# Patient Record
Sex: FEMALE | Race: WHITE | Employment: OTHER | URBAN - METROPOLITAN AREA
[De-identification: names, ages, dates, MRNs, and addresses within clinical notes are randomized per-mention and may not be internally consistent; named-entity substitution may affect disease eponyms.]

---

## 2017-01-01 ENCOUNTER — TELEPHONE (OUTPATIENT)
Dept: NURSING | Facility: CLINIC | Age: 67
End: 2017-01-01

## 2017-01-01 ENCOUNTER — TELEPHONE (OUTPATIENT)
Dept: FAMILY MEDICINE | Facility: CLINIC | Age: 67
End: 2017-01-01

## 2017-01-01 ENCOUNTER — APPOINTMENT (OUTPATIENT)
Dept: GENERAL RADIOLOGY | Facility: CLINIC | Age: 67
DRG: 207 | End: 2017-01-01
Payer: MEDICARE

## 2017-01-01 ENCOUNTER — CARE COORDINATION (OUTPATIENT)
Dept: CARDIOLOGY | Facility: CLINIC | Age: 67
End: 2017-01-01

## 2017-01-01 ENCOUNTER — APPOINTMENT (OUTPATIENT)
Dept: GENERAL RADIOLOGY | Facility: CLINIC | Age: 67
DRG: 871 | End: 2017-01-01
Attending: HOSPITALIST
Payer: MEDICARE

## 2017-01-01 ENCOUNTER — APPOINTMENT (OUTPATIENT)
Dept: GENERAL RADIOLOGY | Facility: CLINIC | Age: 67
DRG: 207 | End: 2017-01-01
Attending: INTERNAL MEDICINE
Payer: MEDICARE

## 2017-01-01 ENCOUNTER — APPOINTMENT (OUTPATIENT)
Dept: ULTRASOUND IMAGING | Facility: CLINIC | Age: 67
DRG: 871 | End: 2017-01-01
Payer: MEDICARE

## 2017-01-01 ENCOUNTER — CARE COORDINATION (OUTPATIENT)
Dept: GERIATRIC MEDICINE | Facility: CLINIC | Age: 67
End: 2017-01-01

## 2017-01-01 ENCOUNTER — APPOINTMENT (OUTPATIENT)
Dept: MEDSURG UNIT | Facility: CLINIC | Age: 67
End: 2017-01-01
Attending: RADIOLOGY
Payer: MEDICARE

## 2017-01-01 ENCOUNTER — CARE COORDINATION (OUTPATIENT)
Dept: CARE COORDINATION | Facility: CLINIC | Age: 67
End: 2017-01-01

## 2017-01-01 ENCOUNTER — APPOINTMENT (OUTPATIENT)
Dept: GENERAL RADIOLOGY | Facility: CLINIC | Age: 67
DRG: 871 | End: 2017-01-01
Payer: MEDICARE

## 2017-01-01 ENCOUNTER — APPOINTMENT (OUTPATIENT)
Dept: CT IMAGING | Facility: CLINIC | Age: 67
DRG: 207 | End: 2017-01-01
Attending: INTERNAL MEDICINE
Payer: MEDICARE

## 2017-01-01 ENCOUNTER — APPOINTMENT (OUTPATIENT)
Dept: ULTRASOUND IMAGING | Facility: CLINIC | Age: 67
DRG: 917 | End: 2017-01-01
Attending: EMERGENCY MEDICINE
Payer: MEDICARE

## 2017-01-01 ENCOUNTER — HOSPITAL ENCOUNTER (INPATIENT)
Facility: CLINIC | Age: 67
LOS: 3 days | Discharge: NURSING, REHAB OR LONG-TERM CARE | DRG: 208 | End: 2017-01-10
Attending: EMERGENCY MEDICINE | Admitting: ORTHOPAEDIC SURGERY
Payer: MEDICARE

## 2017-01-01 ENCOUNTER — HOSPITAL ENCOUNTER (INPATIENT)
Facility: CLINIC | Age: 67
LOS: 5 days | DRG: 207 | End: 2017-04-23
Attending: EMERGENCY MEDICINE | Admitting: INTERNAL MEDICINE
Payer: MEDICARE

## 2017-01-01 ENCOUNTER — APPOINTMENT (OUTPATIENT)
Dept: CT IMAGING | Facility: CLINIC | Age: 67
DRG: 871 | End: 2017-01-01
Attending: EMERGENCY MEDICINE
Payer: MEDICARE

## 2017-01-01 ENCOUNTER — OFFICE VISIT (OUTPATIENT)
Dept: FAMILY MEDICINE | Facility: CLINIC | Age: 67
End: 2017-01-01
Payer: MEDICARE

## 2017-01-01 ENCOUNTER — APPOINTMENT (OUTPATIENT)
Dept: GENERAL RADIOLOGY | Facility: CLINIC | Age: 67
DRG: 871 | End: 2017-01-01
Attending: PEDIATRICS
Payer: MEDICARE

## 2017-01-01 ENCOUNTER — DOCUMENTATION ONLY (OUTPATIENT)
Dept: OTHER | Facility: CLINIC | Age: 67
End: 2017-01-01

## 2017-01-01 ENCOUNTER — HOSPITAL ENCOUNTER (INPATIENT)
Facility: CLINIC | Age: 67
LOS: 6 days | Discharge: HOME HEALTH | DRG: 207 | End: 2017-02-03
Attending: EMERGENCY MEDICINE | Admitting: ORTHOPAEDIC SURGERY
Payer: MEDICARE

## 2017-01-01 ENCOUNTER — APPOINTMENT (OUTPATIENT)
Dept: GENERAL RADIOLOGY | Facility: CLINIC | Age: 67
DRG: 207 | End: 2017-01-01
Attending: EMERGENCY MEDICINE
Payer: MEDICARE

## 2017-01-01 ENCOUNTER — MEDICAL CORRESPONDENCE (OUTPATIENT)
Dept: HEALTH INFORMATION MANAGEMENT | Facility: CLINIC | Age: 67
End: 2017-01-01

## 2017-01-01 ENCOUNTER — TELEPHONE (OUTPATIENT)
Dept: OTHER | Facility: CLINIC | Age: 67
End: 2017-01-01

## 2017-01-01 ENCOUNTER — TELEPHONE (OUTPATIENT)
Dept: INTERNAL MEDICINE | Facility: CLINIC | Age: 67
End: 2017-01-01

## 2017-01-01 ENCOUNTER — APPOINTMENT (OUTPATIENT)
Dept: GENERAL RADIOLOGY | Facility: CLINIC | Age: 67
DRG: 917 | End: 2017-01-01
Attending: EMERGENCY MEDICINE
Payer: MEDICARE

## 2017-01-01 ENCOUNTER — HOSPITAL ENCOUNTER (INPATIENT)
Facility: CLINIC | Age: 67
LOS: 15 days | Discharge: HOME HEALTH | DRG: 871 | End: 2017-04-05
Attending: EMERGENCY MEDICINE | Admitting: SURGERY
Payer: MEDICARE

## 2017-01-01 ENCOUNTER — APPOINTMENT (OUTPATIENT)
Dept: CT IMAGING | Facility: CLINIC | Age: 67
DRG: 871 | End: 2017-01-01
Payer: MEDICARE

## 2017-01-01 ENCOUNTER — APPOINTMENT (OUTPATIENT)
Dept: PHYSICAL THERAPY | Facility: CLINIC | Age: 67
DRG: 917 | End: 2017-01-01
Attending: NURSE PRACTITIONER
Payer: MEDICARE

## 2017-01-01 ENCOUNTER — APPOINTMENT (OUTPATIENT)
Dept: MRI IMAGING | Facility: CLINIC | Age: 67
DRG: 207 | End: 2017-01-01
Attending: INTERNAL MEDICINE
Payer: MEDICARE

## 2017-01-01 ENCOUNTER — HOSPITAL ENCOUNTER (INPATIENT)
Facility: CLINIC | Age: 67
LOS: 4 days | Discharge: HOME HEALTH | DRG: 207 | End: 2017-01-24
Attending: EMERGENCY MEDICINE | Admitting: INTERNAL MEDICINE
Payer: MEDICARE

## 2017-01-01 ENCOUNTER — APPOINTMENT (OUTPATIENT)
Dept: GENERAL RADIOLOGY | Facility: CLINIC | Age: 67
DRG: 917 | End: 2017-01-01
Attending: INTERNAL MEDICINE
Payer: MEDICARE

## 2017-01-01 ENCOUNTER — APPOINTMENT (OUTPATIENT)
Dept: CT IMAGING | Facility: CLINIC | Age: 67
DRG: 917 | End: 2017-01-01
Attending: EMERGENCY MEDICINE
Payer: MEDICARE

## 2017-01-01 ENCOUNTER — OFFICE VISIT (OUTPATIENT)
Dept: BEHAVIORAL HEALTH | Facility: CLINIC | Age: 67
End: 2017-01-01
Payer: MEDICARE

## 2017-01-01 ENCOUNTER — APPOINTMENT (OUTPATIENT)
Dept: GENERAL RADIOLOGY | Facility: CLINIC | Age: 67
End: 2017-01-01
Attending: INTERNAL MEDICINE
Payer: MEDICARE

## 2017-01-01 ENCOUNTER — APPOINTMENT (OUTPATIENT)
Dept: LAB | Facility: CLINIC | Age: 67
End: 2017-01-01
Attending: STUDENT IN AN ORGANIZED HEALTH CARE EDUCATION/TRAINING PROGRAM
Payer: MEDICARE

## 2017-01-01 ENCOUNTER — DOCUMENTATION ONLY (OUTPATIENT)
Dept: CARE COORDINATION | Facility: CLINIC | Age: 67
End: 2017-01-01

## 2017-01-01 ENCOUNTER — APPOINTMENT (OUTPATIENT)
Dept: ULTRASOUND IMAGING | Facility: CLINIC | Age: 67
DRG: 207 | End: 2017-01-01
Attending: PHYSICIAN ASSISTANT
Payer: MEDICARE

## 2017-01-01 ENCOUNTER — ALLIED HEALTH/NURSE VISIT (OUTPATIENT)
Dept: FAMILY MEDICINE | Facility: CLINIC | Age: 67
End: 2017-01-01
Payer: MEDICARE

## 2017-01-01 ENCOUNTER — APPOINTMENT (OUTPATIENT)
Dept: ULTRASOUND IMAGING | Facility: CLINIC | Age: 67
DRG: 208 | End: 2017-01-01
Attending: INTERNAL MEDICINE
Payer: MEDICARE

## 2017-01-01 ENCOUNTER — HOSPITAL ENCOUNTER (INPATIENT)
Facility: CLINIC | Age: 67
LOS: 4 days | Discharge: HOME HEALTH | DRG: 917 | End: 2017-01-06
Attending: EMERGENCY MEDICINE | Admitting: HOSPITALIST
Payer: MEDICARE

## 2017-01-01 ENCOUNTER — APPOINTMENT (OUTPATIENT)
Dept: GENERAL RADIOLOGY | Facility: CLINIC | Age: 67
DRG: 871 | End: 2017-01-01
Attending: INTERNAL MEDICINE
Payer: MEDICARE

## 2017-01-01 ENCOUNTER — ANESTHESIA EVENT (OUTPATIENT)
Dept: SURGERY | Facility: CLINIC | Age: 67
End: 2017-01-01
Payer: MEDICARE

## 2017-01-01 ENCOUNTER — APPOINTMENT (OUTPATIENT)
Dept: GENERAL RADIOLOGY | Facility: CLINIC | Age: 67
DRG: 208 | End: 2017-01-01
Attending: EMERGENCY MEDICINE
Payer: MEDICARE

## 2017-01-01 ENCOUNTER — APPOINTMENT (OUTPATIENT)
Dept: GENERAL RADIOLOGY | Facility: CLINIC | Age: 67
DRG: 871 | End: 2017-01-01
Attending: EMERGENCY MEDICINE
Payer: MEDICARE

## 2017-01-01 ENCOUNTER — APPOINTMENT (OUTPATIENT)
Dept: INTERVENTIONAL RADIOLOGY/VASCULAR | Facility: CLINIC | Age: 67
End: 2017-01-01
Attending: NURSE PRACTITIONER
Payer: MEDICARE

## 2017-01-01 ENCOUNTER — OFFICE VISIT (OUTPATIENT)
Dept: PHARMACY | Facility: CLINIC | Age: 67
End: 2017-01-01
Payer: COMMERCIAL

## 2017-01-01 ENCOUNTER — APPOINTMENT (OUTPATIENT)
Dept: GENERAL RADIOLOGY | Facility: CLINIC | Age: 67
DRG: 208 | End: 2017-01-01
Attending: INTERNAL MEDICINE
Payer: MEDICARE

## 2017-01-01 ENCOUNTER — HOSPITAL ENCOUNTER (OUTPATIENT)
Facility: CLINIC | Age: 67
Setting detail: OBSERVATION
Discharge: HOME HEALTH | End: 2017-02-17
Attending: RADIOLOGY | Admitting: INTERNAL MEDICINE
Payer: MEDICARE

## 2017-01-01 ENCOUNTER — ANESTHESIA (OUTPATIENT)
Dept: SURGERY | Facility: CLINIC | Age: 67
End: 2017-01-01
Payer: MEDICARE

## 2017-01-01 VITALS
OXYGEN SATURATION: 97 % | RESPIRATION RATE: 16 BRPM | SYSTOLIC BLOOD PRESSURE: 137 MMHG | HEART RATE: 116 BPM | HEIGHT: 64 IN | DIASTOLIC BLOOD PRESSURE: 65 MMHG | BODY MASS INDEX: 22.77 KG/M2 | TEMPERATURE: 97.7 F | WEIGHT: 133.4 LBS

## 2017-01-01 VITALS
HEIGHT: 64 IN | WEIGHT: 122 LBS | TEMPERATURE: 96.8 F | RESPIRATION RATE: 20 BRPM | HEART RATE: 134 BPM | OXYGEN SATURATION: 98 % | BODY MASS INDEX: 20.83 KG/M2 | DIASTOLIC BLOOD PRESSURE: 84 MMHG | SYSTOLIC BLOOD PRESSURE: 147 MMHG

## 2017-01-01 VITALS — SYSTOLIC BLOOD PRESSURE: 161 MMHG | HEART RATE: 88 BPM | DIASTOLIC BLOOD PRESSURE: 83 MMHG | OXYGEN SATURATION: 97 %

## 2017-01-01 VITALS — RESPIRATION RATE: 14 BRPM | HEART RATE: 96 BPM | OXYGEN SATURATION: 97 %

## 2017-01-01 VITALS
HEART RATE: 90 BPM | BODY MASS INDEX: 24.87 KG/M2 | TEMPERATURE: 98.3 F | RESPIRATION RATE: 14 BRPM | HEIGHT: 60 IN | SYSTOLIC BLOOD PRESSURE: 94 MMHG | WEIGHT: 126.7 LBS | DIASTOLIC BLOOD PRESSURE: 58 MMHG | OXYGEN SATURATION: 96 %

## 2017-01-01 VITALS
HEART RATE: 86 BPM | DIASTOLIC BLOOD PRESSURE: 76 MMHG | OXYGEN SATURATION: 98 % | RESPIRATION RATE: 16 BRPM | SYSTOLIC BLOOD PRESSURE: 116 MMHG | TEMPERATURE: 98.6 F

## 2017-01-01 VITALS
OXYGEN SATURATION: 98 % | WEIGHT: 130.07 LBS | HEIGHT: 64 IN | SYSTOLIC BLOOD PRESSURE: 106 MMHG | RESPIRATION RATE: 12 BRPM | TEMPERATURE: 98.6 F | BODY MASS INDEX: 22.21 KG/M2 | DIASTOLIC BLOOD PRESSURE: 60 MMHG

## 2017-01-01 VITALS
DIASTOLIC BLOOD PRESSURE: 64 MMHG | RESPIRATION RATE: 14 BRPM | OXYGEN SATURATION: 99 % | SYSTOLIC BLOOD PRESSURE: 104 MMHG | HEART RATE: 92 BPM

## 2017-01-01 VITALS
DIASTOLIC BLOOD PRESSURE: 76 MMHG | OXYGEN SATURATION: 96 % | RESPIRATION RATE: 16 BRPM | HEART RATE: 88 BPM | SYSTOLIC BLOOD PRESSURE: 132 MMHG

## 2017-01-01 VITALS
BODY MASS INDEX: 21.08 KG/M2 | WEIGHT: 123.46 LBS | OXYGEN SATURATION: 100 % | TEMPERATURE: 98.7 F | HEIGHT: 64 IN | RESPIRATION RATE: 16 BRPM | DIASTOLIC BLOOD PRESSURE: 75 MMHG | SYSTOLIC BLOOD PRESSURE: 139 MMHG

## 2017-01-01 VITALS
RESPIRATION RATE: 14 BRPM | DIASTOLIC BLOOD PRESSURE: 68 MMHG | OXYGEN SATURATION: 100 % | SYSTOLIC BLOOD PRESSURE: 132 MMHG | HEART RATE: 98 BPM

## 2017-01-01 VITALS
HEART RATE: 81 BPM | SYSTOLIC BLOOD PRESSURE: 98 MMHG | DIASTOLIC BLOOD PRESSURE: 58 MMHG | RESPIRATION RATE: 16 BRPM | OXYGEN SATURATION: 98 %

## 2017-01-01 VITALS
HEIGHT: 64 IN | HEART RATE: 122 BPM | DIASTOLIC BLOOD PRESSURE: 61 MMHG | OXYGEN SATURATION: 96 % | SYSTOLIC BLOOD PRESSURE: 103 MMHG | WEIGHT: 133.38 LBS | RESPIRATION RATE: 20 BRPM | TEMPERATURE: 97.7 F | BODY MASS INDEX: 22.77 KG/M2

## 2017-01-01 VITALS
OXYGEN SATURATION: 13 % | SYSTOLIC BLOOD PRESSURE: 127 MMHG | HEART RATE: 87 BPM | HEIGHT: 60 IN | DIASTOLIC BLOOD PRESSURE: 90 MMHG | WEIGHT: 120.59 LBS | BODY MASS INDEX: 23.68 KG/M2 | TEMPERATURE: 98.5 F

## 2017-01-01 DIAGNOSIS — K52.9 FREQUENT STOOLS: ICD-10-CM

## 2017-01-01 DIAGNOSIS — K59.00 CONSTIPATION, UNSPECIFIED CONSTIPATION TYPE: ICD-10-CM

## 2017-01-01 DIAGNOSIS — Z87.891 PERSONAL HISTORY OF TOBACCO USE, PRESENTING HAZARDS TO HEALTH: ICD-10-CM

## 2017-01-01 DIAGNOSIS — E56.9 VITAMIN DEFICIENCY: ICD-10-CM

## 2017-01-01 DIAGNOSIS — F41.1 GENERALIZED ANXIETY DISORDER: ICD-10-CM

## 2017-01-01 DIAGNOSIS — J96.22 ACUTE AND CHRONIC RESPIRATORY FAILURE WITH HYPERCAPNIA (H): ICD-10-CM

## 2017-01-01 DIAGNOSIS — Z99.11 ENCOUNTER FOR WEANING FROM RESPIRATOR (H): ICD-10-CM

## 2017-01-01 DIAGNOSIS — T40.2X5A CONSTIPATION DUE TO OPIOID THERAPY: ICD-10-CM

## 2017-01-01 DIAGNOSIS — J96.21 ACUTE ON CHRONIC RESPIRATORY FAILURE WITH HYPOXEMIA (H): ICD-10-CM

## 2017-01-01 DIAGNOSIS — F41.9 ANXIETY: ICD-10-CM

## 2017-01-01 DIAGNOSIS — G62.0 DIETARY SUPPLEMENT-INDUCED NEUROPATHY (H): ICD-10-CM

## 2017-01-01 DIAGNOSIS — K21.9 GASTROESOPHAGEAL REFLUX DISEASE, ESOPHAGITIS PRESENCE NOT SPECIFIED: ICD-10-CM

## 2017-01-01 DIAGNOSIS — R09.89 AIR HUNGER: ICD-10-CM

## 2017-01-01 DIAGNOSIS — K22.0 ACHALASIA: ICD-10-CM

## 2017-01-01 DIAGNOSIS — L03.90 CELLULITIS, UNSPECIFIED CELLULITIS SITE: ICD-10-CM

## 2017-01-01 DIAGNOSIS — J96.21 ACUTE ON CHRONIC RESPIRATORY FAILURE WITH HYPOXIA (H): ICD-10-CM

## 2017-01-01 DIAGNOSIS — R06.02 SHORTNESS OF BREATH: ICD-10-CM

## 2017-01-01 DIAGNOSIS — F18.10: ICD-10-CM

## 2017-01-01 DIAGNOSIS — J96.21 ACUTE ON CHRONIC RESPIRATORY FAILURE WITH HYPOXIA (H): Primary | ICD-10-CM

## 2017-01-01 DIAGNOSIS — M54.9 BACK PAIN, UNSPECIFIED BACK LOCATION, UNSPECIFIED BACK PAIN LATERALITY, UNSPECIFIED CHRONICITY: ICD-10-CM

## 2017-01-01 DIAGNOSIS — R19.7 DIARRHEA, UNSPECIFIED TYPE: ICD-10-CM

## 2017-01-01 DIAGNOSIS — T85.598A FEEDING TUBE DYSFUNCTION, INITIAL ENCOUNTER: Primary | ICD-10-CM

## 2017-01-01 DIAGNOSIS — N39.0 URINARY TRACT INFECTION WITHOUT HEMATURIA, SITE UNSPECIFIED: ICD-10-CM

## 2017-01-01 DIAGNOSIS — Z93.0 TRACHEOSTOMY STATUS (H): ICD-10-CM

## 2017-01-01 DIAGNOSIS — I21.4 NSTEMI (NON-ST ELEVATED MYOCARDIAL INFARCTION) (H): ICD-10-CM

## 2017-01-01 DIAGNOSIS — R06.03 RESPIRATORY DIFFICULTY: ICD-10-CM

## 2017-01-01 DIAGNOSIS — J96.22 ACUTE AND CHRONIC RESPIRATORY FAILURE WITH HYPERCAPNIA (H): Primary | ICD-10-CM

## 2017-01-01 DIAGNOSIS — J44.1 COPD EXACERBATION (H): ICD-10-CM

## 2017-01-01 DIAGNOSIS — J44.9 CHRONIC OBSTRUCTIVE PULMONARY DISEASE, UNSPECIFIED COPD TYPE (H): ICD-10-CM

## 2017-01-01 DIAGNOSIS — B96.4 URINARY TRACT INFECTION DUE TO PROTEUS: Primary | ICD-10-CM

## 2017-01-01 DIAGNOSIS — R06.89 HYPERCARBIA: ICD-10-CM

## 2017-01-01 DIAGNOSIS — K22.4 ESOPHAGEAL DYSMOTILITY: Primary | ICD-10-CM

## 2017-01-01 DIAGNOSIS — F41.1 GENERALIZED ANXIETY DISORDER: Primary | ICD-10-CM

## 2017-01-01 DIAGNOSIS — K59.03 CONSTIPATION DUE TO OPIOID THERAPY: ICD-10-CM

## 2017-01-01 DIAGNOSIS — J06.9 UPPER RESPIRATORY TRACT INFECTION, UNSPECIFIED TYPE: Primary | ICD-10-CM

## 2017-01-01 DIAGNOSIS — J44.89 OBSTRUCTIVE CHRONIC BRONCHITIS WITHOUT EXACERBATION (H): ICD-10-CM

## 2017-01-01 DIAGNOSIS — R33.9 URINARY RETENTION: Primary | ICD-10-CM

## 2017-01-01 DIAGNOSIS — N39.0 URINARY TRACT INFECTION DUE TO PROTEUS: Primary | ICD-10-CM

## 2017-01-01 DIAGNOSIS — J43.2 CENTRILOBULAR EMPHYSEMA (H): ICD-10-CM

## 2017-01-01 DIAGNOSIS — J96.00 ACUTE RESPIRATORY FAILURE, UNSPECIFIED WHETHER WITH HYPOXIA OR HYPERCAPNIA (H): ICD-10-CM

## 2017-01-01 DIAGNOSIS — T50.905A DIETARY SUPPLEMENT-INDUCED NEUROPATHY (H): ICD-10-CM

## 2017-01-01 DIAGNOSIS — L89.150 DECUBITUS ULCER OF SACRAL REGION, UNSTAGEABLE (H): Primary | ICD-10-CM

## 2017-01-01 DIAGNOSIS — F41.9 ANXIETY: Primary | ICD-10-CM

## 2017-01-01 DIAGNOSIS — G47.01 INSOMNIA DUE TO MEDICAL CONDITION: ICD-10-CM

## 2017-01-01 DIAGNOSIS — J18.9 HCAP (HEALTHCARE-ASSOCIATED PNEUMONIA): ICD-10-CM

## 2017-01-01 DIAGNOSIS — J44.9 END STAGE COPD (H): Primary | ICD-10-CM

## 2017-01-01 DIAGNOSIS — J43.2 CENTRILOBULAR EMPHYSEMA (H): Primary | ICD-10-CM

## 2017-01-01 DIAGNOSIS — J44.1 CHRONIC OBSTRUCTIVE PULMONARY DISEASE WITH ACUTE EXACERBATION (H): ICD-10-CM

## 2017-01-01 DIAGNOSIS — Z71.89 ADVANCED DIRECTIVES, COUNSELING/DISCUSSION: ICD-10-CM

## 2017-01-01 DIAGNOSIS — R41.82 ALTERED MENTAL STATUS, UNSPECIFIED ALTERED MENTAL STATUS TYPE: ICD-10-CM

## 2017-01-01 DIAGNOSIS — I21.4 NSTEMI (NON-ST ELEVATED MYOCARDIAL INFARCTION) (H): Primary | ICD-10-CM

## 2017-01-01 DIAGNOSIS — Z76.89 HEALTH CARE HOME: ICD-10-CM

## 2017-01-01 DIAGNOSIS — R06.02 SHORTNESS OF BREATH: Primary | ICD-10-CM

## 2017-01-01 DIAGNOSIS — R65.10 SIRS (SYSTEMIC INFLAMMATORY RESPONSE SYNDROME) (H): ICD-10-CM

## 2017-01-01 DIAGNOSIS — R07.89 ATYPICAL CHEST PAIN: Primary | ICD-10-CM

## 2017-01-01 DIAGNOSIS — N39.0 URINARY TRACT INFECTION WITHOUT HEMATURIA, SITE UNSPECIFIED: Primary | ICD-10-CM

## 2017-01-01 DIAGNOSIS — J42 CHRONIC BRONCHITIS, UNSPECIFIED CHRONIC BRONCHITIS TYPE (H): ICD-10-CM

## 2017-01-01 DIAGNOSIS — K59.03 CONSTIPATION DUE TO OPIOID THERAPY: Primary | ICD-10-CM

## 2017-01-01 DIAGNOSIS — E87.1 HYPONATREMIA: ICD-10-CM

## 2017-01-01 DIAGNOSIS — J44.9 END STAGE COPD (H): ICD-10-CM

## 2017-01-01 DIAGNOSIS — Z87.440 PERSONAL HISTORY OF URINARY TRACT INFECTION: ICD-10-CM

## 2017-01-01 DIAGNOSIS — H04.123 DRY EYES, BILATERAL: ICD-10-CM

## 2017-01-01 DIAGNOSIS — J44.1 COPD EXACERBATION (H): Primary | ICD-10-CM

## 2017-01-01 DIAGNOSIS — N39.0 CHRONIC LOWER URINARY TRACT INFECTION: Primary | ICD-10-CM

## 2017-01-01 DIAGNOSIS — H61.20 IMPACTED CERUMEN, UNSPECIFIED LATERALITY: ICD-10-CM

## 2017-01-01 DIAGNOSIS — J18.9 PNEUMONIA OF LEFT LOWER LOBE DUE TO INFECTIOUS ORGANISM: ICD-10-CM

## 2017-01-01 DIAGNOSIS — J96.20 ACUTE ON CHRONIC RESPIRATORY FAILURE, UNSPECIFIED WHETHER WITH HYPOXIA OR HYPERCAPNIA (H): Primary | ICD-10-CM

## 2017-01-01 DIAGNOSIS — R09.02 HYPOXIA: ICD-10-CM

## 2017-01-01 DIAGNOSIS — T40.2X5A CONSTIPATION DUE TO OPIOID THERAPY: Primary | ICD-10-CM

## 2017-01-01 DIAGNOSIS — Z71.89 ACP (ADVANCE CARE PLANNING): Chronic | ICD-10-CM

## 2017-01-01 DIAGNOSIS — N39.0 URINARY TRACT INFECTION, SITE NOT SPECIFIED: ICD-10-CM

## 2017-01-01 LAB
ABO + RH BLD: NORMAL
ALBUMIN SERPL-MCNC: 2 G/DL (ref 3.4–5)
ALBUMIN SERPL-MCNC: 2.1 G/DL (ref 3.4–5)
ALBUMIN SERPL-MCNC: 2.4 G/DL (ref 3.4–5)
ALBUMIN SERPL-MCNC: 2.5 G/DL (ref 3.4–5)
ALBUMIN SERPL-MCNC: 2.5 G/DL (ref 3.4–5)
ALBUMIN SERPL-MCNC: 2.6 G/DL (ref 3.4–5)
ALBUMIN SERPL-MCNC: 2.7 G/DL (ref 3.4–5)
ALBUMIN SERPL-MCNC: 2.7 G/DL (ref 3.4–5)
ALBUMIN SERPL-MCNC: 3.1 G/DL (ref 3.4–5)
ALBUMIN UR-MCNC: 10 MG/DL
ALBUMIN UR-MCNC: 10 MG/DL
ALBUMIN UR-MCNC: 100 MG/DL
ALBUMIN UR-MCNC: 30 MG/DL
ALBUMIN UR-MCNC: 30 MG/DL
ALBUMIN UR-MCNC: NEGATIVE MG/DL
ALBUMIN UR-MCNC: NEGATIVE MG/DL
ALP SERPL-CCNC: 104 U/L (ref 40–150)
ALP SERPL-CCNC: 113 U/L (ref 40–150)
ALP SERPL-CCNC: 164 U/L (ref 40–150)
ALP SERPL-CCNC: 58 U/L (ref 40–150)
ALP SERPL-CCNC: 65 U/L (ref 40–150)
ALP SERPL-CCNC: 72 U/L (ref 40–150)
ALP SERPL-CCNC: 73 U/L (ref 40–150)
ALP SERPL-CCNC: 79 U/L (ref 40–150)
ALP SERPL-CCNC: 82 U/L (ref 40–150)
ALP SERPL-CCNC: 84 U/L (ref 40–150)
ALP SERPL-CCNC: 91 U/L (ref 40–150)
ALT SERPL W P-5'-P-CCNC: 15 U/L (ref 0–50)
ALT SERPL W P-5'-P-CCNC: 16 U/L (ref 0–50)
ALT SERPL W P-5'-P-CCNC: 21 U/L (ref 0–50)
ALT SERPL W P-5'-P-CCNC: 22 U/L (ref 0–50)
ALT SERPL W P-5'-P-CCNC: 24 U/L (ref 0–50)
ALT SERPL W P-5'-P-CCNC: 26 U/L (ref 0–50)
ALT SERPL W P-5'-P-CCNC: 33 U/L (ref 0–50)
ALT SERPL W P-5'-P-CCNC: 39 U/L (ref 0–50)
ANION GAP SERPL CALCULATED.3IONS-SCNC: 11 MMOL/L (ref 3–14)
ANION GAP SERPL CALCULATED.3IONS-SCNC: 2 MMOL/L (ref 3–14)
ANION GAP SERPL CALCULATED.3IONS-SCNC: 3 MMOL/L (ref 3–14)
ANION GAP SERPL CALCULATED.3IONS-SCNC: 3 MMOL/L (ref 3–14)
ANION GAP SERPL CALCULATED.3IONS-SCNC: 4 MMOL/L (ref 3–14)
ANION GAP SERPL CALCULATED.3IONS-SCNC: 5 MMOL/L (ref 3–14)
ANION GAP SERPL CALCULATED.3IONS-SCNC: 6 MMOL/L (ref 3–14)
ANION GAP SERPL CALCULATED.3IONS-SCNC: 7 MMOL/L (ref 3–14)
ANION GAP SERPL CALCULATED.3IONS-SCNC: 8 MMOL/L (ref 3–14)
ANION GAP SERPL CALCULATED.3IONS-SCNC: 9 MMOL/L (ref 3–14)
ANION GAP SERPL CALCULATED.3IONS-SCNC: ABNORMAL MMOL/L (ref 3–14)
APPEARANCE UR: ABNORMAL
APPEARANCE UR: ABNORMAL
APPEARANCE UR: CLEAR
AST SERPL W P-5'-P-CCNC: 10 U/L (ref 0–45)
AST SERPL W P-5'-P-CCNC: 11 U/L (ref 0–45)
AST SERPL W P-5'-P-CCNC: 12 U/L (ref 0–45)
AST SERPL W P-5'-P-CCNC: 14 U/L (ref 0–45)
AST SERPL W P-5'-P-CCNC: 16 U/L (ref 0–45)
AST SERPL W P-5'-P-CCNC: 17 U/L (ref 0–45)
AST SERPL W P-5'-P-CCNC: 19 U/L (ref 0–45)
AST SERPL W P-5'-P-CCNC: 19 U/L (ref 0–45)
AST SERPL W P-5'-P-CCNC: 22 U/L (ref 0–45)
AST SERPL W P-5'-P-CCNC: 22 U/L (ref 0–45)
AST SERPL W P-5'-P-CCNC: 23 U/L (ref 0–45)
AST SERPL W P-5'-P-CCNC: 24 U/L (ref 0–45)
AST SERPL W P-5'-P-CCNC: 9 U/L (ref 0–45)
BACTERIA #/AREA URNS HPF: ABNORMAL /HPF
BACTERIA SPEC CULT: ABNORMAL
BACTERIA SPEC CULT: NO GROWTH
BACTERIA SPEC CULT: NORMAL
BASE EXCESS BLDA CALC-SCNC: 17.3 MMOL/L
BASE EXCESS BLDA CALC-SCNC: 17.4 MMOL/L
BASE EXCESS BLDA CALC-SCNC: 18.4 MMOL/L
BASE EXCESS BLDA CALC-SCNC: 19.3 MMOL/L
BASE EXCESS BLDA CALC-SCNC: 8.9 MMOL/L
BASE EXCESS BLDV CALC-SCNC: 10 MMOL/L
BASE EXCESS BLDV CALC-SCNC: 12.1 MMOL/L
BASE EXCESS BLDV CALC-SCNC: 12.5 MMOL/L
BASE EXCESS BLDV CALC-SCNC: 13.8 MMOL/L
BASE EXCESS BLDV CALC-SCNC: 14.2 MMOL/L
BASE EXCESS BLDV CALC-SCNC: 17.3 MMOL/L
BASE EXCESS BLDV CALC-SCNC: 18.9 MMOL/L
BASE EXCESS BLDV CALC-SCNC: 25.2 MMOL/L
BASE EXCESS BLDV CALC-SCNC: 25.3 MMOL/L
BASE EXCESS BLDV CALC-SCNC: 26.8 MMOL/L
BASE EXCESS BLDV CALC-SCNC: 3.9 MMOL/L
BASE EXCESS BLDV CALC-SCNC: 5.3 MMOL/L
BASE EXCESS BLDV CALC-SCNC: 7.2 MMOL/L
BASE EXCESS BLDV CALC-SCNC: 7.9 MMOL/L
BASE EXCESS BLDV CALC-SCNC: 8.4 MMOL/L
BASE EXCESS BLDV CALC-SCNC: 8.6 MMOL/L
BASOPHILS # BLD AUTO: 0 10E9/L (ref 0–0.2)
BASOPHILS # BLD AUTO: 0.1 10E9/L (ref 0–0.2)
BASOPHILS NFR BLD AUTO: 0.1 %
BASOPHILS NFR BLD AUTO: 0.2 %
BASOPHILS NFR BLD AUTO: 0.3 %
BASOPHILS NFR BLD AUTO: 0.4 %
BILIRUB DIRECT SERPL-MCNC: <0.1 MG/DL (ref 0–0.2)
BILIRUB SERPL-MCNC: 0.2 MG/DL (ref 0.2–1.3)
BILIRUB SERPL-MCNC: 0.3 MG/DL (ref 0.2–1.3)
BILIRUB SERPL-MCNC: 0.3 MG/DL (ref 0.2–1.3)
BILIRUB SERPL-MCNC: 0.4 MG/DL (ref 0.2–1.3)
BILIRUB SERPL-MCNC: 0.7 MG/DL (ref 0.2–1.3)
BILIRUB UR QL STRIP: NEGATIVE
BLD GP AB SCN SERPL QL: NORMAL
BLD GP AB SCN SERPL QL: NORMAL
BLD PROD TYP BPU: NORMAL
BLD UNIT ID BPU: 0
BLD UNIT ID BPU: 0
BLOOD BANK CMNT PATIENT-IMP: NORMAL
BLOOD BANK CMNT PATIENT-IMP: NORMAL
BLOOD PRODUCT CODE: NORMAL
BLOOD PRODUCT CODE: NORMAL
BPU ID: NORMAL
BPU ID: NORMAL
BUN SERPL-MCNC: 10 MG/DL (ref 7–30)
BUN SERPL-MCNC: 10 MG/DL (ref 7–30)
BUN SERPL-MCNC: 11 MG/DL (ref 7–30)
BUN SERPL-MCNC: 11 MG/DL (ref 7–30)
BUN SERPL-MCNC: 14 MG/DL (ref 7–30)
BUN SERPL-MCNC: 15 MG/DL (ref 7–30)
BUN SERPL-MCNC: 16 MG/DL (ref 7–30)
BUN SERPL-MCNC: 16 MG/DL (ref 7–30)
BUN SERPL-MCNC: 17 MG/DL (ref 7–30)
BUN SERPL-MCNC: 17 MG/DL (ref 7–30)
BUN SERPL-MCNC: 18 MG/DL (ref 7–30)
BUN SERPL-MCNC: 18 MG/DL (ref 7–30)
BUN SERPL-MCNC: 19 MG/DL (ref 7–30)
BUN SERPL-MCNC: 21 MG/DL (ref 7–30)
BUN SERPL-MCNC: 23 MG/DL (ref 7–30)
BUN SERPL-MCNC: 24 MG/DL (ref 7–30)
BUN SERPL-MCNC: 25 MG/DL (ref 7–30)
BUN SERPL-MCNC: 26 MG/DL (ref 7–30)
BUN SERPL-MCNC: 27 MG/DL (ref 7–30)
BUN SERPL-MCNC: 28 MG/DL (ref 7–30)
BUN SERPL-MCNC: 29 MG/DL (ref 7–30)
BUN SERPL-MCNC: 29 MG/DL (ref 7–30)
BUN SERPL-MCNC: 30 MG/DL (ref 7–30)
BUN SERPL-MCNC: 30 MG/DL (ref 7–30)
BUN SERPL-MCNC: 32 MG/DL (ref 7–30)
BUN SERPL-MCNC: 38 MG/DL (ref 7–30)
BUN SERPL-MCNC: 41 MG/DL (ref 7–30)
BUN SERPL-MCNC: 41 MG/DL (ref 7–30)
BUN SERPL-MCNC: 46 MG/DL (ref 7–30)
BUN SERPL-MCNC: 46 MG/DL (ref 7–30)
BUN SERPL-MCNC: 47 MG/DL (ref 7–30)
BUN SERPL-MCNC: 50 MG/DL (ref 7–30)
BUN SERPL-MCNC: 50 MG/DL (ref 7–30)
BUN SERPL-MCNC: 57 MG/DL (ref 7–30)
BUN SERPL-MCNC: 66 MG/DL (ref 7–30)
BUN SERPL-MCNC: 8 MG/DL (ref 7–30)
BUN SERPL-MCNC: 84 MG/DL (ref 7–30)
BUN SERPL-MCNC: 9 MG/DL (ref 7–30)
BUN SERPL-MCNC: 9 MG/DL (ref 7–30)
C DIFF TOX B STL QL: NORMAL
CA-I BLD-SCNC: 4.3 MG/DL (ref 4.4–5.2)
CA-I BLD-SCNC: 4.5 MG/DL (ref 4.4–5.2)
CALCIUM SERPL-MCNC: 7.6 MG/DL (ref 8.5–10.1)
CALCIUM SERPL-MCNC: 7.7 MG/DL (ref 8.5–10.1)
CALCIUM SERPL-MCNC: 7.9 MG/DL (ref 8.5–10.1)
CALCIUM SERPL-MCNC: 8.2 MG/DL (ref 8.5–10.1)
CALCIUM SERPL-MCNC: 8.2 MG/DL (ref 8.5–10.1)
CALCIUM SERPL-MCNC: 8.3 MG/DL (ref 8.5–10.1)
CALCIUM SERPL-MCNC: 8.4 MG/DL (ref 8.5–10.1)
CALCIUM SERPL-MCNC: 8.4 MG/DL (ref 8.5–10.1)
CALCIUM SERPL-MCNC: 8.5 MG/DL (ref 8.5–10.1)
CALCIUM SERPL-MCNC: 8.6 MG/DL (ref 8.5–10.1)
CALCIUM SERPL-MCNC: 8.7 MG/DL (ref 8.5–10.1)
CALCIUM SERPL-MCNC: 8.7 MG/DL (ref 8.5–10.1)
CALCIUM SERPL-MCNC: 8.8 MG/DL (ref 8.5–10.1)
CALCIUM SERPL-MCNC: 8.8 MG/DL (ref 8.5–10.1)
CALCIUM SERPL-MCNC: 8.9 MG/DL (ref 8.5–10.1)
CALCIUM SERPL-MCNC: 8.9 MG/DL (ref 8.5–10.1)
CALCIUM SERPL-MCNC: 9 MG/DL (ref 8.5–10.1)
CALCIUM SERPL-MCNC: 9.1 MG/DL (ref 8.5–10.1)
CALCIUM SERPL-MCNC: 9.2 MG/DL (ref 8.5–10.1)
CALCIUM SERPL-MCNC: 9.4 MG/DL (ref 8.5–10.1)
CALCIUM SERPL-MCNC: 9.5 MG/DL (ref 8.5–10.1)
CALCIUM SERPL-MCNC: 9.6 MG/DL (ref 8.5–10.1)
CALCIUM SERPL-MCNC: 9.9 MG/DL (ref 8.5–10.1)
CAOX CRY #/AREA URNS HPF: ABNORMAL /HPF
CHLORIDE SERPL-SCNC: 100 MMOL/L (ref 94–109)
CHLORIDE SERPL-SCNC: 100 MMOL/L (ref 94–109)
CHLORIDE SERPL-SCNC: 102 MMOL/L (ref 94–109)
CHLORIDE SERPL-SCNC: 103 MMOL/L (ref 94–109)
CHLORIDE SERPL-SCNC: 64 MMOL/L (ref 94–109)
CHLORIDE SERPL-SCNC: 64 MMOL/L (ref 94–109)
CHLORIDE SERPL-SCNC: 71 MMOL/L (ref 94–109)
CHLORIDE SERPL-SCNC: 72 MMOL/L (ref 94–109)
CHLORIDE SERPL-SCNC: 84 MMOL/L (ref 94–109)
CHLORIDE SERPL-SCNC: 86 MMOL/L (ref 94–109)
CHLORIDE SERPL-SCNC: 89 MMOL/L (ref 94–109)
CHLORIDE SERPL-SCNC: 90 MMOL/L (ref 94–109)
CHLORIDE SERPL-SCNC: 91 MMOL/L (ref 94–109)
CHLORIDE SERPL-SCNC: 92 MMOL/L (ref 94–109)
CHLORIDE SERPL-SCNC: 94 MMOL/L (ref 94–109)
CHLORIDE SERPL-SCNC: 95 MMOL/L (ref 94–109)
CHLORIDE SERPL-SCNC: 96 MMOL/L (ref 94–109)
CHLORIDE SERPL-SCNC: 97 MMOL/L (ref 94–109)
CHLORIDE SERPL-SCNC: 98 MMOL/L (ref 94–109)
CHLORIDE SERPL-SCNC: 99 MMOL/L (ref 94–109)
CO2 BLDCOV-SCNC: 34 MMOL/L (ref 21–28)
CO2 BLDCOV-SCNC: 42 MMOL/L (ref 21–28)
CO2 BLDCOV-SCNC: 44 MMOL/L (ref 21–28)
CO2 BLDCOV-SCNC: 48 MMOL/L (ref 21–28)
CO2 BLDCOV-SCNC: 49 MMOL/L (ref 21–28)
CO2 BLDCOV-SCNC: 52 MMOL/L (ref 21–28)
CO2 BLDCOV-SCNC: 55 MMOL/L (ref 21–28)
CO2 SERPL-SCNC: 28 MMOL/L (ref 20–32)
CO2 SERPL-SCNC: 31 MMOL/L (ref 20–32)
CO2 SERPL-SCNC: 31 MMOL/L (ref 20–32)
CO2 SERPL-SCNC: 32 MMOL/L (ref 20–32)
CO2 SERPL-SCNC: 34 MMOL/L (ref 20–32)
CO2 SERPL-SCNC: 35 MMOL/L (ref 20–32)
CO2 SERPL-SCNC: 36 MMOL/L (ref 20–32)
CO2 SERPL-SCNC: 37 MMOL/L (ref 20–32)
CO2 SERPL-SCNC: 38 MMOL/L (ref 20–32)
CO2 SERPL-SCNC: 39 MMOL/L (ref 20–32)
CO2 SERPL-SCNC: 40 MMOL/L (ref 20–32)
CO2 SERPL-SCNC: 41 MMOL/L (ref 20–32)
CO2 SERPL-SCNC: 41 MMOL/L (ref 20–32)
CO2 SERPL-SCNC: 42 MMOL/L (ref 20–32)
CO2 SERPL-SCNC: 42 MMOL/L (ref 20–32)
CO2 SERPL-SCNC: 45 MMOL/L (ref 20–32)
CO2 SERPL-SCNC: ABNORMAL MMOL/L (ref 20–32)
COLOR UR AUTO: ABNORMAL
COLOR UR AUTO: YELLOW
CORTIS SERPL-MCNC: 19.8 UG/DL (ref 4–22)
CREAT SERPL-MCNC: 0.33 MG/DL (ref 0.52–1.04)
CREAT SERPL-MCNC: 0.35 MG/DL (ref 0.52–1.04)
CREAT SERPL-MCNC: 0.36 MG/DL (ref 0.52–1.04)
CREAT SERPL-MCNC: 0.37 MG/DL (ref 0.52–1.04)
CREAT SERPL-MCNC: 0.39 MG/DL (ref 0.52–1.04)
CREAT SERPL-MCNC: 0.39 MG/DL (ref 0.52–1.04)
CREAT SERPL-MCNC: 0.4 MG/DL (ref 0.52–1.04)
CREAT SERPL-MCNC: 0.41 MG/DL (ref 0.52–1.04)
CREAT SERPL-MCNC: 0.42 MG/DL (ref 0.52–1.04)
CREAT SERPL-MCNC: 0.46 MG/DL (ref 0.52–1.04)
CREAT SERPL-MCNC: 0.47 MG/DL (ref 0.52–1.04)
CREAT SERPL-MCNC: 0.47 MG/DL (ref 0.52–1.04)
CREAT SERPL-MCNC: 0.49 MG/DL (ref 0.52–1.04)
CREAT SERPL-MCNC: 0.5 MG/DL (ref 0.52–1.04)
CREAT SERPL-MCNC: 0.5 MG/DL (ref 0.52–1.04)
CREAT SERPL-MCNC: 0.52 MG/DL (ref 0.52–1.04)
CREAT SERPL-MCNC: 0.53 MG/DL (ref 0.52–1.04)
CREAT SERPL-MCNC: 0.53 MG/DL (ref 0.52–1.04)
CREAT SERPL-MCNC: 0.54 MG/DL (ref 0.52–1.04)
CREAT SERPL-MCNC: 0.54 MG/DL (ref 0.52–1.04)
CREAT SERPL-MCNC: 0.55 MG/DL (ref 0.52–1.04)
CREAT SERPL-MCNC: 0.56 MG/DL (ref 0.52–1.04)
CREAT SERPL-MCNC: 0.58 MG/DL (ref 0.52–1.04)
CREAT SERPL-MCNC: 0.6 MG/DL (ref 0.52–1.04)
CREAT SERPL-MCNC: 0.61 MG/DL (ref 0.52–1.04)
CREAT SERPL-MCNC: 0.61 MG/DL (ref 0.52–1.04)
CREAT SERPL-MCNC: 0.64 MG/DL (ref 0.52–1.04)
CREAT SERPL-MCNC: 0.65 MG/DL (ref 0.52–1.04)
CREAT SERPL-MCNC: 0.65 MG/DL (ref 0.52–1.04)
CREAT SERPL-MCNC: 0.76 MG/DL (ref 0.52–1.04)
CREAT SERPL-MCNC: 0.8 MG/DL (ref 0.52–1.04)
CREAT SERPL-MCNC: 0.82 MG/DL (ref 0.52–1.04)
CREAT SERPL-MCNC: 0.83 MG/DL (ref 0.52–1.04)
CREAT SERPL-MCNC: 0.84 MG/DL (ref 0.52–1.04)
CREAT SERPL-MCNC: 0.97 MG/DL (ref 0.52–1.04)
CRP SERPL-MCNC: 10 MG/L (ref 0–8)
CRP SERPL-MCNC: 140 MG/L (ref 0–8)
CRP SERPL-MCNC: 18 MG/L (ref 0–8)
CRP SERPL-MCNC: 21.1 MG/L (ref 0–8)
CRP SERPL-MCNC: 22 MG/L (ref 0–8)
CRP SERPL-MCNC: 4 MG/L (ref 0–8)
CRP SERPL-MCNC: 42 MG/L (ref 0–8)
CRP SERPL-MCNC: 45 MG/L (ref 0–8)
CRP SERPL-MCNC: 83 MG/L (ref 0–8)
DIFFERENTIAL METHOD BLD: ABNORMAL
DIFFERENTIAL METHOD BLD: NORMAL
EOSINOPHIL # BLD AUTO: 0 10E9/L (ref 0–0.7)
EOSINOPHIL # BLD AUTO: 0.1 10E9/L (ref 0–0.7)
EOSINOPHIL # BLD AUTO: 0.2 10E9/L (ref 0–0.7)
EOSINOPHIL # BLD AUTO: 0.5 10E9/L (ref 0–0.7)
EOSINOPHIL # BLD AUTO: 0.7 10E9/L (ref 0–0.7)
EOSINOPHIL # BLD AUTO: 0.8 10E9/L (ref 0–0.7)
EOSINOPHIL # BLD AUTO: 0.9 10E9/L (ref 0–0.7)
EOSINOPHIL # BLD AUTO: 1 10E9/L (ref 0–0.7)
EOSINOPHIL NFR BLD AUTO: 0 %
EOSINOPHIL NFR BLD AUTO: 0 %
EOSINOPHIL NFR BLD AUTO: 0.1 %
EOSINOPHIL NFR BLD AUTO: 0.1 %
EOSINOPHIL NFR BLD AUTO: 0.3 %
EOSINOPHIL NFR BLD AUTO: 1 %
EOSINOPHIL NFR BLD AUTO: 1.3 %
EOSINOPHIL NFR BLD AUTO: 1.8 %
EOSINOPHIL NFR BLD AUTO: 1.9 %
EOSINOPHIL NFR BLD AUTO: 3.8 %
EOSINOPHIL NFR BLD AUTO: 4 %
EOSINOPHIL NFR BLD AUTO: 4.6 %
EOSINOPHIL NFR BLD AUTO: 6.7 %
ERYTHROCYTE [DISTWIDTH] IN BLOOD BY AUTOMATED COUNT: 15 % (ref 10–15)
ERYTHROCYTE [DISTWIDTH] IN BLOOD BY AUTOMATED COUNT: 15.1 % (ref 10–15)
ERYTHROCYTE [DISTWIDTH] IN BLOOD BY AUTOMATED COUNT: 15.2 % (ref 10–15)
ERYTHROCYTE [DISTWIDTH] IN BLOOD BY AUTOMATED COUNT: 15.3 % (ref 10–15)
ERYTHROCYTE [DISTWIDTH] IN BLOOD BY AUTOMATED COUNT: 15.4 % (ref 10–15)
ERYTHROCYTE [DISTWIDTH] IN BLOOD BY AUTOMATED COUNT: 15.5 % (ref 10–15)
ERYTHROCYTE [DISTWIDTH] IN BLOOD BY AUTOMATED COUNT: 15.5 % (ref 10–15)
ERYTHROCYTE [DISTWIDTH] IN BLOOD BY AUTOMATED COUNT: 15.6 % (ref 10–15)
ERYTHROCYTE [DISTWIDTH] IN BLOOD BY AUTOMATED COUNT: 15.6 % (ref 10–15)
ERYTHROCYTE [DISTWIDTH] IN BLOOD BY AUTOMATED COUNT: 15.7 % (ref 10–15)
ERYTHROCYTE [DISTWIDTH] IN BLOOD BY AUTOMATED COUNT: 15.7 % (ref 10–15)
ERYTHROCYTE [DISTWIDTH] IN BLOOD BY AUTOMATED COUNT: 15.8 % (ref 10–15)
ERYTHROCYTE [DISTWIDTH] IN BLOOD BY AUTOMATED COUNT: 16 % (ref 10–15)
ERYTHROCYTE [DISTWIDTH] IN BLOOD BY AUTOMATED COUNT: 16.2 % (ref 10–15)
ERYTHROCYTE [DISTWIDTH] IN BLOOD BY AUTOMATED COUNT: 16.3 % (ref 10–15)
ERYTHROCYTE [DISTWIDTH] IN BLOOD BY AUTOMATED COUNT: 16.5 % (ref 10–15)
ERYTHROCYTE [DISTWIDTH] IN BLOOD BY AUTOMATED COUNT: 17.7 % (ref 10–15)
ERYTHROCYTE [DISTWIDTH] IN BLOOD BY AUTOMATED COUNT: 17.7 % (ref 10–15)
ERYTHROCYTE [DISTWIDTH] IN BLOOD BY AUTOMATED COUNT: 17.8 % (ref 10–15)
ERYTHROCYTE [DISTWIDTH] IN BLOOD BY AUTOMATED COUNT: 18 % (ref 10–15)
ERYTHROCYTE [DISTWIDTH] IN BLOOD BY AUTOMATED COUNT: 18.2 % (ref 10–15)
ERYTHROCYTE [DISTWIDTH] IN BLOOD BY AUTOMATED COUNT: 18.2 % (ref 10–15)
ERYTHROCYTE [DISTWIDTH] IN BLOOD BY AUTOMATED COUNT: 18.3 % (ref 10–15)
ERYTHROCYTE [DISTWIDTH] IN BLOOD BY AUTOMATED COUNT: 18.5 % (ref 10–15)
ERYTHROCYTE [DISTWIDTH] IN BLOOD BY AUTOMATED COUNT: 18.5 % (ref 10–15)
ERYTHROCYTE [DISTWIDTH] IN BLOOD BY AUTOMATED COUNT: 18.6 % (ref 10–15)
ERYTHROCYTE [DISTWIDTH] IN BLOOD BY AUTOMATED COUNT: 19.2 % (ref 10–15)
ERYTHROCYTE [DISTWIDTH] IN BLOOD BY AUTOMATED COUNT: 19.2 % (ref 10–15)
ERYTHROCYTE [DISTWIDTH] IN BLOOD BY AUTOMATED COUNT: 19.4 % (ref 10–15)
ERYTHROCYTE [DISTWIDTH] IN BLOOD BY AUTOMATED COUNT: 19.5 % (ref 10–15)
ERYTHROCYTE [DISTWIDTH] IN BLOOD BY AUTOMATED COUNT: 19.7 % (ref 10–15)
ERYTHROCYTE [DISTWIDTH] IN BLOOD BY AUTOMATED COUNT: NORMAL % (ref 10–15)
ERYTHROCYTE [SEDIMENTATION RATE] IN BLOOD BY WESTERGREN METHOD: 14 MM/H (ref 0–30)
ERYTHROCYTE [SEDIMENTATION RATE] IN BLOOD BY WESTERGREN METHOD: 30 MM/H (ref 0–30)
FERRITIN SERPL-MCNC: 414 NG/ML (ref 8–252)
FLUAV H1 2009 PAND RNA SPEC QL NAA+PROBE: NEGATIVE
FLUAV H1 RNA SPEC QL NAA+PROBE: NEGATIVE
FLUAV H3 RNA SPEC QL NAA+PROBE: NEGATIVE
FLUAV RNA SPEC QL NAA+PROBE: NEGATIVE
FLUAV+FLUBV AG SPEC QL: NEGATIVE
FLUAV+FLUBV AG SPEC QL: NEGATIVE
FLUAV+FLUBV AG SPEC QL: NORMAL
FLUAV+FLUBV AG SPEC QL: NORMAL
FLUAV+FLUBV RNA SPEC QL NAA+PROBE: NORMAL
FLUAV+FLUBV RNA SPEC QL NAA+PROBE: NORMAL
FLUBV RNA SPEC QL NAA+PROBE: NEGATIVE
GFR SERPL CREATININE-BSD FRML MDRD: 57 ML/MIN/1.7M2
GFR SERPL CREATININE-BSD FRML MDRD: 67 ML/MIN/1.7M2
GFR SERPL CREATININE-BSD FRML MDRD: 69 ML/MIN/1.7M2
GFR SERPL CREATININE-BSD FRML MDRD: 69 ML/MIN/1.7M2
GFR SERPL CREATININE-BSD FRML MDRD: 72 ML/MIN/1.7M2
GFR SERPL CREATININE-BSD FRML MDRD: 75 ML/MIN/1.7M2
GFR SERPL CREATININE-BSD FRML MDRD: ABNORMAL ML/MIN/1.7M2
GFR SERPL CREATININE-BSD FRML MDRD: NORMAL ML/MIN/1.7M2
GFR SERPL CREATININE-BSD FRML MDRD: NORMAL ML/MIN/1.7M2
GLUCOSE BLD-MCNC: 134 MG/DL (ref 70–99)
GLUCOSE BLD-MCNC: 146 MG/DL (ref 70–99)
GLUCOSE BLDC GLUCOMTR-MCNC: 100 MG/DL (ref 70–99)
GLUCOSE BLDC GLUCOMTR-MCNC: 100 MG/DL (ref 70–99)
GLUCOSE BLDC GLUCOMTR-MCNC: 101 MG/DL (ref 70–99)
GLUCOSE BLDC GLUCOMTR-MCNC: 102 MG/DL (ref 70–99)
GLUCOSE BLDC GLUCOMTR-MCNC: 103 MG/DL (ref 70–99)
GLUCOSE BLDC GLUCOMTR-MCNC: 104 MG/DL (ref 70–99)
GLUCOSE BLDC GLUCOMTR-MCNC: 105 MG/DL (ref 70–99)
GLUCOSE BLDC GLUCOMTR-MCNC: 106 MG/DL (ref 70–99)
GLUCOSE BLDC GLUCOMTR-MCNC: 106 MG/DL (ref 70–99)
GLUCOSE BLDC GLUCOMTR-MCNC: 107 MG/DL (ref 70–99)
GLUCOSE BLDC GLUCOMTR-MCNC: 108 MG/DL (ref 70–99)
GLUCOSE BLDC GLUCOMTR-MCNC: 110 MG/DL (ref 70–99)
GLUCOSE BLDC GLUCOMTR-MCNC: 111 MG/DL (ref 70–99)
GLUCOSE BLDC GLUCOMTR-MCNC: 112 MG/DL (ref 70–99)
GLUCOSE BLDC GLUCOMTR-MCNC: 113 MG/DL (ref 70–99)
GLUCOSE BLDC GLUCOMTR-MCNC: 113 MG/DL (ref 70–99)
GLUCOSE BLDC GLUCOMTR-MCNC: 114 MG/DL (ref 70–99)
GLUCOSE BLDC GLUCOMTR-MCNC: 115 MG/DL (ref 70–99)
GLUCOSE BLDC GLUCOMTR-MCNC: 116 MG/DL (ref 70–99)
GLUCOSE BLDC GLUCOMTR-MCNC: 117 MG/DL (ref 70–99)
GLUCOSE BLDC GLUCOMTR-MCNC: 118 MG/DL (ref 70–99)
GLUCOSE BLDC GLUCOMTR-MCNC: 119 MG/DL (ref 70–99)
GLUCOSE BLDC GLUCOMTR-MCNC: 120 MG/DL (ref 70–99)
GLUCOSE BLDC GLUCOMTR-MCNC: 121 MG/DL (ref 70–99)
GLUCOSE BLDC GLUCOMTR-MCNC: 121 MG/DL (ref 70–99)
GLUCOSE BLDC GLUCOMTR-MCNC: 122 MG/DL (ref 70–99)
GLUCOSE BLDC GLUCOMTR-MCNC: 125 MG/DL (ref 70–99)
GLUCOSE BLDC GLUCOMTR-MCNC: 127 MG/DL (ref 70–99)
GLUCOSE BLDC GLUCOMTR-MCNC: 128 MG/DL (ref 70–99)
GLUCOSE BLDC GLUCOMTR-MCNC: 129 MG/DL (ref 70–99)
GLUCOSE BLDC GLUCOMTR-MCNC: 130 MG/DL (ref 70–99)
GLUCOSE BLDC GLUCOMTR-MCNC: 130 MG/DL (ref 70–99)
GLUCOSE BLDC GLUCOMTR-MCNC: 131 MG/DL (ref 70–99)
GLUCOSE BLDC GLUCOMTR-MCNC: 131 MG/DL (ref 70–99)
GLUCOSE BLDC GLUCOMTR-MCNC: 132 MG/DL (ref 70–99)
GLUCOSE BLDC GLUCOMTR-MCNC: 133 MG/DL (ref 70–99)
GLUCOSE BLDC GLUCOMTR-MCNC: 134 MG/DL (ref 70–99)
GLUCOSE BLDC GLUCOMTR-MCNC: 136 MG/DL (ref 70–99)
GLUCOSE BLDC GLUCOMTR-MCNC: 138 MG/DL (ref 70–99)
GLUCOSE BLDC GLUCOMTR-MCNC: 139 MG/DL (ref 70–99)
GLUCOSE BLDC GLUCOMTR-MCNC: 140 MG/DL (ref 70–99)
GLUCOSE BLDC GLUCOMTR-MCNC: 141 MG/DL (ref 70–99)
GLUCOSE BLDC GLUCOMTR-MCNC: 143 MG/DL (ref 70–99)
GLUCOSE BLDC GLUCOMTR-MCNC: 144 MG/DL (ref 70–99)
GLUCOSE BLDC GLUCOMTR-MCNC: 145 MG/DL (ref 70–99)
GLUCOSE BLDC GLUCOMTR-MCNC: 148 MG/DL (ref 70–99)
GLUCOSE BLDC GLUCOMTR-MCNC: 150 MG/DL (ref 70–99)
GLUCOSE BLDC GLUCOMTR-MCNC: 151 MG/DL (ref 70–99)
GLUCOSE BLDC GLUCOMTR-MCNC: 153 MG/DL (ref 70–99)
GLUCOSE BLDC GLUCOMTR-MCNC: 160 MG/DL (ref 70–99)
GLUCOSE BLDC GLUCOMTR-MCNC: 161 MG/DL (ref 70–99)
GLUCOSE BLDC GLUCOMTR-MCNC: 172 MG/DL (ref 70–99)
GLUCOSE BLDC GLUCOMTR-MCNC: 173 MG/DL (ref 70–99)
GLUCOSE BLDC GLUCOMTR-MCNC: 177 MG/DL (ref 70–99)
GLUCOSE BLDC GLUCOMTR-MCNC: 214 MG/DL (ref 70–99)
GLUCOSE BLDC GLUCOMTR-MCNC: 76 MG/DL (ref 70–99)
GLUCOSE BLDC GLUCOMTR-MCNC: 78 MG/DL (ref 70–99)
GLUCOSE BLDC GLUCOMTR-MCNC: 79 MG/DL (ref 70–99)
GLUCOSE BLDC GLUCOMTR-MCNC: 79 MG/DL (ref 70–99)
GLUCOSE BLDC GLUCOMTR-MCNC: 80 MG/DL (ref 70–99)
GLUCOSE BLDC GLUCOMTR-MCNC: 83 MG/DL (ref 70–99)
GLUCOSE BLDC GLUCOMTR-MCNC: 86 MG/DL (ref 70–99)
GLUCOSE BLDC GLUCOMTR-MCNC: 87 MG/DL (ref 70–99)
GLUCOSE BLDC GLUCOMTR-MCNC: 89 MG/DL (ref 70–99)
GLUCOSE BLDC GLUCOMTR-MCNC: 91 MG/DL (ref 70–99)
GLUCOSE BLDC GLUCOMTR-MCNC: 91 MG/DL (ref 70–99)
GLUCOSE BLDC GLUCOMTR-MCNC: 92 MG/DL (ref 70–99)
GLUCOSE BLDC GLUCOMTR-MCNC: 93 MG/DL (ref 70–99)
GLUCOSE BLDC GLUCOMTR-MCNC: 93 MG/DL (ref 70–99)
GLUCOSE BLDC GLUCOMTR-MCNC: 94 MG/DL (ref 70–99)
GLUCOSE BLDC GLUCOMTR-MCNC: 95 MG/DL (ref 70–99)
GLUCOSE BLDC GLUCOMTR-MCNC: 97 MG/DL (ref 70–99)
GLUCOSE BLDC GLUCOMTR-MCNC: 98 MG/DL (ref 70–99)
GLUCOSE BLDC GLUCOMTR-MCNC: 99 MG/DL (ref 70–99)
GLUCOSE SERPL-MCNC: 100 MG/DL (ref 70–99)
GLUCOSE SERPL-MCNC: 101 MG/DL (ref 70–99)
GLUCOSE SERPL-MCNC: 102 MG/DL (ref 70–99)
GLUCOSE SERPL-MCNC: 102 MG/DL (ref 70–99)
GLUCOSE SERPL-MCNC: 103 MG/DL (ref 70–99)
GLUCOSE SERPL-MCNC: 104 MG/DL (ref 70–99)
GLUCOSE SERPL-MCNC: 106 MG/DL (ref 70–99)
GLUCOSE SERPL-MCNC: 106 MG/DL (ref 70–99)
GLUCOSE SERPL-MCNC: 109 MG/DL (ref 70–99)
GLUCOSE SERPL-MCNC: 113 MG/DL (ref 70–99)
GLUCOSE SERPL-MCNC: 114 MG/DL (ref 70–99)
GLUCOSE SERPL-MCNC: 115 MG/DL (ref 70–99)
GLUCOSE SERPL-MCNC: 119 MG/DL (ref 70–99)
GLUCOSE SERPL-MCNC: 121 MG/DL (ref 70–99)
GLUCOSE SERPL-MCNC: 125 MG/DL (ref 70–99)
GLUCOSE SERPL-MCNC: 126 MG/DL (ref 70–99)
GLUCOSE SERPL-MCNC: 127 MG/DL (ref 70–99)
GLUCOSE SERPL-MCNC: 131 MG/DL (ref 70–99)
GLUCOSE SERPL-MCNC: 131 MG/DL (ref 70–99)
GLUCOSE SERPL-MCNC: 132 MG/DL (ref 70–99)
GLUCOSE SERPL-MCNC: 134 MG/DL (ref 70–99)
GLUCOSE SERPL-MCNC: 135 MG/DL (ref 70–99)
GLUCOSE SERPL-MCNC: 138 MG/DL (ref 70–99)
GLUCOSE SERPL-MCNC: 138 MG/DL (ref 70–99)
GLUCOSE SERPL-MCNC: 140 MG/DL (ref 70–99)
GLUCOSE SERPL-MCNC: 146 MG/DL (ref 70–99)
GLUCOSE SERPL-MCNC: 153 MG/DL (ref 70–99)
GLUCOSE SERPL-MCNC: 159 MG/DL (ref 70–99)
GLUCOSE SERPL-MCNC: 160 MG/DL (ref 70–99)
GLUCOSE SERPL-MCNC: 163 MG/DL (ref 70–99)
GLUCOSE SERPL-MCNC: 192 MG/DL (ref 70–99)
GLUCOSE SERPL-MCNC: 66 MG/DL (ref 70–99)
GLUCOSE SERPL-MCNC: 81 MG/DL (ref 70–99)
GLUCOSE SERPL-MCNC: 82 MG/DL (ref 70–99)
GLUCOSE SERPL-MCNC: 87 MG/DL (ref 70–99)
GLUCOSE SERPL-MCNC: 89 MG/DL (ref 70–99)
GLUCOSE SERPL-MCNC: 91 MG/DL (ref 70–99)
GLUCOSE SERPL-MCNC: 93 MG/DL (ref 70–99)
GLUCOSE SERPL-MCNC: 96 MG/DL (ref 70–99)
GLUCOSE SERPL-MCNC: 97 MG/DL (ref 70–99)
GLUCOSE SERPL-MCNC: 99 MG/DL (ref 70–99)
GLUCOSE SERPL-MCNC: 99 MG/DL (ref 70–99)
GLUCOSE UR STRIP-MCNC: NEGATIVE MG/DL
GRAM STN SPEC: ABNORMAL
GRAM STN SPEC: ABNORMAL
GRAM STN SPEC: NORMAL
HADV DNA SPEC QL NAA+PROBE: NEGATIVE
HADV DNA SPEC QL NAA+PROBE: NEGATIVE
HCO3 BLD-SCNC: 34 MMOL/L (ref 21–28)
HCO3 BLD-SCNC: 43 MMOL/L (ref 21–28)
HCO3 BLD-SCNC: 43 MMOL/L (ref 21–28)
HCO3 BLD-SCNC: 44 MMOL/L (ref 21–28)
HCO3 BLD-SCNC: 45 MMOL/L (ref 21–28)
HCO3 BLDV-SCNC: 30 MMOL/L (ref 21–28)
HCO3 BLDV-SCNC: 32 MMOL/L (ref 21–28)
HCO3 BLDV-SCNC: 34 MMOL/L (ref 21–28)
HCO3 BLDV-SCNC: 34 MMOL/L (ref 21–28)
HCO3 BLDV-SCNC: 35 MMOL/L (ref 21–28)
HCO3 BLDV-SCNC: 35 MMOL/L (ref 21–28)
HCO3 BLDV-SCNC: 37 MMOL/L (ref 21–28)
HCO3 BLDV-SCNC: 39 MMOL/L (ref 21–28)
HCO3 BLDV-SCNC: 40 MMOL/L (ref 21–28)
HCO3 BLDV-SCNC: 40 MMOL/L (ref 21–28)
HCO3 BLDV-SCNC: 41 MMOL/L (ref 21–28)
HCO3 BLDV-SCNC: 43 MMOL/L (ref 21–28)
HCO3 BLDV-SCNC: 44 MMOL/L (ref 21–28)
HCO3 BLDV-SCNC: 51 MMOL/L (ref 21–28)
HCO3 BLDV-SCNC: 51 MMOL/L (ref 21–28)
HCO3 BLDV-SCNC: 52 MMOL/L (ref 21–28)
HCO3 BLDV-SCNC: 58 MMOL/L (ref 21–28)
HCT VFR BLD AUTO: 22.6 % (ref 35–47)
HCT VFR BLD AUTO: 23 % (ref 35–47)
HCT VFR BLD AUTO: 23 % (ref 35–47)
HCT VFR BLD AUTO: 23.8 % (ref 35–47)
HCT VFR BLD AUTO: 24.1 % (ref 35–47)
HCT VFR BLD AUTO: 24.3 % (ref 35–47)
HCT VFR BLD AUTO: 24.9 % (ref 35–47)
HCT VFR BLD AUTO: 26.2 % (ref 35–47)
HCT VFR BLD AUTO: 26.5 % (ref 35–47)
HCT VFR BLD AUTO: 28.1 % (ref 35–47)
HCT VFR BLD AUTO: 28.5 % (ref 35–47)
HCT VFR BLD AUTO: 28.6 % (ref 35–47)
HCT VFR BLD AUTO: 28.7 % (ref 35–47)
HCT VFR BLD AUTO: 28.8 % (ref 35–47)
HCT VFR BLD AUTO: 28.8 % (ref 35–47)
HCT VFR BLD AUTO: 29.2 % (ref 35–47)
HCT VFR BLD AUTO: 29.3 % (ref 35–47)
HCT VFR BLD AUTO: 29.4 % (ref 35–47)
HCT VFR BLD AUTO: 29.5 % (ref 35–47)
HCT VFR BLD AUTO: 29.6 % (ref 35–47)
HCT VFR BLD AUTO: 29.8 % (ref 35–47)
HCT VFR BLD AUTO: 29.8 % (ref 35–47)
HCT VFR BLD AUTO: 29.9 % (ref 35–47)
HCT VFR BLD AUTO: 30.1 % (ref 35–47)
HCT VFR BLD AUTO: 30.6 % (ref 35–47)
HCT VFR BLD AUTO: 30.9 % (ref 35–47)
HCT VFR BLD AUTO: 31 % (ref 35–47)
HCT VFR BLD AUTO: 31.3 % (ref 35–47)
HCT VFR BLD AUTO: 31.3 % (ref 35–47)
HCT VFR BLD AUTO: 32.1 % (ref 35–47)
HCT VFR BLD AUTO: 32.4 % (ref 35–47)
HCT VFR BLD AUTO: 32.6 % (ref 35–47)
HCT VFR BLD AUTO: 32.6 % (ref 35–47)
HCT VFR BLD AUTO: 32.7 % (ref 35–47)
HCT VFR BLD AUTO: 32.8 % (ref 35–47)
HCT VFR BLD AUTO: 33.7 % (ref 35–47)
HCT VFR BLD AUTO: 34.4 % (ref 35–47)
HCT VFR BLD AUTO: 35.7 % (ref 35–47)
HCT VFR BLD AUTO: 36 % (ref 35–47)
HCT VFR BLD AUTO: 36.5 % (ref 35–47)
HCT VFR BLD AUTO: 36.9 % (ref 35–47)
HCT VFR BLD AUTO: NORMAL % (ref 35–47)
HCT VFR BLD CALC: 30 %PCV (ref 35–47)
HCT VFR BLD CALC: 33 %PCV (ref 35–47)
HGB BLD CALC-MCNC: 10.2 G/DL (ref 11.7–15.7)
HGB BLD CALC-MCNC: 11.2 G/DL (ref 11.7–15.7)
HGB BLD-MCNC: 10 G/DL (ref 11.7–15.7)
HGB BLD-MCNC: 10.1 G/DL (ref 11.7–15.7)
HGB BLD-MCNC: 10.4 G/DL (ref 11.7–15.7)
HGB BLD-MCNC: 10.5 G/DL (ref 11.7–15.7)
HGB BLD-MCNC: 10.8 G/DL (ref 11.7–15.7)
HGB BLD-MCNC: 10.9 G/DL (ref 11.7–15.7)
HGB BLD-MCNC: 11 G/DL (ref 11.7–15.7)
HGB BLD-MCNC: 11.4 G/DL (ref 11.7–15.7)
HGB BLD-MCNC: 11.6 G/DL (ref 11.7–15.7)
HGB BLD-MCNC: 6.9 G/DL (ref 11.7–15.7)
HGB BLD-MCNC: 7.4 G/DL (ref 11.7–15.7)
HGB BLD-MCNC: 7.4 G/DL (ref 11.7–15.7)
HGB BLD-MCNC: 7.5 G/DL (ref 11.7–15.7)
HGB BLD-MCNC: 7.6 G/DL (ref 11.7–15.7)
HGB BLD-MCNC: 7.8 G/DL (ref 11.7–15.7)
HGB BLD-MCNC: 8 G/DL (ref 11.7–15.7)
HGB BLD-MCNC: 8.2 G/DL (ref 11.7–15.7)
HGB BLD-MCNC: 8.5 G/DL (ref 11.7–15.7)
HGB BLD-MCNC: 8.6 G/DL (ref 11.7–15.7)
HGB BLD-MCNC: 8.7 G/DL (ref 11.7–15.7)
HGB BLD-MCNC: 8.7 G/DL (ref 11.7–15.7)
HGB BLD-MCNC: 8.8 G/DL (ref 11.7–15.7)
HGB BLD-MCNC: 8.8 G/DL (ref 11.7–15.7)
HGB BLD-MCNC: 8.9 G/DL (ref 11.7–15.7)
HGB BLD-MCNC: 9 G/DL (ref 11.7–15.7)
HGB BLD-MCNC: 9.1 G/DL (ref 11.7–15.7)
HGB BLD-MCNC: 9.1 G/DL (ref 11.7–15.7)
HGB BLD-MCNC: 9.2 G/DL (ref 11.7–15.7)
HGB BLD-MCNC: 9.2 G/DL (ref 11.7–15.7)
HGB BLD-MCNC: 9.4 G/DL (ref 11.7–15.7)
HGB BLD-MCNC: 9.4 G/DL (ref 11.7–15.7)
HGB BLD-MCNC: 9.5 G/DL (ref 11.7–15.7)
HGB BLD-MCNC: 9.6 G/DL (ref 11.7–15.7)
HGB BLD-MCNC: 9.7 G/DL (ref 11.7–15.7)
HGB BLD-MCNC: 9.7 G/DL (ref 11.7–15.7)
HGB BLD-MCNC: 9.9 G/DL (ref 11.7–15.7)
HGB BLD-MCNC: NORMAL G/DL (ref 11.7–15.7)
HGB UR QL STRIP: ABNORMAL
HGB UR QL STRIP: NEGATIVE
HMPV RNA SPEC QL NAA+PROBE: NEGATIVE
HPIV1 RNA SPEC QL NAA+PROBE: NEGATIVE
HPIV2 RNA SPEC QL NAA+PROBE: NEGATIVE
HPIV3 RNA SPEC QL NAA+PROBE: NEGATIVE
IMM GRANULOCYTES # BLD: 0 10E9/L (ref 0–0.4)
IMM GRANULOCYTES # BLD: 0.1 10E9/L (ref 0–0.4)
IMM GRANULOCYTES NFR BLD: 0.2 %
IMM GRANULOCYTES NFR BLD: 0.3 %
IMM GRANULOCYTES NFR BLD: 0.4 %
IMM GRANULOCYTES NFR BLD: 0.5 %
IMM GRANULOCYTES NFR BLD: 0.6 %
IMM GRANULOCYTES NFR BLD: 1.1 %
INR PPP: 0.91 (ref 0.86–1.14)
INR PPP: 0.99 (ref 0.86–1.14)
INR PPP: 1.06 (ref 0.86–1.14)
INR PPP: 1.15 (ref 0.86–1.14)
INTERPRETATION ECG - MUSE: NORMAL
IRON SATN MFR SERPL: 11 % (ref 15–46)
IRON SERPL-MCNC: 23 UG/DL (ref 35–180)
KETONES UR STRIP-MCNC: NEGATIVE MG/DL
LACTATE BLD-SCNC: 0.9 MMOL/L (ref 0.7–2.1)
LACTATE BLD-SCNC: 1 MMOL/L (ref 0.7–2.1)
LACTATE BLD-SCNC: 1.2 MMOL/L (ref 0.7–2.1)
LACTATE BLD-SCNC: 1.3 MMOL/L (ref 0.7–2.1)
LACTATE BLD-SCNC: 1.4 MMOL/L (ref 0.7–2.1)
LACTATE BLD-SCNC: 1.5 MMOL/L (ref 0.7–2.1)
LACTATE BLD-SCNC: 1.6 MMOL/L (ref 0.7–2.1)
LACTATE BLD-SCNC: 1.7 MMOL/L (ref 0.7–2.1)
LACTATE BLD-SCNC: 1.8 MMOL/L (ref 0.7–2.1)
LACTATE BLD-SCNC: 1.8 MMOL/L (ref 0.7–2.1)
LACTATE BLD-SCNC: 1.9 MMOL/L (ref 0.7–2.1)
LACTATE BLD-SCNC: 1.9 MMOL/L (ref 0.7–2.1)
LACTATE BLD-SCNC: 2.5 MMOL/L (ref 0.7–2.1)
LACTATE BLD-SCNC: 2.6 MMOL/L (ref 0.7–2.1)
LACTATE BLD-SCNC: 2.8 MMOL/L (ref 0.7–2.1)
LEUKOCYTE ESTERASE UR QL STRIP: ABNORMAL
LEUKOCYTE ESTERASE UR QL STRIP: NEGATIVE
LIPASE SERPL-CCNC: 102 U/L (ref 73–393)
LIPASE SERPL-CCNC: 56 U/L (ref 73–393)
LIPASE SERPL-CCNC: 61 U/L (ref 73–393)
LYMPHOCYTES # BLD AUTO: 0.3 10E9/L (ref 0.8–5.3)
LYMPHOCYTES # BLD AUTO: 0.4 10E9/L (ref 0.8–5.3)
LYMPHOCYTES # BLD AUTO: 0.6 10E9/L (ref 0.8–5.3)
LYMPHOCYTES # BLD AUTO: 0.6 10E9/L (ref 0.8–5.3)
LYMPHOCYTES # BLD AUTO: 0.7 10E9/L (ref 0.8–5.3)
LYMPHOCYTES # BLD AUTO: 0.9 10E9/L (ref 0.8–5.3)
LYMPHOCYTES # BLD AUTO: 1.2 10E9/L (ref 0.8–5.3)
LYMPHOCYTES # BLD AUTO: 1.2 10E9/L (ref 0.8–5.3)
LYMPHOCYTES # BLD AUTO: 1.3 10E9/L (ref 0.8–5.3)
LYMPHOCYTES # BLD AUTO: 1.4 10E9/L (ref 0.8–5.3)
LYMPHOCYTES # BLD AUTO: 1.7 10E9/L (ref 0.8–5.3)
LYMPHOCYTES # BLD AUTO: 2.4 10E9/L (ref 0.8–5.3)
LYMPHOCYTES # BLD AUTO: 3 10E9/L (ref 0.8–5.3)
LYMPHOCYTES NFR BLD AUTO: 11.7 %
LYMPHOCYTES NFR BLD AUTO: 12.5 %
LYMPHOCYTES NFR BLD AUTO: 13.3 %
LYMPHOCYTES NFR BLD AUTO: 16.5 %
LYMPHOCYTES NFR BLD AUTO: 2.2 %
LYMPHOCYTES NFR BLD AUTO: 2.3 %
LYMPHOCYTES NFR BLD AUTO: 2.4 %
LYMPHOCYTES NFR BLD AUTO: 3.1 %
LYMPHOCYTES NFR BLD AUTO: 3.9 %
LYMPHOCYTES NFR BLD AUTO: 5.3 %
LYMPHOCYTES NFR BLD AUTO: 6.5 %
LYMPHOCYTES NFR BLD AUTO: 6.7 %
LYMPHOCYTES NFR BLD AUTO: 9.7 %
Lab: ABNORMAL
Lab: NORMAL
MAGNESIUM SERPL-MCNC: 1.8 MG/DL (ref 1.6–2.3)
MAGNESIUM SERPL-MCNC: 1.8 MG/DL (ref 1.6–2.3)
MAGNESIUM SERPL-MCNC: 1.9 MG/DL (ref 1.6–2.3)
MAGNESIUM SERPL-MCNC: 2 MG/DL (ref 1.6–2.3)
MAGNESIUM SERPL-MCNC: 2.1 MG/DL (ref 1.6–2.3)
MAGNESIUM SERPL-MCNC: 2.2 MG/DL (ref 1.6–2.3)
MAGNESIUM SERPL-MCNC: 2.3 MG/DL (ref 1.6–2.3)
MAGNESIUM SERPL-MCNC: 2.4 MG/DL (ref 1.6–2.3)
MAGNESIUM SERPL-MCNC: 2.5 MG/DL (ref 1.6–2.3)
MAGNESIUM SERPL-MCNC: 2.9 MG/DL (ref 1.6–2.3)
MCH RBC QN AUTO: 28.4 PG (ref 26.5–33)
MCH RBC QN AUTO: 28.9 PG (ref 26.5–33)
MCH RBC QN AUTO: 29 PG (ref 26.5–33)
MCH RBC QN AUTO: 29.1 PG (ref 26.5–33)
MCH RBC QN AUTO: 29.2 PG (ref 26.5–33)
MCH RBC QN AUTO: 29.3 PG (ref 26.5–33)
MCH RBC QN AUTO: 29.4 PG (ref 26.5–33)
MCH RBC QN AUTO: 29.6 PG (ref 26.5–33)
MCH RBC QN AUTO: 29.7 PG (ref 26.5–33)
MCH RBC QN AUTO: 29.7 PG (ref 26.5–33)
MCH RBC QN AUTO: 29.8 PG (ref 26.5–33)
MCH RBC QN AUTO: 29.9 PG (ref 26.5–33)
MCH RBC QN AUTO: 30 PG (ref 26.5–33)
MCH RBC QN AUTO: 30 PG (ref 26.5–33)
MCH RBC QN AUTO: 30.1 PG (ref 26.5–33)
MCH RBC QN AUTO: 30.1 PG (ref 26.5–33)
MCH RBC QN AUTO: 30.2 PG (ref 26.5–33)
MCH RBC QN AUTO: 30.2 PG (ref 26.5–33)
MCH RBC QN AUTO: 30.3 PG (ref 26.5–33)
MCH RBC QN AUTO: 30.3 PG (ref 26.5–33)
MCH RBC QN AUTO: 30.4 PG (ref 26.5–33)
MCH RBC QN AUTO: 30.4 PG (ref 26.5–33)
MCH RBC QN AUTO: 30.5 PG (ref 26.5–33)
MCH RBC QN AUTO: 30.6 PG (ref 26.5–33)
MCH RBC QN AUTO: 30.7 PG (ref 26.5–33)
MCH RBC QN AUTO: 31 PG (ref 26.5–33)
MCH RBC QN AUTO: NORMAL PG (ref 26.5–33)
MCHC RBC AUTO-ENTMCNC: 29.6 G/DL (ref 31.5–36.5)
MCHC RBC AUTO-ENTMCNC: 29.9 G/DL (ref 31.5–36.5)
MCHC RBC AUTO-ENTMCNC: 30 G/DL (ref 31.5–36.5)
MCHC RBC AUTO-ENTMCNC: 30 G/DL (ref 31.5–36.5)
MCHC RBC AUTO-ENTMCNC: 30.1 G/DL (ref 31.5–36.5)
MCHC RBC AUTO-ENTMCNC: 30.2 G/DL (ref 31.5–36.5)
MCHC RBC AUTO-ENTMCNC: 30.3 G/DL (ref 31.5–36.5)
MCHC RBC AUTO-ENTMCNC: 30.5 G/DL (ref 31.5–36.5)
MCHC RBC AUTO-ENTMCNC: 30.6 G/DL (ref 31.5–36.5)
MCHC RBC AUTO-ENTMCNC: 30.7 G/DL (ref 31.5–36.5)
MCHC RBC AUTO-ENTMCNC: 30.8 G/DL (ref 31.5–36.5)
MCHC RBC AUTO-ENTMCNC: 30.9 G/DL (ref 31.5–36.5)
MCHC RBC AUTO-ENTMCNC: 31 G/DL (ref 31.5–36.5)
MCHC RBC AUTO-ENTMCNC: 31.2 G/DL (ref 31.5–36.5)
MCHC RBC AUTO-ENTMCNC: 31.2 G/DL (ref 31.5–36.5)
MCHC RBC AUTO-ENTMCNC: 31.3 G/DL (ref 31.5–36.5)
MCHC RBC AUTO-ENTMCNC: 31.4 G/DL (ref 31.5–36.5)
MCHC RBC AUTO-ENTMCNC: 31.4 G/DL (ref 31.5–36.5)
MCHC RBC AUTO-ENTMCNC: 31.5 G/DL (ref 31.5–36.5)
MCHC RBC AUTO-ENTMCNC: 31.7 G/DL (ref 31.5–36.5)
MCHC RBC AUTO-ENTMCNC: 31.7 G/DL (ref 31.5–36.5)
MCHC RBC AUTO-ENTMCNC: 31.9 G/DL (ref 31.5–36.5)
MCHC RBC AUTO-ENTMCNC: 32.1 G/DL (ref 31.5–36.5)
MCHC RBC AUTO-ENTMCNC: 32.1 G/DL (ref 31.5–36.5)
MCHC RBC AUTO-ENTMCNC: 32.2 G/DL (ref 31.5–36.5)
MCHC RBC AUTO-ENTMCNC: 33.1 G/DL (ref 31.5–36.5)
MCHC RBC AUTO-ENTMCNC: NORMAL G/DL (ref 31.5–36.5)
MCV RBC AUTO: 100 FL (ref 78–100)
MCV RBC AUTO: 91 FL (ref 78–100)
MCV RBC AUTO: 92 FL (ref 78–100)
MCV RBC AUTO: 93 FL (ref 78–100)
MCV RBC AUTO: 94 FL (ref 78–100)
MCV RBC AUTO: 95 FL (ref 78–100)
MCV RBC AUTO: 96 FL (ref 78–100)
MCV RBC AUTO: 97 FL (ref 78–100)
MCV RBC AUTO: 98 FL (ref 78–100)
MCV RBC AUTO: 99 FL (ref 78–100)
MCV RBC AUTO: NORMAL FL (ref 78–100)
MICRO REPORT STATUS: ABNORMAL
MICRO REPORT STATUS: NORMAL
MICROBIOLOGIST REVIEW: NORMAL
MICROORGANISM SPEC CULT: ABNORMAL
MONOCYTES # BLD AUTO: 0.3 10E9/L (ref 0–1.3)
MONOCYTES # BLD AUTO: 0.3 10E9/L (ref 0–1.3)
MONOCYTES # BLD AUTO: 0.4 10E9/L (ref 0–1.3)
MONOCYTES # BLD AUTO: 0.6 10E9/L (ref 0–1.3)
MONOCYTES # BLD AUTO: 1 10E9/L (ref 0–1.3)
MONOCYTES # BLD AUTO: 1.1 10E9/L (ref 0–1.3)
MONOCYTES # BLD AUTO: 1.1 10E9/L (ref 0–1.3)
MONOCYTES # BLD AUTO: 1.3 10E9/L (ref 0–1.3)
MONOCYTES # BLD AUTO: 1.3 10E9/L (ref 0–1.3)
MONOCYTES # BLD AUTO: 1.5 10E9/L (ref 0–1.3)
MONOCYTES # BLD AUTO: 1.6 10E9/L (ref 0–1.3)
MONOCYTES # BLD AUTO: 1.7 10E9/L (ref 0–1.3)
MONOCYTES # BLD AUTO: 1.9 10E9/L (ref 0–1.3)
MONOCYTES NFR BLD AUTO: 1.3 %
MONOCYTES NFR BLD AUTO: 10.3 %
MONOCYTES NFR BLD AUTO: 10.7 %
MONOCYTES NFR BLD AUTO: 2.2 %
MONOCYTES NFR BLD AUTO: 3 %
MONOCYTES NFR BLD AUTO: 4.3 %
MONOCYTES NFR BLD AUTO: 5.4 %
MONOCYTES NFR BLD AUTO: 6.6 %
MONOCYTES NFR BLD AUTO: 7.3 %
MONOCYTES NFR BLD AUTO: 7.9 %
MONOCYTES NFR BLD AUTO: 8.1 %
MONOCYTES NFR BLD AUTO: 9.3 %
MONOCYTES NFR BLD AUTO: 9.6 %
MRSA DNA SPEC QL NAA+PROBE: ABNORMAL
MRSA DNA SPEC QL NAA+PROBE: NORMAL
MRSA DNA SPEC QL NAA+PROBE: NORMAL
MUCOUS THREADS #/AREA URNS LPF: PRESENT /LPF
NEUTROPHILS # BLD AUTO: 10.5 10E9/L (ref 1.6–8.3)
NEUTROPHILS # BLD AUTO: 10.7 10E9/L (ref 1.6–8.3)
NEUTROPHILS # BLD AUTO: 10.9 10E9/L (ref 1.6–8.3)
NEUTROPHILS # BLD AUTO: 12.4 10E9/L (ref 1.6–8.3)
NEUTROPHILS # BLD AUTO: 14.5 10E9/L (ref 1.6–8.3)
NEUTROPHILS # BLD AUTO: 15.8 10E9/L (ref 1.6–8.3)
NEUTROPHILS # BLD AUTO: 16.1 10E9/L (ref 1.6–8.3)
NEUTROPHILS # BLD AUTO: 16.2 10E9/L (ref 1.6–8.3)
NEUTROPHILS # BLD AUTO: 17.7 10E9/L (ref 1.6–8.3)
NEUTROPHILS # BLD AUTO: 22.4 10E9/L (ref 1.6–8.3)
NEUTROPHILS # BLD AUTO: 23 10E9/L (ref 1.6–8.3)
NEUTROPHILS # BLD AUTO: 6.9 10E9/L (ref 1.6–8.3)
NEUTROPHILS # BLD AUTO: 9.8 10E9/L (ref 1.6–8.3)
NEUTROPHILS NFR BLD AUTO: 68.3 %
NEUTROPHILS NFR BLD AUTO: 73.7 %
NEUTROPHILS NFR BLD AUTO: 73.7 %
NEUTROPHILS NFR BLD AUTO: 74.6 %
NEUTROPHILS NFR BLD AUTO: 82.5 %
NEUTROPHILS NFR BLD AUTO: 82.6 %
NEUTROPHILS NFR BLD AUTO: 84.8 %
NEUTROPHILS NFR BLD AUTO: 84.9 %
NEUTROPHILS NFR BLD AUTO: 86.2 %
NEUTROPHILS NFR BLD AUTO: 88 %
NEUTROPHILS NFR BLD AUTO: 94 %
NEUTROPHILS NFR BLD AUTO: 94.9 %
NEUTROPHILS NFR BLD AUTO: 95.3 %
NITRATE UR QL: NEGATIVE
NRBC # BLD AUTO: 0 10*3/UL
NRBC BLD AUTO-RTO: 0 /100
NT-PROBNP SERPL-MCNC: 113 PG/ML (ref 0–900)
NT-PROBNP SERPL-MCNC: 248 PG/ML (ref 0–900)
NUM BPU REQUESTED: 1
NUM BPU REQUESTED: 1
O2/TOTAL GAS SETTING VFR VENT: 21 %
O2/TOTAL GAS SETTING VFR VENT: 3 %
O2/TOTAL GAS SETTING VFR VENT: 35 %
O2/TOTAL GAS SETTING VFR VENT: 35 %
O2/TOTAL GAS SETTING VFR VENT: 40 %
O2/TOTAL GAS SETTING VFR VENT: 60 %
O2/TOTAL GAS SETTING VFR VENT: 95 %
O2/TOTAL GAS SETTING VFR VENT: ABNORMAL %
OSMOLALITY SERPL: 318 MMOL/KG (ref 280–301)
PCO2 BLD: 50 MM HG (ref 35–45)
PCO2 BLD: 52 MM HG (ref 35–45)
PCO2 BLD: 62 MM HG (ref 35–45)
PCO2 BLD: 66 MM HG (ref 35–45)
PCO2 BLD: 70 MM HG (ref 35–45)
PCO2 BLDV: 55 MM HG (ref 40–50)
PCO2 BLDV: 56 MM HG (ref 40–50)
PCO2 BLDV: 57 MM HG (ref 40–50)
PCO2 BLDV: 58 MM HG (ref 40–50)
PCO2 BLDV: 59 MM HG (ref 40–50)
PCO2 BLDV: 60 MM HG (ref 40–50)
PCO2 BLDV: 60 MM HG (ref 40–50)
PCO2 BLDV: 62 MM HG (ref 40–50)
PCO2 BLDV: 64 MM HG (ref 40–50)
PCO2 BLDV: 64 MM HG (ref 40–50)
PCO2 BLDV: 65 MM HG (ref 40–50)
PCO2 BLDV: 66 MM HG (ref 40–50)
PCO2 BLDV: 67 MM HG (ref 40–50)
PCO2 BLDV: 68 MM HG (ref 40–50)
PCO2 BLDV: 68 MM HG (ref 40–50)
PCO2 BLDV: 69 MM HG (ref 40–50)
PCO2 BLDV: 73 MM HG (ref 40–50)
PCO2 BLDV: 74 MM HG (ref 40–50)
PCO2 BLDV: 75 MM HG (ref 40–50)
PCO2 BLDV: 75 MM HG (ref 40–50)
PCO2 BLDV: 78 MM HG (ref 40–50)
PCO2 BLDV: 93 MM HG (ref 40–50)
PH BLD: 7.4 PH (ref 7.35–7.45)
PH BLD: 7.43 PH (ref 7.35–7.45)
PH BLD: 7.44 PH (ref 7.35–7.45)
PH BLD: 7.45 PH (ref 7.35–7.45)
PH BLD: 7.54 PH (ref 7.35–7.45)
PH BLDV: 7.31 PH (ref 7.32–7.43)
PH BLDV: 7.31 PH (ref 7.32–7.43)
PH BLDV: 7.32 PH (ref 7.32–7.43)
PH BLDV: 7.33 PH (ref 7.32–7.43)
PH BLDV: 7.35 PH (ref 7.32–7.43)
PH BLDV: 7.36 PH (ref 7.32–7.43)
PH BLDV: 7.36 PH (ref 7.32–7.43)
PH BLDV: 7.37 PH (ref 7.32–7.43)
PH BLDV: 7.38 PH (ref 7.32–7.43)
PH BLDV: 7.39 PH (ref 7.32–7.43)
PH BLDV: 7.4 PH (ref 7.32–7.43)
PH BLDV: 7.4 PH (ref 7.32–7.43)
PH BLDV: 7.41 PH (ref 7.32–7.43)
PH BLDV: 7.45 PH (ref 7.32–7.43)
PH BLDV: 7.47 PH (ref 7.32–7.43)
PH BLDV: 7.48 PH (ref 7.32–7.43)
PH BLDV: 7.49 PH (ref 7.32–7.43)
PH BLDV: 7.5 PH (ref 7.32–7.43)
PH BLDV: 7.5 PH (ref 7.32–7.43)
PH BLDV: 7.57 PH (ref 7.32–7.43)
PH UR STRIP: 6.5 PH (ref 5–7)
PH UR STRIP: 6.5 PH (ref 5–7)
PH UR STRIP: 7.5 PH (ref 5–7)
PH UR STRIP: 8 PH (ref 5–7)
PH UR STRIP: 8 PH (ref 5–7)
PH UR STRIP: 8.5 PH (ref 5–7)
PH UR STRIP: 8.5 PH (ref 5–7)
PHOSPHATE SERPL-MCNC: 1.9 MG/DL (ref 2.5–4.5)
PHOSPHATE SERPL-MCNC: 2 MG/DL (ref 2.5–4.5)
PHOSPHATE SERPL-MCNC: 2.1 MG/DL (ref 2.5–4.5)
PHOSPHATE SERPL-MCNC: 2.1 MG/DL (ref 2.5–4.5)
PHOSPHATE SERPL-MCNC: 2.2 MG/DL (ref 2.5–4.5)
PHOSPHATE SERPL-MCNC: 2.3 MG/DL (ref 2.5–4.5)
PHOSPHATE SERPL-MCNC: 2.3 MG/DL (ref 2.5–4.5)
PHOSPHATE SERPL-MCNC: 2.4 MG/DL (ref 2.5–4.5)
PHOSPHATE SERPL-MCNC: 2.6 MG/DL (ref 2.5–4.5)
PHOSPHATE SERPL-MCNC: 2.8 MG/DL (ref 2.5–4.5)
PHOSPHATE SERPL-MCNC: 2.9 MG/DL (ref 2.5–4.5)
PHOSPHATE SERPL-MCNC: 2.9 MG/DL (ref 2.5–4.5)
PHOSPHATE SERPL-MCNC: 3.1 MG/DL (ref 2.5–4.5)
PLATELET # BLD AUTO: 164 10E9/L (ref 150–450)
PLATELET # BLD AUTO: 187 10E9/L (ref 150–450)
PLATELET # BLD AUTO: 205 10E9/L (ref 150–450)
PLATELET # BLD AUTO: 218 10E9/L (ref 150–450)
PLATELET # BLD AUTO: 230 10E9/L (ref 150–450)
PLATELET # BLD AUTO: 249 10E9/L (ref 150–450)
PLATELET # BLD AUTO: 251 10E9/L (ref 150–450)
PLATELET # BLD AUTO: 259 10E9/L (ref 150–450)
PLATELET # BLD AUTO: 263 10E9/L (ref 150–450)
PLATELET # BLD AUTO: 265 10E9/L (ref 150–450)
PLATELET # BLD AUTO: 268 10E9/L (ref 150–450)
PLATELET # BLD AUTO: 270 10E9/L (ref 150–450)
PLATELET # BLD AUTO: 273 10E9/L (ref 150–450)
PLATELET # BLD AUTO: 275 10E9/L (ref 150–450)
PLATELET # BLD AUTO: 275 10E9/L (ref 150–450)
PLATELET # BLD AUTO: 278 10E9/L (ref 150–450)
PLATELET # BLD AUTO: 278 10E9/L (ref 150–450)
PLATELET # BLD AUTO: 279 10E9/L (ref 150–450)
PLATELET # BLD AUTO: 279 10E9/L (ref 150–450)
PLATELET # BLD AUTO: 285 10E9/L (ref 150–450)
PLATELET # BLD AUTO: 288 10E9/L (ref 150–450)
PLATELET # BLD AUTO: 290 10E9/L (ref 150–450)
PLATELET # BLD AUTO: 295 10E9/L (ref 150–450)
PLATELET # BLD AUTO: 299 10E9/L (ref 150–450)
PLATELET # BLD AUTO: 300 10E9/L (ref 150–450)
PLATELET # BLD AUTO: 300 10E9/L (ref 150–450)
PLATELET # BLD AUTO: 306 10E9/L (ref 150–450)
PLATELET # BLD AUTO: 308 10E9/L (ref 150–450)
PLATELET # BLD AUTO: 309 10E9/L (ref 150–450)
PLATELET # BLD AUTO: 309 10E9/L (ref 150–450)
PLATELET # BLD AUTO: 317 10E9/L (ref 150–450)
PLATELET # BLD AUTO: 319 10E9/L (ref 150–450)
PLATELET # BLD AUTO: 324 10E9/L (ref 150–450)
PLATELET # BLD AUTO: 326 10E9/L (ref 150–450)
PLATELET # BLD AUTO: 333 10E9/L (ref 150–450)
PLATELET # BLD AUTO: 334 10E9/L (ref 150–450)
PLATELET # BLD AUTO: 335 10E9/L (ref 150–450)
PLATELET # BLD AUTO: 341 10E9/L (ref 150–450)
PLATELET # BLD AUTO: 341 10E9/L (ref 150–450)
PLATELET # BLD AUTO: 342 10E9/L (ref 150–450)
PLATELET # BLD AUTO: 343 10E9/L (ref 150–450)
PLATELET # BLD AUTO: 344 10E9/L (ref 150–450)
PLATELET # BLD AUTO: 346 10E9/L (ref 150–450)
PLATELET # BLD AUTO: 378 10E9/L (ref 150–450)
PLATELET # BLD AUTO: NORMAL 10E9/L (ref 150–450)
PLATELET # BLD EST: ABNORMAL 10*3/UL
PO2 BLD: 101 MM HG (ref 80–105)
PO2 BLD: 124 MM HG (ref 80–105)
PO2 BLD: 41 MM HG (ref 80–105)
PO2 BLD: 87 MM HG (ref 80–105)
PO2 BLD: 88 MM HG (ref 80–105)
PO2 BLDV: 29 MM HG (ref 25–47)
PO2 BLDV: 33 MM HG (ref 25–47)
PO2 BLDV: 34 MM HG (ref 25–47)
PO2 BLDV: 34 MM HG (ref 25–47)
PO2 BLDV: 35 MM HG (ref 25–47)
PO2 BLDV: 36 MM HG (ref 25–47)
PO2 BLDV: 37 MM HG (ref 25–47)
PO2 BLDV: 39 MM HG (ref 25–47)
PO2 BLDV: 39 MM HG (ref 25–47)
PO2 BLDV: 42 MM HG (ref 25–47)
PO2 BLDV: 44 MM HG (ref 25–47)
PO2 BLDV: 44 MM HG (ref 25–47)
PO2 BLDV: 49 MM HG (ref 25–47)
PO2 BLDV: 51 MM HG (ref 25–47)
PO2 BLDV: 51 MM HG (ref 25–47)
PO2 BLDV: 57 MM HG (ref 25–47)
PO2 BLDV: 57 MM HG (ref 25–47)
PO2 BLDV: 60 MM HG (ref 25–47)
PO2 BLDV: 65 MM HG (ref 25–47)
PO2 BLDV: 67 MM HG (ref 25–47)
PO2 BLDV: 74 MM HG (ref 25–47)
PO2 BLDV: 91 MM HG (ref 25–47)
POTASSIUM BLD-SCNC: 4.2 MMOL/L (ref 3.4–5.3)
POTASSIUM BLD-SCNC: 4.7 MMOL/L (ref 3.4–5.3)
POTASSIUM SERPL-SCNC: 2.7 MMOL/L (ref 3.4–5.3)
POTASSIUM SERPL-SCNC: 2.8 MMOL/L (ref 3.4–5.3)
POTASSIUM SERPL-SCNC: 3.1 MMOL/L (ref 3.4–5.3)
POTASSIUM SERPL-SCNC: 3.2 MMOL/L (ref 3.4–5.3)
POTASSIUM SERPL-SCNC: 3.3 MMOL/L (ref 3.4–5.3)
POTASSIUM SERPL-SCNC: 3.4 MMOL/L (ref 3.4–5.3)
POTASSIUM SERPL-SCNC: 3.5 MMOL/L (ref 3.4–5.3)
POTASSIUM SERPL-SCNC: 3.5 MMOL/L (ref 3.4–5.3)
POTASSIUM SERPL-SCNC: 3.6 MMOL/L (ref 3.4–5.3)
POTASSIUM SERPL-SCNC: 3.7 MMOL/L (ref 3.4–5.3)
POTASSIUM SERPL-SCNC: 3.8 MMOL/L (ref 3.4–5.3)
POTASSIUM SERPL-SCNC: 3.9 MMOL/L (ref 3.4–5.3)
POTASSIUM SERPL-SCNC: 4 MMOL/L (ref 3.4–5.3)
POTASSIUM SERPL-SCNC: 4.1 MMOL/L (ref 3.4–5.3)
POTASSIUM SERPL-SCNC: 4.2 MMOL/L (ref 3.4–5.3)
POTASSIUM SERPL-SCNC: 4.3 MMOL/L (ref 3.4–5.3)
POTASSIUM SERPL-SCNC: 4.4 MMOL/L (ref 3.4–5.3)
POTASSIUM SERPL-SCNC: 4.5 MMOL/L (ref 3.4–5.3)
POTASSIUM SERPL-SCNC: 4.6 MMOL/L (ref 3.4–5.3)
POTASSIUM SERPL-SCNC: 5 MMOL/L (ref 3.4–5.3)
POTASSIUM SERPL-SCNC: 5 MMOL/L (ref 3.4–5.3)
POTASSIUM SERPL-SCNC: 5.5 MMOL/L (ref 3.4–5.3)
POTASSIUM SERPL-SCNC: 5.9 MMOL/L (ref 3.4–5.3)
POTASSIUM SERPL-SCNC: 6.7 MMOL/L (ref 3.4–5.3)
PROCALCITONIN SERPL-MCNC: 0.07 NG/ML
PROCALCITONIN SERPL-MCNC: 0.11 NG/ML
PROCALCITONIN SERPL-MCNC: 0.13 NG/ML
PROCALCITONIN SERPL-MCNC: 0.19 NG/ML
PROCALCITONIN SERPL-MCNC: 0.2 NG/ML
PROCALCITONIN SERPL-MCNC: 0.33 NG/ML
PROCALCITONIN SERPL-MCNC: 12 NG/ML
PROCALCITONIN SERPL-MCNC: NORMAL NG/ML
PROT SERPL-MCNC: 5.1 G/DL (ref 6.8–8.8)
PROT SERPL-MCNC: 5.2 G/DL (ref 6.8–8.8)
PROT SERPL-MCNC: 5.6 G/DL (ref 6.8–8.8)
PROT SERPL-MCNC: 5.6 G/DL (ref 6.8–8.8)
PROT SERPL-MCNC: 5.8 G/DL (ref 6.8–8.8)
PROT SERPL-MCNC: 5.8 G/DL (ref 6.8–8.8)
PROT SERPL-MCNC: 5.9 G/DL (ref 6.8–8.8)
PROT SERPL-MCNC: 6.3 G/DL (ref 6.8–8.8)
PROT SERPL-MCNC: 6.5 G/DL (ref 6.8–8.8)
PROT SERPL-MCNC: 6.8 G/DL (ref 6.8–8.8)
PROT SERPL-MCNC: 7.2 G/DL (ref 6.8–8.8)
RADIOLOGIST FLAGS: ABNORMAL
RADIOLOGIST FLAGS: ABNORMAL
RBC # BLD AUTO: 2.38 10E12/L (ref 3.8–5.2)
RBC # BLD AUTO: 2.43 10E12/L (ref 3.8–5.2)
RBC # BLD AUTO: 2.48 10E12/L (ref 3.8–5.2)
RBC # BLD AUTO: 2.54 10E12/L (ref 3.8–5.2)
RBC # BLD AUTO: 2.57 10E12/L (ref 3.8–5.2)
RBC # BLD AUTO: 2.57 10E12/L (ref 3.8–5.2)
RBC # BLD AUTO: 2.68 10E12/L (ref 3.8–5.2)
RBC # BLD AUTO: 2.7 10E12/L (ref 3.8–5.2)
RBC # BLD AUTO: 2.75 10E12/L (ref 3.8–5.2)
RBC # BLD AUTO: 2.92 10E12/L (ref 3.8–5.2)
RBC # BLD AUTO: 2.93 10E12/L (ref 3.8–5.2)
RBC # BLD AUTO: 2.93 10E12/L (ref 3.8–5.2)
RBC # BLD AUTO: 2.96 10E12/L (ref 3.8–5.2)
RBC # BLD AUTO: 2.98 10E12/L (ref 3.8–5.2)
RBC # BLD AUTO: 2.98 10E12/L (ref 3.8–5.2)
RBC # BLD AUTO: 2.99 10E12/L (ref 3.8–5.2)
RBC # BLD AUTO: 3 10E12/L (ref 3.8–5.2)
RBC # BLD AUTO: 3.03 10E12/L (ref 3.8–5.2)
RBC # BLD AUTO: 3.04 10E12/L (ref 3.8–5.2)
RBC # BLD AUTO: 3.05 10E12/L (ref 3.8–5.2)
RBC # BLD AUTO: 3.07 10E12/L (ref 3.8–5.2)
RBC # BLD AUTO: 3.09 10E12/L (ref 3.8–5.2)
RBC # BLD AUTO: 3.11 10E12/L (ref 3.8–5.2)
RBC # BLD AUTO: 3.13 10E12/L (ref 3.8–5.2)
RBC # BLD AUTO: 3.13 10E12/L (ref 3.8–5.2)
RBC # BLD AUTO: 3.17 10E12/L (ref 3.8–5.2)
RBC # BLD AUTO: 3.19 10E12/L (ref 3.8–5.2)
RBC # BLD AUTO: 3.23 10E12/L (ref 3.8–5.2)
RBC # BLD AUTO: 3.26 10E12/L (ref 3.8–5.2)
RBC # BLD AUTO: 3.28 10E12/L (ref 3.8–5.2)
RBC # BLD AUTO: 3.33 10E12/L (ref 3.8–5.2)
RBC # BLD AUTO: 3.34 10E12/L (ref 3.8–5.2)
RBC # BLD AUTO: 3.46 10E12/L (ref 3.8–5.2)
RBC # BLD AUTO: 3.48 10E12/L (ref 3.8–5.2)
RBC # BLD AUTO: 3.48 10E12/L (ref 3.8–5.2)
RBC # BLD AUTO: 3.52 10E12/L (ref 3.8–5.2)
RBC # BLD AUTO: 3.56 10E12/L (ref 3.8–5.2)
RBC # BLD AUTO: 3.64 10E12/L (ref 3.8–5.2)
RBC # BLD AUTO: 3.72 10E12/L (ref 3.8–5.2)
RBC # BLD AUTO: 3.73 10E12/L (ref 3.8–5.2)
RBC # BLD AUTO: 3.83 10E12/L (ref 3.8–5.2)
RBC # BLD AUTO: NORMAL 10E12/L (ref 3.8–5.2)
RBC #/AREA URNS AUTO: 17 /HPF (ref 0–2)
RBC #/AREA URNS AUTO: 20 /HPF (ref 0–2)
RBC #/AREA URNS AUTO: 35 /HPF (ref 0–2)
RBC #/AREA URNS AUTO: 35 /HPF (ref 0–2)
RBC #/AREA URNS AUTO: 4 /HPF (ref 0–2)
RBC #/AREA URNS AUTO: 42 /HPF (ref 0–2)
RBC #/AREA URNS AUTO: 44 /HPF (ref 0–2)
RETICS # AUTO: 38.6 10E9/L (ref 25–95)
RETICS # AUTO: 58.3 10E9/L (ref 25–95)
RETICS/RBC NFR AUTO: 1.6 % (ref 0.5–2)
RETICS/RBC NFR AUTO: 2.4 % (ref 0.5–2)
RHINOVIRUS RNA SPEC QL NAA+PROBE: NEGATIVE
RSV RNA SPEC NAA+PROBE: NORMAL
RSV RNA SPEC QL NAA+PROBE: NEGATIVE
RSV RNA SPEC QL NAA+PROBE: NEGATIVE
SAO2 % BLDV FROM PO2: 51 %
SAO2 % BLDV FROM PO2: 55 %
SAO2 % BLDV FROM PO2: 64 %
SAO2 % BLDV FROM PO2: 88 %
SAO2 % BLDV FROM PO2: 90 %
SAO2 % BLDV FROM PO2: 90 %
SAO2 % BLDV FROM PO2: 93 %
SODIUM BLD-SCNC: 130 MMOL/L (ref 133–144)
SODIUM BLD-SCNC: 134 MMOL/L (ref 133–144)
SODIUM SERPL-SCNC: 120 MMOL/L (ref 133–144)
SODIUM SERPL-SCNC: 120 MMOL/L (ref 133–144)
SODIUM SERPL-SCNC: 125 MMOL/L (ref 133–144)
SODIUM SERPL-SCNC: 128 MMOL/L (ref 133–144)
SODIUM SERPL-SCNC: 129 MMOL/L (ref 133–144)
SODIUM SERPL-SCNC: 130 MMOL/L (ref 133–144)
SODIUM SERPL-SCNC: 131 MMOL/L (ref 133–144)
SODIUM SERPL-SCNC: 132 MMOL/L (ref 133–144)
SODIUM SERPL-SCNC: 133 MMOL/L (ref 133–144)
SODIUM SERPL-SCNC: 133 MMOL/L (ref 133–144)
SODIUM SERPL-SCNC: 135 MMOL/L (ref 133–144)
SODIUM SERPL-SCNC: 136 MMOL/L (ref 133–144)
SODIUM SERPL-SCNC: 137 MMOL/L (ref 133–144)
SODIUM SERPL-SCNC: 138 MMOL/L (ref 133–144)
SODIUM SERPL-SCNC: 139 MMOL/L (ref 133–144)
SODIUM SERPL-SCNC: 140 MMOL/L (ref 133–144)
SODIUM SERPL-SCNC: 141 MMOL/L (ref 133–144)
SODIUM SERPL-SCNC: 142 MMOL/L (ref 133–144)
SP GR UR STRIP: 1.01 (ref 1–1.03)
SP GR UR STRIP: 1.02 (ref 1–1.03)
SPECIMEN EXP DATE BLD: NORMAL
SPECIMEN EXP DATE BLD: NORMAL
SPECIMEN SOURCE: ABNORMAL
SPECIMEN SOURCE: NORMAL
SQUAMOUS #/AREA URNS AUTO: <1 /HPF (ref 0–1)
SQUAMOUS #/AREA URNS AUTO: <1 /HPF (ref 0–1)
TIBC SERPL-MCNC: 205 UG/DL (ref 240–430)
TRANS CELLS #/AREA URNS HPF: 1 /HPF (ref 0–1)
TRANS CELLS #/AREA URNS HPF: <1 /HPF (ref 0–1)
TRANSFERRIN SERPL-MCNC: 176 MG/DL (ref 210–360)
TRANSFUSION STATUS PATIENT QL: NORMAL
TROPONIN I SERPL-MCNC: 0.02 UG/L (ref 0–0.04)
TROPONIN I SERPL-MCNC: NORMAL UG/L (ref 0–0.04)
UPPER GI ENDOSCOPY: NORMAL
URN SPEC COLLECT METH UR: ABNORMAL
UROBILINOGEN UR STRIP-MCNC: NORMAL MG/DL (ref 0–2)
VANCOMYCIN SERPL-MCNC: 21.8 MG/L
WBC # BLD AUTO: 10 10E9/L (ref 4–11)
WBC # BLD AUTO: 10 10E9/L (ref 4–11)
WBC # BLD AUTO: 10.1 10E9/L (ref 4–11)
WBC # BLD AUTO: 10.4 10E9/L (ref 4–11)
WBC # BLD AUTO: 10.6 10E9/L (ref 4–11)
WBC # BLD AUTO: 10.6 10E9/L (ref 4–11)
WBC # BLD AUTO: 10.7 10E9/L (ref 4–11)
WBC # BLD AUTO: 10.7 10E9/L (ref 4–11)
WBC # BLD AUTO: 11 10E9/L (ref 4–11)
WBC # BLD AUTO: 11 10E9/L (ref 4–11)
WBC # BLD AUTO: 11.1 10E9/L (ref 4–11)
WBC # BLD AUTO: 11.1 10E9/L (ref 4–11)
WBC # BLD AUTO: 11.6 10E9/L (ref 4–11)
WBC # BLD AUTO: 11.6 10E9/L (ref 4–11)
WBC # BLD AUTO: 11.8 10E9/L (ref 4–11)
WBC # BLD AUTO: 11.9 10E9/L (ref 4–11)
WBC # BLD AUTO: 12.9 10E9/L (ref 4–11)
WBC # BLD AUTO: 13.2 10E9/L (ref 4–11)
WBC # BLD AUTO: 13.4 10E9/L (ref 4–11)
WBC # BLD AUTO: 13.5 10E9/L (ref 4–11)
WBC # BLD AUTO: 14.3 10E9/L (ref 4–11)
WBC # BLD AUTO: 14.4 10E9/L (ref 4–11)
WBC # BLD AUTO: 14.5 10E9/L (ref 4–11)
WBC # BLD AUTO: 15.9 10E9/L (ref 4–11)
WBC # BLD AUTO: 16.6 10E9/L (ref 4–11)
WBC # BLD AUTO: 17.1 10E9/L (ref 4–11)
WBC # BLD AUTO: 18.1 10E9/L (ref 4–11)
WBC # BLD AUTO: 18.6 10E9/L (ref 4–11)
WBC # BLD AUTO: 18.7 10E9/L (ref 4–11)
WBC # BLD AUTO: 18.7 10E9/L (ref 4–11)
WBC # BLD AUTO: 18.9 10E9/L (ref 4–11)
WBC # BLD AUTO: 19.1 10E9/L (ref 4–11)
WBC # BLD AUTO: 19.5 10E9/L (ref 4–11)
WBC # BLD AUTO: 23.5 10E9/L (ref 4–11)
WBC # BLD AUTO: 26.1 10E9/L (ref 4–11)
WBC # BLD AUTO: 8.3 10E9/L (ref 4–11)
WBC # BLD AUTO: 8.6 10E9/L (ref 4–11)
WBC # BLD AUTO: 8.7 10E9/L (ref 4–11)
WBC # BLD AUTO: 9 10E9/L (ref 4–11)
WBC # BLD AUTO: 9.3 10E9/L (ref 4–11)
WBC # BLD AUTO: 9.3 10E9/L (ref 4–11)
WBC # BLD AUTO: NORMAL 10E9/L (ref 4–11)
WBC #/AREA URNS AUTO: 1 /HPF (ref 0–2)
WBC #/AREA URNS AUTO: 3 /HPF (ref 0–2)
WBC #/AREA URNS AUTO: 3 /HPF (ref 0–2)
WBC #/AREA URNS AUTO: 4 /HPF (ref 0–2)
WBC #/AREA URNS AUTO: 5 /HPF (ref 0–2)
WBC #/AREA URNS AUTO: 51 /HPF (ref 0–2)
WBC #/AREA URNS AUTO: 6 /HPF (ref 0–2)

## 2017-01-01 PROCEDURE — 94640 AIRWAY INHALATION TREATMENT: CPT

## 2017-01-01 PROCEDURE — 99233 SBSQ HOSP IP/OBS HIGH 50: CPT | Mod: GC | Performed by: STUDENT IN AN ORGANIZED HEALTH CARE EDUCATION/TRAINING PROGRAM

## 2017-01-01 PROCEDURE — 25000132 ZZH RX MED GY IP 250 OP 250 PS 637: Mod: GY | Performed by: INTERNAL MEDICINE

## 2017-01-01 PROCEDURE — 25000132 ZZH RX MED GY IP 250 OP 250 PS 637: Mod: GY | Performed by: SURGERY

## 2017-01-01 PROCEDURE — 87077 CULTURE AEROBIC IDENTIFY: CPT | Performed by: INTERNAL MEDICINE

## 2017-01-01 PROCEDURE — 25000132 ZZH RX MED GY IP 250 OP 250 PS 637: Mod: GY | Performed by: PHYSICIAN ASSISTANT

## 2017-01-01 PROCEDURE — 70450 CT HEAD/BRAIN W/O DYE: CPT

## 2017-01-01 PROCEDURE — 85049 AUTOMATED PLATELET COUNT: CPT | Performed by: INTERNAL MEDICINE

## 2017-01-01 PROCEDURE — 94003 VENT MGMT INPAT SUBQ DAY: CPT

## 2017-01-01 PROCEDURE — 94640 AIRWAY INHALATION TREATMENT: CPT | Mod: 76

## 2017-01-01 PROCEDURE — 25000125 ZZHC RX 250: Performed by: INTERNAL MEDICINE

## 2017-01-01 PROCEDURE — 40000802 ZZH SITE CHECK

## 2017-01-01 PROCEDURE — 90834 PSYTX W PT 45 MINUTES: CPT | Performed by: MARRIAGE & FAMILY THERAPIST

## 2017-01-01 PROCEDURE — 25000125 ZZHC RX 250: Performed by: EMERGENCY MEDICINE

## 2017-01-01 PROCEDURE — 25000125 ZZHC RX 250: Performed by: NURSE PRACTITIONER

## 2017-01-01 PROCEDURE — 74000 XR ABDOMEN PORT F1 VW: CPT

## 2017-01-01 PROCEDURE — 25000132 ZZH RX MED GY IP 250 OP 250 PS 637: Mod: GY | Performed by: NURSE PRACTITIONER

## 2017-01-01 PROCEDURE — 83605 ASSAY OF LACTIC ACID: CPT | Performed by: STUDENT IN AN ORGANIZED HEALTH CARE EDUCATION/TRAINING PROGRAM

## 2017-01-01 PROCEDURE — 40000275 ZZH STATISTIC RCP TIME EA 10 MIN

## 2017-01-01 PROCEDURE — 99285 EMERGENCY DEPT VISIT HI MDM: CPT | Mod: 25 | Performed by: EMERGENCY MEDICINE

## 2017-01-01 PROCEDURE — 99217 ZZC OBSERVATION CARE DISCHARGE: CPT | Performed by: INTERNAL MEDICINE

## 2017-01-01 PROCEDURE — 99291 CRITICAL CARE FIRST HOUR: CPT | Mod: 25 | Performed by: EMERGENCY MEDICINE

## 2017-01-01 PROCEDURE — 85027 COMPLETE CBC AUTOMATED: CPT | Performed by: STUDENT IN AN ORGANIZED HEALTH CARE EDUCATION/TRAINING PROGRAM

## 2017-01-01 PROCEDURE — 25000128 H RX IP 250 OP 636: Performed by: INTERNAL MEDICINE

## 2017-01-01 PROCEDURE — A9270 NON-COVERED ITEM OR SERVICE: HCPCS | Mod: GY | Performed by: SURGERY

## 2017-01-01 PROCEDURE — 36592 COLLECT BLOOD FROM PICC: CPT | Performed by: STUDENT IN AN ORGANIZED HEALTH CARE EDUCATION/TRAINING PROGRAM

## 2017-01-01 PROCEDURE — 85025 COMPLETE CBC W/AUTO DIFF WBC: CPT | Performed by: INTERNAL MEDICINE

## 2017-01-01 PROCEDURE — 12000008 ZZH R&B INTERMEDIATE UMMC

## 2017-01-01 PROCEDURE — 40000501 ZZHCL STATISTIC HEMATOCRIT ED POCT

## 2017-01-01 PROCEDURE — 36592 COLLECT BLOOD FROM PICC: CPT | Performed by: INTERNAL MEDICINE

## 2017-01-01 PROCEDURE — A9270 NON-COVERED ITEM OR SERVICE: HCPCS | Mod: GY | Performed by: INTERNAL MEDICINE

## 2017-01-01 PROCEDURE — 99223 1ST HOSP IP/OBS HIGH 75: CPT | Performed by: INTERNAL MEDICINE

## 2017-01-01 PROCEDURE — 83605 ASSAY OF LACTIC ACID: CPT

## 2017-01-01 PROCEDURE — 25000132 ZZH RX MED GY IP 250 OP 250 PS 637: Mod: GY | Performed by: STUDENT IN AN ORGANIZED HEALTH CARE EDUCATION/TRAINING PROGRAM

## 2017-01-01 PROCEDURE — 25000128 H RX IP 250 OP 636: Performed by: STUDENT IN AN ORGANIZED HEALTH CARE EDUCATION/TRAINING PROGRAM

## 2017-01-01 PROCEDURE — 99350 HOME/RES VST EST HIGH MDM 60: CPT

## 2017-01-01 PROCEDURE — 87633 RESP VIRUS 12-25 TARGETS: CPT | Performed by: EMERGENCY MEDICINE

## 2017-01-01 PROCEDURE — 36415 COLL VENOUS BLD VENIPUNCTURE: CPT | Performed by: INTERNAL MEDICINE

## 2017-01-01 PROCEDURE — 40000281 ZZH STATISTIC TRANSPORT TIME EA 15 MIN

## 2017-01-01 PROCEDURE — 87800 DETECT AGNT MULT DNA DIREC: CPT | Performed by: EMERGENCY MEDICINE

## 2017-01-01 PROCEDURE — 12000006 ZZH R&B IMCU INTERMEDIATE UMMC

## 2017-01-01 PROCEDURE — 71010 XR CHEST PORT 1 VW: CPT

## 2017-01-01 PROCEDURE — A9270 NON-COVERED ITEM OR SERVICE: HCPCS | Mod: GY | Performed by: STUDENT IN AN ORGANIZED HEALTH CARE EDUCATION/TRAINING PROGRAM

## 2017-01-01 PROCEDURE — 25000125 ZZHC RX 250: Performed by: STUDENT IN AN ORGANIZED HEALTH CARE EDUCATION/TRAINING PROGRAM

## 2017-01-01 PROCEDURE — 99232 SBSQ HOSP IP/OBS MODERATE 35: CPT | Mod: GC | Performed by: INTERNAL MEDICINE

## 2017-01-01 PROCEDURE — 83735 ASSAY OF MAGNESIUM: CPT | Performed by: INTERNAL MEDICINE

## 2017-01-01 PROCEDURE — 36600 WITHDRAWAL OF ARTERIAL BLOOD: CPT

## 2017-01-01 PROCEDURE — 80076 HEPATIC FUNCTION PANEL: CPT | Performed by: EMERGENCY MEDICINE

## 2017-01-01 PROCEDURE — A9270 NON-COVERED ITEM OR SERVICE: HCPCS | Mod: GY | Performed by: PHYSICIAN ASSISTANT

## 2017-01-01 PROCEDURE — 80048 BASIC METABOLIC PNL TOTAL CA: CPT | Performed by: INTERNAL MEDICINE

## 2017-01-01 PROCEDURE — 87070 CULTURE OTHR SPECIMN AEROBIC: CPT | Mod: XS | Performed by: INTERNAL MEDICINE

## 2017-01-01 PROCEDURE — 99233 SBSQ HOSP IP/OBS HIGH 50: CPT | Performed by: CLINICAL NURSE SPECIALIST

## 2017-01-01 PROCEDURE — 84100 ASSAY OF PHOSPHORUS: CPT | Performed by: INTERNAL MEDICINE

## 2017-01-01 PROCEDURE — 84145 PROCALCITONIN (PCT): CPT | Performed by: INTERNAL MEDICINE

## 2017-01-01 PROCEDURE — 99207 ZZC CDG-CODE CATEGORY CHANGED: CPT | Performed by: PHYSICIAN ASSISTANT

## 2017-01-01 PROCEDURE — 87088 URINE BACTERIA CULTURE: CPT | Performed by: HOSPITALIST

## 2017-01-01 PROCEDURE — 27210429 ZZH NUTRITION PRODUCT INTERMEDIATE LITER

## 2017-01-01 PROCEDURE — 96365 THER/PROPH/DIAG IV INF INIT: CPT | Performed by: EMERGENCY MEDICINE

## 2017-01-01 PROCEDURE — 96361 HYDRATE IV INFUSION ADD-ON: CPT | Performed by: EMERGENCY MEDICINE

## 2017-01-01 PROCEDURE — 80048 BASIC METABOLIC PNL TOTAL CA: CPT | Performed by: NURSE PRACTITIONER

## 2017-01-01 PROCEDURE — 80048 BASIC METABOLIC PNL TOTAL CA: CPT | Performed by: STUDENT IN AN ORGANIZED HEALTH CARE EDUCATION/TRAINING PROGRAM

## 2017-01-01 PROCEDURE — 25000128 H RX IP 250 OP 636: Performed by: EMERGENCY MEDICINE

## 2017-01-01 PROCEDURE — 27210437 ZZH NUTRITION PRODUCT SEMIELEM INTERMED LITER

## 2017-01-01 PROCEDURE — 99232 SBSQ HOSP IP/OBS MODERATE 35: CPT | Performed by: CLINICAL NURSE SPECIALIST

## 2017-01-01 PROCEDURE — 94002 VENT MGMT INPAT INIT DAY: CPT

## 2017-01-01 PROCEDURE — 84484 ASSAY OF TROPONIN QUANT: CPT | Performed by: INTERNAL MEDICINE

## 2017-01-01 PROCEDURE — 25000132 ZZH RX MED GY IP 250 OP 250 PS 637: Mod: GY | Performed by: HOSPITALIST

## 2017-01-01 PROCEDURE — 40000940 XR CHEST PORT 1 VW

## 2017-01-01 PROCEDURE — A9270 NON-COVERED ITEM OR SERVICE: HCPCS | Mod: GY | Performed by: HOSPITALIST

## 2017-01-01 PROCEDURE — 82803 BLOOD GASES ANY COMBINATION: CPT | Performed by: INTERNAL MEDICINE

## 2017-01-01 PROCEDURE — 93970 EXTREMITY STUDY: CPT

## 2017-01-01 PROCEDURE — 87077 CULTURE AEROBIC IDENTIFY: CPT | Performed by: SURGERY

## 2017-01-01 PROCEDURE — 99211 OFF/OP EST MAY X REQ PHY/QHP: CPT

## 2017-01-01 PROCEDURE — 84100 ASSAY OF PHOSPHORUS: CPT | Performed by: STUDENT IN AN ORGANIZED HEALTH CARE EDUCATION/TRAINING PROGRAM

## 2017-01-01 PROCEDURE — 84100 ASSAY OF PHOSPHORUS: CPT | Performed by: PEDIATRICS

## 2017-01-01 PROCEDURE — 99223 1ST HOSP IP/OBS HIGH 75: CPT | Performed by: CLINICAL NURSE SPECIALIST

## 2017-01-01 PROCEDURE — 84145 PROCALCITONIN (PCT): CPT | Performed by: EMERGENCY MEDICINE

## 2017-01-01 PROCEDURE — 5A1945Z RESPIRATORY VENTILATION, 24-96 CONSECUTIVE HOURS: ICD-10-PCS | Performed by: HOSPITALIST

## 2017-01-01 PROCEDURE — A9270 NON-COVERED ITEM OR SERVICE: HCPCS | Mod: GY | Performed by: NURSE PRACTITIONER

## 2017-01-01 PROCEDURE — 93010 ELECTROCARDIOGRAM REPORT: CPT | Performed by: INTERNAL MEDICINE

## 2017-01-01 PROCEDURE — 00000146 ZZHCL STATISTIC GLUCOSE BY METER IP

## 2017-01-01 PROCEDURE — 84450 TRANSFERASE (AST) (SGOT): CPT | Performed by: INTERNAL MEDICINE

## 2017-01-01 PROCEDURE — 87186 SC STD MICRODIL/AGAR DIL: CPT | Performed by: EMERGENCY MEDICINE

## 2017-01-01 PROCEDURE — 80048 BASIC METABOLIC PNL TOTAL CA: CPT | Performed by: EMERGENCY MEDICINE

## 2017-01-01 PROCEDURE — 25000125 ZZHC RX 250: Performed by: PHYSICIAN ASSISTANT

## 2017-01-01 PROCEDURE — 85027 COMPLETE CBC AUTOMATED: CPT | Performed by: PEDIATRICS

## 2017-01-01 PROCEDURE — 37000009 ZZH ANESTHESIA TECHNICAL FEE, EACH ADDTL 15 MIN: Performed by: INTERNAL MEDICINE

## 2017-01-01 PROCEDURE — 99207 ZZC CDG-CORRECTLY CODED, REVIEWED AND AGREE: CPT | Performed by: CLINICAL NURSE SPECIALIST

## 2017-01-01 PROCEDURE — 85027 COMPLETE CBC AUTOMATED: CPT | Performed by: NURSE PRACTITIONER

## 2017-01-01 PROCEDURE — 27210903 ZZH KIT CR5

## 2017-01-01 PROCEDURE — 99233 SBSQ HOSP IP/OBS HIGH 50: CPT | Performed by: INTERNAL MEDICINE

## 2017-01-01 PROCEDURE — 70553 MRI BRAIN STEM W/O & W/DYE: CPT

## 2017-01-01 PROCEDURE — 80053 COMPREHEN METABOLIC PANEL: CPT | Performed by: EMERGENCY MEDICINE

## 2017-01-01 PROCEDURE — 87640 STAPH A DNA AMP PROBE: CPT | Mod: XU | Performed by: INTERNAL MEDICINE

## 2017-01-01 PROCEDURE — 40000809 ZZH STATISTIC NO DOCUMENTATION TO SUPPORT CHARGE

## 2017-01-01 PROCEDURE — 82803 BLOOD GASES ANY COMBINATION: CPT | Performed by: PHYSICIAN ASSISTANT

## 2017-01-01 PROCEDURE — 85027 COMPLETE CBC AUTOMATED: CPT | Performed by: INTERNAL MEDICINE

## 2017-01-01 PROCEDURE — 82330 ASSAY OF CALCIUM: CPT

## 2017-01-01 PROCEDURE — 84295 ASSAY OF SERUM SODIUM: CPT | Mod: 91 | Performed by: NURSE PRACTITIONER

## 2017-01-01 PROCEDURE — 36592 COLLECT BLOOD FROM PICC: CPT | Performed by: PHYSICIAN ASSISTANT

## 2017-01-01 PROCEDURE — 25000128 H RX IP 250 OP 636

## 2017-01-01 PROCEDURE — 25000128 H RX IP 250 OP 636: Performed by: PHYSICIAN ASSISTANT

## 2017-01-01 PROCEDURE — 96365 THER/PROPH/DIAG IV INF INIT: CPT

## 2017-01-01 PROCEDURE — 36415 COLL VENOUS BLD VENIPUNCTURE: CPT | Performed by: HOSPITALIST

## 2017-01-01 PROCEDURE — 93005 ELECTROCARDIOGRAM TRACING: CPT | Performed by: EMERGENCY MEDICINE

## 2017-01-01 PROCEDURE — 99207 ZZC APP CREDIT; MD BILLING SHARED VISIT: CPT | Mod: Z6 | Performed by: EMERGENCY MEDICINE

## 2017-01-01 PROCEDURE — 99207 ZZC CDG-CODE CATEGORY CHANGED: CPT | Performed by: INTERNAL MEDICINE

## 2017-01-01 PROCEDURE — 25800025 ZZH RX 258: Performed by: STUDENT IN AN ORGANIZED HEALTH CARE EDUCATION/TRAINING PROGRAM

## 2017-01-01 PROCEDURE — 40000556 ZZH STATISTIC PERIPHERAL IV START W US GUIDANCE

## 2017-01-01 PROCEDURE — 83735 ASSAY OF MAGNESIUM: CPT | Performed by: PEDIATRICS

## 2017-01-01 PROCEDURE — 87040 BLOOD CULTURE FOR BACTERIA: CPT | Performed by: EMERGENCY MEDICINE

## 2017-01-01 PROCEDURE — 25000125 ZZHC RX 250: Performed by: ORTHOPAEDIC SURGERY

## 2017-01-01 PROCEDURE — 25000308 HC RX OP HPI UCR WEL MED 250 IP 250: Performed by: INTERNAL MEDICINE

## 2017-01-01 PROCEDURE — 86850 RBC ANTIBODY SCREEN: CPT | Performed by: STUDENT IN AN ORGANIZED HEALTH CARE EDUCATION/TRAINING PROGRAM

## 2017-01-01 PROCEDURE — 82565 ASSAY OF CREATININE: CPT | Mod: 91 | Performed by: INTERNAL MEDICINE

## 2017-01-01 PROCEDURE — 87086 URINE CULTURE/COLONY COUNT: CPT | Performed by: EMERGENCY MEDICINE

## 2017-01-01 PROCEDURE — 25000131 ZZH RX MED GY IP 250 OP 636 PS 637: Mod: GY | Performed by: INTERNAL MEDICINE

## 2017-01-01 PROCEDURE — 84295 ASSAY OF SERUM SODIUM: CPT | Performed by: INTERNAL MEDICINE

## 2017-01-01 PROCEDURE — 99349 HOME/RES VST EST MOD MDM 40: CPT

## 2017-01-01 PROCEDURE — 84145 PROCALCITONIN (PCT): CPT | Performed by: STUDENT IN AN ORGANIZED HEALTH CARE EDUCATION/TRAINING PROGRAM

## 2017-01-01 PROCEDURE — 99226 ZZC SUBSEQUENT OBSERVATION CARE,LEVEL III: CPT | Performed by: INTERNAL MEDICINE

## 2017-01-01 PROCEDURE — 27210995 ZZH RX 272

## 2017-01-01 PROCEDURE — 25000125 ZZHC RX 250: Performed by: PEDIATRICS

## 2017-01-01 PROCEDURE — 27210905 ZZH KIT CR7

## 2017-01-01 PROCEDURE — 84132 ASSAY OF SERUM POTASSIUM: CPT | Performed by: INTERNAL MEDICINE

## 2017-01-01 PROCEDURE — 40000115 ZZH STATISTIC NURSE TLC VISIT: Performed by: CLINICAL NURSE SPECIALIST

## 2017-01-01 PROCEDURE — 87070 CULTURE OTHR SPECIMN AEROBIC: CPT | Performed by: HOSPITALIST

## 2017-01-01 PROCEDURE — 82803 BLOOD GASES ANY COMBINATION: CPT | Performed by: STUDENT IN AN ORGANIZED HEALTH CARE EDUCATION/TRAINING PROGRAM

## 2017-01-01 PROCEDURE — 86900 BLOOD TYPING SEROLOGIC ABO: CPT | Performed by: STUDENT IN AN ORGANIZED HEALTH CARE EDUCATION/TRAINING PROGRAM

## 2017-01-01 PROCEDURE — 83540 ASSAY OF IRON: CPT | Performed by: INTERNAL MEDICINE

## 2017-01-01 PROCEDURE — 97110 THERAPEUTIC EXERCISES: CPT | Mod: GP | Performed by: PHYSICAL THERAPIST

## 2017-01-01 PROCEDURE — 25000565 ZZH ISOFLURANE, EA 15 MIN: Performed by: INTERNAL MEDICINE

## 2017-01-01 PROCEDURE — 80053 COMPREHEN METABOLIC PANEL: CPT | Performed by: PEDIATRICS

## 2017-01-01 PROCEDURE — 85045 AUTOMATED RETICULOCYTE COUNT: CPT | Performed by: INTERNAL MEDICINE

## 2017-01-01 PROCEDURE — 25500064 ZZH RX 255 OP 636: Performed by: ORTHOPAEDIC SURGERY

## 2017-01-01 PROCEDURE — 82565 ASSAY OF CREATININE: CPT | Performed by: INTERNAL MEDICINE

## 2017-01-01 PROCEDURE — 25000128 H RX IP 250 OP 636: Performed by: ORTHOPAEDIC SURGERY

## 2017-01-01 PROCEDURE — 25000128 H RX IP 250 OP 636: Performed by: NURSE PRACTITIONER

## 2017-01-01 PROCEDURE — 25000132 ZZH RX MED GY IP 250 OP 250 PS 637: Mod: GY | Performed by: MARRIAGE & FAMILY THERAPIST

## 2017-01-01 PROCEDURE — 27210195 ZZH KIT POWER PICC DOUBLE LUMEN

## 2017-01-01 PROCEDURE — 85652 RBC SED RATE AUTOMATED: CPT | Performed by: INTERNAL MEDICINE

## 2017-01-01 PROCEDURE — 86140 C-REACTIVE PROTEIN: CPT | Performed by: PHYSICIAN ASSISTANT

## 2017-01-01 PROCEDURE — 85025 COMPLETE CBC W/AUTO DIFF WBC: CPT | Performed by: STUDENT IN AN ORGANIZED HEALTH CARE EDUCATION/TRAINING PROGRAM

## 2017-01-01 PROCEDURE — 40000497 ZZHCL STATISTIC SODIUM ED POCT

## 2017-01-01 PROCEDURE — 96367 TX/PROPH/DG ADDL SEQ IV INF: CPT

## 2017-01-01 PROCEDURE — 99285 EMERGENCY DEPT VISIT HI MDM: CPT | Mod: 25

## 2017-01-01 PROCEDURE — 25000128 H RX IP 250 OP 636: Performed by: HOSPITALIST

## 2017-01-01 PROCEDURE — 80053 COMPREHEN METABOLIC PANEL: CPT | Performed by: STUDENT IN AN ORGANIZED HEALTH CARE EDUCATION/TRAINING PROGRAM

## 2017-01-01 PROCEDURE — 94640 AIRWAY INHALATION TREATMENT: CPT | Performed by: OPTOMETRIST

## 2017-01-01 PROCEDURE — 84295 ASSAY OF SERUM SODIUM: CPT | Mod: 91 | Performed by: INTERNAL MEDICINE

## 2017-01-01 PROCEDURE — 82803 BLOOD GASES ANY COMBINATION: CPT | Performed by: PEDIATRICS

## 2017-01-01 PROCEDURE — 97161 PT EVAL LOW COMPLEX 20 MIN: CPT | Mod: GP | Performed by: PHYSICAL THERAPIST

## 2017-01-01 PROCEDURE — 87804 INFLUENZA ASSAY W/OPTIC: CPT | Mod: 91 | Performed by: EMERGENCY MEDICINE

## 2017-01-01 PROCEDURE — 99221 1ST HOSP IP/OBS SF/LOW 40: CPT | Performed by: CLINICAL NURSE SPECIALIST

## 2017-01-01 PROCEDURE — 80048 BASIC METABOLIC PNL TOTAL CA: CPT | Performed by: PEDIATRICS

## 2017-01-01 PROCEDURE — 85610 PROTHROMBIN TIME: CPT | Performed by: PEDIATRICS

## 2017-01-01 PROCEDURE — 36415 COLL VENOUS BLD VENIPUNCTURE: CPT | Performed by: STUDENT IN AN ORGANIZED HEALTH CARE EDUCATION/TRAINING PROGRAM

## 2017-01-01 PROCEDURE — 99233 SBSQ HOSP IP/OBS HIGH 50: CPT | Mod: GC | Performed by: INTERNAL MEDICINE

## 2017-01-01 PROCEDURE — 71260 CT THORAX DX C+: CPT

## 2017-01-01 PROCEDURE — A9270 NON-COVERED ITEM OR SERVICE: HCPCS | Mod: GY | Performed by: EMERGENCY MEDICINE

## 2017-01-01 PROCEDURE — 40000164 ZZH STATISTIC PHYSICIAN TLC VISIT: Performed by: INTERNAL MEDICINE

## 2017-01-01 PROCEDURE — P9016 RBC LEUKOCYTES REDUCED: HCPCS | Performed by: STUDENT IN AN ORGANIZED HEALTH CARE EDUCATION/TRAINING PROGRAM

## 2017-01-01 PROCEDURE — 86140 C-REACTIVE PROTEIN: CPT | Performed by: INTERNAL MEDICINE

## 2017-01-01 PROCEDURE — 40000498 ZZHCL STATISTIC POTASSIUM ED POCT

## 2017-01-01 PROCEDURE — 96360 HYDRATION IV INFUSION INIT: CPT | Performed by: EMERGENCY MEDICINE

## 2017-01-01 PROCEDURE — 83735 ASSAY OF MAGNESIUM: CPT | Performed by: STUDENT IN AN ORGANIZED HEALTH CARE EDUCATION/TRAINING PROGRAM

## 2017-01-01 PROCEDURE — 84132 ASSAY OF SERUM POTASSIUM: CPT | Mod: 91

## 2017-01-01 PROCEDURE — 5A1955Z RESPIRATORY VENTILATION, GREATER THAN 96 CONSECUTIVE HOURS: ICD-10-PCS | Performed by: INTERNAL MEDICINE

## 2017-01-01 PROCEDURE — 83550 IRON BINDING TEST: CPT | Performed by: INTERNAL MEDICINE

## 2017-01-01 PROCEDURE — 87493 C DIFF AMPLIFIED PROBE: CPT | Performed by: INTERNAL MEDICINE

## 2017-01-01 PROCEDURE — 86140 C-REACTIVE PROTEIN: CPT | Performed by: PEDIATRICS

## 2017-01-01 PROCEDURE — 83605 ASSAY OF LACTIC ACID: CPT | Performed by: INTERNAL MEDICINE

## 2017-01-01 PROCEDURE — 40000558 ZZH STATISTIC CVC DRESSING CHANGE

## 2017-01-01 PROCEDURE — 99212 OFFICE O/P EST SF 10 MIN: CPT

## 2017-01-01 PROCEDURE — 82803 BLOOD GASES ANY COMBINATION: CPT

## 2017-01-01 PROCEDURE — 85025 COMPLETE CBC W/AUTO DIFF WBC: CPT | Performed by: EMERGENCY MEDICINE

## 2017-01-01 PROCEDURE — 25000125 ZZHC RX 250

## 2017-01-01 PROCEDURE — 20000004 ZZH R&B ICU UMMC

## 2017-01-01 PROCEDURE — 93010 ELECTROCARDIOGRAM REPORT: CPT | Mod: Z6 | Performed by: EMERGENCY MEDICINE

## 2017-01-01 PROCEDURE — 87186 SC STD MICRODIL/AGAR DIL: CPT | Performed by: HOSPITALIST

## 2017-01-01 PROCEDURE — 25000128 H RX IP 250 OP 636: Performed by: RADIOLOGY

## 2017-01-01 PROCEDURE — 83690 ASSAY OF LIPASE: CPT | Performed by: EMERGENCY MEDICINE

## 2017-01-01 PROCEDURE — 99231 SBSQ HOSP IP/OBS SF/LOW 25: CPT | Performed by: INTERNAL MEDICINE

## 2017-01-01 PROCEDURE — 87493 C DIFF AMPLIFIED PROBE: CPT | Performed by: HOSPITALIST

## 2017-01-01 PROCEDURE — 36569 INSJ PICC 5 YR+ W/O IMAGING: CPT

## 2017-01-01 PROCEDURE — 96366 THER/PROPH/DIAG IV INF ADDON: CPT | Performed by: EMERGENCY MEDICINE

## 2017-01-01 PROCEDURE — 87641 MR-STAPH DNA AMP PROBE: CPT | Performed by: INTERNAL MEDICINE

## 2017-01-01 PROCEDURE — C1769 GUIDE WIRE: HCPCS | Performed by: INTERNAL MEDICINE

## 2017-01-01 PROCEDURE — 25500064 ZZH RX 255 OP 636: Performed by: INTERNAL MEDICINE

## 2017-01-01 PROCEDURE — 36415 COLL VENOUS BLD VENIPUNCTURE: CPT | Performed by: MARRIAGE & FAMILY THERAPIST

## 2017-01-01 PROCEDURE — 85027 COMPLETE CBC AUTOMATED: CPT | Performed by: MARRIAGE & FAMILY THERAPIST

## 2017-01-01 PROCEDURE — 87186 SC STD MICRODIL/AGAR DIL: CPT | Performed by: SURGERY

## 2017-01-01 PROCEDURE — 36415 COLL VENOUS BLD VENIPUNCTURE: CPT | Performed by: EMERGENCY MEDICINE

## 2017-01-01 PROCEDURE — 87493 C DIFF AMPLIFIED PROBE: CPT | Performed by: NURSE PRACTITIONER

## 2017-01-01 PROCEDURE — 86900 BLOOD TYPING SEROLOGIC ABO: CPT | Performed by: SURGERY

## 2017-01-01 PROCEDURE — 87070 CULTURE OTHR SPECIMN AEROBIC: CPT | Performed by: SURGERY

## 2017-01-01 PROCEDURE — 85610 PROTHROMBIN TIME: CPT | Performed by: INTERNAL MEDICINE

## 2017-01-01 PROCEDURE — 74177 CT ABD & PELVIS W/CONTRAST: CPT

## 2017-01-01 PROCEDURE — 94640 AIRWAY INHALATION TREATMENT: CPT | Mod: 76 | Performed by: EMERGENCY MEDICINE

## 2017-01-01 PROCEDURE — 82533 TOTAL CORTISOL: CPT | Performed by: PEDIATRICS

## 2017-01-01 PROCEDURE — 86140 C-REACTIVE PROTEIN: CPT | Performed by: EMERGENCY MEDICINE

## 2017-01-01 PROCEDURE — 94640 AIRWAY INHALATION TREATMENT: CPT | Performed by: EMERGENCY MEDICINE

## 2017-01-01 PROCEDURE — 99223 1ST HOSP IP/OBS HIGH 75: CPT | Mod: AI | Performed by: ORTHOPAEDIC SURGERY

## 2017-01-01 PROCEDURE — 80053 COMPREHEN METABOLIC PANEL: CPT | Performed by: INTERNAL MEDICINE

## 2017-01-01 PROCEDURE — 74000 XR ABDOMEN PORT F1 VW: CPT | Mod: 77

## 2017-01-01 PROCEDURE — 36415 COLL VENOUS BLD VENIPUNCTURE: CPT | Performed by: PEDIATRICS

## 2017-01-01 PROCEDURE — 40000922 ZZH STATISTIC RCP TIME PACU VENT EA 10 MIN

## 2017-01-01 PROCEDURE — 99239 HOSP IP/OBS DSCHRG MGMT >30: CPT | Mod: GC | Performed by: INTERNAL MEDICINE

## 2017-01-01 PROCEDURE — 85049 AUTOMATED PLATELET COUNT: CPT | Mod: 91 | Performed by: EMERGENCY MEDICINE

## 2017-01-01 PROCEDURE — 71010 XR CHEST 1 VW: CPT | Mod: 77

## 2017-01-01 PROCEDURE — 40000275 ZZH STATISTIC RCP TIME EA 10 MIN: Performed by: OPTOMETRIST

## 2017-01-01 PROCEDURE — 87077 CULTURE AEROBIC IDENTIFY: CPT | Performed by: EMERGENCY MEDICINE

## 2017-01-01 PROCEDURE — 99207 ZZC CDG-CODE CATEGORY CHANGED: CPT | Performed by: CLINICAL NURSE SPECIALIST

## 2017-01-01 PROCEDURE — 87088 URINE BACTERIA CULTURE: CPT | Performed by: EMERGENCY MEDICINE

## 2017-01-01 PROCEDURE — 93971 EXTREMITY STUDY: CPT | Mod: LT

## 2017-01-01 PROCEDURE — 99285 EMERGENCY DEPT VISIT HI MDM: CPT | Performed by: EMERGENCY MEDICINE

## 2017-01-01 PROCEDURE — 99291 CRITICAL CARE FIRST HOUR: CPT | Mod: GC | Performed by: INTERNAL MEDICINE

## 2017-01-01 PROCEDURE — 25000132 ZZH RX MED GY IP 250 OP 250 PS 637: Mod: GY | Performed by: EMERGENCY MEDICINE

## 2017-01-01 PROCEDURE — 84132 ASSAY OF SERUM POTASSIUM: CPT | Mod: 91 | Performed by: INTERNAL MEDICINE

## 2017-01-01 PROCEDURE — 80048 BASIC METABOLIC PNL TOTAL CA: CPT | Performed by: MARRIAGE & FAMILY THERAPIST

## 2017-01-01 PROCEDURE — 90785 PSYTX COMPLEX INTERACTIVE: CPT | Performed by: MARRIAGE & FAMILY THERAPIST

## 2017-01-01 PROCEDURE — 25000125 ZZHC RX 250: Performed by: PHARMACIST

## 2017-01-01 PROCEDURE — 76882 US LMTD JT/FCL EVL NVASC XTR: CPT | Mod: LT

## 2017-01-01 PROCEDURE — 84132 ASSAY OF SERUM POTASSIUM: CPT | Mod: 91 | Performed by: ORTHOPAEDIC SURGERY

## 2017-01-01 PROCEDURE — 87804 INFLUENZA ASSAY W/OPTIC: CPT | Performed by: EMERGENCY MEDICINE

## 2017-01-01 PROCEDURE — 40000274 ZZH STATISTIC RCP CONSULT EA 30 MIN

## 2017-01-01 PROCEDURE — 84484 ASSAY OF TROPONIN QUANT: CPT | Performed by: EMERGENCY MEDICINE

## 2017-01-01 PROCEDURE — 99607 MTMS BY PHARM ADDL 15 MIN: CPT | Mod: GY | Performed by: PHARMACIST

## 2017-01-01 PROCEDURE — 40000141 ZZH STATISTIC PERIPHERAL IV START W/O US GUIDANCE

## 2017-01-01 PROCEDURE — 99239 HOSP IP/OBS DSCHRG MGMT >30: CPT | Performed by: INTERNAL MEDICINE

## 2017-01-01 PROCEDURE — 87641 MR-STAPH DNA AMP PROBE: CPT | Performed by: SURGERY

## 2017-01-01 PROCEDURE — 85610 PROTHROMBIN TIME: CPT | Performed by: EMERGENCY MEDICINE

## 2017-01-01 PROCEDURE — 36592 COLLECT BLOOD FROM PICC: CPT | Performed by: ORTHOPAEDIC SURGERY

## 2017-01-01 PROCEDURE — 84520 ASSAY OF UREA NITROGEN: CPT | Performed by: INTERNAL MEDICINE

## 2017-01-01 PROCEDURE — 84300 ASSAY OF URINE SODIUM: CPT | Performed by: EMERGENCY MEDICINE

## 2017-01-01 PROCEDURE — 36415 COLL VENOUS BLD VENIPUNCTURE: CPT | Performed by: NURSE PRACTITIONER

## 2017-01-01 PROCEDURE — 81001 URINALYSIS AUTO W/SCOPE: CPT | Performed by: EMERGENCY MEDICINE

## 2017-01-01 PROCEDURE — G8978 MOBILITY CURRENT STATUS: HCPCS | Mod: GP,CL | Performed by: PHYSICAL THERAPIST

## 2017-01-01 PROCEDURE — 25800025 ZZH RX 258: Performed by: HOSPITALIST

## 2017-01-01 PROCEDURE — 84145 PROCALCITONIN (PCT): CPT | Performed by: PHYSICIAN ASSISTANT

## 2017-01-01 PROCEDURE — 82803 BLOOD GASES ANY COMBINATION: CPT | Performed by: EMERGENCY MEDICINE

## 2017-01-01 PROCEDURE — 25000125 ZZHC RX 250: Performed by: HOSPITALIST

## 2017-01-01 PROCEDURE — 5A1945Z RESPIRATORY VENTILATION, 24-96 CONSECUTIVE HOURS: ICD-10-PCS | Performed by: ORTHOPAEDIC SURGERY

## 2017-01-01 PROCEDURE — 83605 ASSAY OF LACTIC ACID: CPT | Performed by: PEDIATRICS

## 2017-01-01 PROCEDURE — 25000125 ZZHC RX 250: Performed by: NURSE ANESTHETIST, CERTIFIED REGISTERED

## 2017-01-01 PROCEDURE — 99356 ZZC PROLONGED SERV,INPATIENT,1ST HR: CPT | Performed by: CLINICAL NURSE SPECIALIST

## 2017-01-01 PROCEDURE — P9016 RBC LEUKOCYTES REDUCED: HCPCS | Performed by: SURGERY

## 2017-01-01 PROCEDURE — 87631 RESP VIRUS 3-5 TARGETS: CPT | Performed by: EMERGENCY MEDICINE

## 2017-01-01 PROCEDURE — 99207 ZZC CDG-CORRECTLY CODED, REVIEWED AND AGREE: CPT | Performed by: INTERNAL MEDICINE

## 2017-01-01 PROCEDURE — 96375 TX/PRO/DX INJ NEW DRUG ADDON: CPT

## 2017-01-01 PROCEDURE — C1887 CATHETER, GUIDING: HCPCS

## 2017-01-01 PROCEDURE — 87186 SC STD MICRODIL/AGAR DIL: CPT | Performed by: INTERNAL MEDICINE

## 2017-01-01 PROCEDURE — 87205 SMEAR GRAM STAIN: CPT | Performed by: HOSPITALIST

## 2017-01-01 PROCEDURE — 93971 EXTREMITY STUDY: CPT | Mod: RT

## 2017-01-01 PROCEDURE — 40000502 ZZHCL STATISTIC GLUCOSE ED POCT

## 2017-01-01 PROCEDURE — G8979 MOBILITY GOAL STATUS: HCPCS | Mod: GP,CL | Performed by: PHYSICAL THERAPIST

## 2017-01-01 PROCEDURE — 25800025 ZZH RX 258: Performed by: INTERNAL MEDICINE

## 2017-01-01 PROCEDURE — A9270 NON-COVERED ITEM OR SERVICE: HCPCS | Performed by: EMERGENCY MEDICINE

## 2017-01-01 PROCEDURE — 86923 COMPATIBILITY TEST ELECTRIC: CPT | Performed by: SURGERY

## 2017-01-01 PROCEDURE — 99220 ZZC INITIAL OBSERVATION CARE,LEVL III: CPT | Performed by: PHYSICIAN ASSISTANT

## 2017-01-01 PROCEDURE — 83605 ASSAY OF LACTIC ACID: CPT | Performed by: EMERGENCY MEDICINE

## 2017-01-01 PROCEDURE — C1769 GUIDE WIRE: HCPCS

## 2017-01-01 PROCEDURE — 82570 ASSAY OF URINE CREATININE: CPT | Performed by: EMERGENCY MEDICINE

## 2017-01-01 PROCEDURE — 27210185 ZZH KIT OPEN ENDED DOUBLE LUMEN

## 2017-01-01 PROCEDURE — 99223 1ST HOSP IP/OBS HIGH 75: CPT | Mod: AI | Performed by: HOSPITALIST

## 2017-01-01 PROCEDURE — A9270 NON-COVERED ITEM OR SERVICE: HCPCS | Performed by: PHYSICIAN ASSISTANT

## 2017-01-01 PROCEDURE — 99350 HOME/RES VST EST HIGH MDM 60: CPT | Performed by: NURSE PRACTITIONER

## 2017-01-01 PROCEDURE — 25800025 ZZH RX 258: Performed by: EMERGENCY MEDICINE

## 2017-01-01 PROCEDURE — 27210794 ZZH OR GENERAL SUPPLY STERILE: Performed by: INTERNAL MEDICINE

## 2017-01-01 PROCEDURE — 36416 COLLJ CAPILLARY BLOOD SPEC: CPT | Performed by: INTERNAL MEDICINE

## 2017-01-01 PROCEDURE — 86850 RBC ANTIBODY SCREEN: CPT | Performed by: SURGERY

## 2017-01-01 PROCEDURE — 36592 COLLECT BLOOD FROM PICC: CPT | Performed by: PEDIATRICS

## 2017-01-01 PROCEDURE — 99232 SBSQ HOSP IP/OBS MODERATE 35: CPT | Performed by: INTERNAL MEDICINE

## 2017-01-01 PROCEDURE — 83605 ASSAY OF LACTIC ACID: CPT | Performed by: ORTHOPAEDIC SURGERY

## 2017-01-01 PROCEDURE — 99223 1ST HOSP IP/OBS HIGH 75: CPT | Mod: AI | Performed by: INTERNAL MEDICINE

## 2017-01-01 PROCEDURE — 27210916 ZZ H TUBE GASTRO CR5

## 2017-01-01 PROCEDURE — 81001 URINALYSIS AUTO W/SCOPE: CPT | Performed by: STUDENT IN AN ORGANIZED HEALTH CARE EDUCATION/TRAINING PROGRAM

## 2017-01-01 PROCEDURE — 86901 BLOOD TYPING SEROLOGIC RH(D): CPT | Performed by: SURGERY

## 2017-01-01 PROCEDURE — 85018 HEMOGLOBIN: CPT | Performed by: INTERNAL MEDICINE

## 2017-01-01 PROCEDURE — 40000193 ZZH STATISTIC PT WARD VISIT: Performed by: PHYSICAL THERAPIST

## 2017-01-01 PROCEDURE — 83605 ASSAY OF LACTIC ACID: CPT | Mod: 91 | Performed by: PHYSICIAN ASSISTANT

## 2017-01-01 PROCEDURE — 83735 ASSAY OF MAGNESIUM: CPT | Performed by: EMERGENCY MEDICINE

## 2017-01-01 PROCEDURE — 80048 BASIC METABOLIC PNL TOTAL CA: CPT | Performed by: PHYSICIAN ASSISTANT

## 2017-01-01 PROCEDURE — 99605 MTMS BY PHARM NP 15 MIN: CPT | Mod: GY | Performed by: PHARMACIST

## 2017-01-01 PROCEDURE — 37000008 ZZH ANESTHESIA TECHNICAL FEE, 1ST 30 MIN: Performed by: INTERNAL MEDICINE

## 2017-01-01 PROCEDURE — 25500064 ZZH RX 255 OP 636: Performed by: STUDENT IN AN ORGANIZED HEALTH CARE EDUCATION/TRAINING PROGRAM

## 2017-01-01 PROCEDURE — 93005 ELECTROCARDIOGRAM TRACING: CPT

## 2017-01-01 PROCEDURE — 87640 STAPH A DNA AMP PROBE: CPT | Mod: XU | Performed by: SURGERY

## 2017-01-01 PROCEDURE — 83880 ASSAY OF NATRIURETIC PEPTIDE: CPT | Performed by: EMERGENCY MEDICINE

## 2017-01-01 PROCEDURE — 25000128 H RX IP 250 OP 636: Performed by: PEDIATRICS

## 2017-01-01 PROCEDURE — 96376 TX/PRO/DX INJ SAME DRUG ADON: CPT

## 2017-01-01 PROCEDURE — 85027 COMPLETE CBC AUTOMATED: CPT | Performed by: PHYSICIAN ASSISTANT

## 2017-01-01 PROCEDURE — 80202 ASSAY OF VANCOMYCIN: CPT | Performed by: INTERNAL MEDICINE

## 2017-01-01 PROCEDURE — A9585 GADOBUTROL INJECTION: HCPCS | Performed by: ORTHOPAEDIC SURGERY

## 2017-01-01 PROCEDURE — 96365 THER/PROPH/DIAG IV INF INIT: CPT | Mod: 59 | Performed by: EMERGENCY MEDICINE

## 2017-01-01 PROCEDURE — A9270 NON-COVERED ITEM OR SERVICE: HCPCS | Mod: GY | Performed by: MARRIAGE & FAMILY THERAPIST

## 2017-01-01 PROCEDURE — 84132 ASSAY OF SERUM POTASSIUM: CPT | Mod: 91 | Performed by: HOSPITALIST

## 2017-01-01 PROCEDURE — 85018 HEMOGLOBIN: CPT | Performed by: STUDENT IN AN ORGANIZED HEALTH CARE EDUCATION/TRAINING PROGRAM

## 2017-01-01 PROCEDURE — 87205 SMEAR GRAM STAIN: CPT | Performed by: INTERNAL MEDICINE

## 2017-01-01 PROCEDURE — 25000132 ZZH RX MED GY IP 250 OP 250 PS 637: Performed by: EMERGENCY MEDICINE

## 2017-01-01 PROCEDURE — 40000893 ZZH STATISTIC PT IP EVAL DEFER

## 2017-01-01 PROCEDURE — 40000277 XR SURGERY CARM FLUORO LESS THAN 5 MIN W STILLS: Mod: TC

## 2017-01-01 PROCEDURE — P9041 ALBUMIN (HUMAN),5%, 50ML: HCPCS | Performed by: INTERNAL MEDICINE

## 2017-01-01 PROCEDURE — 84484 ASSAY OF TROPONIN QUANT: CPT | Mod: 91 | Performed by: INTERNAL MEDICINE

## 2017-01-01 PROCEDURE — 36000051 ZZH SURGERY LEVEL 2 1ST 30 MIN - UMMC: Performed by: INTERNAL MEDICINE

## 2017-01-01 PROCEDURE — 84145 PROCALCITONIN (PCT): CPT | Performed by: NURSE PRACTITIONER

## 2017-01-01 PROCEDURE — 96366 THER/PROPH/DIAG IV INF ADDON: CPT

## 2017-01-01 PROCEDURE — 87040 BLOOD CULTURE FOR BACTERIA: CPT | Mod: 91 | Performed by: EMERGENCY MEDICINE

## 2017-01-01 PROCEDURE — 84100 ASSAY OF PHOSPHORUS: CPT | Performed by: EMERGENCY MEDICINE

## 2017-01-01 PROCEDURE — 99207 ZZC NO CHARGE NURSE ONLY: CPT

## 2017-01-01 PROCEDURE — 25800025 ZZH RX 258

## 2017-01-01 PROCEDURE — 81001 URINALYSIS AUTO W/SCOPE: CPT | Performed by: INTERNAL MEDICINE

## 2017-01-01 PROCEDURE — 86923 COMPATIBILITY TEST ELECTRIC: CPT | Performed by: STUDENT IN AN ORGANIZED HEALTH CARE EDUCATION/TRAINING PROGRAM

## 2017-01-01 PROCEDURE — 93005 ELECTROCARDIOGRAM TRACING: CPT | Mod: XU

## 2017-01-01 PROCEDURE — 85045 AUTOMATED RETICULOCYTE COUNT: CPT | Performed by: STUDENT IN AN ORGANIZED HEALTH CARE EDUCATION/TRAINING PROGRAM

## 2017-01-01 PROCEDURE — 87040 BLOOD CULTURE FOR BACTERIA: CPT | Performed by: INTERNAL MEDICINE

## 2017-01-01 PROCEDURE — 49450 REPLACE G/C TUBE PERC: CPT

## 2017-01-01 PROCEDURE — 99285 EMERGENCY DEPT VISIT HI MDM: CPT | Mod: Z6 | Performed by: EMERGENCY MEDICINE

## 2017-01-01 PROCEDURE — 82728 ASSAY OF FERRITIN: CPT | Performed by: INTERNAL MEDICINE

## 2017-01-01 PROCEDURE — C1751 CATH, INF, PER/CENT/MIDLINE: HCPCS

## 2017-01-01 PROCEDURE — 87077 CULTURE AEROBIC IDENTIFY: CPT | Performed by: HOSPITALIST

## 2017-01-01 PROCEDURE — 25000125 ZZHC RX 250: Performed by: SURGERY

## 2017-01-01 PROCEDURE — 84145 PROCALCITONIN (PCT): CPT | Performed by: HOSPITALIST

## 2017-01-01 PROCEDURE — 82803 BLOOD GASES ANY COMBINATION: CPT | Mod: 91

## 2017-01-01 PROCEDURE — 25500064 ZZH RX 255 OP 636: Performed by: SURGERY

## 2017-01-01 PROCEDURE — 86140 C-REACTIVE PROTEIN: CPT | Performed by: STUDENT IN AN ORGANIZED HEALTH CARE EDUCATION/TRAINING PROGRAM

## 2017-01-01 PROCEDURE — 70549 MR ANGIOGRAPH NECK W/O&W/DYE: CPT

## 2017-01-01 PROCEDURE — 85049 AUTOMATED PLATELET COUNT: CPT | Mod: 91 | Performed by: INTERNAL MEDICINE

## 2017-01-01 PROCEDURE — 93010 ELECTROCARDIOGRAM REPORT: CPT | Mod: 76 | Performed by: INTERNAL MEDICINE

## 2017-01-01 PROCEDURE — 76770 US EXAM ABDO BACK WALL COMP: CPT

## 2017-01-01 PROCEDURE — 25000128 H RX IP 250 OP 636: Performed by: NURSE ANESTHETIST, CERTIFIED REGISTERED

## 2017-01-01 PROCEDURE — 87070 CULTURE OTHR SPECIMN AEROBIC: CPT | Performed by: INTERNAL MEDICINE

## 2017-01-01 PROCEDURE — 12000005 ZZH R&B IMCU CRITICAL UMMC

## 2017-01-01 PROCEDURE — 82803 BLOOD GASES ANY COMBINATION: CPT | Mod: 91 | Performed by: PHYSICIAN ASSISTANT

## 2017-01-01 PROCEDURE — 36592 COLLECT BLOOD FROM PICC: CPT

## 2017-01-01 PROCEDURE — 82803 BLOOD GASES ANY COMBINATION: CPT | Performed by: NURSE PRACTITIONER

## 2017-01-01 PROCEDURE — 40000276 ZZH STATISTIC RCP TIME ED VENT EA 10 MIN

## 2017-01-01 PROCEDURE — 86901 BLOOD TYPING SEROLOGIC RH(D): CPT | Performed by: STUDENT IN AN ORGANIZED HEALTH CARE EDUCATION/TRAINING PROGRAM

## 2017-01-01 PROCEDURE — 83735 ASSAY OF MAGNESIUM: CPT | Performed by: NURSE PRACTITIONER

## 2017-01-01 PROCEDURE — 96361 HYDRATE IV INFUSION ADD-ON: CPT | Mod: 59 | Performed by: EMERGENCY MEDICINE

## 2017-01-01 PROCEDURE — 36430 TRANSFUSION BLD/BLD COMPNT: CPT | Mod: XU

## 2017-01-01 PROCEDURE — 36000053 ZZH SURGERY LEVEL 2 EA 15 ADDTL MIN - UMMC: Performed by: INTERNAL MEDICINE

## 2017-01-01 PROCEDURE — 83930 ASSAY OF BLOOD OSMOLALITY: CPT | Performed by: EMERGENCY MEDICINE

## 2017-01-01 PROCEDURE — 87205 SMEAR GRAM STAIN: CPT | Performed by: SURGERY

## 2017-01-01 PROCEDURE — 84466 ASSAY OF TRANSFERRIN: CPT | Performed by: INTERNAL MEDICINE

## 2017-01-01 PROCEDURE — 25500064 ZZH RX 255 OP 636: Performed by: RADIOLOGY

## 2017-01-01 PROCEDURE — 87086 URINE CULTURE/COLONY COUNT: CPT | Performed by: HOSPITALIST

## 2017-01-01 PROCEDURE — 84100 ASSAY OF PHOSPHORUS: CPT | Performed by: MARRIAGE & FAMILY THERAPIST

## 2017-01-01 PROCEDURE — 83735 ASSAY OF MAGNESIUM: CPT | Performed by: MARRIAGE & FAMILY THERAPIST

## 2017-01-01 RX ORDER — POTASSIUM CHLORIDE 7.45 MG/ML
10 INJECTION INTRAVENOUS
Status: DISCONTINUED | OUTPATIENT
Start: 2017-01-01 | End: 2017-01-01 | Stop reason: HOSPADM

## 2017-01-01 RX ORDER — LACTOBACILLUS RHAMNOSUS GG 10B CELL
1 CAPSULE ORAL DAILY
Status: DISCONTINUED | OUTPATIENT
Start: 2017-01-01 | End: 2017-01-01 | Stop reason: HOSPADM

## 2017-01-01 RX ORDER — CALCIUM CARBONATE 500 MG/1
500 TABLET, CHEWABLE ORAL
Status: DISCONTINUED | OUTPATIENT
Start: 2017-01-01 | End: 2017-01-01 | Stop reason: HOSPADM

## 2017-01-01 RX ORDER — PREDNISONE 20 MG/1
20 TABLET ORAL DAILY
Qty: 90 TABLET | Refills: 3 | Status: ON HOLD | OUTPATIENT
Start: 2017-01-01 | End: 2017-01-01

## 2017-01-01 RX ORDER — FEXOFENADINE HCL 180 MG/1
180 TABLET ORAL DAILY
Status: DISCONTINUED | OUTPATIENT
Start: 2017-01-01 | End: 2017-01-01 | Stop reason: HOSPADM

## 2017-01-01 RX ORDER — LORAZEPAM 2 MG/ML
2-4 INJECTION INTRAMUSCULAR EVERY 30 MIN PRN
Status: DISCONTINUED | OUTPATIENT
Start: 2017-01-01 | End: 2017-01-01 | Stop reason: HOSPADM

## 2017-01-01 RX ORDER — LOPERAMIDE HYDROCHLORIDE 2 MG/1
TABLET ORAL
Qty: 30 TABLET | Refills: 0 | Status: SHIPPED | OUTPATIENT
Start: 2017-01-01

## 2017-01-01 RX ORDER — BUDESONIDE 0.5 MG/2ML
0.5 INHALANT ORAL 2 TIMES DAILY
Status: DISCONTINUED | OUTPATIENT
Start: 2017-01-01 | End: 2017-01-01 | Stop reason: HOSPADM

## 2017-01-01 RX ORDER — POTASSIUM CHLORIDE 1.5 G/1.58G
40 POWDER, FOR SOLUTION ORAL ONCE
Status: COMPLETED | OUTPATIENT
Start: 2017-01-01 | End: 2017-01-01

## 2017-01-01 RX ORDER — HEPARIN SODIUM,PORCINE 10 UNIT/ML
5-10 VIAL (ML) INTRAVENOUS
Status: DISCONTINUED | OUTPATIENT
Start: 2017-01-01 | End: 2017-01-01 | Stop reason: HOSPADM

## 2017-01-01 RX ORDER — POLYETHYLENE GLYCOL 3350 17 G/17G
17 POWDER, FOR SOLUTION ORAL 2 TIMES DAILY
Qty: 100 PACKET | Refills: 0 | Status: CANCELLED | OUTPATIENT
Start: 2017-01-01

## 2017-01-01 RX ORDER — FUROSEMIDE 10 MG/ML
40 INJECTION INTRAMUSCULAR; INTRAVENOUS ONCE
Status: COMPLETED | OUTPATIENT
Start: 2017-01-01 | End: 2017-01-01

## 2017-01-01 RX ORDER — PREDNISONE 20 MG/1
TABLET ORAL
Qty: 12 TABLET | Refills: 0 | Status: SHIPPED | OUTPATIENT
Start: 2017-01-01 | End: 2017-01-01

## 2017-01-01 RX ORDER — PREDNISONE 5 MG/ML
40 SOLUTION ORAL DAILY
Qty: 120 ML | Refills: 0 | Status: ON HOLD | OUTPATIENT
Start: 2017-01-01 | End: 2017-01-01

## 2017-01-01 RX ORDER — SODIUM CHLORIDE 9 MG/ML
INJECTION, SOLUTION INTRAVENOUS CONTINUOUS PRN
Status: DISCONTINUED | OUTPATIENT
Start: 2017-01-01 | End: 2017-01-01

## 2017-01-01 RX ORDER — LORAZEPAM 0.5 MG/1
.5-1 TABLET ORAL
Status: DISCONTINUED | OUTPATIENT
Start: 2017-01-01 | End: 2017-01-01

## 2017-01-01 RX ORDER — ONDANSETRON 2 MG/ML
INJECTION INTRAMUSCULAR; INTRAVENOUS PRN
Status: DISCONTINUED | OUTPATIENT
Start: 2017-01-01 | End: 2017-01-01

## 2017-01-01 RX ORDER — BISACODYL 10 MG
10 SUPPOSITORY, RECTAL RECTAL DAILY PRN
Status: DISCONTINUED | OUTPATIENT
Start: 2017-01-01 | End: 2017-01-01

## 2017-01-01 RX ORDER — LEVALBUTEROL INHALATION SOLUTION 1.25 MG/3ML
1 SOLUTION RESPIRATORY (INHALATION) EVERY 4 HOURS
Status: DISCONTINUED | OUTPATIENT
Start: 2017-01-01 | End: 2017-01-01

## 2017-01-01 RX ORDER — PREDNISONE 5 MG/ML
20 SOLUTION ORAL DAILY
Status: DISCONTINUED | OUTPATIENT
Start: 2017-01-01 | End: 2017-01-01

## 2017-01-01 RX ORDER — ONDANSETRON 4 MG/1
4 TABLET, FILM COATED ORAL EVERY 4 HOURS PRN
Status: DISCONTINUED | OUTPATIENT
Start: 2017-01-01 | End: 2017-01-01 | Stop reason: HOSPADM

## 2017-01-01 RX ORDER — GUAR GUM
1 PACKET (EA) ORAL 3 TIMES DAILY
Status: DISCONTINUED | OUTPATIENT
Start: 2017-01-01 | End: 2017-01-01 | Stop reason: HOSPADM

## 2017-01-01 RX ORDER — AMOXICILLIN 250 MG
2 CAPSULE ORAL 2 TIMES DAILY
Qty: 100 TABLET | Refills: 0 | COMMUNITY
Start: 2017-01-01 | End: 2017-01-01

## 2017-01-01 RX ORDER — SODIUM CHLORIDE, SODIUM LACTATE, POTASSIUM CHLORIDE, CALCIUM CHLORIDE 600; 310; 30; 20 MG/100ML; MG/100ML; MG/100ML; MG/100ML
INJECTION, SOLUTION INTRAVENOUS CONTINUOUS
Status: DISCONTINUED | OUTPATIENT
Start: 2017-01-01 | End: 2017-01-01

## 2017-01-01 RX ORDER — GABAPENTIN 250 MG/5ML
300 SOLUTION ORAL 2 TIMES DAILY
Status: DISCONTINUED | OUTPATIENT
Start: 2017-01-01 | End: 2017-01-01

## 2017-01-01 RX ORDER — LIDOCAINE 40 MG/G
CREAM TOPICAL
Status: DISCONTINUED | OUTPATIENT
Start: 2017-01-01 | End: 2017-01-01 | Stop reason: HOSPADM

## 2017-01-01 RX ORDER — OXYCODONE HYDROCHLORIDE 5 MG/1
5 TABLET ORAL EVERY 4 HOURS PRN
Status: DISCONTINUED | OUTPATIENT
Start: 2017-01-01 | End: 2017-01-01 | Stop reason: HOSPADM

## 2017-01-01 RX ORDER — MEROPENEM 500 MG/1
500 INJECTION, POWDER, FOR SOLUTION INTRAVENOUS EVERY 6 HOURS
Status: DISCONTINUED | OUTPATIENT
Start: 2017-01-01 | End: 2017-01-01 | Stop reason: HOSPADM

## 2017-01-01 RX ORDER — POTASSIUM CHLORIDE 20MEQ/15ML
60 LIQUID (ML) ORAL DAILY
Status: DISCONTINUED | OUTPATIENT
Start: 2017-01-01 | End: 2017-01-01

## 2017-01-01 RX ORDER — POTASSIUM CHLORIDE 750 MG/1
20-40 TABLET, EXTENDED RELEASE ORAL
Status: DISCONTINUED | OUTPATIENT
Start: 2017-01-01 | End: 2017-01-01 | Stop reason: HOSPADM

## 2017-01-01 RX ORDER — FAMOTIDINE 20 MG/1
20 TABLET, FILM COATED ORAL 2 TIMES DAILY
Qty: 60 TABLET | Refills: 1 | Status: ON HOLD | OUTPATIENT
Start: 2017-01-01 | End: 2017-01-01

## 2017-01-01 RX ORDER — QUETIAPINE FUMARATE 50 MG/1
50 TABLET, FILM COATED ORAL 2 TIMES DAILY
Qty: 120 TABLET | DISCHARGE
Start: 2017-01-01 | End: 2017-01-01

## 2017-01-01 RX ORDER — NALOXONE HYDROCHLORIDE 0.4 MG/ML
.1-.4 INJECTION, SOLUTION INTRAMUSCULAR; INTRAVENOUS; SUBCUTANEOUS
Status: DISCONTINUED | OUTPATIENT
Start: 2017-01-01 | End: 2017-01-01 | Stop reason: HOSPADM

## 2017-01-01 RX ORDER — NUT.TX.COMP. IMMUNE SYSTM,REG 0.09 G-1.5
50 LIQUID (ML) ORAL CONTINUOUS
Qty: 30 BOTTLE | Refills: 11 | Status: SHIPPED | OUTPATIENT
Start: 2017-01-01

## 2017-01-01 RX ORDER — LEVOFLOXACIN 750 MG/1
750 TABLET, FILM COATED ORAL DAILY
Qty: 7 TABLET | Refills: 0 | Status: SHIPPED | OUTPATIENT
Start: 2017-01-01 | End: 2017-01-01

## 2017-01-01 RX ORDER — SODIUM CHLORIDE 9 MG/ML
INJECTION, SOLUTION INTRAVENOUS CONTINUOUS
Status: DISCONTINUED | OUTPATIENT
Start: 2017-01-01 | End: 2017-01-01

## 2017-01-01 RX ORDER — HYDRALAZINE HYDROCHLORIDE 20 MG/ML
10 INJECTION INTRAMUSCULAR; INTRAVENOUS EVERY 6 HOURS PRN
Status: DISCONTINUED | OUTPATIENT
Start: 2017-01-01 | End: 2017-01-01 | Stop reason: HOSPADM

## 2017-01-01 RX ORDER — MORPHINE SULFATE 10 MG/1
10 CAPSULE, EXTENDED RELEASE ORAL EVERY 8 HOURS
Status: DISCONTINUED | OUTPATIENT
Start: 2017-01-01 | End: 2017-01-01

## 2017-01-01 RX ORDER — SODIUM CHLORIDE 9 MG/ML
INJECTION, SOLUTION INTRAVENOUS
Status: DISCONTINUED
Start: 2017-01-01 | End: 2017-01-01 | Stop reason: HOSPADM

## 2017-01-01 RX ORDER — PIPERACILLIN SODIUM, TAZOBACTAM SODIUM 4; .5 G/20ML; G/20ML
4.5 INJECTION, POWDER, LYOPHILIZED, FOR SOLUTION INTRAVENOUS EVERY 6 HOURS
Status: CANCELLED | OUTPATIENT
Start: 2017-01-01

## 2017-01-01 RX ORDER — SODIUM CHLORIDE 9 MG/ML
INJECTION, SOLUTION INTRAVENOUS
Status: COMPLETED
Start: 2017-01-01 | End: 2017-01-01

## 2017-01-01 RX ORDER — LORAZEPAM 2 MG/ML
.25-.5 INJECTION INTRAMUSCULAR
Status: DISCONTINUED | OUTPATIENT
Start: 2017-01-01 | End: 2017-01-01 | Stop reason: HOSPADM

## 2017-01-01 RX ORDER — HEPARIN SODIUM 5000 [USP'U]/.5ML
5000 INJECTION, SOLUTION INTRAVENOUS; SUBCUTANEOUS EVERY 12 HOURS
Status: DISCONTINUED | OUTPATIENT
Start: 2017-01-01 | End: 2017-01-01

## 2017-01-01 RX ORDER — HYDROMORPHONE HYDROCHLORIDE 1 MG/ML
1 SOLUTION ORAL
Status: DISCONTINUED | OUTPATIENT
Start: 2017-01-01 | End: 2017-01-01 | Stop reason: HOSPADM

## 2017-01-01 RX ORDER — POTASSIUM CHLORIDE 29.8 MG/ML
20 INJECTION INTRAVENOUS
Status: DISCONTINUED | OUTPATIENT
Start: 2017-01-01 | End: 2017-01-01 | Stop reason: HOSPADM

## 2017-01-01 RX ORDER — BISACODYL 10 MG
10 SUPPOSITORY, RECTAL RECTAL DAILY PRN
Qty: 25 SUPPOSITORY | Refills: 1 | COMMUNITY
Start: 2017-01-01

## 2017-01-01 RX ORDER — FUROSEMIDE 10 MG/ML
20 INJECTION INTRAMUSCULAR; INTRAVENOUS ONCE
Status: DISCONTINUED | OUTPATIENT
Start: 2017-01-01 | End: 2017-01-01

## 2017-01-01 RX ORDER — PREDNISONE 20 MG/1
20 TABLET ORAL ONCE
Status: COMPLETED | OUTPATIENT
Start: 2017-01-01 | End: 2017-01-01

## 2017-01-01 RX ORDER — HEPARIN SODIUM 5000 [USP'U]/.5ML
5000 INJECTION, SOLUTION INTRAVENOUS; SUBCUTANEOUS EVERY 8 HOURS
Status: DISCONTINUED | OUTPATIENT
Start: 2017-01-01 | End: 2017-01-01 | Stop reason: HOSPADM

## 2017-01-01 RX ORDER — LIDOCAINE 50 MG/G
1-2 PATCH TOPICAL
Status: DISCONTINUED | OUTPATIENT
Start: 2017-01-01 | End: 2017-01-01 | Stop reason: HOSPADM

## 2017-01-01 RX ORDER — CLONAZEPAM 0.5 MG/1
2 TABLET ORAL 4 TIMES DAILY
Status: DISCONTINUED | OUTPATIENT
Start: 2017-01-01 | End: 2017-01-01

## 2017-01-01 RX ORDER — LACTOBACILLUS RHAMNOSUS GG 10B CELL
1 CAPSULE ORAL 2 TIMES DAILY
Qty: 60 CAPSULE | Refills: 0 | Status: SHIPPED
Start: 2017-01-01 | End: 2017-01-01

## 2017-01-01 RX ORDER — SIMETHICONE 40MG/0.6ML
125 SUSPENSION, DROPS(FINAL DOSAGE FORM)(ML) ORAL 4 TIMES DAILY
Status: DISCONTINUED | OUTPATIENT
Start: 2017-01-01 | End: 2017-01-01 | Stop reason: HOSPADM

## 2017-01-01 RX ORDER — SULFAMETHOXAZOLE AND TRIMETHOPRIM 200; 40 MG/5ML; MG/5ML
20 SUSPENSION ORAL DAILY
Status: DISCONTINUED | OUTPATIENT
Start: 2017-01-01 | End: 2017-01-01 | Stop reason: HOSPADM

## 2017-01-01 RX ORDER — LORAZEPAM 2 MG/ML
1-2 INJECTION INTRAMUSCULAR
Status: DISCONTINUED | OUTPATIENT
Start: 2017-01-01 | End: 2017-01-01

## 2017-01-01 RX ORDER — MEROPENEM 1 G/1
1 INJECTION, POWDER, FOR SOLUTION INTRAVENOUS EVERY 8 HOURS
Qty: 15000 MG | Refills: 0 | Status: SHIPPED | OUTPATIENT
Start: 2017-01-01 | End: 2017-01-01

## 2017-01-01 RX ORDER — AMPICILLIN AND SULBACTAM 2; 1 G/1; G/1
3 INJECTION, POWDER, FOR SOLUTION INTRAMUSCULAR; INTRAVENOUS EVERY 6 HOURS
Status: DISCONTINUED | OUTPATIENT
Start: 2017-01-01 | End: 2017-01-01

## 2017-01-01 RX ORDER — QUETIAPINE FUMARATE 50 MG/1
50 TABLET, FILM COATED ORAL 3 TIMES DAILY
Qty: 30 TABLET | Refills: 1 | COMMUNITY
Start: 2017-01-01 | End: 2017-01-01

## 2017-01-01 RX ORDER — PREDNISONE 5 MG/ML
20 SOLUTION ORAL DAILY
Status: DISCONTINUED | OUTPATIENT
Start: 2017-01-01 | End: 2017-01-01 | Stop reason: HOSPADM

## 2017-01-01 RX ORDER — FAMOTIDINE 40 MG/5ML
20 POWDER, FOR SUSPENSION ORAL 2 TIMES DAILY PRN
Status: DISCONTINUED | OUTPATIENT
Start: 2017-01-01 | End: 2017-01-01

## 2017-01-01 RX ORDER — ONDANSETRON 4 MG/1
4 TABLET, ORALLY DISINTEGRATING ORAL EVERY 6 HOURS PRN
Status: DISCONTINUED | OUTPATIENT
Start: 2017-01-01 | End: 2017-01-01 | Stop reason: HOSPADM

## 2017-01-01 RX ORDER — GABAPENTIN 250 MG/5ML
300 SOLUTION ORAL 3 TIMES DAILY
Qty: 450 ML | Refills: 0 | COMMUNITY
Start: 2017-01-01 | End: 2017-01-01

## 2017-01-01 RX ORDER — SERTRALINE HYDROCHLORIDE 20 MG/ML
150 SOLUTION ORAL DAILY
Status: DISCONTINUED | OUTPATIENT
Start: 2017-01-01 | End: 2017-01-01

## 2017-01-01 RX ORDER — MORPHINE SULFATE 10 MG/1
5 CAPSULE, EXTENDED RELEASE ORAL AT BEDTIME
Status: DISCONTINUED | OUTPATIENT
Start: 2017-01-01 | End: 2017-01-01

## 2017-01-01 RX ORDER — FAMOTIDINE 40 MG/5ML
20 POWDER, FOR SUSPENSION ORAL 2 TIMES DAILY PRN
Status: DISCONTINUED | OUTPATIENT
Start: 2017-01-01 | End: 2017-01-01 | Stop reason: HOSPADM

## 2017-01-01 RX ORDER — FLUTICASONE PROPIONATE 50 MCG
2 SPRAY, SUSPENSION (ML) NASAL DAILY
Status: DISCONTINUED | OUTPATIENT
Start: 2017-01-01 | End: 2017-01-01 | Stop reason: HOSPADM

## 2017-01-01 RX ORDER — MORPHINE SULFATE 2 MG/ML
2 INJECTION, SOLUTION INTRAMUSCULAR; INTRAVENOUS ONCE
Status: DISCONTINUED | OUTPATIENT
Start: 2017-01-01 | End: 2017-01-01 | Stop reason: HOSPADM

## 2017-01-01 RX ORDER — ONDANSETRON 2 MG/ML
4 INJECTION INTRAMUSCULAR; INTRAVENOUS EVERY 6 HOURS PRN
Status: DISCONTINUED | OUTPATIENT
Start: 2017-01-01 | End: 2017-01-01 | Stop reason: HOSPADM

## 2017-01-01 RX ORDER — MULTIVIT WITH IRON,MINERALS
1 TABLET,CHEWABLE ORAL DAILY
Qty: 30 TABLET | Refills: 3 | Status: SHIPPED | OUTPATIENT
Start: 2017-01-01 | End: 2017-01-01

## 2017-01-01 RX ORDER — HYDROMORPHONE HYDROCHLORIDE 1 MG/ML
1.5 SOLUTION ORAL
Status: DISCONTINUED | OUTPATIENT
Start: 2017-01-01 | End: 2017-01-01 | Stop reason: HOSPADM

## 2017-01-01 RX ORDER — LEVALBUTEROL INHALATION SOLUTION 1.25 MG/3ML
1 SOLUTION RESPIRATORY (INHALATION)
Status: DISCONTINUED | OUTPATIENT
Start: 2017-01-01 | End: 2017-01-01 | Stop reason: HOSPADM

## 2017-01-01 RX ORDER — QUETIAPINE FUMARATE 25 MG/1
50 TABLET, FILM COATED ORAL 2 TIMES DAILY
Status: DISCONTINUED | OUTPATIENT
Start: 2017-01-01 | End: 2017-01-01

## 2017-01-01 RX ORDER — AMOXICILLIN 250 MG
1 CAPSULE ORAL AT BEDTIME
Status: DISCONTINUED | OUTPATIENT
Start: 2017-01-01 | End: 2017-01-01

## 2017-01-01 RX ORDER — GUAR GUM
1 PACKET (EA) ORAL 3 TIMES DAILY
Qty: 42 PACKET | Refills: 0 | Status: SHIPPED | OUTPATIENT
Start: 2017-01-01 | End: 2017-01-01

## 2017-01-01 RX ORDER — LIDOCAINE 50 MG/G
1-2 PATCH TOPICAL EVERY 24 HOURS
Status: DISCONTINUED | OUTPATIENT
Start: 2017-01-01 | End: 2017-01-01

## 2017-01-01 RX ORDER — POTASSIUM CHLORIDE 1.5 G/1.58G
20-40 POWDER, FOR SOLUTION ORAL
Status: DISCONTINUED | OUTPATIENT
Start: 2017-01-01 | End: 2017-01-01 | Stop reason: HOSPADM

## 2017-01-01 RX ORDER — PROCHLORPERAZINE MALEATE 5 MG
5 TABLET ORAL EVERY 6 HOURS PRN
Status: DISCONTINUED | OUTPATIENT
Start: 2017-01-01 | End: 2017-01-01 | Stop reason: HOSPADM

## 2017-01-01 RX ORDER — HEPARIN SODIUM,PORCINE 10 UNIT/ML
2-5 VIAL (ML) INTRAVENOUS
Status: DISCONTINUED | OUTPATIENT
Start: 2017-01-01 | End: 2017-01-01

## 2017-01-01 RX ORDER — HEPARIN SODIUM,PORCINE 10 UNIT/ML
5-10 VIAL (ML) INTRAVENOUS
Status: DISCONTINUED | OUTPATIENT
Start: 2017-01-01 | End: 2017-01-01

## 2017-01-01 RX ORDER — PREDNISONE 20 MG/1
40 TABLET ORAL DAILY
Status: DISCONTINUED | OUTPATIENT
Start: 2017-01-01 | End: 2017-01-01 | Stop reason: HOSPADM

## 2017-01-01 RX ORDER — AMOXICILLIN 500 MG/1
1000 TABLET, FILM COATED ORAL 3 TIMES DAILY
Qty: 30 TABLET | Refills: 0 | Status: SHIPPED | OUTPATIENT
Start: 2017-01-01 | End: 2017-01-01

## 2017-01-01 RX ORDER — POLYETHYLENE GLYCOL 3350 17 G/17G
17 POWDER, FOR SOLUTION ORAL 2 TIMES DAILY
Qty: 100 PACKET | Refills: 0 | Status: SHIPPED | OUTPATIENT
Start: 2017-01-01

## 2017-01-01 RX ORDER — DEXTROSE MONOHYDRATE 25 G/50ML
50 INJECTION, SOLUTION INTRAVENOUS ONCE
Status: COMPLETED | OUTPATIENT
Start: 2017-01-01 | End: 2017-01-01

## 2017-01-01 RX ORDER — LORAZEPAM 2 MG/ML
1 INJECTION INTRAMUSCULAR ONCE
Status: COMPLETED | OUTPATIENT
Start: 2017-01-01 | End: 2017-01-01

## 2017-01-01 RX ORDER — GABAPENTIN 250 MG/5ML
150 SOLUTION ORAL ONCE
Status: COMPLETED | OUTPATIENT
Start: 2017-01-01 | End: 2017-01-01

## 2017-01-01 RX ORDER — LORAZEPAM 2 MG/ML
0.5 CONCENTRATE ORAL
Status: DISCONTINUED | OUTPATIENT
Start: 2017-01-01 | End: 2017-01-01 | Stop reason: HOSPADM

## 2017-01-01 RX ORDER — FEXOFENADINE HCL 180 MG/1
180 TABLET ORAL DAILY
Qty: 30 TABLET | Refills: 0 | Status: SHIPPED | OUTPATIENT
Start: 2017-01-01

## 2017-01-01 RX ORDER — ONDANSETRON 4 MG/1
4 TABLET, FILM COATED ORAL EVERY 4 HOURS PRN
Status: DISCONTINUED | OUTPATIENT
Start: 2017-01-01 | End: 2017-01-01

## 2017-01-01 RX ORDER — HYDROMORPHONE HYDROCHLORIDE 1 MG/ML
1 SOLUTION ORAL
Status: DISCONTINUED | OUTPATIENT
Start: 2017-01-01 | End: 2017-01-01

## 2017-01-01 RX ORDER — QUETIAPINE FUMARATE 50 MG/1
50 TABLET, FILM COATED ORAL 3 TIMES DAILY
Status: DISCONTINUED | OUTPATIENT
Start: 2017-01-01 | End: 2017-01-01

## 2017-01-01 RX ORDER — GUAIFENESIN 200 MG/1
200 TABLET ORAL 4 TIMES DAILY
Status: DISCONTINUED | OUTPATIENT
Start: 2017-01-01 | End: 2017-01-01 | Stop reason: CLARIF

## 2017-01-01 RX ORDER — AMOXICILLIN 250 MG
2 CAPSULE ORAL 2 TIMES DAILY
Status: DISCONTINUED | OUTPATIENT
Start: 2017-01-01 | End: 2017-01-01 | Stop reason: HOSPADM

## 2017-01-01 RX ORDER — SIMETHICONE 125 MG
125 TABLET,CHEWABLE ORAL 4 TIMES DAILY
Status: DISCONTINUED | OUTPATIENT
Start: 2017-01-01 | End: 2017-01-01

## 2017-01-01 RX ORDER — GUAIFENESIN 200 MG/1
200 TABLET ORAL 4 TIMES DAILY
Status: DISCONTINUED | OUTPATIENT
Start: 2017-01-01 | End: 2017-01-01

## 2017-01-01 RX ORDER — ONDANSETRON 4 MG/1
4 TABLET, FILM COATED ORAL EVERY 4 HOURS PRN
Qty: 18 TABLET | COMMUNITY
Start: 2017-01-01

## 2017-01-01 RX ORDER — PIPERACILLIN SODIUM, TAZOBACTAM SODIUM 3; .375 G/15ML; G/15ML
3.38 INJECTION, POWDER, LYOPHILIZED, FOR SOLUTION INTRAVENOUS EVERY 6 HOURS
Status: DISCONTINUED | OUTPATIENT
Start: 2017-01-01 | End: 2017-01-01 | Stop reason: HOSPADM

## 2017-01-01 RX ORDER — DEXTROSE MONOHYDRATE 25 G/50ML
25 INJECTION, SOLUTION INTRAVENOUS ONCE
Status: DISCONTINUED | OUTPATIENT
Start: 2017-01-01 | End: 2017-01-01

## 2017-01-01 RX ORDER — LORAZEPAM 2 MG/ML
0.5 CONCENTRATE ORAL
Qty: 15 ML | Refills: 0 | Status: SHIPPED | DISCHARGE
Start: 2017-01-01 | End: 2017-01-01

## 2017-01-01 RX ORDER — QUETIAPINE FUMARATE 25 MG/1
25 TABLET, FILM COATED ORAL
Status: DISCONTINUED | OUTPATIENT
Start: 2017-01-01 | End: 2017-01-01

## 2017-01-01 RX ORDER — MORPHINE SULFATE 2 MG/ML
2-4 INJECTION, SOLUTION INTRAMUSCULAR; INTRAVENOUS
Status: DISCONTINUED | OUTPATIENT
Start: 2017-01-01 | End: 2017-01-01 | Stop reason: HOSPADM

## 2017-01-01 RX ORDER — PREDNISONE 20 MG/1
40 TABLET ORAL DAILY
Qty: 6 TABLET | Refills: 0 | Status: SHIPPED | OUTPATIENT
Start: 2017-01-01 | End: 2017-01-01

## 2017-01-01 RX ORDER — HYDROMORPHONE HYDROCHLORIDE 1 MG/ML
1 SOLUTION ORAL ONCE
Status: COMPLETED | OUTPATIENT
Start: 2017-01-01 | End: 2017-01-01

## 2017-01-01 RX ORDER — FEXOFENADINE HCL 180 MG/1
180 TABLET ORAL DAILY
Status: DISCONTINUED | OUTPATIENT
Start: 2017-01-01 | End: 2017-01-01

## 2017-01-01 RX ORDER — CLONAZEPAM 0.5 MG/1
1.5 TABLET, ORALLY DISINTEGRATING ORAL 4 TIMES DAILY
Status: DISCONTINUED | OUTPATIENT
Start: 2017-01-01 | End: 2017-01-01 | Stop reason: HOSPADM

## 2017-01-01 RX ORDER — QUETIAPINE FUMARATE 50 MG/1
50 TABLET, FILM COATED ORAL 2 TIMES DAILY
Status: DISCONTINUED | OUTPATIENT
Start: 2017-01-01 | End: 2017-01-01 | Stop reason: HOSPADM

## 2017-01-01 RX ORDER — LORAZEPAM 2 MG/ML
0.5 CONCENTRATE ORAL
Qty: 15 ML | Refills: 0 | Status: ON HOLD | OUTPATIENT
Start: 2017-01-01 | End: 2017-01-01

## 2017-01-01 RX ORDER — IOPAMIDOL 755 MG/ML
76 INJECTION, SOLUTION INTRAVASCULAR ONCE
Status: COMPLETED | OUTPATIENT
Start: 2017-01-01 | End: 2017-01-01

## 2017-01-01 RX ORDER — FLUMAZENIL 0.1 MG/ML
0.2 INJECTION, SOLUTION INTRAVENOUS
Status: DISCONTINUED | OUTPATIENT
Start: 2017-01-01 | End: 2017-01-01 | Stop reason: HOSPADM

## 2017-01-01 RX ORDER — LORAZEPAM 2 MG/ML
.25-.5 INJECTION INTRAMUSCULAR EVERY 6 HOURS PRN
Status: DISCONTINUED | OUTPATIENT
Start: 2017-01-01 | End: 2017-01-01

## 2017-01-01 RX ORDER — LEVALBUTEROL INHALATION SOLUTION 1.25 MG/3ML
1 SOLUTION RESPIRATORY (INHALATION) 4 TIMES DAILY
Status: DISCONTINUED | OUTPATIENT
Start: 2017-01-01 | End: 2017-01-01

## 2017-01-01 RX ORDER — GABAPENTIN 250 MG/5ML
300 SOLUTION ORAL 3 TIMES DAILY
Status: DISCONTINUED | OUTPATIENT
Start: 2017-01-01 | End: 2017-01-01 | Stop reason: HOSPADM

## 2017-01-01 RX ORDER — NUT.TX.COMP. IMMUNE SYSTM,REG 0.09 G-1.5
50 LIQUID (ML) ORAL CONTINUOUS
Status: DISCONTINUED | OUTPATIENT
Start: 2017-01-01 | End: 2017-01-01

## 2017-01-01 RX ORDER — PYRIDOXINE HCL (VITAMIN B6) 100 MG
500 TABLET ORAL DAILY
Status: DISCONTINUED | OUTPATIENT
Start: 2017-01-01 | End: 2017-01-01 | Stop reason: HOSPADM

## 2017-01-01 RX ORDER — DEXTROSE MONOHYDRATE 25 G/50ML
25-50 INJECTION, SOLUTION INTRAVENOUS
Status: DISCONTINUED | OUTPATIENT
Start: 2017-01-01 | End: 2017-01-01 | Stop reason: HOSPADM

## 2017-01-01 RX ORDER — NALOXONE HYDROCHLORIDE 0.4 MG/ML
.1-.4 INJECTION, SOLUTION INTRAMUSCULAR; INTRAVENOUS; SUBCUTANEOUS
Status: DISCONTINUED | OUTPATIENT
Start: 2017-01-01 | End: 2017-01-01

## 2017-01-01 RX ORDER — QUETIAPINE FUMARATE 50 MG/1
50 TABLET, FILM COATED ORAL AT BEDTIME
Status: DISCONTINUED | OUTPATIENT
Start: 2017-01-01 | End: 2017-01-01

## 2017-01-01 RX ORDER — MORPHINE SULFATE 10 MG/1
CAPSULE, EXTENDED RELEASE ORAL
Qty: 90 CAPSULE | Refills: 0 | Status: SHIPPED | OUTPATIENT
Start: 2017-01-01 | End: 2017-01-01

## 2017-01-01 RX ORDER — LIDOCAINE 40 MG/G
CREAM TOPICAL
Status: DISCONTINUED | OUTPATIENT
Start: 2017-01-01 | End: 2017-01-01

## 2017-01-01 RX ORDER — SODIUM CHLORIDE 9 MG/ML
INJECTION, SOLUTION INTRAVENOUS CONTINUOUS
Status: DISPENSED | OUTPATIENT
Start: 2017-01-01 | End: 2017-01-01

## 2017-01-01 RX ORDER — DEXTROSE MONOHYDRATE, SODIUM CHLORIDE, AND POTASSIUM CHLORIDE 50; 1.49; 9 G/1000ML; G/1000ML; G/1000ML
INJECTION, SOLUTION INTRAVENOUS CONTINUOUS
Status: DISCONTINUED | OUTPATIENT
Start: 2017-01-01 | End: 2017-01-01

## 2017-01-01 RX ORDER — LEVALBUTEROL INHALATION SOLUTION 1.25 MG/3ML
1 SOLUTION RESPIRATORY (INHALATION) EVERY 4 HOURS
Status: DISCONTINUED | OUTPATIENT
Start: 2017-01-01 | End: 2017-01-01 | Stop reason: HOSPADM

## 2017-01-01 RX ORDER — CLONAZEPAM 0.12 MG/1
1.5 TABLET, ORALLY DISINTEGRATING ORAL 4 TIMES DAILY
Status: DISCONTINUED | OUTPATIENT
Start: 2017-01-01 | End: 2017-01-01

## 2017-01-01 RX ORDER — QUETIAPINE FUMARATE 25 MG/1
25 TABLET, FILM COATED ORAL
Status: DISCONTINUED | OUTPATIENT
Start: 2017-01-01 | End: 2017-01-01 | Stop reason: HOSPADM

## 2017-01-01 RX ORDER — CLARITHROMYCIN 500 MG
500 TABLET ORAL 2 TIMES DAILY
Status: DISCONTINUED | OUTPATIENT
Start: 2017-01-01 | End: 2017-01-01 | Stop reason: HOSPADM

## 2017-01-01 RX ORDER — BUDESONIDE 0.5 MG/2ML
0.5 INHALANT ORAL 2 TIMES DAILY
Status: DISCONTINUED | OUTPATIENT
Start: 2017-01-01 | End: 2017-01-01

## 2017-01-01 RX ORDER — PYRIDOXINE HCL (VITAMIN B6) 100 MG
500 TABLET ORAL DAILY
Status: DISCONTINUED | OUTPATIENT
Start: 2017-01-01 | End: 2017-01-01 | Stop reason: CLARIF

## 2017-01-01 RX ORDER — QUETIAPINE FUMARATE 25 MG/1
25 TABLET, FILM COATED ORAL 2 TIMES DAILY
Status: DISCONTINUED | OUTPATIENT
Start: 2017-01-01 | End: 2017-01-01 | Stop reason: HOSPADM

## 2017-01-01 RX ORDER — SCOLOPAMINE TRANSDERMAL SYSTEM 1 MG/1
1 PATCH, EXTENDED RELEASE TRANSDERMAL
Status: DISCONTINUED | OUTPATIENT
Start: 2017-01-01 | End: 2017-01-01 | Stop reason: HOSPADM

## 2017-01-01 RX ORDER — QUETIAPINE FUMARATE 25 MG/1
25 TABLET, FILM COATED ORAL 3 TIMES DAILY PRN
Status: DISCONTINUED | OUTPATIENT
Start: 2017-01-01 | End: 2017-01-01

## 2017-01-01 RX ORDER — ONDANSETRON 2 MG/ML
0.1 INJECTION INTRAMUSCULAR; INTRAVENOUS EVERY 4 HOURS PRN
Status: DISCONTINUED | OUTPATIENT
Start: 2017-01-01 | End: 2017-01-01

## 2017-01-01 RX ORDER — LABETALOL HYDROCHLORIDE 5 MG/ML
20 INJECTION, SOLUTION INTRAVENOUS EVERY 6 HOURS PRN
Status: DISCONTINUED | OUTPATIENT
Start: 2017-01-01 | End: 2017-01-01

## 2017-01-01 RX ORDER — DEXTROSE MONOHYDRATE 100 MG/ML
INJECTION, SOLUTION INTRAVENOUS CONTINUOUS
Status: DISPENSED | OUTPATIENT
Start: 2017-01-01 | End: 2017-01-01

## 2017-01-01 RX ORDER — SODIUM CHLORIDE 9 MG/ML
INJECTION, SOLUTION INTRAVENOUS CONTINUOUS
Status: DISCONTINUED | OUTPATIENT
Start: 2017-01-01 | End: 2017-01-01 | Stop reason: HOSPADM

## 2017-01-01 RX ORDER — CLONAZEPAM 0.5 MG/1
1.5 TABLET ORAL 4 TIMES DAILY
Status: DISCONTINUED | OUTPATIENT
Start: 2017-01-01 | End: 2017-01-01

## 2017-01-01 RX ORDER — SERTRALINE HYDROCHLORIDE 20 MG/ML
100 SOLUTION ORAL DAILY
Status: DISCONTINUED | OUTPATIENT
Start: 2017-01-01 | End: 2017-01-01 | Stop reason: HOSPADM

## 2017-01-01 RX ORDER — GABAPENTIN 250 MG/5ML
300 SOLUTION ORAL 2 TIMES DAILY
Status: DISCONTINUED | OUTPATIENT
Start: 2017-01-01 | End: 2017-01-01 | Stop reason: HOSPADM

## 2017-01-01 RX ORDER — FAMOTIDINE 40 MG/5ML
20 POWDER, FOR SUSPENSION ORAL 2 TIMES DAILY PRN
Qty: 75 ML | Refills: 3 | Status: ON HOLD | OUTPATIENT
Start: 2017-01-01 | End: 2017-01-01

## 2017-01-01 RX ORDER — ATROPINE SULFATE 10 MG/ML
1-2 SOLUTION/ DROPS OPHTHALMIC
Status: DISCONTINUED | OUTPATIENT
Start: 2017-01-01 | End: 2017-01-01 | Stop reason: HOSPADM

## 2017-01-01 RX ORDER — LOPERAMIDE HYDROCHLORIDE 1 MG/5ML
2 SOLUTION ORAL 4 TIMES DAILY PRN
Status: DISCONTINUED | OUTPATIENT
Start: 2017-01-01 | End: 2017-01-01 | Stop reason: HOSPADM

## 2017-01-01 RX ORDER — LORAZEPAM 2 MG/ML
1-2 INJECTION INTRAMUSCULAR EVERY 30 MIN PRN
Status: DISCONTINUED | OUTPATIENT
Start: 2017-01-01 | End: 2017-01-01

## 2017-01-01 RX ORDER — SERTRALINE HYDROCHLORIDE 20 MG/ML
150 SOLUTION ORAL DAILY
Status: DISCONTINUED | OUTPATIENT
Start: 2017-01-01 | End: 2017-01-01 | Stop reason: HOSPADM

## 2017-01-01 RX ORDER — QUETIAPINE FUMARATE 25 MG/1
25 TABLET, FILM COATED ORAL 3 TIMES DAILY PRN
Qty: 90 TABLET | Refills: 0 | Status: SHIPPED | OUTPATIENT
Start: 2017-01-01 | End: 2017-01-01

## 2017-01-01 RX ORDER — ATORVASTATIN CALCIUM 20 MG/1
20 TABLET, FILM COATED ORAL DAILY
Status: DISCONTINUED | OUTPATIENT
Start: 2017-01-01 | End: 2017-01-01 | Stop reason: HOSPADM

## 2017-01-01 RX ORDER — BUDESONIDE 0.5 MG/2ML
INHALANT ORAL
Qty: 60 ML | Refills: 0 | Status: CANCELLED | OUTPATIENT
Start: 2017-01-01

## 2017-01-01 RX ORDER — CLONAZEPAM 0.5 MG/1
1 TABLET ORAL ONCE
Status: COMPLETED | OUTPATIENT
Start: 2017-01-01 | End: 2017-01-01

## 2017-01-01 RX ORDER — MORPHINE SULFATE 10 MG/5ML
10 SOLUTION ORAL EVERY 8 HOURS
Status: DISCONTINUED | OUTPATIENT
Start: 2017-01-01 | End: 2017-01-01

## 2017-01-01 RX ORDER — LEVALBUTEROL INHALATION SOLUTION 1.25 MG/3ML
1.25 SOLUTION RESPIRATORY (INHALATION) EVERY 6 HOURS PRN
Status: DISCONTINUED | OUTPATIENT
Start: 2017-01-01 | End: 2017-01-01

## 2017-01-01 RX ORDER — SODIUM CHLORIDE, SODIUM LACTATE, POTASSIUM CHLORIDE, CALCIUM CHLORIDE 600; 310; 30; 20 MG/100ML; MG/100ML; MG/100ML; MG/100ML
INJECTION, SOLUTION INTRAVENOUS
Status: COMPLETED
Start: 2017-01-01 | End: 2017-01-01

## 2017-01-01 RX ORDER — LEVALBUTEROL INHALATION SOLUTION 1.25 MG/3ML
1 SOLUTION RESPIRATORY (INHALATION) 4 TIMES DAILY
Qty: 540 ML | Refills: 0 | Status: SHIPPED | OUTPATIENT
Start: 2017-01-01

## 2017-01-01 RX ORDER — QUETIAPINE FUMARATE 50 MG/1
50 TABLET, FILM COATED ORAL 2 TIMES DAILY
Status: DISCONTINUED | OUTPATIENT
Start: 2017-01-01 | End: 2017-01-01

## 2017-01-01 RX ORDER — IOPAMIDOL 755 MG/ML
52 INJECTION, SOLUTION INTRAVASCULAR ONCE
Status: COMPLETED | OUTPATIENT
Start: 2017-01-01 | End: 2017-01-01

## 2017-01-01 RX ORDER — LEVALBUTEROL INHALATION SOLUTION 1.25 MG/3ML
1.25 SOLUTION RESPIRATORY (INHALATION) ONCE
Status: DISCONTINUED | OUTPATIENT
Start: 2017-01-01 | End: 2017-01-01

## 2017-01-01 RX ORDER — NYSTATIN 100000/ML
500000 SUSPENSION, ORAL (FINAL DOSE FORM) ORAL 4 TIMES DAILY
Status: DISCONTINUED | OUTPATIENT
Start: 2017-01-01 | End: 2017-01-01 | Stop reason: HOSPADM

## 2017-01-01 RX ORDER — QUETIAPINE FUMARATE 25 MG/1
TABLET, FILM COATED ORAL
Qty: 90 TABLET | Refills: 3 | Status: SHIPPED | OUTPATIENT
Start: 2017-01-01

## 2017-01-01 RX ORDER — PREDNISONE 20 MG/1
20 TABLET ORAL DAILY
Qty: 90 TABLET | Refills: 3 | Status: SHIPPED | OUTPATIENT
Start: 2017-01-01 | End: 2017-01-01 | Stop reason: ALTCHOICE

## 2017-01-01 RX ORDER — CLARITHROMYCIN 500 MG
500 TABLET ORAL 2 TIMES DAILY
Qty: 20 TABLET | Refills: 0 | Status: SHIPPED | OUTPATIENT
Start: 2017-01-01 | End: 2017-01-01

## 2017-01-01 RX ORDER — HEPARIN SODIUM,PORCINE 10 UNIT/ML
5-10 VIAL (ML) INTRAVENOUS EVERY 24 HOURS
Status: DISCONTINUED | OUTPATIENT
Start: 2017-01-01 | End: 2017-01-01 | Stop reason: HOSPADM

## 2017-01-01 RX ORDER — LEVALBUTEROL INHALATION SOLUTION 1.25 MG/3ML
1 SOLUTION RESPIRATORY (INHALATION) 4 TIMES DAILY
Status: DISCONTINUED | OUTPATIENT
Start: 2017-01-01 | End: 2017-01-01 | Stop reason: HOSPADM

## 2017-01-01 RX ORDER — MORPHINE SULFATE 100 MG/5ML
15 SOLUTION ORAL
Qty: 30 ML | Refills: 0
Start: 2017-01-01 | End: 2017-01-01

## 2017-01-01 RX ORDER — PREDNISONE 20 MG/1
20 TABLET ORAL DAILY
Qty: 90 TABLET | Refills: 3 | Status: SHIPPED | OUTPATIENT
Start: 2017-01-01 | End: 2017-01-01

## 2017-01-01 RX ORDER — MORPHINE SULFATE 0.5 MG/ML
2 INJECTION, SOLUTION EPIDURAL; INTRATHECAL; INTRAVENOUS ONCE
Status: DISCONTINUED | OUTPATIENT
Start: 2017-01-01 | End: 2017-01-01

## 2017-01-01 RX ORDER — HEPARIN SODIUM,PORCINE 10 UNIT/ML
2-5 VIAL (ML) INTRAVENOUS
Status: COMPLETED | OUTPATIENT
Start: 2017-01-01 | End: 2017-01-01

## 2017-01-01 RX ORDER — BISACODYL 10 MG
10 SUPPOSITORY, RECTAL RECTAL DAILY PRN
Status: DISCONTINUED | OUTPATIENT
Start: 2017-01-01 | End: 2017-01-01 | Stop reason: HOSPADM

## 2017-01-01 RX ORDER — LORAZEPAM 2 MG/ML
0.5 CONCENTRATE ORAL EVERY 4 HOURS PRN
Status: DISCONTINUED | OUTPATIENT
Start: 2017-01-01 | End: 2017-01-01

## 2017-01-01 RX ORDER — MEROPENEM 500 MG/1
500 INJECTION, POWDER, FOR SOLUTION INTRAVENOUS EVERY 8 HOURS
Qty: 300 ML | Refills: 0 | Status: ON HOLD
Start: 2017-01-01 | End: 2017-01-01

## 2017-01-01 RX ORDER — HYDROMORPHONE HYDROCHLORIDE 1 MG/ML
1 SOLUTION ORAL
Refills: 0 | Status: ON HOLD | COMMUNITY
Start: 2017-01-01 | End: 2017-01-01

## 2017-01-01 RX ORDER — HYDROMORPHONE HYDROCHLORIDE 1 MG/ML
.3-.5 INJECTION, SOLUTION INTRAMUSCULAR; INTRAVENOUS; SUBCUTANEOUS ONCE
Status: COMPLETED | OUTPATIENT
Start: 2017-01-01 | End: 2017-01-01

## 2017-01-01 RX ORDER — PYRIDOXINE HCL (VITAMIN B6) 100 MG
500 TABLET ORAL DAILY
Status: DISCONTINUED | OUTPATIENT
Start: 2017-01-01 | End: 2017-01-01

## 2017-01-01 RX ORDER — MORPHINE SULFATE 2 MG/ML
1-2 INJECTION, SOLUTION INTRAMUSCULAR; INTRAVENOUS
Status: DISCONTINUED | OUTPATIENT
Start: 2017-01-01 | End: 2017-01-01

## 2017-01-01 RX ORDER — ALBUTEROL SULFATE 0.83 MG/ML
1 SOLUTION RESPIRATORY (INHALATION) EVERY 6 HOURS PRN
Status: DISCONTINUED | OUTPATIENT
Start: 2017-01-01 | End: 2017-01-01 | Stop reason: HOSPADM

## 2017-01-01 RX ORDER — GABAPENTIN 250 MG/5ML
300 SOLUTION ORAL EVERY 8 HOURS SCHEDULED
Status: DISCONTINUED | OUTPATIENT
Start: 2017-01-01 | End: 2017-01-01 | Stop reason: HOSPADM

## 2017-01-01 RX ORDER — QUETIAPINE FUMARATE 25 MG/1
75 TABLET, FILM COATED ORAL AT BEDTIME
Qty: 60 TABLET | Refills: 1 | COMMUNITY
Start: 2017-01-01 | End: 2017-01-01

## 2017-01-01 RX ORDER — QUETIAPINE FUMARATE 25 MG/1
25 TABLET, FILM COATED ORAL 3 TIMES DAILY PRN
Status: DISCONTINUED | OUTPATIENT
Start: 2017-01-01 | End: 2017-01-01 | Stop reason: HOSPADM

## 2017-01-01 RX ORDER — LORAZEPAM 2 MG/ML
0.5 CONCENTRATE ORAL
Status: DISCONTINUED | OUTPATIENT
Start: 2017-01-01 | End: 2017-01-01

## 2017-01-01 RX ORDER — HALOPERIDOL 5 MG/ML
.5-1 INJECTION INTRAMUSCULAR
Status: DISCONTINUED | OUTPATIENT
Start: 2017-01-01 | End: 2017-01-01 | Stop reason: HOSPADM

## 2017-01-01 RX ORDER — LACTOBACILLUS RHAMNOSUS GG 10B CELL
1 CAPSULE ORAL DAILY
Qty: 60 CAPSULE | Refills: 0 | Status: SHIPPED
Start: 2017-01-01

## 2017-01-01 RX ORDER — LIDOCAINE 50 MG/G
1-2 PATCH TOPICAL EVERY 24 HOURS
Status: DISCONTINUED | OUTPATIENT
Start: 2017-01-01 | End: 2017-01-01 | Stop reason: HOSPADM

## 2017-01-01 RX ORDER — MAGNESIUM SULFATE HEPTAHYDRATE 40 MG/ML
4 INJECTION, SOLUTION INTRAVENOUS EVERY 4 HOURS PRN
Status: DISCONTINUED | OUTPATIENT
Start: 2017-01-01 | End: 2017-01-01 | Stop reason: HOSPADM

## 2017-01-01 RX ORDER — GADOBUTROL 604.72 MG/ML
7.5 INJECTION INTRAVENOUS ONCE
Status: COMPLETED | OUTPATIENT
Start: 2017-01-01 | End: 2017-01-01

## 2017-01-01 RX ORDER — IOPAMIDOL 755 MG/ML
85 INJECTION, SOLUTION INTRAVASCULAR ONCE
Status: COMPLETED | OUTPATIENT
Start: 2017-01-01 | End: 2017-01-01

## 2017-01-01 RX ORDER — PREDNISONE 5 MG/ML
40 SOLUTION ORAL DAILY
Status: DISCONTINUED | OUTPATIENT
Start: 2017-01-01 | End: 2017-01-01 | Stop reason: HOSPADM

## 2017-01-01 RX ORDER — POLYETHYLENE GLYCOL 3350 17 G/17G
17 POWDER, FOR SOLUTION ORAL 2 TIMES DAILY
Status: DISCONTINUED | OUTPATIENT
Start: 2017-01-01 | End: 2017-01-01 | Stop reason: HOSPADM

## 2017-01-01 RX ORDER — LEVALBUTEROL INHALATION SOLUTION 1.25 MG/3ML
1.25 SOLUTION RESPIRATORY (INHALATION) ONCE
Status: COMPLETED | OUTPATIENT
Start: 2017-01-01 | End: 2017-01-01

## 2017-01-01 RX ORDER — AMOXICILLIN 250 MG
2 CAPSULE ORAL 2 TIMES DAILY
Status: DISCONTINUED | OUTPATIENT
Start: 2017-01-01 | End: 2017-01-01

## 2017-01-01 RX ORDER — MEROPENEM 500 MG/1
500 INJECTION, POWDER, FOR SOLUTION INTRAVENOUS ONCE
Status: COMPLETED | OUTPATIENT
Start: 2017-01-01 | End: 2017-01-01

## 2017-01-01 RX ORDER — HEPARIN SODIUM,PORCINE 10 UNIT/ML
2-5 VIAL (ML) INTRAVENOUS
Status: DISCONTINUED | OUTPATIENT
Start: 2017-01-01 | End: 2017-01-01 | Stop reason: HOSPADM

## 2017-01-01 RX ORDER — NICOTINE POLACRILEX 4 MG
15-30 LOZENGE BUCCAL
Status: DISCONTINUED | OUTPATIENT
Start: 2017-01-01 | End: 2017-01-01 | Stop reason: HOSPADM

## 2017-01-01 RX ORDER — SODIUM CHLORIDE, SODIUM LACTATE, POTASSIUM CHLORIDE, CALCIUM CHLORIDE 600; 310; 30; 20 MG/100ML; MG/100ML; MG/100ML; MG/100ML
INJECTION, SOLUTION INTRAVENOUS CONTINUOUS
Status: DISCONTINUED | OUTPATIENT
Start: 2017-01-01 | End: 2017-01-01 | Stop reason: HOSPADM

## 2017-01-01 RX ORDER — FUROSEMIDE 10 MG/ML
20 INJECTION INTRAMUSCULAR; INTRAVENOUS ONCE
Status: COMPLETED | OUTPATIENT
Start: 2017-01-01 | End: 2017-01-01

## 2017-01-01 RX ORDER — MORPHINE SULFATE 10 MG/1
CAPSULE, EXTENDED RELEASE ORAL
Qty: 60 CAPSULE | Refills: 0 | Status: SHIPPED | OUTPATIENT
Start: 2017-01-01 | End: 2017-01-01

## 2017-01-01 RX ORDER — HYDROMORPHONE HYDROCHLORIDE 1 MG/ML
.3-.5 INJECTION, SOLUTION INTRAMUSCULAR; INTRAVENOUS; SUBCUTANEOUS
Status: DISCONTINUED | OUTPATIENT
Start: 2017-01-01 | End: 2017-01-01 | Stop reason: HOSPADM

## 2017-01-01 RX ORDER — CLONAZEPAM 0.5 MG/1
1 TABLET ORAL 3 TIMES DAILY
Status: DISCONTINUED | OUTPATIENT
Start: 2017-01-01 | End: 2017-01-01

## 2017-01-01 RX ORDER — LEVOFLOXACIN 750 MG/1
750 TABLET, FILM COATED ORAL DAILY
Status: DISCONTINUED | OUTPATIENT
Start: 2017-01-01 | End: 2017-01-01 | Stop reason: HOSPADM

## 2017-01-01 RX ORDER — SODIUM CHLORIDE FOR INHALATION 3 %
3 VIAL, NEBULIZER (ML) INHALATION
Status: DISCONTINUED | OUTPATIENT
Start: 2017-01-01 | End: 2017-01-01 | Stop reason: HOSPADM

## 2017-01-01 RX ORDER — GUAIFENESIN 200 MG/1
200 TABLET ORAL 4 TIMES DAILY
Qty: 120 TABLET | Refills: 3 | Status: SHIPPED | OUTPATIENT
Start: 2017-01-01

## 2017-01-01 RX ORDER — ATROPINE SULFATE 10 MG/ML
1-2 SOLUTION/ DROPS OPHTHALMIC EVERY 30 MIN PRN
Status: DISCONTINUED | OUTPATIENT
Start: 2017-01-01 | End: 2017-01-01

## 2017-01-01 RX ORDER — PROCHLORPERAZINE 25 MG
12.5 SUPPOSITORY, RECTAL RECTAL EVERY 12 HOURS PRN
Status: DISCONTINUED | OUTPATIENT
Start: 2017-01-01 | End: 2017-01-01 | Stop reason: HOSPADM

## 2017-01-01 RX ORDER — HYDROMORPHONE HYDROCHLORIDE 1 MG/ML
1 SOLUTION ORAL
Qty: 50 ML | Refills: 0 | Status: SHIPPED | OUTPATIENT
Start: 2017-01-01 | End: 2017-01-01 | Stop reason: ALTCHOICE

## 2017-01-01 RX ORDER — LOPERAMIDE HYDROCHLORIDE 2 MG/1
2 TABLET ORAL 4 TIMES DAILY PRN
Status: DISCONTINUED | OUTPATIENT
Start: 2017-01-01 | End: 2017-01-01

## 2017-01-01 RX ORDER — DEXTROSE MONOHYDRATE 25 G/50ML
25 INJECTION, SOLUTION INTRAVENOUS ONCE
Status: COMPLETED | OUTPATIENT
Start: 2017-01-01 | End: 2017-01-01

## 2017-01-01 RX ORDER — MENTHOL AND ZINC OXIDE .44; 20.625 G/100G; G/100G
OINTMENT TOPICAL 2 TIMES DAILY PRN
Qty: 113 G | Refills: 3 | Status: ON HOLD | OUTPATIENT
Start: 2017-01-01 | End: 2017-01-01

## 2017-01-01 RX ORDER — PREDNISONE 5 MG/ML
20 SOLUTION ORAL DAILY
Qty: 500 ML | Refills: 0 | Status: ON HOLD | DISCHARGE
Start: 2017-01-01 | End: 2017-01-01

## 2017-01-01 RX ORDER — MORPHINE SULFATE 10 MG/5ML
5 SOLUTION ORAL ONCE
Status: COMPLETED | OUTPATIENT
Start: 2017-01-01 | End: 2017-01-01

## 2017-01-01 RX ORDER — SODIUM POLYSTYRENE SULFONATE 15 G/60ML
30 SUSPENSION ORAL; RECTAL ONCE
Status: COMPLETED | OUTPATIENT
Start: 2017-01-01 | End: 2017-01-01

## 2017-01-01 RX ORDER — QUETIAPINE FUMARATE 25 MG/1
25 TABLET, FILM COATED ORAL ONCE
Status: COMPLETED | OUTPATIENT
Start: 2017-01-01 | End: 2017-01-01

## 2017-01-01 RX ORDER — ALBUMIN, HUMAN INJ 5% 5 %
50 SOLUTION INTRAVENOUS ONCE
Status: COMPLETED | OUTPATIENT
Start: 2017-01-01 | End: 2017-01-01

## 2017-01-01 RX ORDER — PREDNISONE 5 MG/ML
20 SOLUTION ORAL DAILY
Qty: 500 ML | Refills: 0 | COMMUNITY
Start: 2017-01-01 | End: 2017-01-01

## 2017-01-01 RX ORDER — LIDOCAINE 50 MG/G
1-2 PATCH TOPICAL EVERY 24 HOURS
Qty: 30 PATCH | Refills: 0 | Status: ON HOLD | OUTPATIENT
Start: 2017-01-01 | End: 2017-01-01

## 2017-01-01 RX ORDER — SIMETHICONE 40MG/0.6ML
20 SUSPENSION, DROPS(FINAL DOSAGE FORM)(ML) ORAL ONCE
Status: COMPLETED | OUTPATIENT
Start: 2017-01-01 | End: 2017-01-01

## 2017-01-01 RX ORDER — FAMOTIDINE 40 MG/5ML
20 POWDER, FOR SUSPENSION ORAL 2 TIMES DAILY
Status: DISCONTINUED | OUTPATIENT
Start: 2017-01-01 | End: 2017-01-01

## 2017-01-01 RX ORDER — CALCIUM CARBONATE 500 MG/1
500 TABLET, CHEWABLE ORAL
Status: DISCONTINUED | OUTPATIENT
Start: 2017-01-01 | End: 2017-01-01

## 2017-01-01 RX ORDER — GUAR GUM
1 PACKET (EA) ORAL 3 TIMES DAILY
Status: DISCONTINUED | OUTPATIENT
Start: 2017-01-01 | End: 2017-01-01

## 2017-01-01 RX ORDER — VANCOMYCIN HYDROCHLORIDE 1 G/200ML
1000 INJECTION, SOLUTION INTRAVENOUS EVERY 12 HOURS
Status: DISCONTINUED | OUTPATIENT
Start: 2017-01-01 | End: 2017-01-01

## 2017-01-01 RX ORDER — PYRIDOXINE HCL (VITAMIN B6) 100 MG
500 TABLET ORAL DAILY
Qty: 90 CAPSULE | COMMUNITY
Start: 2017-01-01 | End: 2017-01-01 | Stop reason: DRUGHIGH

## 2017-01-01 RX ORDER — ASPIRIN 81 MG/1
81 TABLET, CHEWABLE ORAL DAILY
Status: DISCONTINUED | OUTPATIENT
Start: 2017-01-01 | End: 2017-01-01 | Stop reason: HOSPADM

## 2017-01-01 RX ORDER — FUROSEMIDE 10 MG/ML
20 SOLUTION ORAL ONCE
Status: COMPLETED | OUTPATIENT
Start: 2017-01-01 | End: 2017-01-01

## 2017-01-01 RX ORDER — CLONAZEPAM 2 MG/1
2 TABLET ORAL 4 TIMES DAILY
Qty: 120 TABLET | Refills: 1 | Status: SHIPPED | OUTPATIENT
Start: 2017-01-01

## 2017-01-01 RX ORDER — MENTHOL AND ZINC OXIDE .44; 20.625 G/100G; G/100G
OINTMENT TOPICAL 2 TIMES DAILY PRN
Status: DISCONTINUED | OUTPATIENT
Start: 2017-01-01 | End: 2017-01-01

## 2017-01-01 RX ORDER — POLYETHYLENE GLYCOL 3350 17 G/17G
17 POWDER, FOR SOLUTION ORAL DAILY
Status: DISCONTINUED | OUTPATIENT
Start: 2017-01-01 | End: 2017-01-01

## 2017-01-01 RX ORDER — BISACODYL 10 MG
10 SUPPOSITORY, RECTAL RECTAL DAILY PRN
Qty: 28 SUPPOSITORY | Refills: 3 | Status: SHIPPED | OUTPATIENT
Start: 2017-01-01 | End: 2017-01-01

## 2017-01-01 RX ORDER — QUETIAPINE FUMARATE 50 MG/1
50 TABLET, FILM COATED ORAL 3 TIMES DAILY
Status: DISCONTINUED | OUTPATIENT
Start: 2017-01-01 | End: 2017-01-01 | Stop reason: HOSPADM

## 2017-01-01 RX ORDER — MEROPENEM 500 MG/1
500 INJECTION, POWDER, FOR SOLUTION INTRAVENOUS EVERY 6 HOURS
Qty: 200 ML | Refills: 0 | Status: ON HOLD | DISCHARGE
Start: 2017-01-01 | End: 2017-01-01

## 2017-01-01 RX ORDER — IOPAMIDOL 612 MG/ML
INJECTION, SOLUTION INTRAVASCULAR PRN
Status: DISCONTINUED | OUTPATIENT
Start: 2017-01-01 | End: 2017-01-01 | Stop reason: HOSPADM

## 2017-01-01 RX ORDER — OXYCODONE HYDROCHLORIDE 5 MG/1
5 TABLET ORAL EVERY 4 HOURS
Status: DISCONTINUED | OUTPATIENT
Start: 2017-01-01 | End: 2017-01-01

## 2017-01-01 RX ORDER — POLYETHYLENE GLYCOL 3350 17 G/17G
17 POWDER, FOR SOLUTION ORAL 2 TIMES DAILY PRN
Qty: 100 PACKET | Refills: 0 | COMMUNITY
Start: 2017-01-01 | End: 2017-01-01

## 2017-01-01 RX ORDER — HYDROMORPHONE HYDROCHLORIDE 1 MG/ML
0.5 INJECTION, SOLUTION INTRAMUSCULAR; INTRAVENOUS; SUBCUTANEOUS ONCE
Status: COMPLETED | OUTPATIENT
Start: 2017-01-01 | End: 2017-01-01

## 2017-01-01 RX ORDER — LACTOBACILLUS RHAMNOSUS GG 10B CELL
1 CAPSULE ORAL DAILY
Status: DISCONTINUED | OUTPATIENT
Start: 2017-01-01 | End: 2017-01-01

## 2017-01-01 RX ORDER — MORPHINE SULFATE 10 MG/1
10 CAPSULE, EXTENDED RELEASE ORAL EVERY 12 HOURS
Status: DISCONTINUED | OUTPATIENT
Start: 2017-01-01 | End: 2017-01-01

## 2017-01-01 RX ORDER — QUETIAPINE FUMARATE 50 MG/1
50 TABLET, FILM COATED ORAL 2 TIMES DAILY
Qty: 180 TABLET | Refills: 3 | Status: SHIPPED | OUTPATIENT
Start: 2017-01-01

## 2017-01-01 RX ORDER — MORPHINE SULFATE 4 MG/ML
5-10 INJECTION, SOLUTION INTRAMUSCULAR; INTRAVENOUS EVERY 10 MIN PRN
Status: DISCONTINUED | OUTPATIENT
Start: 2017-01-01 | End: 2017-01-01 | Stop reason: HOSPADM

## 2017-01-01 RX ORDER — GABAPENTIN 250 MG/5ML
300 SOLUTION ORAL ONCE
Status: COMPLETED | OUTPATIENT
Start: 2017-01-01 | End: 2017-01-01

## 2017-01-01 RX ORDER — HYDRALAZINE HYDROCHLORIDE 20 MG/ML
10 INJECTION INTRAMUSCULAR; INTRAVENOUS EVERY 6 HOURS PRN
Status: DISCONTINUED | OUTPATIENT
Start: 2017-01-01 | End: 2017-01-01

## 2017-01-01 RX ORDER — FEXOFENADINE HCL 60 MG/1
60 TABLET, FILM COATED ORAL 2 TIMES DAILY
Status: DISCONTINUED | OUTPATIENT
Start: 2017-01-01 | End: 2017-01-01

## 2017-01-01 RX ORDER — SULFAMETHOXAZOLE AND TRIMETHOPRIM 200; 40 MG/5ML; MG/5ML
20 SUSPENSION ORAL DAILY
Qty: 560 ML | Refills: 3 | Status: SHIPPED | OUTPATIENT
Start: 2017-01-01 | End: 2017-01-01

## 2017-01-01 RX ORDER — MENTHOL AND ZINC OXIDE .44; 20.625 G/100G; G/100G
OINTMENT TOPICAL 2 TIMES DAILY PRN
Status: DISCONTINUED | OUTPATIENT
Start: 2017-01-01 | End: 2017-01-01 | Stop reason: HOSPADM

## 2017-01-01 RX ORDER — HYDROMORPHONE HYDROCHLORIDE 1 MG/ML
1 SOLUTION ORAL
Qty: 180 ML | Refills: 0 | Status: SHIPPED | OUTPATIENT
Start: 2017-01-01 | End: 2017-01-01

## 2017-01-01 RX ORDER — HYDROMORPHONE HCL/0.9% NACL/PF 0.2MG/0.2
0.2 SYRINGE (ML) INTRAVENOUS
Status: DISCONTINUED | OUTPATIENT
Start: 2017-01-01 | End: 2017-01-01

## 2017-01-01 RX ORDER — LORAZEPAM 2 MG/ML
0.5 CONCENTRATE ORAL
Qty: 30 ML | Refills: 1 | Status: SHIPPED | OUTPATIENT
Start: 2017-01-01

## 2017-01-01 RX ORDER — POLYETHYLENE GLYCOL 3350 17 G/17G
17 POWDER, FOR SOLUTION ORAL 2 TIMES DAILY
Qty: 100 PACKET | Refills: 0 | COMMUNITY
Start: 2017-01-01 | End: 2017-01-01

## 2017-01-01 RX ORDER — ALBUTEROL SULFATE 0.83 MG/ML
1 SOLUTION RESPIRATORY (INHALATION) EVERY 6 HOURS PRN
Status: ON HOLD | COMMUNITY
End: 2017-01-01

## 2017-01-01 RX ORDER — PIPERACILLIN SODIUM, TAZOBACTAM SODIUM 3; .375 G/15ML; G/15ML
3.38 INJECTION, POWDER, LYOPHILIZED, FOR SOLUTION INTRAVENOUS EVERY 6 HOURS
Qty: 540 ML | Refills: 0 | DISCHARGE
Start: 2017-01-01 | End: 2017-01-01

## 2017-01-01 RX ORDER — HYDROMORPHONE HYDROCHLORIDE 1 MG/ML
1 SOLUTION ORAL
Qty: 180 ML | Refills: 0 | Status: SHIPPED | OUTPATIENT
Start: 2017-01-01

## 2017-01-01 RX ORDER — PREDNISONE 20 MG/1
40 TABLET ORAL DAILY
Qty: 8 TABLET | Refills: 0 | Status: SHIPPED | OUTPATIENT
Start: 2017-01-01 | End: 2017-01-01

## 2017-01-01 RX ORDER — METHYLPREDNISOLONE SODIUM SUCCINATE 125 MG/2ML
60 INJECTION, POWDER, LYOPHILIZED, FOR SOLUTION INTRAMUSCULAR; INTRAVENOUS EVERY 8 HOURS
Status: DISCONTINUED | OUTPATIENT
Start: 2017-01-01 | End: 2017-01-01

## 2017-01-01 RX ORDER — METHYLPREDNISOLONE SODIUM SUCCINATE 125 MG/2ML
125 INJECTION, POWDER, LYOPHILIZED, FOR SOLUTION INTRAMUSCULAR; INTRAVENOUS EVERY 8 HOURS
Status: DISCONTINUED | OUTPATIENT
Start: 2017-01-01 | End: 2017-01-01

## 2017-01-01 RX ORDER — CALCIUM CARBONATE 500 MG/1
1 TABLET, CHEWABLE ORAL
Qty: 150 TABLET | COMMUNITY
Start: 2017-01-01

## 2017-01-01 RX ORDER — NALOXONE HYDROCHLORIDE 0.4 MG/ML
1 INJECTION, SOLUTION INTRAMUSCULAR; INTRAVENOUS; SUBCUTANEOUS ONCE
Status: DISCONTINUED | OUTPATIENT
Start: 2017-01-01 | End: 2017-01-01

## 2017-01-01 RX ORDER — PREDNISONE 20 MG/1
20 TABLET ORAL DAILY
Status: DISCONTINUED | OUTPATIENT
Start: 2017-01-01 | End: 2017-01-01

## 2017-01-01 RX ORDER — LACTOBACILLUS RHAMNOSUS GG 10B CELL
1 CAPSULE ORAL 2 TIMES DAILY
Status: DISCONTINUED | OUTPATIENT
Start: 2017-01-01 | End: 2017-01-01 | Stop reason: HOSPADM

## 2017-01-01 RX ORDER — FENTANYL CITRATE 50 UG/ML
INJECTION, SOLUTION INTRAMUSCULAR; INTRAVENOUS PRN
Status: DISCONTINUED | OUTPATIENT
Start: 2017-01-01 | End: 2017-01-01

## 2017-01-01 RX ORDER — SERTRALINE HYDROCHLORIDE 20 MG/ML
150 SOLUTION ORAL DAILY
Qty: 105 ML | Refills: 0 | Status: SHIPPED | OUTPATIENT
Start: 2017-01-01

## 2017-01-01 RX ORDER — METHYLPREDNISOLONE SODIUM SUCCINATE 40 MG/ML
16 INJECTION, POWDER, LYOPHILIZED, FOR SOLUTION INTRAMUSCULAR; INTRAVENOUS EVERY 24 HOURS
Status: DISCONTINUED | OUTPATIENT
Start: 2017-01-01 | End: 2017-01-01

## 2017-01-01 RX ORDER — FEXOFENADINE HCL 60 MG/1
180 TABLET, FILM COATED ORAL DAILY
Qty: 30 TABLET | Refills: 0 | Status: SHIPPED | OUTPATIENT
Start: 2017-01-01 | End: 2017-01-01

## 2017-01-01 RX ORDER — QUETIAPINE FUMARATE 50 MG/1
50 TABLET, FILM COATED ORAL 3 TIMES DAILY
Qty: 30 TABLET | Refills: 1 | Status: ON HOLD | COMMUNITY
Start: 2017-01-01 | End: 2017-01-01

## 2017-01-01 RX ORDER — HYDROMORPHONE HYDROCHLORIDE 1 MG/ML
1 SOLUTION ORAL
Qty: 30 ML | Refills: 0 | Status: SHIPPED | DISCHARGE
Start: 2017-01-01 | End: 2017-01-01

## 2017-01-01 RX ORDER — NICOTINE POLACRILEX 4 MG
15-30 LOZENGE BUCCAL
Status: DISCONTINUED | OUTPATIENT
Start: 2017-01-01 | End: 2017-01-01

## 2017-01-01 RX ORDER — DEXTROSE MONOHYDRATE 100 MG/ML
INJECTION, SOLUTION INTRAVENOUS CONTINUOUS
Status: DISCONTINUED | OUTPATIENT
Start: 2017-01-01 | End: 2017-01-01

## 2017-01-01 RX ORDER — MORPHINE SULFATE 10 MG/5ML
10 SOLUTION ORAL 2 TIMES DAILY
Status: DISCONTINUED | OUTPATIENT
Start: 2017-01-01 | End: 2017-01-01

## 2017-01-01 RX ORDER — FEXOFENADINE HCL 60 MG/1
60 TABLET, FILM COATED ORAL ONCE
Status: COMPLETED | OUTPATIENT
Start: 2017-01-01 | End: 2017-01-01

## 2017-01-01 RX ORDER — LABETALOL HYDROCHLORIDE 5 MG/ML
INJECTION, SOLUTION INTRAVENOUS
Status: COMPLETED
Start: 2017-01-01 | End: 2017-01-01

## 2017-01-01 RX ORDER — LIDOCAINE 50 MG/G
2-3 PATCH TOPICAL
Status: DISCONTINUED | OUTPATIENT
Start: 2017-01-01 | End: 2017-01-01 | Stop reason: HOSPADM

## 2017-01-01 RX ORDER — SODIUM CHLORIDE 9 MG/ML
INJECTION, SOLUTION INTRAVENOUS CONTINUOUS
Status: ACTIVE | OUTPATIENT
Start: 2017-01-01 | End: 2017-01-01

## 2017-01-01 RX ORDER — BUDESONIDE 0.5 MG/2ML
0.5 INHALANT ORAL 2 TIMES DAILY
Qty: 60 AMPULE | Refills: 0 | Status: SHIPPED | OUTPATIENT
Start: 2017-01-01

## 2017-01-01 RX ORDER — SULFAMETHOXAZOLE AND TRIMETHOPRIM 200; 40 MG/5ML; MG/5ML
160 SUSPENSION ORAL DAILY
COMMUNITY

## 2017-01-01 RX ORDER — MORPHINE SULFATE 10 MG/1
10 CAPSULE, EXTENDED RELEASE ORAL DAILY
Status: DISCONTINUED | OUTPATIENT
Start: 2017-01-01 | End: 2017-01-01

## 2017-01-01 RX ORDER — LABETALOL HYDROCHLORIDE 5 MG/ML
20 INJECTION, SOLUTION INTRAVENOUS ONCE
Status: COMPLETED | OUTPATIENT
Start: 2017-01-01 | End: 2017-01-01

## 2017-01-01 RX ORDER — MORPHINE SULFATE 10 MG/1
CAPSULE, EXTENDED RELEASE ORAL
Qty: 60 CAPSULE | Refills: 0 | Status: SHIPPED | OUTPATIENT
Start: 2017-01-01

## 2017-01-01 RX ORDER — AMOXICILLIN 250 MG
2 CAPSULE ORAL 2 TIMES DAILY PRN
Qty: 100 TABLET | Refills: 0 | Status: SHIPPED | OUTPATIENT
Start: 2017-01-01 | End: 2017-01-01

## 2017-01-01 RX ORDER — PIPERACILLIN SODIUM, TAZOBACTAM SODIUM 3; .375 G/15ML; G/15ML
3.38 INJECTION, POWDER, LYOPHILIZED, FOR SOLUTION INTRAVENOUS ONCE
Status: DISCONTINUED | OUTPATIENT
Start: 2017-01-01 | End: 2017-01-01

## 2017-01-01 RX ORDER — AMOXICILLIN 400 MG/5ML
1000 POWDER, FOR SUSPENSION ORAL 3 TIMES DAILY
Status: DISCONTINUED | OUTPATIENT
Start: 2017-01-01 | End: 2017-01-01 | Stop reason: HOSPADM

## 2017-01-01 RX ORDER — HYDROMORPHONE HYDROCHLORIDE 1 MG/ML
0.5 SOLUTION ORAL ONCE
Status: COMPLETED | OUTPATIENT
Start: 2017-01-01 | End: 2017-01-01

## 2017-01-01 RX ORDER — OLANZAPINE 5 MG/1
5 TABLET, ORALLY DISINTEGRATING ORAL AT BEDTIME
Status: DISCONTINUED | OUTPATIENT
Start: 2017-01-01 | End: 2017-01-01 | Stop reason: HOSPADM

## 2017-01-01 RX ORDER — ACETYLCYSTEINE 200 MG/ML
2 SOLUTION ORAL; RESPIRATORY (INHALATION) EVERY 4 HOURS
Status: DISCONTINUED | OUTPATIENT
Start: 2017-01-01 | End: 2017-01-01

## 2017-01-01 RX ORDER — MORPHINE SULFATE 10 MG/5ML
5 SOLUTION ORAL 2 TIMES DAILY
Status: DISCONTINUED | OUTPATIENT
Start: 2017-01-01 | End: 2017-01-01

## 2017-01-01 RX ORDER — GABAPENTIN 250 MG/5ML
150 SOLUTION ORAL 2 TIMES DAILY
Status: DISCONTINUED | OUTPATIENT
Start: 2017-01-01 | End: 2017-01-01

## 2017-01-01 RX ORDER — DEXTROSE MONOHYDRATE 25 G/50ML
25-50 INJECTION, SOLUTION INTRAVENOUS
Status: DISCONTINUED | OUTPATIENT
Start: 2017-01-01 | End: 2017-01-01

## 2017-01-01 RX ORDER — AMOXICILLIN 250 MG
2 CAPSULE ORAL 2 TIMES DAILY PRN
Qty: 100 TABLET | Refills: 0 | COMMUNITY
Start: 2017-01-01 | End: 2017-01-01

## 2017-01-01 RX ORDER — QUETIAPINE FUMARATE 25 MG/1
25 TABLET, FILM COATED ORAL 3 TIMES DAILY PRN
Qty: 30 TABLET | Refills: 0 | Status: SHIPPED | OUTPATIENT
Start: 2017-01-01 | End: 2017-01-01

## 2017-01-01 RX ORDER — HYDROMORPHONE HYDROCHLORIDE 1 MG/ML
1 SOLUTION ORAL EVERY 4 HOURS PRN
Status: DISCONTINUED | OUTPATIENT
Start: 2017-01-01 | End: 2017-01-01

## 2017-01-01 RX ORDER — HYDROMORPHONE HYDROCHLORIDE 1 MG/ML
0.5 SOLUTION ORAL EVERY 4 HOURS PRN
Status: DISCONTINUED | OUTPATIENT
Start: 2017-01-01 | End: 2017-01-01

## 2017-01-01 RX ORDER — ACETAMINOPHEN 500 MG
500-1000 TABLET ORAL EVERY 6 HOURS PRN
Status: DISCONTINUED | OUTPATIENT
Start: 2017-01-01 | End: 2017-01-01

## 2017-01-01 RX ORDER — PROPOFOL 10 MG/ML
INJECTION, EMULSION INTRAVENOUS PRN
Status: DISCONTINUED | OUTPATIENT
Start: 2017-01-01 | End: 2017-01-01

## 2017-01-01 RX ORDER — GUAR GUM
1 PACKET (EA) ORAL DAILY
Status: DISCONTINUED | OUTPATIENT
Start: 2017-01-01 | End: 2017-01-01 | Stop reason: HOSPADM

## 2017-01-01 RX ORDER — LORAZEPAM 2 MG/ML
0.25 CONCENTRATE ORAL ONCE
Status: COMPLETED | OUTPATIENT
Start: 2017-01-01 | End: 2017-01-01

## 2017-01-01 RX ORDER — MORPHINE SULFATE 10 MG/5ML
10 SOLUTION ORAL EVERY 8 HOURS
Status: DISCONTINUED | OUTPATIENT
Start: 2017-01-01 | End: 2017-01-01 | Stop reason: HOSPADM

## 2017-01-01 RX ORDER — MEROPENEM 1 G/1
1 INJECTION, POWDER, FOR SOLUTION INTRAVENOUS EVERY 8 HOURS
Status: DISCONTINUED | OUTPATIENT
Start: 2017-01-01 | End: 2017-01-01

## 2017-01-01 RX ORDER — LORAZEPAM 2 MG/ML
.5-1 INJECTION INTRAMUSCULAR
Status: DISCONTINUED | OUTPATIENT
Start: 2017-01-01 | End: 2017-01-01

## 2017-01-01 RX ORDER — BUDESONIDE 0.5 MG/2ML
0.5 INHALANT ORAL 2 TIMES DAILY
Qty: 60 AMPULE | Refills: 0 | Status: CANCELLED | OUTPATIENT
Start: 2017-01-01

## 2017-01-01 RX ORDER — LIDOCAINE HYDROCHLORIDE 20 MG/ML
INJECTION, SOLUTION INFILTRATION; PERINEURAL PRN
Status: DISCONTINUED | OUTPATIENT
Start: 2017-01-01 | End: 2017-01-01

## 2017-01-01 RX ORDER — MORPHINE SULFATE 10 MG/1
10 CAPSULE, EXTENDED RELEASE ORAL EVERY 8 HOURS
Status: DISCONTINUED | OUTPATIENT
Start: 2017-01-01 | End: 2017-01-01 | Stop reason: CLARIF

## 2017-01-01 RX ORDER — PREDNISONE 5 MG/ML
20 SOLUTION ORAL DAILY
Qty: 500 ML | Refills: 0 | Status: CANCELLED | OUTPATIENT
Start: 2017-01-01

## 2017-01-01 RX ORDER — LORAZEPAM 2 MG/ML
.25-.5 INJECTION INTRAMUSCULAR EVERY 4 HOURS PRN
Status: DISCONTINUED | OUTPATIENT
Start: 2017-01-01 | End: 2017-01-01

## 2017-01-01 RX ADMIN — LEVALBUTEROL 1.25 MG: 1.25 SOLUTION RESPIRATORY (INHALATION) at 08:16

## 2017-01-01 RX ADMIN — LEVALBUTEROL HYDROCHLORIDE 1.25 MG: 1.25 SOLUTION RESPIRATORY (INHALATION) at 12:12

## 2017-01-01 RX ADMIN — IPRATROPIUM BROMIDE 0.5 MG: 0.5 SOLUTION RESPIRATORY (INHALATION) at 16:21

## 2017-01-01 RX ADMIN — QUETIAPINE FUMARATE 50 MG: 50 TABLET, FILM COATED ORAL at 07:41

## 2017-01-01 RX ADMIN — SENNOSIDES AND DOCUSATE SODIUM 1 TABLET: 8.6; 5 TABLET ORAL at 21:15

## 2017-01-01 RX ADMIN — Medication 1 MG: at 05:48

## 2017-01-01 RX ADMIN — SODIUM CHLORIDE, PRESERVATIVE FREE 5 ML: 5 INJECTION INTRAVENOUS at 12:31

## 2017-01-01 RX ADMIN — BUDESONIDE 0.5 MG: 0.5 INHALANT RESPIRATORY (INHALATION) at 21:17

## 2017-01-01 RX ADMIN — LEVALBUTEROL HYDROCHLORIDE 1.25 MG: 1.25 SOLUTION RESPIRATORY (INHALATION) at 20:35

## 2017-01-01 RX ADMIN — GABAPENTIN 300 MG: 250 SOLUTION ORAL at 08:48

## 2017-01-01 RX ADMIN — GABAPENTIN 300 MG: 250 SUSPENSION ORAL at 16:28

## 2017-01-01 RX ADMIN — Medication 1.5 MG: at 08:55

## 2017-01-01 RX ADMIN — POTASSIUM CHLORIDE 20 MEQ: 1.5 POWDER, FOR SOLUTION ORAL at 14:32

## 2017-01-01 RX ADMIN — HYDROMORPHONE HYDROCHLORIDE 1 MG: 1 SOLUTION ORAL at 02:45

## 2017-01-01 RX ADMIN — MEROPENEM 1 G: 1 INJECTION, POWDER, FOR SOLUTION INTRAVENOUS at 08:31

## 2017-01-01 RX ADMIN — MEROPENEM 1 G: 1 INJECTION, POWDER, FOR SOLUTION INTRAVENOUS at 17:27

## 2017-01-01 RX ADMIN — HYDROMORPHONE HYDROCHLORIDE 1 MG: 1 SOLUTION ORAL at 00:46

## 2017-01-01 RX ADMIN — FEXOFENADINE HYDROCHLORIDE 180 MG: 180 TABLET, FILM COATED ORAL at 08:37

## 2017-01-01 RX ADMIN — IPRATROPIUM BROMIDE 0.5 MG: 0.5 SOLUTION RESPIRATORY (INHALATION) at 21:08

## 2017-01-01 RX ADMIN — BUDESONIDE 0.5 MG: 0.5 INHALANT RESPIRATORY (INHALATION) at 08:33

## 2017-01-01 RX ADMIN — Medication 1.5 MG: at 05:43

## 2017-01-01 RX ADMIN — LEVALBUTEROL HYDROCHLORIDE 1.25 MG: 1.25 SOLUTION RESPIRATORY (INHALATION) at 16:45

## 2017-01-01 RX ADMIN — IPRATROPIUM BROMIDE 0.5 MG: 0.5 SOLUTION RESPIRATORY (INHALATION) at 15:46

## 2017-01-01 RX ADMIN — LEVALBUTEROL HYDROCHLORIDE 1.25 MG: 1.25 SOLUTION RESPIRATORY (INHALATION) at 08:18

## 2017-01-01 RX ADMIN — CLONAZEPAM 1.5 MG: 2 TABLET ORAL at 11:24

## 2017-01-01 RX ADMIN — GUAIFENESIN 10 ML: 100 SOLUTION ORAL at 19:50

## 2017-01-01 RX ADMIN — SODIUM PHOSPHATE 1 ENEMA: 7; 19 ENEMA RECTAL at 14:10

## 2017-01-01 RX ADMIN — PREDNISONE 20 MG: 20 TABLET ORAL at 07:21

## 2017-01-01 RX ADMIN — FAMOTIDINE 20 MG: 40 POWDER, FOR SUSPENSION ORAL at 19:04

## 2017-01-01 RX ADMIN — CLONAZEPAM 1.5 MG: 2 TABLET ORAL at 12:53

## 2017-01-01 RX ADMIN — GABAPENTIN 150 MG: 250 SOLUTION ORAL at 09:49

## 2017-01-01 RX ADMIN — Medication 20 MG: at 19:01

## 2017-01-01 RX ADMIN — GABAPENTIN 300 MG: 250 SOLUTION ORAL at 08:42

## 2017-01-01 RX ADMIN — Medication 1 PACKET: at 15:50

## 2017-01-01 RX ADMIN — HYDROMORPHONE HYDROCHLORIDE 1 MG: 1 SOLUTION ORAL at 01:51

## 2017-01-01 RX ADMIN — MORPHINE SULFATE 10 MG: 10 SOLUTION ORAL at 21:45

## 2017-01-01 RX ADMIN — BUDESONIDE 0.5 MG: 0.5 INHALANT RESPIRATORY (INHALATION) at 07:56

## 2017-01-01 RX ADMIN — IPRATROPIUM BROMIDE 0.5 MG: 0.5 SOLUTION RESPIRATORY (INHALATION) at 12:17

## 2017-01-01 RX ADMIN — Medication 0.5 MG: at 10:59

## 2017-01-01 RX ADMIN — LEVALBUTEROL HYDROCHLORIDE 1.25 MG: 1.25 SOLUTION RESPIRATORY (INHALATION) at 21:01

## 2017-01-01 RX ADMIN — HEPARIN SODIUM 5000 UNITS: 5000 INJECTION, SOLUTION INTRAVENOUS; SUBCUTANEOUS at 23:53

## 2017-01-01 RX ADMIN — QUETIAPINE FUMARATE 50 MG: 25 TABLET, FILM COATED ORAL at 08:03

## 2017-01-01 RX ADMIN — Medication 3 MG: at 21:52

## 2017-01-01 RX ADMIN — LEVALBUTEROL 1.25 MG: 1.25 SOLUTION RESPIRATORY (INHALATION) at 15:45

## 2017-01-01 RX ADMIN — BUDESONIDE 0.5 MG: 0.5 INHALANT RESPIRATORY (INHALATION) at 08:18

## 2017-01-01 RX ADMIN — AMPICILLIN SODIUM AND SULBACTAM SODIUM 3 G: 2; 1 INJECTION, POWDER, FOR SOLUTION INTRAMUSCULAR; INTRAVENOUS at 21:43

## 2017-01-01 RX ADMIN — GABAPENTIN 300 MG: 250 SOLUTION ORAL at 13:23

## 2017-01-01 RX ADMIN — GUAIFENESIN 10 ML: 100 SOLUTION ORAL at 15:55

## 2017-01-01 RX ADMIN — MORPHINE SULFATE 10 MG: 10 SOLUTION ORAL at 14:06

## 2017-01-01 RX ADMIN — PREDNISONE 20 MG: 20 TABLET ORAL at 14:36

## 2017-01-01 RX ADMIN — FLUTICASONE PROPIONATE 2 SPRAY: 50 SPRAY, METERED NASAL at 08:07

## 2017-01-01 RX ADMIN — DOCUSATE SODIUM 100 MG: 50 LIQUID ORAL at 20:09

## 2017-01-01 RX ADMIN — Medication 1.5 MG: at 20:07

## 2017-01-01 RX ADMIN — Medication 20 MG: at 20:50

## 2017-01-01 RX ADMIN — LEVALBUTEROL HYDROCHLORIDE 1.25 MG: 1.25 SOLUTION RESPIRATORY (INHALATION) at 00:11

## 2017-01-01 RX ADMIN — HYDROMORPHONE HYDROCHLORIDE 1 MG: 1 SOLUTION ORAL at 18:19

## 2017-01-01 RX ADMIN — FEXOFENADINE HYDROCHLORIDE 180 MG: 180 TABLET, FILM COATED ORAL at 07:48

## 2017-01-01 RX ADMIN — Medication 13 ML: at 07:47

## 2017-01-01 RX ADMIN — Medication 10 ML: at 08:20

## 2017-01-01 RX ADMIN — LEVALBUTEROL 1.25 MG: 1.25 SOLUTION RESPIRATORY (INHALATION) at 15:50

## 2017-01-01 RX ADMIN — POTASSIUM CHLORIDE 40 MEQ: 1.5 POWDER, FOR SOLUTION ORAL at 18:23

## 2017-01-01 RX ADMIN — QUETIAPINE 50 MG: 50 TABLET, FILM COATED ORAL at 22:23

## 2017-01-01 RX ADMIN — MEROPENEM 1 G: 1 INJECTION, POWDER, FOR SOLUTION INTRAVENOUS at 00:04

## 2017-01-01 RX ADMIN — CLONAZEPAM 1.5 MG: 2 TABLET ORAL at 07:29

## 2017-01-01 RX ADMIN — PREDNISONE 20 MG: 5 SOLUTION ORAL at 08:34

## 2017-01-01 RX ADMIN — MORPHINE SULFATE 10 MG: 10 SOLUTION ORAL at 07:59

## 2017-01-01 RX ADMIN — BUDESONIDE 0.5 MG: 0.5 INHALANT RESPIRATORY (INHALATION) at 20:32

## 2017-01-01 RX ADMIN — GABAPENTIN 300 MG: 250 SUSPENSION ORAL at 08:10

## 2017-01-01 RX ADMIN — IPRATROPIUM BROMIDE 0.5 MG: 0.5 SOLUTION RESPIRATORY (INHALATION) at 16:12

## 2017-01-01 RX ADMIN — Medication 500 MG: at 07:52

## 2017-01-01 RX ADMIN — CLONAZEPAM 1.5 MG: 2 TABLET ORAL at 11:26

## 2017-01-01 RX ADMIN — GUAIFENESIN 10 ML: 100 SOLUTION ORAL at 09:51

## 2017-01-01 RX ADMIN — LIDOCAINE HYDROCHLORIDE 30 ML: 20 SOLUTION ORAL; TOPICAL at 08:38

## 2017-01-01 RX ADMIN — Medication 1.5 MG: at 19:01

## 2017-01-01 RX ADMIN — POTASSIUM CHLORIDE, DEXTROSE MONOHYDRATE AND SODIUM CHLORIDE: 150; 5; 900 INJECTION, SOLUTION INTRAVENOUS at 21:52

## 2017-01-01 RX ADMIN — HYDROMORPHONE HYDROCHLORIDE 0.3 MG: 10 INJECTION, SOLUTION INTRAMUSCULAR; INTRAVENOUS; SUBCUTANEOUS at 08:06

## 2017-01-01 RX ADMIN — LIDOCAINE 1 PATCH: 50 PATCH TOPICAL at 21:11

## 2017-01-01 RX ADMIN — ACETYLCYSTEINE 2 ML: 200 SOLUTION ORAL; RESPIRATORY (INHALATION) at 11:30

## 2017-01-01 RX ADMIN — AMPICILLIN SODIUM AND SULBACTAM SODIUM 3 G: 2; 1 INJECTION, POWDER, FOR SOLUTION INTRAMUSCULAR; INTRAVENOUS at 22:54

## 2017-01-01 RX ADMIN — LEVALBUTEROL HYDROCHLORIDE 1.25 MG: 1.25 SOLUTION RESPIRATORY (INHALATION) at 12:06

## 2017-01-01 RX ADMIN — PANTOPRAZOLE SODIUM 40 MG: 40 INJECTION, POWDER, FOR SOLUTION INTRAVENOUS at 08:06

## 2017-01-01 RX ADMIN — POTASSIUM CHLORIDE 20 MEQ: 29.8 INJECTION, SOLUTION INTRAVENOUS at 16:51

## 2017-01-01 RX ADMIN — IPRATROPIUM BROMIDE 0.5 MG: 0.5 SOLUTION RESPIRATORY (INHALATION) at 20:15

## 2017-01-01 RX ADMIN — MEROPENEM 1 G: 1 INJECTION, POWDER, FOR SOLUTION INTRAVENOUS at 08:04

## 2017-01-01 RX ADMIN — Medication 0.5 MG: at 01:28

## 2017-01-01 RX ADMIN — CLONAZEPAM 1.5 MG: 2 TABLET ORAL at 12:25

## 2017-01-01 RX ADMIN — SODIUM CHLORIDE 1000 ML: 900 INJECTION, SOLUTION INTRAVENOUS at 10:15

## 2017-01-01 RX ADMIN — PREDNISONE 20 MG: 20 TABLET ORAL at 08:52

## 2017-01-01 RX ADMIN — SERTRALINE HYDROCHLORIDE 100 MG: 20 SOLUTION ORAL at 08:00

## 2017-01-01 RX ADMIN — FAMOTIDINE 20 MG: 40 POWDER, FOR SUSPENSION ORAL at 08:19

## 2017-01-01 RX ADMIN — LEVALBUTEROL HYDROCHLORIDE 1.25 MG: 1.25 SOLUTION RESPIRATORY (INHALATION) at 11:40

## 2017-01-01 RX ADMIN — BUDESONIDE 0.5 MG: 0.5 INHALANT RESPIRATORY (INHALATION) at 09:14

## 2017-01-01 RX ADMIN — ENOXAPARIN SODIUM 40 MG: 40 INJECTION SUBCUTANEOUS at 08:53

## 2017-01-01 RX ADMIN — INSULIN ASPART 1 UNITS: 100 INJECTION, SOLUTION INTRAVENOUS; SUBCUTANEOUS at 08:18

## 2017-01-01 RX ADMIN — LEVALBUTEROL 1.25 MG: 1.25 SOLUTION RESPIRATORY (INHALATION) at 13:47

## 2017-01-01 RX ADMIN — HYDROMORPHONE HYDROCHLORIDE 1 MG: 1 SOLUTION ORAL at 01:33

## 2017-01-01 RX ADMIN — GUAIFENESIN 10 ML: 100 SOLUTION ORAL at 19:04

## 2017-01-01 RX ADMIN — Medication 1.5 MG: at 20:49

## 2017-01-01 RX ADMIN — OXYCODONE HYDROCHLORIDE 5 MG: 5 TABLET ORAL at 08:23

## 2017-01-01 RX ADMIN — IPRATROPIUM BROMIDE 0.5 MG: 0.5 SOLUTION RESPIRATORY (INHALATION) at 00:18

## 2017-01-01 RX ADMIN — Medication 220 MG: at 08:10

## 2017-01-01 RX ADMIN — ACETAMINOPHEN 325 MG: 160 SUSPENSION ORAL at 19:14

## 2017-01-01 RX ADMIN — ONDANSETRON 4 MG: 2 INJECTION INTRAMUSCULAR; INTRAVENOUS at 17:25

## 2017-01-01 RX ADMIN — Medication 0.5 MG: at 08:06

## 2017-01-01 RX ADMIN — LEVALBUTEROL HYDROCHLORIDE 1.25 MG: 1.25 SOLUTION RESPIRATORY (INHALATION) at 20:59

## 2017-01-01 RX ADMIN — PREDNISONE 20 MG: 20 TABLET ORAL at 08:31

## 2017-01-01 RX ADMIN — Medication 1 PACKET: at 13:25

## 2017-01-01 RX ADMIN — GUAIFENESIN 10 ML: 100 SOLUTION ORAL at 12:12

## 2017-01-01 RX ADMIN — OLANZAPINE 2.5 MG: 5 TABLET, ORALLY DISINTEGRATING ORAL at 03:58

## 2017-01-01 RX ADMIN — PREDNISONE 20 MG: 5 SOLUTION ORAL at 08:37

## 2017-01-01 RX ADMIN — Medication 1.5 MG: at 11:15

## 2017-01-01 RX ADMIN — SERTRALINE HYDROCHLORIDE 150 MG: 20 SOLUTION ORAL at 07:58

## 2017-01-01 RX ADMIN — MEROPENEM 1 G: 1 INJECTION, POWDER, FOR SOLUTION INTRAVENOUS at 17:19

## 2017-01-01 RX ADMIN — CLONAZEPAM 1.5 MG: 2 TABLET ORAL at 20:03

## 2017-01-01 RX ADMIN — Medication 1 CAPSULE: at 07:41

## 2017-01-01 RX ADMIN — ATORVASTATIN CALCIUM 20 MG: 20 TABLET, FILM COATED ORAL at 07:53

## 2017-01-01 RX ADMIN — IPRATROPIUM BROMIDE 0.5 MG: 0.5 SOLUTION RESPIRATORY (INHALATION) at 19:55

## 2017-01-01 RX ADMIN — ONDANSETRON 4 MG: 2 INJECTION INTRAMUSCULAR; INTRAVENOUS at 13:11

## 2017-01-01 RX ADMIN — GUAIFENESIN 10 ML: 100 SOLUTION ORAL at 08:30

## 2017-01-01 RX ADMIN — SODIUM CHLORIDE, PRESERVATIVE FREE 5 ML: 5 INJECTION INTRAVENOUS at 17:45

## 2017-01-01 RX ADMIN — Medication 1.5 MG: at 07:57

## 2017-01-01 RX ADMIN — Medication 15 ML: at 12:05

## 2017-01-01 RX ADMIN — CLONAZEPAM 1 MG: 0.5 TABLET ORAL at 09:48

## 2017-01-01 RX ADMIN — MULTIVITAMIN 15 ML: LIQUID ORAL at 08:31

## 2017-01-01 RX ADMIN — AMPICILLIN SODIUM AND SULBACTAM SODIUM 3 G: 2; 1 INJECTION, POWDER, FOR SOLUTION INTRAMUSCULAR; INTRAVENOUS at 15:06

## 2017-01-01 RX ADMIN — MORPHINE SULFATE 5 MG: 10 SOLUTION ORAL at 07:50

## 2017-01-01 RX ADMIN — IPRATROPIUM BROMIDE 0.5 MG: 0.5 SOLUTION RESPIRATORY (INHALATION) at 11:40

## 2017-01-01 RX ADMIN — SODIUM CHLORIDE, PRESERVATIVE FREE 5 ML: 5 INJECTION INTRAVENOUS at 19:33

## 2017-01-01 RX ADMIN — SENNOSIDES AND DOCUSATE SODIUM 1 TABLET: 8.6; 5 TABLET ORAL at 22:00

## 2017-01-01 RX ADMIN — BUDESONIDE 0.5 MG: 0.5 INHALANT RESPIRATORY (INHALATION) at 19:14

## 2017-01-01 RX ADMIN — DEXTROSE MONOHYDRATE 35 G: 25 INJECTION, SOLUTION INTRAVENOUS at 13:52

## 2017-01-01 RX ADMIN — GABAPENTIN 300 MG: 250 SOLUTION ORAL at 08:17

## 2017-01-01 RX ADMIN — IPRATROPIUM BROMIDE 0.5 MG: 0.5 SOLUTION RESPIRATORY (INHALATION) at 12:32

## 2017-01-01 RX ADMIN — GABAPENTIN 300 MG: 250 SOLUTION ORAL at 07:50

## 2017-01-01 RX ADMIN — Medication 0.5 MG: at 14:28

## 2017-01-01 RX ADMIN — Medication 20 MG: at 08:00

## 2017-01-01 RX ADMIN — LEVALBUTEROL HYDROCHLORIDE 1.25 MG: 1.25 SOLUTION RESPIRATORY (INHALATION) at 16:08

## 2017-01-01 RX ADMIN — GUAIFENESIN 10 ML: 100 SOLUTION ORAL at 20:11

## 2017-01-01 RX ADMIN — IPRATROPIUM BROMIDE 0.5 MG: 0.5 SOLUTION RESPIRATORY (INHALATION) at 12:23

## 2017-01-01 RX ADMIN — HYDROMORPHONE HYDROCHLORIDE 1 MG: 1 SOLUTION ORAL at 09:10

## 2017-01-01 RX ADMIN — FEXOFENADINE 60 MG: 60 TABLET, FILM COATED ORAL at 09:48

## 2017-01-01 RX ADMIN — Medication 0.5 MG: at 16:33

## 2017-01-01 RX ADMIN — GUAIFENESIN 10 ML: 100 SOLUTION ORAL at 15:06

## 2017-01-01 RX ADMIN — GUAIFENESIN 10 ML: 100 SOLUTION ORAL at 19:40

## 2017-01-01 RX ADMIN — CLONAZEPAM 1.5 MG: 2 TABLET ORAL at 19:04

## 2017-01-01 RX ADMIN — Medication 3 MG: at 21:51

## 2017-01-01 RX ADMIN — GUAIFENESIN 200 MG: 100 SOLUTION ORAL at 08:07

## 2017-01-01 RX ADMIN — POTASSIUM CHLORIDE 20 MEQ: 29.8 INJECTION, SOLUTION INTRAVENOUS at 02:35

## 2017-01-01 RX ADMIN — SODIUM CHLORIDE 1000 ML: 9 INJECTION, SOLUTION INTRAVENOUS at 12:05

## 2017-01-01 RX ADMIN — QUETIAPINE FUMARATE 50 MG: 25 TABLET, FILM COATED ORAL at 07:21

## 2017-01-01 RX ADMIN — LEVALBUTEROL HYDROCHLORIDE 1.25 MG: 1.25 SOLUTION RESPIRATORY (INHALATION) at 19:15

## 2017-01-01 RX ADMIN — ACETAMINOPHEN 325 MG: 160 SUSPENSION ORAL at 04:42

## 2017-01-01 RX ADMIN — Medication 160 MG: at 08:56

## 2017-01-01 RX ADMIN — Medication 0.5 MG: at 09:16

## 2017-01-01 RX ADMIN — SODIUM PHOSPHATE, MONOBASIC, MONOHYDRATE AND SODIUM PHOSPHATE, DIBASIC, ANHYDROUS 15 MMOL: 276; 142 INJECTION, SOLUTION INTRAVENOUS at 22:13

## 2017-01-01 RX ADMIN — BUDESONIDE 0.5 MG: 0.5 INHALANT RESPIRATORY (INHALATION) at 20:20

## 2017-01-01 RX ADMIN — SIMETHICONE 125 MG: 20 SUSPENSION/ DROPS ORAL at 08:05

## 2017-01-01 RX ADMIN — CLONAZEPAM 1.5 MG: 2 TABLET ORAL at 08:20

## 2017-01-01 RX ADMIN — ACETAMINOPHEN 325 MG: 160 SUSPENSION ORAL at 01:22

## 2017-01-01 RX ADMIN — SODIUM CHLORIDE 500 ML: 9 INJECTION, SOLUTION INTRAVENOUS at 01:40

## 2017-01-01 RX ADMIN — QUETIAPINE FUMARATE 25 MG: 25 TABLET, FILM COATED ORAL at 15:39

## 2017-01-01 RX ADMIN — SENNOSIDES AND DOCUSATE SODIUM 2 TABLET: 8.6; 5 TABLET ORAL at 08:06

## 2017-01-01 RX ADMIN — CLONAZEPAM 1.5 MG: 2 TABLET ORAL at 21:10

## 2017-01-01 RX ADMIN — ACETAMINOPHEN 325 MG: 160 SUSPENSION ORAL at 15:58

## 2017-01-01 RX ADMIN — Medication 1 PACKET: at 07:58

## 2017-01-01 RX ADMIN — FLUTICASONE PROPIONATE 2 SPRAY: 50 SPRAY, METERED NASAL at 08:57

## 2017-01-01 RX ADMIN — LIDOCAINE 2 PATCH: 50 PATCH TOPICAL at 09:59

## 2017-01-01 RX ADMIN — MORPHINE SULFATE 10 MG: 10 SOLUTION ORAL at 13:22

## 2017-01-01 RX ADMIN — LEVALBUTEROL 1.25 MG: 1.25 SOLUTION RESPIRATORY (INHALATION) at 08:25

## 2017-01-01 RX ADMIN — HYDROMORPHONE HYDROCHLORIDE 1 MG: 1 SOLUTION ORAL at 10:54

## 2017-01-01 RX ADMIN — ACETYLCYSTEINE 2 ML: 200 SOLUTION ORAL; RESPIRATORY (INHALATION) at 00:35

## 2017-01-01 RX ADMIN — Medication 60 MEQ: at 08:41

## 2017-01-01 RX ADMIN — PANTOPRAZOLE SODIUM 40 MG: 40 INJECTION, POWDER, FOR SOLUTION INTRAVENOUS at 13:44

## 2017-01-01 RX ADMIN — Medication 1 MG: at 10:49

## 2017-01-01 RX ADMIN — GUAIFENESIN 10 ML: 100 SOLUTION ORAL at 20:47

## 2017-01-01 RX ADMIN — HYDROMORPHONE HYDROCHLORIDE 1 MG: 1 SOLUTION ORAL at 18:02

## 2017-01-01 RX ADMIN — ACETYLCYSTEINE 2 ML: 200 SOLUTION ORAL; RESPIRATORY (INHALATION) at 04:17

## 2017-01-01 RX ADMIN — Medication 0.5 MG: at 13:20

## 2017-01-01 RX ADMIN — HEPARIN SODIUM 5000 UNITS: 5000 INJECTION, SOLUTION INTRAVENOUS; SUBCUTANEOUS at 08:07

## 2017-01-01 RX ADMIN — HYDROMORPHONE HYDROCHLORIDE 1 MG: 1 SOLUTION ORAL at 18:33

## 2017-01-01 RX ADMIN — Medication 1.5 MG: at 15:47

## 2017-01-01 RX ADMIN — FEXOFENADINE HYDROCHLORIDE 180 MG: 180 TABLET, FILM COATED ORAL at 08:57

## 2017-01-01 RX ADMIN — Medication 1.5 MG: at 08:37

## 2017-01-01 RX ADMIN — IPRATROPIUM BROMIDE 0.5 MG: 0.5 SOLUTION RESPIRATORY (INHALATION) at 08:06

## 2017-01-01 RX ADMIN — Medication 0.5 MG: at 05:32

## 2017-01-01 RX ADMIN — IPRATROPIUM BROMIDE 0.5 MG: 0.5 SOLUTION RESPIRATORY (INHALATION) at 12:24

## 2017-01-01 RX ADMIN — SIMETHICONE 125 MG: 20 SUSPENSION/ DROPS ORAL at 19:33

## 2017-01-01 RX ADMIN — GABAPENTIN 300 MG: 250 SUSPENSION ORAL at 15:49

## 2017-01-01 RX ADMIN — ENOXAPARIN SODIUM 40 MG: 40 INJECTION SUBCUTANEOUS at 09:24

## 2017-01-01 RX ADMIN — Medication 1 PACKET: at 20:07

## 2017-01-01 RX ADMIN — QUETIAPINE 50 MG: 50 TABLET, FILM COATED ORAL at 07:56

## 2017-01-01 RX ADMIN — MULTIVITAMIN 15 ML: LIQUID ORAL at 07:41

## 2017-01-01 RX ADMIN — SODIUM CHLORIDE: 9 INJECTION, SOLUTION INTRAVENOUS at 14:54

## 2017-01-01 RX ADMIN — SERTRALINE HYDROCHLORIDE 150 MG: 20 SOLUTION ORAL at 08:13

## 2017-01-01 RX ADMIN — IPRATROPIUM BROMIDE 0.5 MG: 0.5 SOLUTION RESPIRATORY (INHALATION) at 20:26

## 2017-01-01 RX ADMIN — PREDNISONE 40 MG: 5 SOLUTION ORAL at 08:03

## 2017-01-01 RX ADMIN — Medication 1 CAPSULE: at 08:12

## 2017-01-01 RX ADMIN — POLYETHYLENE GLYCOL 3350 17 G: 17 POWDER, FOR SOLUTION ORAL at 08:06

## 2017-01-01 RX ADMIN — ONDANSETRON 4 MG: 4 TABLET, ORALLY DISINTEGRATING ORAL at 08:32

## 2017-01-01 RX ADMIN — Medication 220 MG: at 07:44

## 2017-01-01 RX ADMIN — Medication 1.5 MG: at 17:11

## 2017-01-01 RX ADMIN — Medication 0.5 MG: at 08:36

## 2017-01-01 RX ADMIN — SODIUM CHLORIDE: 9 INJECTION, SOLUTION INTRAVENOUS at 17:00

## 2017-01-01 RX ADMIN — HYDROMORPHONE HYDROCHLORIDE 0.5 MG: 1 SOLUTION ORAL at 02:02

## 2017-01-01 RX ADMIN — IPRATROPIUM BROMIDE 0.5 MG: 0.5 SOLUTION RESPIRATORY (INHALATION) at 15:49

## 2017-01-01 RX ADMIN — CLONAZEPAM 1.5 MG: 2 TABLET ORAL at 12:16

## 2017-01-01 RX ADMIN — MORPHINE SULFATE 6 MG: 2 INJECTION, SOLUTION INTRAMUSCULAR; INTRAVENOUS at 12:50

## 2017-01-01 RX ADMIN — HYDROMORPHONE HYDROCHLORIDE 0.2 MG: 10 INJECTION, SOLUTION INTRAMUSCULAR; INTRAVENOUS; SUBCUTANEOUS at 16:26

## 2017-01-01 RX ADMIN — POLYETHYLENE GLYCOL 400 AND PROPYLENE GLYCOL 1 DROP: 4; 3 SOLUTION/ DROPS OPHTHALMIC at 19:56

## 2017-01-01 RX ADMIN — MULTIVITAMIN 15 ML: LIQUID ORAL at 08:24

## 2017-01-01 RX ADMIN — AMPICILLIN SODIUM AND SULBACTAM SODIUM 3 G: 2; 1 INJECTION, POWDER, FOR SOLUTION INTRAMUSCULAR; INTRAVENOUS at 03:34

## 2017-01-01 RX ADMIN — LEVALBUTEROL 1.25 MG: 1.25 SOLUTION RESPIRATORY (INHALATION) at 15:49

## 2017-01-01 RX ADMIN — CLONAZEPAM 1.5 MG: 2 TABLET ORAL at 15:55

## 2017-01-01 RX ADMIN — LEVALBUTEROL 1.25 MG: 1.25 SOLUTION RESPIRATORY (INHALATION) at 12:41

## 2017-01-01 RX ADMIN — AMPICILLIN SODIUM AND SULBACTAM SODIUM 3 G: 2; 1 INJECTION, POWDER, FOR SOLUTION INTRAMUSCULAR; INTRAVENOUS at 04:14

## 2017-01-01 RX ADMIN — Medication 1.5 MG: at 15:36

## 2017-01-01 RX ADMIN — MORPHINE SULFATE 10 MG: 10 SOLUTION ORAL at 04:00

## 2017-01-01 RX ADMIN — LEVALBUTEROL HYDROCHLORIDE 1.25 MG: 1.25 SOLUTION RESPIRATORY (INHALATION) at 08:49

## 2017-01-01 RX ADMIN — SENNOSIDES AND DOCUSATE SODIUM 1 TABLET: 8.6; 5 TABLET ORAL at 21:53

## 2017-01-01 RX ADMIN — Medication 1 MG: at 19:29

## 2017-01-01 RX ADMIN — Medication 0.5 MG: at 04:30

## 2017-01-01 RX ADMIN — Medication 500 MG: at 08:05

## 2017-01-01 RX ADMIN — POLYETHYLENE GLYCOL 3350 17 G: 17 POWDER, FOR SOLUTION ORAL at 22:52

## 2017-01-01 RX ADMIN — QUETIAPINE 50 MG: 50 TABLET, FILM COATED ORAL at 08:32

## 2017-01-01 RX ADMIN — IPRATROPIUM BROMIDE 0.5 MG: 0.5 SOLUTION RESPIRATORY (INHALATION) at 08:24

## 2017-01-01 RX ADMIN — ONDANSETRON 4 MG: 2 INJECTION INTRAMUSCULAR; INTRAVENOUS at 18:47

## 2017-01-01 RX ADMIN — IPRATROPIUM BROMIDE 0.5 MG: 0.5 SOLUTION RESPIRATORY (INHALATION) at 17:33

## 2017-01-01 RX ADMIN — SODIUM PHOSPHATE, MONOBASIC, MONOHYDRATE AND SODIUM PHOSPHATE, DIBASIC, ANHYDROUS 15 MMOL: 276; 142 INJECTION, SOLUTION INTRAVENOUS at 04:21

## 2017-01-01 RX ADMIN — MULTIVITAMIN 15 ML: LIQUID ORAL at 08:10

## 2017-01-01 RX ADMIN — LEVALBUTEROL 1.25 MG: 1.25 SOLUTION RESPIRATORY (INHALATION) at 16:10

## 2017-01-01 RX ADMIN — GABAPENTIN 300 MG: 250 SOLUTION ORAL at 20:07

## 2017-01-01 RX ADMIN — ACETAMINOPHEN 325 MG: 160 SUSPENSION ORAL at 08:10

## 2017-01-01 RX ADMIN — QUETIAPINE FUMARATE 25 MG: 25 TABLET, FILM COATED ORAL at 19:33

## 2017-01-01 RX ADMIN — HYDROMORPHONE HYDROCHLORIDE 1 MG: 1 SOLUTION ORAL at 15:38

## 2017-01-01 RX ADMIN — IPRATROPIUM BROMIDE 0.5 MG: 0.5 SOLUTION RESPIRATORY (INHALATION) at 15:45

## 2017-01-01 RX ADMIN — GUAIFENESIN 10 ML: 100 SOLUTION ORAL at 19:54

## 2017-01-01 RX ADMIN — Medication 1.5 MG: at 19:17

## 2017-01-01 RX ADMIN — LIDOCAINE 2 PATCH: 50 PATCH CUTANEOUS at 09:33

## 2017-01-01 RX ADMIN — LOPERAMIDE HYDROCHLORIDE 2 MG: 1 SOLUTION ORAL at 17:05

## 2017-01-01 RX ADMIN — MEROPENEM 1 G: 1 INJECTION, POWDER, FOR SOLUTION INTRAVENOUS at 00:49

## 2017-01-01 RX ADMIN — IPRATROPIUM BROMIDE 0.5 MG: 0.5 SOLUTION RESPIRATORY (INHALATION) at 08:34

## 2017-01-01 RX ADMIN — PIPERACILLIN AND TAZOBACTAM 3.38 G: 3; .375 INJECTION, POWDER, FOR SOLUTION INTRAVENOUS at 14:29

## 2017-01-01 RX ADMIN — GABAPENTIN 150 MG: 250 SOLUTION ORAL at 19:29

## 2017-01-01 RX ADMIN — MEROPENEM 500 MG: 500 INJECTION, POWDER, FOR SOLUTION INTRAVENOUS at 23:39

## 2017-01-01 RX ADMIN — Medication 1 PACKET: at 17:21

## 2017-01-01 RX ADMIN — IPRATROPIUM BROMIDE 0.5 MG: 0.5 SOLUTION RESPIRATORY (INHALATION) at 21:01

## 2017-01-01 RX ADMIN — Medication 160 MG: at 08:04

## 2017-01-01 RX ADMIN — QUETIAPINE FUMARATE 50 MG: 50 TABLET, FILM COATED ORAL at 08:04

## 2017-01-01 RX ADMIN — QUETIAPINE 50 MG: 50 TABLET, FILM COATED ORAL at 21:28

## 2017-01-01 RX ADMIN — Medication 1 CAPSULE: at 09:01

## 2017-01-01 RX ADMIN — IPRATROPIUM BROMIDE 0.5 MG: 0.5 SOLUTION RESPIRATORY (INHALATION) at 15:53

## 2017-01-01 RX ADMIN — FEXOFENADINE HYDROCHLORIDE 180 MG: 180 TABLET, FILM COATED ORAL at 08:04

## 2017-01-01 RX ADMIN — BUDESONIDE 0.5 MG: 0.5 INHALANT RESPIRATORY (INHALATION) at 08:46

## 2017-01-01 RX ADMIN — Medication 0.5 MG: at 00:45

## 2017-01-01 RX ADMIN — IPRATROPIUM BROMIDE 0.5 MG: 0.5 SOLUTION RESPIRATORY (INHALATION) at 12:06

## 2017-01-01 RX ADMIN — Medication 1 MG: at 14:28

## 2017-01-01 RX ADMIN — LORAZEPAM 0.5 MG: 2 INJECTION INTRAMUSCULAR; INTRAVENOUS at 19:04

## 2017-01-01 RX ADMIN — LEVALBUTEROL HYDROCHLORIDE 1.25 MG: 1.25 SOLUTION RESPIRATORY (INHALATION) at 12:38

## 2017-01-01 RX ADMIN — LEVALBUTEROL HYDROCHLORIDE 1.25 MG: 1.25 SOLUTION RESPIRATORY (INHALATION) at 20:56

## 2017-01-01 RX ADMIN — ACETAMINOPHEN 325 MG: 160 SUSPENSION ORAL at 12:14

## 2017-01-01 RX ADMIN — GABAPENTIN 300 MG: 250 SOLUTION ORAL at 20:29

## 2017-01-01 RX ADMIN — GABAPENTIN 300 MG: 250 SOLUTION ORAL at 07:49

## 2017-01-01 RX ADMIN — IPRATROPIUM BROMIDE 0.5 MG: 0.5 SOLUTION RESPIRATORY (INHALATION) at 16:42

## 2017-01-01 RX ADMIN — Medication 1 PACKET: at 15:49

## 2017-01-01 RX ADMIN — GABAPENTIN 300 MG: 250 SUSPENSION ORAL at 02:41

## 2017-01-01 RX ADMIN — MEROPENEM 500 MG: 500 INJECTION, POWDER, FOR SOLUTION INTRAVENOUS at 05:30

## 2017-01-01 RX ADMIN — SODIUM CHLORIDE, PRESERVATIVE FREE 5 ML: 5 INJECTION INTRAVENOUS at 17:29

## 2017-01-01 RX ADMIN — POTASSIUM CHLORIDE 20 MEQ: 1.5 POWDER, FOR SOLUTION ORAL at 09:07

## 2017-01-01 RX ADMIN — SIMETHICONE 125 MG: 20 SUSPENSION/ DROPS ORAL at 08:33

## 2017-01-01 RX ADMIN — IPRATROPIUM BROMIDE 0.5 MG: 0.5 SOLUTION RESPIRATORY (INHALATION) at 12:05

## 2017-01-01 RX ADMIN — LEVALBUTEROL 1.25 MG: 1.25 SOLUTION RESPIRATORY (INHALATION) at 16:14

## 2017-01-01 RX ADMIN — GABAPENTIN 300 MG: 250 SOLUTION ORAL at 13:27

## 2017-01-01 RX ADMIN — Medication 1 CAPSULE: at 08:31

## 2017-01-01 RX ADMIN — Medication 220 MG: at 07:50

## 2017-01-01 RX ADMIN — GABAPENTIN 150 MG: 250 SOLUTION ORAL at 08:03

## 2017-01-01 RX ADMIN — GUAIFENESIN 10 ML: 100 SOLUTION ORAL at 09:01

## 2017-01-01 RX ADMIN — Medication 0.5 MG: at 15:55

## 2017-01-01 RX ADMIN — Medication 1 PACKET: at 07:56

## 2017-01-01 RX ADMIN — SODIUM PHOSPHATE, MONOBASIC, MONOHYDRATE AND SODIUM PHOSPHATE, DIBASIC, ANHYDROUS 20 MMOL: 276; 142 INJECTION, SOLUTION INTRAVENOUS at 17:20

## 2017-01-01 RX ADMIN — PREDNISONE 20 MG: 5 SOLUTION ORAL at 07:50

## 2017-01-01 RX ADMIN — Medication 1.5 MG: at 13:00

## 2017-01-01 RX ADMIN — BUDESONIDE 0.5 MG: 0.5 INHALANT RESPIRATORY (INHALATION) at 21:01

## 2017-01-01 RX ADMIN — SODIUM PHOSPHATE, MONOBASIC, MONOHYDRATE AND SODIUM PHOSPHATE, DIBASIC, ANHYDROUS 15 MMOL: 276; 142 INJECTION, SOLUTION INTRAVENOUS at 06:49

## 2017-01-01 RX ADMIN — LEVALBUTEROL HYDROCHLORIDE 1.25 MG: 1.25 SOLUTION RESPIRATORY (INHALATION) at 08:32

## 2017-01-01 RX ADMIN — Medication 1 MG: at 13:28

## 2017-01-01 RX ADMIN — Medication 220 MG: at 08:30

## 2017-01-01 RX ADMIN — LEVALBUTEROL 1.25 MG: 1.25 SOLUTION RESPIRATORY (INHALATION) at 08:57

## 2017-01-01 RX ADMIN — BUDESONIDE 0.5 MG: 0.5 INHALANT RESPIRATORY (INHALATION) at 08:30

## 2017-01-01 RX ADMIN — GUAIFENESIN 10 ML: 100 SOLUTION ORAL at 08:05

## 2017-01-01 RX ADMIN — LEVALBUTEROL 1.25 MG: 1.25 SOLUTION RESPIRATORY (INHALATION) at 19:55

## 2017-01-01 RX ADMIN — ONDANSETRON 4 MG: 2 INJECTION INTRAMUSCULAR; INTRAVENOUS at 18:49

## 2017-01-01 RX ADMIN — GUAIFENESIN 10 ML: 100 SOLUTION ORAL at 11:30

## 2017-01-01 RX ADMIN — LEVALBUTEROL HYDROCHLORIDE 1.25 MG: 1.25 SOLUTION RESPIRATORY (INHALATION) at 13:04

## 2017-01-01 RX ADMIN — HYDROMORPHONE HYDROCHLORIDE 1 MG: 1 SOLUTION ORAL at 09:08

## 2017-01-01 RX ADMIN — SIMETHICONE 125 MG: 20 SUSPENSION/ DROPS ORAL at 11:27

## 2017-01-01 RX ADMIN — GABAPENTIN 300 MG: 250 SOLUTION ORAL at 19:01

## 2017-01-01 RX ADMIN — MEROPENEM 1 G: 1 INJECTION, POWDER, FOR SOLUTION INTRAVENOUS at 08:14

## 2017-01-01 RX ADMIN — Medication 3 MG: at 22:38

## 2017-01-01 RX ADMIN — Medication 20 MG: at 08:12

## 2017-01-01 RX ADMIN — BUDESONIDE 0.5 MG: 0.5 INHALANT RESPIRATORY (INHALATION) at 09:13

## 2017-01-01 RX ADMIN — LEVALBUTEROL HYDROCHLORIDE 1.25 MG: 1.25 SOLUTION RESPIRATORY (INHALATION) at 16:56

## 2017-01-01 RX ADMIN — ACETYLCYSTEINE 2 ML: 200 SOLUTION ORAL; RESPIRATORY (INHALATION) at 13:40

## 2017-01-01 RX ADMIN — HUMAN INSULIN 6 UNITS: 100 INJECTION, SOLUTION SUBCUTANEOUS at 11:46

## 2017-01-01 RX ADMIN — BUDESONIDE 0.5 MG: 0.5 INHALANT RESPIRATORY (INHALATION) at 20:59

## 2017-01-01 RX ADMIN — IPRATROPIUM BROMIDE 0.5 MG: 0.5 SOLUTION RESPIRATORY (INHALATION) at 16:35

## 2017-01-01 RX ADMIN — NYSTATIN 500000 UNITS: 100000 SUSPENSION ORAL at 20:53

## 2017-01-01 RX ADMIN — GABAPENTIN 300 MG: 250 SUSPENSION ORAL at 01:21

## 2017-01-01 RX ADMIN — ALTEPLASE 1 MG: 2.2 INJECTION, POWDER, LYOPHILIZED, FOR SOLUTION INTRAVENOUS at 20:55

## 2017-01-01 RX ADMIN — Medication 1 PACKET: at 14:08

## 2017-01-01 RX ADMIN — LEVALBUTEROL 1.25 MG: 1.25 SOLUTION RESPIRATORY (INHALATION) at 15:41

## 2017-01-01 RX ADMIN — QUETIAPINE FUMARATE 50 MG: 50 TABLET, FILM COATED ORAL at 13:28

## 2017-01-01 RX ADMIN — SERTRALINE HYDROCHLORIDE 150 MG: 20 SOLUTION ORAL at 19:38

## 2017-01-01 RX ADMIN — SODIUM CHLORIDE 1000 ML: 9 INJECTION, SOLUTION INTRAVENOUS at 12:06

## 2017-01-01 RX ADMIN — SENNOSIDES AND DOCUSATE SODIUM 2 TABLET: 8.6; 5 TABLET ORAL at 08:01

## 2017-01-01 RX ADMIN — GUAIFENESIN 10 ML: 100 SOLUTION ORAL at 19:45

## 2017-01-01 RX ADMIN — SIMETHICONE 125 MG: 20 SUSPENSION/ DROPS ORAL at 11:30

## 2017-01-01 RX ADMIN — QUETIAPINE 50 MG: 50 TABLET, FILM COATED ORAL at 23:50

## 2017-01-01 RX ADMIN — Medication 20 MG: at 08:10

## 2017-01-01 RX ADMIN — ENOXAPARIN SODIUM 40 MG: 40 INJECTION SUBCUTANEOUS at 08:32

## 2017-01-01 RX ADMIN — BUDESONIDE 0.5 MG: 0.5 INHALANT RESPIRATORY (INHALATION) at 07:55

## 2017-01-01 RX ADMIN — MEROPENEM 1 G: 1 INJECTION, POWDER, FOR SOLUTION INTRAVENOUS at 02:35

## 2017-01-01 RX ADMIN — Medication 0.5 MG: at 20:44

## 2017-01-01 RX ADMIN — IPRATROPIUM BROMIDE 0.5 MG: 0.5 SOLUTION RESPIRATORY (INHALATION) at 08:32

## 2017-01-01 RX ADMIN — Medication 160 MG: at 08:20

## 2017-01-01 RX ADMIN — PREDNISONE 20 MG: 5 SOLUTION ORAL at 07:57

## 2017-01-01 RX ADMIN — CLONAZEPAM 1 MG: 0.5 TABLET ORAL at 07:43

## 2017-01-01 RX ADMIN — IPRATROPIUM BROMIDE 0.5 MG: 0.5 SOLUTION RESPIRATORY (INHALATION) at 13:04

## 2017-01-01 RX ADMIN — FAMOTIDINE 20 MG: 40 POWDER, FOR SUSPENSION ORAL at 07:25

## 2017-01-01 RX ADMIN — HEPARIN SODIUM 5000 UNITS: 5000 INJECTION, SOLUTION INTRAVENOUS; SUBCUTANEOUS at 16:41

## 2017-01-01 RX ADMIN — ATORVASTATIN CALCIUM 20 MG: 20 TABLET, FILM COATED ORAL at 09:48

## 2017-01-01 RX ADMIN — BUDESONIDE 0.5 MG: 0.5 INHALANT RESPIRATORY (INHALATION) at 20:03

## 2017-01-01 RX ADMIN — BUDESONIDE 0.5 MG: 0.5 INHALANT RESPIRATORY (INHALATION) at 20:11

## 2017-01-01 RX ADMIN — HYDROMORPHONE HYDROCHLORIDE 0.5 MG: 10 INJECTION, SOLUTION INTRAMUSCULAR; INTRAVENOUS; SUBCUTANEOUS at 03:24

## 2017-01-01 RX ADMIN — QUETIAPINE FUMARATE 25 MG: 25 TABLET, FILM COATED ORAL at 08:18

## 2017-01-01 RX ADMIN — Medication 1 PACKET: at 15:55

## 2017-01-01 RX ADMIN — ACETAMINOPHEN 325 MG: 160 SUSPENSION ORAL at 00:39

## 2017-01-01 RX ADMIN — GUAIFENESIN 10 ML: 100 SOLUTION ORAL at 19:01

## 2017-01-01 RX ADMIN — LEVALBUTEROL HYDROCHLORIDE 1.25 MG: 1.25 SOLUTION RESPIRATORY (INHALATION) at 21:08

## 2017-01-01 RX ADMIN — IPRATROPIUM BROMIDE 0.5 MG: 0.5 SOLUTION RESPIRATORY (INHALATION) at 16:07

## 2017-01-01 RX ADMIN — FEXOFENADINE HYDROCHLORIDE 180 MG: 180 TABLET, FILM COATED ORAL at 08:05

## 2017-01-01 RX ADMIN — GUAIFENESIN 200 MG: 100 SOLUTION ORAL at 07:50

## 2017-01-01 RX ADMIN — Medication 1.5 MG: at 12:00

## 2017-01-01 RX ADMIN — LEVALBUTEROL HYDROCHLORIDE 1.25 MG: 1.25 SOLUTION RESPIRATORY (INHALATION) at 20:20

## 2017-01-01 RX ADMIN — SIMETHICONE 125 MG: 20 SUSPENSION/ DROPS ORAL at 15:17

## 2017-01-01 RX ADMIN — HYDROMORPHONE HYDROCHLORIDE 1 MG: 1 SOLUTION ORAL at 05:23

## 2017-01-01 RX ADMIN — LEVALBUTEROL HYDROCHLORIDE 1.25 MG: 1.25 SOLUTION RESPIRATORY (INHALATION) at 15:55

## 2017-01-01 RX ADMIN — MORPHINE SULFATE 10 MG: 10 SOLUTION ORAL at 06:45

## 2017-01-01 RX ADMIN — Medication 0.5 MG: at 13:11

## 2017-01-01 RX ADMIN — SODIUM CHLORIDE 500 ML: 9 INJECTION, SOLUTION INTRAVENOUS at 22:55

## 2017-01-01 RX ADMIN — Medication 3 MG: at 21:59

## 2017-01-01 RX ADMIN — GUAIFENESIN 10 ML: 100 SOLUTION ORAL at 11:44

## 2017-01-01 RX ADMIN — HEPARIN SODIUM 5000 UNITS: 5000 INJECTION, SOLUTION INTRAVENOUS; SUBCUTANEOUS at 15:49

## 2017-01-01 RX ADMIN — Medication 1.5 MG: at 11:54

## 2017-01-01 RX ADMIN — IPRATROPIUM BROMIDE 0.5 MG: 0.5 SOLUTION RESPIRATORY (INHALATION) at 16:06

## 2017-01-01 RX ADMIN — IPRATROPIUM BROMIDE 0.5 MG: 0.5 SOLUTION RESPIRATORY (INHALATION) at 20:28

## 2017-01-01 RX ADMIN — CLONAZEPAM 1.5 MG: 2 TABLET ORAL at 23:37

## 2017-01-01 RX ADMIN — GABAPENTIN 300 MG: 250 SOLUTION ORAL at 07:42

## 2017-01-01 RX ADMIN — Medication 20 MG: at 08:03

## 2017-01-01 RX ADMIN — MORPHINE SULFATE 5 MG: 10 SOLUTION ORAL at 08:26

## 2017-01-01 RX ADMIN — BUDESONIDE 0.5 MG: 0.5 INHALANT RESPIRATORY (INHALATION) at 20:28

## 2017-01-01 RX ADMIN — ONDANSETRON HYDROCHLORIDE 4 MG: 4 TABLET, FILM COATED ORAL at 00:59

## 2017-01-01 RX ADMIN — GABAPENTIN 300 MG: 250 SOLUTION ORAL at 21:23

## 2017-01-01 RX ADMIN — IPRATROPIUM BROMIDE 0.5 MG: 0.5 SOLUTION RESPIRATORY (INHALATION) at 13:21

## 2017-01-01 RX ADMIN — ASPIRIN 81 MG CHEWABLE TABLET 81 MG: 81 TABLET CHEWABLE at 09:48

## 2017-01-01 RX ADMIN — METHYLNALTREXONE BROMIDE 8 MG: 12 INJECTION, SOLUTION SUBCUTANEOUS at 19:13

## 2017-01-01 RX ADMIN — CLONAZEPAM 1.5 MG: 2 TABLET ORAL at 15:11

## 2017-01-01 RX ADMIN — FEXOFENADINE HYDROCHLORIDE 180 MG: 180 TABLET, FILM COATED ORAL at 08:38

## 2017-01-01 RX ADMIN — MEROPENEM 500 MG: 500 INJECTION, POWDER, FOR SOLUTION INTRAVENOUS at 17:40

## 2017-01-01 RX ADMIN — GUAIFENESIN 200 MG: 100 SOLUTION ORAL at 20:15

## 2017-01-01 RX ADMIN — QUETIAPINE 50 MG: 50 TABLET, FILM COATED ORAL at 08:05

## 2017-01-01 RX ADMIN — MEROPENEM 500 MG: 500 INJECTION, POWDER, FOR SOLUTION INTRAVENOUS at 11:29

## 2017-01-01 RX ADMIN — VANCOMYCIN HYDROCHLORIDE 1000 MG: 1 INJECTION, SOLUTION INTRAVENOUS at 12:30

## 2017-01-01 RX ADMIN — GUAIFENESIN 10 ML: 100 SOLUTION ORAL at 19:32

## 2017-01-01 RX ADMIN — LEVALBUTEROL 1.25 MG: 1.25 SOLUTION RESPIRATORY (INHALATION) at 00:44

## 2017-01-01 RX ADMIN — LEVALBUTEROL HYDROCHLORIDE 1.25 MG: 1.25 SOLUTION RESPIRATORY (INHALATION) at 20:25

## 2017-01-01 RX ADMIN — QUETIAPINE FUMARATE 25 MG: 25 TABLET, FILM COATED ORAL at 08:23

## 2017-01-01 RX ADMIN — Medication 1 MG: at 14:46

## 2017-01-01 RX ADMIN — Medication 10 ML: at 21:21

## 2017-01-01 RX ADMIN — QUETIAPINE FUMARATE 50 MG: 50 TABLET, FILM COATED ORAL at 09:48

## 2017-01-01 RX ADMIN — BUDESONIDE 0.5 MG: 0.5 INHALANT RESPIRATORY (INHALATION) at 08:40

## 2017-01-01 RX ADMIN — POLYETHYLENE GLYCOL 3350 17 G: 17 POWDER, FOR SOLUTION ORAL at 08:31

## 2017-01-01 RX ADMIN — Medication 0.5 MG: at 02:50

## 2017-01-01 RX ADMIN — GUAIFENESIN 10 ML: 100 SOLUTION ORAL at 10:36

## 2017-01-01 RX ADMIN — MULTIVITAMIN 15 ML: LIQUID ORAL at 08:26

## 2017-01-01 RX ADMIN — ACETYLCYSTEINE 2 ML: 200 SOLUTION ORAL; RESPIRATORY (INHALATION) at 09:50

## 2017-01-01 RX ADMIN — HYDROMORPHONE HYDROCHLORIDE 0.2 MG: 10 INJECTION, SOLUTION INTRAMUSCULAR; INTRAVENOUS; SUBCUTANEOUS at 20:48

## 2017-01-01 RX ADMIN — IPRATROPIUM BROMIDE 0.5 MG: 0.5 SOLUTION RESPIRATORY (INHALATION) at 08:46

## 2017-01-01 RX ADMIN — GUAIFENESIN 10 ML: 100 SOLUTION ORAL at 11:20

## 2017-01-01 RX ADMIN — LEVALBUTEROL HYDROCHLORIDE 1.25 MG: 1.25 SOLUTION RESPIRATORY (INHALATION) at 09:13

## 2017-01-01 RX ADMIN — MEROPENEM 500 MG: 500 INJECTION, POWDER, FOR SOLUTION INTRAVENOUS at 23:28

## 2017-01-01 RX ADMIN — SODIUM CHLORIDE, PRESERVATIVE FREE 71 ML: 5 INJECTION INTRAVENOUS at 14:19

## 2017-01-01 RX ADMIN — SODIUM PHOSPHATE, MONOBASIC, MONOHYDRATE AND SODIUM PHOSPHATE, DIBASIC, ANHYDROUS 15 MMOL: 276; 142 INJECTION, SOLUTION INTRAVENOUS at 10:43

## 2017-01-01 RX ADMIN — QUETIAPINE FUMARATE 25 MG: 25 TABLET, FILM COATED ORAL at 07:48

## 2017-01-01 RX ADMIN — Medication 10 ML: at 20:49

## 2017-01-01 RX ADMIN — CLONAZEPAM 1.5 MG: 2 TABLET ORAL at 19:50

## 2017-01-01 RX ADMIN — LEVALBUTEROL HYDROCHLORIDE 1.25 MG: 1.25 SOLUTION RESPIRATORY (INHALATION) at 16:12

## 2017-01-01 RX ADMIN — GUAIFENESIN 10 ML: 100 SOLUTION ORAL at 11:49

## 2017-01-01 RX ADMIN — Medication 3 MG: at 22:59

## 2017-01-01 RX ADMIN — GABAPENTIN 300 MG: 250 SUSPENSION ORAL at 07:54

## 2017-01-01 RX ADMIN — IPRATROPIUM BROMIDE 0.5 MG: 0.5 SOLUTION RESPIRATORY (INHALATION) at 08:49

## 2017-01-01 RX ADMIN — ACETAMINOPHEN 650 MG: 160 SOLUTION ORAL at 16:21

## 2017-01-01 RX ADMIN — IOPAMIDOL 52 ML: 755 INJECTION, SOLUTION INTRAVENOUS at 12:06

## 2017-01-01 RX ADMIN — LEVALBUTEROL HYDROCHLORIDE 1.25 MG: 1.25 SOLUTION RESPIRATORY (INHALATION) at 00:16

## 2017-01-01 RX ADMIN — MEROPENEM 1 G: 1 INJECTION, POWDER, FOR SOLUTION INTRAVENOUS at 16:17

## 2017-01-01 RX ADMIN — FUROSEMIDE 20 MG: 10 SOLUTION ORAL at 10:00

## 2017-01-01 RX ADMIN — IPRATROPIUM BROMIDE 0.5 MG: 0.5 SOLUTION RESPIRATORY (INHALATION) at 08:30

## 2017-01-01 RX ADMIN — ONDANSETRON HYDROCHLORIDE 4 MG: 4 TABLET, FILM COATED ORAL at 19:41

## 2017-01-01 RX ADMIN — LEVALBUTEROL HYDROCHLORIDE 1.25 MG: 1.25 SOLUTION RESPIRATORY (INHALATION) at 00:34

## 2017-01-01 RX ADMIN — LABETALOL HYDROCHLORIDE 20 MG: 5 INJECTION, SOLUTION INTRAVENOUS at 08:28

## 2017-01-01 RX ADMIN — FEXOFENADINE HYDROCHLORIDE 180 MG: 180 TABLET, FILM COATED ORAL at 08:43

## 2017-01-01 RX ADMIN — Medication 1 PACKET: at 19:02

## 2017-01-01 RX ADMIN — IPRATROPIUM BROMIDE 0.5 MG: 0.5 SOLUTION RESPIRATORY (INHALATION) at 00:27

## 2017-01-01 RX ADMIN — GABAPENTIN 300 MG: 250 SUSPENSION ORAL at 00:04

## 2017-01-01 RX ADMIN — GABAPENTIN 300 MG: 250 SOLUTION ORAL at 08:56

## 2017-01-01 RX ADMIN — AMPICILLIN SODIUM AND SULBACTAM SODIUM 3 G: 2; 1 INJECTION, POWDER, FOR SOLUTION INTRAMUSCULAR; INTRAVENOUS at 21:45

## 2017-01-01 RX ADMIN — IPRATROPIUM BROMIDE 0.5 MG: 0.5 SOLUTION RESPIRATORY (INHALATION) at 09:13

## 2017-01-01 RX ADMIN — Medication 1 CAPSULE: at 08:52

## 2017-01-01 RX ADMIN — SODIUM CHLORIDE, POTASSIUM CHLORIDE, SODIUM LACTATE AND CALCIUM CHLORIDE 500 ML: 600; 310; 30; 20 INJECTION, SOLUTION INTRAVENOUS at 00:33

## 2017-01-01 RX ADMIN — MEROPENEM 500 MG: 500 INJECTION, POWDER, FOR SOLUTION INTRAVENOUS at 06:10

## 2017-01-01 RX ADMIN — Medication 0.5 MG: at 08:55

## 2017-01-01 RX ADMIN — IPRATROPIUM BROMIDE 0.5 MG: 0.5 SOLUTION RESPIRATORY (INHALATION) at 20:56

## 2017-01-01 RX ADMIN — OXYCODONE HYDROCHLORIDE 5 MG: 5 TABLET ORAL at 12:14

## 2017-01-01 RX ADMIN — IPRATROPIUM BROMIDE 0.5 MG: 0.5 SOLUTION RESPIRATORY (INHALATION) at 03:21

## 2017-01-01 RX ADMIN — GABAPENTIN 300 MG: 250 SUSPENSION ORAL at 08:24

## 2017-01-01 RX ADMIN — Medication 1 MG: at 00:15

## 2017-01-01 RX ADMIN — Medication 20 MG: at 19:15

## 2017-01-01 RX ADMIN — LEVALBUTEROL HYDROCHLORIDE 1.25 MG: 1.25 SOLUTION RESPIRATORY (INHALATION) at 12:17

## 2017-01-01 RX ADMIN — FLUTICASONE PROPIONATE 2 SPRAY: 50 SPRAY, METERED NASAL at 10:06

## 2017-01-01 RX ADMIN — GUAIFENESIN 10 ML: 100 SOLUTION ORAL at 13:40

## 2017-01-01 RX ADMIN — LEVALBUTEROL 1.25 MG: 1.25 SOLUTION RESPIRATORY (INHALATION) at 20:32

## 2017-01-01 RX ADMIN — POTASSIUM CHLORIDE 40 MEQ: 1.5 POWDER, FOR SOLUTION ORAL at 18:47

## 2017-01-01 RX ADMIN — Medication 0.5 MG: at 01:32

## 2017-01-01 RX ADMIN — SIMETHICONE 125 MG: 20 SUSPENSION/ DROPS ORAL at 08:45

## 2017-01-01 RX ADMIN — SODIUM CHLORIDE 500 ML: 9 INJECTION, SOLUTION INTRAVENOUS at 23:37

## 2017-01-01 RX ADMIN — GUAIFENESIN 10 ML: 100 SOLUTION ORAL at 17:27

## 2017-01-01 RX ADMIN — Medication 0.5 MG: at 06:28

## 2017-01-01 RX ADMIN — Medication 1.5 MG: at 10:23

## 2017-01-01 RX ADMIN — LEVALBUTEROL 1.25 MG: 1.25 SOLUTION RESPIRATORY (INHALATION) at 17:13

## 2017-01-01 RX ADMIN — ACETAMINOPHEN 650 MG: 160 SUSPENSION ORAL at 15:38

## 2017-01-01 RX ADMIN — PIPERACILLIN AND TAZOBACTAM 3.38 G: 3; .375 INJECTION, POWDER, FOR SOLUTION INTRAVENOUS at 03:34

## 2017-01-01 RX ADMIN — Medication 1 CAPSULE: at 08:04

## 2017-01-01 RX ADMIN — ACETYLCYSTEINE 2 ML: 200 SOLUTION ORAL; RESPIRATORY (INHALATION) at 04:38

## 2017-01-01 RX ADMIN — Medication 1.5 MG: at 00:17

## 2017-01-01 RX ADMIN — Medication 1 CAPSULE: at 08:07

## 2017-01-01 RX ADMIN — ACETAMINOPHEN 325 MG: 160 SUSPENSION ORAL at 04:21

## 2017-01-01 RX ADMIN — LEVALBUTEROL 1.25 MG: 1.25 SOLUTION RESPIRATORY (INHALATION) at 03:32

## 2017-01-01 RX ADMIN — LEVALBUTEROL HYDROCHLORIDE 1.25 MG: 1.25 SOLUTION RESPIRATORY (INHALATION) at 12:46

## 2017-01-01 RX ADMIN — MEROPENEM 1 G: 1 INJECTION, POWDER, FOR SOLUTION INTRAVENOUS at 08:52

## 2017-01-01 RX ADMIN — SENNOSIDES AND DOCUSATE SODIUM 2 TABLET: 8.6; 5 TABLET ORAL at 09:01

## 2017-01-01 RX ADMIN — QUETIAPINE FUMARATE 50 MG: 25 TABLET, FILM COATED ORAL at 20:52

## 2017-01-01 RX ADMIN — SODIUM CHLORIDE 500 ML: 9 INJECTION, SOLUTION INTRAVENOUS at 10:03

## 2017-01-01 RX ADMIN — LIDOCAINE 1 PATCH: 50 PATCH TOPICAL at 20:49

## 2017-01-01 RX ADMIN — GUAIFENESIN 10 ML: 100 SOLUTION ORAL at 15:58

## 2017-01-01 RX ADMIN — PREDNISONE 40 MG: 5 SOLUTION ORAL at 07:53

## 2017-01-01 RX ADMIN — LEVALBUTEROL 1.25 MG: 1.25 SOLUTION RESPIRATORY (INHALATION) at 12:26

## 2017-01-01 RX ADMIN — GABAPENTIN 300 MG: 250 SOLUTION ORAL at 14:49

## 2017-01-01 RX ADMIN — POLYETHYLENE GLYCOL 3350 17 G: 17 POWDER, FOR SOLUTION ORAL at 08:48

## 2017-01-01 RX ADMIN — MULTIVITAMIN 15 ML: LIQUID ORAL at 08:19

## 2017-01-01 RX ADMIN — LEVALBUTEROL HYDROCHLORIDE 1.25 MG: 1.25 SOLUTION RESPIRATORY (INHALATION) at 12:19

## 2017-01-01 RX ADMIN — Medication 0.5 MG: at 05:04

## 2017-01-01 RX ADMIN — HYALURONIDASE (HUMAN RECOMBINANT) 150 UNITS: 150 INJECTION, SOLUTION SUBCUTANEOUS at 11:58

## 2017-01-01 RX ADMIN — Medication 0.5 MG: at 02:32

## 2017-01-01 RX ADMIN — CLONAZEPAM 1.5 MG: 2 TABLET ORAL at 16:05

## 2017-01-01 RX ADMIN — QUETIAPINE FUMARATE 50 MG: 50 TABLET, FILM COATED ORAL at 21:15

## 2017-01-01 RX ADMIN — Medication 0.5 MG: at 15:02

## 2017-01-01 RX ADMIN — Medication 220 MG: at 07:43

## 2017-01-01 RX ADMIN — AMPICILLIN SODIUM AND SULBACTAM SODIUM 3 G: 2; 1 INJECTION, POWDER, FOR SOLUTION INTRAMUSCULAR; INTRAVENOUS at 15:55

## 2017-01-01 RX ADMIN — Medication 0.5 MG: at 01:02

## 2017-01-01 RX ADMIN — SIMETHICONE 125 MG: 20 SUSPENSION/ DROPS ORAL at 22:37

## 2017-01-01 RX ADMIN — GUAIFENESIN 10 ML: 100 SOLUTION ORAL at 12:15

## 2017-01-01 RX ADMIN — Medication 0.5 MG: at 12:46

## 2017-01-01 RX ADMIN — ACETAMINOPHEN 325 MG: 160 SUSPENSION ORAL at 08:19

## 2017-01-01 RX ADMIN — ACETAMINOPHEN 325 MG: 160 SUSPENSION ORAL at 16:52

## 2017-01-01 RX ADMIN — MORPHINE SULFATE 10 MG: 10 SOLUTION ORAL at 14:36

## 2017-01-01 RX ADMIN — LEVALBUTEROL HYDROCHLORIDE 1.25 MG: 1.25 SOLUTION RESPIRATORY (INHALATION) at 16:27

## 2017-01-01 RX ADMIN — BUDESONIDE 0.5 MG: 0.5 INHALANT RESPIRATORY (INHALATION) at 21:07

## 2017-01-01 RX ADMIN — POLYETHYLENE GLYCOL 400, PROPYLENE GLYCOL 2 DROP: .4; .3 LIQUID OPHTHALMIC at 16:03

## 2017-01-01 RX ADMIN — ACETYLCYSTEINE 2 ML: 200 SOLUTION ORAL; RESPIRATORY (INHALATION) at 12:17

## 2017-01-01 RX ADMIN — ACETYLCYSTEINE 2 ML: 200 SOLUTION ORAL; RESPIRATORY (INHALATION) at 08:31

## 2017-01-01 RX ADMIN — FEXOFENADINE HYDROCHLORIDE 180 MG: 180 TABLET, FILM COATED ORAL at 09:20

## 2017-01-01 RX ADMIN — MEROPENEM 1 G: 1 INJECTION, POWDER, FOR SOLUTION INTRAVENOUS at 15:50

## 2017-01-01 RX ADMIN — LEVALBUTEROL HYDROCHLORIDE 1.25 MG: 1.25 SOLUTION RESPIRATORY (INHALATION) at 13:03

## 2017-01-01 RX ADMIN — LIDOCAINE 2 PATCH: 50 PATCH TOPICAL at 21:41

## 2017-01-01 RX ADMIN — GUAIFENESIN 200 MG: 100 SOLUTION ORAL at 08:17

## 2017-01-01 RX ADMIN — SENNOSIDES AND DOCUSATE SODIUM 2 TABLET: 8.6; 5 TABLET ORAL at 08:52

## 2017-01-01 RX ADMIN — IPRATROPIUM BROMIDE 0.5 MG: 0.5 SOLUTION RESPIRATORY (INHALATION) at 12:26

## 2017-01-01 RX ADMIN — FAMOTIDINE 20 MG: 40 POWDER, FOR SUSPENSION ORAL at 19:46

## 2017-01-01 RX ADMIN — Medication 1 PACKET: at 08:34

## 2017-01-01 RX ADMIN — GUAIFENESIN 10 ML: 100 SOLUTION ORAL at 09:49

## 2017-01-01 RX ADMIN — METHYLPREDNISOLONE SODIUM SUCCINATE 125 MG: 125 INJECTION, POWDER, LYOPHILIZED, FOR SOLUTION INTRAMUSCULAR; INTRAVENOUS at 21:47

## 2017-01-01 RX ADMIN — Medication 1 CAPSULE: at 08:06

## 2017-01-01 RX ADMIN — LEVALBUTEROL HYDROCHLORIDE 1.25 MG: 1.25 SOLUTION RESPIRATORY (INHALATION) at 20:15

## 2017-01-01 RX ADMIN — Medication 0.5 MG: at 18:12

## 2017-01-01 RX ADMIN — SIMETHICONE 125 MG: 20 SUSPENSION/ DROPS ORAL at 15:59

## 2017-01-01 RX ADMIN — IPRATROPIUM BROMIDE 0.5 MG: 0.5 SOLUTION RESPIRATORY (INHALATION) at 17:26

## 2017-01-01 RX ADMIN — LEVALBUTEROL HYDROCHLORIDE 1.25 MG: 1.25 SOLUTION RESPIRATORY (INHALATION) at 20:28

## 2017-01-01 RX ADMIN — Medication 0.5 MG: at 15:37

## 2017-01-01 RX ADMIN — IPRATROPIUM BROMIDE 0.5 MG: 0.5 SOLUTION RESPIRATORY (INHALATION) at 21:54

## 2017-01-01 RX ADMIN — CALCIUM CARBONATE (ANTACID) CHEW TAB 500 MG 500 MG: 500 CHEW TAB at 15:04

## 2017-01-01 RX ADMIN — ACETYLCYSTEINE 2 ML: 200 SOLUTION ORAL; RESPIRATORY (INHALATION) at 21:01

## 2017-01-01 RX ADMIN — Medication 1 MG: at 13:20

## 2017-01-01 RX ADMIN — BUDESONIDE 0.5 MG: 0.5 INHALANT RESPIRATORY (INHALATION) at 20:16

## 2017-01-01 RX ADMIN — IPRATROPIUM BROMIDE 0.5 MG: 0.5 SOLUTION RESPIRATORY (INHALATION) at 21:07

## 2017-01-01 RX ADMIN — HYDROMORPHONE HYDROCHLORIDE 1 MG: 1 SOLUTION ORAL at 12:47

## 2017-01-01 RX ADMIN — BUDESONIDE 0.5 MG: 0.5 INHALANT RESPIRATORY (INHALATION) at 20:56

## 2017-01-01 RX ADMIN — IPRATROPIUM BROMIDE 0.5 MG: 0.5 SOLUTION RESPIRATORY (INHALATION) at 16:18

## 2017-01-01 RX ADMIN — IPRATROPIUM BROMIDE 0.5 MG: 0.5 SOLUTION RESPIRATORY (INHALATION) at 03:46

## 2017-01-01 RX ADMIN — ACETYLCYSTEINE 2 ML: 200 SOLUTION ORAL; RESPIRATORY (INHALATION) at 12:05

## 2017-01-01 RX ADMIN — Medication 1 PACKET: at 08:59

## 2017-01-01 RX ADMIN — ACETYLCYSTEINE 2 ML: 200 SOLUTION ORAL; RESPIRATORY (INHALATION) at 19:14

## 2017-01-01 RX ADMIN — OXYCODONE HYDROCHLORIDE 5 MG: 5 TABLET ORAL at 04:28

## 2017-01-01 RX ADMIN — LIDOCAINE 1 PATCH: 50 PATCH CUTANEOUS at 08:33

## 2017-01-01 RX ADMIN — Medication 0.5 MG: at 11:38

## 2017-01-01 RX ADMIN — Medication 1 MG: at 07:52

## 2017-01-01 RX ADMIN — GUAIFENESIN 10 ML: 100 SOLUTION ORAL at 08:10

## 2017-01-01 RX ADMIN — AMPICILLIN SODIUM AND SULBACTAM SODIUM 3 G: 2; 1 INJECTION, POWDER, FOR SOLUTION INTRAMUSCULAR; INTRAVENOUS at 04:28

## 2017-01-01 RX ADMIN — GUAIFENESIN 200 MG: 100 SOLUTION ORAL at 20:09

## 2017-01-01 RX ADMIN — POLYETHYLENE GLYCOL 3350 17 G: 17 POWDER, FOR SOLUTION ORAL at 07:43

## 2017-01-01 RX ADMIN — QUETIAPINE 50 MG: 50 TABLET, FILM COATED ORAL at 01:30

## 2017-01-01 RX ADMIN — SODIUM CHLORIDE, PRESERVATIVE FREE 5 ML: 5 INJECTION INTRAVENOUS at 11:02

## 2017-01-01 RX ADMIN — ENOXAPARIN SODIUM 40 MG: 40 INJECTION SUBCUTANEOUS at 01:26

## 2017-01-01 RX ADMIN — GABAPENTIN 300 MG: 250 SOLUTION ORAL at 20:48

## 2017-01-01 RX ADMIN — Medication 500 ML: at 22:55

## 2017-01-01 RX ADMIN — GUAIFENESIN 200 MG: 100 SOLUTION ORAL at 11:29

## 2017-01-01 RX ADMIN — DOCUSATE SODIUM 100 MG: 50 LIQUID ORAL at 21:21

## 2017-01-01 RX ADMIN — GUAIFENESIN 200 MG: 100 SOLUTION ORAL at 17:18

## 2017-01-01 RX ADMIN — LEVALBUTEROL HYDROCHLORIDE 1.25 MG: 1.25 SOLUTION RESPIRATORY (INHALATION) at 17:33

## 2017-01-01 RX ADMIN — GABAPENTIN 300 MG: 250 SUSPENSION ORAL at 08:19

## 2017-01-01 RX ADMIN — LEVALBUTEROL HYDROCHLORIDE 1.25 MG: 1.25 SOLUTION RESPIRATORY (INHALATION) at 17:26

## 2017-01-01 RX ADMIN — FLUTICASONE PROPIONATE 2 SPRAY: 50 SPRAY, METERED NASAL at 08:34

## 2017-01-01 RX ADMIN — Medication 10 ML: at 20:08

## 2017-01-01 RX ADMIN — ACETYLCYSTEINE 2 ML: 200 SOLUTION ORAL; RESPIRATORY (INHALATION) at 23:42

## 2017-01-01 RX ADMIN — LEVALBUTEROL HYDROCHLORIDE 1.25 MG: 1.25 SOLUTION RESPIRATORY (INHALATION) at 16:36

## 2017-01-01 RX ADMIN — Medication 500 ML: at 20:50

## 2017-01-01 RX ADMIN — SENNOSIDES AND DOCUSATE SODIUM 2 TABLET: 8.6; 5 TABLET ORAL at 21:01

## 2017-01-01 RX ADMIN — Medication 0.5 MG: at 18:43

## 2017-01-01 RX ADMIN — GUAIFENESIN 10 ML: 100 SOLUTION ORAL at 07:51

## 2017-01-01 RX ADMIN — HYDROMORPHONE HYDROCHLORIDE 1 MG: 1 SOLUTION ORAL at 21:26

## 2017-01-01 RX ADMIN — OXYCODONE HYDROCHLORIDE 5 MG: 5 TABLET ORAL at 08:12

## 2017-01-01 RX ADMIN — CLONAZEPAM 1.5 MG: 2 TABLET ORAL at 15:54

## 2017-01-01 RX ADMIN — DOCUSATE SODIUM 100 MG: 50 LIQUID ORAL at 08:17

## 2017-01-01 RX ADMIN — FAMOTIDINE 20 MG: 40 POWDER, FOR SUSPENSION ORAL at 08:05

## 2017-01-01 RX ADMIN — SODIUM CHLORIDE: 9 INJECTION, SOLUTION INTRAVENOUS at 11:07

## 2017-01-01 RX ADMIN — QUETIAPINE FUMARATE 25 MG: 25 TABLET, FILM COATED ORAL at 09:00

## 2017-01-01 RX ADMIN — LEVALBUTEROL 1.25 MG: 1.25 SOLUTION RESPIRATORY (INHALATION) at 08:27

## 2017-01-01 RX ADMIN — ACETAMINOPHEN 325 MG: 160 SUSPENSION ORAL at 12:27

## 2017-01-01 RX ADMIN — HYDROMORPHONE HYDROCHLORIDE 1 MG: 1 SOLUTION ORAL at 07:01

## 2017-01-01 RX ADMIN — Medication 1.5 MG: at 08:08

## 2017-01-01 RX ADMIN — IPRATROPIUM BROMIDE 0.5 MG: 0.5 SOLUTION RESPIRATORY (INHALATION) at 13:14

## 2017-01-01 RX ADMIN — SODIUM CHLORIDE: 9 INJECTION, SOLUTION INTRAVENOUS at 04:20

## 2017-01-01 RX ADMIN — HYDROMORPHONE HYDROCHLORIDE 1 MG: 1 SOLUTION ORAL at 03:55

## 2017-01-01 RX ADMIN — CLONAZEPAM 1.5 MG: 2 TABLET ORAL at 08:33

## 2017-01-01 RX ADMIN — Medication 500 MG: at 17:06

## 2017-01-01 RX ADMIN — AMPICILLIN SODIUM AND SULBACTAM SODIUM 3 G: 2; 1 INJECTION, POWDER, FOR SOLUTION INTRAMUSCULAR; INTRAVENOUS at 09:09

## 2017-01-01 RX ADMIN — LEVALBUTEROL 1.25 MG: 1.25 SOLUTION RESPIRATORY (INHALATION) at 12:21

## 2017-01-01 RX ADMIN — Medication 2 G: at 11:25

## 2017-01-01 RX ADMIN — Medication 1 PACKET: at 14:14

## 2017-01-01 RX ADMIN — HYDROMORPHONE HYDROCHLORIDE 0.5 MG: 1 SOLUTION ORAL at 10:31

## 2017-01-01 RX ADMIN — Medication 0.5 MG: at 16:07

## 2017-01-01 RX ADMIN — QUETIAPINE FUMARATE 50 MG: 25 TABLET, FILM COATED ORAL at 09:21

## 2017-01-01 RX ADMIN — CLONAZEPAM 1.5 MG: 2 TABLET ORAL at 16:17

## 2017-01-01 RX ADMIN — ACETAMINOPHEN 325 MG: 160 SUSPENSION ORAL at 19:33

## 2017-01-01 RX ADMIN — IPRATROPIUM BROMIDE 0.5 MG: 0.5 SOLUTION RESPIRATORY (INHALATION) at 16:00

## 2017-01-01 RX ADMIN — MORPHINE SULFATE 10 MG: 10 SOLUTION ORAL at 22:54

## 2017-01-01 RX ADMIN — CLONAZEPAM 1.5 MG: 2 TABLET ORAL at 20:47

## 2017-01-01 RX ADMIN — GABAPENTIN 300 MG: 250 SOLUTION ORAL at 19:48

## 2017-01-01 RX ADMIN — LEVALBUTEROL 1.25 MG: 1.25 SOLUTION RESPIRATORY (INHALATION) at 07:55

## 2017-01-01 RX ADMIN — Medication 160 MG: at 08:46

## 2017-01-01 RX ADMIN — IPRATROPIUM BROMIDE 0.5 MG: 0.5 SOLUTION RESPIRATORY (INHALATION) at 20:03

## 2017-01-01 RX ADMIN — MORPHINE SULFATE 10 MG: 10 SOLUTION ORAL at 06:03

## 2017-01-01 RX ADMIN — MEROPENEM 1 G: 1 INJECTION, POWDER, FOR SOLUTION INTRAVENOUS at 00:03

## 2017-01-01 RX ADMIN — SODIUM CHLORIDE, PRESERVATIVE FREE 5 ML: 5 INJECTION INTRAVENOUS at 17:00

## 2017-01-01 RX ADMIN — LEVALBUTEROL 1.25 MG: 1.25 SOLUTION RESPIRATORY (INHALATION) at 08:37

## 2017-01-01 RX ADMIN — GUAIFENESIN 10 ML: 100 SOLUTION ORAL at 12:05

## 2017-01-01 RX ADMIN — SERTRALINE HYDROCHLORIDE 150 MG: 20 SOLUTION ORAL at 08:20

## 2017-01-01 RX ADMIN — SIMETHICONE 125 MG: 20 SUSPENSION/ DROPS ORAL at 13:01

## 2017-01-01 RX ADMIN — SODIUM CHLORIDE 500 ML: 9 INJECTION, SOLUTION INTRAVENOUS at 18:40

## 2017-01-01 RX ADMIN — BUDESONIDE 0.5 MG: 0.5 INHALANT RESPIRATORY (INHALATION) at 08:42

## 2017-01-01 RX ADMIN — GABAPENTIN 300 MG: 250 SUSPENSION ORAL at 00:10

## 2017-01-01 RX ADMIN — QUETIAPINE FUMARATE 25 MG: 25 TABLET, FILM COATED ORAL at 05:55

## 2017-01-01 RX ADMIN — FAMOTIDINE 20 MG: 40 POWDER, FOR SUSPENSION ORAL at 20:24

## 2017-01-01 RX ADMIN — Medication 1 PACKET: at 08:28

## 2017-01-01 RX ADMIN — LEVALBUTEROL HYDROCHLORIDE 1.25 MG: 1.25 SOLUTION RESPIRATORY (INHALATION) at 08:16

## 2017-01-01 RX ADMIN — MORPHINE SULFATE 10 MG: 10 SOLUTION ORAL at 06:29

## 2017-01-01 RX ADMIN — HYDROMORPHONE HYDROCHLORIDE 0.5 MG: 1 SOLUTION ORAL at 14:02

## 2017-01-01 RX ADMIN — LEVALBUTEROL 1.25 MG: 1.25 SOLUTION RESPIRATORY (INHALATION) at 10:00

## 2017-01-01 RX ADMIN — GUAIFENESIN 200 MG: 100 SOLUTION ORAL at 16:01

## 2017-01-01 RX ADMIN — Medication 1 PACKET: at 08:13

## 2017-01-01 RX ADMIN — POLYETHYLENE GLYCOL 3350 17 G: 17 POWDER, FOR SOLUTION ORAL at 09:24

## 2017-01-01 RX ADMIN — ENOXAPARIN SODIUM 40 MG: 40 INJECTION SUBCUTANEOUS at 08:07

## 2017-01-01 RX ADMIN — GUAIFENESIN 10 ML: 100 SOLUTION ORAL at 19:38

## 2017-01-01 RX ADMIN — SODIUM CHLORIDE 500 ML: 9 INJECTION, SOLUTION INTRAVENOUS at 20:50

## 2017-01-01 RX ADMIN — IPRATROPIUM BROMIDE 0.5 MG: 0.5 SOLUTION RESPIRATORY (INHALATION) at 03:32

## 2017-01-01 RX ADMIN — GABAPENTIN 300 MG: 250 SOLUTION ORAL at 15:39

## 2017-01-01 RX ADMIN — GUAIFENESIN 200 MG: 100 SOLUTION ORAL at 15:04

## 2017-01-01 RX ADMIN — Medication 20 MG: at 08:33

## 2017-01-01 RX ADMIN — AMOXICILLIN 1000 MG: 400 POWDER, FOR SUSPENSION ORAL at 08:33

## 2017-01-01 RX ADMIN — ACETAMINOPHEN 325 MG: 160 SUSPENSION ORAL at 11:26

## 2017-01-01 RX ADMIN — MORPHINE SULFATE 10 MG: 10 SOLUTION ORAL at 14:35

## 2017-01-01 RX ADMIN — CLONAZEPAM 1.5 MG: 2 TABLET ORAL at 21:52

## 2017-01-01 RX ADMIN — DEXTRAN 70, AND HYPROMELLOSE 2910 2 DROP: 1; 3 SOLUTION/ DROPS OPHTHALMIC at 15:49

## 2017-01-01 RX ADMIN — LEVALBUTEROL HYDROCHLORIDE 1.25 MG: 1.25 SOLUTION RESPIRATORY (INHALATION) at 16:57

## 2017-01-01 RX ADMIN — FAMOTIDINE 20 MG: 40 POWDER, FOR SUSPENSION ORAL at 08:35

## 2017-01-01 RX ADMIN — Medication 1 CAPSULE: at 08:34

## 2017-01-01 RX ADMIN — IPRATROPIUM BROMIDE 0.5 MG: 0.5 SOLUTION RESPIRATORY (INHALATION) at 07:51

## 2017-01-01 RX ADMIN — SODIUM CHLORIDE 1000 ML: 9 INJECTION, SOLUTION INTRAVENOUS at 22:49

## 2017-01-01 RX ADMIN — POLYETHYLENE GLYCOL 3350 17 G: 17 POWDER, FOR SOLUTION ORAL at 21:29

## 2017-01-01 RX ADMIN — GUAIFENESIN 10 ML: 100 SOLUTION ORAL at 12:25

## 2017-01-01 RX ADMIN — IPRATROPIUM BROMIDE 0.5 MG: 0.5 SOLUTION RESPIRATORY (INHALATION) at 20:33

## 2017-01-01 RX ADMIN — Medication 0.5 MG: at 11:54

## 2017-01-01 RX ADMIN — AMOXICILLIN AND CLAVULANATE POTASSIUM 1 TABLET: 875; 125 TABLET, FILM COATED ORAL at 00:22

## 2017-01-01 RX ADMIN — OXYCODONE HYDROCHLORIDE 5 MG: 5 TABLET ORAL at 20:18

## 2017-01-01 RX ADMIN — LEVALBUTEROL HYDROCHLORIDE 1.25 MG: 1.25 SOLUTION RESPIRATORY (INHALATION) at 07:20

## 2017-01-01 RX ADMIN — BUDESONIDE 0.5 MG: 0.5 INHALANT RESPIRATORY (INHALATION) at 08:16

## 2017-01-01 RX ADMIN — GABAPENTIN 300 MG: 250 SUSPENSION ORAL at 16:05

## 2017-01-01 RX ADMIN — Medication 160 MG: at 08:01

## 2017-01-01 RX ADMIN — FEXOFENADINE 60 MG: 60 TABLET, FILM COATED ORAL at 10:59

## 2017-01-01 RX ADMIN — LEVALBUTEROL 1.25 MG: 1.25 SOLUTION RESPIRATORY (INHALATION) at 04:22

## 2017-01-01 RX ADMIN — Medication 160 MG: at 08:03

## 2017-01-01 RX ADMIN — IOPAMIDOL 85 ML: 755 INJECTION, SOLUTION INTRAVENOUS at 06:42

## 2017-01-01 RX ADMIN — GUAIFENESIN 10 ML: 100 SOLUTION ORAL at 19:28

## 2017-01-01 RX ADMIN — LEVALBUTEROL HYDROCHLORIDE 1.25 MG: 1.25 SOLUTION RESPIRATORY (INHALATION) at 08:31

## 2017-01-01 RX ADMIN — SODIUM CHLORIDE, POTASSIUM CHLORIDE, SODIUM LACTATE AND CALCIUM CHLORIDE 500 ML: 600; 310; 30; 20 INJECTION, SOLUTION INTRAVENOUS at 02:50

## 2017-01-01 RX ADMIN — Medication 0.5 MG: at 03:16

## 2017-01-01 RX ADMIN — Medication 3 MG: at 23:27

## 2017-01-01 RX ADMIN — HYDROMORPHONE HYDROCHLORIDE 1 MG: 1 SOLUTION ORAL at 18:12

## 2017-01-01 RX ADMIN — ACETAMINOPHEN 325 MG: 160 SUSPENSION ORAL at 12:20

## 2017-01-01 RX ADMIN — IPRATROPIUM BROMIDE 0.5 MG: 0.5 SOLUTION RESPIRATORY (INHALATION) at 08:40

## 2017-01-01 RX ADMIN — Medication 1 PACKET: at 08:35

## 2017-01-01 RX ADMIN — CLONAZEPAM 1.5 MG: 2 TABLET ORAL at 08:24

## 2017-01-01 RX ADMIN — SIMETHICONE 20 MG: 20 SUSPENSION/ DROPS ORAL at 12:13

## 2017-01-01 RX ADMIN — CALCIUM CARBONATE (ANTACID) CHEW TAB 500 MG 500 MG: 500 CHEW TAB at 17:02

## 2017-01-01 RX ADMIN — CLONAZEPAM 1.5 MG: 2 TABLET ORAL at 08:10

## 2017-01-01 RX ADMIN — GUAIFENESIN 200 MG: 100 SOLUTION ORAL at 11:38

## 2017-01-01 RX ADMIN — POLYETHYLENE GLYCOL 3350 17 G: 17 POWDER, FOR SOLUTION ORAL at 08:21

## 2017-01-01 RX ADMIN — Medication 500 MG: at 14:20

## 2017-01-01 RX ADMIN — ACETAMINOPHEN 325 MG: 160 SUSPENSION ORAL at 12:39

## 2017-01-01 RX ADMIN — AMPICILLIN SODIUM AND SULBACTAM SODIUM 3 G: 2; 1 INJECTION, POWDER, FOR SOLUTION INTRAMUSCULAR; INTRAVENOUS at 09:42

## 2017-01-01 RX ADMIN — IPRATROPIUM BROMIDE 0.5 MG: 0.5 SOLUTION RESPIRATORY (INHALATION) at 08:33

## 2017-01-01 RX ADMIN — IPRATROPIUM BROMIDE 0.5 MG: 0.5 SOLUTION RESPIRATORY (INHALATION) at 20:00

## 2017-01-01 RX ADMIN — GABAPENTIN 300 MG: 250 SOLUTION ORAL at 19:49

## 2017-01-01 RX ADMIN — HYDROMORPHONE HYDROCHLORIDE 1 MG: 1 SOLUTION ORAL at 06:13

## 2017-01-01 RX ADMIN — IPRATROPIUM BROMIDE 0.5 MG: 0.5 SOLUTION RESPIRATORY (INHALATION) at 16:14

## 2017-01-01 RX ADMIN — LEVALBUTEROL HYDROCHLORIDE 1.25 MG: 1.25 SOLUTION RESPIRATORY (INHALATION) at 19:59

## 2017-01-01 RX ADMIN — LEVALBUTEROL 1.25 MG: 1.25 SOLUTION RESPIRATORY (INHALATION) at 09:32

## 2017-01-01 RX ADMIN — HYDROMORPHONE HYDROCHLORIDE 1 MG: 1 SOLUTION ORAL at 11:24

## 2017-01-01 RX ADMIN — POLYETHYLENE GLYCOL 3350 17 G: 17 POWDER, FOR SOLUTION ORAL at 07:49

## 2017-01-01 RX ADMIN — IPRATROPIUM BROMIDE 0.5 MG: 0.5 SOLUTION RESPIRATORY (INHALATION) at 03:11

## 2017-01-01 RX ADMIN — IPRATROPIUM BROMIDE 0.5 MG: 0.5 SOLUTION RESPIRATORY (INHALATION) at 16:56

## 2017-01-01 RX ADMIN — Medication 220 MG: at 08:24

## 2017-01-01 RX ADMIN — IPRATROPIUM BROMIDE 0.5 MG: 0.5 SOLUTION RESPIRATORY (INHALATION) at 20:20

## 2017-01-01 RX ADMIN — SERTRALINE HYDROCHLORIDE 150 MG: 20 SOLUTION ORAL at 08:12

## 2017-01-01 RX ADMIN — SODIUM CHLORIDE, PRESERVATIVE FREE 5 ML: 5 INJECTION INTRAVENOUS at 05:38

## 2017-01-01 RX ADMIN — LIDOCAINE 2 PATCH: 50 PATCH CUTANEOUS at 07:22

## 2017-01-01 RX ADMIN — Medication 3 MG: at 21:50

## 2017-01-01 RX ADMIN — CLONAZEPAM 2 MG: 0.5 TABLET ORAL at 07:41

## 2017-01-01 RX ADMIN — Medication 160 MG: at 07:50

## 2017-01-01 RX ADMIN — LEVALBUTEROL HYDROCHLORIDE 1.25 MG: 1.25 SOLUTION RESPIRATORY (INHALATION) at 20:11

## 2017-01-01 RX ADMIN — ONDANSETRON 4 MG: 2 INJECTION INTRAMUSCULAR; INTRAVENOUS at 10:07

## 2017-01-01 RX ADMIN — LEVALBUTEROL HYDROCHLORIDE 1.25 MG: 1.25 SOLUTION RESPIRATORY (INHALATION) at 12:32

## 2017-01-01 RX ADMIN — IPRATROPIUM BROMIDE 0.5 MG: 0.5 SOLUTION RESPIRATORY (INHALATION) at 12:07

## 2017-01-01 RX ADMIN — Medication 1 PACKET: at 08:05

## 2017-01-01 RX ADMIN — Medication 1.5 MG: at 16:04

## 2017-01-01 RX ADMIN — ACETAMINOPHEN 650 MG: 160 SUSPENSION ORAL at 05:30

## 2017-01-01 RX ADMIN — PHENYLEPHRINE HYDROCHLORIDE 100 MCG: 10 INJECTION, SOLUTION INTRAMUSCULAR; INTRAVENOUS; SUBCUTANEOUS at 09:23

## 2017-01-01 RX ADMIN — MEROPENEM 1 G: 1 INJECTION, POWDER, FOR SOLUTION INTRAVENOUS at 09:27

## 2017-01-01 RX ADMIN — Medication 0.5 MG: at 01:45

## 2017-01-01 RX ADMIN — GUAIFENESIN 10 ML: 100 SOLUTION ORAL at 16:05

## 2017-01-01 RX ADMIN — POTASSIUM CHLORIDE 40 MEQ: 1.5 POWDER, FOR SOLUTION ORAL at 06:16

## 2017-01-01 RX ADMIN — SENNOSIDES AND DOCUSATE SODIUM 1 TABLET: 8.6; 5 TABLET ORAL at 21:13

## 2017-01-01 RX ADMIN — LOPERAMIDE HYDROCHLORIDE 2 MG: 1 SOLUTION ORAL at 08:04

## 2017-01-01 RX ADMIN — GABAPENTIN 300 MG: 250 SOLUTION ORAL at 07:44

## 2017-01-01 RX ADMIN — PREDNISONE 20 MG: 5 SOLUTION ORAL at 15:38

## 2017-01-01 RX ADMIN — ENOXAPARIN SODIUM 40 MG: 40 INJECTION SUBCUTANEOUS at 08:38

## 2017-01-01 RX ADMIN — ASPIRIN 81 MG CHEWABLE TABLET 81 MG: 81 TABLET CHEWABLE at 07:53

## 2017-01-01 RX ADMIN — GUAIFENESIN 10 ML: 100 SOLUTION ORAL at 12:35

## 2017-01-01 RX ADMIN — SERTRALINE HYDROCHLORIDE 150 MG: 20 SOLUTION ORAL at 08:00

## 2017-01-01 RX ADMIN — INSULIN ASPART 1 UNITS: 100 INJECTION, SOLUTION INTRAVENOUS; SUBCUTANEOUS at 09:32

## 2017-01-01 RX ADMIN — LEVALBUTEROL 1.25 MG: 1.25 SOLUTION RESPIRATORY (INHALATION) at 20:20

## 2017-01-01 RX ADMIN — Medication 0.5 MG: at 10:16

## 2017-01-01 RX ADMIN — ATORVASTATIN CALCIUM 20 MG: 20 TABLET, FILM COATED ORAL at 08:01

## 2017-01-01 RX ADMIN — Medication 1.5 MG: at 08:19

## 2017-01-01 RX ADMIN — GUAIFENESIN 10 ML: 100 SOLUTION ORAL at 15:44

## 2017-01-01 RX ADMIN — POTASSIUM CHLORIDE 20 MEQ: 1.5 POWDER, FOR SOLUTION ORAL at 19:15

## 2017-01-01 RX ADMIN — HYDROMORPHONE HYDROCHLORIDE 1 MG: 1 SOLUTION ORAL at 22:42

## 2017-01-01 RX ADMIN — Medication 1.5 MG: at 05:55

## 2017-01-01 RX ADMIN — SERTRALINE HYDROCHLORIDE 150 MG: 20 SOLUTION ORAL at 08:07

## 2017-01-01 RX ADMIN — SODIUM CHLORIDE 500 ML: 9 INJECTION, SOLUTION INTRAVENOUS at 00:58

## 2017-01-01 RX ADMIN — FAMOTIDINE 20 MG: 40 POWDER, FOR SUSPENSION ORAL at 20:11

## 2017-01-01 RX ADMIN — LEVALBUTEROL 1.25 MG: 1.25 SOLUTION RESPIRATORY (INHALATION) at 01:07

## 2017-01-01 RX ADMIN — FUROSEMIDE 20 MG: 10 INJECTION, SOLUTION INTRAVENOUS at 11:22

## 2017-01-01 RX ADMIN — Medication 15 ML: at 09:49

## 2017-01-01 RX ADMIN — Medication 1 CAPSULE: at 08:18

## 2017-01-01 RX ADMIN — QUETIAPINE FUMARATE 25 MG: 25 TABLET, FILM COATED ORAL at 19:40

## 2017-01-01 RX ADMIN — GABAPENTIN 300 MG: 250 SUSPENSION ORAL at 00:44

## 2017-01-01 RX ADMIN — PREDNISONE 20 MG: 5 SOLUTION ORAL at 08:48

## 2017-01-01 RX ADMIN — PREDNISONE 20 MG: 5 SOLUTION ORAL at 08:05

## 2017-01-01 RX ADMIN — Medication 3 MG: at 22:37

## 2017-01-01 RX ADMIN — GUAIFENESIN 10 ML: 100 SOLUTION ORAL at 08:12

## 2017-01-01 RX ADMIN — GABAPENTIN 300 MG: 250 SOLUTION ORAL at 08:37

## 2017-01-01 RX ADMIN — Medication 3 MG: at 01:31

## 2017-01-01 RX ADMIN — GABAPENTIN 300 MG: 250 SOLUTION ORAL at 15:31

## 2017-01-01 RX ADMIN — PIPERACILLIN AND TAZOBACTAM 3.38 G: 3; .375 INJECTION, POWDER, FOR SOLUTION INTRAVENOUS at 14:08

## 2017-01-01 RX ADMIN — POLYETHYLENE GLYCOL 3350 17 G: 17 POWDER, FOR SOLUTION ORAL at 20:11

## 2017-01-01 RX ADMIN — HEPARIN SODIUM 5000 UNITS: 5000 INJECTION, SOLUTION INTRAVENOUS; SUBCUTANEOUS at 01:36

## 2017-01-01 RX ADMIN — BUDESONIDE 0.5 MG: 0.5 INHALANT RESPIRATORY (INHALATION) at 07:54

## 2017-01-01 RX ADMIN — GUAIFENESIN 200 MG: 100 SOLUTION ORAL at 11:50

## 2017-01-01 RX ADMIN — ACETAMINOPHEN 650 MG: 160 SOLUTION ORAL at 15:49

## 2017-01-01 RX ADMIN — Medication 1 CAPSULE: at 07:56

## 2017-01-01 RX ADMIN — POTASSIUM CHLORIDE 20 MEQ: 29.8 INJECTION, SOLUTION INTRAVENOUS at 12:47

## 2017-01-01 RX ADMIN — GABAPENTIN 300 MG: 250 SOLUTION ORAL at 08:34

## 2017-01-01 RX ADMIN — LEVALBUTEROL 1.25 MG: 1.25 SOLUTION RESPIRATORY (INHALATION) at 12:05

## 2017-01-01 RX ADMIN — IPRATROPIUM BROMIDE 0.5 MG: 0.5 SOLUTION RESPIRATORY (INHALATION) at 00:17

## 2017-01-01 RX ADMIN — MORPHINE SULFATE 10 MG: 10 SOLUTION ORAL at 22:37

## 2017-01-01 RX ADMIN — LEVALBUTEROL HYDROCHLORIDE 1.25 MG: 1.25 SOLUTION RESPIRATORY (INHALATION) at 16:47

## 2017-01-01 RX ADMIN — GUAIFENESIN 10 ML: 100 SOLUTION ORAL at 15:17

## 2017-01-01 RX ADMIN — LEVALBUTEROL HYDROCHLORIDE 1.25 MG: 1.25 SOLUTION RESPIRATORY (INHALATION) at 22:01

## 2017-01-01 RX ADMIN — FEXOFENADINE HYDROCHLORIDE 180 MG: 180 TABLET, FILM COATED ORAL at 08:08

## 2017-01-01 RX ADMIN — SODIUM CHLORIDE: 9 INJECTION, SOLUTION INTRAVENOUS at 06:14

## 2017-01-01 RX ADMIN — GUAIFENESIN 10 ML: 100 SOLUTION ORAL at 13:00

## 2017-01-01 RX ADMIN — MORPHINE SULFATE 10 MG: 10 SOLUTION ORAL at 13:09

## 2017-01-01 RX ADMIN — FEXOFENADINE HYDROCHLORIDE 180 MG: 180 TABLET, FILM COATED ORAL at 08:18

## 2017-01-01 RX ADMIN — Medication 1 PACKET: at 16:54

## 2017-01-01 RX ADMIN — POLYETHYLENE GLYCOL 400 AND PROPYLENE GLYCOL 1 DROP: 4; 3 SOLUTION/ DROPS OPHTHALMIC at 10:06

## 2017-01-01 RX ADMIN — Medication 1.5 MG: at 11:45

## 2017-01-01 RX ADMIN — QUETIAPINE 50 MG: 50 TABLET, FILM COATED ORAL at 20:22

## 2017-01-01 RX ADMIN — MEROPENEM 500 MG: 500 INJECTION, POWDER, FOR SOLUTION INTRAVENOUS at 00:59

## 2017-01-01 RX ADMIN — HYDROMORPHONE HYDROCHLORIDE 0.5 MG: 10 INJECTION, SOLUTION INTRAMUSCULAR; INTRAVENOUS; SUBCUTANEOUS at 20:15

## 2017-01-01 RX ADMIN — Medication 1.5 MG: at 21:11

## 2017-01-01 RX ADMIN — IPRATROPIUM BROMIDE 0.5 MG: 0.5 SOLUTION RESPIRATORY (INHALATION) at 15:50

## 2017-01-01 RX ADMIN — GUAIFENESIN 200 MG: 100 SOLUTION ORAL at 19:49

## 2017-01-01 RX ADMIN — MEROPENEM 1 G: 1 INJECTION, POWDER, FOR SOLUTION INTRAVENOUS at 01:28

## 2017-01-01 RX ADMIN — Medication 20 MG: at 20:28

## 2017-01-01 RX ADMIN — ONDANSETRON 4 MG: 2 INJECTION INTRAMUSCULAR; INTRAVENOUS at 18:19

## 2017-01-01 RX ADMIN — GUAIFENESIN 10 ML: 100 SOLUTION ORAL at 08:35

## 2017-01-01 RX ADMIN — SERTRALINE HYDROCHLORIDE 100 MG: 20 SOLUTION ORAL at 09:49

## 2017-01-01 RX ADMIN — QUETIAPINE FUMARATE 50 MG: 50 TABLET, FILM COATED ORAL at 13:03

## 2017-01-01 RX ADMIN — HYDROMORPHONE HYDROCHLORIDE 1 MG: 1 SOLUTION ORAL at 04:30

## 2017-01-01 RX ADMIN — Medication 1 PACKET: at 15:48

## 2017-01-01 RX ADMIN — ACETAMINOPHEN 325 MG: 160 SUSPENSION ORAL at 04:28

## 2017-01-01 RX ADMIN — HEPARIN SODIUM 5000 UNITS: 5000 INJECTION, SOLUTION INTRAVENOUS; SUBCUTANEOUS at 08:49

## 2017-01-01 RX ADMIN — MULTIVITAMIN 15 ML: LIQUID ORAL at 07:49

## 2017-01-01 RX ADMIN — GUAIFENESIN 10 ML: 100 SOLUTION ORAL at 12:39

## 2017-01-01 RX ADMIN — Medication 1 PACKET: at 16:28

## 2017-01-01 RX ADMIN — LIDOCAINE 2 PATCH: 50 PATCH CUTANEOUS at 12:05

## 2017-01-01 RX ADMIN — Medication 0.5 MG: at 20:57

## 2017-01-01 RX ADMIN — QUETIAPINE FUMARATE 50 MG: 50 TABLET, FILM COATED ORAL at 21:13

## 2017-01-01 RX ADMIN — LEVALBUTEROL HYDROCHLORIDE 1.25 MG: 1.25 SOLUTION RESPIRATORY (INHALATION) at 03:10

## 2017-01-01 RX ADMIN — SERTRALINE HYDROCHLORIDE 150 MG: 20 SOLUTION ORAL at 09:20

## 2017-01-01 RX ADMIN — CLONAZEPAM 1.5 MG: 0.5 TABLET ORAL at 11:49

## 2017-01-01 RX ADMIN — SODIUM CHLORIDE: 9 INJECTION, SOLUTION INTRAVENOUS at 23:29

## 2017-01-01 RX ADMIN — IPRATROPIUM BROMIDE 0.5 MG: 0.5 SOLUTION RESPIRATORY (INHALATION) at 17:13

## 2017-01-01 RX ADMIN — CLONAZEPAM 1.5 MG: 2 TABLET ORAL at 11:54

## 2017-01-01 RX ADMIN — ONDANSETRON 4 MG: 2 INJECTION INTRAMUSCULAR; INTRAVENOUS at 12:56

## 2017-01-01 RX ADMIN — Medication 3 MG: at 22:23

## 2017-01-01 RX ADMIN — MULTIVITAMIN 15 ML: LIQUID ORAL at 07:47

## 2017-01-01 RX ADMIN — IPRATROPIUM BROMIDE 0.5 MG: 0.5 SOLUTION RESPIRATORY (INHALATION) at 07:57

## 2017-01-01 RX ADMIN — Medication 1 PACKET: at 09:55

## 2017-01-01 RX ADMIN — IPRATROPIUM BROMIDE 0.5 MG: 0.5 SOLUTION RESPIRATORY (INHALATION) at 15:47

## 2017-01-01 RX ADMIN — BUDESONIDE 0.5 MG: 0.5 INHALANT RESPIRATORY (INHALATION) at 08:19

## 2017-01-01 RX ADMIN — GABAPENTIN 300 MG: 250 SUSPENSION ORAL at 15:47

## 2017-01-01 RX ADMIN — GUAIFENESIN 10 ML: 100 SOLUTION ORAL at 12:16

## 2017-01-01 RX ADMIN — MEROPENEM 500 MG: 500 INJECTION, POWDER, FOR SOLUTION INTRAVENOUS at 05:00

## 2017-01-01 RX ADMIN — GABAPENTIN 300 MG: 250 SUSPENSION ORAL at 23:42

## 2017-01-01 RX ADMIN — SODIUM CHLORIDE, POTASSIUM CHLORIDE, SODIUM LACTATE AND CALCIUM CHLORIDE: 600; 310; 30; 20 INJECTION, SOLUTION INTRAVENOUS at 05:44

## 2017-01-01 RX ADMIN — Medication 3 MG: at 22:44

## 2017-01-01 RX ADMIN — GUAIFENESIN 10 ML: 100 SOLUTION ORAL at 12:08

## 2017-01-01 RX ADMIN — LEVALBUTEROL 1.25 MG: 1.25 SOLUTION RESPIRATORY (INHALATION) at 08:43

## 2017-01-01 RX ADMIN — SODIUM CHLORIDE: 9 INJECTION, SOLUTION INTRAVENOUS at 05:41

## 2017-01-01 RX ADMIN — SERTRALINE HYDROCHLORIDE 150 MG: 20 SOLUTION ORAL at 07:43

## 2017-01-01 RX ADMIN — CLONAZEPAM 1.5 MG: 0.5 TABLET ORAL at 08:32

## 2017-01-01 RX ADMIN — IPRATROPIUM BROMIDE 0.5 MG: 0.5 SOLUTION RESPIRATORY (INHALATION) at 08:57

## 2017-01-01 RX ADMIN — GABAPENTIN 300 MG: 250 SOLUTION ORAL at 12:07

## 2017-01-01 RX ADMIN — LEVALBUTEROL HYDROCHLORIDE 1.25 MG: 1.25 SOLUTION RESPIRATORY (INHALATION) at 12:24

## 2017-01-01 RX ADMIN — Medication 0.5 MG: at 00:11

## 2017-01-01 RX ADMIN — CLONAZEPAM 1.5 MG: 0.5 TABLET ORAL at 21:29

## 2017-01-01 RX ADMIN — GABAPENTIN 300 MG: 250 SUSPENSION ORAL at 00:46

## 2017-01-01 RX ADMIN — Medication 13 ML: at 19:41

## 2017-01-01 RX ADMIN — Medication 1 PACKET: at 21:23

## 2017-01-01 RX ADMIN — LEVALBUTEROL HYDROCHLORIDE 1.25 MG: 1.25 SOLUTION RESPIRATORY (INHALATION) at 12:22

## 2017-01-01 RX ADMIN — ACETYLCYSTEINE 2 ML: 200 SOLUTION ORAL; RESPIRATORY (INHALATION) at 15:40

## 2017-01-01 RX ADMIN — MEROPENEM 1 G: 1 INJECTION, POWDER, FOR SOLUTION INTRAVENOUS at 15:03

## 2017-01-01 RX ADMIN — POTASSIUM CHLORIDE 40 MEQ: 1.5 POWDER, FOR SOLUTION ORAL at 09:28

## 2017-01-01 RX ADMIN — Medication 1 PACKET: at 19:33

## 2017-01-01 RX ADMIN — CLONAZEPAM 1.5 MG: 2 TABLET ORAL at 12:39

## 2017-01-01 RX ADMIN — HYDROMORPHONE HYDROCHLORIDE 1 MG: 1 SOLUTION ORAL at 06:52

## 2017-01-01 RX ADMIN — POLYETHYLENE GLYCOL 3350 17 G: 17 POWDER, FOR SOLUTION ORAL at 20:25

## 2017-01-01 RX ADMIN — CLARITHROMYCIN 500 MG: 500 TABLET, FILM COATED ORAL at 21:28

## 2017-01-01 RX ADMIN — QUETIAPINE FUMARATE 25 MG: 25 TABLET, FILM COATED ORAL at 08:07

## 2017-01-01 RX ADMIN — HYDROMORPHONE HYDROCHLORIDE 0.5 MG: 1 SOLUTION ORAL at 14:19

## 2017-01-01 RX ADMIN — Medication 0.5 MG: at 15:38

## 2017-01-01 RX ADMIN — CLONAZEPAM 1 MG: 0.5 TABLET ORAL at 20:10

## 2017-01-01 RX ADMIN — ACETAMINOPHEN 325 MG: 160 SUSPENSION ORAL at 16:32

## 2017-01-01 RX ADMIN — BUDESONIDE 0.5 MG: 0.5 INHALANT RESPIRATORY (INHALATION) at 08:47

## 2017-01-01 RX ADMIN — LEVALBUTEROL HYDROCHLORIDE 1.25 MG: 1.25 SOLUTION RESPIRATORY (INHALATION) at 12:47

## 2017-01-01 RX ADMIN — INSULIN ASPART 1 UNITS: 100 INJECTION, SOLUTION INTRAVENOUS; SUBCUTANEOUS at 12:14

## 2017-01-01 RX ADMIN — QUETIAPINE FUMARATE 25 MG: 25 TABLET, FILM COATED ORAL at 22:36

## 2017-01-01 RX ADMIN — GUAIFENESIN 10 ML: 100 SOLUTION ORAL at 15:25

## 2017-01-01 RX ADMIN — IPRATROPIUM BROMIDE 0.5 MG: 0.5 SOLUTION RESPIRATORY (INHALATION) at 00:10

## 2017-01-01 RX ADMIN — SERTRALINE HYDROCHLORIDE 150 MG: 20 SOLUTION ORAL at 08:09

## 2017-01-01 RX ADMIN — Medication 20 MG: at 09:49

## 2017-01-01 RX ADMIN — MORPHINE SULFATE 10 MG: 10 SOLUTION ORAL at 05:02

## 2017-01-01 RX ADMIN — IPRATROPIUM BROMIDE 0.5 MG: 0.5 SOLUTION RESPIRATORY (INHALATION) at 20:11

## 2017-01-01 RX ADMIN — Medication 0.5 MG: at 11:24

## 2017-01-01 RX ADMIN — SIMETHICONE 125 MG: 20 SUSPENSION/ DROPS ORAL at 12:35

## 2017-01-01 RX ADMIN — Medication 500 MG: at 14:29

## 2017-01-01 RX ADMIN — ACETAMINOPHEN 325 MG: 160 SUSPENSION ORAL at 21:01

## 2017-01-01 RX ADMIN — GABAPENTIN 300 MG: 250 SUSPENSION ORAL at 08:34

## 2017-01-01 RX ADMIN — SIMETHICONE 125 MG: 20 SUSPENSION/ DROPS ORAL at 07:53

## 2017-01-01 RX ADMIN — IPRATROPIUM BROMIDE 0.5 MG: 0.5 SOLUTION RESPIRATORY (INHALATION) at 10:34

## 2017-01-01 RX ADMIN — IPRATROPIUM BROMIDE 0.5 MG: 0.5 SOLUTION RESPIRATORY (INHALATION) at 16:33

## 2017-01-01 RX ADMIN — Medication 0.5 MG: at 15:58

## 2017-01-01 RX ADMIN — Medication 1.5 MG: at 15:16

## 2017-01-01 RX ADMIN — GABAPENTIN 300 MG: 250 SUSPENSION ORAL at 08:05

## 2017-01-01 RX ADMIN — IPRATROPIUM BROMIDE 0.5 MG: 0.5 SOLUTION RESPIRATORY (INHALATION) at 19:59

## 2017-01-01 RX ADMIN — SERTRALINE HYDROCHLORIDE 150 MG: 20 SOLUTION ORAL at 08:35

## 2017-01-01 RX ADMIN — QUETIAPINE FUMARATE 50 MG: 25 TABLET, FILM COATED ORAL at 08:18

## 2017-01-01 RX ADMIN — LEVALBUTEROL HYDROCHLORIDE 1.25 MG: 1.25 SOLUTION RESPIRATORY (INHALATION) at 00:00

## 2017-01-01 RX ADMIN — ATORVASTATIN CALCIUM 20 MG: 20 TABLET, FILM COATED ORAL at 08:57

## 2017-01-01 RX ADMIN — GUAIFENESIN 10 ML: 100 SOLUTION ORAL at 20:31

## 2017-01-01 RX ADMIN — SODIUM PHOSPHATE, MONOBASIC, MONOHYDRATE AND SODIUM PHOSPHATE, DIBASIC, ANHYDROUS 20 MMOL: 276; 142 INJECTION, SOLUTION INTRAVENOUS at 13:27

## 2017-01-01 RX ADMIN — GUAIFENESIN 10 ML: 100 SOLUTION ORAL at 08:19

## 2017-01-01 RX ADMIN — MORPHINE SULFATE 10 MG: 10 SOLUTION ORAL at 21:49

## 2017-01-01 RX ADMIN — GUAIFENESIN 200 MG: 100 SOLUTION ORAL at 15:47

## 2017-01-01 RX ADMIN — SIMETHICONE 125 MG: 20 SUSPENSION/ DROPS ORAL at 12:00

## 2017-01-01 RX ADMIN — GUAIFENESIN 10 ML: 100 SOLUTION ORAL at 15:36

## 2017-01-01 RX ADMIN — SODIUM CHLORIDE, PRESERVATIVE FREE 5 ML: 5 INJECTION INTRAVENOUS at 16:53

## 2017-01-01 RX ADMIN — Medication 1 PACKET: at 19:57

## 2017-01-01 RX ADMIN — Medication 13 ML: at 18:24

## 2017-01-01 RX ADMIN — GABAPENTIN 150 MG: 250 SOLUTION ORAL at 08:00

## 2017-01-01 RX ADMIN — BUDESONIDE 0.5 MG: 0.5 INHALANT RESPIRATORY (INHALATION) at 08:41

## 2017-01-01 RX ADMIN — FAMOTIDINE 20 MG: 40 POWDER, FOR SUSPENSION ORAL at 09:20

## 2017-01-01 RX ADMIN — Medication 1 CAPSULE: at 07:49

## 2017-01-01 RX ADMIN — Medication 3 MG: at 21:38

## 2017-01-01 RX ADMIN — Medication 1 MG: at 15:29

## 2017-01-01 RX ADMIN — HYDROMORPHONE HYDROCHLORIDE 1 MG: 1 SOLUTION ORAL at 18:13

## 2017-01-01 RX ADMIN — IPRATROPIUM BROMIDE 0.5 MG: 0.5 SOLUTION RESPIRATORY (INHALATION) at 12:40

## 2017-01-01 RX ADMIN — ALBUMIN HUMAN 50 G: 0.05 INJECTION, SOLUTION INTRAVENOUS at 15:54

## 2017-01-01 RX ADMIN — GABAPENTIN 300 MG: 250 SUSPENSION ORAL at 15:44

## 2017-01-01 RX ADMIN — MORPHINE SULFATE 10 MG: 10 SOLUTION ORAL at 13:44

## 2017-01-01 RX ADMIN — Medication 1.5 MG: at 12:35

## 2017-01-01 RX ADMIN — ACETAMINOPHEN 325 MG: 160 SUSPENSION ORAL at 04:24

## 2017-01-01 RX ADMIN — GUAIFENESIN 10 ML: 100 SOLUTION ORAL at 12:14

## 2017-01-01 RX ADMIN — GABAPENTIN 150 MG: 250 SOLUTION ORAL at 20:09

## 2017-01-01 RX ADMIN — GUAIFENESIN 10 ML: 100 SOLUTION ORAL at 08:46

## 2017-01-01 RX ADMIN — Medication 20 MG: at 07:53

## 2017-01-01 RX ADMIN — Medication 1 MG: at 04:51

## 2017-01-01 RX ADMIN — HYDROMORPHONE HYDROCHLORIDE 1 MG: 1 SOLUTION ORAL at 08:07

## 2017-01-01 RX ADMIN — QUETIAPINE FUMARATE 25 MG: 25 TABLET, FILM COATED ORAL at 02:08

## 2017-01-01 RX ADMIN — IPRATROPIUM BROMIDE 0.5 MG: 0.5 SOLUTION RESPIRATORY (INHALATION) at 08:47

## 2017-01-01 RX ADMIN — PREDNISONE 20 MG: 5 SOLUTION ORAL at 08:20

## 2017-01-01 RX ADMIN — ACETYLCYSTEINE 2 ML: 200 SOLUTION ORAL; RESPIRATORY (INHALATION) at 20:15

## 2017-01-01 RX ADMIN — MORPHINE SULFATE 10 MG: 10 SOLUTION ORAL at 06:46

## 2017-01-01 RX ADMIN — MEROPENEM 500 MG: 500 INJECTION, POWDER, FOR SOLUTION INTRAVENOUS at 11:38

## 2017-01-01 RX ADMIN — SODIUM CHLORIDE 500 ML: 9 INJECTION, SOLUTION INTRAVENOUS at 23:27

## 2017-01-01 RX ADMIN — Medication 1.5 MG: at 15:04

## 2017-01-01 RX ADMIN — Medication 0.5 MG: at 10:04

## 2017-01-01 RX ADMIN — Medication 0.5 MG: at 22:01

## 2017-01-01 RX ADMIN — MORPHINE SULFATE 10 MG: 10 SOLUTION ORAL at 22:44

## 2017-01-01 RX ADMIN — IPRATROPIUM BROMIDE 0.5 MG: 0.5 SOLUTION RESPIRATORY (INHALATION) at 07:53

## 2017-01-01 RX ADMIN — Medication 3 MG: at 00:01

## 2017-01-01 RX ADMIN — CLONAZEPAM 1.5 MG: 2 TABLET ORAL at 12:37

## 2017-01-01 RX ADMIN — MORPHINE SULFATE 10 MG: 10 SOLUTION ORAL at 20:25

## 2017-01-01 RX ADMIN — BUDESONIDE 0.5 MG: 0.5 INHALANT RESPIRATORY (INHALATION) at 20:35

## 2017-01-01 RX ADMIN — SENNOSIDES AND DOCUSATE SODIUM 2 TABLET: 8.6; 5 TABLET ORAL at 09:22

## 2017-01-01 RX ADMIN — CLONAZEPAM 1.5 MG: 2 TABLET ORAL at 11:01

## 2017-01-01 RX ADMIN — Medication 15 ML: at 08:20

## 2017-01-01 RX ADMIN — GABAPENTIN 300 MG: 250 SOLUTION ORAL at 09:19

## 2017-01-01 RX ADMIN — Medication 1.5 MG: at 05:44

## 2017-01-01 RX ADMIN — MULTIVITAMIN 15 ML: LIQUID ORAL at 08:42

## 2017-01-01 RX ADMIN — Medication 0.5 MG: at 03:19

## 2017-01-01 RX ADMIN — Medication 15 ML: at 09:20

## 2017-01-01 RX ADMIN — Medication 3 MG: at 21:11

## 2017-01-01 RX ADMIN — MEROPENEM 1 G: 1 INJECTION, POWDER, FOR SOLUTION INTRAVENOUS at 09:45

## 2017-01-01 RX ADMIN — Medication 1.5 MG: at 22:51

## 2017-01-01 RX ADMIN — GUAIFENESIN 10 ML: 100 SOLUTION ORAL at 12:26

## 2017-01-01 RX ADMIN — CLONAZEPAM 1.5 MG: 2 TABLET ORAL at 08:11

## 2017-01-01 RX ADMIN — FLUTICASONE PROPIONATE 2 SPRAY: 50 SPRAY, METERED NASAL at 07:52

## 2017-01-01 RX ADMIN — HEPARIN SODIUM 5000 UNITS: 5000 INJECTION, SOLUTION INTRAVENOUS; SUBCUTANEOUS at 13:26

## 2017-01-01 RX ADMIN — IPRATROPIUM BROMIDE 0.5 MG: 0.5 SOLUTION RESPIRATORY (INHALATION) at 16:11

## 2017-01-01 RX ADMIN — MULTIVITAMIN 15 ML: LIQUID ORAL at 07:43

## 2017-01-01 RX ADMIN — Medication 0.5 MG: at 00:54

## 2017-01-01 RX ADMIN — FEXOFENADINE HYDROCHLORIDE 180 MG: 180 TABLET, FILM COATED ORAL at 07:53

## 2017-01-01 RX ADMIN — GUAIFENESIN 200 MG: 100 SOLUTION ORAL at 21:23

## 2017-01-01 RX ADMIN — LEVALBUTEROL HYDROCHLORIDE 1.25 MG: 1.25 SOLUTION RESPIRATORY (INHALATION) at 08:47

## 2017-01-01 RX ADMIN — Medication 3 MG: at 22:54

## 2017-01-01 RX ADMIN — GUAIFENESIN 10 ML: 100 SOLUTION ORAL at 15:04

## 2017-01-01 RX ADMIN — PREDNISONE 20 MG: 20 TABLET ORAL at 08:11

## 2017-01-01 RX ADMIN — FEXOFENADINE HYDROCHLORIDE 180 MG: 180 TABLET, FILM COATED ORAL at 07:59

## 2017-01-01 RX ADMIN — GABAPENTIN 300 MG: 250 SOLUTION ORAL at 21:28

## 2017-01-01 RX ADMIN — LEVALBUTEROL 1.25 MG: 1.25 SOLUTION RESPIRATORY (INHALATION) at 16:49

## 2017-01-01 RX ADMIN — OLANZAPINE 5 MG: 5 TABLET, ORALLY DISINTEGRATING ORAL at 20:12

## 2017-01-01 RX ADMIN — ACETYLCYSTEINE 2 ML: 200 SOLUTION ORAL; RESPIRATORY (INHALATION) at 00:11

## 2017-01-01 RX ADMIN — GUAIFENESIN 10 ML: 100 SOLUTION ORAL at 12:28

## 2017-01-01 RX ADMIN — Medication 220 MG: at 09:00

## 2017-01-01 RX ADMIN — Medication 1.5 MG: at 20:11

## 2017-01-01 RX ADMIN — IPRATROPIUM BROMIDE 0.5 MG: 0.5 SOLUTION RESPIRATORY (INHALATION) at 08:19

## 2017-01-01 RX ADMIN — LEVALBUTEROL 1.25 MG: 1.25 SOLUTION RESPIRATORY (INHALATION) at 07:56

## 2017-01-01 RX ADMIN — GUAIFENESIN 10 ML: 100 SOLUTION ORAL at 16:15

## 2017-01-01 RX ADMIN — GABAPENTIN 150 MG: 250 SOLUTION ORAL at 10:00

## 2017-01-01 RX ADMIN — GABAPENTIN 300 MG: 250 SOLUTION ORAL at 19:38

## 2017-01-01 RX ADMIN — PREDNISONE 20 MG: 5 SOLUTION ORAL at 08:16

## 2017-01-01 RX ADMIN — SENNOSIDES AND DOCUSATE SODIUM 2 TABLET: 8.6; 5 TABLET ORAL at 19:46

## 2017-01-01 RX ADMIN — GABAPENTIN 300 MG: 250 SUSPENSION ORAL at 16:53

## 2017-01-01 RX ADMIN — ATORVASTATIN CALCIUM 20 MG: 20 TABLET, FILM COATED ORAL at 08:44

## 2017-01-01 RX ADMIN — BUDESONIDE 0.5 MG: 0.5 INHALANT RESPIRATORY (INHALATION) at 20:33

## 2017-01-01 RX ADMIN — POLYETHYLENE GLYCOL 3350 17 G: 17 POWDER, FOR SOLUTION ORAL at 08:16

## 2017-01-01 RX ADMIN — PREDNISONE 20 MG: 5 SOLUTION ORAL at 08:30

## 2017-01-01 RX ADMIN — IPRATROPIUM BROMIDE 0.5 MG: 0.5 SOLUTION RESPIRATORY (INHALATION) at 21:02

## 2017-01-01 RX ADMIN — Medication 0.5 MG: at 01:26

## 2017-01-01 RX ADMIN — LEVALBUTEROL 1.25 MG: 1.25 SOLUTION RESPIRATORY (INHALATION) at 15:40

## 2017-01-01 RX ADMIN — HYDROMORPHONE HYDROCHLORIDE 1 MG: 1 SOLUTION ORAL at 05:54

## 2017-01-01 RX ADMIN — GUAIFENESIN 10 ML: 100 SOLUTION ORAL at 12:20

## 2017-01-01 RX ADMIN — FAMOTIDINE 20 MG: 40 POWDER, FOR SUSPENSION ORAL at 07:54

## 2017-01-01 RX ADMIN — CLONAZEPAM 1 MG: 0.5 TABLET ORAL at 13:26

## 2017-01-01 RX ADMIN — HYDROMORPHONE HYDROCHLORIDE 1 MG: 1 SOLUTION ORAL at 16:05

## 2017-01-01 RX ADMIN — AMOXICILLIN 1000 MG: 400 POWDER, FOR SUSPENSION ORAL at 21:28

## 2017-01-01 RX ADMIN — LIDOCAINE 1 PATCH: 50 PATCH TOPICAL at 08:26

## 2017-01-01 RX ADMIN — Medication 0.5 MG: at 21:09

## 2017-01-01 RX ADMIN — HYDROMORPHONE HYDROCHLORIDE 1 MG: 1 SOLUTION ORAL at 12:25

## 2017-01-01 RX ADMIN — LEVALBUTEROL HYDROCHLORIDE 1.25 MG: 1.25 SOLUTION RESPIRATORY (INHALATION) at 08:41

## 2017-01-01 RX ADMIN — Medication 1 CAPSULE: at 20:30

## 2017-01-01 RX ADMIN — SERTRALINE HYDROCHLORIDE 150 MG: 20 SOLUTION ORAL at 07:57

## 2017-01-01 RX ADMIN — FENTANYL CITRATE 25 MCG: 50 INJECTION, SOLUTION INTRAMUSCULAR; INTRAVENOUS at 09:42

## 2017-01-01 RX ADMIN — POTASSIUM CHLORIDE 20 MEQ: 1.5 POWDER, FOR SOLUTION ORAL at 08:19

## 2017-01-01 RX ADMIN — SODIUM CHLORIDE: 900 INJECTION, SOLUTION INTRAVENOUS at 14:01

## 2017-01-01 RX ADMIN — PREDNISONE 20 MG: 20 TABLET ORAL at 08:18

## 2017-01-01 RX ADMIN — MORPHINE SULFATE 5 MG: 10 SOLUTION ORAL at 20:10

## 2017-01-01 RX ADMIN — ACETAMINOPHEN 325 MG: 160 SUSPENSION ORAL at 04:14

## 2017-01-01 RX ADMIN — IPRATROPIUM BROMIDE 0.5 MG: 0.5 SOLUTION RESPIRATORY (INHALATION) at 20:32

## 2017-01-01 RX ADMIN — PREDNISONE 20 MG: 5 SOLUTION ORAL at 09:50

## 2017-01-01 RX ADMIN — HYDROMORPHONE HYDROCHLORIDE 1 MG: 1 SOLUTION ORAL at 16:46

## 2017-01-01 RX ADMIN — CLONAZEPAM 1.5 MG: 2 TABLET ORAL at 16:32

## 2017-01-01 RX ADMIN — VANCOMYCIN HYDROCHLORIDE 1000 MG: 1 INJECTION, SOLUTION INTRAVENOUS at 22:10

## 2017-01-01 RX ADMIN — INSULIN ASPART 1 UNITS: 100 INJECTION, SOLUTION INTRAVENOUS; SUBCUTANEOUS at 16:56

## 2017-01-01 RX ADMIN — GUAIFENESIN 10 ML: 100 SOLUTION ORAL at 08:00

## 2017-01-01 RX ADMIN — LEVALBUTEROL 1.25 MG: 1.25 SOLUTION RESPIRATORY (INHALATION) at 08:29

## 2017-01-01 RX ADMIN — PROPOFOL 30 MG: 10 INJECTION, EMULSION INTRAVENOUS at 09:47

## 2017-01-01 RX ADMIN — LEVALBUTEROL 1.25 MG: 1.25 SOLUTION RESPIRATORY (INHALATION) at 12:24

## 2017-01-01 RX ADMIN — IPRATROPIUM BROMIDE 0.5 MG: 0.5 SOLUTION RESPIRATORY (INHALATION) at 12:39

## 2017-01-01 RX ADMIN — IPRATROPIUM BROMIDE 0.5 MG: 0.5 SOLUTION RESPIRATORY (INHALATION) at 08:51

## 2017-01-01 RX ADMIN — GUAIFENESIN 200 MG: 100 SOLUTION ORAL at 20:50

## 2017-01-01 RX ADMIN — Medication 0.5 MG: at 23:38

## 2017-01-01 RX ADMIN — QUETIAPINE FUMARATE 50 MG: 25 TABLET, FILM COATED ORAL at 08:33

## 2017-01-01 RX ADMIN — LEVALBUTEROL HYDROCHLORIDE 1.25 MG: 1.25 SOLUTION RESPIRATORY (INHALATION) at 19:57

## 2017-01-01 RX ADMIN — SERTRALINE HYDROCHLORIDE 150 MG: 20 SOLUTION ORAL at 08:19

## 2017-01-01 RX ADMIN — HYDROMORPHONE HYDROCHLORIDE 1 MG: 1 SOLUTION ORAL at 00:05

## 2017-01-01 RX ADMIN — FEXOFENADINE HYDROCHLORIDE 180 MG: 180 TABLET, FILM COATED ORAL at 08:06

## 2017-01-01 RX ADMIN — MEROPENEM 1 G: 1 INJECTION, POWDER, FOR SOLUTION INTRAVENOUS at 15:55

## 2017-01-01 RX ADMIN — LEVALBUTEROL HYDROCHLORIDE 1.25 MG: 1.25 SOLUTION RESPIRATORY (INHALATION) at 16:33

## 2017-01-01 RX ADMIN — PREDNISONE 20 MG: 20 TABLET ORAL at 08:23

## 2017-01-01 RX ADMIN — ENOXAPARIN SODIUM 40 MG: 40 INJECTION SUBCUTANEOUS at 09:01

## 2017-01-01 RX ADMIN — MORPHINE SULFATE 10 MG: 10 SOLUTION ORAL at 21:52

## 2017-01-01 RX ADMIN — Medication 1 PACKET: at 14:34

## 2017-01-01 RX ADMIN — CLONAZEPAM 1.5 MG: 2 TABLET ORAL at 15:58

## 2017-01-01 RX ADMIN — ACETAMINOPHEN 325 MG: 160 SUSPENSION ORAL at 04:19

## 2017-01-01 RX ADMIN — ENOXAPARIN SODIUM 40 MG: 40 INJECTION SUBCUTANEOUS at 07:57

## 2017-01-01 RX ADMIN — Medication 3 MG: at 22:28

## 2017-01-01 RX ADMIN — CLONAZEPAM 1.5 MG: 2 TABLET ORAL at 09:36

## 2017-01-01 RX ADMIN — CLARITHROMYCIN 500 MG: 500 TABLET, FILM COATED ORAL at 12:08

## 2017-01-01 RX ADMIN — ENOXAPARIN SODIUM 40 MG: 40 INJECTION SUBCUTANEOUS at 07:46

## 2017-01-01 RX ADMIN — Medication 15 ML: at 08:33

## 2017-01-01 RX ADMIN — GUAIFENESIN 10 ML: 100 SOLUTION ORAL at 16:52

## 2017-01-01 RX ADMIN — LEVALBUTEROL HYDROCHLORIDE 1.25 MG: 1.25 SOLUTION RESPIRATORY (INHALATION) at 15:47

## 2017-01-01 RX ADMIN — Medication 1 PACKET: at 13:21

## 2017-01-01 RX ADMIN — POTASSIUM CHLORIDE 40 MEQ: 1.5 POWDER, FOR SOLUTION ORAL at 12:07

## 2017-01-01 RX ADMIN — Medication 60 MEQ: at 08:26

## 2017-01-01 RX ADMIN — GUAIFENESIN 10 ML: 100 SOLUTION ORAL at 11:53

## 2017-01-01 RX ADMIN — IPRATROPIUM BROMIDE 0.5 MG: 0.5 SOLUTION RESPIRATORY (INHALATION) at 16:52

## 2017-01-01 RX ADMIN — Medication 3 MG: at 21:02

## 2017-01-01 RX ADMIN — PREDNISONE 60 MG: 50 TABLET ORAL at 13:01

## 2017-01-01 RX ADMIN — LEVALBUTEROL HYDROCHLORIDE 1.25 MG: 1.25 SOLUTION RESPIRATORY (INHALATION) at 12:39

## 2017-01-01 RX ADMIN — POTASSIUM CHLORIDE 20 MEQ: 29.8 INJECTION, SOLUTION INTRAVENOUS at 06:03

## 2017-01-01 RX ADMIN — QUETIAPINE 50 MG: 50 TABLET, FILM COATED ORAL at 15:04

## 2017-01-01 RX ADMIN — FEXOFENADINE HYDROCHLORIDE 180 MG: 180 TABLET, FILM COATED ORAL at 08:49

## 2017-01-01 RX ADMIN — IPRATROPIUM BROMIDE 0.5 MG: 0.5 SOLUTION RESPIRATORY (INHALATION) at 23:29

## 2017-01-01 RX ADMIN — Medication 0.5 MG: at 11:28

## 2017-01-01 RX ADMIN — GABAPENTIN 300 MG: 250 SUSPENSION ORAL at 08:12

## 2017-01-01 RX ADMIN — QUETIAPINE FUMARATE 25 MG: 25 TABLET, FILM COATED ORAL at 15:25

## 2017-01-01 RX ADMIN — IPRATROPIUM BROMIDE 0.5 MG: 0.5 SOLUTION RESPIRATORY (INHALATION) at 00:44

## 2017-01-01 RX ADMIN — LEVALBUTEROL HYDROCHLORIDE 1.25 MG: 1.25 SOLUTION RESPIRATORY (INHALATION) at 16:00

## 2017-01-01 RX ADMIN — Medication 1 CAPSULE: at 08:26

## 2017-01-01 RX ADMIN — Medication 15 ML: at 08:17

## 2017-01-01 RX ADMIN — ACETAMINOPHEN 650 MG: 160 SOLUTION ORAL at 12:14

## 2017-01-01 RX ADMIN — CLONAZEPAM 1.5 MG: 2 TABLET ORAL at 08:25

## 2017-01-01 RX ADMIN — ACETAMINOPHEN 325 MG: 160 SUSPENSION ORAL at 08:24

## 2017-01-01 RX ADMIN — LORAZEPAM 1 MG: 2 INJECTION INTRAMUSCULAR; INTRAVENOUS at 19:32

## 2017-01-01 RX ADMIN — LEVALBUTEROL HYDROCHLORIDE 1.25 MG: 1.25 SOLUTION RESPIRATORY (INHALATION) at 08:03

## 2017-01-01 RX ADMIN — FEXOFENADINE HYDROCHLORIDE 180 MG: 180 TABLET, FILM COATED ORAL at 07:25

## 2017-01-01 RX ADMIN — MORPHINE SULFATE 10 MG: 10 SOLUTION ORAL at 12:27

## 2017-01-01 RX ADMIN — IPRATROPIUM BROMIDE 0.5 MG: 0.5 SOLUTION RESPIRATORY (INHALATION) at 20:25

## 2017-01-01 RX ADMIN — FEXOFENADINE HYDROCHLORIDE 180 MG: 180 TABLET, FILM COATED ORAL at 13:10

## 2017-01-01 RX ADMIN — ACETAMINOPHEN 325 MG: 160 SUSPENSION ORAL at 20:31

## 2017-01-01 RX ADMIN — METHYLPREDNISOLONE SODIUM SUCCINATE 62.5 MG: 125 INJECTION, POWDER, LYOPHILIZED, FOR SOLUTION INTRAMUSCULAR; INTRAVENOUS at 21:50

## 2017-01-01 RX ADMIN — Medication 1.5 MG: at 16:01

## 2017-01-01 RX ADMIN — LIDOCAINE 1 PATCH: 50 PATCH TOPICAL at 12:12

## 2017-01-01 RX ADMIN — Medication 1 PACKET: at 21:50

## 2017-01-01 RX ADMIN — Medication 1 PACKET: at 20:23

## 2017-01-01 RX ADMIN — BUDESONIDE 0.5 MG: 0.5 INHALANT RESPIRATORY (INHALATION) at 08:50

## 2017-01-01 RX ADMIN — ACETAMINOPHEN 325 MG: 160 SUSPENSION ORAL at 12:17

## 2017-01-01 RX ADMIN — POLYETHYLENE GLYCOL 400, PROPYLENE GLYCOL 1 DROP: .4; .3 LIQUID OPHTHALMIC at 08:45

## 2017-01-01 RX ADMIN — HYDROMORPHONE HYDROCHLORIDE 1 MG: 1 SOLUTION ORAL at 14:46

## 2017-01-01 RX ADMIN — ONDANSETRON 4 MG: 2 INJECTION INTRAMUSCULAR; INTRAVENOUS at 02:20

## 2017-01-01 RX ADMIN — INSULIN ASPART 1 UNITS: 100 INJECTION, SOLUTION INTRAVENOUS; SUBCUTANEOUS at 11:33

## 2017-01-01 RX ADMIN — IPRATROPIUM BROMIDE 0.5 MG: 0.5 SOLUTION RESPIRATORY (INHALATION) at 16:10

## 2017-01-01 RX ADMIN — LEVALBUTEROL 1.25 MG: 1.25 SOLUTION RESPIRATORY (INHALATION) at 12:13

## 2017-01-01 RX ADMIN — IPRATROPIUM BROMIDE 0.5 MG: 0.5 SOLUTION RESPIRATORY (INHALATION) at 12:13

## 2017-01-01 RX ADMIN — Medication 1 CAPSULE: at 20:10

## 2017-01-01 RX ADMIN — GUAIFENESIN 10 ML: 100 SOLUTION ORAL at 07:58

## 2017-01-01 RX ADMIN — SENNOSIDES AND DOCUSATE SODIUM 2 TABLET: 8.6; 5 TABLET ORAL at 19:15

## 2017-01-01 RX ADMIN — MORPHINE SULFATE 10 MG: 10 SOLUTION ORAL at 05:46

## 2017-01-01 RX ADMIN — GUAIFENESIN 200 MG: 100 SOLUTION ORAL at 16:42

## 2017-01-01 RX ADMIN — METHYLPREDNISOLONE SODIUM SUCCINATE 16 MG: 40 INJECTION, POWDER, LYOPHILIZED, FOR SOLUTION INTRAMUSCULAR; INTRAVENOUS at 13:40

## 2017-01-01 RX ADMIN — ACETYLCYSTEINE 2 ML: 200 SOLUTION ORAL; RESPIRATORY (INHALATION) at 09:14

## 2017-01-01 RX ADMIN — POLYETHYLENE GLYCOL 3350 17 G: 17 POWDER, FOR SOLUTION ORAL at 20:23

## 2017-01-01 RX ADMIN — ENOXAPARIN SODIUM 40 MG: 40 INJECTION SUBCUTANEOUS at 08:22

## 2017-01-01 RX ADMIN — LIDOCAINE 1 PATCH: 50 PATCH CUTANEOUS at 08:46

## 2017-01-01 RX ADMIN — HEPARIN SODIUM 5000 UNITS: 5000 INJECTION, SOLUTION INTRAVENOUS; SUBCUTANEOUS at 14:19

## 2017-01-01 RX ADMIN — Medication 3 MG: at 22:33

## 2017-01-01 RX ADMIN — AMOXICILLIN 1000 MG: 400 POWDER, FOR SUSPENSION ORAL at 12:06

## 2017-01-01 RX ADMIN — QUETIAPINE FUMARATE 50 MG: 25 TABLET, FILM COATED ORAL at 20:11

## 2017-01-01 RX ADMIN — Medication 3 MG: at 21:10

## 2017-01-01 RX ADMIN — Medication 0.5 MG: at 00:17

## 2017-01-01 RX ADMIN — CLONAZEPAM 1.5 MG: 2 TABLET ORAL at 17:40

## 2017-01-01 RX ADMIN — Medication 160 MG: at 07:57

## 2017-01-01 RX ADMIN — Medication 1.5 MG: at 08:38

## 2017-01-01 RX ADMIN — Medication 1 MG: at 11:19

## 2017-01-01 RX ADMIN — Medication 1.5 MG: at 12:59

## 2017-01-01 RX ADMIN — Medication 0.5 MG: at 08:46

## 2017-01-01 RX ADMIN — ACETAMINOPHEN 650 MG: 160 SUSPENSION ORAL at 14:02

## 2017-01-01 RX ADMIN — MULTIVITAMIN 15 ML: LIQUID ORAL at 07:58

## 2017-01-01 RX ADMIN — PREDNISONE 20 MG: 5 SOLUTION ORAL at 08:19

## 2017-01-01 RX ADMIN — IPRATROPIUM BROMIDE 0.5 MG: 0.5 SOLUTION RESPIRATORY (INHALATION) at 21:17

## 2017-01-01 RX ADMIN — LEVALBUTEROL 1.25 MG: 1.25 SOLUTION RESPIRATORY (INHALATION) at 20:22

## 2017-01-01 RX ADMIN — IPRATROPIUM BROMIDE 0.5 MG: 0.5 SOLUTION RESPIRATORY (INHALATION) at 08:27

## 2017-01-01 RX ADMIN — HYDROMORPHONE HYDROCHLORIDE 1 MG: 1 SOLUTION ORAL at 18:35

## 2017-01-01 RX ADMIN — ACETAMINOPHEN 325 MG: 160 SUSPENSION ORAL at 00:46

## 2017-01-01 RX ADMIN — SERTRALINE HYDROCHLORIDE 150 MG: 20 SOLUTION ORAL at 09:19

## 2017-01-01 RX ADMIN — GABAPENTIN 300 MG: 250 SUSPENSION ORAL at 23:18

## 2017-01-01 RX ADMIN — Medication 1.5 MG: at 20:51

## 2017-01-01 RX ADMIN — POTASSIUM CHLORIDE 20 MEQ: 29.8 INJECTION, SOLUTION INTRAVENOUS at 10:10

## 2017-01-01 RX ADMIN — Medication 1 PACKET: at 20:13

## 2017-01-01 RX ADMIN — Medication 1.5 MG: at 19:28

## 2017-01-01 RX ADMIN — Medication 10 ML: at 08:17

## 2017-01-01 RX ADMIN — HYDROMORPHONE HYDROCHLORIDE 0.5 MG: 1 SOLUTION ORAL at 09:48

## 2017-01-01 RX ADMIN — IPRATROPIUM BROMIDE 0.5 MG: 0.5 SOLUTION RESPIRATORY (INHALATION) at 16:57

## 2017-01-01 RX ADMIN — FEXOFENADINE HYDROCHLORIDE 180 MG: 180 TABLET, FILM COATED ORAL at 08:28

## 2017-01-01 RX ADMIN — Medication 3 MG: at 23:41

## 2017-01-01 RX ADMIN — FAMOTIDINE 20 MG: 40 POWDER, FOR SUSPENSION ORAL at 07:58

## 2017-01-01 RX ADMIN — HYDROMORPHONE HYDROCHLORIDE 1 MG: 1 SOLUTION ORAL at 03:03

## 2017-01-01 RX ADMIN — GUAIFENESIN 10 ML: 100 SOLUTION ORAL at 20:30

## 2017-01-01 RX ADMIN — ACETAMINOPHEN 325 MG: 160 SUSPENSION ORAL at 16:27

## 2017-01-01 RX ADMIN — IPRATROPIUM BROMIDE 0.5 MG: 0.5 SOLUTION RESPIRATORY (INHALATION) at 13:47

## 2017-01-01 RX ADMIN — POTASSIUM CHLORIDE 10 MEQ: 14.9 INJECTION, SOLUTION, CONCENTRATE PARENTERAL at 00:02

## 2017-01-01 RX ADMIN — Medication 1.5 MG: at 22:33

## 2017-01-01 RX ADMIN — Medication 1.5 MG: at 02:08

## 2017-01-01 RX ADMIN — Medication 1 CAPSULE: at 08:05

## 2017-01-01 RX ADMIN — SENNOSIDES AND DOCUSATE SODIUM 2 TABLET: 8.6; 5 TABLET ORAL at 20:12

## 2017-01-01 RX ADMIN — IPRATROPIUM BROMIDE 0.5 MG: 0.5 SOLUTION RESPIRATORY (INHALATION) at 12:20

## 2017-01-01 RX ADMIN — ACETAMINOPHEN 650 MG: 160 SUSPENSION ORAL at 12:06

## 2017-01-01 RX ADMIN — HYDROMORPHONE HYDROCHLORIDE 1 MG: 1 SOLUTION ORAL at 16:04

## 2017-01-01 RX ADMIN — DEXTROSE MONOHYDRATE 25 G: 25 INJECTION, SOLUTION INTRAVENOUS at 11:12

## 2017-01-01 RX ADMIN — HEPARIN SODIUM 5000 UNITS: 5000 INJECTION, SOLUTION INTRAVENOUS; SUBCUTANEOUS at 00:38

## 2017-01-01 RX ADMIN — GABAPENTIN 300 MG: 250 SOLUTION ORAL at 20:49

## 2017-01-01 RX ADMIN — GABAPENTIN 300 MG: 250 SOLUTION ORAL at 13:10

## 2017-01-01 RX ADMIN — PREDNISONE 20 MG: 5 SOLUTION ORAL at 08:03

## 2017-01-01 RX ADMIN — GUAIFENESIN 10 ML: 100 SOLUTION ORAL at 11:25

## 2017-01-01 RX ADMIN — IPRATROPIUM BROMIDE 0.5 MG: 0.5 SOLUTION RESPIRATORY (INHALATION) at 10:00

## 2017-01-01 RX ADMIN — Medication 0.5 MG: at 01:17

## 2017-01-01 RX ADMIN — GABAPENTIN 300 MG: 250 SUSPENSION ORAL at 15:58

## 2017-01-01 RX ADMIN — GABAPENTIN 300 MG: 250 SOLUTION ORAL at 20:30

## 2017-01-01 RX ADMIN — LEVALBUTEROL HYDROCHLORIDE 1.25 MG: 1.25 SOLUTION RESPIRATORY (INHALATION) at 08:50

## 2017-01-01 RX ADMIN — LEVALBUTEROL HYDROCHLORIDE 1.25 MG: 1.25 SOLUTION RESPIRATORY (INHALATION) at 04:17

## 2017-01-01 RX ADMIN — HYDROMORPHONE HYDROCHLORIDE 1 MG: 1 SOLUTION ORAL at 18:52

## 2017-01-01 RX ADMIN — CLONAZEPAM 1.5 MG: 2 TABLET ORAL at 14:13

## 2017-01-01 RX ADMIN — IPRATROPIUM BROMIDE 0.5 MG: 0.5 SOLUTION RESPIRATORY (INHALATION) at 20:59

## 2017-01-01 RX ADMIN — LEVALBUTEROL HYDROCHLORIDE 1.25 MG: 1.25 SOLUTION RESPIRATORY (INHALATION) at 16:52

## 2017-01-01 RX ADMIN — QUETIAPINE FUMARATE 25 MG: 25 TABLET, FILM COATED ORAL at 08:08

## 2017-01-01 RX ADMIN — CLONAZEPAM 1.5 MG: 2 TABLET ORAL at 19:33

## 2017-01-01 RX ADMIN — Medication 1 CAPSULE: at 07:58

## 2017-01-01 RX ADMIN — Medication 1 PACKET: at 13:03

## 2017-01-01 RX ADMIN — Medication 1.5 MG: at 13:22

## 2017-01-01 RX ADMIN — Medication 1.5 MG: at 17:05

## 2017-01-01 RX ADMIN — BUDESONIDE 0.5 MG: 0.5 INHALANT RESPIRATORY (INHALATION) at 21:19

## 2017-01-01 RX ADMIN — Medication 0.5 MG: at 04:22

## 2017-01-01 RX ADMIN — Medication 0.5 MG: at 05:44

## 2017-01-01 RX ADMIN — IPRATROPIUM BROMIDE 0.5 MG: 0.5 SOLUTION RESPIRATORY (INHALATION) at 08:42

## 2017-01-01 RX ADMIN — ACETAMINOPHEN 325 MG: 160 SUSPENSION ORAL at 00:44

## 2017-01-01 RX ADMIN — HYDROMORPHONE HYDROCHLORIDE 0.5 MG: 10 INJECTION, SOLUTION INTRAMUSCULAR; INTRAVENOUS; SUBCUTANEOUS at 00:40

## 2017-01-01 RX ADMIN — SODIUM CHLORIDE 500 ML: 9 INJECTION, SOLUTION INTRAVENOUS at 11:39

## 2017-01-01 RX ADMIN — BUDESONIDE 0.5 MG: 0.5 INHALANT RESPIRATORY (INHALATION) at 08:49

## 2017-01-01 RX ADMIN — FEXOFENADINE HYDROCHLORIDE 180 MG: 180 TABLET, FILM COATED ORAL at 08:17

## 2017-01-01 RX ADMIN — AMPICILLIN SODIUM AND SULBACTAM SODIUM 3 G: 2; 1 INJECTION, POWDER, FOR SOLUTION INTRAMUSCULAR; INTRAVENOUS at 09:51

## 2017-01-01 RX ADMIN — LEVALBUTEROL HYDROCHLORIDE 1.25 MG: 1.25 SOLUTION RESPIRATORY (INHALATION) at 12:45

## 2017-01-01 RX ADMIN — Medication 500 ML: at 00:58

## 2017-01-01 RX ADMIN — Medication 1.5 MG: at 16:22

## 2017-01-01 RX ADMIN — GUAIFENESIN 10 ML: 100 SOLUTION ORAL at 20:09

## 2017-01-01 RX ADMIN — QUETIAPINE 50 MG: 50 TABLET, FILM COATED ORAL at 08:38

## 2017-01-01 RX ADMIN — IPRATROPIUM BROMIDE 0.5 MG: 0.5 SOLUTION RESPIRATORY (INHALATION) at 07:56

## 2017-01-01 RX ADMIN — Medication 1 PACKET: at 22:12

## 2017-01-01 RX ADMIN — Medication 1 PACKET: at 21:59

## 2017-01-01 RX ADMIN — GUAIFENESIN 10 ML: 100 SOLUTION ORAL at 08:20

## 2017-01-01 RX ADMIN — Medication 1 PACKET: at 08:08

## 2017-01-01 RX ADMIN — HYDROMORPHONE HYDROCHLORIDE 0.5 MG: 10 INJECTION, SOLUTION INTRAMUSCULAR; INTRAVENOUS; SUBCUTANEOUS at 17:33

## 2017-01-01 RX ADMIN — GABAPENTIN 300 MG: 250 SOLUTION ORAL at 21:11

## 2017-01-01 RX ADMIN — HUMAN INSULIN 10 UNITS: 100 INJECTION, SOLUTION SUBCUTANEOUS at 13:51

## 2017-01-01 RX ADMIN — GABAPENTIN 300 MG: 250 SOLUTION ORAL at 08:06

## 2017-01-01 RX ADMIN — ONDANSETRON 4 MG: 2 INJECTION INTRAMUSCULAR; INTRAVENOUS at 08:19

## 2017-01-01 RX ADMIN — LEVALBUTEROL 1.25 MG: 1.25 SOLUTION RESPIRATORY (INHALATION) at 08:15

## 2017-01-01 RX ADMIN — GUAIFENESIN 10 ML: 100 SOLUTION ORAL at 07:26

## 2017-01-01 RX ADMIN — ACETAMINOPHEN 650 MG: 160 SOLUTION ORAL at 13:37

## 2017-01-01 RX ADMIN — INSULIN ASPART 1 UNITS: 100 INJECTION, SOLUTION INTRAVENOUS; SUBCUTANEOUS at 12:31

## 2017-01-01 RX ADMIN — QUETIAPINE FUMARATE 25 MG: 25 TABLET, FILM COATED ORAL at 07:59

## 2017-01-01 RX ADMIN — SERTRALINE HYDROCHLORIDE 150 MG: 20 SOLUTION ORAL at 08:17

## 2017-01-01 RX ADMIN — BUDESONIDE 0.5 MG: 0.5 INHALANT RESPIRATORY (INHALATION) at 19:59

## 2017-01-01 RX ADMIN — Medication 0.5 MG: at 06:22

## 2017-01-01 RX ADMIN — Medication 1.5 MG: at 13:02

## 2017-01-01 RX ADMIN — HEPARIN SODIUM 5000 UNITS: 5000 INJECTION, SOLUTION INTRAVENOUS; SUBCUTANEOUS at 02:03

## 2017-01-01 RX ADMIN — SODIUM CHLORIDE 500 ML: 9 INJECTION, SOLUTION INTRAVENOUS at 09:17

## 2017-01-01 RX ADMIN — CLONAZEPAM 1 MG: 0.5 TABLET ORAL at 20:06

## 2017-01-01 RX ADMIN — DOCUSATE SODIUM 100 MG: 50 LIQUID ORAL at 09:20

## 2017-01-01 RX ADMIN — LEVALBUTEROL HYDROCHLORIDE 1.25 MG: 1.25 SOLUTION RESPIRATORY (INHALATION) at 11:39

## 2017-01-01 RX ADMIN — METHYLNALTREXONE BROMIDE 8 MG: 12 INJECTION, SOLUTION SUBCUTANEOUS at 09:07

## 2017-01-01 RX ADMIN — QUETIAPINE FUMARATE 25 MG: 25 TABLET, FILM COATED ORAL at 19:04

## 2017-01-01 RX ADMIN — Medication 1.5 MG: at 16:42

## 2017-01-01 RX ADMIN — LEVALBUTEROL HYDROCHLORIDE 1.25 MG: 1.25 SOLUTION RESPIRATORY (INHALATION) at 16:35

## 2017-01-01 RX ADMIN — IPRATROPIUM BROMIDE 0.5 MG: 0.5 SOLUTION RESPIRATORY (INHALATION) at 12:47

## 2017-01-01 RX ADMIN — LEVALBUTEROL HYDROCHLORIDE 1.25 MG: 1.25 SOLUTION RESPIRATORY (INHALATION) at 20:33

## 2017-01-01 RX ADMIN — ACETAMINOPHEN 325 MG: 160 SUSPENSION ORAL at 09:01

## 2017-01-01 RX ADMIN — METHYLPREDNISOLONE SODIUM SUCCINATE 62.5 MG: 125 INJECTION, POWDER, LYOPHILIZED, FOR SOLUTION INTRAMUSCULAR; INTRAVENOUS at 13:18

## 2017-01-01 RX ADMIN — SULFAMETHOXAZOLE AND TRIMETHOPRIM 160 MG: 200; 40 SUSPENSION ORAL at 08:35

## 2017-01-01 RX ADMIN — FEXOFENADINE HYDROCHLORIDE 180 MG: 180 TABLET, FILM COATED ORAL at 08:21

## 2017-01-01 RX ADMIN — CLONAZEPAM 1.5 MG: 0.5 TABLET, ORALLY DISINTEGRATING ORAL at 12:07

## 2017-01-01 RX ADMIN — POLYETHYLENE GLYCOL 3350 17 G: 17 POWDER, FOR SOLUTION ORAL at 20:51

## 2017-01-01 RX ADMIN — SERTRALINE HYDROCHLORIDE 150 MG: 20 SOLUTION ORAL at 07:25

## 2017-01-01 RX ADMIN — ACETAMINOPHEN 325 MG: 160 SUSPENSION ORAL at 08:00

## 2017-01-01 RX ADMIN — Medication 15 ML: at 08:46

## 2017-01-01 RX ADMIN — Medication 0.5 MG: at 10:09

## 2017-01-01 RX ADMIN — BUDESONIDE 0.5 MG: 0.5 INHALANT RESPIRATORY (INHALATION) at 08:24

## 2017-01-01 RX ADMIN — MEROPENEM 1 G: 1 INJECTION, POWDER, FOR SOLUTION INTRAVENOUS at 01:02

## 2017-01-01 RX ADMIN — Medication 1 CAPSULE: at 07:48

## 2017-01-01 RX ADMIN — Medication 60 MEQ: at 12:15

## 2017-01-01 RX ADMIN — Medication 3 MG: at 21:14

## 2017-01-01 RX ADMIN — MORPHINE SULFATE 5 MG: 10 SOLUTION ORAL at 20:05

## 2017-01-01 RX ADMIN — CLONAZEPAM 1.5 MG: 2 TABLET ORAL at 21:01

## 2017-01-01 RX ADMIN — POTASSIUM CHLORIDE 20 MEQ: 29.8 INJECTION, SOLUTION INTRAVENOUS at 04:20

## 2017-01-01 RX ADMIN — QUETIAPINE FUMARATE 25 MG: 25 TABLET, FILM COATED ORAL at 21:01

## 2017-01-01 RX ADMIN — Medication 0.5 MG: at 13:09

## 2017-01-01 RX ADMIN — GUAIFENESIN 10 ML: 100 SOLUTION ORAL at 11:18

## 2017-01-01 RX ADMIN — PHENYLEPHRINE HYDROCHLORIDE 100 MCG: 10 INJECTION, SOLUTION INTRAMUSCULAR; INTRAVENOUS; SUBCUTANEOUS at 09:58

## 2017-01-01 RX ADMIN — SIMETHICONE 125 MG: 20 SUSPENSION/ DROPS ORAL at 19:55

## 2017-01-01 RX ADMIN — FEXOFENADINE HYDROCHLORIDE 180 MG: 180 TABLET, FILM COATED ORAL at 08:29

## 2017-01-01 RX ADMIN — METHYLPREDNISOLONE SODIUM SUCCINATE 125 MG: 125 INJECTION, POWDER, LYOPHILIZED, FOR SOLUTION INTRAMUSCULAR; INTRAVENOUS at 05:37

## 2017-01-01 RX ADMIN — ACETAMINOPHEN 650 MG: 160 SOLUTION ORAL at 13:14

## 2017-01-01 RX ADMIN — Medication 1.5 MG: at 20:30

## 2017-01-01 RX ADMIN — HYDROMORPHONE HYDROCHLORIDE 1 MG: 1 SOLUTION ORAL at 03:40

## 2017-01-01 RX ADMIN — Medication 1 CAPSULE: at 08:38

## 2017-01-01 RX ADMIN — Medication 1 PACKET: at 07:25

## 2017-01-01 RX ADMIN — QUETIAPINE 50 MG: 50 TABLET, FILM COATED ORAL at 21:36

## 2017-01-01 RX ADMIN — SERTRALINE HYDROCHLORIDE 150 MG: 20 SOLUTION ORAL at 07:49

## 2017-01-01 RX ADMIN — MORPHINE SULFATE 10 MG: 10 SOLUTION ORAL at 06:15

## 2017-01-01 RX ADMIN — CLONAZEPAM 1.5 MG: 2 TABLET ORAL at 12:47

## 2017-01-01 RX ADMIN — Medication 500 MG: at 07:45

## 2017-01-01 RX ADMIN — Medication 0.5 MG: at 13:22

## 2017-01-01 RX ADMIN — Medication 1 MG: at 23:39

## 2017-01-01 RX ADMIN — QUETIAPINE 50 MG: 50 TABLET, FILM COATED ORAL at 15:34

## 2017-01-01 RX ADMIN — GABAPENTIN 300 MG: 250 SUSPENSION ORAL at 08:20

## 2017-01-01 RX ADMIN — GUAIFENESIN 10 ML: 100 SOLUTION ORAL at 16:32

## 2017-01-01 RX ADMIN — Medication 1.5 MG: at 13:18

## 2017-01-01 RX ADMIN — ACETAMINOPHEN 325 MG: 160 SUSPENSION ORAL at 00:56

## 2017-01-01 RX ADMIN — Medication 220 MG: at 08:12

## 2017-01-01 RX ADMIN — POTASSIUM CHLORIDE 20 MEQ: 29.8 INJECTION, SOLUTION INTRAVENOUS at 16:41

## 2017-01-01 RX ADMIN — Medication 1 PACKET: at 15:47

## 2017-01-01 RX ADMIN — HYDROMORPHONE HYDROCHLORIDE 1 MG: 1 SOLUTION ORAL at 21:15

## 2017-01-01 RX ADMIN — LEVALBUTEROL HYDROCHLORIDE 1.25 MG: 1.25 SOLUTION RESPIRATORY (INHALATION) at 08:39

## 2017-01-01 RX ADMIN — POLYETHYLENE GLYCOL 400, PROPYLENE GLYCOL 1 DROP: .4; .3 LIQUID OPHTHALMIC at 12:00

## 2017-01-01 RX ADMIN — GABAPENTIN 300 MG: 250 SUSPENSION ORAL at 15:54

## 2017-01-01 RX ADMIN — Medication 3 MG: at 21:15

## 2017-01-01 RX ADMIN — HEPARIN SODIUM 5000 UNITS: 5000 INJECTION, SOLUTION INTRAVENOUS; SUBCUTANEOUS at 16:06

## 2017-01-01 RX ADMIN — ACETYLCYSTEINE 2 ML: 200 SOLUTION ORAL; RESPIRATORY (INHALATION) at 15:41

## 2017-01-01 RX ADMIN — Medication 1 PACKET: at 14:21

## 2017-01-01 RX ADMIN — ASPIRIN 81 MG CHEWABLE TABLET 81 MG: 81 TABLET CHEWABLE at 08:57

## 2017-01-01 RX ADMIN — SODIUM CHLORIDE, PRESERVATIVE FREE 5 ML: 5 INJECTION INTRAVENOUS at 05:49

## 2017-01-01 RX ADMIN — Medication 1.5 MG: at 21:28

## 2017-01-01 RX ADMIN — FEXOFENADINE HYDROCHLORIDE 180 MG: 180 TABLET, FILM COATED ORAL at 09:00

## 2017-01-01 RX ADMIN — QUETIAPINE FUMARATE 50 MG: 50 TABLET, FILM COATED ORAL at 21:53

## 2017-01-01 RX ADMIN — ENOXAPARIN SODIUM 40 MG: 40 INJECTION SUBCUTANEOUS at 07:21

## 2017-01-01 RX ADMIN — GABAPENTIN 300 MG: 250 SUSPENSION ORAL at 07:25

## 2017-01-01 RX ADMIN — BUDESONIDE 0.5 MG: 0.5 INHALANT RESPIRATORY (INHALATION) at 13:59

## 2017-01-01 RX ADMIN — IPRATROPIUM BROMIDE 0.5 MG: 0.5 SOLUTION RESPIRATORY (INHALATION) at 16:45

## 2017-01-01 RX ADMIN — GUAIFENESIN 10 ML: 100 SOLUTION ORAL at 19:14

## 2017-01-01 RX ADMIN — BUDESONIDE 0.5 MG: 0.5 INHALANT RESPIRATORY (INHALATION) at 08:06

## 2017-01-01 RX ADMIN — Medication 1 PACKET: at 14:18

## 2017-01-01 RX ADMIN — MORPHINE SULFATE 10 MG: 10 SOLUTION ORAL at 14:32

## 2017-01-01 RX ADMIN — MORPHINE SULFATE 10 MG: 10 SOLUTION ORAL at 21:01

## 2017-01-01 RX ADMIN — Medication 20 MG: at 21:01

## 2017-01-01 RX ADMIN — Medication 3 MG: at 22:27

## 2017-01-01 RX ADMIN — QUETIAPINE FUMARATE 50 MG: 25 TABLET, FILM COATED ORAL at 20:27

## 2017-01-01 RX ADMIN — INSULIN ASPART 1 UNITS: 100 INJECTION, SOLUTION INTRAVENOUS; SUBCUTANEOUS at 12:20

## 2017-01-01 RX ADMIN — Medication 1 CAPSULE: at 09:21

## 2017-01-01 RX ADMIN — Medication 220 MG: at 08:19

## 2017-01-01 RX ADMIN — Medication 3 MG: at 21:36

## 2017-01-01 RX ADMIN — BUDESONIDE 0.5 MG: 0.5 INHALANT RESPIRATORY (INHALATION) at 08:51

## 2017-01-01 RX ADMIN — ASPIRIN 81 MG CHEWABLE TABLET 81 MG: 81 TABLET CHEWABLE at 08:29

## 2017-01-01 RX ADMIN — Medication 0.5 MG: at 04:08

## 2017-01-01 RX ADMIN — Medication 1.5 MG: at 11:01

## 2017-01-01 RX ADMIN — IPRATROPIUM BROMIDE 0.5 MG: 0.5 SOLUTION RESPIRATORY (INHALATION) at 16:28

## 2017-01-01 RX ADMIN — VANCOMYCIN HYDROCHLORIDE 1250 MG: 10 INJECTION, POWDER, LYOPHILIZED, FOR SOLUTION INTRAVENOUS at 01:35

## 2017-01-01 RX ADMIN — CALCIUM CARBONATE (ANTACID) CHEW TAB 500 MG 500 MG: 500 CHEW TAB at 06:00

## 2017-01-01 RX ADMIN — HYDROMORPHONE HYDROCHLORIDE 1 MG: 1 SOLUTION ORAL at 00:33

## 2017-01-01 RX ADMIN — GABAPENTIN 300 MG: 250 SOLUTION ORAL at 14:18

## 2017-01-01 RX ADMIN — PROCHLORPERAZINE EDISYLATE 5 MG: 5 INJECTION INTRAMUSCULAR; INTRAVENOUS at 13:31

## 2017-01-01 RX ADMIN — GUAIFENESIN 200 MG: 100 SOLUTION ORAL at 13:02

## 2017-01-01 RX ADMIN — LEVALBUTEROL HYDROCHLORIDE 1.25 MG: 1.25 SOLUTION RESPIRATORY (INHALATION) at 08:46

## 2017-01-01 RX ADMIN — Medication 1.5 MG: at 19:26

## 2017-01-01 RX ADMIN — Medication 1 PACKET: at 16:32

## 2017-01-01 RX ADMIN — Medication 0.5 MG: at 08:35

## 2017-01-01 RX ADMIN — LEVALBUTEROL HYDROCHLORIDE 1.25 MG: 1.25 SOLUTION RESPIRATORY (INHALATION) at 03:24

## 2017-01-01 RX ADMIN — IPRATROPIUM BROMIDE 0.5 MG: 0.5 SOLUTION RESPIRATORY (INHALATION) at 12:45

## 2017-01-01 RX ADMIN — GUAIFENESIN 200 MG: 100 SOLUTION ORAL at 12:31

## 2017-01-01 RX ADMIN — CLONAZEPAM 1.5 MG: 0.5 TABLET, ORALLY DISINTEGRATING ORAL at 15:38

## 2017-01-01 RX ADMIN — Medication 1 PACKET: at 08:38

## 2017-01-01 RX ADMIN — QUETIAPINE 50 MG: 50 TABLET, FILM COATED ORAL at 19:32

## 2017-01-01 RX ADMIN — INSULIN ASPART 1 UNITS: 100 INJECTION, SOLUTION INTRAVENOUS; SUBCUTANEOUS at 15:53

## 2017-01-01 RX ADMIN — BUDESONIDE 0.5 MG: 0.5 INHALANT RESPIRATORY (INHALATION) at 08:39

## 2017-01-01 RX ADMIN — MORPHINE SULFATE 10 MG: 10 SOLUTION ORAL at 04:19

## 2017-01-01 RX ADMIN — CLONAZEPAM 1.5 MG: 2 TABLET ORAL at 12:41

## 2017-01-01 RX ADMIN — Medication 220 MG: at 08:52

## 2017-01-01 RX ADMIN — LEVALBUTEROL 1.25 MG: 1.25 SOLUTION RESPIRATORY (INHALATION) at 03:21

## 2017-01-01 RX ADMIN — IPRATROPIUM BROMIDE 0.5 MG: 0.5 SOLUTION RESPIRATORY (INHALATION) at 11:27

## 2017-01-01 RX ADMIN — ACETAMINOPHEN 325 MG: 160 SUSPENSION ORAL at 20:28

## 2017-01-01 RX ADMIN — QUETIAPINE 50 MG: 50 TABLET, FILM COATED ORAL at 14:53

## 2017-01-01 RX ADMIN — PIPERACILLIN AND TAZOBACTAM 3.38 G: 3; .375 INJECTION, POWDER, FOR SOLUTION INTRAVENOUS at 20:23

## 2017-01-01 RX ADMIN — MEROPENEM 500 MG: 500 INJECTION, POWDER, FOR SOLUTION INTRAVENOUS at 05:44

## 2017-01-01 RX ADMIN — Medication 13 ML: at 20:51

## 2017-01-01 RX ADMIN — ACETAMINOPHEN 325 MG: 160 SUSPENSION ORAL at 20:50

## 2017-01-01 RX ADMIN — PREDNISONE 20 MG: 5 SOLUTION ORAL at 08:09

## 2017-01-01 RX ADMIN — LIDOCAINE 1 PATCH: 50 PATCH TOPICAL at 07:43

## 2017-01-01 RX ADMIN — MORPHINE SULFATE 10 MG: 10 SOLUTION ORAL at 13:41

## 2017-01-01 RX ADMIN — Medication 1 CAPSULE: at 08:33

## 2017-01-01 RX ADMIN — Medication 160 MG: at 08:36

## 2017-01-01 RX ADMIN — IPRATROPIUM BROMIDE 0.5 MG: 0.5 SOLUTION RESPIRATORY (INHALATION) at 12:19

## 2017-01-01 RX ADMIN — PIPERACILLIN AND TAZOBACTAM 3.38 G: 3; .375 INJECTION, POWDER, FOR SOLUTION INTRAVENOUS at 08:59

## 2017-01-01 RX ADMIN — FLUTICASONE PROPIONATE 2 SPRAY: 50 SPRAY, METERED NASAL at 08:52

## 2017-01-01 RX ADMIN — PREDNISONE 20 MG: 5 SOLUTION ORAL at 09:19

## 2017-01-01 RX ADMIN — OXYCODONE HYDROCHLORIDE 5 MG: 5 TABLET ORAL at 16:54

## 2017-01-01 RX ADMIN — SODIUM PHOSPHATE 1 ENEMA: 7; 19 ENEMA RECTAL at 19:20

## 2017-01-01 RX ADMIN — Medication 1 CAPSULE: at 08:42

## 2017-01-01 RX ADMIN — SENNOSIDES AND DOCUSATE SODIUM 2 TABLET: 8.6; 5 TABLET ORAL at 08:30

## 2017-01-01 RX ADMIN — POLYETHYLENE GLYCOL 400 AND PROPYLENE GLYCOL 1 DROP: 4; 3 SOLUTION/ DROPS OPHTHALMIC at 16:15

## 2017-01-01 RX ADMIN — QUETIAPINE FUMARATE 75 MG: 50 TABLET, FILM COATED ORAL at 23:40

## 2017-01-01 RX ADMIN — FUROSEMIDE 20 MG: 10 INJECTION, SOLUTION INTRAVENOUS at 10:59

## 2017-01-01 RX ADMIN — LEVALBUTEROL HYDROCHLORIDE 1.25 MG: 1.25 SOLUTION RESPIRATORY (INHALATION) at 07:54

## 2017-01-01 RX ADMIN — GUAIFENESIN 200 MG: 100 SOLUTION ORAL at 08:37

## 2017-01-01 RX ADMIN — CALCIUM CARBONATE (ANTACID) CHEW TAB 500 MG 500 MG: 500 CHEW TAB at 23:39

## 2017-01-01 RX ADMIN — CLONAZEPAM 1.5 MG: 2 TABLET ORAL at 08:19

## 2017-01-01 RX ADMIN — ACETAMINOPHEN 650 MG: 160 SOLUTION ORAL at 02:08

## 2017-01-01 RX ADMIN — Medication 0.5 MG: at 15:17

## 2017-01-01 RX ADMIN — ACETAMINOPHEN 325 MG: 160 SUSPENSION ORAL at 11:29

## 2017-01-01 RX ADMIN — LEVALBUTEROL HYDROCHLORIDE 1.25 MG: 1.25 SOLUTION RESPIRATORY (INHALATION) at 04:37

## 2017-01-01 RX ADMIN — CLONAZEPAM 1 MG: 0.5 TABLET ORAL at 09:33

## 2017-01-01 RX ADMIN — CLONAZEPAM 1.5 MG: 2 TABLET ORAL at 12:13

## 2017-01-01 RX ADMIN — Medication 20 MG: at 19:53

## 2017-01-01 RX ADMIN — SENNOSIDES AND DOCUSATE SODIUM 2 TABLET: 8.6; 5 TABLET ORAL at 07:49

## 2017-01-01 RX ADMIN — QUETIAPINE 50 MG: 50 TABLET, FILM COATED ORAL at 20:07

## 2017-01-01 RX ADMIN — Medication 1.5 MG: at 21:50

## 2017-01-01 RX ADMIN — QUETIAPINE FUMARATE 50 MG: 50 TABLET, FILM COATED ORAL at 19:53

## 2017-01-01 RX ADMIN — Medication 13 ML: at 09:29

## 2017-01-01 RX ADMIN — ONDANSETRON 4 MG: 2 INJECTION INTRAMUSCULAR; INTRAVENOUS at 16:31

## 2017-01-01 RX ADMIN — ACETYLCYSTEINE 2 ML: 200 SOLUTION ORAL; RESPIRATORY (INHALATION) at 03:23

## 2017-01-01 RX ADMIN — GUAIFENESIN 10 ML: 100 SOLUTION ORAL at 08:44

## 2017-01-01 RX ADMIN — POLYETHYLENE GLYCOL 3350 17 G: 17 POWDER, FOR SOLUTION ORAL at 17:41

## 2017-01-01 RX ADMIN — FLUTICASONE PROPIONATE 2 SPRAY: 50 SPRAY, METERED NASAL at 08:06

## 2017-01-01 RX ADMIN — LEVALBUTEROL HYDROCHLORIDE 1.25 MG: 1.25 SOLUTION RESPIRATORY (INHALATION) at 15:40

## 2017-01-01 RX ADMIN — Medication 1 PACKET: at 21:10

## 2017-01-01 RX ADMIN — QUETIAPINE FUMARATE 25 MG: 25 TABLET, FILM COATED ORAL at 02:02

## 2017-01-01 RX ADMIN — MEROPENEM 1 G: 1 INJECTION, POWDER, FOR SOLUTION INTRAVENOUS at 18:23

## 2017-01-01 RX ADMIN — Medication 1 PACKET: at 08:19

## 2017-01-01 RX ADMIN — IPRATROPIUM BROMIDE 0.5 MG: 0.5 SOLUTION RESPIRATORY (INHALATION) at 08:41

## 2017-01-01 RX ADMIN — FEXOFENADINE HYDROCHLORIDE 180 MG: 180 TABLET, FILM COATED ORAL at 07:51

## 2017-01-01 RX ADMIN — POTASSIUM CHLORIDE 20 MEQ: 750 TABLET, EXTENDED RELEASE ORAL at 20:12

## 2017-01-01 RX ADMIN — FENTANYL CITRATE 50 MCG: 50 INJECTION, SOLUTION INTRAMUSCULAR; INTRAVENOUS at 10:25

## 2017-01-01 RX ADMIN — FAMOTIDINE 20 MG: 40 POWDER, FOR SUSPENSION ORAL at 20:51

## 2017-01-01 RX ADMIN — BUDESONIDE 0.5 MG: 0.5 INHALANT RESPIRATORY (INHALATION) at 08:04

## 2017-01-01 RX ADMIN — QUETIAPINE FUMARATE 50 MG: 50 TABLET, FILM COATED ORAL at 08:06

## 2017-01-01 RX ADMIN — BUDESONIDE 0.5 MG: 0.5 INHALANT RESPIRATORY (INHALATION) at 08:32

## 2017-01-01 RX ADMIN — Medication 1.5 MG: at 15:02

## 2017-01-01 RX ADMIN — SERTRALINE HYDROCHLORIDE 150 MG: 20 SOLUTION ORAL at 08:31

## 2017-01-01 RX ADMIN — CLONAZEPAM 1 MG: 0.5 TABLET ORAL at 14:19

## 2017-01-01 RX ADMIN — FEXOFENADINE HYDROCHLORIDE 180 MG: 180 TABLET, FILM COATED ORAL at 07:50

## 2017-01-01 RX ADMIN — FUROSEMIDE 20 MG: 10 INJECTION, SOLUTION INTRAVENOUS at 16:33

## 2017-01-01 RX ADMIN — BUDESONIDE 0.5 MG: 0.5 INHALANT RESPIRATORY (INHALATION) at 08:34

## 2017-01-01 RX ADMIN — QUETIAPINE FUMARATE 50 MG: 25 TABLET, FILM COATED ORAL at 08:30

## 2017-01-01 RX ADMIN — SERTRALINE HYDROCHLORIDE 100 MG: 20 SOLUTION ORAL at 08:03

## 2017-01-01 RX ADMIN — HYDROMORPHONE HYDROCHLORIDE 0.5 MG: 10 INJECTION, SOLUTION INTRAMUSCULAR; INTRAVENOUS; SUBCUTANEOUS at 18:27

## 2017-01-01 RX ADMIN — GABAPENTIN 300 MG: 250 SOLUTION ORAL at 08:20

## 2017-01-01 RX ADMIN — LEVALBUTEROL HYDROCHLORIDE 1.25 MG: 1.25 SOLUTION RESPIRATORY (INHALATION) at 15:12

## 2017-01-01 RX ADMIN — Medication 1 CAPSULE: at 19:29

## 2017-01-01 RX ADMIN — GUAIFENESIN 200 MG: 100 SOLUTION ORAL at 20:07

## 2017-01-01 RX ADMIN — GABAPENTIN 300 MG: 250 SOLUTION ORAL at 08:10

## 2017-01-01 RX ADMIN — ONDANSETRON 4 MG: 2 INJECTION INTRAMUSCULAR; INTRAVENOUS at 04:31

## 2017-01-01 RX ADMIN — HYDROMORPHONE HYDROCHLORIDE 1 MG: 1 SOLUTION ORAL at 06:57

## 2017-01-01 RX ADMIN — HYDROMORPHONE HYDROCHLORIDE 1 MG: 1 SOLUTION ORAL at 01:35

## 2017-01-01 RX ADMIN — Medication 13 ML: at 08:21

## 2017-01-01 RX ADMIN — SERTRALINE HYDROCHLORIDE 150 MG: 20 SOLUTION ORAL at 12:59

## 2017-01-01 RX ADMIN — BUDESONIDE 0.5 MG: 0.5 INHALANT RESPIRATORY (INHALATION) at 20:25

## 2017-01-01 RX ADMIN — QUETIAPINE FUMARATE 50 MG: 50 TABLET, FILM COATED ORAL at 13:20

## 2017-01-01 RX ADMIN — HEPARIN SODIUM 5000 UNITS: 5000 INJECTION, SOLUTION INTRAVENOUS; SUBCUTANEOUS at 14:02

## 2017-01-01 RX ADMIN — QUETIAPINE FUMARATE 50 MG: 50 TABLET, FILM COATED ORAL at 20:09

## 2017-01-01 RX ADMIN — CLARITHROMYCIN 500 MG: 500 TABLET, FILM COATED ORAL at 08:33

## 2017-01-01 RX ADMIN — PREDNISONE 20 MG: 5 SOLUTION ORAL at 08:04

## 2017-01-01 RX ADMIN — GUAIFENESIN 10 ML: 100 SOLUTION ORAL at 11:37

## 2017-01-01 RX ADMIN — GUAIFENESIN 200 MG: 100 SOLUTION ORAL at 11:45

## 2017-01-01 RX ADMIN — CLONAZEPAM 1 MG: 0.5 TABLET ORAL at 19:38

## 2017-01-01 RX ADMIN — GABAPENTIN 300 MG: 250 SUSPENSION ORAL at 15:06

## 2017-01-01 RX ADMIN — ONDANSETRON 4 MG: 2 INJECTION INTRAMUSCULAR; INTRAVENOUS at 10:23

## 2017-01-01 RX ADMIN — HYDROMORPHONE HYDROCHLORIDE 1 MG: 1 SOLUTION ORAL at 19:04

## 2017-01-01 RX ADMIN — AMPICILLIN SODIUM AND SULBACTAM SODIUM 3 G: 2; 1 INJECTION, POWDER, FOR SOLUTION INTRAMUSCULAR; INTRAVENOUS at 16:53

## 2017-01-01 RX ADMIN — BUDESONIDE 0.5 MG: 0.5 INHALANT RESPIRATORY (INHALATION) at 09:50

## 2017-01-01 RX ADMIN — CLONAZEPAM 1.5 MG: 2 TABLET ORAL at 19:40

## 2017-01-01 RX ADMIN — Medication 2 G: at 05:07

## 2017-01-01 RX ADMIN — Medication 1 PACKET: at 16:15

## 2017-01-01 RX ADMIN — GUAIFENESIN 200 MG: 100 SOLUTION ORAL at 12:59

## 2017-01-01 RX ADMIN — HYDROMORPHONE HYDROCHLORIDE 1 MG: 1 SOLUTION ORAL at 08:37

## 2017-01-01 RX ADMIN — MULTIVITAMIN 15 ML: LIQUID ORAL at 11:00

## 2017-01-01 RX ADMIN — Medication 0.5 MG: at 06:11

## 2017-01-01 RX ADMIN — FUROSEMIDE 40 MG: 10 INJECTION, SOLUTION INTRAVENOUS at 11:56

## 2017-01-01 RX ADMIN — IPRATROPIUM BROMIDE 0.5 MG: 0.5 SOLUTION RESPIRATORY (INHALATION) at 00:23

## 2017-01-01 RX ADMIN — Medication 1.5 MG: at 08:35

## 2017-01-01 RX ADMIN — MORPHINE SULFATE 10 MG: 10 SOLUTION ORAL at 19:38

## 2017-01-01 RX ADMIN — POLYETHYLENE GLYCOL 3350 17 G: 17 POWDER, FOR SOLUTION ORAL at 08:52

## 2017-01-01 RX ADMIN — HYDROMORPHONE HYDROCHLORIDE 1 MG: 1 SOLUTION ORAL at 10:37

## 2017-01-01 RX ADMIN — MORPHINE SULFATE 10 MG: 10 SOLUTION ORAL at 14:08

## 2017-01-01 RX ADMIN — QUETIAPINE FUMARATE 25 MG: 25 TABLET, FILM COATED ORAL at 23:40

## 2017-01-01 RX ADMIN — HYDROMORPHONE HYDROCHLORIDE 1 MG: 1 SOLUTION ORAL at 06:11

## 2017-01-01 RX ADMIN — SERTRALINE HYDROCHLORIDE 150 MG: 20 SOLUTION ORAL at 08:36

## 2017-01-01 RX ADMIN — Medication 1 PACKET: at 08:09

## 2017-01-01 RX ADMIN — Medication 1.5 MG: at 23:56

## 2017-01-01 RX ADMIN — LEVALBUTEROL 1.25 MG: 1.25 SOLUTION RESPIRATORY (INHALATION) at 00:04

## 2017-01-01 RX ADMIN — METHYLNALTREXONE BROMIDE 8 MG: 12 INJECTION, SOLUTION SUBCUTANEOUS at 08:25

## 2017-01-01 RX ADMIN — CLONAZEPAM 1.5 MG: 2 TABLET ORAL at 20:41

## 2017-01-01 RX ADMIN — IPRATROPIUM BROMIDE 0.5 MG: 0.5 SOLUTION RESPIRATORY (INHALATION) at 08:14

## 2017-01-01 RX ADMIN — QUETIAPINE FUMARATE 50 MG: 50 TABLET, FILM COATED ORAL at 19:28

## 2017-01-01 RX ADMIN — AMOXICILLIN AND CLAVULANATE POTASSIUM 1 TABLET: 875; 125 TABLET, FILM COATED ORAL at 09:48

## 2017-01-01 RX ADMIN — HYDROMORPHONE HYDROCHLORIDE 0.5 MG: 10 INJECTION, SOLUTION INTRAMUSCULAR; INTRAVENOUS; SUBCUTANEOUS at 04:56

## 2017-01-01 RX ADMIN — SODIUM CHLORIDE, PRESERVATIVE FREE 5 ML: 5 INJECTION INTRAVENOUS at 17:53

## 2017-01-01 RX ADMIN — Medication 1.5 MG: at 19:54

## 2017-01-01 RX ADMIN — Medication 1.5 MG: at 03:34

## 2017-01-01 RX ADMIN — BUDESONIDE 0.5 MG: 0.5 INHALANT RESPIRATORY (INHALATION) at 19:55

## 2017-01-01 RX ADMIN — ACETAMINOPHEN 650 MG: 160 SOLUTION ORAL at 19:29

## 2017-01-01 RX ADMIN — BUDESONIDE 0.5 MG: 0.5 INHALANT RESPIRATORY (INHALATION) at 08:57

## 2017-01-01 RX ADMIN — LEVALBUTEROL HYDROCHLORIDE 1.25 MG: 1.25 SOLUTION RESPIRATORY (INHALATION) at 08:24

## 2017-01-01 RX ADMIN — SERTRALINE HYDROCHLORIDE 150 MG: 20 SOLUTION ORAL at 08:49

## 2017-01-01 RX ADMIN — GABAPENTIN 300 MG: 250 SUSPENSION ORAL at 00:01

## 2017-01-01 RX ADMIN — Medication 1 CAPSULE: at 07:21

## 2017-01-01 RX ADMIN — IPRATROPIUM BROMIDE 0.5 MG: 0.5 SOLUTION RESPIRATORY (INHALATION) at 15:55

## 2017-01-01 RX ADMIN — MEROPENEM 1 G: 1 INJECTION, POWDER, FOR SOLUTION INTRAVENOUS at 08:40

## 2017-01-01 RX ADMIN — LABETALOL HYDROCHLORIDE 20 MG: 5 INJECTION, SOLUTION INTRAVENOUS at 17:17

## 2017-01-01 RX ADMIN — HEPARIN SODIUM 5000 UNITS: 5000 INJECTION, SOLUTION INTRAVENOUS; SUBCUTANEOUS at 00:24

## 2017-01-01 RX ADMIN — HYDROMORPHONE HYDROCHLORIDE 1 MG: 1 SOLUTION ORAL at 18:09

## 2017-01-01 RX ADMIN — Medication 1 CAPSULE: at 20:05

## 2017-01-01 RX ADMIN — POLYETHYLENE GLYCOL 3350 17 G: 17 POWDER, FOR SOLUTION ORAL at 08:03

## 2017-01-01 RX ADMIN — MEROPENEM 500 MG: 500 INJECTION, POWDER, FOR SOLUTION INTRAVENOUS at 10:57

## 2017-01-01 RX ADMIN — CLONAZEPAM 1.5 MG: 2 TABLET ORAL at 19:45

## 2017-01-01 RX ADMIN — Medication 160 MG: at 08:06

## 2017-01-01 RX ADMIN — Medication 1 PACKET: at 14:12

## 2017-01-01 RX ADMIN — IPRATROPIUM BROMIDE 0.5 MG: 0.5 SOLUTION RESPIRATORY (INHALATION) at 23:56

## 2017-01-01 RX ADMIN — Medication 1 CAPSULE: at 09:05

## 2017-01-01 RX ADMIN — MORPHINE SULFATE 10 MG: 10 SOLUTION ORAL at 22:12

## 2017-01-01 RX ADMIN — LEVALBUTEROL 1.25 MG: 1.25 SOLUTION RESPIRATORY (INHALATION) at 00:28

## 2017-01-01 RX ADMIN — IPRATROPIUM BROMIDE 0.5 MG: 0.5 SOLUTION RESPIRATORY (INHALATION) at 12:21

## 2017-01-01 RX ADMIN — Medication 1 PACKET: at 08:12

## 2017-01-01 RX ADMIN — CLONAZEPAM 1.5 MG: 2 TABLET ORAL at 15:47

## 2017-01-01 RX ADMIN — GABAPENTIN 300 MG: 250 SUSPENSION ORAL at 01:06

## 2017-01-01 RX ADMIN — ASPIRIN 81 MG CHEWABLE TABLET 81 MG: 81 TABLET CHEWABLE at 08:44

## 2017-01-01 RX ADMIN — GUAIFENESIN 10 ML: 100 SOLUTION ORAL at 15:54

## 2017-01-01 RX ADMIN — MULTIVITAMIN 15 ML: LIQUID ORAL at 08:34

## 2017-01-01 RX ADMIN — QUETIAPINE FUMARATE 50 MG: 50 TABLET, FILM COATED ORAL at 14:29

## 2017-01-01 RX ADMIN — LIDOCAINE 1 PATCH: 50 PATCH CUTANEOUS at 09:25

## 2017-01-01 RX ADMIN — ONDANSETRON 4 MG: 2 INJECTION INTRAMUSCULAR; INTRAVENOUS at 10:12

## 2017-01-01 RX ADMIN — GUAIFENESIN 200 MG: 100 SOLUTION ORAL at 15:49

## 2017-01-01 RX ADMIN — ENOXAPARIN SODIUM 40 MG: 40 INJECTION SUBCUTANEOUS at 23:38

## 2017-01-01 RX ADMIN — IPRATROPIUM BROMIDE 0.5 MG: 0.5 SOLUTION RESPIRATORY (INHALATION) at 00:28

## 2017-01-01 RX ADMIN — HYDROMORPHONE HYDROCHLORIDE 1 MG: 1 SOLUTION ORAL at 18:50

## 2017-01-01 RX ADMIN — ATORVASTATIN CALCIUM 20 MG: 20 TABLET, FILM COATED ORAL at 08:28

## 2017-01-01 RX ADMIN — LORAZEPAM 0.26 MG: 2 SOLUTION, CONCENTRATE ORAL at 14:23

## 2017-01-01 RX ADMIN — PIPERACILLIN AND TAZOBACTAM 3.38 G: 3; .375 INJECTION, POWDER, FOR SOLUTION INTRAVENOUS at 19:26

## 2017-01-01 RX ADMIN — ONDANSETRON 4 MG: 2 INJECTION INTRAMUSCULAR; INTRAVENOUS at 01:18

## 2017-01-01 RX ADMIN — SIMETHICONE 125 MG: 20 SUSPENSION/ DROPS ORAL at 19:02

## 2017-01-01 RX ADMIN — SODIUM PHOSPHATE, MONOBASIC, MONOHYDRATE AND SODIUM PHOSPHATE, DIBASIC, ANHYDROUS 15 MMOL: 276; 142 INJECTION, SOLUTION INTRAVENOUS at 06:31

## 2017-01-01 RX ADMIN — ASPIRIN 81 MG CHEWABLE TABLET 81 MG: 81 TABLET CHEWABLE at 08:01

## 2017-01-01 RX ADMIN — ENOXAPARIN SODIUM 40 MG: 40 INJECTION SUBCUTANEOUS at 08:10

## 2017-01-01 RX ADMIN — FEXOFENADINE HYDROCHLORIDE 180 MG: 180 TABLET, FILM COATED ORAL at 08:52

## 2017-01-01 RX ADMIN — ACETAMINOPHEN 650 MG: 160 SOLUTION ORAL at 07:49

## 2017-01-01 RX ADMIN — LIDOCAINE HYDROCHLORIDE 2 ML: 10 INJECTION, SOLUTION INFILTRATION; PERINEURAL at 18:39

## 2017-01-01 RX ADMIN — SERTRALINE HYDROCHLORIDE 150 MG: 20 SOLUTION ORAL at 20:12

## 2017-01-01 RX ADMIN — Medication 160 MG: at 09:20

## 2017-01-01 RX ADMIN — IPRATROPIUM BROMIDE 0.5 MG: 0.5 SOLUTION RESPIRATORY (INHALATION) at 12:46

## 2017-01-01 RX ADMIN — HYDROMORPHONE HYDROCHLORIDE 1 MG: 1 SOLUTION ORAL at 03:34

## 2017-01-01 RX ADMIN — Medication 1.5 MG: at 11:29

## 2017-01-01 RX ADMIN — Medication 1 PACKET: at 07:50

## 2017-01-01 RX ADMIN — CLONAZEPAM 1.5 MG: 2 TABLET ORAL at 20:28

## 2017-01-01 RX ADMIN — BUDESONIDE 0.5 MG: 0.5 INHALANT RESPIRATORY (INHALATION) at 08:27

## 2017-01-01 RX ADMIN — MORPHINE SULFATE 4 MG: 2 INJECTION, SOLUTION INTRAMUSCULAR; INTRAVENOUS at 12:30

## 2017-01-01 RX ADMIN — LEVALBUTEROL HYDROCHLORIDE 1.25 MG: 1.25 SOLUTION RESPIRATORY (INHALATION) at 09:14

## 2017-01-01 RX ADMIN — CLONAZEPAM 1.5 MG: 2 TABLET ORAL at 08:32

## 2017-01-01 RX ADMIN — Medication 1 PACKET: at 20:11

## 2017-01-01 RX ADMIN — HYDROMORPHONE HYDROCHLORIDE 1 MG: 1 SOLUTION ORAL at 05:56

## 2017-01-01 RX ADMIN — AMPICILLIN SODIUM AND SULBACTAM SODIUM 3 G: 2; 1 INJECTION, POWDER, FOR SOLUTION INTRAMUSCULAR; INTRAVENOUS at 22:01

## 2017-01-01 RX ADMIN — Medication 20 MG: at 20:09

## 2017-01-01 RX ADMIN — Medication 0.5 MG: at 05:17

## 2017-01-01 RX ADMIN — HEPARIN SODIUM 5000 UNITS: 5000 INJECTION, SOLUTION INTRAVENOUS; SUBCUTANEOUS at 15:48

## 2017-01-01 RX ADMIN — HYDROMORPHONE HYDROCHLORIDE 1 MG: 1 SOLUTION ORAL at 05:39

## 2017-01-01 RX ADMIN — HYDROMORPHONE HYDROCHLORIDE 1 MG: 1 SOLUTION ORAL at 08:53

## 2017-01-01 RX ADMIN — Medication 0.5 MG: at 16:04

## 2017-01-01 RX ADMIN — HYDROMORPHONE HYDROCHLORIDE 1 MG: 1 SOLUTION ORAL at 20:52

## 2017-01-01 RX ADMIN — CLONAZEPAM 1.5 MG: 2 TABLET ORAL at 16:52

## 2017-01-01 RX ADMIN — POTASSIUM CHLORIDE 20 MEQ: 29.8 INJECTION, SOLUTION INTRAVENOUS at 10:02

## 2017-01-01 RX ADMIN — HYDROMORPHONE HYDROCHLORIDE 1 MG: 1 SOLUTION ORAL at 02:52

## 2017-01-01 RX ADMIN — Medication 1.5 MG: at 08:48

## 2017-01-01 RX ADMIN — GABAPENTIN 300 MG: 250 SUSPENSION ORAL at 15:55

## 2017-01-01 RX ADMIN — Medication 3 MG: at 21:49

## 2017-01-01 RX ADMIN — QUETIAPINE FUMARATE 75 MG: 50 TABLET, FILM COATED ORAL at 21:02

## 2017-01-01 RX ADMIN — IPRATROPIUM BROMIDE 0.5 MG: 0.5 SOLUTION RESPIRATORY (INHALATION) at 16:05

## 2017-01-01 RX ADMIN — LEVALBUTEROL HYDROCHLORIDE 1.25 MG: 1.25 SOLUTION RESPIRATORY (INHALATION) at 21:17

## 2017-01-01 RX ADMIN — Medication 1 MG: at 14:54

## 2017-01-01 RX ADMIN — Medication 15 ML: at 10:59

## 2017-01-01 RX ADMIN — MEROPENEM 500 MG: 500 INJECTION, POWDER, FOR SOLUTION INTRAVENOUS at 04:42

## 2017-01-01 RX ADMIN — VANCOMYCIN HYDROCHLORIDE 1000 MG: 1 INJECTION, SOLUTION INTRAVENOUS at 10:09

## 2017-01-01 RX ADMIN — BUDESONIDE 0.5 MG: 0.5 INHALANT RESPIRATORY (INHALATION) at 00:10

## 2017-01-01 RX ADMIN — BUDESONIDE 0.5 MG: 0.5 INHALANT RESPIRATORY (INHALATION) at 21:55

## 2017-01-01 RX ADMIN — Medication 1.5 MG: at 03:19

## 2017-01-01 RX ADMIN — LEVALBUTEROL 1.25 MG: 1.25 SOLUTION RESPIRATORY (INHALATION) at 00:01

## 2017-01-01 RX ADMIN — METHYLNALTREXONE BROMIDE 8 MG: 12 INJECTION, SOLUTION SUBCUTANEOUS at 08:20

## 2017-01-01 RX ADMIN — LEVALBUTEROL 1.25 MG: 1.25 SOLUTION RESPIRATORY (INHALATION) at 20:35

## 2017-01-01 RX ADMIN — MULTIVITAMIN 15 ML: LIQUID ORAL at 08:18

## 2017-01-01 RX ADMIN — Medication 1 PACKET: at 07:49

## 2017-01-01 RX ADMIN — LEVALBUTEROL 1.25 MG: 1.25 SOLUTION RESPIRATORY (INHALATION) at 00:18

## 2017-01-01 RX ADMIN — Medication 1.5 MG: at 10:47

## 2017-01-01 RX ADMIN — LEVALBUTEROL HYDROCHLORIDE 1.25 MG: 1.25 SOLUTION RESPIRATORY (INHALATION) at 11:29

## 2017-01-01 RX ADMIN — GUAIFENESIN 10 ML: 100 SOLUTION ORAL at 08:24

## 2017-01-01 RX ADMIN — SERTRALINE HYDROCHLORIDE 150 MG: 20 SOLUTION ORAL at 08:10

## 2017-01-01 RX ADMIN — LEVALBUTEROL HYDROCHLORIDE 1.25 MG: 1.25 SOLUTION RESPIRATORY (INHALATION) at 08:30

## 2017-01-01 RX ADMIN — Medication 1.5 MG: at 17:25

## 2017-01-01 RX ADMIN — ACETYLCYSTEINE 2 ML: 200 SOLUTION ORAL; RESPIRATORY (INHALATION) at 08:04

## 2017-01-01 RX ADMIN — QUETIAPINE FUMARATE 25 MG: 25 TABLET, FILM COATED ORAL at 20:28

## 2017-01-01 RX ADMIN — CLONAZEPAM 1.5 MG: 2 TABLET ORAL at 11:20

## 2017-01-01 RX ADMIN — PREDNISONE 20 MG: 20 TABLET ORAL at 10:59

## 2017-01-01 RX ADMIN — FEXOFENADINE HYDROCHLORIDE 180 MG: 180 TABLET, FILM COATED ORAL at 07:43

## 2017-01-01 RX ADMIN — Medication 1.5 MG: at 09:16

## 2017-01-01 RX ADMIN — ACETAMINOPHEN 325 MG: 160 SUSPENSION ORAL at 03:49

## 2017-01-01 RX ADMIN — IPRATROPIUM BROMIDE 0.5 MG: 0.5 SOLUTION RESPIRATORY (INHALATION) at 00:01

## 2017-01-01 RX ADMIN — Medication 1 MG: at 12:43

## 2017-01-01 RX ADMIN — SODIUM CHLORIDE 500 ML: 9 INJECTION, SOLUTION INTRAVENOUS at 09:39

## 2017-01-01 RX ADMIN — FAMOTIDINE 20 MG: 40 POWDER, FOR SUSPENSION ORAL at 08:34

## 2017-01-01 RX ADMIN — QUETIAPINE FUMARATE 25 MG: 25 TABLET, FILM COATED ORAL at 20:50

## 2017-01-01 RX ADMIN — BUDESONIDE 0.5 MG: 0.5 INHALANT RESPIRATORY (INHALATION) at 21:08

## 2017-01-01 RX ADMIN — Medication 1 PACKET: at 22:01

## 2017-01-01 RX ADMIN — FAMOTIDINE 20 MG: 40 POWDER, FOR SUSPENSION ORAL at 20:10

## 2017-01-01 RX ADMIN — GABAPENTIN 300 MG: 250 SOLUTION ORAL at 08:28

## 2017-01-01 RX ADMIN — Medication 1 PACKET: at 20:52

## 2017-01-01 RX ADMIN — LEVALBUTEROL HYDROCHLORIDE 1.25 MG: 1.25 SOLUTION RESPIRATORY (INHALATION) at 09:03

## 2017-01-01 RX ADMIN — Medication 13 ML: at 08:35

## 2017-01-01 RX ADMIN — HEPARIN SODIUM 5000 UNITS: 5000 INJECTION, SOLUTION INTRAVENOUS; SUBCUTANEOUS at 00:51

## 2017-01-01 RX ADMIN — GUAIFENESIN 200 MG: 100 SOLUTION ORAL at 15:33

## 2017-01-01 RX ADMIN — MULTIVITAMIN 15 ML: LIQUID ORAL at 07:21

## 2017-01-01 RX ADMIN — Medication 1 MG: at 03:51

## 2017-01-01 RX ADMIN — MEROPENEM 1 G: 1 INJECTION, POWDER, FOR SOLUTION INTRAVENOUS at 18:45

## 2017-01-01 RX ADMIN — MORPHINE SULFATE 10 MG: 10 SOLUTION ORAL at 15:43

## 2017-01-01 RX ADMIN — Medication 0.5 MG: at 21:53

## 2017-01-01 RX ADMIN — IPRATROPIUM BROMIDE 0.5 MG: 0.5 SOLUTION RESPIRATORY (INHALATION) at 01:07

## 2017-01-01 RX ADMIN — ACETAMINOPHEN 650 MG: 160 SOLUTION ORAL at 23:27

## 2017-01-01 RX ADMIN — QUETIAPINE FUMARATE 25 MG: 25 TABLET, FILM COATED ORAL at 20:18

## 2017-01-01 RX ADMIN — LEVALBUTEROL HYDROCHLORIDE 1.25 MG: 1.25 SOLUTION RESPIRATORY (INHALATION) at 16:05

## 2017-01-01 RX ADMIN — SIMETHICONE 125 MG: 20 SUSPENSION/ DROPS ORAL at 20:10

## 2017-01-01 RX ADMIN — QUETIAPINE FUMARATE 75 MG: 50 TABLET, FILM COATED ORAL at 22:00

## 2017-01-01 RX ADMIN — Medication 1.5 MG: at 09:11

## 2017-01-01 RX ADMIN — Medication 0.5 MG: at 09:36

## 2017-01-01 RX ADMIN — Medication 0.5 MG: at 21:41

## 2017-01-01 RX ADMIN — BUDESONIDE 0.5 MG: 0.5 INHALANT RESPIRATORY (INHALATION) at 00:27

## 2017-01-01 RX ADMIN — Medication 0.5 MG: at 00:22

## 2017-01-01 RX ADMIN — Medication 0.5 MG: at 12:00

## 2017-01-01 RX ADMIN — HEPARIN SODIUM 5000 UNITS: 5000 INJECTION, SOLUTION INTRAVENOUS; SUBCUTANEOUS at 23:50

## 2017-01-01 RX ADMIN — LEVALBUTEROL HYDROCHLORIDE 1.25 MG: 1.25 SOLUTION RESPIRATORY (INHALATION) at 11:54

## 2017-01-01 RX ADMIN — FEXOFENADINE HYDROCHLORIDE 180 MG: 180 TABLET, FILM COATED ORAL at 08:11

## 2017-01-01 RX ADMIN — SODIUM CHLORIDE, POTASSIUM CHLORIDE, SODIUM LACTATE AND CALCIUM CHLORIDE: 600; 310; 30; 20 INJECTION, SOLUTION INTRAVENOUS at 12:33

## 2017-01-01 RX ADMIN — GUAIFENESIN 10 ML: 100 SOLUTION ORAL at 08:52

## 2017-01-01 RX ADMIN — Medication 1.5 MG: at 11:50

## 2017-01-01 RX ADMIN — SODIUM CHLORIDE, PRESERVATIVE FREE 5 ML: 5 INJECTION INTRAVENOUS at 15:43

## 2017-01-01 RX ADMIN — SODIUM CHLORIDE, POTASSIUM CHLORIDE, SODIUM LACTATE AND CALCIUM CHLORIDE: 600; 310; 30; 20 INJECTION, SOLUTION INTRAVENOUS at 08:19

## 2017-01-01 RX ADMIN — QUETIAPINE 50 MG: 50 TABLET, FILM COATED ORAL at 08:07

## 2017-01-01 RX ADMIN — Medication 160 MG: at 07:54

## 2017-01-01 RX ADMIN — GUAIFENESIN 200 MG: 100 SOLUTION ORAL at 11:01

## 2017-01-01 RX ADMIN — SODIUM CHLORIDE, PRESERVATIVE FREE 5 ML: 5 INJECTION INTRAVENOUS at 01:07

## 2017-01-01 RX ADMIN — Medication 1 PACKET: at 19:34

## 2017-01-01 RX ADMIN — ONDANSETRON 4 MG: 2 INJECTION INTRAMUSCULAR; INTRAVENOUS at 08:34

## 2017-01-01 RX ADMIN — IPRATROPIUM BROMIDE 0.5 MG: 0.5 SOLUTION RESPIRATORY (INHALATION) at 08:29

## 2017-01-01 RX ADMIN — Medication 0.5 MG: at 00:32

## 2017-01-01 RX ADMIN — MORPHINE SULFATE 10 MG: 10 SOLUTION ORAL at 14:10

## 2017-01-01 RX ADMIN — LEVALBUTEROL 1.25 MG: 1.25 SOLUTION RESPIRATORY (INHALATION) at 08:33

## 2017-01-01 RX ADMIN — POLYPROPYLENE GLYCOL 400, PROPYLENE GLYCOL 2 DROP: .4; .3 LIQUID OPHTHALMIC at 16:00

## 2017-01-01 RX ADMIN — IPRATROPIUM BROMIDE 0.5 MG: 0.5 SOLUTION RESPIRATORY (INHALATION) at 15:37

## 2017-01-01 RX ADMIN — LIDOCAINE HYDROCHLORIDE 30 ML: 20 SOLUTION ORAL; TOPICAL at 04:20

## 2017-01-01 RX ADMIN — IPRATROPIUM BROMIDE 0.5 MG: 0.5 SOLUTION RESPIRATORY (INHALATION) at 00:02

## 2017-01-01 RX ADMIN — LEVALBUTEROL HYDROCHLORIDE 1.25 MG: 1.25 SOLUTION RESPIRATORY (INHALATION) at 12:07

## 2017-01-01 RX ADMIN — Medication 0.5 MG: at 21:58

## 2017-01-01 RX ADMIN — CLONAZEPAM 1.5 MG: 2 TABLET ORAL at 08:57

## 2017-01-01 RX ADMIN — SIMETHICONE 125 MG: 20 SUSPENSION/ DROPS ORAL at 08:57

## 2017-01-01 RX ADMIN — LEVALBUTEROL 1.25 MG: 1.25 SOLUTION RESPIRATORY (INHALATION) at 15:46

## 2017-01-01 RX ADMIN — GABAPENTIN 300 MG: 250 SUSPENSION ORAL at 09:00

## 2017-01-01 RX ADMIN — Medication 3 MG: at 21:43

## 2017-01-01 RX ADMIN — SERTRALINE HYDROCHLORIDE 150 MG: 20 SOLUTION ORAL at 08:56

## 2017-01-01 RX ADMIN — Medication 20 MG: at 08:46

## 2017-01-01 RX ADMIN — HYDROMORPHONE HYDROCHLORIDE 1 MG: 1 SOLUTION ORAL at 20:28

## 2017-01-01 RX ADMIN — CALCIUM CARBONATE (ANTACID) CHEW TAB 500 MG 500 MG: 500 CHEW TAB at 11:34

## 2017-01-01 RX ADMIN — LEVALBUTEROL HYDROCHLORIDE 1.25 MG: 1.25 SOLUTION RESPIRATORY (INHALATION) at 12:40

## 2017-01-01 RX ADMIN — LEVALBUTEROL 1.25 MG: 1.25 SOLUTION RESPIRATORY (INHALATION) at 16:30

## 2017-01-01 RX ADMIN — CLONAZEPAM 1.5 MG: 2 TABLET ORAL at 07:59

## 2017-01-01 RX ADMIN — DEXTROSE MONOHYDRATE: 100 INJECTION, SOLUTION INTRAVENOUS at 11:11

## 2017-01-01 RX ADMIN — ONDANSETRON 4 MG: 2 INJECTION INTRAMUSCULAR; INTRAVENOUS at 18:33

## 2017-01-01 RX ADMIN — GUAIFENESIN 200 MG: 100 SOLUTION ORAL at 08:48

## 2017-01-01 RX ADMIN — AMPICILLIN SODIUM AND SULBACTAM SODIUM 3 G: 2; 1 INJECTION, POWDER, FOR SOLUTION INTRAMUSCULAR; INTRAVENOUS at 16:04

## 2017-01-01 RX ADMIN — MULTIVITAMIN 15 ML: LIQUID ORAL at 09:01

## 2017-01-01 RX ADMIN — Medication 0.5 MG: at 21:51

## 2017-01-01 RX ADMIN — GABAPENTIN 300 MG: 250 SUSPENSION ORAL at 00:56

## 2017-01-01 RX ADMIN — MULTIVITAMIN 15 ML: LIQUID ORAL at 08:03

## 2017-01-01 RX ADMIN — SODIUM CHLORIDE, PRESERVATIVE FREE 5 ML: 5 INJECTION INTRAVENOUS at 12:34

## 2017-01-01 RX ADMIN — MORPHINE SULFATE 10 MG: 10 SOLUTION ORAL at 22:27

## 2017-01-01 RX ADMIN — LEVALBUTEROL 1.25 MG: 1.25 SOLUTION RESPIRATORY (INHALATION) at 16:18

## 2017-01-01 RX ADMIN — IPRATROPIUM BROMIDE 0.5 MG: 0.5 SOLUTION RESPIRATORY (INHALATION) at 12:18

## 2017-01-01 RX ADMIN — AMPICILLIN SODIUM AND SULBACTAM SODIUM 3 G: 2; 1 INJECTION, POWDER, FOR SOLUTION INTRAMUSCULAR; INTRAVENOUS at 22:32

## 2017-01-01 RX ADMIN — HYDROMORPHONE HYDROCHLORIDE 1 MG: 1 SOLUTION ORAL at 20:23

## 2017-01-01 RX ADMIN — IPRATROPIUM BROMIDE 0.5 MG: 0.5 SOLUTION RESPIRATORY (INHALATION) at 20:35

## 2017-01-01 RX ADMIN — HEPARIN SODIUM 5000 UNITS: 5000 INJECTION, SOLUTION INTRAVENOUS; SUBCUTANEOUS at 01:30

## 2017-01-01 RX ADMIN — ACETYLCYSTEINE 2 ML: 200 SOLUTION ORAL; RESPIRATORY (INHALATION) at 00:01

## 2017-01-01 RX ADMIN — Medication 0.5 MG: at 19:01

## 2017-01-01 RX ADMIN — HYDROMORPHONE HYDROCHLORIDE 1 MG: 1 SOLUTION ORAL at 12:30

## 2017-01-01 RX ADMIN — Medication 0.5 MG: at 12:05

## 2017-01-01 RX ADMIN — POLYETHYLENE GLYCOL 400, PROPYLENE GLYCOL 1 DROP: .4; .3 LIQUID OPHTHALMIC at 22:20

## 2017-01-01 RX ADMIN — IPRATROPIUM BROMIDE 0.5 MG: 0.5 SOLUTION RESPIRATORY (INHALATION) at 08:03

## 2017-01-01 RX ADMIN — Medication 0.5 MG: at 10:00

## 2017-01-01 RX ADMIN — FEXOFENADINE HYDROCHLORIDE 180 MG: 180 TABLET, FILM COATED ORAL at 08:36

## 2017-01-01 RX ADMIN — SODIUM CHLORIDE, PRESERVATIVE FREE 5 ML: 5 INJECTION INTRAVENOUS at 18:02

## 2017-01-01 RX ADMIN — HYDROMORPHONE HYDROCHLORIDE 1 MG: 1 SOLUTION ORAL at 20:44

## 2017-01-01 RX ADMIN — Medication 0.5 MG: at 19:28

## 2017-01-01 RX ADMIN — SIMETHICONE 125 MG: 20 SUSPENSION/ DROPS ORAL at 08:07

## 2017-01-01 RX ADMIN — MEROPENEM 500 MG: 500 INJECTION, POWDER, FOR SOLUTION INTRAVENOUS at 17:24

## 2017-01-01 RX ADMIN — GABAPENTIN 300 MG: 250 SUSPENSION ORAL at 07:58

## 2017-01-01 RX ADMIN — LORAZEPAM 1 MG: 2 INJECTION INTRAMUSCULAR; INTRAVENOUS at 12:20

## 2017-01-01 RX ADMIN — LEVALBUTEROL HYDROCHLORIDE 1.25 MG: 1.25 SOLUTION RESPIRATORY (INHALATION) at 07:57

## 2017-01-01 RX ADMIN — LEVALBUTEROL HYDROCHLORIDE 1.25 MG: 1.25 SOLUTION RESPIRATORY (INHALATION) at 08:51

## 2017-01-01 RX ADMIN — Medication 1.5 MG: at 17:22

## 2017-01-01 RX ADMIN — Medication 1 MG: at 10:34

## 2017-01-01 RX ADMIN — IPRATROPIUM BROMIDE 0.5 MG: 0.5 SOLUTION RESPIRATORY (INHALATION) at 08:16

## 2017-01-01 RX ADMIN — BUDESONIDE 0.5 MG: 0.5 INHALANT RESPIRATORY (INHALATION) at 21:02

## 2017-01-01 RX ADMIN — DOCUSATE SODIUM 100 MG: 50 LIQUID ORAL at 08:20

## 2017-01-01 RX ADMIN — IPRATROPIUM BROMIDE 0.5 MG: 0.5 SOLUTION RESPIRATORY (INHALATION) at 08:39

## 2017-01-01 RX ADMIN — SERTRALINE HYDROCHLORIDE 150 MG: 20 SOLUTION ORAL at 08:05

## 2017-01-01 RX ADMIN — Medication 0.5 MG: at 05:10

## 2017-01-01 RX ADMIN — MORPHINE SULFATE 10 MG: 10 SOLUTION ORAL at 07:43

## 2017-01-01 RX ADMIN — HYDROMORPHONE HYDROCHLORIDE 1 MG: 1 SOLUTION ORAL at 07:48

## 2017-01-01 RX ADMIN — Medication 13 ML: at 19:46

## 2017-01-01 RX ADMIN — HYDROMORPHONE HYDROCHLORIDE 1 MG: 1 SOLUTION ORAL at 01:17

## 2017-01-01 RX ADMIN — FENTANYL CITRATE 50 MCG: 50 INJECTION, SOLUTION INTRAMUSCULAR; INTRAVENOUS at 09:08

## 2017-01-01 RX ADMIN — Medication 0.5 MG: at 01:54

## 2017-01-01 RX ADMIN — BUDESONIDE 0.5 MG: 0.5 INHALANT RESPIRATORY (INHALATION) at 09:17

## 2017-01-01 RX ADMIN — VANCOMYCIN HYDROCHLORIDE 1000 MG: 1 INJECTION, SOLUTION INTRAVENOUS at 11:42

## 2017-01-01 RX ADMIN — ONDANSETRON HYDROCHLORIDE 4 MG: 4 TABLET, FILM COATED ORAL at 14:00

## 2017-01-01 RX ADMIN — Medication 1.5 MG: at 15:17

## 2017-01-01 RX ADMIN — LIDOCAINE 1 PATCH: 50 PATCH TOPICAL at 20:31

## 2017-01-01 RX ADMIN — PREDNISONE 60 MG: 50 TABLET ORAL at 08:29

## 2017-01-01 RX ADMIN — Medication 220 MG: at 08:43

## 2017-01-01 RX ADMIN — SERTRALINE HYDROCHLORIDE 150 MG: 20 SOLUTION ORAL at 08:59

## 2017-01-01 RX ADMIN — Medication 1.5 MG: at 15:49

## 2017-01-01 RX ADMIN — MEROPENEM 1 G: 1 INJECTION, POWDER, FOR SOLUTION INTRAVENOUS at 00:36

## 2017-01-01 RX ADMIN — POTASSIUM CHLORIDE 20 MEQ: 1.5 POWDER, FOR SOLUTION ORAL at 21:52

## 2017-01-01 RX ADMIN — BUDESONIDE 0.5 MG: 0.5 INHALANT RESPIRATORY (INHALATION) at 23:29

## 2017-01-01 RX ADMIN — Medication 0.5 MG: at 18:15

## 2017-01-01 RX ADMIN — GUAIFENESIN 200 MG: 100 SOLUTION ORAL at 07:57

## 2017-01-01 RX ADMIN — SERTRALINE HYDROCHLORIDE 150 MG: 20 SOLUTION ORAL at 08:52

## 2017-01-01 RX ADMIN — IPRATROPIUM BROMIDE 0.5 MG: 0.5 SOLUTION RESPIRATORY (INHALATION) at 11:39

## 2017-01-01 RX ADMIN — Medication 0.5 MG: at 00:55

## 2017-01-01 RX ADMIN — LEVALBUTEROL HYDROCHLORIDE 1.25 MG: 1.25 SOLUTION RESPIRATORY (INHALATION) at 13:40

## 2017-01-01 RX ADMIN — INSULIN ASPART 1 UNITS: 100 INJECTION, SOLUTION INTRAVENOUS; SUBCUTANEOUS at 11:53

## 2017-01-01 RX ADMIN — PREDNISONE 40 MG: 20 TABLET ORAL at 08:08

## 2017-01-01 RX ADMIN — FAMOTIDINE 20 MG: 40 POWDER, FOR SUSPENSION ORAL at 19:50

## 2017-01-01 RX ADMIN — IPRATROPIUM BROMIDE 0.5 MG: 0.5 SOLUTION RESPIRATORY (INHALATION) at 12:41

## 2017-01-01 RX ADMIN — LEVALBUTEROL HYDROCHLORIDE 1.25 MG: 1.25 SOLUTION RESPIRATORY (INHALATION) at 23:42

## 2017-01-01 RX ADMIN — QUETIAPINE FUMARATE 50 MG: 25 TABLET, FILM COATED ORAL at 19:49

## 2017-01-01 RX ADMIN — Medication 1 CAPSULE: at 08:57

## 2017-01-01 RX ADMIN — HYDROMORPHONE HYDROCHLORIDE 1 MG: 1 SOLUTION ORAL at 02:23

## 2017-01-01 RX ADMIN — GUAIFENESIN 10 ML: 100 SOLUTION ORAL at 11:54

## 2017-01-01 RX ADMIN — LIDOCAINE 1 PATCH: 50 PATCH TOPICAL at 07:49

## 2017-01-01 RX ADMIN — HEPARIN SODIUM 5000 UNITS: 5000 INJECTION, SOLUTION INTRAVENOUS; SUBCUTANEOUS at 07:57

## 2017-01-01 RX ADMIN — SODIUM CHLORIDE: 9 INJECTION, SOLUTION INTRAVENOUS at 03:11

## 2017-01-01 RX ADMIN — SODIUM CHLORIDE 1000 ML: 9 INJECTION, SOLUTION INTRAVENOUS at 12:15

## 2017-01-01 RX ADMIN — Medication 1 PACKET: at 09:25

## 2017-01-01 RX ADMIN — GUAIFENESIN 10 ML: 100 SOLUTION ORAL at 07:47

## 2017-01-01 RX ADMIN — AMPICILLIN SODIUM AND SULBACTAM SODIUM 3 G: 2; 1 INJECTION, POWDER, FOR SOLUTION INTRAMUSCULAR; INTRAVENOUS at 09:38

## 2017-01-01 RX ADMIN — Medication 1 CAPSULE: at 08:19

## 2017-01-01 RX ADMIN — POLYETHYLENE GLYCOL 3350 17 G: 17 POWDER, FOR SOLUTION ORAL at 20:31

## 2017-01-01 RX ADMIN — Medication 20 MG: at 08:56

## 2017-01-01 RX ADMIN — POLYETHYLENE GLYCOL 3350 17 G: 17 POWDER, FOR SOLUTION ORAL at 19:15

## 2017-01-01 RX ADMIN — IPRATROPIUM BROMIDE 0.5 MG: 0.5 SOLUTION RESPIRATORY (INHALATION) at 04:03

## 2017-01-01 RX ADMIN — AMPICILLIN SODIUM AND SULBACTAM SODIUM 3 G: 2; 1 INJECTION, POWDER, FOR SOLUTION INTRAMUSCULAR; INTRAVENOUS at 09:36

## 2017-01-01 RX ADMIN — Medication 20 MG: at 20:30

## 2017-01-01 RX ADMIN — ACETAMINOPHEN 325 MG: 160 SUSPENSION ORAL at 15:50

## 2017-01-01 RX ADMIN — MEROPENEM 500 MG: 500 INJECTION, POWDER, FOR SOLUTION INTRAVENOUS at 23:00

## 2017-01-01 RX ADMIN — HYDROMORPHONE HYDROCHLORIDE 1 MG: 1 SOLUTION ORAL at 10:18

## 2017-01-01 RX ADMIN — SODIUM CHLORIDE: 9 INJECTION, SOLUTION INTRAVENOUS at 18:32

## 2017-01-01 RX ADMIN — Medication 1 PACKET: at 21:45

## 2017-01-01 RX ADMIN — GABAPENTIN 300 MG: 250 SOLUTION ORAL at 20:10

## 2017-01-01 RX ADMIN — MORPHINE SULFATE 10 MG: 10 SOLUTION ORAL at 05:39

## 2017-01-01 RX ADMIN — Medication 1 PACKET: at 08:11

## 2017-01-01 RX ADMIN — Medication 1 PACKET: at 08:20

## 2017-01-01 RX ADMIN — Medication 1.5 MG: at 19:32

## 2017-01-01 RX ADMIN — SODIUM CHLORIDE: 9 INJECTION, SOLUTION INTRAVENOUS at 09:48

## 2017-01-01 RX ADMIN — AMPICILLIN SODIUM AND SULBACTAM SODIUM 3 G: 2; 1 INJECTION, POWDER, FOR SOLUTION INTRAMUSCULAR; INTRAVENOUS at 04:01

## 2017-01-01 RX ADMIN — MULTIVITAMIN 15 ML: LIQUID ORAL at 08:00

## 2017-01-01 RX ADMIN — HYDROMORPHONE HYDROCHLORIDE 1 MG: 1 SOLUTION ORAL at 20:09

## 2017-01-01 RX ADMIN — IPRATROPIUM BROMIDE 0.5 MG: 0.5 SOLUTION RESPIRATORY (INHALATION) at 12:34

## 2017-01-01 RX ADMIN — LIDOCAINE HYDROCHLORIDE 2 ML: 20 INJECTION, SOLUTION INFILTRATION; PERINEURAL at 18:02

## 2017-01-01 RX ADMIN — GABAPENTIN 150 MG: 250 SOLUTION ORAL at 07:53

## 2017-01-01 RX ADMIN — Medication 1 MG: at 19:53

## 2017-01-01 RX ADMIN — LEVALBUTEROL HYDROCHLORIDE 1.25 MG: 1.25 SOLUTION RESPIRATORY (INHALATION) at 10:34

## 2017-01-01 RX ADMIN — GUAIFENESIN 200 MG: 100 SOLUTION ORAL at 13:10

## 2017-01-01 RX ADMIN — ONDANSETRON 4 MG: 2 INJECTION INTRAMUSCULAR; INTRAVENOUS at 04:35

## 2017-01-01 RX ADMIN — Medication 1 MG: at 17:24

## 2017-01-01 RX ADMIN — ACETAMINOPHEN 650 MG: 160 SUSPENSION ORAL at 06:17

## 2017-01-01 RX ADMIN — QUETIAPINE 50 MG: 50 TABLET, FILM COATED ORAL at 14:46

## 2017-01-01 RX ADMIN — LEVALBUTEROL HYDROCHLORIDE 1.25 MG: 1.25 SOLUTION RESPIRATORY (INHALATION) at 16:42

## 2017-01-01 RX ADMIN — Medication 1.5 MG: at 08:16

## 2017-01-01 RX ADMIN — OXYCODONE HYDROCHLORIDE 5 MG: 5 TABLET ORAL at 20:47

## 2017-01-01 RX ADMIN — Medication 1 CAPSULE: at 19:01

## 2017-01-01 RX ADMIN — PREDNISONE 20 MG: 20 TABLET ORAL at 08:06

## 2017-01-01 RX ADMIN — FUROSEMIDE 20 MG: 10 INJECTION, SOLUTION INTRAVENOUS at 09:09

## 2017-01-01 RX ADMIN — Medication 1 PACKET: at 15:04

## 2017-01-01 RX ADMIN — Medication 1 MG: at 15:36

## 2017-01-01 RX ADMIN — IPRATROPIUM BROMIDE 0.5 MG: 0.5 SOLUTION RESPIRATORY (INHALATION) at 09:50

## 2017-01-01 RX ADMIN — QUETIAPINE FUMARATE 50 MG: 50 TABLET, FILM COATED ORAL at 07:52

## 2017-01-01 RX ADMIN — Medication 0.5 MG: at 11:41

## 2017-01-01 RX ADMIN — Medication 1 PACKET: at 22:45

## 2017-01-01 RX ADMIN — BUDESONIDE 0.5 MG: 0.5 INHALANT RESPIRATORY (INHALATION) at 12:11

## 2017-01-01 RX ADMIN — Medication 1 PACKET: at 08:49

## 2017-01-01 RX ADMIN — CLONAZEPAM 1.5 MG: 2 TABLET ORAL at 15:06

## 2017-01-01 RX ADMIN — ACETAMINOPHEN 325 MG: 160 SUSPENSION ORAL at 20:47

## 2017-01-01 RX ADMIN — GUAIFENESIN 10 ML: 100 SOLUTION ORAL at 16:18

## 2017-01-01 RX ADMIN — POTASSIUM CHLORIDE 20 MEQ: 1.5 POWDER, FOR SOLUTION ORAL at 15:57

## 2017-01-01 RX ADMIN — GABAPENTIN 300 MG: 250 SUSPENSION ORAL at 08:52

## 2017-01-01 RX ADMIN — HYDROMORPHONE HYDROCHLORIDE 1 MG: 1 SOLUTION ORAL at 14:19

## 2017-01-01 RX ADMIN — MEROPENEM 500 MG: 500 INJECTION, POWDER, FOR SOLUTION INTRAVENOUS at 17:18

## 2017-01-01 RX ADMIN — Medication 1 CAPSULE: at 07:53

## 2017-01-01 RX ADMIN — BUDESONIDE 0.5 MG: 0.5 INHALANT RESPIRATORY (INHALATION) at 20:34

## 2017-01-01 RX ADMIN — SODIUM CHLORIDE, PRESERVATIVE FREE 5 ML: 5 INJECTION INTRAVENOUS at 06:05

## 2017-01-01 RX ADMIN — HYDROMORPHONE HYDROCHLORIDE 1 MG: 1 SOLUTION ORAL at 13:10

## 2017-01-01 RX ADMIN — Medication 1 PACKET: at 08:53

## 2017-01-01 RX ADMIN — Medication 1 PACKET: at 22:20

## 2017-01-01 RX ADMIN — SENNOSIDES AND DOCUSATE SODIUM 2 TABLET: 8.6; 5 TABLET ORAL at 08:38

## 2017-01-01 RX ADMIN — POTASSIUM CHLORIDE 20 MEQ: 1.5 POWDER, FOR SOLUTION ORAL at 05:30

## 2017-01-01 RX ADMIN — MORPHINE SULFATE 10 MG: 10 SOLUTION ORAL at 01:17

## 2017-01-01 RX ADMIN — FAMOTIDINE 20 MG: 40 POWDER, FOR SUSPENSION ORAL at 09:00

## 2017-01-01 RX ADMIN — Medication 3 MG: at 23:16

## 2017-01-01 RX ADMIN — GABAPENTIN 300 MG: 250 SOLUTION ORAL at 08:32

## 2017-01-01 RX ADMIN — ACETAMINOPHEN 325 MG: 160 SUSPENSION ORAL at 23:21

## 2017-01-01 RX ADMIN — IOPAMIDOL 85 ML: 755 INJECTION, SOLUTION INTRAVENOUS at 14:19

## 2017-01-01 RX ADMIN — IPRATROPIUM BROMIDE 0.5 MG: 0.5 SOLUTION RESPIRATORY (INHALATION) at 16:49

## 2017-01-01 RX ADMIN — QUETIAPINE FUMARATE 25 MG: 25 TABLET, FILM COATED ORAL at 19:45

## 2017-01-01 RX ADMIN — IPRATROPIUM BROMIDE 0.5 MG: 0.5 SOLUTION RESPIRATORY (INHALATION) at 20:04

## 2017-01-01 RX ADMIN — HEPARIN SODIUM 5000 UNITS: 5000 INJECTION, SOLUTION INTRAVENOUS; SUBCUTANEOUS at 12:14

## 2017-01-01 RX ADMIN — GABAPENTIN 300 MG: 250 SUSPENSION ORAL at 08:33

## 2017-01-01 RX ADMIN — LEVALBUTEROL HYDROCHLORIDE 1.25 MG: 1.25 SOLUTION RESPIRATORY (INHALATION) at 15:53

## 2017-01-01 RX ADMIN — LEVALBUTEROL 1.25 MG: 1.25 SOLUTION RESPIRATORY (INHALATION) at 21:02

## 2017-01-01 RX ADMIN — LEVALBUTEROL HYDROCHLORIDE 1.25 MG: 1.25 SOLUTION RESPIRATORY (INHALATION) at 12:23

## 2017-01-01 RX ADMIN — IPRATROPIUM BROMIDE 0.5 MG: 0.5 SOLUTION RESPIRATORY (INHALATION) at 20:10

## 2017-01-01 RX ADMIN — Medication 1 PACKET: at 15:06

## 2017-01-01 RX ADMIN — LEVALBUTEROL HYDROCHLORIDE 1.25 MG: 1.25 SOLUTION RESPIRATORY (INHALATION) at 00:02

## 2017-01-01 RX ADMIN — Medication 1.5 MG: at 12:05

## 2017-01-01 RX ADMIN — GABAPENTIN 300 MG: 250 SOLUTION ORAL at 07:57

## 2017-01-01 RX ADMIN — BUDESONIDE 0.5 MG: 0.5 INHALANT RESPIRATORY (INHALATION) at 19:57

## 2017-01-01 RX ADMIN — IPRATROPIUM BROMIDE 0.5 MG: 0.5 SOLUTION RESPIRATORY (INHALATION) at 19:57

## 2017-01-01 RX ADMIN — Medication 1 PACKET: at 22:36

## 2017-01-01 RX ADMIN — ACETYLCYSTEINE 2 ML: 200 SOLUTION ORAL; RESPIRATORY (INHALATION) at 00:16

## 2017-01-01 RX ADMIN — CLONAZEPAM 1 MG: 0.5 TABLET ORAL at 07:49

## 2017-01-01 RX ADMIN — ENOXAPARIN SODIUM 40 MG: 40 INJECTION SUBCUTANEOUS at 00:22

## 2017-01-01 RX ADMIN — GUAIFENESIN 10 ML: 100 SOLUTION ORAL at 20:28

## 2017-01-01 RX ADMIN — SERTRALINE HYDROCHLORIDE 100 MG: 20 SOLUTION ORAL at 07:53

## 2017-01-01 RX ADMIN — ACETYLCYSTEINE 2 ML: 200 SOLUTION ORAL; RESPIRATORY (INHALATION) at 19:57

## 2017-01-01 RX ADMIN — MORPHINE SULFATE 10 MG: 10 SOLUTION ORAL at 05:43

## 2017-01-01 RX ADMIN — IPRATROPIUM BROMIDE 0.5 MG: 0.5 SOLUTION RESPIRATORY (INHALATION) at 20:22

## 2017-01-01 RX ADMIN — Medication 1.5 MG: at 00:53

## 2017-01-01 RX ADMIN — HYDROMORPHONE HYDROCHLORIDE 1 MG: 1 SOLUTION ORAL at 04:21

## 2017-01-01 RX ADMIN — GABAPENTIN 300 MG: 250 SUSPENSION ORAL at 15:28

## 2017-01-01 RX ADMIN — MORPHINE SULFATE 10 MG: 10 SOLUTION ORAL at 06:58

## 2017-01-01 RX ADMIN — LEVALBUTEROL 1.25 MG: 1.25 SOLUTION RESPIRATORY (INHALATION) at 20:10

## 2017-01-01 RX ADMIN — Medication 0.5 MG: at 00:13

## 2017-01-01 RX ADMIN — Medication 1 CAPSULE: at 08:24

## 2017-01-01 RX ADMIN — IPRATROPIUM BROMIDE 0.5 MG: 0.5 SOLUTION RESPIRATORY (INHALATION) at 12:11

## 2017-01-01 RX ADMIN — SODIUM CHLORIDE 1000 ML: 9 INJECTION, SOLUTION INTRAVENOUS at 00:49

## 2017-01-01 RX ADMIN — GUAIFENESIN 10 ML: 100 SOLUTION ORAL at 15:39

## 2017-01-01 RX ADMIN — Medication 1 PACKET: at 13:31

## 2017-01-01 RX ADMIN — Medication 1 PACKET: at 16:04

## 2017-01-01 RX ADMIN — Medication 3 MG: at 22:01

## 2017-01-01 RX ADMIN — DIATRIZOATE MEGLUMINE AND DIATRIZOATE SODIUM 35 ML: 660; 100 SOLUTION ORAL; RECTAL at 16:41

## 2017-01-01 RX ADMIN — ENOXAPARIN SODIUM 40 MG: 40 INJECTION SUBCUTANEOUS at 08:17

## 2017-01-01 RX ADMIN — GUAIFENESIN 10 ML: 100 SOLUTION ORAL at 15:47

## 2017-01-01 RX ADMIN — POLYETHYLENE GLYCOL 3350 17 G: 17 POWDER, FOR SOLUTION ORAL at 21:13

## 2017-01-01 RX ADMIN — IPRATROPIUM BROMIDE 0.5 MG: 0.5 SOLUTION RESPIRATORY (INHALATION) at 19:14

## 2017-01-01 RX ADMIN — MORPHINE SULFATE 5 MG: 10 SOLUTION ORAL at 09:11

## 2017-01-01 RX ADMIN — Medication 0.5 MG: at 17:47

## 2017-01-01 RX ADMIN — AMOXICILLIN 1000 MG: 400 POWDER, FOR SUSPENSION ORAL at 15:41

## 2017-01-01 RX ADMIN — IPRATROPIUM BROMIDE 0.5 MG: 0.5 SOLUTION RESPIRATORY (INHALATION) at 08:18

## 2017-01-01 RX ADMIN — OXYCODONE HYDROCHLORIDE 5 MG: 5 TABLET ORAL at 04:35

## 2017-01-01 RX ADMIN — SODIUM CHLORIDE 1000 ML: 9 INJECTION, SOLUTION INTRAVENOUS at 15:24

## 2017-01-01 RX ADMIN — MULTIVITAMIN 15 ML: LIQUID ORAL at 08:52

## 2017-01-01 RX ADMIN — PREDNISONE 20 MG: 20 TABLET ORAL at 09:21

## 2017-01-01 RX ADMIN — MEROPENEM 500 MG: 500 INJECTION, POWDER, FOR SOLUTION INTRAVENOUS at 09:55

## 2017-01-01 RX ADMIN — BUDESONIDE 0.5 MG: 0.5 INHALANT RESPIRATORY (INHALATION) at 07:51

## 2017-01-01 RX ADMIN — FEXOFENADINE HYDROCHLORIDE 180 MG: 180 TABLET, FILM COATED ORAL at 07:45

## 2017-01-01 RX ADMIN — PREDNISONE 20 MG: 5 SOLUTION ORAL at 08:43

## 2017-01-01 RX ADMIN — MEROPENEM 500 MG: 500 INJECTION, POWDER, FOR SOLUTION INTRAVENOUS at 17:54

## 2017-01-01 RX ADMIN — Medication 1.5 MG: at 08:04

## 2017-01-01 RX ADMIN — Medication 160 MG: at 08:49

## 2017-01-01 RX ADMIN — GABAPENTIN 300 MG: 250 SOLUTION ORAL at 08:05

## 2017-01-01 RX ADMIN — GUAIFENESIN 10 ML: 100 SOLUTION ORAL at 21:52

## 2017-01-01 RX ADMIN — GUAIFENESIN 200 MG: 100 SOLUTION ORAL at 08:19

## 2017-01-01 RX ADMIN — PREDNISONE 20 MG: 20 TABLET ORAL at 08:34

## 2017-01-01 RX ADMIN — MEROPENEM 500 MG: 500 INJECTION, POWDER, FOR SOLUTION INTRAVENOUS at 12:15

## 2017-01-01 RX ADMIN — ATORVASTATIN CALCIUM 20 MG: 20 TABLET, FILM COATED ORAL at 08:04

## 2017-01-01 RX ADMIN — GUAIFENESIN 10 ML: 100 SOLUTION ORAL at 16:27

## 2017-01-01 RX ADMIN — ENOXAPARIN SODIUM 40 MG: 40 INJECTION SUBCUTANEOUS at 08:12

## 2017-01-01 RX ADMIN — Medication 0.5 MG: at 13:14

## 2017-01-01 RX ADMIN — SENNOSIDES AND DOCUSATE SODIUM 2 TABLET: 8.6; 5 TABLET ORAL at 08:11

## 2017-01-01 RX ADMIN — ACETAMINOPHEN 325 MG: 160 SUSPENSION ORAL at 08:12

## 2017-01-01 RX ADMIN — LEVALBUTEROL HYDROCHLORIDE 1.25 MG: 1.25 SOLUTION RESPIRATORY (INHALATION) at 03:46

## 2017-01-01 RX ADMIN — CLONAZEPAM 1.5 MG: 2 TABLET ORAL at 16:28

## 2017-01-01 RX ADMIN — PIPERACILLIN AND TAZOBACTAM 3.38 G: 3; .375 INJECTION, POWDER, FOR SOLUTION INTRAVENOUS at 08:38

## 2017-01-01 RX ADMIN — ENOXAPARIN SODIUM 40 MG: 40 INJECTION SUBCUTANEOUS at 00:15

## 2017-01-01 RX ADMIN — PREDNISONE 20 MG: 5 SOLUTION ORAL at 07:43

## 2017-01-01 RX ADMIN — METHYLPREDNISOLONE SODIUM SUCCINATE 62.5 MG: 125 INJECTION, POWDER, LYOPHILIZED, FOR SOLUTION INTRAMUSCULAR; INTRAVENOUS at 06:02

## 2017-01-01 RX ADMIN — Medication 1.5 MG: at 16:40

## 2017-01-01 RX ADMIN — QUETIAPINE FUMARATE 25 MG: 25 TABLET, FILM COATED ORAL at 08:12

## 2017-01-01 RX ADMIN — HYDROMORPHONE HYDROCHLORIDE 1 MG: 1 SOLUTION ORAL at 23:34

## 2017-01-01 RX ADMIN — ONDANSETRON 4 MG: 2 INJECTION INTRAMUSCULAR; INTRAVENOUS at 08:55

## 2017-01-01 RX ADMIN — Medication 60 MEQ: at 07:49

## 2017-01-01 RX ADMIN — IPRATROPIUM BROMIDE 0.5 MG: 0.5 SOLUTION RESPIRATORY (INHALATION) at 12:22

## 2017-01-01 RX ADMIN — Medication 0.5 MG: at 02:24

## 2017-01-01 RX ADMIN — IPRATROPIUM BROMIDE 0.5 MG: 0.5 SOLUTION RESPIRATORY (INHALATION) at 08:50

## 2017-01-01 RX ADMIN — INSULIN ASPART 1 UNITS: 100 INJECTION, SOLUTION INTRAVENOUS; SUBCUTANEOUS at 16:01

## 2017-01-01 RX ADMIN — CLONAZEPAM 1.5 MG: 2 TABLET ORAL at 13:13

## 2017-01-01 RX ADMIN — Medication 1 PACKET: at 17:17

## 2017-01-01 RX ADMIN — HYDROMORPHONE HYDROCHLORIDE 1 MG: 1 SOLUTION ORAL at 04:23

## 2017-01-01 RX ADMIN — SODIUM CHLORIDE: 9 INJECTION, SOLUTION INTRAVENOUS at 13:59

## 2017-01-01 RX ADMIN — HYDROMORPHONE HYDROCHLORIDE 1 MG: 1 SOLUTION ORAL at 06:03

## 2017-01-01 RX ADMIN — Medication 1 PACKET: at 21:02

## 2017-01-01 RX ADMIN — GABAPENTIN 300 MG: 250 SUSPENSION ORAL at 16:32

## 2017-01-01 RX ADMIN — FEXOFENADINE HYDROCHLORIDE 180 MG: 180 TABLET, FILM COATED ORAL at 08:44

## 2017-01-01 RX ADMIN — QUETIAPINE 50 MG: 50 TABLET, FILM COATED ORAL at 20:48

## 2017-01-01 RX ADMIN — INSULIN ASPART 1 UNITS: 100 INJECTION, SOLUTION INTRAVENOUS; SUBCUTANEOUS at 12:28

## 2017-01-01 RX ADMIN — IPRATROPIUM BROMIDE 0.5 MG: 0.5 SOLUTION RESPIRATORY (INHALATION) at 22:02

## 2017-01-01 RX ADMIN — AMPICILLIN SODIUM AND SULBACTAM SODIUM 3 G: 2; 1 INJECTION, POWDER, FOR SOLUTION INTRAMUSCULAR; INTRAVENOUS at 15:47

## 2017-01-01 RX ADMIN — Medication 0.5 MG: at 13:01

## 2017-01-01 RX ADMIN — IPRATROPIUM BROMIDE 0.5 MG: 0.5 SOLUTION RESPIRATORY (INHALATION) at 11:54

## 2017-01-01 RX ADMIN — CLONAZEPAM 1.5 MG: 2 TABLET ORAL at 12:20

## 2017-01-01 RX ADMIN — SERTRALINE HYDROCHLORIDE 150 MG: 20 SOLUTION ORAL at 08:34

## 2017-01-01 RX ADMIN — BUDESONIDE 0.5 MG: 0.5 INHALANT RESPIRATORY (INHALATION) at 08:37

## 2017-01-01 RX ADMIN — LIDOCAINE 1 PATCH: 50 PATCH TOPICAL at 07:42

## 2017-01-01 RX ADMIN — LEVALBUTEROL HYDROCHLORIDE 1.25 MG: 1.25 SOLUTION RESPIRATORY (INHALATION) at 12:34

## 2017-01-01 RX ADMIN — BUDESONIDE 0.5 MG: 0.5 INHALANT RESPIRATORY (INHALATION) at 00:44

## 2017-01-01 RX ADMIN — GUAIFENESIN 10 ML: 100 SOLUTION ORAL at 16:40

## 2017-01-01 RX ADMIN — GUAIFENESIN 200 MG: 100 SOLUTION ORAL at 08:05

## 2017-01-01 RX ADMIN — POTASSIUM CHLORIDE 40 MEQ: 1.5 POWDER, FOR SOLUTION ORAL at 11:25

## 2017-01-01 RX ADMIN — HYDROMORPHONE HYDROCHLORIDE 1 MG: 1 SOLUTION ORAL at 12:27

## 2017-01-01 RX ADMIN — IPRATROPIUM BROMIDE 0.5 MG: 0.5 SOLUTION RESPIRATORY (INHALATION) at 04:22

## 2017-01-01 RX ADMIN — Medication 1.5 MG: at 12:30

## 2017-01-01 RX ADMIN — VANCOMYCIN HYDROCHLORIDE 1000 MG: 1 INJECTION, SOLUTION INTRAVENOUS at 20:03

## 2017-01-01 RX ADMIN — AMPICILLIN SODIUM AND SULBACTAM SODIUM 3 G: 2; 1 INJECTION, POWDER, FOR SOLUTION INTRAMUSCULAR; INTRAVENOUS at 05:04

## 2017-01-01 RX ADMIN — Medication 20 MG: at 22:38

## 2017-01-01 RX ADMIN — LEVALBUTEROL 1.25 MG: 1.25 SOLUTION RESPIRATORY (INHALATION) at 13:19

## 2017-01-01 RX ADMIN — HYDROMORPHONE HYDROCHLORIDE 1 MG: 1 SOLUTION ORAL at 11:42

## 2017-01-01 RX ADMIN — ENOXAPARIN SODIUM 40 MG: 40 INJECTION SUBCUTANEOUS at 08:25

## 2017-01-01 RX ADMIN — ONDANSETRON 4 MG: 2 INJECTION INTRAMUSCULAR; INTRAVENOUS at 01:31

## 2017-01-01 RX ADMIN — LEVALBUTEROL HYDROCHLORIDE 1.25 MG: 1.25 SOLUTION RESPIRATORY (INHALATION) at 09:50

## 2017-01-01 RX ADMIN — SERTRALINE HYDROCHLORIDE 150 MG: 20 SOLUTION ORAL at 19:29

## 2017-01-01 RX ADMIN — QUETIAPINE 50 MG: 50 TABLET, FILM COATED ORAL at 08:06

## 2017-01-01 RX ADMIN — Medication 500 MG: at 13:49

## 2017-01-01 RX ADMIN — DOCUSATE SODIUM 100 MG: 50 LIQUID ORAL at 20:50

## 2017-01-01 RX ADMIN — Medication 1 MG: at 17:26

## 2017-01-01 RX ADMIN — PREDNISONE 20 MG: 20 TABLET ORAL at 07:47

## 2017-01-01 RX ADMIN — NYSTATIN 500000 UNITS: 100000 SUSPENSION ORAL at 16:42

## 2017-01-01 RX ADMIN — Medication 0.5 MG: at 01:06

## 2017-01-01 RX ADMIN — QUETIAPINE FUMARATE 75 MG: 50 TABLET, FILM COATED ORAL at 00:22

## 2017-01-01 RX ADMIN — IPRATROPIUM BROMIDE 0.5 MG: 0.5 SOLUTION RESPIRATORY (INHALATION) at 07:23

## 2017-01-01 RX ADMIN — GUAIFENESIN 10 ML: 100 SOLUTION ORAL at 08:34

## 2017-01-01 RX ADMIN — HEPARIN SODIUM 5000 UNITS: 5000 INJECTION, SOLUTION INTRAVENOUS; SUBCUTANEOUS at 04:36

## 2017-01-01 RX ADMIN — HYDROMORPHONE HYDROCHLORIDE 1 MG: 1 SOLUTION ORAL at 13:41

## 2017-01-01 RX ADMIN — GUAIFENESIN 10 ML: 100 SOLUTION ORAL at 08:33

## 2017-01-01 RX ADMIN — Medication 1.5 MG: at 21:42

## 2017-01-01 RX ADMIN — GABAPENTIN 300 MG: 250 SOLUTION ORAL at 19:28

## 2017-01-01 RX ADMIN — SODIUM CHLORIDE 500 ML: 9 INJECTION, SOLUTION INTRAVENOUS at 17:00

## 2017-01-01 RX ADMIN — Medication 1.5 MG: at 08:29

## 2017-01-01 RX ADMIN — LEVALBUTEROL 1.25 MG: 1.25 SOLUTION RESPIRATORY (INHALATION) at 14:00

## 2017-01-01 RX ADMIN — GABAPENTIN 300 MG: 250 SOLUTION ORAL at 15:33

## 2017-01-01 RX ADMIN — SODIUM CHLORIDE 1000 ML: 9 INJECTION, SOLUTION INTRAVENOUS at 23:35

## 2017-01-01 RX ADMIN — Medication 20 MG: at 19:34

## 2017-01-01 RX ADMIN — Medication 0.5 MG: at 06:13

## 2017-01-01 RX ADMIN — GUAIFENESIN 200 MG: 100 SOLUTION ORAL at 17:29

## 2017-01-01 RX ADMIN — GUAIFENESIN 10 ML: 100 SOLUTION ORAL at 20:49

## 2017-01-01 RX ADMIN — Medication 0.5 MG: at 23:00

## 2017-01-01 RX ADMIN — CLONAZEPAM 1.5 MG: 2 TABLET ORAL at 15:44

## 2017-01-01 RX ADMIN — ACETAMINOPHEN 650 MG: 160 SOLUTION ORAL at 00:23

## 2017-01-01 RX ADMIN — GUAIFENESIN 10 ML: 100 SOLUTION ORAL at 12:07

## 2017-01-01 RX ADMIN — HYDROMORPHONE HYDROCHLORIDE 1 MG: 1 SOLUTION ORAL at 20:50

## 2017-01-01 RX ADMIN — PROPOFOL 70 MG: 10 INJECTION, EMULSION INTRAVENOUS at 09:17

## 2017-01-01 RX ADMIN — Medication 160 MG: at 09:50

## 2017-01-01 RX ADMIN — Medication 10 ML: at 09:19

## 2017-01-01 RX ADMIN — MEROPENEM 1 G: 1 INJECTION, POWDER, FOR SOLUTION INTRAVENOUS at 08:18

## 2017-01-01 RX ADMIN — LEVALBUTEROL HYDROCHLORIDE 1.25 MG: 1.25 SOLUTION RESPIRATORY (INHALATION) at 07:51

## 2017-01-01 RX ADMIN — GABAPENTIN 300 MG: 250 SUSPENSION ORAL at 00:39

## 2017-01-01 RX ADMIN — SERTRALINE HYDROCHLORIDE 150 MG: 20 SOLUTION ORAL at 08:46

## 2017-01-01 RX ADMIN — SERTRALINE HYDROCHLORIDE 150 MG: 20 SOLUTION ORAL at 08:24

## 2017-01-01 RX ADMIN — PIPERACILLIN AND TAZOBACTAM 3.38 G: 3; .375 INJECTION, POWDER, FOR SOLUTION INTRAVENOUS at 01:45

## 2017-01-01 RX ADMIN — IPRATROPIUM BROMIDE 0.5 MG: 0.5 SOLUTION RESPIRATORY (INHALATION) at 00:04

## 2017-01-01 RX ADMIN — GUAIFENESIN 200 MG: 100 SOLUTION ORAL at 08:12

## 2017-01-01 RX ADMIN — SODIUM CHLORIDE: 9 INJECTION, SOLUTION INTRAVENOUS at 21:31

## 2017-01-01 RX ADMIN — GADOBUTROL 5 ML: 604.72 INJECTION INTRAVENOUS at 20:54

## 2017-01-01 RX ADMIN — Medication 1 PACKET: at 20:30

## 2017-01-01 RX ADMIN — IPRATROPIUM BROMIDE 0.5 MG: 0.5 SOLUTION RESPIRATORY (INHALATION) at 09:14

## 2017-01-01 RX ADMIN — SIMETHICONE 125 MG: 20 SUSPENSION/ DROPS ORAL at 10:00

## 2017-01-01 RX ADMIN — HEPARIN SODIUM 5000 UNITS: 5000 INJECTION, SOLUTION INTRAVENOUS; SUBCUTANEOUS at 01:02

## 2017-01-01 RX ADMIN — MEROPENEM 1 G: 1 INJECTION, POWDER, FOR SOLUTION INTRAVENOUS at 00:35

## 2017-01-01 RX ADMIN — MORPHINE SULFATE 5 MG: 10 SOLUTION ORAL at 19:27

## 2017-01-01 RX ADMIN — IPRATROPIUM BROMIDE 0.5 MG: 0.5 SOLUTION RESPIRATORY (INHALATION) at 13:40

## 2017-01-01 RX ADMIN — GUAIFENESIN 200 MG: 100 SOLUTION ORAL at 16:22

## 2017-01-01 RX ADMIN — GABAPENTIN 300 MG: 250 SUSPENSION ORAL at 16:18

## 2017-01-01 RX ADMIN — Medication 1 MG: at 08:01

## 2017-01-01 RX ADMIN — MEROPENEM 500 MG: 500 INJECTION, POWDER, FOR SOLUTION INTRAVENOUS at 23:04

## 2017-01-01 RX ADMIN — SENNOSIDES AND DOCUSATE SODIUM 2 TABLET: 8.6; 5 TABLET ORAL at 20:03

## 2017-01-01 RX ADMIN — Medication 1 PACKET: at 23:16

## 2017-01-01 RX ADMIN — LORAZEPAM 4 MG: 2 INJECTION INTRAMUSCULAR; INTRAVENOUS at 12:51

## 2017-01-01 RX ADMIN — SODIUM CHLORIDE, PRESERVATIVE FREE 5 ML: 5 INJECTION INTRAVENOUS at 18:26

## 2017-01-01 RX ADMIN — POLYETHYLENE GLYCOL 3350 17 G: 17 POWDER, FOR SOLUTION ORAL at 20:48

## 2017-01-01 RX ADMIN — CLONAZEPAM 1 MG: 0.5 TABLET ORAL at 08:26

## 2017-01-01 RX ADMIN — POTASSIUM PHOSPHATE, MONOBASIC AND POTASSIUM PHOSPHATE, DIBASIC 15 MMOL: 224; 236 INJECTION, SOLUTION INTRAVENOUS at 08:28

## 2017-01-01 RX ADMIN — LORAZEPAM 2 MG: 2 INJECTION INTRAMUSCULAR; INTRAVENOUS at 12:30

## 2017-01-01 RX ADMIN — IPRATROPIUM BROMIDE 0.5 MG: 0.5 SOLUTION RESPIRATORY (INHALATION) at 08:25

## 2017-01-01 RX ADMIN — SODIUM CHLORIDE: 9 INJECTION, SOLUTION INTRAVENOUS at 10:09

## 2017-01-01 RX ADMIN — QUETIAPINE FUMARATE 25 MG: 25 TABLET, FILM COATED ORAL at 08:58

## 2017-01-01 RX ADMIN — LIDOCAINE 1 PATCH: 50 PATCH CUTANEOUS at 08:13

## 2017-01-01 RX ADMIN — Medication 0.5 MG: at 11:30

## 2017-01-01 RX ADMIN — Medication 0.5 MG: at 08:29

## 2017-01-01 RX ADMIN — ACETAMINOPHEN 650 MG: 160 SOLUTION ORAL at 08:48

## 2017-01-01 RX ADMIN — GABAPENTIN 300 MG: 250 SOLUTION ORAL at 14:21

## 2017-01-01 RX ADMIN — QUETIAPINE FUMARATE 25 MG: 25 TABLET, FILM COATED ORAL at 08:04

## 2017-01-01 RX ADMIN — POLYETHYLENE GLYCOL 3350 17 G: 17 POWDER, FOR SOLUTION ORAL at 09:02

## 2017-01-01 RX ADMIN — GUAIFENESIN 10 ML: 100 SOLUTION ORAL at 21:01

## 2017-01-01 RX ADMIN — IPRATROPIUM BROMIDE 0.5 MG: 0.5 SOLUTION RESPIRATORY (INHALATION) at 08:37

## 2017-01-01 RX ADMIN — ACETYLCYSTEINE 2 ML: 200 SOLUTION ORAL; RESPIRATORY (INHALATION) at 16:35

## 2017-01-01 RX ADMIN — IPRATROPIUM BROMIDE 0.5 MG: 0.5 SOLUTION RESPIRATORY (INHALATION) at 15:41

## 2017-01-01 RX ADMIN — LEVALBUTEROL 1.25 MG: 1.25 SOLUTION RESPIRATORY (INHALATION) at 20:03

## 2017-01-01 RX ADMIN — HEPARIN SODIUM 5000 UNITS: 5000 INJECTION, SOLUTION INTRAVENOUS; SUBCUTANEOUS at 08:06

## 2017-01-01 RX ADMIN — LEVALBUTEROL HYDROCHLORIDE 1.25 MG: 1.25 SOLUTION RESPIRATORY (INHALATION) at 21:07

## 2017-01-01 RX ADMIN — SENNOSIDES AND DOCUSATE SODIUM 2 TABLET: 8.6; 5 TABLET ORAL at 20:32

## 2017-01-01 RX ADMIN — LIDOCAINE HYDROCHLORIDE 40 MG: 20 INJECTION, SOLUTION INFILTRATION; PERINEURAL at 09:17

## 2017-01-01 RX ADMIN — Medication 1.5 MG: at 17:29

## 2017-01-01 RX ADMIN — BUDESONIDE 0.5 MG: 0.5 INHALANT RESPIRATORY (INHALATION) at 07:23

## 2017-01-01 RX ADMIN — Medication 20 MG: at 08:19

## 2017-01-01 RX ADMIN — Medication 1 CAPSULE: at 08:00

## 2017-01-01 RX ADMIN — IPRATROPIUM BROMIDE 0.5 MG: 0.5 SOLUTION RESPIRATORY (INHALATION) at 15:40

## 2017-01-01 RX ADMIN — GABAPENTIN 300 MG: 250 SOLUTION ORAL at 20:11

## 2017-01-01 RX ADMIN — Medication 3 MG: at 22:12

## 2017-01-01 RX ADMIN — GABAPENTIN 300 MG: 250 SOLUTION ORAL at 14:34

## 2017-01-01 RX ADMIN — CLONAZEPAM 1 MG: 0.5 TABLET ORAL at 19:27

## 2017-01-01 RX ADMIN — Medication 500 MG: at 07:42

## 2017-01-01 RX ADMIN — LEVALBUTEROL 1.25 MG: 1.25 SOLUTION RESPIRATORY (INHALATION) at 20:16

## 2017-01-01 RX ADMIN — POLYETHYLENE GLYCOL 3350 17 G: 17 POWDER, FOR SOLUTION ORAL at 20:10

## 2017-01-01 RX ADMIN — QUETIAPINE 50 MG: 50 TABLET, FILM COATED ORAL at 08:31

## 2017-01-01 RX ADMIN — Medication 1 CAPSULE: at 21:47

## 2017-01-01 RX ADMIN — Medication 1 MG: at 08:03

## 2017-01-01 RX ADMIN — CLONAZEPAM 1 MG: 0.5 TABLET ORAL at 14:02

## 2017-01-01 RX ADMIN — Medication 1.5 MG: at 11:18

## 2017-01-01 RX ADMIN — GUAIFENESIN 10 ML: 100 SOLUTION ORAL at 12:00

## 2017-01-01 RX ADMIN — Medication 1 PACKET: at 21:11

## 2017-01-01 RX ADMIN — MORPHINE SULFATE 10 MG: 10 SOLUTION ORAL at 21:10

## 2017-01-01 RX ADMIN — ACETYLCYSTEINE 2 ML: 200 SOLUTION ORAL; RESPIRATORY (INHALATION) at 20:00

## 2017-01-01 RX ADMIN — Medication 1.5 MG: at 08:12

## 2017-01-01 RX ADMIN — ACETAMINOPHEN 650 MG: 160 SUSPENSION ORAL at 04:21

## 2017-01-01 RX ADMIN — LEVALBUTEROL HYDROCHLORIDE 1.25 MG: 1.25 SOLUTION RESPIRATORY (INHALATION) at 08:40

## 2017-01-01 RX ADMIN — SENNOSIDES AND DOCUSATE SODIUM 2 TABLET: 8.6; 5 TABLET ORAL at 21:29

## 2017-01-01 RX ADMIN — QUETIAPINE FUMARATE 25 MG: 25 TABLET, FILM COATED ORAL at 20:31

## 2017-01-01 RX ADMIN — FAMOTIDINE 20 MG: 40 POWDER, FOR SUSPENSION ORAL at 21:44

## 2017-01-01 RX ADMIN — IPRATROPIUM BROMIDE 0.5 MG: 0.5 SOLUTION RESPIRATORY (INHALATION) at 07:55

## 2017-01-01 RX ADMIN — QUETIAPINE FUMARATE 25 MG: 25 TABLET, FILM COATED ORAL at 01:32

## 2017-01-01 RX ADMIN — SIMETHICONE 125 MG: 20 SUSPENSION/ DROPS ORAL at 15:46

## 2017-01-01 RX ADMIN — LEVALBUTEROL HYDROCHLORIDE 1.25 MG: 1.25 SOLUTION RESPIRATORY (INHALATION) at 16:11

## 2017-01-01 RX ADMIN — PREDNISONE 20 MG: 20 TABLET ORAL at 08:03

## 2017-01-01 RX ADMIN — POTASSIUM CHLORIDE 20 MEQ: 1.5 POWDER, FOR SOLUTION ORAL at 22:48

## 2017-01-01 RX ADMIN — SODIUM CHLORIDE: 9 INJECTION, SOLUTION INTRAVENOUS at 16:00

## 2017-01-01 RX ADMIN — Medication 1.5 MG: at 11:34

## 2017-01-01 RX ADMIN — SODIUM CHLORIDE 100 ML: 9 INJECTION, SOLUTION INTRAVENOUS at 20:54

## 2017-01-01 RX ADMIN — Medication 1 PACKET: at 21:47

## 2017-01-01 RX ADMIN — Medication 1 CAPSULE: at 07:43

## 2017-01-01 RX ADMIN — GUAIFENESIN 10 ML: 100 SOLUTION ORAL at 07:53

## 2017-01-01 RX ADMIN — GUAIFENESIN 10 ML: 100 SOLUTION ORAL at 20:07

## 2017-01-01 RX ADMIN — LEVALBUTEROL HYDROCHLORIDE 1.25 MG: 1.25 SOLUTION RESPIRATORY (INHALATION) at 12:18

## 2017-01-01 RX ADMIN — IOPAMIDOL 76 ML: 755 INJECTION, SOLUTION INTRAVENOUS at 22:29

## 2017-01-01 RX ADMIN — Medication 15 ML: at 08:57

## 2017-01-01 RX ADMIN — Medication 0.5 MG: at 17:27

## 2017-01-01 RX ADMIN — Medication 3 MG: at 21:53

## 2017-01-01 RX ADMIN — FEXOFENADINE 60 MG: 60 TABLET, FILM COATED ORAL at 08:04

## 2017-01-01 RX ADMIN — ACETAMINOPHEN 650 MG: 160 SUSPENSION ORAL at 17:45

## 2017-01-01 RX ADMIN — Medication 0.5 MG: at 18:47

## 2017-01-01 RX ADMIN — LEVALBUTEROL HYDROCHLORIDE 1.25 MG: 1.25 SOLUTION RESPIRATORY (INHALATION) at 23:56

## 2017-01-01 RX ADMIN — IPRATROPIUM BROMIDE 0.5 MG: 0.5 SOLUTION RESPIRATORY (INHALATION) at 13:03

## 2017-01-01 RX ADMIN — ONDANSETRON 4 MG: 2 INJECTION INTRAMUSCULAR; INTRAVENOUS at 02:09

## 2017-01-01 RX ADMIN — BUDESONIDE 0.5 MG: 0.5 INHALANT RESPIRATORY (INHALATION) at 07:58

## 2017-01-01 RX ADMIN — Medication 2 G: at 08:12

## 2017-01-01 RX ADMIN — ENOXAPARIN SODIUM 40 MG: 40 INJECTION SUBCUTANEOUS at 08:19

## 2017-01-01 RX ADMIN — Medication 0.5 MG: at 04:09

## 2017-01-01 RX ADMIN — Medication 160 MG: at 08:17

## 2017-01-01 RX ADMIN — FEXOFENADINE HYDROCHLORIDE 180 MG: 180 TABLET, FILM COATED ORAL at 09:18

## 2017-01-01 RX ADMIN — LEVALBUTEROL 1.25 MG: 1.25 SOLUTION RESPIRATORY (INHALATION) at 12:23

## 2017-01-01 RX ADMIN — POLYETHYLENE GLYCOL 400 AND PROPYLENE GLYCOL 2 DROP: 4; 3 SOLUTION/ DROPS OPHTHALMIC at 07:54

## 2017-01-01 RX ADMIN — MEROPENEM 1 G: 1 INJECTION, POWDER, FOR SOLUTION INTRAVENOUS at 09:28

## 2017-01-01 RX ADMIN — SERTRALINE HYDROCHLORIDE 150 MG: 20 SOLUTION ORAL at 12:06

## 2017-01-01 RX ADMIN — Medication 60 MEQ: at 02:20

## 2017-01-01 RX ADMIN — FEXOFENADINE 60 MG: 60 TABLET, FILM COATED ORAL at 19:56

## 2017-01-01 RX ADMIN — Medication 1 PACKET: at 22:54

## 2017-01-01 RX ADMIN — MORPHINE SULFATE 10 MG: 10 SOLUTION ORAL at 14:46

## 2017-01-01 RX ADMIN — GABAPENTIN 300 MG: 250 SOLUTION ORAL at 13:11

## 2017-01-01 RX ADMIN — BUDESONIDE 0.5 MG: 0.5 INHALANT RESPIRATORY (INHALATION) at 00:17

## 2017-01-01 RX ADMIN — FEXOFENADINE HYDROCHLORIDE 180 MG: 180 TABLET, FILM COATED ORAL at 08:23

## 2017-01-01 RX ADMIN — LEVALBUTEROL HYDROCHLORIDE 1.25 MG: 1.25 SOLUTION RESPIRATORY (INHALATION) at 21:55

## 2017-01-01 RX ADMIN — GABAPENTIN 300 MG: 250 SUSPENSION ORAL at 09:20

## 2017-01-01 RX ADMIN — ACETYLCYSTEINE 2 ML: 200 SOLUTION ORAL; RESPIRATORY (INHALATION) at 07:23

## 2017-01-01 RX ADMIN — Medication 220 MG: at 07:59

## 2017-01-01 RX ADMIN — LOPERAMIDE HYDROCHLORIDE 2 MG: 1 SOLUTION ORAL at 10:00

## 2017-01-01 RX ADMIN — GUAIFENESIN 10 ML: 100 SOLUTION ORAL at 07:43

## 2017-01-01 RX ADMIN — IPRATROPIUM BROMIDE 0.5 MG: 0.5 SOLUTION RESPIRATORY (INHALATION) at 14:00

## 2017-01-01 RX ADMIN — HEPARIN SODIUM 5000 UNITS: 5000 INJECTION, SOLUTION INTRAVENOUS; SUBCUTANEOUS at 16:01

## 2017-01-01 RX ADMIN — CLONAZEPAM 1.5 MG: 2 TABLET ORAL at 20:17

## 2017-01-01 RX ADMIN — GABAPENTIN 300 MG: 250 SUSPENSION ORAL at 00:29

## 2017-01-01 RX ADMIN — Medication 20 MG: at 20:48

## 2017-01-01 RX ADMIN — GUAIFENESIN 10 ML: 100 SOLUTION ORAL at 08:57

## 2017-01-01 RX ADMIN — BUDESONIDE 0.5 MG: 0.5 INHALANT RESPIRATORY (INHALATION) at 08:20

## 2017-01-01 RX ADMIN — Medication 1.5 MG: at 08:06

## 2017-01-01 RX ADMIN — GUAIFENESIN 10 ML: 100 SOLUTION ORAL at 07:44

## 2017-01-01 RX ADMIN — QUETIAPINE FUMARATE 25 MG: 25 TABLET, FILM COATED ORAL at 20:49

## 2017-01-01 RX ADMIN — Medication 20 MG: at 08:25

## 2017-01-01 RX ADMIN — HYDROMORPHONE HYDROCHLORIDE 1 MG: 1 SOLUTION ORAL at 13:12

## 2017-01-01 RX ADMIN — SODIUM POLYSTYRENE SULFONATE 30 G: 15 SUSPENSION ORAL; RECTAL at 11:07

## 2017-01-01 RX ADMIN — HYDROMORPHONE HYDROCHLORIDE 1 MG: 1 SOLUTION ORAL at 11:54

## 2017-01-01 RX ADMIN — GABAPENTIN 300 MG: 250 SUSPENSION ORAL at 15:04

## 2017-01-01 RX ADMIN — Medication 13 ML: at 19:49

## 2017-01-01 RX ADMIN — ASPIRIN 81 MG CHEWABLE TABLET 81 MG: 81 TABLET CHEWABLE at 08:04

## 2017-01-01 RX ADMIN — IPRATROPIUM BROMIDE 0.5 MG: 0.5 SOLUTION RESPIRATORY (INHALATION) at 15:12

## 2017-01-01 RX ADMIN — POTASSIUM CHLORIDE 10 MEQ: 14.9 INJECTION, SOLUTION, CONCENTRATE PARENTERAL at 01:21

## 2017-01-01 RX ADMIN — POLYETHYLENE GLYCOL 3350 17 G: 17 POWDER, FOR SOLUTION ORAL at 20:03

## 2017-01-01 RX ADMIN — MORPHINE SULFATE 10 MG: 10 SOLUTION ORAL at 13:53

## 2017-01-01 RX ADMIN — POLYETHYLENE GLYCOL 3350 17 G: 17 POWDER, FOR SOLUTION ORAL at 08:05

## 2017-01-01 RX ADMIN — Medication 1 MG: at 10:04

## 2017-01-01 RX ADMIN — ONDANSETRON 4 MG: 2 INJECTION INTRAMUSCULAR; INTRAVENOUS at 08:08

## 2017-01-01 RX ADMIN — LEVALBUTEROL HYDROCHLORIDE 1.25 MG: 1.25 SOLUTION RESPIRATORY (INHALATION) at 08:34

## 2017-01-01 RX ADMIN — SODIUM CHLORIDE, POTASSIUM CHLORIDE, SODIUM LACTATE AND CALCIUM CHLORIDE 1560 ML: 600; 310; 30; 20 INJECTION, SOLUTION INTRAVENOUS at 04:02

## 2017-01-01 RX ADMIN — Medication 1 CAPSULE: at 08:10

## 2017-01-01 RX ADMIN — POLYETHYLENE GLYCOL 400 AND PROPYLENE GLYCOL 2 DROP: 4; 3 SOLUTION/ DROPS OPHTHALMIC at 11:23

## 2017-01-01 RX ADMIN — SODIUM CHLORIDE 500 ML: 9 INJECTION, SOLUTION INTRAVENOUS at 21:55

## 2017-01-01 RX ADMIN — BUDESONIDE 0.5 MG: 0.5 INHALANT RESPIRATORY (INHALATION) at 20:00

## 2017-01-01 RX ADMIN — GUAIFENESIN 200 MG: 100 SOLUTION ORAL at 20:23

## 2017-01-01 RX ADMIN — IPRATROPIUM BROMIDE 0.5 MG: 0.5 SOLUTION RESPIRATORY (INHALATION) at 16:47

## 2017-01-01 RX ADMIN — POTASSIUM CHLORIDE 40 MEQ: 1.5 POWDER, FOR SOLUTION ORAL at 14:27

## 2017-01-01 RX ADMIN — Medication 2 G: at 00:03

## 2017-01-01 RX ADMIN — LABETALOL HYDROCHLORIDE 20 MG: 5 INJECTION, SOLUTION INTRAVENOUS at 11:55

## 2017-01-01 RX ADMIN — GUAIFENESIN 10 ML: 100 SOLUTION ORAL at 20:51

## 2017-01-01 RX ADMIN — GUAIFENESIN 10 ML: 100 SOLUTION ORAL at 11:26

## 2017-01-01 RX ADMIN — Medication 1 PACKET: at 08:15

## 2017-01-01 RX ADMIN — GUAIFENESIN 200 MG: 100 SOLUTION ORAL at 11:34

## 2017-01-01 RX ADMIN — Medication 0.5 MG: at 18:31

## 2017-01-01 RX ADMIN — GABAPENTIN 300 MG: 250 SUSPENSION ORAL at 23:21

## 2017-01-01 RX ADMIN — CLONAZEPAM 1.5 MG: 2 TABLET ORAL at 12:14

## 2017-01-01 RX ADMIN — GUAIFENESIN 200 MG: 100 SOLUTION ORAL at 09:19

## 2017-01-01 RX ADMIN — Medication 160 MG: at 08:09

## 2017-01-01 RX ADMIN — FEXOFENADINE HYDROCHLORIDE 180 MG: 180 TABLET, FILM COATED ORAL at 08:00

## 2017-01-01 RX ADMIN — QUETIAPINE 50 MG: 50 TABLET, FILM COATED ORAL at 21:11

## 2017-01-01 RX ADMIN — SODIUM CHLORIDE: 9 INJECTION, SOLUTION INTRAVENOUS at 09:51

## 2017-01-01 RX ADMIN — PREDNISONE 20 MG: 20 TABLET ORAL at 08:01

## 2017-01-01 RX ADMIN — LEVALBUTEROL HYDROCHLORIDE 1.25 MG: 1.25 SOLUTION RESPIRATORY (INHALATION) at 16:21

## 2017-01-01 RX ADMIN — QUETIAPINE FUMARATE 25 MG: 25 TABLET, FILM COATED ORAL at 18:34

## 2017-01-01 RX ADMIN — GUAIFENESIN 10 ML: 100 SOLUTION ORAL at 15:03

## 2017-01-01 RX ADMIN — LEVALBUTEROL HYDROCHLORIDE 1.25 MG: 1.25 SOLUTION RESPIRATORY (INHALATION) at 08:06

## 2017-01-01 RX ADMIN — ENOXAPARIN SODIUM 40 MG: 40 INJECTION SUBCUTANEOUS at 08:04

## 2017-01-01 RX ADMIN — LEVALBUTEROL 1.25 MG: 1.25 SOLUTION RESPIRATORY (INHALATION) at 20:04

## 2017-01-01 RX ADMIN — Medication 1.5 MG: at 15:34

## 2017-01-01 RX ADMIN — HYDROMORPHONE HYDROCHLORIDE 1 MG: 1 SOLUTION ORAL at 08:12

## 2017-01-01 RX ADMIN — GABAPENTIN 150 MG: 250 SOLUTION ORAL at 19:54

## 2017-01-01 RX ADMIN — GUAIFENESIN 10 ML: 100 SOLUTION ORAL at 15:50

## 2017-01-01 RX ADMIN — GUAIFENESIN 10 ML: 100 SOLUTION ORAL at 14:13

## 2017-01-01 RX ADMIN — BUDESONIDE 0.5 MG: 0.5 INHALANT RESPIRATORY (INHALATION) at 22:02

## 2017-01-01 RX ADMIN — LEVALBUTEROL HYDROCHLORIDE 1.25 MG: 1.25 SOLUTION RESPIRATORY (INHALATION) at 20:00

## 2017-01-01 RX ADMIN — GABAPENTIN 300 MG: 250 SUSPENSION ORAL at 17:16

## 2017-01-01 RX ADMIN — ACETYLCYSTEINE 2 ML: 200 SOLUTION ORAL; RESPIRATORY (INHALATION) at 16:05

## 2017-01-01 RX ADMIN — LEVALBUTEROL HYDROCHLORIDE 1.25 MG: 1.25 SOLUTION RESPIRATORY (INHALATION) at 13:14

## 2017-01-01 RX ADMIN — ACETAMINOPHEN 325 MG: 160 SUSPENSION ORAL at 15:44

## 2017-01-01 RX ADMIN — GUAIFENESIN 200 MG: 100 SOLUTION ORAL at 08:20

## 2017-01-01 RX ADMIN — PREDNISONE 40 MG: 20 TABLET ORAL at 08:04

## 2017-01-01 RX ADMIN — MULTIVITAMIN 15 ML: LIQUID ORAL at 08:12

## 2017-01-01 RX ADMIN — PREDNISONE 20 MG: 20 TABLET ORAL at 09:00

## 2017-01-01 RX ADMIN — Medication 20 MG: at 19:29

## 2017-01-01 RX ADMIN — GUAIFENESIN 10 ML: 100 SOLUTION ORAL at 08:07

## 2017-01-01 RX ADMIN — Medication 15 ML: at 11:18

## 2017-01-01 RX ADMIN — FAMOTIDINE 20 MG: 40 POWDER, FOR SUSPENSION ORAL at 20:31

## 2017-01-01 RX ADMIN — CLONAZEPAM 1.5 MG: 2 TABLET ORAL at 10:18

## 2017-01-01 RX ADMIN — LEVALBUTEROL HYDROCHLORIDE 1.25 MG: 1.25 SOLUTION RESPIRATORY (INHALATION) at 16:06

## 2017-01-01 RX ADMIN — MORPHINE SULFATE 10 MG: 10 SOLUTION ORAL at 22:01

## 2017-01-01 RX ADMIN — CALCIUM GLUCONATE 2 G: 94 INJECTION, SOLUTION INTRAVENOUS at 11:56

## 2017-01-01 RX ADMIN — HYDROMORPHONE HYDROCHLORIDE 1 MG: 1 SOLUTION ORAL at 17:45

## 2017-01-01 RX ADMIN — IPRATROPIUM BROMIDE 0.5 MG: 0.5 SOLUTION RESPIRATORY (INHALATION) at 12:38

## 2017-01-01 RX ADMIN — Medication 1 PACKET: at 09:03

## 2017-01-01 RX ADMIN — FAMOTIDINE 20 MG: 40 POWDER, FOR SUSPENSION ORAL at 08:53

## 2017-01-01 RX ADMIN — HYDROMORPHONE HYDROCHLORIDE 1 MG: 1 SOLUTION ORAL at 18:15

## 2017-01-01 RX ADMIN — HEPARIN SODIUM 5000 UNITS: 5000 INJECTION, SOLUTION INTRAVENOUS; SUBCUTANEOUS at 08:38

## 2017-01-01 RX ADMIN — LEVALBUTEROL 1.25 MG: 1.25 SOLUTION RESPIRATORY (INHALATION) at 12:10

## 2017-01-01 RX ADMIN — Medication 1 MG: at 18:43

## 2017-01-01 RX ADMIN — GUAIFENESIN 200 MG: 100 SOLUTION ORAL at 17:21

## 2017-01-01 RX ADMIN — Medication 1 PACKET: at 09:01

## 2017-01-01 RX ADMIN — GABAPENTIN 300 MG: 250 SOLUTION ORAL at 08:46

## 2017-01-01 RX ADMIN — ACETYLCYSTEINE 2 ML: 200 SOLUTION ORAL; RESPIRATORY (INHALATION) at 16:06

## 2017-01-01 RX ADMIN — LEVALBUTEROL 1.25 MG: 1.25 SOLUTION RESPIRATORY (INHALATION) at 04:02

## 2017-01-01 RX ADMIN — Medication 1 PACKET: at 22:29

## 2017-01-01 RX ADMIN — VANCOMYCIN HYDROCHLORIDE 1000 MG: 1 INJECTION, SOLUTION INTRAVENOUS at 23:39

## 2017-01-01 RX ADMIN — GUAIFENESIN 200 MG: 100 SOLUTION ORAL at 11:54

## 2017-01-01 RX ADMIN — FEXOFENADINE HYDROCHLORIDE 180 MG: 180 TABLET, FILM COATED ORAL at 07:56

## 2017-01-01 RX ADMIN — Medication 160 MG: at 08:32

## 2017-01-01 RX ADMIN — HYDROMORPHONE HYDROCHLORIDE 1 MG: 1 SOLUTION ORAL at 03:49

## 2017-01-01 RX ADMIN — Medication 1 MG: at 21:41

## 2017-01-01 RX ADMIN — ACETAMINOPHEN 325 MG: 160 SUSPENSION ORAL at 23:42

## 2017-01-01 RX ADMIN — POLYETHYLENE GLYCOL 3350 17 G: 17 POWDER, FOR SOLUTION ORAL at 09:18

## 2017-01-01 ASSESSMENT — PAIN DESCRIPTION - DESCRIPTORS
DESCRIPTORS: ACHING;CONSTANT
DESCRIPTORS: ACHING;CONSTANT;DISCOMFORT
DESCRIPTORS: ACHING;CONSTANT
DESCRIPTORS: CONSTANT
DESCRIPTORS: ACHING
DESCRIPTORS: CONSTANT
DESCRIPTORS: ACHING
DESCRIPTORS: ACHING
DESCRIPTORS: CONSTANT
DESCRIPTORS: CONSTANT;DISCOMFORT
DESCRIPTORS: ACHING;DISCOMFORT
DESCRIPTORS: CONSTANT
DESCRIPTORS: ACHING
DESCRIPTORS: DISCOMFORT
DESCRIPTORS: CONSTANT
DESCRIPTORS: ACHING;DISCOMFORT
DESCRIPTORS: CONSTANT
DESCRIPTORS: ACHING;CONSTANT
DESCRIPTORS: CONSTANT
DESCRIPTORS: ACHING
DESCRIPTORS: ACHING;CONSTANT
DESCRIPTORS: ACHING;DISCOMFORT
DESCRIPTORS: CONSTANT
DESCRIPTORS: PATIENT UNABLE TO DESCRIBE
DESCRIPTORS: ACHING
DESCRIPTORS: ACHING
DESCRIPTORS: CONSTANT
DESCRIPTORS: ACHING;CONSTANT
DESCRIPTORS: CONSTANT
DESCRIPTORS: DISCOMFORT
DESCRIPTORS: CONSTANT
DESCRIPTORS: ACHING
DESCRIPTORS: ACHING;CONSTANT
DESCRIPTORS: CONSTANT
DESCRIPTORS: ACHING
DESCRIPTORS: ACHING;CONSTANT
DESCRIPTORS: CONSTANT
DESCRIPTORS: CONSTANT
DESCRIPTORS: ACHING;CONSTANT
DESCRIPTORS: CONSTANT
DESCRIPTORS: ACHING
DESCRIPTORS: ACHING;CONSTANT
DESCRIPTORS: ACHING
DESCRIPTORS: CONSTANT;DISCOMFORT
DESCRIPTORS: CONSTANT
DESCRIPTORS: ACHING;CONSTANT
DESCRIPTORS: CONSTANT;ACHING
DESCRIPTORS: ACHING;CONSTANT
DESCRIPTORS: DISCOMFORT
DESCRIPTORS: ACHING;DISCOMFORT
DESCRIPTORS: CONSTANT
DESCRIPTORS: ACHING
DESCRIPTORS: PATIENT UNABLE TO DESCRIBE
DESCRIPTORS: DISCOMFORT
DESCRIPTORS: ACHING
DESCRIPTORS: ACHING;DISCOMFORT
DESCRIPTORS: ACHING
DESCRIPTORS: CONSTANT
DESCRIPTORS: ACHING
DESCRIPTORS: PATIENT UNABLE TO DESCRIBE

## 2017-01-01 ASSESSMENT — ACTIVITIES OF DAILY LIVING (ADL)
BATHING: 3-->ASSISTIVE EQUIPMENT AND PERSON
DRESS: 4-->COMPLETELY DEPENDENT
COGNITION: 1 - ATTENTION OR MEMORY DEFICITS
TRANSFERRING: 4-->COMPLETELY DEPENDENT
AMBULATION: 3-->ASSISTIVE EQUIPMENT AND PERSON
TOILETING: 2-->ASSISTIVE PERSON
RETIRED_EATING: 4-->COMPLETELY DEPENDENT
EATING: 4-->COMPLETELY DEPENDENT
RETIRED_EATING: 3-->ASSISTIVE EQUIPMENT AND PERSON
TRANSFERRING: 4-->COMPLETELY DEPENDENT
AMBULATION: 4-->COMPLETELY DEPENDENT
FALL_HISTORY_WITHIN_LAST_SIX_MONTHS: NO
RETIRED_EATING: 4-->COMPLETELY DEPENDENT
FALL_HISTORY_WITHIN_LAST_SIX_MONTHS: NO
BATHING: 4-->COMPLETELY DEPENDENT
BATHING: 2-->ASSISTIVE PERSON
TOILETING: 4-->COMPLETELY DEPENDENT
TRANSFERRING: 4-->COMPLETELY DEPENDENT
DRESS: 4-->COMPLETELY DEPENDENT
TRANSFERRING: 3-->ASSISTIVE EQUIPMENT AND PERSON
DRESS: 2-->ASSISTIVE PERSON
RETIRED_COMMUNICATION: 0-->UNDERSTANDS/COMMUNICATES WITHOUT DIFFICULTY
RETIRED_COMMUNICATION: 2-->DIFFICULTY UNDERSTANDING (NOT RELATED TO LANGUAGE BARRIER)
COGNITION: 1 - ATTENTION OR MEMORY DEFICITS
AMBULATION: 4-->COMPLETELY DEPENDENT
SWALLOWING: 2-->DIFFICULTY SWALLOWING LIQUIDS/FOODS
BATHING: 4-->COMPLETELY DEPENDENT
SWALLOWING: 2-->DIFFICULTY SWALLOWING LIQUIDS/FOODS
AMBULATION: 4-->COMPLETELY DEPENDENT
TOILETING: 3-->ASSISTIVE EQUIPMENT AND PERSON
DRESS: 4-->COMPLETELY DEPENDENT
DRESS: 2-->ASSISTIVE PERSON
DRESS: 2-->ASSISTIVE PERSON
TOILETING: 4-->COMPLETELY DEPENDENT
TOILETING: 2-->ASSISTIVE PERSON
BATHING: 4-->COMPLETELY DEPENDENT
COGNITION: 1 - ATTENTION OR MEMORY DEFICITS
RETIRED_COMMUNICATION: 2-->DIFFICULTY SPEAKING (NOT RELATED TO LANGUAGE BARRIER)
DRESS: 4-->COMPLETELY DEPENDENT
FALL_HISTORY_WITHIN_LAST_SIX_MONTHS: NO
FALL_HISTORY_WITHIN_LAST_SIX_MONTHS: NO
SWALLOWING: 0-->SWALLOWS FOODS/LIQUIDS WITHOUT DIFFICULTY
RETIRED_EATING: 4-->COMPLETELY DEPENDENT
FALL_HISTORY_WITHIN_LAST_SIX_MONTHS: NO
BATHING: 2-->ASSISTIVE PERSON
SWALLOWING: 2-->DIFFICULTY SWALLOWING LIQUIDS/FOODS
RETIRED_EATING: 2-->ASSISTIVE PERSON
TOILETING: 4-->COMPLETELY DEPENDENT
SWALLOWING: 2-->DIFFICULTY SWALLOWING LIQUIDS/FOODS
COGNITION: 1 - ATTENTION OR MEMORY DEFICITS
COGNITION: 0 - NO COGNITION ISSUES REPORTED
TRANSFERRING: 3-->ASSISTIVE EQUIPMENT AND PERSON
SWALLOWING: 2-->DIFFICULTY SWALLOWING LIQUIDS/FOODS
RETIRED_COMMUNICATION: 2-->DIFFICULTY SPEAKING (NOT RELATED TO LANGUAGE BARRIER)
RETIRED_EATING: 3-->ASSISTIVE EQUIPMENT AND PERSON
TRANSFERRING: 4-->COMPLETELY DEPENDENT
RETIRED_COMMUNICATION: 2-->DIFFICULTY SPEAKING (NOT RELATED TO LANGUAGE BARRIER)
TRANSFERRING: 3-->ASSISTIVE EQUIPMENT AND PERSON
TOILETING: 4-->COMPLETELY DEPENDENT
AMBULATION: 4-->COMPLETELY DEPENDENT
RETIRED_COMMUNICATION: 2-->DIFFICULTY SPEAKING (NOT RELATED TO LANGUAGE BARRIER)
COMMUNICATION: 0-->UNDERSTANDS/COMMUNICATES WITHOUT DIFFICULTY
AMBULATION: 4-->COMPLETELY DEPENDENT
COGNITION: 1 - ATTENTION OR MEMORY DEFICITS
BATHING: 4-->COMPLETELY DEPENDENT
FALL_HISTORY_WITHIN_LAST_SIX_MONTHS: NO
SWALLOWING: 2-->DIFFICULTY SWALLOWING LIQUIDS/FOODS
WHICH_OF_THE_ABOVE_FUNCTIONAL_RISKS_HAD_A_RECENT_ONSET_OR_CHANGE?: AMBULATION;TRANSFERRING
AMBULATION: 4-->COMPLETELY DEPENDENT

## 2017-01-01 ASSESSMENT — ENCOUNTER SYMPTOMS
FEVER: 1
NERVOUS/ANXIOUS: 1
DIARRHEA: 0
SHORTNESS OF BREATH: 1
SHORTNESS OF BREATH: 1
ABDOMINAL PAIN: 1
ABDOMINAL DISTENTION: 1
DYSURIA: 1
SHORTNESS OF BREATH: 1
VOMITING: 0
NERVOUS/ANXIOUS: 1
SHORTNESS OF BREATH: 0
BACK PAIN: 1

## 2017-01-02 PROBLEM — R41.82 ALTERED MENTAL STATE: Status: ACTIVE | Noted: 2017-01-01

## 2017-01-02 NOTE — ED NOTES
"Pt with a home vent BIBA with reports of increased lethargy and pts daughter's concern for increased Na, as this is her recurrent hx. Pt was just discharged from  on Friday.    EMS: 170/97, , RR 20-26, 95% sats on 3L O2 home vent settings.    ED: Pt alert and restless, mouths words that she is hot, \"help me\", and that she is short of breath; reports chronic back pain. Pt is accompianied by a group home nurse who reports they did not note any concern for mental status nor need to call EMS.   Trach vent in place and continuing to use home vent.   Avendaño bag connected to GJ tube with bile drainage; group home nurse unsure why this is present. Urinary catheter present as well.   Group home nurse reports pt received prn ativan and dilaudid per peg this 0900 and that the dilaudid is \"for her anxiety\". Nurse reports pt was awake most of the night and then was sleeping deeply this morning.     BP: 165/86 mmHg Patient Position: Lying Heart Rate: 103 Pulse/Heart Rate Source: Monitor SpO2: 92 % O2 Device: Mechanical Ventilator Temp: 98.8  F (37.1  C) Temp src: Oral Patient Currently in Pain: Yes HR/Pulse Review: 103    "

## 2017-01-02 NOTE — ED NOTES
Bed: ED02  Expected date:   Expected time:   Means of arrival:   Comments:  Higinio  66 yr F  Lethargic, hx of elevated Na+  Vent dependent  Yellow

## 2017-01-02 NOTE — IP AVS SNAPSHOT
` `           [address] [facility] BANK: [phone]            Medication Administration Report for Mary Wang as of 01/06/17 1601   Legend:    Given Hold Not Given Due Canceled Entry Other Actions    Time Time (Time) Time  Time-Action       Inactive    Active    Linked        Medications 12/31/16 01/01/17 01/02/17 01/03/17 01/04/17 01/05/17 01/06/17    acetaminophen (TYLENOL) solution 650 mg  Dose: 650 mg Freq: EVERY 4 HOURS PRN Route: PER FEEDING   PRN Reason: mild pain  Start: 01/02/17 2328   Admin Instructions: Maximum acetaminophen dose from all sources= 75 mg/kg/day not to exceed 4 grams/day.        0023 (650 mg)-Given       1214 (650 mg)-Given        1929 (650 mg)-Given         1314 (650 mg)-Given           aspirin chewable tablet 81 mg  Dose: 81 mg Freq: DAILY Route: PER FEEDING   Start: 01/03/17 0800       0948 (81 mg)-Given        0804 (81 mg)-Given        0801 (81 mg)-Given        0753 (81 mg)-Given           atorvastatin (LIPITOR) tablet 20 mg  Dose: 20 mg Freq: DAILY Route: PO  Start: 01/03/17 0800       0948 (20 mg)-Given        0804 (20 mg)-Given        0801 (20 mg)-Given        0753 (20 mg)-Given           budesonide (PULMICORT) neb solution 0.5 mg  Dose: 0.5 mg Freq: 2 TIMES DAILY Route: NEBULIZATION  Start: 01/02/17 2330       0027 (0.5 mg)-Given       0842 (0.5 mg)-Given       2032 (0.5 mg)-Given        0755 (0.5 mg)-Given       2102 (0.5 mg)-Given        0816 (0.5 mg)-Given       2034 (0.5 mg)-Given        0837 (0.5 mg)-Given       [ ] 2000           calcium carbonate (TUMS) chewable tablet 500 mg  Dose: 500 mg Freq: EVERY 2 HOURS PRN Route: PER G TUBE  PRN Reason: heartburn  Start: 01/02/17 2328              clonazePAM (klonoPIN) suspension 1.5 mg  Dose: 1.5 mg Freq: 4 TIMES DAILY Route: PO  Start: 01/03/17 1600   Admin Instructions: Shake Well.        1705 (1.5 mg)-Given       1954 (1.5 mg)-Given        0812 (1.5 mg)-Given       1235 (1.5 mg)-Given       1536 (1.5 mg)-Given       1928  (1.5 mg)-Given        0835 (1.5 mg)-Given       1118 (1.5 mg)-Given       1516 (1.5 mg)-Given       2011 (1.5 mg)-Given        0808 (1.5 mg)-Given       1205 (1.5 mg)-Given       1536 (1.5 mg)-Given       [ ] 2000           Cranberry CAPS 500 mg  Dose: 500 mg Freq: DAILY Route: PO  Start: 01/03/17 0815   Admin Instructions: Formulary alternate for tablets        1429 (500 mg)-Given        0805 (500 mg)-Given        0805 (500 mg)-Given        0752 (500 mg)-Given           enoxaparin (LOVENOX) injection 40 mg  Dose: 40 mg Freq: EVERY 24 HOURS Route: SC  Start: 01/02/17 2330   Admin Instructions: HOLD if platelet count falls below 50% of baseline or less than 100,000/ L and notify provider.        0022 (40 mg)-Given        0015 (40 mg)-Given        0126 (40 mg)-Given       2338 (40 mg)-Given        [ ] 2330           fexofenadine (ALLEGRA) tablet 180 mg  Dose: 180 mg Freq: DAILY Route: PO  Start: 01/05/17 0800         0800 (180 mg)-Given        0753 (180 mg)-Given           fiber modular (NUTRISOURCE FIBER) packet 1 packet  Dose: 1 packet Freq: 3 TIMES DAILY Route: PER J TUBE  Start: 01/03/17 1530   Admin Instructions: Mix each packet with 60 mL water before administering. Supplied by nutrition department.        1615 (1 packet)-Given       1957 (1 packet)-Given        0812 (1 packet)-Given       1321 (1 packet)-Given       1933 (1 packet)-Given        0805 (1 packet)-Given       1331 (1 packet)-Given       2013 (1 packet)-Given        0756 (1 packet)-Given       1303 (1 packet)-Given       [ ] 2000           fluticasone (FLONASE) 50 MCG/ACT spray 2 spray  Dose: 2 spray Freq: DAILY Route: BOTH NOSTRIL  Start: 01/03/17 0800       1006 (2 spray)-Given        0806 (2 spray)-Given        0807 (2 spray)-Given        0752 (2 spray)-Given           guaiFENesin (ROBITUSSIN) 20 mg/mL solution 10 mL  Dose: 10 mL Freq: 4 TIMES DAILY Route: PER FEEDING   Start: 01/03/17 0800   Admin Instructions: Formulary alternate for  guaifenesin 200 mg tablets (home med)        0949 (10 mL)-Given       1125 (10 mL)-Given       1615 (10 mL)-Given       1954 (10 mL)-Given        0807 (10 mL)-Given       1235 (10 mL)-Given       1536 (10 mL)-Given       1932 (10 mL)-Given        0800 (10 mL)-Given       1118 (10 mL)-Given       1517 (10 mL)-Given       2009 (10 mL)-Given        0753 (10 mL)-Given       1205 (10 mL)-Given       [ ] 1600       [ ] 2000           hydrALAZINE (APRESOLINE) injection 10 mg  Dose: 10 mg Freq: EVERY 6 HOURS PRN Route: IV  PRN Reason: high blood pressure  PRN Comment: sbp>160  Start: 01/04/17 1227              HYDROmorphone (DILAUDID) liquid 1 mg  Dose: 1 mg Freq: EVERY 2 HOURS PRN Route: PO  PRN Reason: moderate to severe pain  PRN Comment: air hunger  Start: 01/03/17 1745       1953 (1 mg)-Given       2141 (1 mg)-Given        0015 (1 mg)-Given       0351 (1 mg)-Given       0803 (1 mg)-Given       1004 (1 mg)-Given       1320 (1 mg)-Given       1536 (1 mg)-Given       1724 (1 mg)-Given       1929 (1 mg)-Given        0451 (1 mg)-Given       0801 (1 mg)-Given       1119 (1 mg)-Given       1328 (1 mg)-Given       1529 (1 mg)-Given       1843 (1 mg)-Given       2339 (1 mg)-Given        0548 (1 mg)-Given       0752 (1 mg)-Given       1034 (1 mg)-Given       1243 (1 mg)-Given       1454 (1 mg)-Given           ipratropium (ATROVENT) 0.02 % neb solution 0.5 mg  Dose: 0.5 mg Freq: EVERY 4 HOURS WHILE AWAKE Route: NEBULIZATION  Start: 01/02/17 2330       0027 (0.5 mg)-Given       0842 (0.5 mg)-Given       1241 (0.5 mg)-Given       1649 (0.5 mg)-Given       2032 (0.5 mg)-Given        0028 (0.5 mg)-Given       0755 (0.5 mg)-Given       1220 (0.5 mg)-Given       1549 (0.5 mg)-Given       2102 (0.5 mg)-Given        0001 (0.5 mg)-Given       0816 (0.5 mg)-Given       1226 (0.5 mg)-Given       1614 (0.5 mg)-Given       2035 (0.5 mg)-Given        0023 (0.5 mg)-Given       0837 (0.5 mg)-Given       1213 (0.5 mg)-Given       1545 (0.5  mg)-Given       [ ] 2000           lactobacillus rhamnosus (GG) (CULTURELL) capsule 1 capsule  Dose: 1 capsule Freq: 2 TIMES DAILY Route: PO  Start: 01/03/17 2000   Admin Instructions: Administer at least 2 hours before or after oral antibiotics. Capsules may be opened.        2005 (1 capsule)-Given        0805 (1 capsule)-Given       1929 (1 capsule)-Given        0800 (1 capsule)-Given       2010 (1 capsule)-Given        0753 (1 capsule)-Given       [ ] 2000           levalbuterol (XOPENEX) neb solution 1.25 mg  Dose: 1 ampule Freq: EVERY 4 HOURS WHILE AWAKE Route: NEBULIZATION  Start: 01/03/17 0800       0843 (1.25 mg)-Given       1241 (1.25 mg)-Given       1649 (1.25 mg)-Given       2032 (1.25 mg)-Given        0028 (1.25 mg)-Given       0755 (1.25 mg)-Given       1221 (1.25 mg)-Given       1550 (1.25 mg)-Given       2102 (1.25 mg)-Given        0001 (1.25 mg)-Given       0816 (1.25 mg)-Given       1226 (1.25 mg)-Given       1614 (1.25 mg)-Given       2035 (1.25 mg)-Given        0837 (1.25 mg)-Given       1213 (1.25 mg)-Given       1545 (1.25 mg)-Given       [ ] 2000           lidocaine (LIDODERM) 5 % Patch 2-3 patch  Dose: 2-3 patch Freq: EVERY 24 HOURS 0800 Route: TD  Start: 01/06/17 0830   Admin Instructions: Apply patch(s) to low back pain. To prevent lidocaine toxicity, patient should be patch free for 12 hrs daily. Patches may be cut to smaller size prior to removing release liner.  NEVER APPLY HEAT OVER PATCH which will increase absorption and may lead to risk of local anesthetic toxicity. Do not apply over area where liposomal bupivacaine was injected for 96 hours post injection.           0959 (2 patch)-Given           lidocaine (LIDODERM) Patch in Place  Freq: EVERY 8 HOURS Route: TD  Start: 01/06/17 0830   Admin Instructions: Chart every shift, confirming that patch is still in place on patient (no barcode scan needed). See patch order for dose information.  NEVER APPLY HEAT OVER PATCH which will  "increase absorption and may lead to risk of local anesthetic toxicity. Do not apply over area where liposomal bupivacaine injected for 96 hours.           1000 ( )-Patch in Place       [ ] 1630           lidocaine (LMX4) kit  Freq: EVERY 1 HOUR PRN Route: Top  PRN Reason: pain  PRN Comment: with VAD insertion or accessing implanted port.  Start: 01/02/17 2328   Admin Instructions: Do NOT give if patient has a history of allergy to any local anesthetic or any \"karina\" product.   Apply 30 minutes prior to VAD insertion or port access.  MAX Dose:  2.5 g (  of 5 g tube)               lidocaine (XYLOCAINE) 2 % 15 mL, alum & mag hydroxide-simethicone (MYLANTA ES/MAALOX  ES) 15 mL GI Cocktail  Dose: 30 mL Freq: 3 TIMES DAILY PRN Route: PO  PRN Reason: moderate pain  Start: 01/02/17 2328              lidocaine 1 % 1 mL  Dose: 1 mL Freq: EVERY 1 HOUR PRN Route: OTHER  PRN Comment: mild pain with VAD insertion or accessing implanted port  Start: 01/02/17 2328   Admin Instructions: Do NOT give if patient has a history of allergy to any local anesthetic or any \"karina\" product. MAX dose 1 mL subcutaneous OR intradermal in divided doses.               loperamide (IMODIUM) liquid 2 mg  Dose: 2 mg Freq: 4 TIMES DAILY PRN Route: PO  PRN Reason: diarrhea  Start: 01/03/17 1405       1705 (2 mg)-Given        0804 (2 mg)-Given         1000 (2 mg)-Given           LORazepam (ATIVAN) 2 MG/ML (HIGH CONC) solution 0.5 mg  Dose: 0.5 mg Freq: EVERY 3 HOURS PRN Route: PO  PRN Reason: anxiety  Start: 01/03/17 1338       1428 (0.5 mg)-Given       1831 (0.5 mg)-Given       2141 (0.5 mg)-Given        0145 (0.5 mg)-Given       0611 (0.5 mg)-Given       1004 (0.5 mg)-Given       1320 (0.5 mg)-Given       1633 (0.5 mg)-Given       1928 (0.5 mg)-Given        0126 (0.5 mg)-Given       0532 (0.5 mg)-Given       0835 (0.5 mg)-Given       1124 (0.5 mg)-Given       1517 (0.5 mg)-Given       1843 (0.5 mg)-Given       2109 (0.5 mg)-Given       2338 (0.5 " mg)-Given        0628 (0.5 mg)-Given       1000 (0.5 mg)-Given       1314 (0.5 mg)-Given           magnesium sulfate 2 g in NS intermittent infusion (PharMEDium or FV Cmpd)  Dose: 2 g Freq: DAILY PRN Route: IV  PRN Reason: magnesium supplementation  Last Dose: 2 g (01/03/17 1125)  Start: 01/03/17 0837   Admin Instructions: For Serum Mg++ 1.6 - 2 mg/dL  Give 2 g and recheck magnesium level next AM.        1125 (2 g)-New Bag              magnesium sulfate 4 g in 100 mL sterile water (premade)  Dose: 4 g Freq: EVERY 4 HOURS PRN Route: IV  PRN Reason: magnesium supplementation  Start: 01/03/17 0837   Admin Instructions: For serum Mg++ less than 1.6 mg/dL  Give 4 g and recheck magnesium level 2 hours after dose, and next AM.               melatonin liquid 3 mg  Dose: 3 mg Freq: AT BEDTIME Route: PER J TUBE  Start: 01/02/17 2330       (0240)-Not Given       2102 (3 mg)-Given        2159 (3 mg)-Given         (0056)-Not Given       [ ] 2200           multivitamins with minerals (CERTAVITE/CEROVITE) liquid 15 mL  Dose: 15 mL Freq: DAILY BEFORE LUNCH Route: PER J TUBE  Start: 01/04/17 1130        1059 (15 mL)-Given        1118 (15 mL)-Given        1205 (15 mL)-Given           naloxone (NARCAN) injection 0.1-0.4 mg  Dose: 0.1-0.4 mg Freq: EVERY 2 MIN PRN Route: IV  PRN Reason: opioid reversal  Start: 01/02/17 2328   Admin Instructions: For respiratory rate LESS than or EQUAL to 8.  Partial reversal dose:  0.1 mg titrated q 2 minutes for Analgesia Side Effects Monitoring Sedation Level of 3 (frequently drowsy, arousable, drifts to sleep during conversation).Full reversal dose:  0.4 mg bolus for Analgesia Side Effects Monitoring Sedation Level of 4 (somnolent, minimal or no response to stimulation).               omeprazole (priLOSEC) suspension 20 mg  Dose: 20 mg Freq: 2 TIMES DAILY Route: PER FEEDING   Start: 01/03/17 0800   Admin Instructions: Shake well.        0949 (20 mg)-Given       1953 (20 mg)-Given        0803 (20  mg)-Given       1929 (20 mg)-Given        0800 (20 mg)-Given       2009 (20 mg)-Given        0753 (20 mg)-Given       [ ] 2000           ondansetron (ZOFRAN-ODT) ODT tab 4 mg  Dose: 4 mg Freq: EVERY 6 HOURS PRN Route: PO  PRN Reason: nausea  Start: 01/02/17 2328   Admin Instructions: This is Step 1 of nausea and vomiting management.  If nausea not resolved in 15 minutes, go to Step 2 prochlorperazine (COMPAZINE). Do not push through foil backing. Peel back foil and gently remove. Place on tongue immediately. Administration with liquid unnecessary                     Or  ondansetron (ZOFRAN) injection 4 mg  Dose: 4 mg Freq: EVERY 6 HOURS PRN Route: IV  PRN Reasons: nausea,vomiting  Start: 01/02/17 2328   Admin Instructions: This is Step 1 of nausea and vomiting management.  If nausea not resolved in 15 minutes, go to Step 2 prochlorperazine (COMPAZINE).           1256 (4 mg)-Given           polyethylene glycol 0.4%- propylene glycol 0.3% (SYSTANE ULTRA) ophthalmic solution 1-2 drop  Dose: 1-2 drop Freq: 4 TIMES DAILY Route: Both Eyes  Start: 01/03/17 0800       1006 (1 drop)-Given       (1107)-Not Given       1615 (1 drop)-Given       1956 (1 drop)-Given        (0806)-Not Given       (1236)-Not Given       (1548)-Not Given       (1934)-Not Given        (0806)-Not Given       1123 (2 drop)-Given       (1517)-Not Given       (2029)-Not Given        0754 (2 drop)-Given       (1205)-Not Given       [ ] 1600       [ ] 2000           potassium chloride (KLOR-CON) Packet 20-40 mEq  Dose: 20-40 mEq Freq: EVERY 2 HOURS PRN Route: ORAL OR FEED  PRN Reason: potassium supplementation  Start: 01/03/17 0837   Admin Instructions: Use if unable to tolerate tablets.  If Serum K+ 3.0-3.3, dose = 60 mEq po total dose (40 mEq x1 followed in 2 hours by 20 mEq x1). Recheck K+ level 4 hours after dose and the next AM.  If Serum K+ 2.5-2.9, dose = 80 mEq po total dose (40 mEq Q2H x2). Recheck K+ level 4 hours after dose and the next AM.  If  Serum K+ less than 2.5, See IV order.  Dissolve packet contents in 4-8 ounces of cold water or juice.        1125 (40 mEq)-Given       1432 (20 mEq)-Given              potassium chloride 10 mEq in 100 mL intermittent infusion  Dose: 10 mEq Freq: EVERY 1 HOUR PRN Route: IV  PRN Reason: potassium supplementation  Start: 01/03/17 0837   Admin Instructions: Infuse via PERIPHERAL LINE or CENTRAL LINE. Use for central line replacement if patient weight less than 65 kg, if patient is on TPN with high potassium content or if unit does not stock 20 mEq bags.   If Serum K+ 3.0-3.3, dose = 10 mEq/hr x4 doses (40 mEq IV total dose). Recheck K+ level 2 hours after dose and the next AM.   If Serum K+ less than 3.0, dose = 10 mEq/hr x6 doses (60 mEq IV total dose). Recheck K+ level 2 hours after dose and the next AM.               potassium chloride 10 mEq in 100 mL intermittent infusion with 10 mg lidocaine  Dose: 10 mEq Freq: EVERY 1 HOUR PRN Route: IV  PRN Reason: potassium supplementation  Start: 01/03/17 0837   Admin Instructions: Infuse via PERIPHERAL LINE. Use potassium with lidocaine for pain with peripheral administration.  If Serum K+ 3.0-3.3, dose = 10 mEq/hr x4 doses (40 mEq IV total dose). Recheck K+ level 2 hours after dose and the next AM.  If Serum K+ less than 3.0, dose = 10 mEq/hr x6 doses (60 mEq IV total dose). Recheck K+ level 2 hours after dose and the next AM.               potassium chloride 20 mEq in 50 mL intermittent infusion  Dose: 20 mEq Freq: EVERY 1 HOUR PRN Route: IV  PRN Reason: potassium supplementation  Start: 01/03/17 0837   Admin Instructions: Infuse via CENTRAL LINE Only. May need EKG if less than 65 kg or on TPN - Max rate is 0.3 mEq/kg/hr for patients not on EKG monitoring.   If Serum K+ 3.0-3.3, dose = 20 mEq/hr x2 doses (40 mEq IV total dose). Recheck K+ level 2 hours after dose and the next AM.  If Serum K+ less than 3.0, dose = 20 mEq/hr x3 doses (60 mEq IV total dose). Recheck K+ level  2 hours after dose and the next AM.               potassium chloride SA (K-DUR/KLOR-CON M) CR tablet 20-40 mEq  Dose: 20-40 mEq Freq: EVERY 2 HOURS PRN Route: PO  PRN Reason: potassium supplementation  Start: 01/03/17 0837   Admin Instructions: Use if able to take PO.   If Serum K+ 3.0-3.3, dose = 60 mEq po total dose (40 mEq x1 followed in 2 hours by 20 mEq x1). Recheck K+ level 4 hours after dose and the next AM.  If Serum K+ 2.5-2.9, dose = 80 mEq po total dose (40 mEq Q2H x2). Recheck K+ level 4 hours after dose and the next AM.  If Serum K+ less than 2.5, See IV order.  DO NOT CRUSH.               predniSONE solution 40 mg  Dose: 40 mg Freq: DAILY Route: PO  Start: 01/05/17 0800         0803 (40 mg)-Given        0753 (40 mg)-Given           prochlorperazine (COMPAZINE) injection 5 mg  Dose: 5 mg Freq: EVERY 6 HOURS PRN Route: IV  PRN Reasons: nausea,vomiting  Start: 01/02/17 2328   Admin Instructions: This is Step 2 of nausea and vomiting management.   If nausea not resolved in 15 minutes, give metoclopramide (REGLAN) if ordered (step 3 of nausea and vomiting management)          1331 (5 mg)-Given           Or  prochlorperazine (COMPAZINE) tablet 5 mg  Dose: 5 mg Freq: EVERY 6 HOURS PRN Route: PO  PRN Reason: vomiting  Start: 01/02/17 2328   Admin Instructions: This is Step 2 of nausea and vomiting management.   If nausea not resolved in 15 minutes, give metoclopramide (REGLAN) if ordered (step 3 of nausea and vomiting management)                     Or  prochlorperazine (COMPAZINE) Suppository 12.5 mg  Dose: 12.5 mg Freq: EVERY 12 HOURS PRN Route: RE  PRN Reasons: nausea,vomiting  Start: 01/02/17 2328   Admin Instructions: This is Step 2 of nausea and vomiting management.   If nausea not resolved in 15 minutes, give metoclopramide (REGLAN) if ordered (step 3 of nausea and vomiting management)                      QUEtiapine (SEROquel) tablet 25 mg  Dose: 25 mg Freq: AT BEDTIME PRN Route: PO  PRN Comment: if  initial 75 mg does not work-can give additional 25 for total of 100 mg  Start: 01/02/17 2328         2340 (25 mg)-Given            QUEtiapine (SEROquel) tablet 50 mg  Dose: 50 mg Freq: 3 TIMES DAILY Route: PO  Start: 01/03/17 0800       0948 (50 mg)-Given       1429 (50 mg)-Given       1953 (50 mg)-Given        0804 (50 mg)-Given       1320 (50 mg)-Given       1928 (50 mg)-Given        0806 (50 mg)-Given       1328 (50 mg)-Given       2009 (50 mg)-Given        0752 (50 mg)-Given       1303 (50 mg)-Given       [ ] 2000           QUEtiapine (SEROquel) tablet 75 mg  Dose: 75 mg Freq: AT BEDTIME Route: PO  Start: 01/02/17 2330       0022 (75 mg)-Given       2102 (75 mg)-Given        2200 (75 mg)-Given        2340 (75 mg)-Given        [ ] 2200           sertraline (ZOLOFT) 20 MG/ML (HIGH CONC) solution 100 mg  Dose: 100 mg Freq: DAILY Route: PER FEEDING   Start: 01/03/17 0800   Admin Instructions: Must dilute before use; mix with 4 oz of water, ginger ale, OJ, lemonade or lemon/lime soda.        0949 (100 mg)-Given        0803 (100 mg)-Given        0800 (100 mg)-Given        0753 (100 mg)-Given           simethicone (MYLICON) suspension 125 mg  Dose: 125 mg Freq: 4 TIMES DAILY Route: PER FEEDING   Start: 01/03/17 0800   Admin Instructions: Formulary alternate for simethicone 125 mg tablets (home med)        1000 (125 mg)-Given       1130 (125 mg)-Given       (1808)-Not Given       1955 (125 mg)-Given        0807 (125 mg)-Given       1235 (125 mg)-Given       1546 (125 mg)-Given       1933 (125 mg)-Given        0805 (125 mg)-Given       1127 (125 mg)-Given       1517 (125 mg)-Given       2010 (125 mg)-Given        0753 (125 mg)-Given       (1205)-Not Given       [ ] 1600       [ ] 2000           sodium chloride (OCEAN) 0.65 % nasal spray 1 spray  Dose: 1 spray Freq: DAILY PRN Route: BOTH NOSTRIL  PRN Reason: congestion  Start: 01/02/17 2328              sodium chloride (PF) 0.9% PF flush 3 mL  Dose: 3 mL Freq: EVERY 8  HOURS Route: IK  Start: 01/02/17 2330   Admin Instructions: And Q1H PRN, to lock peripheral IV dormant line.        (0009)-Not Given       (1014)-Not Given       1434 (3 mL)-Given        0145 (3 mL)-Given       (0805)-Not Given       1549 (3 mL)-Given        0046 (3 mL)-Given       0807 (3 mL)-Given       1519 (3 mL)-Given       2341 (3 mL)-Given        0820 (3 mL)-Given       1455 (3 mL)-Given       [ ] 2330           sodium chloride (PF) 0.9% PF flush 3 mL  Dose: 3 mL Freq: EVERY 1 HOUR PRN Route: IK  PRN Reason: line flush  PRN Comment: for peripheral IV flush post IV meds  Start: 01/02/17 2328              sulfamethoxazole-trimethoprim (BACTRIM/SEPTRA) suspension 160 mg  Dose: 20 mL Freq: DAILY Route: PER J TUBE  Indications Comment: Prophylaxis  Start: 01/03/17 0800   Admin Instructions: Shake well.        0950 (160 mg)-Given        0803 (160 mg)-Given        0801 (160 mg)-Given        0754 (160 mg)-Given          Future Medications  Medications 12/31/16 01/01/17 01/02/17 01/03/17 01/04/17 01/05/17 01/06/17       gabapentin (NEURONTIN) solution 300 mg  Dose: 300 mg Freq: 2 TIMES DAILY Route: PO  Start: 01/06/17 2000          [ ] 2000           lidocaine (LIDODERM) patch REMOVAL  Freq: EVERY 24 HOURS 2000 Route: TD  Start: 01/06/17 2000   Admin Instructions: Patient should have a 12 hour patch free interval           [ ] 2000          Completed Medications  Medications 12/31/16 01/01/17 01/02/17 01/03/17 01/04/17 01/05/17 01/06/17         Dose: 60 mg Freq: ONCE Route: PO  Start: 01/04/17 1100   End: 01/04/17 1059        1059 (60 mg)-Given               Dose: 20 mg Freq: ONCE Route: IV  Start: 01/05/17 1045   End: 01/05/17 1122         1122 (20 mg)-Given              Dose: 20 mg Freq: ONCE Route: IV  Start: 01/04/17 1600   End: 01/04/17 1633        1633 (20 mg)-Given               Dose: 20 mg Freq: ONCE Route: IV  Start: 01/04/17 1045   End: 01/04/17 1059        1059 (20 mg)-Given               Dose: 20 mg Freq:  ONCE Route: PO  Start: 01/06/17 0900   End: 01/06/17 1000          1000 (20 mg)-Given             Dose: 150 mg Freq: ONCE Route: PO  Start: 01/06/17 0900   End: 01/06/17 1000          1000 (150 mg)-Given             Dose: 0.5 mg Freq: ONCE Route: PER GJ TUBE  Start: 01/04/17 1100   End: 01/04/17 1059        1059 (0.5 mg)-Given               Dose: 20 mg Freq: ONCE Route: PO  Start: 01/04/17 1100   End: 01/04/17 1059        1059 (20 mg)-Given            Discontinued Medications  Medications 12/31/16 01/01/17 01/02/17 01/03/17 01/04/17 01/05/17 01/06/17         Rate: 50 mL/hr Freq: CONTINUOUS Route: IV  Last Dose: Stopped (01/04/17 1105)  Start: 01/03/17 1745   End: 01/04/17 1029   Admin Instructions: While not on tube feeding.        2152 ( )-New Bag        0515 ( )-Rate/Dose Verify       0745 ( )-Rate/Dose Verify       1029-Med Discontinued  1105-Stopped               Dose: 60 mg Freq: 2 TIMES DAILY Route: PO  Start: 01/02/17 2330   End: 01/04/17 1045              0948 (60 mg)-Given       1956 (60 mg)-Given        0804 (60 mg)-Given       1045-Med Discontinued           Dose: 150 mg Freq: 2 TIMES DAILY Route: PO  Start: 01/02/17 2330   End: 01/06/17 0849              0949 (150 mg)-Given       1954 (150 mg)-Given        0803 (150 mg)-Given       1929 (150 mg)-Given        0800 (150 mg)-Given       2009 (150 mg)-Given        0753 (150 mg)-Given       0849-Med Discontinued         Dose: 1 mg Freq: EVERY 3 HOURS PRN Route: PO  PRN Reason: moderate to severe pain  PRN Comment: air hunger  Start: 01/02/17 2328   End: 01/03/17 1739       1049 (1 mg)-Given       1428 (1 mg)-Given       1726 (1 mg)-Given       1739-Med Discontinued            Dose: 1.25 mg Freq: ONCE Route: NEBULIZATION  Start: 01/02/17 2028   End: 01/04/17 1555               1555-Med Discontinued           Dose: 20 mg Freq: DAILY Route: PO  Start: 01/03/17 0800   End: 01/04/17 1039       0950 (20 mg)-Given        0803 (20 mg)-Given       1039-Med  Discontinued      Medications 12/31/16 01/01/17 01/02/17 01/03/17 01/04/17 01/05/17 01/06/17

## 2017-01-02 NOTE — IP AVS SNAPSHOT
MRN:[id number]                      After Visit Summary   1/2/2017    Mary Wang    MRN: [id number]           Thank you!     Thank you for choosing [facility] for your care. Our goal is always to provide you with excellent care. Hearing back from our patients is one way we can continue to improve our services. Please take a few minutes to complete the written survey that you may receive in the mail after you visit with us. Thank you!        Patient Information     Date Of Birth          1950        About your hospital stay     You were admitted on:  January 2, 2017 You last received care in the:  [address] [facility]    You were discharged on:  January 6, 2017        Reason for your hospital stay       Loose stools. Acute on chronic dyspnea suspect COPD exacerbation.                  Who to Call     For medical emergencies, please call 911.  For non-urgent questions about your medical care, please call your primary care provider or clinic, [phone]          Attending Provider     Provider    Sarah Dior MD Wheeler, Edi LI MD    [facility], MD Geo       Primary Care Provider Office Phone # Fax #    DrumoreErika Mcmillan [phone]       [address]  [facility] [address]        After Care Instructions     Activity       Your activity upon discharge: activity as tolerated. Turn Q 2 hours.            Bladder scan       X 2 for post void residual            Diet       Follow this diet upon discharge: Orders Placed This Encounter  Adult Formula Drip Feeding: Continuous Isosource 1.5; Jejunostomy; Goal Rate: 85; mL/hr; From: 8:00 PM- 8:00 AM; Water flushes: 124 ml q 4 hours. Medication - Tube Feeding Flush Frequency: At least 15-30 mL water before and after medication administration and with tube clogging; [place]  NPO Time Specified Ice Chips            Fall precautions           Glucose monitor nursing POCT       Before meals and at  bedtime            Intake and output       Every shift      Avendaño catheter   To straight gravity drainage. Change catheter every 2 weeks and PRN for leaking or decreased uring output with signs of bladder distention. DO NOT change catheter without a specific MD order IF diagnosis of benign prostatic hypertrophy (BPH), neurogenic bladder, or other urological conditions             Ventilator       Continue prior home settings. No changes made.   Continue trach care, vent care.   Turn Q 2 hour.   Activity as tolerated, with assist.                  Follow-up Appointments     Adult [facility] Follow-up and recommended labs and tests       Follow up with primary care provider, Radha Mcmillan, within 7 days for hospital follow- up.  The following labs/tests are recommended: cbc, bmp, mg.      Follow-up with Urology outpatient for urodynamics (for urinary retention) +/ cystoscopy (hx of hematuria)         Appointments on [place] and/or [facility] (with [facility] or [facility] provider or service). Call [phone] if you haven't heard regarding these appointments within 7 days of discharge.                  Your next 10 appointments already scheduled     David 10, 2017  3:00 PM   Nurse Only with COCC NURSE, COMPLEX CARE SOCIAL WORK   [facility] ([facility])    [address] [place] [address]  [address] [place] [id number] [phone]            Jan 17, 2017  2:00 PM   Office Visit with Haydee Feliciano [facility] ([facility])    [address] [place] [address]  [address] [place] [id number] [phone]           Bring a current list of meds and any records pertaining to this visit.  For Physicals, please bring immunization records and any forms needing to be filled out.  Please arrive 10 minutes early to complete paperwork.            Jan 20, 2017  2:00 PM   New Visit with Norman Brito MD   [facility]  ([facility])    [address]  [address]  [facility] [id number] [phone]            Jan 20, 2017  2:00 PM   New Visit with Corinne E Melling, LMFT   [facility] ([facility])    [address]  [address]  [facility] [id number] [phone]              Additional Services     Home Care OT Referral for Hospital Discharge       OT to eval and treat    Your provider has ordered home care - occupational therapy. If you have not been contacted within 2 days of your discharge please call the department phone number listed on the top of this document.            Home Care PT Referral for Hospital Discharge       PT to eval and treat    Your provider has ordered home care - physical therapy. If you have not been contacted within 2 days of your discharge please call the department phone number listed on the top of this document.            Home care nursing referral       [facility]  PH:  [phone]    Please resume prior 24 hr nursing and RT services    RN skilled nursing visit. RN to assess vital signs and weight, respiratory and cardiac status, pain level and activity tolerance, hydration, nutrition and bowel status and home safety.    Your provider has ordered home care nursing services. If you have not been contacted within 2 days of your discharge please call the inpatient department phone number at [phone] .                  Future tests that were ordered for you     AntiEmbolism Stockings       Bilateral below knee length.On in the morning, off at night                  Further instructions from your care team       Continue home vent settings.   Trach care.   Oral hygiene.       Pending Results     Date and Time Order Name Status Description    1/2/2017 1509 Blood culture Preliminary     1/2/2017 1509 Blood culture Preliminary             Statement of Approval     Ordered          01/06/17 1159  I have reviewed and agree with all  "the recommendations and orders detailed in this document.   EFFECTIVE NOW     Approved and electronically signed by:  Geo Bui MD             Admission Information        Provider Department Dept Phone    1/2/2017 Geo Bui MD [facility] [phone]      Your Vitals Were     Blood Pressure Temperature Respirations    106/60 mmHg 98.6  F (37  C) (Oral) 12    Height Weight BMI (Body Mass Index)    1.626 m (5' 4\") 59 kg (130 lb 1.1 oz) 22.32 kg/m2    Pulse Oximetry          98%        MyChart Information     [facility] gives you secure access to your electronic health record. If you see a primary care provider, you can also send messages to your care team and make appointments. If you have questions, please call your primary care clinic.  If you do not have a primary care provider, please call [phone] and they will assist you.        Care EveryWhere ID     This is your Care EveryWhere ID. This could be used by other organizations to access your [facility] medical records  [id number]           Review of your medicines      START taking        Dose / Directions    fiber modular packet   Used for:  Diarrhea, unspecified type        Dose:  1 packet   1 packet by Per J Tube route 3 times daily   Quantity:  42 packet   Refills:  0       lactobacillus rhamnosus (GG) capsule   Used for:  Diarrhea, unspecified type        Dose:  1 capsule   Take 1 capsule by mouth 2 times daily   Quantity:  60 capsule   Refills:  0       LORazepam 2 MG/ML (HIGH CONC) solution   Commonly known as:  ATIVAN   Used for:  COPD exacerbation (H), Anxiety        Dose:  0.5 mg   Take 0.25 mLs (0.5 mg) by mouth every 3 hours as needed for anxiety   Quantity:  15 mL   Refills:  0         CONTINUE these medicines which may have CHANGED, or have new prescriptions. If we are uncertain of the size of tablets/capsules you have at home, strength may be listed as something that might have changed.        Dose / Directions    Cranberry 500 MG Caps "   This may have changed:    - medication strength  - how much to take  - Another medication with the same name was removed. Continue taking this medication, and follow the directions you see here.        Dose:  500 mg   Take 1 capsule (500 mg) by mouth daily   Quantity:  90 capsule   Refills:  0       * fexofenadine 60 MG tablet   Commonly known as:  ALLEGRA   This may have changed:    - how much to take  - when to take this   Used for:  Shortness of breath        Dose:  180 mg   Take 3 tablets (180 mg) by mouth daily   Quantity:  30 tablet   Refills:  0       * fexofenadine 180 MG tablet   Commonly known as:  ALLEGRA   This may have changed:  You were already taking a medication with the same name, and this prescription was added. Make sure you understand how and when to take each.   Used for:  COPD exacerbation (H)        Dose:  180 mg   Take 1 tablet (180 mg) by mouth daily   Quantity:  30 tablet   Refills:  0       HYDROmorphone 1 MG/ML Liqd liquid   Commonly known as:  DILAUDID   This may have changed:  when to take this   Used for:  COPD exacerbation (H)        Dose:  1 mg   Take 1 mL (1 mg) by mouth every 2 hours as needed for moderate to severe pain (air hunger)   Quantity:  50 mL   Refills:  0       polyethylene glycol Packet   Commonly known as:  MIRALAX/GLYCOLAX   This may have changed:    - when to take this  - reasons to take this   Used for:  Constipation, unspecified constipation type        Dose:  17 g   17 g by Per J Tube route 2 times daily as needed for constipation Hold for loose stools   Quantity:  100 packet   Refills:  0       * predniSONE 5 MG/5ML solution   This may have changed:  You were already taking a medication with the same name, and this prescription was added. Make sure you understand how and when to take each.   Used for:  COPD exacerbation (H)        Dose:  40 mg   Start taking on:  1/7/2017   Take 40 mLs (40 mg) by mouth daily for 3 days   Quantity:  120 mL   Refills:  0       *  predniSONE 5 MG/5ML solution   This may have changed:  Another medication with the same name was added. Make sure you understand how and when to take each.   Used for:  Shortness of breath        Dose:  20 mg   Start taking on:  1/9/2017   Take 20 mLs (20 mg) by mouth daily   Quantity:  500 mL   Refills:  0       * QUEtiapine 50 MG tablet   Commonly known as:  SEROquel   This may have changed:  Another medication with the same name was changed. Make sure you understand how and when to take each.   Used for:  Air hunger, Anxiety, Insomnia due to medical condition        Dose:  50 mg   Take 1 tablet (50 mg) by mouth 3 times daily   Quantity:  120 tablet   Refills:  0       * QUEtiapine 25 MG tablet   Commonly known as:  SEROquel   This may have changed:  Another medication with the same name was changed. Make sure you understand how and when to take each.   Used for:  Anxiety, Insomnia due to medical condition, Air hunger        Dose:  75 mg   Take 3 tablets (75 mg) by mouth At Bedtime   Quantity:  120 tablet   Refills:  0       * QUEtiapine 25 MG tablet   Commonly known as:  SEROquel   This may have changed:    - when to take this  - reasons to take this   Used for:  Anxiety, Insomnia due to medical condition, Air hunger        Dose:  25 mg   Take 1 tablet (25 mg) by mouth 3 times daily as needed (anxiety, sleep)   Quantity:  30 tablet   Refills:  0       senna-docusate 8.6-50 MG per tablet   Commonly known as:  SENOKOT-S;PERICOLACE   This may have changed:    - when to take this  - reasons to take this   Used for:  Constipation, unspecified constipation type        Dose:  2 tablet   2 tablets by Per J Tube route 2 times daily as needed for constipation   Quantity:  100 tablet   Refills:  0       sertraline 20 MG/ML (HIGH CONC) solution   Commonly known as:  ZOLOFT   This may have changed:  how much to take   Used for:  Anxiety        Dose:  150 mg   7.5 mLs (150 mg) by Per Feeding Tube route daily   Quantity:  105  mL   Refills:  0       * Notice:  This list has 7 medication(s) that are the same as other medications prescribed for you. Read the directions carefully, and ask your doctor or other care provider to review them with you.      CONTINUE these medicines which have NOT CHANGED        Dose / Directions    acetaminophen 160 MG/5ML solution   Commonly known as:  TYLENOL   Used for:  Other chronic pain        Dose:  650 mg   20.3 mLs (650 mg) by Per Feeding Tube route every 4 hours as needed for mild pain   Quantity:  300 mL   Refills:  0       aspirin 81 MG chewable tablet   Used for:  NSTEMI (non-ST elevated myocardial infarction) (H)        Dose:  81 mg   1 tablet (81 mg) by Per Feeding Tube route daily   Quantity:  36 tablet   Refills:  0       atorvastatin 20 MG tablet   Commonly known as:  LIPITOR   Used for:  NSTEMI (non-ST elevated myocardial infarction) (H)        Dose:  20 mg   Take 1 tablet (20 mg) by mouth daily   Quantity:  30 tablet   Refills:  0       budesonide 0.5 MG/2ML neb solution   Commonly known as:  PULMICORT   Used for:  Shortness of breath, Respiratory difficulty        Dose:  0.5 mg   Take 2 mLs (0.5 mg) by nebulization 2 times daily   Quantity:  60 ampule   Refills:  0       calcium carbonate 500 MG chewable tablet   Commonly known as:  TUMS   Used for:  Gastroesophageal reflux disease, esophagitis presence not specified        Dose:  1 chew tab   1 tablet (500 mg) by Per G Tube route every 2 hours as needed for heartburn   Quantity:  150 tablet   Refills:  0       clonazePAM 0.1 mg/mL   Commonly known as:  klonoPIN   Used for:  Shortness of breath        Dose:  1.5 mg   Take 15 mLs (1.5 mg) by mouth 4 times daily Needs appt for refills   Quantity:  420 mL   Refills:  0       fluticasone 50 MCG/ACT spray   Commonly known as:  FLONASE   Used for:  Acute and chronic respiratory failure with hypercapnia (H)        Dose:  2 spray   Spray 2 sprays into both nostrils daily   Quantity:  1 Bottle    Refills:  0       gabapentin 250 MG/5ML solution   Commonly known as:  NEURONTIN   Used for:  Anxiety        Dose:  300 mg   6 mLs (300 mg) by Per J Tube route 2 times daily   Quantity:  450 mL   Refills:  0       guaiFENesin 200 MG Tabs tablet   Commonly known as:  ORGANIDIN   Used for:  Shortness of breath        Dose:  200 mg   Take 1 tablet (200 mg) by mouth 4 times daily   Quantity:  115 tablet   Refills:  0       ipratropium 0.02 % neb solution   Commonly known as:  ATROVENT   Used for:  Shortness of breath        Dose:  0.5 mg   Take 2.5 mLs (0.5 mg) by nebulization every 4 hours   Quantity:  450 mL   Refills:  0       JEVITY 1.5 IZZY Liqd   Used for:  Achalasia        Dose:  5 Can   Take 5 Cans by mouth daily Per tube feeding   Quantity:  150 Can   Refills:  11       levalbuterol 1.25 MG/3ML neb solution   Commonly known as:  XOPENEX   Used for:  Chronic obstructive pulmonary disease with acute exacerbation (H)        Dose:  1 ampule   Take 3 mLs (1.25 mg) by nebulization every 4 hours   Quantity:  540 mL   Refills:  0       lidocaine 15 mL, alum & mag hydroxide-simethicone 15 mL GI Cocktail   Used for:  Gastroesophageal reflux disease without esophagitis        Dose:  30 mL   Take 30 mLs by mouth 3 times daily as needed for moderate pain   Quantity:  500 mL   Refills:  0       loperamide 2 MG tablet   Commonly known as:  IMODIUM A-D   Used for:  Frequent stools        2-2 mg tablets per J-tube qid prn frequent and/or loose stools. Do not use more than 8 tabs per day.   Quantity:  30 tablet   Refills:  0       melatonin 1 MG/ML Liqd liquid   Used for:  Anxiety, Insomnia due to medical condition        Dose:  3 mg   3 mLs (3 mg) by Per J Tube route At Bedtime   Quantity:  300 mL   Refills:  0       Multiple Vitamins-Iron 15 MG Chew   Used for:  Dietary supplement-induced neuropathy (H)        Dose:  1 chew tab   1 chew tab by Per J Tube route daily Contains ferrous gluconate, 15 mL = 9 mg iron   Quantity:   30 tablet   Refills:  0       omeprazole 2 mg/mL   Commonly known as:  priLOSEC   Used for:  Achalasia        Dose:  20 mg   10 mLs (20 mg) by Per Feeding Tube route 2 times daily   Quantity:  600 mL   Refills:  3       polyethylene glycol 0.4%- propylene glycol 0.3% 0.4-0.3 % Soln ophthalmic solution   Commonly known as:  SYSTANE ULTRA   Used for:  Dry eyes, bilateral        Dose:  1-2 drop   Place 1-2 drops into both eyes 4 times daily 0900, 1300, 1700, 2100   Quantity:  1 Bottle   Refills:  0       simethicone 125 MG Chew chewable tablet   Commonly known as:  MYLANTA GAS   Used for:  Achalasia        Dose:  125 mg   1 tablet (125 mg) by Per Feeding Tube route 4 times daily   Quantity:  120 tablet   Refills:  0       sodium chloride 0.65 % nasal spray   Commonly known as:  OCEAN   Used for:  Chronic sinusitis, unspecified location        Dose:  1 spray   Spray 1 spray into both nostrils daily as needed for congestion   Quantity:  1 Bottle   Refills:  0       sulfamethoxazole-trimethoprim suspension   Commonly known as:  BACTRIM/SEPTRA   Used for:  Chronic obstructive pulmonary disease with acute exacerbation (H)        Dose:  20 mL   20 mLs (160 mg) by Per J Tube route daily Dose based on TMP component. 20 mLs = 160 mg   Quantity:  560 mL   Refills:  0            Where to get your medicines      These medications were sent to [facility] - [place], [place] - [address]  [address] [address]     Phone:  [phone]    - fexofenadine 60 MG tablet  - predniSONE 5 MG/5ML solution      These medications were sent to [facility] - [place], [place] - [address]  [address], [facility] [address]     Phone:  [phone]    - fexofenadine 180 MG tablet  - fiber modular packet  - lactobacillus rhamnosus (GG) capsule  - omeprazole 2 mg/mL  - QUEtiapine 25 MG tablet  - sertraline 20 MG/ML (HIGH CONC) solution      Some of these will need a paper  prescription and others can be bought over the counter. Ask your nurse if you have questions.     Bring a paper prescription for each of these medications    - clonazePAM 0.1 mg/mL  - HYDROmorphone 1 MG/ML Liqd liquid  - LORazepam 2 MG/ML (HIGH CONC) solution    You don't need a prescription for these medications    - predniSONE 5 MG/5ML solution             Protect others around you: Learn how to safely use, store and throw away your medicines at www.disposemymeds.org.             Medication List: This is a list of all your medications and when to take them. Check marks below indicate your daily home schedule. Keep this list as a reference.      Medications           Morning Afternoon Evening Bedtime As Needed    acetaminophen 160 MG/5ML solution   Commonly known as:  TYLENOL   20.3 mLs (650 mg) by Per Feeding Tube route every 4 hours as needed for mild pain   Last time this was given:  650 mg on 1/6/2017  1:14 PM                                aspirin 81 MG chewable tablet   1 tablet (81 mg) by Per Feeding Tube route daily   Last time this was given:  81 mg on 1/6/2017  7:53 AM                                atorvastatin 20 MG tablet   Commonly known as:  LIPITOR   Take 1 tablet (20 mg) by mouth daily   Last time this was given:  20 mg on 1/6/2017  7:53 AM                                budesonide 0.5 MG/2ML neb solution   Commonly known as:  PULMICORT   Take 2 mLs (0.5 mg) by nebulization 2 times daily   Last time this was given:  0.5 mg on 1/6/2017  8:37 AM                                calcium carbonate 500 MG chewable tablet   Commonly known as:  TUMS   1 tablet (500 mg) by Per G Tube route every 2 hours as needed for heartburn                                clonazePAM 0.1 mg/mL   Commonly known as:  klonoPIN   Take 15 mLs (1.5 mg) by mouth 4 times daily Needs appt for refills   Last time this was given:  1.5 mg on 1/6/2017 12:05 PM                                Cranberry 500 MG Caps   Take 1 capsule (500  mg) by mouth daily   Last time this was given:  500 mg on 1/6/2017  7:52 AM                                * fexofenadine 60 MG tablet   Commonly known as:  ALLEGRA   Take 3 tablets (180 mg) by mouth daily   Last time this was given:  180 mg on 1/6/2017  7:53 AM                                * fexofenadine 180 MG tablet   Commonly known as:  ALLEGRA   Take 1 tablet (180 mg) by mouth daily   Last time this was given:  180 mg on 1/6/2017  7:53 AM                                fiber modular packet   1 packet by Per J Tube route 3 times daily   Last time this was given:  1 packet on 1/6/2017  1:03 PM                                fluticasone 50 MCG/ACT spray   Commonly known as:  FLONASE   Spray 2 sprays into both nostrils daily   Last time this was given:  2 sprays on 1/6/2017  7:52 AM                                gabapentin 250 MG/5ML solution   Commonly known as:  NEURONTIN   6 mLs (300 mg) by Per J Tube route 2 times daily   Last time this was given:  150 mg on 1/6/2017 10:00 AM                                guaiFENesin 200 MG Tabs tablet   Commonly known as:  ORGANIDIN   Take 1 tablet (200 mg) by mouth 4 times daily                                HYDROmorphone 1 MG/ML Liqd liquid   Commonly known as:  DILAUDID   Take 1 mL (1 mg) by mouth every 2 hours as needed for moderate to severe pain (air hunger)   Last time this was given:  1 mg on 1/6/2017  2:54 PM                                ipratropium 0.02 % neb solution   Commonly known as:  ATROVENT   Take 2.5 mLs (0.5 mg) by nebulization every 4 hours   Last time this was given:  0.5 mg on 1/6/2017 12:13 PM                                JEVITY 1.5 IZZY Liqd   Take 5 Cans by mouth daily Per tube feeding                                lactobacillus rhamnosus (GG) capsule   Take 1 capsule by mouth 2 times daily   Last time this was given:  1 capsule on 1/6/2017  7:53 AM                                levalbuterol 1.25 MG/3ML neb solution   Commonly known as:   XOPENEX   Take 3 mLs (1.25 mg) by nebulization every 4 hours   Last time this was given:  1.25 mg on 1/6/2017 12:13 PM                                lidocaine 15 mL, alum & mag hydroxide-simethicone 15 mL GI Cocktail   Take 30 mLs by mouth 3 times daily as needed for moderate pain                                loperamide 2 MG tablet   Commonly known as:  IMODIUM A-D   2-2 mg tablets per J-tube qid prn frequent and/or loose stools. Do not use more than 8 tabs per day.                                LORazepam 2 MG/ML (HIGH CONC) solution   Commonly known as:  ATIVAN   Take 0.25 mLs (0.5 mg) by mouth every 3 hours as needed for anxiety   Last time this was given:  0.5 mg on 1/6/2017  1:14 PM                                melatonin 1 MG/ML Liqd liquid   3 mLs (3 mg) by Per J Tube route At Bedtime   Last time this was given:  3 mg on 1/4/2017  9:59 PM                                Multiple Vitamins-Iron 15 MG Chew   1 chew tab by Per J Tube route daily Contains ferrous gluconate, 15 mL = 9 mg iron                                omeprazole 2 mg/mL   Commonly known as:  priLOSEC   10 mLs (20 mg) by Per Feeding Tube route 2 times daily   Last time this was given:  20 mg on 1/6/2017  7:53 AM                                polyethylene glycol 0.4%- propylene glycol 0.3% 0.4-0.3 % Soln ophthalmic solution   Commonly known as:  SYSTANE ULTRA   Place 1-2 drops into both eyes 4 times daily 0900, 1300, 1700, 2100   Last time this was given:  2 drops on 1/6/2017  7:54 AM                                polyethylene glycol Packet   Commonly known as:  MIRALAX/GLYCOLAX   17 g by Per J Tube route 2 times daily as needed for constipation Hold for loose stools                                * predniSONE 5 MG/5ML solution   Take 40 mLs (40 mg) by mouth daily for 3 days   Start taking on:  1/7/2017   Last time this was given:  40 mg on 1/6/2017  7:53 AM                                * predniSONE 5 MG/5ML solution   Take 20 mLs (20 mg)  by mouth daily   Start taking on:  1/9/2017   Last time this was given:  40 mg on 1/6/2017  7:53 AM                                * QUEtiapine 50 MG tablet   Commonly known as:  SEROquel   Take 1 tablet (50 mg) by mouth 3 times daily   Last time this was given:  50 mg on 1/6/2017  1:03 PM                                * QUEtiapine 25 MG tablet   Commonly known as:  SEROquel   Take 3 tablets (75 mg) by mouth At Bedtime   Last time this was given:  50 mg on 1/6/2017  1:03 PM                                * QUEtiapine 25 MG tablet   Commonly known as:  SEROquel   Take 1 tablet (25 mg) by mouth 3 times daily as needed (anxiety, sleep)   Last time this was given:  50 mg on 1/6/2017  1:03 PM                                senna-docusate 8.6-50 MG per tablet   Commonly known as:  SENOKOT-S;PERICOLACE   2 tablets by Per J Tube route 2 times daily as needed for constipation                                sertraline 20 MG/ML (HIGH CONC) solution   Commonly known as:  ZOLOFT   7.5 mLs (150 mg) by Per Feeding Tube route daily   Last time this was given:  100 mg on 1/6/2017  7:53 AM                                simethicone 125 MG Chew chewable tablet   Commonly known as:  MYLANTA GAS   1 tablet (125 mg) by Per Feeding Tube route 4 times daily                                sodium chloride 0.65 % nasal spray   Commonly known as:  OCEAN   Spray 1 spray into both nostrils daily as needed for congestion                                sulfamethoxazole-trimethoprim suspension   Commonly known as:  BACTRIM/SEPTRA   20 mLs (160 mg) by Per J Tube route daily Dose based on TMP component. 20 mLs = 160 mg   Last time this was given:  160 mg on 1/6/2017  7:54 AM                                * Notice:  This list has 7 medication(s) that are the same as other medications prescribed for you. Read the directions carefully, and ask your doctor or other care provider to review them with you.

## 2017-01-02 NOTE — IP AVS SNAPSHOT
` ` Patient Information     Patient Name Sex     Mary Wang ([id number]) Female 1950       Room Bed    39 6239-01      Patient Demographics     Address Phone E-mail Address    [facility] [address] [phone] (Home) *Preferred*  [phone] (Mobile) [email or web address]      Patient Ethnicity & Race     Ethnic Group Patient Race    American White      Emergency Contact(s)     Name Relation Home Work Mobile    César Troy Power of  [phone]    No Secondary Contact Other None        Documents on File        Status Date Received Description       Documents for the Patient    Privacy Notice - [place] Received 14     Face Sheet Received 10/19/09     Insurance Card Received 14 bcbs/med    Patient ID Received 14 [place] 2018    Consent for Services - Hospital/Clinic Received 04/10/12     Consent for Services - Hospital/Clinic  04/10/12 GENERAL CONSENT FORM - ENGLISH    Consent to Communicate Received 12     Consent to Communicate  12 AUTHORIZATION TO DISCUSS PROTECTED HEALTH INFORMATION    Consent for Services - [facility] Received 12 CONSENT    [facility] Specified Other Received 16     Consent for Services - Hospital/Clinic Received 13     Consent for Services - Hospital/Clinic  13 CONSENT FOR SERVICE (SIGNED ON 13)    Consent for Services - Hospital/Clinic Received 14     Consent for Services - Hospital/Clinic Received 14 CONSENT FOR SERVICE    External Medication Information Consent Accepted 14     Insurance Card Received 14 Medicare Card    Insurance Card Received 14 [employer] Federal Card    Business/Insurance/Care Coordination/Health Form - Patient  10/03/14 [employer] SUSTAINING MEDICAL EQUIPMENT FORM    Consent for Services - Hospital/Clinic Received 04/29/15     Power of  Not Received  STATUTORY SHORT FORM POWER OF     Advance Directives  and Living Will Received 08/03/15 HEALTH CARE DIRECTIVE 7/28/15    Advance Directives and Living Will Not Received  VALIDATION OF AD 7/28/15    HIM JOSE MARTIN Authorization - File Only  08/13/15 [facility] JOSE MARTIN Authorization - File Only  09/29/15 [facility] / [facility] AUTHORIZATION FOR RELEASE OF PROTECTED HEALTH INFORMATION    HIM JOSE MARTIN Authorization - File Only  09/29/15 [facility] / [facility] AUTHORIZATION FOR RELEASE OF PROTECTED HEALTH INFORMATION    Consent for Services/Privacy Notice - Hospital/Clinic Received 01/20/16     Consent for Services/Privacy Notice - Hospital/Clinic  01/20/16 CONSENT FOR SERVICE/PRIVACY NOTICE    HIM JOSE MARTIN Authorization  02/04/16     HIM JOSE MARTIN Authorization  02/05/16     Business/Insurance/Care Coordination/Health Form - Patient  04/05/16 [employer], PRIOR AUTH APPROVAL- LEVALBUTEROL HCL NEBU, 4/1/16    HIM JOSE MARTIN Authorization  04/25/16     HIM JOSE MARTIN Authorization  05/24/16     HIM JOSE MARTIN Authorization  06/20/16     HIM JOSE MARTIN Authorization  07/12/16 [facility]    Medicare Lifetime Days Consent Received 07/18/16     HIM JOSE MARTIN Authorization  07/21/16     HIM JOSE MARTIN Authorization  07/26/16 [facility]    Consent for Services/Privacy Notice - Hospital/Clinic-Esign       Medicare Lifetime Days Consent Received 09/13/16     Medicare Lifetime Days Consent Received 10/03/16     Consent for EHR Access Received 10/30/16     Medicare Lifetime Days Consent Received 11/01/16        Documents for the Encounter    CMS IM for Patient Signature Received 01/02/17       Admission Information     Attending Provider Admitting Provider Admission Type Admission Date/Time    Geo Bui MD Wheeler, Daniel J, MD Emergency 01/02/17  1318    Discharge Date Hospital Service Auth/Cert Status Service Area     Internal Medicine Incomplete [facility]    Unit Room/Bed Admission Status    UU U6B 6239/6239-01 Admission (Confirmed)            Admission     Complaint    Altered mental state      Hospital  Account     Name Acct ID Class Status Primary Coverage    Mary Wang [id number] Inpatient Open MEDICARE - MEDICARE            Guarantor Account (for Hospital Account #[id number])     Name Relation to Pt Service Area Active? Acct Type    Mary Wang Self FCS Yes Personal/Family    Address Phone          [address] [place] [address] [phone](H)              Coverage Information (for Hospital Account #[id number])     1. MEDICARE/MEDICARE     F/O Payor/Plan Precert #    MEDICARE/MEDICARE     Subscriber Subscriber #    Mary Wang [id number]    Address Phone    [employer]  [address]  [place] [address] [phone]          2. BCBS/[employer] EMPL[employer]EE PROGRAM     F/O Payor/Plan Precert #    BCBS/F[employer] EMPL[employer]EE PROGRAM     Subscriber Subscriber #    Mary Wang [id number]    Address Phone    [address] [address]  [place], [place] [id number] [phone]          3. STIVEN/STIVEN MA     F/O Payor/Plan Precert #    STIVEN/STIVEN MA     Subscriber Subscriber #    Mary Wang [id number]    Address Phone    [address]  [place] [address] [phone]

## 2017-01-02 NOTE — IP AVS SNAPSHOT
[address] [address]    Phone:  [phone]                                       After Visit Summary   1/2/2017    Mary Wang    MRN: [id number]           After Visit Summary Signature Page     I have received my discharge instructions, and my questions have been answered. I have discussed any challenges I see with this plan with the nurse or doctor.    ..........................................................................................................................................  Patient/Patient Representative Signature      ..........................................................................................................................................  Patient Representative Print Name and Relationship to Patient    ..................................................               ................................................  Date                                            Time    ..........................................................................................................................................  Reviewed by Signature/Title    ...................................................              ..............................................  Date                                                            Time

## 2017-01-02 NOTE — LETTER
Hand-off for Care Transitions to Next Level of Care Provider  Name: Mary Wang  MRN #: [id number]    Primary Care Provider: Radha Mcmillan  Primary Clinic: [address]  [facility] [address]        Reason for Hospitalization:  HCAP (healthcare-associated pneumonia) [J18.9]  Altered mental status, unspecified altered mental status type [R41.82]  Cellulitis, unspecified cellulitis site [L03.90]  Admit Date/Time: 1/2/2017  1:18 PM  Discharge Date: 01/06/17    Reason for Communication Hand-off Referral: Fragility  Multiple providers/specialties    Discharge Plan:  Discharged to: Home with homecare                   Patient agreeable to post-hospital support suggestions:  Yes  Discharge Plan:             Patient is on new medications:   Yes           MTM follow up recommended: Yes           Patient will receive  HOME CARE  at:  Group home    Follow-up specialty is recommended: Yes  This would communicate all referrals e.g. , CORE clinic, Homecare, Cardiac Rehab    Follow-up plan:  Future Appointments  Date Time Provider Department Center   1/10/2017 3:00 PM ELADIO NURSE [facility]   1/17/2017 2:00 PM Haydee Feliciano [facility]   1/20/2017 2:00 PM Norman Brito MD [facility]   [facility]2017 [facility]:00 [facility]       Any outstanding tests or procedures:    Procedures     Future Labs/Procedures    Ventilator     Comments:    Continue prior home settings. No changes made.   Continue trach care, vent care.   Turn Q 2 hour.   Activity as tolerated, with assist.          Referrals     Future Labs/Procedures    Home care nursing referral     Comments:    [facility]  PH:  [phone]    Please resume prior 24 hr nursing and RT services    RN skilled nursing visit. RN to assess vital signs and weight, respiratory and cardiac status, pain level and activity tolerance, hydration, nutrition and bowel status and home safety.    Your provider has ordered  home care nursing services. If you have not been contacted within 2 days of your discharge please call the inpatient department phone number at [phone] .    Home Care OT Referral for Hospital Discharge     Comments:    OT to eval and treat    Your provider has ordered home care - occupational therapy. If you have not been contacted within 2 days of your discharge please call the department phone number listed on the top of this document.    Home Care PT Referral for Hospital Discharge     Comments:    PT to eval and treat    Your provider has ordered home care - physical therapy. If you have not been contacted within 2 days of your discharge please call the department phone number listed on the top of this document.        Supplies     Future Labs/Procedures    AntiEmbolism Stockings     Comments:    Bilateral below knee length.On in the morning, off at night          Key Recommendations:  POLST form completed and sent with pt at discharge.  Complex Care unit to see pt this week.  Hospice offered this hospitalization but pt declined.    Gisel Flowers RNCC

## 2017-01-02 NOTE — ED PROVIDER NOTES
"  History     Chief Complaint   Patient presents with     Altered Mental Status     increased lethargy, hx of high Na     The history is provided by a relative and medical records. The history is limited by the condition of the patient.     Mary Wang is a 66 year old female with a history of urinary retention (requiring Clean Intermittent Cath), severe COPD now s/p tracheostomy and ventilator dependent, hypertension and achalasia s/p GJ tube placement who presents via ambulance from her group home for evaluation of increased lethargy. Of note, the patient was admitted from 12/25/16-12/28/16 for possible sepsis 2/2 chronic urinary retention for which she was treated with IV antibiotics (vancomycin and Zosyn) followed by a 10 day course of Augmentin, and was discharged with an indwelling catheter in place.     Today, the patient's daughter states the patient has seemed lethargic and \"very disoriented\" for the past three days. She states she visited her mother on Saturday, three days ago, and it took a very long time for her to wake the patient up and even after visiting for one hour, the patient did not remember her daughter had been there. At the time of this visit her daughter did notice that the patient's oxygen had been set to 5 L, however, this is reportedly not supposed to be above 3 L. She did turn the patient's oxygen down, but does voice concern that having this level up for approximately three hours may have caused an increase in the patient's CO2 level. Her daughter also states the patient's [facility] physical therapist was unable to get the patient to wake up last Friday or this morning, and also voiced concern to the patient's daughter that the patient has not been at her baseline, and seems \"very disoriented\". Additionally, the patient's daughter reports the patient's left arm appears significantly swollen and red compared to her baseline for the past few days. Currently, the patient " "complains of back pain and chest pain that has been constant for \"a long time\", but is otherwise unable to provide further history.     The patient's group home nurse is also here in the emergency department and reports the patient did receive prescribed Ativan and Dilaudid this morning around 9 AM. Her nurse also reports the patient receives one tablet of aspirin daily, but is otherwise not anticoagulated.    Per patient's daughter, the patient does wish to have full cares.       CT Abd/Pelvis with contrast and CT Urogram (12/27/16) Impression:    1. Small area of thickening along the left proximal ureteral wall. No  proximal dilatation. Cannot rule out early ureteral urothelial  carcinoma versus. Differential includes adjacent vessel or sequela of  prior infection.  2. Stable 5 mm nonobstructing right renal stone.  3. Left renal cortical hypodensities too small accurately characterize  but likely representing small renal cysts.  4. Diffuse urinary bladder wall thickening, suggestive of cystitis.  5. No suspicious filling defect of the renal collecting system.  6. Colonic diverticulosis without evidence of active inflammation.     I have reviewed the Medications, Allergies, Past Medical and Surgical History, and Social History in the [employer] system.  Past Medical History   Diagnosis Date     COPD (chronic obstructive pulmonary disease) (H)      trach/vent dependent      Anxiety      TMJ pain dysfunction syndrome      Tracheostomy in place      Hypertension      GERD (gastroesophageal reflux disease)      Achalasia      Lung nodule      spiculated; followed in pulm clinic      Stress-induced cardiomyopathy      Anxiety and depression      Chronic pain        Past Surgical History   Procedure Laterality Date     Tracheostomy N/A 8/26/2015     Procedure: TRACHEOSTOMY;  Surgeon: Milena Thibodeaux MD;  Location: [facility] OR     Bronchoscopy, insert bronchial valve Right 9/2/2015     Procedure: BRONCHOSCOPY, INSERT BRONCHIAL " VALVE;  Surgeon: Everardo Beach MD;  Location: [facility] OR     Esophagoscopy, gastroscopy, duodenoscopy (egd), combined N/A 12/11/2015     Procedure: COMBINED ESOPHAGOSCOPY, GASTROSCOPY, DUODENOSCOPY (EGD);  Surgeon: Dhruv Lyles MD;  Location: [facility] OR     Esophagoscopy, gastroscopy, duodenoscopy (egd), combined N/A 12/31/2015     Procedure: COMBINED ESOPHAGOSCOPY, GASTROSCOPY, DUODENOSCOPY (EGD);  Surgeon: Brenden Plasencia MD;  Location: [facility] OR     Percutaneous insertion tube jejunostomy N/A 12/31/2015     Procedure: PERCUTANEOUS INSERTION TUBE JEJUNOSTOMY;  Surgeon: Brenden Plasencia MD;  Location: [facility]     Percutaneous insertion tube jejunostomy N/A 1/25/2016     Procedure: PERCUTANEOUS INSERTION TUBE JEJUNOSTOMY;  Surgeon: Carrie Coleman MD;  Location: [facility] OR     Anesthesia out of or mri N/A 4/18/2016     Procedure: ANESTHESIA OUT OF OR MRI;  Surgeon: GENERIC ANESTHESIA PROVIDER;  Location: [facility]     Endoscopic insertion tube gastrostomy N/A 7/9/2016     Procedure: ENDOSCOPIC INSERTION TUBE GASTROSTOMY;  Surgeon: Brenden Plasencia MD;  Location: [facility] OR       Family History   Problem Relation Age of Onset     CANCER Sister        Social History   Substance Use Topics     Smoking status: Former Smoker -- 2.00 packs/day for 40 years     Types: Cigarettes     Start date: 01/01/1964     Quit date: 04/18/1998     Smokeless tobacco: Never Used     Alcohol Use: No     Current Facility-Administered Medications   Medication     0.9% sodium chloride infusion     Current Outpatient Prescriptions   Medication     amoxicillin-clavulanate (AUGMENTIN) 400-57 MG/5ML suspension     Cranberry 405 MG CAPS     Cranberry 125 MG TABS     clonazePAM (KLONOPIN) 0.1 mg/mL     HYDROmorphone (DILAUDID) 1 MG/ML LIQD liquid     sertraline (ZOLOFT) 20 MG/ML (HIGH CONC) solution     Nutritional Supplements (JEVITY 1.5 IZZY) LIQD     loperamide (IMODIUM A-D) 2 MG tablet     melatonin (MELATONIN) 1 MG/ML LIQD liquid      acetaminophen (TYLENOL) 160 MG/5ML oral liquid     calcium carbonate (TUMS) 500 MG chewable tablet     budesonide (PULMICORT) 0.5 MG/2ML nebulizer solution     ipratropium (ATROVENT) 0.02 % nebulizer solution     levalbuterol (XOPENEX) 1.25 MG/3ML nebulizer solution     gabapentin (NEURONTIN) 250 MG/5ML solution     fexofenadine (ALLEGRA) 60 MG tablet     atorvastatin (LIPITOR) 20 MG tablet     QUEtiapine (SEROQUEL) 50 MG tablet     QUEtiapine (SEROQUEL) 25 MG tablet     predniSONE 5 MG/5ML solution     guaiFENesin (ORGANIDIN) 200 MG TABS     fluticasone (FLONASE) 50 MCG/ACT nasal spray     sodium chloride (OCEAN) 0.65 % nasal spray     polyethylene glycol (MIRALAX/GLYCOLAX) packet     senna-docusate (SENOKOT-S;PERICOLACE) 8.6-50 MG per tablet     sulfamethoxazole-trimethoprim (BACTRIM,SEPTRA) 200-40 mg/5ml suspension     simethicone (MYLANTA GAS) 125 MG CHEW     Pediatric Multivitamins-Iron (MULTIPLE VITAMINS-IRON) 15 MG CHEW     omeprazole (PRILOSEC) 2 mg/mL     lidocaine 15 mL, alum & mag hydroxide-simethicone 15 mL GI Cocktail     aspirin 81 MG chewable tablet     QUEtiapine (SEROQUEL) 25 MG tablet     polyethylene glycol 0.4%- propylene glycol 0.3% (SYSTANE ULTRA) 0.4-0.3 % SOLN ophthalmic solution     Facility-Administered Medications Ordered in Other Encounters   Medication     midazolam (VERSED) injection        Allergies   Allergen Reactions     Levaquin Other (See Comments)     Delirium     Toro Podhaler [Tobramycin] Other (See Comments)     Severe congestion, SOB      Suprax [Cefixime]      See ceftibuten     Augmentin Nausea     Has tolerated for treatment of UTIs in 2016.     Avelox [Moxifloxacin] Anxiety     Cedax [Ceftibuten] Other (See Comments)     Pain in eyes     Penicillins Itching     Tolerated amoxicillin without issues.     Vantin [Cefpodoxime] Nausea       Review of Systems   Unable to perform ROS: Acuity of condition       Physical Exam   BP: 165/86 mmHg  Heart Rate: 103  Temp: 98.8  F  (37.1  C)  SpO2: 92 %  Physical Exam   Constitutional: She appears well-developed and well-nourished. She is active.  Non-toxic appearance.   Vent in place (home vent), tracheostomy   HENT:   Head: Normocephalic and atraumatic. Head is without right periorbital erythema and without left periorbital erythema.   Right Ear: No drainage.   Left Ear: No drainage.   Nose: No rhinorrhea. No epistaxis.   Mouth/Throat: Mucous membranes are normal. Mucous membranes are not cyanotic. No trismus in the jaw.   Eyes: Conjunctivae and EOM are normal. Pupils are equal, round, and reactive to light. No scleral icterus.   Neck: No tracheal deviation, no edema and no erythema present.   Trach in place   Cardiovascular: Normal rate and regular rhythm.  Exam reveals no gallop and no friction rub.    No murmur heard.  Pulmonary/Chest: No accessory muscle usage or stridor. No respiratory distress. She has no wheezes.   Trach in place. Shallow breaths, supported by vent. Thick secretions on trach suctioning (no blood)   Abdominal: Soft. She exhibits no distension. There is no tenderness. There is no rigidity and no guarding.   Musculoskeletal:   Extremities warm and seemingly well perfused. (atrophied legs)     Neurological: She is alert. She is not disoriented. She displays atrophy (LEs). She displays no tremor. She displays no seizure activity. GCS eye subscore is 4. GCS verbal subscore is 4. GCS motor subscore is 6.   Skin: Skin is warm and dry. No rash noted.   Psychiatric:   Cannot assess         ED Course   Procedures       2:54 PM  The patient was seen and examined by Dr. Dior in Room 8.          EKG Interpretation:      Interpreted by Sarah Dior MD  Time reviewed: 1521  Symptoms at time of EKG: altered mental status   Rhythm: normal sinus   Rate: 98 bpm  Axis: normal  Ectopy: none  Conduction: Low voltage QRS  ST Segments/ T Waves: No ST-T wave changes  Q Waves: none  Comparison to prior: When compared with previous EKG on  12/26/2016, rate has increased without significant changes in morphology.  Clinical Impression: NSR, No acute changes in morphology from previous    __________________________________________________________    Critical Care time:  was 35 minutes for this patient excluding procedures for identification of sepsis w/ two infectious sources (cellulitis and ventilator associated pneumonia), hyponatremia, polypharmacy (benzos and narcotics) causing altered mental status, requiring IVF, broad spectrum antibiotics, ongoing vent support         Labs Ordered and Resulted from Time of ED Arrival Up to the Time of Departure from the ED   CBC WITH PLATELETS DIFFERENTIAL - Abnormal; Notable for the following:     WBC 18.7 (*)     RBC Count 3.48 (*)     Hemoglobin 10.5 (*)     Hematocrit 33.7 (*)     MCHC 31.2 (*)     Absolute Neutrophil 17.7 (*)     Absolute Lymphocytes 0.4 (*)     All other components within normal limits   COMPREHENSIVE METABOLIC PANEL - Abnormal; Notable for the following:     Sodium 131 (*)     Chloride 89 (*)     Carbon Dioxide 37 (*)     Glucose 134 (*)     Creatinine 0.47 (*)     Albumin 2.7 (*)     Alkaline Phosphatase 164 (*)     All other components within normal limits   LIPASE - Abnormal; Notable for the following:     Lipase 61 (*)     All other components within normal limits   CRP INFLAMMATION - Abnormal; Notable for the following:     CRP Inflammation 18.0 (*)     All other components within normal limits   ISTAT GASES ELEC ICA GLUC JOSE ROBERTO POCT - Abnormal; Notable for the following:     Ph Venous 7.45 (*)     PCO2 Venous 60 (*)     PO2 Venous 67 (*)     Bicarbonate Venous 42 (*)     Sodium 130 (*)     Glucose 146 (*)     Calcium Ionized 4.3 (*)     Hemoglobin 11.2 (*)     Hematocrit - POCT 33 (*)     All other components within normal limits   ISTAT  GASES LACTATE JOSE ROBERTO POCT - Abnormal; Notable for the following:     PCO2 Venous 68 (*)     PO2 Venous 57 (*)     Bicarbonate Venous 44 (*)     All other  components within normal limits   INR   MAGNESIUM   PHOSPHORUS   PROCALCITONIN   ROUTINE UA WITH MICROSCOPIC   BLOOD CULTURE   BLOOD CULTURE   URINE CULTURE AEROBIC BACTERIAL       Assessments & Plan (with Medical Decision Making)   HISTORY TAKING INFORMATION:   - History, ROS, SH, PMH and Physical exam performed by myself in the presence of our medical scribe. No interpretive services were needed. History primarily provided by pt's daughter, patient does mouth some words.   - PMH: Old chart from [facility] reviewed and revealed multiple conditions including ACS/previous NSTEMI and also atypical chest pain, and stress-induced cardiomyopathy, end-stage COPD with acute on chronic respiratory failure, a TIA, altered mental status, previous pneumonias and sepsis, GJ tube placement, his hyponatremia, esophageal dysmotility and GERD, UTI, anxiety, chronic pain, et al. as further listed in EMR  - PSH: Tracheostomy, bronchoscopies, EGDs, percutaneous jejunostomy tube placement  - SH: Has RN help at home. Former smoker, no EtOH, no drugs  - FH: None new reported    ED Visit Review (last couple months):  - 12/25/16: Shortness of breath; admitted  - 12/22/16: Shortness of breath, COPD exacerbation; discharged  - 11/13/16: Shortness of breath, acute on chronic return failure with hypercapnia; admitted  - 11/11/16: Wrist distress and hypoxia; discharge    Recent Admission Review:  - 12/25/16 - 12/28/16: Admitted to   --- Discharge diagnoses: Chronic urinary retention, chronic bacteriuria, tracheitis  --- Goals of care discussion: Family's priority is reportedly making her mother comfortable, minimizing exams, clinic visits, interventions but also wanted to remain a FULL CODE, trying to be set up in the [facility]  --- Urinary retention was thought to be related to polypharmacy with her multiple narcotic and psychiatric medications. Started on 10 day course of Augmentin after the dose of Vanc/Zosyn in the ED (for  "enterococcus on urine culture), with urology recommending no additional treatment of bacteria unless symptomatic with abdominal pain,, continues on indwelling Avendaño catheter  --- Hyponatremia resolved with fluids  --- Depression, anxiety on multiple meds (oxycodone discontinued)    ________________________________________________________________________    IMPRESSION: 66-year-old, medically comorbid female, including being ventilator dependent with tracheostomy in place, recently admitted with chronic urinary retention and chronic bacteriuria as well as tracheitis, and with PMH additionally as listed above and as further described in her EMR. Pt BIBA today as the daughter was concerned for AMS/restlessness, subjective fevers, et al as listed above in the HPI/ROS (group home staff available and report they did not notice any acute mental status changes). Patient only complains of stable chest and back pain for a \"long time,\" clarifies years.    - Trach/home vent, Avendaño and perc GJ tube in place (no changes to any that we're aware of or abnormal findings on exam)  - Continues on 10 day course of Augmentin from previous bacteruria;     On examination, the patient has trouble participating with full neuro exam, though is awake, alert and is often able to communicate via mouthing words that seems appropriate but then will fixate on some things despite questions being asked (like the location of her cell phone, etc.)  She has some generally poor movement bilaterally, perhaps a bit worse on the right than the left but her oxygen saturation on her home vent is appropriate considering her significant COPD.  Cannot appreciate any obvious abnormal cardiac sounds, no pain to palpation of the abdomen, no acute findings with a lower extremity.  Enteric tube and Avendaño catheter seem appropriate as does her output.  If anything the left upper extremity is a bit swollen (which family says can happen when she is more ill), but I also " think it looks a bit erythematous, perhaps a bit warmer than surrounding tissue and could consider a cellulitis, etc.     Based upon the patient's presentation/history provided by the family, patient's nurse, etc. it sounds as though she likely has some delirium, or even just general deconditioning and worsening of her overall baseline illness.  Regards the delirium.  Differential is broad and includes but is not limited to medication effects (especially as she is on narcotics for her air hunger as well as benzodiazepines, and that these have been being given more regularly), infection also possible as she recently had the bacteria with enterococcus (though could've been colonized in the setting of her indwelling catheter), though I am also suspicious for possible respiratory source given her vent dependence or even skin/soft tissue based upon her left arm findings.  Electrolytes/metabolic/endocrine derangements also possible, we will evaluate for hypercapnia, electrolyte abnormalities, etc.    PLAN: Clarified goals of care with pt/family. Patient would like everything done, FULL CODE, full assessment, admission and treatment as needed.   - Start eval with ECG, labs, repeat urine, chest x-ray, head CT, ultrasound of the LUE to evaluate for clot and infectious findings    RESULTS:  - POC Labs: VBG shows blood gas component reasonable for severe COPD, Na 130, down from 140, lactate normal   - Labs: Leukocytosis up to 18.7 (from 9.6), Hgb up to 10.5, Na 131 (from 140), LFTs improved   - Urine:  Considering from indwelling cath, not sure there is clear UTI. Culture pending.  - Imaging: Images and written preliminary reports reviewed by myself and revealed:  --- Head CT: No acute findings  --- CXR: prelim reported left-opacities likely atelectasis, but with clinical picture think more likely PNA  --- LUE US: No DVT, findings of cellulitis in the LUE    INTERVENTIONS:   - Slow IVF given (last Echo Sept. 2016 had EF of  40-45%)  - Vanc, Zosyn (tolerated in past).    RE-EVALUATION:  - The patient's symptoms were somewhat improved. Family says thinks more mentally clear than on arrival (whether due to time from previous medications, time itself for waxing/waning of delirium, etc. Unclear.)    DISCUSSIONS:  - w US: Spoke with the US team prior to her imaging, they will be ultimately evaluated her LUE for both clots and infectious findings of the skin/soft tissue.  - w/ IM: will admit to IMC/ for further evaluation/management    DISPOSITION/PLANNING:  - FINAL IMPRESSION: Sepsis Delirium/AMS, LUE cellulitis, VAP, hyponatremia  - DISPOSITION: Admit to IM (intermediate), for further evaluation/management  --- Pending: cultures      ______________________________________________________________________________  - I discussed the care of the patient with US, IM  - I have reviewed the nursing notes.  - I have reviewed the findings, plan with the patient/family. They expressed understanding and agreement with this plan. All questions answered to the best of our ability at this time.             New Prescriptions    No medications on file       Final diagnoses:   None   Janet RODRIGUEZ, am serving as a trained medical scribe to document services personally performed by Sarah Dior MD, based on the provider's statements to me.   Sarah RODRIGUEZ MD, was physically present and have reviewed and verified the accuracy of this note documented by Janet Kaminski.      1/2/2017   [facility], [place], EMERGENCY DEPARTMENT      Sarah Dior MD  01/04/17 [id number]

## 2017-01-03 NOTE — PROGRESS NOTES
CLINICAL NUTRITION SERVICES - ASSESSMENT NOTE     Nutrition Prescription    RECOMMENDATIONS FOR MDs/PROVIDERS TO ORDER:  Continue TF as ordered. RD added fiber modulars, which will need to be included in her discharge order. For diarrhea, please consider ordering 1 capsule Culturell GG TID via her Jtube and PRN imodium.    Water flushes per providers. May consider changing to a feed and flush system - 60 ml water flush q 1 hr x 12 hrs while feeds are running if 124 ml q 4 hr is not tolerated well.    Malnutrition Status:    Does not meet criteria, but at risk.    Recommendations already ordered by Registered Dietitian (RD):  Ordered 15 mL multivitamin with minerals (Certavite).     Ordered 3 packets of Nutrisource fiber (will provider 9 grams of soluble fiber daily).     Future/Additional Recommendations:  Diarrhea - await to see if fiber and probiotics help at all to increase consistency and decrease frequency of bowel movements. Would recommend scheduling imodium if diarrhea persisting after fiber and probiotics have been given for a few days.    Recommend completing a metabolic cart study. Given mechanical ventilation and elevated Alkaline Phosphatase, would be appropriate to determine baseline REE and possibility for overfeeding. Would await goals of care discussion prior to completing this.       REASON FOR ASSESSMENT  Mary Wang is a/an 66 year old female assessed by the dietitian for Provider Order - Registered Dietitian to Assess and Order TF per Medical Nutrition Therapy Protocol (with comments: Also patient with diarrhea. Appreciate your guidance).     NUTRITION HISTORY  Pt is chronically NPO d/t history of achalasia. Has a GJ tube (placed 9/12/16). Gtube is usually to gravity, with Jtube for feeding. Typically pt is constipated, but recently has been having loose stools per report. Cdiff has been negative upon several checks, however pt was recently treated with antibiotics for an infection, which  "could be a contributing factor.    CURRENT NUTRITION ORDERS  Diet: NPO    Nutrition Support: Isosource 1.5 @ 85 ml/hr x 12 hrs (8am-8pm) + 124 ml q 4 hr water flushes.    Intake/Tolerance: Received 170 ml of TF in the past 24 hrs.     LABS  K+ low @ 3.3   ALP high @ 164 - has been elevated for the past several months    MEDICATIONS  Medications reviewed    ANTHROPOMETRICS  Height: 162.6 cm (5' 4\")  Most Recent Weight: 61.5 kg (135 lb 9.3 oz)    IBW: 54.5 kg   BMI: Normal BMI  Weight History:   Wt Readings from Last 25 Encounters:   01/03/17 61.5 kg (135 lb 9.3 oz)   12/28/16 53.5 kg (117 lb 15.1 oz)   12/22/16 53.978 kg (119 lb)   12/05/16 56.836 kg (125 lb 4.8 oz)   11/28/16 54.023 kg (119 lb 1.6 oz)   11/11/16 62 kg (136 lb 11 oz)   10/04/16 56 kg (123 lb 7.3 oz)   09/16/16 53.2 kg (117 lb 4.6 oz)   09/06/16 50.349 kg (111 lb)   07/22/16 57 kg (125 lb 10.6 oz)   07/10/16 50 kg (110 lb 3.7 oz)   06/14/16 56.065 kg (123 lb 9.6 oz)   05/31/16 50.349 kg (111 lb)   05/23/16 50.7 kg (111 lb 12.4 oz)   05/11/16 49.442 kg (109 lb)   04/21/16 52 kg (114 lb 10.2 oz)   02/09/16 47.038 kg (103 lb 11.2 oz)   02/01/16 51 kg (112 lb 7 oz)   01/20/16 50.576 kg (111 lb 8 oz)   01/02/16 55.5 kg (122 lb 5.7 oz)   12/11/15 52.3 kg (115 lb 4.8 oz)   12/09/15 52.436 kg (115 lb 9.6 oz)   12/09/15 49.896 kg (110 lb)   12/01/15 52.527 kg (115 lb 12.8 oz)   11/10/15 46.947 kg (103 lb 8 oz)   No meaningful weight trends per above data. Weights quite variable likely due to fluid status changes.     Dosing Weight: 62 kg (lowest/driest weight this admit on 1/2)    ASSESSED NUTRITION NEEDS  Estimated Energy Needs: 8905-0869 kcals/day (25 - 30 kcals/kg)  Justification: Maintenance  Estimated Protein Needs: 62-74 grams protein/day (1 - 1.2 grams of pro/kg)  Justification: Maintenance of LBM  Estimated Fluid Needs: 1 mL/kcal    Justification: Maintenance    PHYSICAL FINDINGS  Mechanical ventilation; FiO2 @ 40%.  Jose F: 12  Skin dry, ecchymotic on " UEs.   Pale and thin LEs.  Loose stools    MALNUTRITION  % Intake: No decreased intake noted  % Weight Loss: None noted, but difficult to assess  Subcutaneous Fat Loss: None observed; facial swelling.   Muscle Loss: Carries weight centrally, but noted losses in upper leg (quadricep, hamstring), Patellar region, and Posterior calf.   Fluid Accumulation/Edema: None noted  Malnutrition Diagnosis: Patient does not meet two of the above criteria necessary for diagnosing malnutrition but is at risk for malnutrition    NUTRITION DIAGNOSIS  Predicted inadequate nutrient intake (protein-energy) related to may have interruptions to EN throughout this admission       INTERVENTIONS  Implementation  -Collaboration with other providers: discussed TF tolerance with RN.  -Enteral Nutrition: as above    Goals  Total avg nutritional intake to meet a minimum of 25 kcal/kg and 1 g PRO/kg daily (per dosing wt 62 kg).    Monitoring/Evaluation  Progress toward goals will be monitored and evaluated per protocol.    Norman Cornelius RD, LD  6B Clinical Dietitian  Pager: [phone]

## 2017-01-03 NOTE — PROGRESS NOTES
Internal Medicine                                                     Progress Note  Mary Wang MRN: [id number]  1950  Date of Admission:2017  Primary care provider: Radha Mcmillan  ___________________________________  INTERVAL HISTORY (24 Hrs)/SUBJECTIVE:     HPI, Interval events reviewed.   Seen with RN, talked to daughter at bedside.   Patient continues to report dyspnea, attributes mostly to her anxiety.   No chest pain.   No cough or fever.   Thick whitish secretions no suction.   Remains alert interactive, very anxious  Vent settings essentially unchanged from baseline.     ROS: 4 point ROS neg other than the symptoms noted above in the interval history.    OBJECTIVE :   VITALS:    Temp:  [97.6  F (36.4  C)-98.8  F (37.1  C)] 97.6  F (36.4  C)  Heart Rate:  [] 101  Resp:  [9-20] 20  BP: (100-170)/(56-99) 162/86 mmHg  FiO2 (%):  [40 %-50 %] 40 %  SpO2:  [91 %-99 %] 99 %     Temp (24hrs), Av.3  F (36.8  C), Min:97.7  F (36.5  C), Max:98.8  F (37.1  C)    Wt Readings from Last 5 Encounters:   17 61.5 kg (135 lb 9.3 oz)   16 53.5 kg (117 lb 15.1 oz)   16 53.978 kg (119 lb)   16 56.836 kg (125 lb 4.8 oz)   16 54.023 kg (119 lb 1.6 oz)        Intake/Output Summary (Last 24 hours) at 17 1430  Last data filed at 17 1100   Gross per 24 hour   Intake 894.16 ml   Output   3550 ml   Net -2655.84 ml       PHYSICAL EXAM:  General: alert, anxious. tachypnea. Labored breathing. On Ventilator.   HEENT: At/nc, anicteric.  Moist MM  Neck: Trach. Vented .  Respi/Chest: diminished bs throughout. Poor effort.   CVS/Heart: S1S2 regular, no m/r/g,  Gi/Abd: Soft, non tender, PEG tube+  MSK/Ext: Distal pulses 2+.     Neuro: Alert.     Medications: reviewed.       DIAGNOSTIC STUDIES:     LABS (Last four results)  CMP    Recent Labs  Lab 17  0729 17  1338 17  1335 17  0044 16  0734    130* 131* 136 140   POTASSIUM 3.3* 4.7 4.6   --  4.1   CHLORIDE 94  --  89*  --  105   CO2 40*  --  37*  --  30   ANIONGAP 5  --  4  --  5   GLC 97 146* 134*  --  112*   BUN 11  --  16  --  14   CR 0.49*  --  0.47*  --  0.45*   GFRESTIMATED >90Non  GFR Calc  --  >90Non  GFR Calc  --  >90Non  GFR Calc   GFRESTBLACK >90African American GFR Calc  --  >90African American GFR Calc  --  >90African American GFR Calc   IZZY 8.5  --  8.6  --  8.2*   MAG 1.8  --  1.8  --  1.9   PHOS  --   --  3.1  --  3.3   PROTTOTAL  --   --  6.8  --  5.5*   ALBUMIN  --   --  2.7*  --  2.3*   BILITOTAL  --   --  0.2  --  0.3   ALKPHOS  --   --  164*  --  202*   AST  --   --  17  --  31   ALT  --   --  39  --  57*     CBC    Recent Labs  Lab 01/03/17  0729 01/02/17  1338 01/02/17  1335 12/28/16  0734   WBC 8.3  --  18.7* 9.6   RBC 3.09*  --  3.48* 3.02*   HGB 9.1* 11.2* 10.5* 9.0*   HCT 29.8*  --  33.7* 29.8*   MCV 96  --  97 99   MCH 29.4  --  30.2 29.8   MCHC 30.5*  --  31.2* 30.2*   RDW 15.1*  --  15.0 15.3*     --  343 225     INR    Recent Labs  Lab 01/02/17  1335   INR 0.99     Venous Blood Gas    Recent Labs  Lab 01/02/17  1338   PHV 7.41  7.45*   PCO2V 68*  60*   PO2V 57*  67*   HCO3V 44*  42*       Recent Results (from the past 48 hour(s))   US Extremity Non Vascular Left    Narrative    EXAMINATION: DOPPLER VENOUS ULTRASOUND OF THE LEFT UPPER EXTREMITY,  1/2/2017 4:11 PM       IMPRESSION:  1.  No evidence of left upper extremity deep venous thrombosis.  2.  Subcutaneous edema without drainable fluid collection in the area  of left upper forearm redness which may represent cellulitis.    MILLY HINDS MD   CT Head w/o Contrast    Narrative    CT HEAD W/O CONTRAST 1/2/2017 5:02 PM    History: AMS       Impression    Impression: No acute intracranial findings. Unchanged findings of  chronic small vessel ischemic disease.    LANI WAITE MD   XR Chest Port 1 View    Narrative    XR CHEST PORT 1 VW 1/2/2017 5:53  PM    Comparison: 12/26/2016    History: AMS, leukocytosis      Impression: Streaky left costophrenic angle opacities favored to  represent atelectasis.           ASSESSMENT & PLAN :    Mary Wang is a 66 year old female with a history of severe COPD now s/p tracheostomy and chronic ventilator, anxiety, hypertension, achalasia s/p GJ tube placement who presented to the hospital with leukocytosis and altered mental status.  Of note she was just hospitalized from 12/1-12/5 with a UTI and then again from 12/25-12/28 with bacteruria.    # Leukocytosis, altered mental status - Per family and PT, patient more difficult to arouse over the past 3 days and now acutely confused and talking about family members who have been dead for years.  Patient gives positive answers to all ROS questions.  She has mild redness on her left shoulder which could represent cellulitis, atelectasis vs early pneumonia on CXR and diarrhea concerning for C diff (patient on multiple antibiotics recently and currently on Augmentin).  While an infection could be causing her lethargy and altered mental status, she is also on multiple psychotropic medications with recent up-titration.  -  check for C diff : neg. DC Enteric precautions  - UA from ~1 month old bender catheter and not overly concerning for infection. Fu UC  - CXR reviewed. No clear PNA.  ED started treatment with Zosyn/Vanco for possible HCaP.  discontinued by admitting team.   - VBG does not show significantly worsened hypercarbia  - Medications reviewed: gabapentin.clonazepam dose reduced on adm.       1/3/2017:   Leucocytosis better.   Patient continues to report uncontrolled anxiety. Remains alert, at baseline per daughter at bedside.   Resumed her home lorazepam 0.5 mg Q 3 hour prn, increased clonazepam 1.5 qid.   Per Patient, daughter: would rather stay sleepy then suffer w anxiety.      # Urinary retention - Ongoing issue with bender cath placed during last admission.  She has  worked with urology and family and the patient have agreed to differ further work up and continue with an indwelling catheter for the time being.  - Continue home bender catheter    #  Hyponatremia - Recurrent issue which resolved previously with IVF.  Suspect hyponatremic hypovolemia given diarrhea, BUN/Cr ratio.  - s/p 750 ccs NS in ED.  Na 139    # Severe COPD - S/p tracheostomy and now 24/7 ventilator dependent.  - Continue home nebs: BID pulmicort, ipatropium, levalbuterol  - Continue home prednisone 20 mg PO Qday, bactrim for prophylaxis * Consider increasing to 40 mg daily if reports dyspnea even after she had her anxiety meds   - Continue dilaudid prn for air hunger, increase Q 2 hour prn per Palliative.   - RT for assistance with ventilator management  - pulmonary toilet     # Achalasia - S/p GJ tube placement.  - G tube to gravity drainage  - Continue home TF: Isosource 1.5 @ 85 ms/hr x12 hours from [id number].  Free water 125 q4h during the day.    # Loose stools: C.diff neg. Nutrition consult to review tube feeding. Fiber. Probiotics. Imodium prn.     # Palliative: consult appreciated. Code status changed to DNR. D/w team.     Diet/IVF: Tube feeds. Ivf. k mg protocol.   DVT ppx: Lovenox sQ  Disposition: pending medical stabilization.     Plan d/w SANCHO, CC & RN.   Updated daughter at bedside.         Geo Bui MD   Hospitalist (Internal Medicine)  Pager: [phone].

## 2017-01-03 NOTE — PROGRESS NOTES
Called César, patients daughter, on request of patient. Left a  for her to return call. Pt would like her to come see her now, rather than later this afternoon. I explained that César told me this morning she would be in later to see pt after work, but pt has requested multiple times that I call César and request she come now. Will await return call.

## 2017-01-03 NOTE — CONSULTS
".[facility]  Palliative Care Consultation         Mary Wang MRN# [id number]   Age: 66 year old YOB: 1950   Date of Admission: 1/2/2017    Reason for consult: Symptom management  Goals of care       Requesting physician/service: Geo Bui MD       Recommendations          Goals of Care  Per discussion with patient and daughter, César, she wound not want CPR at this time and patient and daughter in agreement to change code status to DNR. Discussed with patient and daughter the concern that her disease has gotten worse given her increased difficulty with air hunger. Discussed briefly concept of focusing on comfort only. Given patient's anxiety during visit, unable to discuss further this approach to care. Daughter appreciates the information. Daughter is quite tearful and states that her mothers wishes are to continue getting better and get treatment however daughter states that her prayers are different.  -Continue restorative measures for now  -Change code status to DNR  -Ongoing discussion of goals of care and decisional support    Air hunger/Anxiety  Patient continually mouths \"help me\". She reports not being able to breath frequently. Increasing doses of clonazepam has not been helpful in the past. Hopeful that with improved symptom management with opioids and non-pharmacological management will be able to decrease dose of clonazepam. Patient reports being ok with being more sleeping in order to treat her symptoms however explained that this would be inconsistent with her goals for restorative measures.  -Encourage use of fan at bedside  -Increase frequency of Hydromorphone 1 mg to q2h PRN   -Continue PTA Clonazepam and ativan for now  -Continue PTA Seroquel, encourage use of PRN dose at bedtime      Recommend to continue follow up in complex care clinic if remains restorative in goals.    POLST- not discussed this visit, will need prior to CHANO Perkins " "LÁZARO Santiago CNS  Palliative Care Consult Team  Pager: [phone]    40 minutes spent with patient, with >50% counseling and in care coordination.            Disease Process/es & Symptoms     Altered mental status with leukocytosis  Increased lethargy, hypernatremia, short of breath  Advanced COPD  Ventilator dependent on trach  Anxiety     Support/Coping   (We typically ask each of our patients the following questions:)    How do you make sense of this? Not addressed  Are you Hoahaoism? Spiritual? Not so much? Hoahaoism, Bahai  Are you at peace? Not addressed  What are your concerns, medical and non-medical, that are not being addressed? Not addreseed  Who are your \"go-to\" people when you need support? Supportive daughter    Plan for psychosocial/spiritual support/follow-up from palliative team: Will discuss case during palliative interdisciplinary team rounds and involve LICSW and  as needed.       Decision-Making & Goals of Care     Discussion/counseling today about prognosis/goals of care/decisions:   See above  Patient has a completed health care directive available in the chart (Y/N): Yes  Health care agent (only if patient has an available, complete HCD): César Troy, daughter  There is no POLST (Physician orders for life-sustaining treatment) form on file for this patient  Code Status: Full code   Patient has decision-making capacity (Y/N): Yes    Coordination of Care     Findings & plan of care discussed with: Primary team, bedside RN  Follow-up plan from palliative team: will continue to follow patient  Thank you for involving us in the patient's care.           Chief Complaint     Symptom management and goals of care         History of Present Illness     History obtained from: chart review      66 year old female with severe COPD s/p tracheostomy and on chronic ventilator who presents with AMS with leukocytosis. Patient had become increasingly difficult to arouse over the past 3 days " and is more acutely confused. Undergoing evaluation for reversible causes of altered mental status. She also has multiple medications that also are concern for complicating mental status which include clonazepam, hydromorphone, gabapentin, and Seroquel. Primary team is working toward weaning some of these medications. Attempt was made for patient to follow up at complex care clinic but now is hospitalized.    Palliative Care team consulted 1/3 for goals of care, chronic respiratory failure on ventilator with severe anxiety disorder.            Past Medical History:   I have reviewed this patient's past medical history  This patient  has a past medical history of COPD (chronic obstructive pulmonary disease) (H); Anxiety; TMJ pain dysfunction syndrome; Tracheostomy in place; Hypertension; GERD (gastroesophageal reflux disease); Achalasia; Lung nodule; Stress-induced cardiomyopathy; Anxiety and depression; and Chronic pain.           Past Surgical History:   I have reviewed this patient's past surgical history  This patient  has past surgical history that includes Tracheostomy (N/A, 8/26/2015); Bronchoscopy, Insert Bronchial Valve (Right, 9/2/2015); Esophagoscopy, gastroscopy, duodenoscopy (EGD), combined (N/A, 12/11/2015); Esophagoscopy, gastroscopy, duodenoscopy (EGD), combined (N/A, 12/31/2015); Percutaneous insertion tube jejunostomy (N/A, 12/31/2015); Percutaneous insertion tube jejunostomy (N/A, 1/25/2016); Anesthesia out of OR MRI (N/A, 4/18/2016); and Endoscopic insertion tube gastrostomy (N/A, 7/9/2016).            Social/Spiritual History:     Living situation: Lives in group home  Family system: Daughters Elisabeth (lives in [place]) and Efren (lives in [place] but not in [facility]), son Uli (lives in [place]). Has a  whom she occasionally sees and notes that he abuses alcohol    Functional status (needs help with ADLs or IADLs): was getting up to chair with assistance  Employment/education: Not  addressed  Use of community resources: has 24 hour nursing staff at home  Activities/interests: Not addressed  History of substance use/abuse: former smoker            Family History:   I have reviewed this patient's family history  The patient has no family status information on file.              Allergies:   All allergies reviewed and addressed  This patient is allergic to is allergic to levaquin; sarabjit podhaler; suprax; augmentin; avelox; cedax; penicillins; and vantin.           Medications:   I have reviewed this patient's medication profile and medications during this hospitalization    Medications of Note  Clonazepam 1 mg 4 times a day (decreased dose 1/3)  Gabapentin 150 mg two times a day  Melatonin 3 mg at bedtime  Prednisone 20 mg daily  Seroquel 50 mg 3 times a day, 75 mg at bedtime  Sertraline 100 mg daily     mg q4h PRN- x1  Hydromorphone 1 mg q3h PRN- x0  Ondansetron PRN- x0  Compazine PRN- x0  Seroquel 25 mg PRN at bedtime- x0           Review of Systems:   The comprehensive review of systems is negative other than noted here and in the HPI.    Palliative Symptom Review (0=no symptom/no concern, 1=mild, 2=moderate, 3=severe):      Pain: 0      Fatigue: 0      Nausea: 0      Constipation: 0      Diarrhea: 0      Depressive Symptoms: 0      Anxiety: 3      Drowsiness: 0      Poor Appetite: 3      Shortness of Breath: 3      Insomnia: 0            Physical Exam:   Vitals were reviewed  Temp: 97.7  F (36.5  C) Temp src: Axillary BP: 135/67 mmHg   Heart Rate: 96 Resp: 14 SpO2: 98 % O2 Device: Mechanical Ventilator Oxygen Delivery: 3 LPM  Constitutional: Awake, anxious  Lungs: Increased work of breathing on ventilator  Cardiovascular: Regular rate and rhythm, normal S1 and S2  Abdomen: Normal bowel sounds, soft, non-distended  Neurologic: Awake, alert, mouths answers to questions appropriately  Neuropsychiatric: anxious           Data Reviewed:     ROUTINE ICU LABS (Last four results)  CMP  Recent  Labs  Lab 01/03/17 0729 01/02/17  1338 01/02/17  1335 01/02/17  0044 12/28/16  0734    130* 131* 136 140   POTASSIUM 3.3* 4.7 4.6  --  4.1   CHLORIDE 94  --  89*  --  105   CO2 40*  --  37*  --  30   ANIONGAP 5  --  4  --  5   GLC 97 146* 134*  --  112*   BUN 11  --  16  --  14   CR 0.49*  --  0.47*  --  0.45*   GFRESTIMATED >90Non  GFR Calc  --  >90Non  GFR Calc  --  >90Non  GFR Calc   GFRESTBLACK >90African American GFR Calc  --  >90African American GFR Calc  --  >90African American GFR Calc   IZZY 8.5  --  8.6  --  8.2*   MAG 1.8  --  1.8  --  1.9   PHOS  --   --  3.1  --  3.3   PROTTOTAL  --   --  6.8  --  5.5*   ALBUMIN  --   --  2.7*  --  2.3*   BILITOTAL  --   --  0.2  --  0.3   ALKPHOS  --   --  164*  --  202*   AST  --   --  17  --  31   ALT  --   --  39  --  57*     CBC  Recent Labs  Lab 01/03/17 0729 01/02/17 1338 01/02/17 1335 12/28/16  0734   WBC 8.3  --  18.7* 9.6   RBC 3.09*  --  3.48* 3.02*   HGB 9.1* 11.2* 10.5* 9.0*   HCT 29.8*  --  33.7* 29.8*   MCV 96  --  97 99   MCH 29.4  --  30.2 29.8   MCHC 30.5*  --  31.2* 30.2*   RDW 15.1*  --  15.0 15.3*     --  343 225     INR  Recent Labs  Lab 01/02/17  1335   INR 0.99     Arterial Blood GasNo lab results found in last 7 days.

## 2017-01-03 NOTE — PROGRESS NOTES
D Patient arrived on floor from ED via liter transfer and is being admitted for lethargy, hypernatremia and SOB. Heart regular/tachy and lungs decreased with scattered crackles. In place bivona trach and home ventilator. Patient voiced c/o SOB with sat's 96%. Neb and suctioning done with some improvement. Voiced no c/o pain or nausea and has been. Gtube to gravity with bender cathetrer in place. In line suctioning frequently done. Orientated to room/call light and completed admission process.   I Vital's, assessment and med's per order.   A Resting in bed with call light in reach.   P Continue to monitor and update MD with changes.

## 2017-01-03 NOTE — PLAN OF CARE
Problem: Goal Outcome Summary  Goal: Goal Outcome Summary  Outcome: No Change  D AVSS with sat's 98% on mechanical ventilator set to 40%. Heart regular/tachy at times and lung sound decreased in bases with moderate amount of upper respiratory congestion. In line suctioning done every 2 hours with white/thin sputum returned. Adequate urine output via bender catheter with cath care done and gastric tube to gravity drainage putting out clear/bile fluid. Non blanchable reddened area on coccyx covered with foam dressing for protection. Turning repositioning/repositioning with nona and oral care done every two hours and prn. Slept approximately 75% of the night.   I Vital's, assessment and med's per order.   A Resting in bed with call light in reach.  P Continue to monitor and update MD with changes.

## 2017-01-03 NOTE — PROGRESS NOTES
Assessment for the Complex Care Clinic was cancelled as patient is in the hospital.    LUZ Cuevas  Social Work Care Coordinator  [facility], Mobile Team

## 2017-01-03 NOTE — PROVIDER NOTIFICATION
Updated MD regarding -NPO and no IV fluids. Patient does have free water flushes ordered but may drop her sodium level further so have no started.

## 2017-01-03 NOTE — H&P
[facility] History and Physical  Department of Internal Medicine    Patient Name: Mary Wang MRN# [id number]   Age: 66 year old YOB: 1950     Date of Admission:1/2/2017    Primary care provider: Radha Mcmillan  Date of Service: 1/2/2017  Admitting Team: [facility]         Assessment and Plan:   Mary Wang is a 66 year old female with a history of severe COPD now s/p tracheostomy and chronic ventilator, anxiety, hypertension, achalasia s/p GJ tube placement who presented to the hospital with leukocytosis and altered mental status.  Of note she was just hospitalized from 12/1-12/5 with a UTI and then again from 12/25-12/28 with bacteruria.    1) Leukocytosis, altered mental status - Per family and PT, patient more difficult to arouse over the past 3 days and now acutely confused and talking about family members who have been dead for years.  Patient gives positive answers to all ROS questions.  She has mild redness on her left shoulder which could represent cellulitis, atelectasis vs early pneumonia on CXR and diarrhea concerning for C diff (patient on multiple antibiotics recently and currently on Augmentin).  While an infection could be causing her lethargy and altered mental status, she is also on multiple psychotropic medications with recent up-titration.  - Will check for C diff.  Enteric precautions  - UA from ~1 month old bender catheter and not overly concerning for infection  - Small area of erythema on left upper arm not suggestive of systemic disease.  - CXR not particularly concerning for pneumonia but patient appears dry and CXR findings may be subtle.  ED started treatment with Zosyn/Vanco for possible HCaP.  Will continue.  - VBG does not show significantly worsened hypercarbia  - Medications reviewed:  Patient on clonazepam 1.5 QID, hydromorphone 1 mg, gabapentin 300 mg, Seroquel.   Will decrease gabapentin dose 50% overnight.  Will decrease clonazepam dose to  1 mg QID.  Will need to see if patient is able to tolerate reductions and monitor tomorrow.    2) Urinary retention - Ongoing issue with bender cath placed during last admission.  She has worked with urology and family and the patient have agreed to differ further work up and continue with an indwelling catheter for the time being.  - Continue home bender catheter    3) Hyponatremia - Recurrent issue which resolved previously with IVF.  Suspect hyponatremic hypovolemia given diarrhea, BUN/Cr ratio.  - Already received 750 ccs NS in ED.  - Sodium recheck on arrival to floor  - BMP in am    4) Severe COPD - S/p tracheostomy and now 24/7 ventilator dependent.  - Continue home nebs: BID pulmicort, ipatropium, levalbuterol  - Continue home prednisone 20 mg PO Qday, bactrim for prophylaxis  - Continue dilaudid for air hunger  - Consult RT for assistance with ventilator management    5) Achalasia - S/p GJ tube placement.  - G tube to gravity drainage  - Continue home TF: Isosource 1.5 @ 85 ms/hr x12 hours from 9214-2401.  Free water 125 q4h during the day.      CODE: Full  Diet/IVF: Tube feeds  DVT ppx: Lovenox  Disposition/Admission Status: Inpatient    Jose David De La Cruz  Internal Medicine Hospitalist & [facility]  Pager: [phone]           Chief Complaint:   Altered mental status         HPI:   Mary Wang is a 66 year old female with a history of severe COPD now s/p tracheostomy and chronic ventilator, anxiety, hypertension, achalasia s/p GJ tube placement who presented to the hospital with leukocytosis and altered mental status.  Of note she was just hospitalized from 12/1-12/5 with a UTI and then again from 12/25-12/28 with bacteruria.    The patient provides a limited history.  She nods and shakes her head to command but also nods in response to every ROS question asked.  Per family, she has been confused and mouthing the names of long-dead family members.  I spoke with her  daughters and they report she has been confused and lethargic for three days now.  Physical therapy noted the patient took an unusually long time to wake up, and family noted the same thing yesterday as well as signs of disorientation.  Family also noted three episodes of diarrhea in just a short visit yesterday.    A review of the patient's chart shows that she is being followed by our palliative care team for her COPD.  She has anxiety and air hunger and is now on a number of sedating medications with plans to decrease doses.  She was going to be set up with our complex care clinic as she now struggles to come into clinic visits, but unfortunately that appointment was two days from now.         Past Medical History:     Past Medical History   Diagnosis Date     COPD (chronic obstructive pulmonary disease) (H)      trach/vent dependent      Anxiety      TMJ pain dysfunction syndrome      Tracheostomy in place      Hypertension      GERD (gastroesophageal reflux disease)      Achalasia      Lung nodule      spiculated; followed in pulm clinic      Stress-induced cardiomyopathy      Anxiety and depression      Chronic pain        PMH reviewed and up to date         Past Surgical History:     Past Surgical History   Procedure Laterality Date     Tracheostomy N/A 8/26/2015     Procedure: TRACHEOSTOMY;  Surgeon: Milena Thibodeaux MD;  Location: [facility] OR     Bronchoscopy, insert bronchial valve Right 9/2/2015     Procedure: BRONCHOSCOPY, INSERT BRONCHIAL VALVE;  Surgeon: Everardo Beach MD;  Location: [facility] OR     Esophagoscopy, gastroscopy, duodenoscopy (egd), combined N/A 12/11/2015     Procedure: COMBINED ESOPHAGOSCOPY, GASTROSCOPY, DUODENOSCOPY (EGD);  Surgeon: Dhruv Lyles MD;  Location:  OR     Esophagoscopy, gastroscopy, duodenoscopy (egd), combined N/A 12/31/2015     Procedure: COMBINED ESOPHAGOSCOPY, GASTROSCOPY, DUODENOSCOPY (EGD);  Surgeon: Brenden Plasencia MD;  [facility]      Percutaneous insertion tube jejunostomy N/A 12/31/2015     Procedure: PERCUTANEOUS INSERTION TUBE JEJUNOSTOMY;  Surgeon: Brenden Plasencia MD;  Location: [facility] OR     Percutaneous insertion tube jejunostomy N/A 1/25/2016     Procedure: PERCUTANEOUS INSERTION TUBE JEJUNOSTOMY;  Surgeon: Carrie Coleman MD;  Location: [facility]     Anesthesia out of or mri N/A 4/18/2016     Procedure: ANESTHESIA OUT OF OR MRI;  Surgeon: GENERIC ANESTHESIA PROVIDER;  Location: [facility] OR     Endoscopic insertion tube gastrostomy N/A 7/9/2016     Procedure: ENDOSCOPIC INSERTION TUBE GASTROSTOMY;  Surgeon: Brenden Plasencia MD;  Location: [facility] OR       PS reviewed and up to date         Social History:     Social History     Social History     Marital Status:      Spouse Name: N/A     Number of Children: N/A     Years of Education: N/A     Occupational History     Not on file.     Social History Main Topics     Smoking status: Former Smoker -- 2.00 packs/day for 40 years     Types: Cigarettes     Start date: 01/01/1964     Quit date: 04/18/1998     Smokeless tobacco: Never Used     Alcohol Use: No     Drug Use: No     Sexual Activity: Not on file     Other Topics Concern     Not on file     Social History Narrative            Family History:     Family History   Problem Relation Age of Onset     CANCER Sister             Immunizations:     Immunization History   Administered Date(s) Administered     Influenza (High Dose) 3 valent vaccine 10/06/2014, 09/24/2015, 10/04/2016     Influenza (IIV3) 11/20/2012, 10/21/2013     Pneumococcal (PCV 13) 11/17/2014     Pneumococcal 23 valent 05/03/2012     Tetanus Not Indicated - By Hx 05/03/2014              Allergies:      Allergies   Allergen Reactions     Levaquin Other (See Comments)     Delirium     Toro Podhaler [Tobramycin] Other (See Comments)     Severe congestion, SOB      Suprax [Cefixime]      See ceftibuten     Augmentin Nausea     Has tolerated for treatment of UTIs in 2016.      Avelox [Moxifloxacin] Anxiety     Cedax [Ceftibuten] Other (See Comments)     Pain in eyes     Penicillins Itching     Tolerated amoxicillin without issues.     Vantin [Cefpodoxime] Nausea            Medications:     Prior to Admission medications    Medication Sig Last Dose Taking? Auth Provider   amoxicillin-clavulanate (AUGMENTIN) 400-57 MG/5ML suspension 10.9 mLs (875 mg) by Oral or Feeding Tube route 2 times daily for 8 days   Theresa Wagoner MD   Cranberry 405 MG CAPS Take 405 mg by mouth daily   Radha Mcmillan MD   Cranberry 125 MG TABS Take 1 tablet by mouth daily One tab daily   Radha Mcmillan MD   clonazePAM (KLONOPIN) 0.1 mg/mL Take 15 mLs (1.5 mg) by mouth 4 times daily Needs appt for refills   Radha Mcmillan MD   HYDROmorphone (DILAUDID) 1 MG/ML LIQD liquid Take 1 mL (1 mg) by mouth every 3 hours as needed for moderate to severe pain (air hunger)   Radha Mcmillan MD   sertraline (ZOLOFT) 20 MG/ML (HIGH CONC) solution 5 mLs (100 mg) by Per Feeding Tube route daily   Carina Smith MD   Nutritional Supplements (JEVITY 1.5 IZZY) LIQD Take 5 Cans by mouth daily Per tube feeding   Radha Mcmillan MD   loperamide (IMODIUM A-D) 2 MG tablet 2-2 mg tablets per J-tube qid prn frequent and/or loose stools. Do not use more than 8 tabs per day.   Radha Mcmillan MD   melatonin (MELATONIN) 1 MG/ML LIQD liquid 3 mLs (3 mg) by Per J Tube route At Bedtime   Francisco Burt MD   acetaminophen (TYLENOL) 160 MG/5ML oral liquid 20.3 mLs (650 mg) by Per Feeding Tube route every 4 hours as needed for mild pain   Kiesha Martinez MD   calcium carbonate (TUMS) 500 MG chewable tablet 1 tablet (500 mg) by Per G Tube route every 2 hours as needed for heartburn   Kiesha Martinez MD   budesonide (PULMICORT) 0.5 MG/2ML nebulizer solution Take 2 mLs (0.5 mg) by nebulization 2 times daily   Kiesha Martinez MD   ipratropium (ATROVENT) 0.02 % nebulizer solution Take 2.5  mLs (0.5 mg) by nebulization every 4 hours   Kiesha Martinez MD   levalbuterol (XOPENEX) 1.25 MG/3ML nebulizer solution Take 3 mLs (1.25 mg) by nebulization every 4 hours   Kiesha Martinez MD   gabapentin (NEURONTIN) 250 MG/5ML solution 6 mLs (300 mg) by Per J Tube route 2 times daily   Kiesha Martinez MD   fexofenadine (ALLEGRA) 60 MG tablet Take 1 tablet (60 mg) by mouth 2 times daily   Kiesha Martinez MD   atorvastatin (LIPITOR) 20 MG tablet Take 1 tablet (20 mg) by mouth daily   Kiesha Martinez MD   QUEtiapine (SEROQUEL) 50 MG tablet Take 1 tablet (50 mg) by mouth 3 times daily   Kiesha Martinez MD   QUEtiapine (SEROQUEL) 25 MG tablet Take 3 tablets (75 mg) by mouth At Bedtime   Kiesha Martinez MD   predniSONE 5 MG/5ML solution Take 20 mLs (20 mg) by mouth daily   Kiesha Martinez MD   guaiFENesin (ORGANIDIN) 200 MG TABS Take 1 tablet (200 mg) by mouth 4 times daily   Kiesha Martinez MD   fluticasone (FLONASE) 50 MCG/ACT nasal spray Spray 2 sprays into both nostrils daily   Kiesha Martinez MD   sodium chloride (OCEAN) 0.65 % nasal spray Spray 1 spray into both nostrils daily as needed for congestion   Kiesha Martinez MD   polyethylene glycol (MIRALAX/GLYCOLAX) packet 17 g by Per J Tube route 2 times daily Hold for loose stools   Kiesha Martinez MD   senna-docusate (SENOKOT-S;PERICOLACE) 8.6-50 MG per tablet 2 tablets by Per J Tube route 2 times daily   Kiesha Martinez MD   sulfamethoxazole-trimethoprim (BACTRIM,SEPTRA) 200-40 mg/5ml suspension 20 mLs (160 mg) by Per J Tube route daily Dose based on TMP component. 20 mLs = 160 mg   Kiesha Martinez MD   simethicone (MYLANTA GAS) 125 MG CHEW 1 tablet (125 mg) by Per Feeding Tube route 4 times daily   Kiesha Martinez MD   Pediatric Multivitamins-Iron (MULTIPLE VITAMINS-IRON) 15 MG CHEW 1 chew tab by Per J Tube route daily Contains ferrous gluconate, 15 mL = 9 mg iron    Kiesha Martinez MD   omeprazole (PRILOSEC) 2 mg/mL 10 mLs (20 mg) by Per Feeding Tube route 2 times daily   Kiesha Martinez MD   lidocaine 15 mL, alum & mag hydroxide-simethicone 15 mL GI Cocktail Take 30 mLs by mouth 3 times daily as needed for moderate pain   Kiesha Martinez MD   aspirin 81 MG chewable tablet 1 tablet (81 mg) by Per Feeding Tube route daily   Kiesha Martinez MD   QUEtiapine (SEROQUEL) 25 MG tablet Take 1 tablet (25 mg) by mouth nightly as needed (if initial 75 mg does not work-can give additional 25 for total of 100 mg)   Kiesha Martinez MD   polyethylene glycol 0.4%- propylene glycol 0.3% (SYSTANE ULTRA) 0.4-0.3 % SOLN ophthalmic solution Place 1-2 drops into both eyes 4 times daily 0900, 1300, 1700, 2100   Kiesha Martinez MD             Review of Systems:     A complete ROS was performed and is negative other than what is stated in the HPI.         Physical Exam:   Blood pressure 127/87, temperature 98.8  F (37.1  C), temperature source Oral, resp. rate 13, SpO2 97 %, not currently breastfeeding.    Physical Exam   Constitutional:   Chronically ill appearing older woman, resting comfortably   Head: Normocephalic and atraumatic.   Eyes: Conjunctivae are normal. Pupils are equal, round, and reactive to light.    Oral: Pharynx has no erythema or exudate, mucous membranes are moist  Neck:   No adenopathy, no bony tenderness.  Trach in place, no drainage  Cardiovascular: Regular rate and rhythm without murmurs or gallops  Pulmonary/Chest: Clear to auscultation bilaterally, with no wheezes or retractions. No respiratory distress.  GI: Soft with good bowel sounds. Tender in the RLQ and RUQ, non-distended, with no guarding, no rebound, no peritoneal signs.   Musculoskeletal:  No edema or clubbing   Skin: Skin is warm and dry.  Multiple bruises.  Small area of warmth and erythema on left shoulder.  Neurological: Nods and shakes head appropriately but is disoriented.   4/5 strength all extremities.  Psychiatric:  Follows commands.      Tubes/Lines/Devices: Trach in place, G tube to drainage, bender in place           Data:   XR CHEST PORT 1 VW 1/2/2017 5:53 PM     Comparison: 12/26/2016     History: AMS, leukocytosis     Findings: Single AP view of the chest. Cardiac silhouette is within  normal limits. Tracheostomy tube in stable position. Streaky left  costophrenic opacities. Stable diffuse granulomatosis. Biapical  scarring. Visualized abdomen is unremarkable.                                                                       Impression: Streaky left costophrenic angle opacities favored to  represent atelectasis.     I have personally reviewed the examination and initial interpretation  and I agree with the findings.     LUCIA TENA MD  CT HEAD W/O CONTRAST 1/2/2017 5:02 PM     History: AMS     Comparison: 9/10/2016.     Technique: Using multidetector thin collimation helical acquisition  technique, axial, coronal and sagittal CT images from the skull base  to the vertex were obtained without intravenous contrast.       Findings:     No intracranial hemorrhage, mass effect, or midline shift. Mild  diffuse cerebral and cerebellar volume loss. Moderate hypodensity  within the white matter likely representing chronic small vessel  ischemic disease, unchanged. The ventricles are proportionate to the  cerebral sulci. The gray to white matter differentiation of the  cerebral hemispheres is preserved. The basal cisterns are patent.     The visualized paranasal sinuses are clear. The mastoid air cells are  clear. Bilateral pseudophakia.                                                                        Impression: No acute intracranial findings. Unchanged findings of  chronic small vessel ischemic disease.     I have personally reviewed the examination and initial interpretation  and I agree with the findings.     LANI WAITE MD    I spoke with Dr Blackburn regarding  patient's plan of care  I reviewed past notes, imaging and labs      Jose David De La Cruz, ARMINP

## 2017-01-04 NOTE — PROVIDER NOTIFICATION
Team paged - sepsis protocol triggered. Last BP was 196/103, HR tachy in 100's, O2 sats dropping between 91 and 93% (was previously regularly % on 40% FiO2). Lung sounds very coarse and diminished at bases. RT called to do scheduled nebs now. Will continue to monitor.

## 2017-01-04 NOTE — PLAN OF CARE
"Problem: Goal Outcome Summary  Goal: Goal Outcome Summary  Outcome: No Change  /87 mmHg  Temp(Src) 98.3  F (36.8  C) (Oral)  Resp 20  Ht 1.626 m (5' 4\")  Wt 62 kg (136 lb 11 oz)  BMI 23.45 kg/m2  SpO2 99%    VS: BP got up to 196/103 today - recheck was 147/87. Prn hydralazine ordered for systolic >160. Pt also tachy starting around 11am, in conjunction with lower O2 sats (91-93%, when she was previously %). Team aware. Sepsis protocol triggered.   Neuro: Disoriented to time and occasionally says illogical comments according to family. Confused - inappropriately puts call light on when RN is in her room within sight.   Cardiac: SR/ST 90s-110's  Respiratory: Sating 98% on 50% FiO2 on vent, this was increased today. LS course, crackles, diminished. Team aware. IV fluids stopped. Suctioning frequently.  GI: BMX1, incontinent of stool, is getting scheduled imodium (and prn) and fiber in J tube  : Adequate urine output in bender, lasix was given today  IV Access: R PIV SL'd  Drains/tubes: G tube to gravity  J tube with TF @85/hr w/ 125 cc flush q4h from 8a to 8p.  Diet/appetite: NPO, ice chips okay per order  Activity:  Assist of 2, bedrest  Pain: Back and abdominal pain. Prn dilaudid q2h for pain and air hunger. Also giving ativan q3h prn for anxiety  Skin: Non blanchable redness on coccyx  Sepsis protocol triggered. Also ordered stat chest XR.     R: Continue with POC. Notify primary team with changes.            "

## 2017-01-04 NOTE — PROGRESS NOTES
"Internal Medicine                                                     Progress Note  Mary Wang MRN: [id number]  1950  Date of Admission:2017  Primary care provider: Radha Mcmillan  ___________________________________  INTERVAL HISTORY (24 Hrs)/SUBJECTIVE:     Interval events reviewed.   Seen with RN,  daughter at bedside.   Patient continues to report dyspnea, \"can't breathe\"  Anxiety better after resuming her home dose.   No fever.   Secretions unchanged per RN.     *Vent settings essentially unchanged from baseline.     ROS: 4 point ROS neg other than the symptoms noted above in the interval history.    OBJECTIVE :   VITALS:    Temp:  [97.6  F (36.4  C)-98.8  F (37.1  C)] 98.5  F (36.9  C)  Heart Rate:  [] 88  Resp:  [14-24] 16  BP: (108-162)/(60-88) 151/88 mmHg  FiO2 (%):  [40 %] 40 %  SpO2:  [98 %-100 %] 98 %     Temp (24hrs), Av.5  F (36.9  C), Min:98.3  F (36.8  C), Max:99  F (37.2  C)    Wt Readings from Last 5 Encounters:   17 62 kg (136 lb 11 oz)   16 53.5 kg (117 lb 15.1 oz)   16 53.978 kg (119 lb)   16 56.836 kg (125 lb 4.8 oz)   16 54.023 kg (119 lb 1.6 oz)       Intake/Output Summary (Last 24 hours) at 17 1802  Last data filed at 17 1600   Gross per 24 hour   Intake 2695.83 ml   Output   2135 ml   Net 560.83 ml         PHYSICAL EXAM:  General: alert, anxious. tachypnea. Labored breathing. On Ventilator.   HEENT: At/nc, anicteric.   Neck: Trach. Vented .  Respi/Chest: BL ronchi. diminished throughout. Poor effort.   CVS/Heart: S1S2 regular, no m/r/g,  Gi/Abd: Soft, non tender, PEG tube+  MSK/Ext: Distal pulses 2+.     Neuro: Alert.     Medications: reviewed.       DIAGNOSTIC STUDIES:     LABS (Last four results)  CMP    Recent Labs  Lab 17  0729 17  1338 17  1335 17  0044    130* 131* 136   POTASSIUM 3.3* 4.7 4.6  --    CHLORIDE 94  --  89*  --    CO2 40*  --  37*  --    ANIONGAP 5  --  4  --    GLC " 97 146* 134*  --    BUN 11  --  16  --    CR 0.49*  --  0.47*  --    GFRESTIMATED >90Non  GFR Calc  --  >90Non  GFR Calc  --    GFRESTBLACK >90African American GFR Calc  --  >90African American GFR Calc  --    IZZY 8.5  --  8.6  --    MAG 1.8  --  1.8  --    PHOS  --   --  3.1  --    PROTTOTAL  --   --  6.8  --    ALBUMIN  --   --  2.7*  --    BILITOTAL  --   --  0.2  --    ALKPHOS  --   --  164*  --    AST  --   --  17  --    ALT  --   --  39  --      CBC    Recent Labs  Lab 01/03/17  0729 01/02/17  1338 01/02/17  1335   WBC 8.3  --  18.7*   RBC 3.09*  --  3.48*   HGB 9.1* 11.2* 10.5*   HCT 29.8*  --  33.7*   MCV 96  --  97   MCH 29.4  --  30.2   MCHC 30.5*  --  31.2*   RDW 15.1*  --  15.0     --  343     INR    Recent Labs  Lab 01/02/17  1335   INR 0.99     Venous Blood Gas    Recent Labs  Lab 01/02/17  1338   PHV 7.41  7.45*   PCO2V 68*  60*   PO2V 57*  67*   HCO3V 44*  42*          US Extremity Non Vascular Left    Narrative    EXAMINATION: DOPPLER VENOUS ULTRASOUND OF THE LEFT UPPER EXTREMITY,  1/2/2017 4:11 PM       IMPRESSION:  1.  No evidence of left upper extremity deep venous thrombosis.  2.  Subcutaneous edema without drainable fluid collection in the area  of left upper forearm redness which may represent cellulitis.    MILLY HINDS MD     CT Head w/o Contrast    Narrative    CT HEAD W/O CONTRAST 1/2/2017 5:02 PM    History: AMS       Impression    Impression: No acute intracranial findings. Unchanged findings of  chronic small vessel ischemic disease.    LANI WAITE MD     Recent Results (from the past 24 hour(s))   XR Chest Port 1 View    Narrative    Exam:  XR CHEST PORT 1 VW, 1/4/2017 1:43 PM    History: acute dyspnea      Impression:    1. Mildly increased streaky bibasilar opacities greater on the left  which could represent infection or atelectasis.  2. Small bilateral pleural effusions, right greater than left, which  are increased since prior.          ASSESSMENT & PLAN :    Mary Wang is a 66 year old female with a history of severe COPD now s/p tracheostomy and chronic ventilator, anxiety, hypertension, achalasia s/p GJ tube placement who presented to the hospital with leukocytosis and altered mental status.  Of note she was just hospitalized from 12/1-12/5 with a UTI and then again from 12/25-12/28 with bacteruria.    # Leukocytosis, altered mental status - Per family and PT, patient more difficult to arouse over the past 3 days and now acutely confused and talking about family members who have been dead for years.  Patient gives positive answers to all ROS questions.  She has mild redness on her left shoulder which could represent cellulitis, atelectasis vs early pneumonia on CXR and diarrhea concerning for C diff (patient on multiple antibiotics recently and currently on Augmentin).  While an infection could be causing her lethargy and altered mental status, she is also on multiple psychotropic medications with recent up-titration.  -   C diff : neg.   - UA from ~1 month old bender catheter and not overly concerning for infection. Fu UC  - CXR reviewed. No clear PNA.  ED started treatment with Zosyn/Vanco for possible HCaP.  discontinued by admitting team.   - VBG does not show significantly worsened hypercarbia  - Medications reviewed: gabapentin.clonazepam dose reduced on adm.       1/3/2017:   Leucocytosis better.   Patient continues to report uncontrolled anxiety. Remains alert, at baseline per daughter at bedside.   Resumed her home lorazepam 0.5 mg Q 3 hour prn, increased clonazepam 1.5 qid.   Per Patient, daughter: would rather stay sleepy then suffer w anxiety.      1/4/2017  More ronchoros.  Dc IVF . IV lasix 20 mg x 2.     # Urinary retention - Ongoing issue with bender cath placed during last admission.  She has worked with urology and family and the patient have agreed to differ further work up and continue with an indwelling catheter  for the time being.  - Continue home bender catheter    #  Hyponatremia - Recurrent issue which resolved previously with IVF.  Suspect hyponatremic hypovolemia given diarrhea, BUN/Cr ratio.  - s/p 750 ccs NS in ED.  Na 139    # Severe COPD - S/p tracheostomy and now 24/7 ventilator dependent.  - Continue home nebs: BID pulmicort, ipatropium, levalbuterol  - Continue home prednisone 20 mg PO Qday, bactrim for prophylaxis  - Increase Prednisone to 40 mg daily. Plan 5 days then taper back to her home 20 mg daily.     - Continue dilaudid prn for air hunger, increase Q 2 hour prn per Palliative.   - RT for assistance with ventilator management  - pulmonary toilet     # Achalasia - S/p GJ tube placement.  - G tube to gravity drainage  - Continue home TF: Isosource 1.5 @ 85 ms/hr x12 hours from 8730-0495.  Free water 125 q4h during the day.    # Loose stools: C.diff neg. Nutrition consult to review tube feeding. Fiber. Probiotics. Imodium prn.     # Goals of Care: Palliative: consult appreciated. Code status changed to DNR.     Diet/IVF: Tube feeds. k mg protocol.   DVT ppx: Lovenox sQ  Disposition: pending medical stabilization.     Plan d/w [facility], CC & RN.   Updated daughter at bedside.         Geo Bui MD   Hospitalist (Internal Medicine)  Pager: [phone].

## 2017-01-04 NOTE — PLAN OF CARE
"Problem: Goal Outcome Summary  Goal: Goal Outcome Summary  B/P: 113/64, T: 98.3, P: 81, R: 24  Alert most of shift, disoriented to time and situation. Forgetful. Complaints that she \"cannot breathe\" continued, except when pt was given dose of PRN ativan and PRN Dilaudid. HR would then decrease from 100s to 80s and pt rested comfortably. Before PRN meds were again available, pt would wake and struggle with anxiety stating she cannot breathe. PRN imodium given and pt had 2 small incontinent BMs. Per order, TF off (at 2200, d/t start time of 1000) and IVF started. Daughter César requests to be called at any time during the night with questions or changes in pt condition.         "

## 2017-01-04 NOTE — PLAN OF CARE
Problem: Goal Outcome Summary  Goal: Goal Outcome Summary  PT^B- pt seen for eval and intervention. Pt very anxious and not wanting to do PT or get up. Pt tolerated ROM to UE and LE. Pt is tight in her mm. She can assist with ROM when not as anxious. Pt feeling SOB and wanting more meds even though she had meds on board. Pt should be able to return to her NH at discharge.

## 2017-01-04 NOTE — PROGRESS NOTES
[facility]  Patient is currently open to home care services with [facility].  The patient is currently receiving PT and OT services.  [facility]  and team have been notified of patient admission.  [facility] liaison will continue to follow patient during stay.  If appropriate provide orders to resume home care at time of discharge.    Leela Ortega RN  [facility] Liaison

## 2017-01-04 NOTE — PROGRESS NOTES
"Palliative Care Inpatient Clinical Social Work Assessment    Patient Information:  Mary is a 66 year old woman with a history of COPD currently in the hospital.    Relevant Symptoms/Concerns     Physical:  Her daughter César says that she was the one who asked the group home to send her to the hospital because Mary was not responding. She says that in the past this has been due to high CO2 levels or an infection. Since Mary has been here an infection has not been found so ninfa Lindsey wonders if this decline might just be a sign of her worsening COPD rather than something that is treatable. César thought that her mother would come to the hospital, be treated as she has in the past, recover some function and be able to discharge back to her group home. She is now wondering if this is realistic.   Psychological/Emotional/Existential:  Mary often turns to her daughter and mouths \"helps me.\" Her daughter is calm and reassuring. After brief reassurance Mary would typically fall back asleep. Her daughter has also noticed that Mary seems to sense when her daughter's focus or attention is elsewhere and often asks for her to reengage in being attentive to Mary's needs. Her daughter describes the helplessness that she perceives her mother to feel and concludes that by asking for things her mother is just trying to feel better.   Family/Social/Caregiver:   Family members have told ninfa Lindsey that they want to if/when they should come to say goodbye to Mary if she is dying.   Developmental:      Mental Health:  Ninfa Lindsey describes some dependent characteristics of Mary prior to being diagnosed with COPD and notes that her anxiety and desire for help has increased since her health has declined. Ninfa Lindsey also reports that Mary had extensive training in integrative therapies (relaxation, meditation, etc) while she was at [place] but does not seem to use these tools proactively. She " "explains that Mary would often find relaxation in smoking (which she has not done in many years) and going to the casino. She says that beach imagery or other relaxing images are often not as helpful for her mother. She also indicates that Mary has also been a very busy person who does not like to relax and she would tend to go to bed late and wake up early.   End of Life:  Daughter César wonders if her mother is dying. She also wonders about how to make decisions consistent with her mother's wishes if she is dying. Today we briefly began to discuss the role of hospice care and how they would likely be able to come to Mary's group home.   Cultural/Mormon/Spiritual:      Grief/Loss:  Edenilson Lindsey expresses grief and guilt regarding her mother's functional status. She does not like that her mother is alone at times but also visits when she can.   Concurrent Stressors:        Comments:       Strengths     Physical:     Psychological/Emotional/Existential:      Family/Social/Caregiver:  Mary's other daughter Sissy often visits and her son (who lives in [facility]) visits when he can. Her daughter Sissy has a learning disability and prefers that the medical team talk with her sister César about updates. Her daughter César is present today, lives two hours away and tries to visit at least once per week. Daughter César is very patient and caring toward Mary and often tries to help redirect her anxious thoughts.   Developmental:      Mental Health:  Mary has reported limited receptivity to learning behavioral interventions for relaxation in the past. She has received training in relaxation techniques when she was at [place].   End of Life:      Cultural/Mormon/Spiritual:  Mary turns to prayer as a source of comfort and asked her daughter César to pray with her several times. Edenilson Lindsey says that Mary has said she is praying \"to live.\"   Grief/Loss:         Comments:   "     Goals/Decision Making/Advance Care Planning   Preferences:  Edenilson Lindsey wants to follow her mothers wishes which have typically been to prolong her life but also feels that she might be suffering and wants her to be comfortable. She does hope that her mother can return to her group home and has found this a good setting for her mother's care.   Concerns:  Edenilson Lindsey is struggling with the decision to continue aggressive cares for her mother and switching goals to comfort focused care if her mother is dying. She begins to ask questions about hospice today. Her plan at this point is to get her mother set up with the Complex Care Team once her mother returns to the group home.   Documents:  Health care directive on file    Decision Making Issues:  Edenilson Lindsey is primary health care agent per a health care directive.     Comments:       Resource Needs     Discharge Planning:  Per Unit/Program  and/or Care Coordinator   Other:      Comments:  Briefly spoke with unit SW and Care Coordinator about home care or hospice follow up upon discharge.    Sources of Information   Patient:  Mary was minimally able to engage meaningfully today. She was often sleeping but at times would open her eyes.   Family:  Edenilson Lindsey   Staff:  Dayanna Goddard, [address] MSW & Maddie Santiago, Palliative CNS   Chart Review:  SW notes and prior palliative care notes   Other:       Intervention (Check all that apply)    X   Assessment of palliative specific issues      X   Introduction of Palliative clinical social work interventions        Adjustment to illness counseling        Advanced care planning        Attended/participated in care conference        Behavioral interventions for symptom management    X   Facilitation of processing of thoughts/feelings        Family communication facilitated    X   Grief counseling        Goals of care discussion/facilitation        Life legacy work    X   Life review  facilitation        Psychoeducation    X   Re-framing        Resource referral        Other:       Comments:  Mary was mostly in and out of sleeping and did not engage directly with me but rather relied on her daughter. Her daughter César was verbal, engaged and receptive.    Plan:  I will continue assessment/intervention as indicated.       [facility], [facility]  Palliative Care Clinical   Pager [phone]

## 2017-01-04 NOTE — PROGRESS NOTES
01/04/17 0916   Quick Adds   Type of Visit Initial PT Evaluation   Living Environment   Lives With facility resident   Living Arrangements group home   Home Accessibility no concerns   Number of Stairs to Enter Home 0   Number of Stairs Within Home 0   Stair Railings at Home none   Transportation Available van, wheelchair accessible   Living Environment Comment lives in group home, has home PT an OT   Self-Care   Dominant Hand right   Usual Activity Tolerance fair   Current Activity Tolerance poor   Regular Exercise yes   Activity/Exercise Type (PT and OT for mobility and exercise.)   Exercise Amount/Frequency 3-5 times/wk   Equipment Currently Used at Home commode;hospital bed;walker, rolling;wheelchair   Activity/Exercise/Self-Care Comment p has assist for all ADLS. Pt had been walking a short distance with ww in the past   Functional Level Prior   Ambulation 4-->completely dependent   Transferring 4-->completely dependent   Toileting 4-->completely dependent   Bathing 4-->completely dependent   Dressing 4-->completely dependent   Eating 4-->completely dependent   Communication 2-->difficulty speaking (not related to language barrier)   Swallowing 2-->difficulty swallowing liquids/foods   Cognition 1 - attention or memory deficits   Fall history within last six months no   Which of the above functional risks had a recent onset or change? none   General Information   Onset of Illness/Injury or Date of Surgery - Date 01/02/17   Referring Physician Jose David De La Cruz CNP   Patient/Family Goals Statement to get pain meds   Pertinent History of Current Problem (include personal factors and/or comorbidities that impact the POC) Mary Wang is a 66 year old female with a history of severe COPD now s/p tracheostomy and chronic ventilator, anxiety, hypertension, achalasia s/p GJ tube placement who presented to the hospital with leukocytosis and altered mental status.  Of note she was just hospitalized from 12/1-12/5  with a UTI and then again from 12/25-12/28 with bacteruria   Precautions/Limitations fall precautions;oxygen therapy device and L/min   Weight-Bearing Status - LUE weight-bearing as tolerated   Weight-Bearing Status - RUE weight-bearing as tolerated   Weight-Bearing Status - LLE weight-bearing as tolerated   Weight-Bearing Status - RLE weight-bearing as tolerated   Heart Disease Risk Factors Age;Gender;Medical history;High blood pressure;Lack of physical activity;Stress   General Observations pt lying in bed on her R side, knees flexed with sheet over her head on vent   Cognitive Status Examination   Orientation person;place   Level of Consciousness lethargic/somnolent;other (see comments)  (on vent)   Follows Commands and Answers Questions 50% of the time;able to follow single-step instructions   Personal Safety and Judgment impaired   Memory impaired   Cognitive Comment pt mouthing words with PT understanding some words. Pt focused on getting pain or anxiety meds   Pain Assessment   Patient Currently in Pain Yes, see Vital Sign flowsheet   Posture    Posture Forward head position;Protracted shoulders   Posture Comments pt tends to be in forward head, rounded shoulder position with hips and knees in flexed posture   Range of Motion (ROM)   ROM Comment shoulder flexion and abd and ER limited. Shoulder flexion to 90 degrees, ER limited to 40 degrees, hip flexion to 90 degrees, hip rotation limite in IR, hip abd limited bilaterally, ankle DF lacks 10 degrees from neutral. All shoulder, hip, knee, and ankle joints limited   Strength   Strength Comments Hand strength good, Pt able to move her shoulders thru partial ROM with assist., Elbow strength 3/5. Pt able to assist with hip and knee, and ankle ROM but did not take resistance with mm testing. Pt focused on wanting anxiety or pain meds   Bed Mobility   Bed Mobility Comments pt rolls with assist.   Transfer Skills   Transfer Comments not tested   Gait   Gait Comments  "not tested   Balance   Balance Comments not tested   Muscle Tone   Muscle Tone no deficits were identified   General Therapy Interventions   Planned Therapy Interventions balance training;bed mobility training;strengthening;stretching;transfer training   Clinical Impression   Criteria for Skilled Therapeutic Intervention yes, treatment indicated   PT Diagnosis impaired functional mobility   Influenced by the following impairments cognition,  vent dependent, anxiety, decreased ROM,   Functional limitations due to impairments ability to perform ADLS, to move independently in bed, to transfer, and to walk   Clinical Presentation Stable/Uncomplicated   Clinical Presentation Rationale pt is highly anxious and vent dependent and has been limited in her functional mobility by  her anxiety   Clinical Decision Making (Complexity) Low complexity   Therapy Frequency` 5 times/week   Predicted Duration of Therapy Intervention (days/wks) 1/11/17   Anticipated Discharge Disposition Home with Home Therapy;Other (see comments)  (group home as before)   Risk & Benefits of therapy have been explained Yes   Patient, Family & other staff in agreement with plan of care Yes   [facility] TM \"6 Clicks\"   2016, Trustees of [facility], under license to [employer].  All rights reserved.   6 Clicks Short Forms Basic Mobility Inpatient Short Form   [facility]  \"6 Clicks\" V.2 Basic Mobility Inpatient Short Form   1. Turning from your back to your side while in a flat bed without using bedrails? 3 - A Little   2. Moving from lying on your back to sitting on the side of a flat bed without using bedrails? 2 - A Lot   3. Moving to and from a bed to a chair (including a wheelchair)? 2 - A Lot   4. Standing up from a chair using your arms (e.g., wheelchair, or bedside chair)? 2 - A Lot   5. To walk in hospital room? 2 - A Lot   6. Climbing 3-5 steps with a railing? 1 - Total   Basic Mobility Raw Score (Score out of " 24.Lower scores equate to lower levels of function) 12   Total Evaluation Time   Total Evaluation Time (Minutes) 10

## 2017-01-04 NOTE — PROGRESS NOTES
[facility], [place]   Palliative Care Daily Progress Note          Recommendations, Patient/Family Counseling & Coordination       Air hunger/Anxiety   She reports not being able to breath frequently. Increasing doses of clonazepam has not been helpful in the past and she historically does not participate in her anxiety management. Hopeful that with improved symptom management with opioids and her participation in non-pharmacological management will be able to decrease dose of clonazepam. Patient reports being ok with being more sleeping in order to treat her symptoms. Discussed with primary team, attempting to treat for exacerbation of COPD to see if this improves her symptoms as well. If changes are not helpful, the current regimen would be challenging outside of the hospital setting without a clear hospice plan of care.  -Continue Hydromorphone liquid per Gtube 1 mg q2h PRN  -Encourage use of fan at bedside  -Continue PTA Clonazepam and ativan for now  -Continue PTA Seroquel, encourage use of PRN dose at bedtime  - Further visits by the palliative team including  for non-pharmacological support of anxiety management and  for spiritual support.    Goals of Care  Daughter continues to voice that hospice would not be something that Mary would chose. Daughter describes goal to keep her mother out of the hospital as much as possible and thinks her mother would like to continue her home care and get set up with the complex care clinic. Daughter describes that with these services at home she feels that she would be able to trouble shoot more at home and prevent hospitalizations. Daughter reports that Mary has seen family die that were having trouble breathing and she states that Mary is fearful of that.    Coping  Daughter is tearful and concerned that her mother is reaching the end of her life. She expresses uncertainty of what to look for that would well her  that she is getting worse. Discussed the difficulty in knowing how close she is to the end of her life given that she is on the ventilator. She worried about her mother dying alone.  - Palliative Care clinical  involvement appreciated    POLST - not addressed today, will need prior to discharge     Thank you for the opportunity to continue to participate in the care of this patient and family.  Please feel free to contact on-call palliative provider with any emergent needs.  We can be reached via team pager [phone] (answered 8-4:30 Monday-Friday); after-hours answering service ([phone]); Main Palliative Clinic - [facility] 1C: [phone].     LÁZARO Villarreal CNS  Palliative Care Consult Team  Pager: [phone]    35 minutes spent with patient, with >50% counseling and in care coordination.           Assessment      1) Diagnoses & symptoms:        Altered mental status with leukocytosis now improved  Increased lethargy, hypernatremia, short of breath  Advanced COPD  Ventilator dependent on trach  Anxiety   Achalasia s/p GJ tube placement     2)  Psychosocial/Spiritual Needs:   Ongoing:Will discuss case during palliative interdisciplinary team rounds and involve LICSW and  as needed.        Is new assessment/intervention required by palliative team?:  No         Interval History:   Ongoing complaints of shortness of breath           Review of Systems:   Palliative Symptom Review (0=no symptom/no concern, 1=mild, 2=moderate, 3=severe):      Pain: 0      Fatigue: 0      Nausea: 0      Constipation: 0      Diarrhea: 0      Depressive Symptoms: 0      Anxiety: 3      Drowsiness: 0      Poor Appetite: 0      Shortness of Breath: 3      Insomnia: 0            Medications:   I have reviewed this patient's medication profile and medications given in past 24 hours.    Medications of Note  Clonazepam 1.5 mg 4 times a day   Gabapentin 150 mg two times a day  Melatonin 3 mg at bedtime  Prednisone  20 mg daily  Seroquel 50 mg 3 times a day, 75 mg at bedtime  Sertraline 100 mg daily     mg q4h PRN- x1  Hydromorphone 1 mg q2h PRN- x5 yesterday, x3 today  Lorazepam 0.5 mg PO q3h PRN- x3 yesterday, x2 today  Ondansetron PRN- x0  Compazine PRN- x0  Seroquel 25 mg PRN at bedtime- x0            Physical Exam:   Vitals were reviewed  -  Temp: 98.3  F (36.8  C) Temp src: Oral BP: 147/87 mmHg   Heart Rate: 108 Resp: 20 SpO2: 99 % O2 Device: Mechanical Ventilator    Constitutional: drowsy, able to answer questions with mouthing words  Lungs: No increased work of breathing on ventilator, wheezing left greater than right to auscultation  Cardiovascular: Regular rate and rhythm  Abdomen: normal bowel sounds, soft, non-distended, non-tender  Neurologic: Awake, alert, able to answer questions appropriately with mouthing words  Neuropsychiatric: intermittently anxious             Data Reviewed:   ROUTINE ICU LABS (Last four results)  CMP  Recent Labs  Lab 01/03/17  0729 01/02/17  1338 01/02/17  1335 01/02/17  0044    130* 131* 136   POTASSIUM 3.3* 4.7 4.6  --    CHLORIDE 94  --  89*  --    CO2 40*  --  37*  --    ANIONGAP 5  --  4  --    GLC 97 146* 134*  --    BUN 11  --  16  --    CR 0.49*  --  0.47*  --    GFRESTIMATED >90Non  GFR Calc  --  >90Non  GFR Calc  --    GFRESTBLACK >90African American GFR Calc  --  >90African American GFR Calc  --    IZZY 8.5  --  8.6  --    MAG 1.8  --  1.8  --    PHOS  --   --  3.1  --    PROTTOTAL  --   --  6.8  --    ALBUMIN  --   --  2.7*  --    BILITOTAL  --   --  0.2  --    ALKPHOS  --   --  164*  --    AST  --   --  17  --    ALT  --   --  39  --      CBC  Recent Labs  Lab 01/03/17  0729 01/02/17  1338 01/02/17  1335   WBC 8.3  --  18.7*   RBC 3.09*  --  3.48*   HGB 9.1* 11.2* 10.5*   HCT 29.8*  --  33.7*   MCV 96  --  97   MCH 29.4  --  30.2   MCHC 30.5*  --  31.2*   RDW 15.1*  --  15.0     --  343     INR  Recent Labs  Lab  01/02/17  1335   INR 0.99     Arterial Blood GasNo lab results found in last 7 days.

## 2017-01-04 NOTE — PLAN OF CARE
Problem: Goal Outcome Summary  Goal: Goal Outcome Summary  Outcome: No Change  Pt frequently c/o can't breathe. LS diminished and crackles present in all chopra. Sat's upper 90's on 40% mechanical vent. RR 14. Overall appears unchanged. No new issues or concerns. .

## 2017-01-04 NOTE — PLAN OF CARE
Problem: Goal Outcome Summary  Goal: Goal Outcome Summary  OT-6B: OT Holding. Pt with limited participation in therapy (PT today). Per discussion with PT, will hold OT.

## 2017-01-05 NOTE — PROGRESS NOTES
SPIRITUAL HEALTH SERVICES  SPIRITUAL ASSESSMENT Progress Note (Palliative Focus)  [facility] ([place]) 6B    Attempted visit as part of palliative support. Patient busy visiting with doctor.    I plan to return tomorrow and will inform the palliative  of care provided.    Sarah Baker  Chaplain Resident  Pager [phone]

## 2017-01-05 NOTE — PROGRESS NOTES
"Internal Medicine                                                     Progress Note  Mary Wang MRN: [id number]  1950  Date of Admission:2017  Primary care provider: Radha Mcmillan  ___________________________________  INTERVAL HISTORY (24 Hrs)/SUBJECTIVE:     Interval events reviewed.   Seen with RN,   Patient continues to report dyspnea, \"can't breathe\" however appeared slightly more comfortable to me today.   Anxiety : stable.   No fever.   Secretions unchanged per RN.     *Vent settings essentially unchanged from baseline.     ROS: 4 point ROS neg other than the symptoms noted above in the interval history.    OBJECTIVE :   VITALS:    Temp:  [98.1  F (36.7  C)-99  F (37.2  C)] 98.5  F (36.9  C)  Heart Rate:  [] 97  Resp:  [14-24] 16  BP: (113-163)/(56-87) 113/56 mmHg  FiO2 (%):  [50 %] 50 %  SpO2:  [99 %-100 %] 100 %     Temp (24hrs), Av.4  F (36.9  C), Min:98.1  F (36.7  C), Max:98.8  F (37.1  C)      Wt Readings from Last 5 Encounters:   17 59 kg (130 lb 1.1 oz)   16 53.5 kg (117 lb 15.1 oz)   16 53.978 kg (119 lb)   16 56.836 kg (125 lb 4.8 oz)   16 54.023 kg (119 lb 1.6 oz)       Intake/Output Summary (Last 24 hours) at 17 1621  Last data filed at 17 1600   Gross per 24 hour   Intake   1900 ml   Output   2750 ml   Net   -850 ml       PHYSICAL EXAM:  General: alert, anxious. tachypnea. Labored breathing. On Ventilator.   HEENT: At/nc, anicteric.   Neck: Trach. Vented .  Respi/Chest:  diminished throughout. Poor effort. Mild wheeze, exp. bl  CVS/Heart: S1S2 regular, no m/r/g,  Gi/Abd: Soft, non tender, PEG tube+  MSK/Ext: Distal pulses 2+.     Neuro: Alert.     Medications: reviewed.       DIAGNOSTIC STUDIES:     LABS (Last four results)  CMP    Recent Labs  Lab 17  0655 17  0921 17  0729 17  1338 17  1335    138 139 130* 131*   POTASSIUM 4.0 4.3 3.3* 4.7 4.6   CHLORIDE 95 98 94  --  89*   CO2 35* 32 " 40*  --  37*   ANIONGAP 6 8 5  --  4   GLC 81 125* 97 146* 134*   BUN 19 15 11  --  16   CR 0.54 0.56 0.49*  --  0.47*   GFRESTIMATED >90Non  GFR Calc >90Non  GFR Calc >90Non  GFR Calc  --  >90Non  GFR Calc   GFRESTBLACK >90African American GFR Calc >90African American GFR Calc >90African American GFR Calc  --  >90African American GFR Calc   IZZY 8.8 8.3* 8.5  --  8.6   MAG  --  2.3 1.8  --  1.8   PHOS  --   --   --   --  3.1   PROTTOTAL  --   --   --   --  6.8   ALBUMIN  --   --   --   --  2.7*   BILITOTAL  --   --   --   --  0.2   ALKPHOS  --   --   --   --  164*   AST  --   --   --   --  17   ALT  --   --   --   --  39     CBC    Recent Labs  Lab 01/05/17  0655 01/04/17  0921 01/03/17  0729 01/02/17  1338 01/02/17  1335   WBC 10.6 13.4* 8.3  --  18.7*   RBC 3.04* 3.17* 3.09*  --  3.48*   HGB 8.9* 9.6* 9.1* 11.2* 10.5*   HCT 29.6* 29.9* 29.8*  --  33.7*   MCV 97 94 96  --  97   MCH 29.3 30.3 29.4  --  30.2   MCHC 30.1* 32.1 30.5*  --  31.2*   RDW 15.3* 15.4* 15.1*  --  15.0    218 270  --  343     INR    Recent Labs  Lab 01/02/17  1335   INR 0.99     Venous Blood Gas    Recent Labs  Lab 01/02/17  1338   PHV 7.41  7.45*   PCO2V 68*  60*   PO2V 57*  67*   HCO3V 44*  42*       CT Head w/o Contrast    Narrative    CT HEAD W/O CONTRAST 1/2/2017 5:02 PM    History: AMS       Impression    Impression: No acute intracranial findings. Unchanged findings of  chronic small vessel ischemic disease.    LANI WAITE MD        XR Chest Port 1 View    Narrative    Exam:  XR CHEST PORT 1 VW, 1/4/2017 1:43 PM    History: acute dyspnea      Impression:    1. Mildly increased streaky bibasilar opacities greater on the left  which could represent infection or atelectasis.  2. Small bilateral pleural effusions, right greater than left, which  are increased since prior.         ASSESSMENT & PLAN :    Mary Wang is a 66 year old female with a history of  severe COPD now s/p tracheostomy and chronic ventilator, anxiety, hypertension, achalasia s/p GJ tube placement who presented to the hospital with leukocytosis and altered mental status.  Of note she was just hospitalized from 12/1-12/5 with a UTI and then again from 12/25-12/28 with bacteruria.    # Leukocytosis, altered mental status - better    Per family and PT, patient more difficult to arouse over the past 3 days and now acutely confused and talking about family members who have been dead for years.  Patient gives positive answers to all ROS questions.  She has mild redness on her left shoulder which could represent cellulitis, atelectasis vs early pneumonia on CXR and diarrhea concerning for C diff (patient on multiple antibiotics recently and currently on Augmentin).  While an infection could be causing her lethargy and altered mental status, she is also on multiple psychotropic medications with recent up-titration.  -  C diff : neg.   - UA from ~1 month old bender catheter and not overly concerning for infection. Fu UC  - CXR reviewed. No clear PNA.  ED started treatment with Zosyn/Vanco for possible HCaP.  discontinued by admitting team.   - VBG does not show significantly worsened hypercarbia  - Medications reviewed: gabapentin, benzos dose reduced on adm.       1/5/2017  Leucocytosis better.   Continue her home dose of anxiety meds: lorazepam 0.5 mg Q 3 hour prn, clonazepam 1.5 qid.  *Per Patient, daughter: would rather stay sleepy then suffer w anxiety.         # Urinary retention - Ongoing issue with bender cath placed during last admission.  She has worked with urology and family and the patient have agreed to differ further work up and continue with an indwelling catheter for the time being.  - Continue home bender catheter  * change bender prior to discharge.     #  Hyponatremia - Recurrent issue which resolved previously with IVF.  Suspect hyponatremic hypovolemia given diarrhea, BUN/Cr  ratio.  Resolved w IVF    # Severe COPD - S/p tracheostomy and now 24/7 ventilator dependent.  - Continue home nebs: BID pulmicort, ipatropium, levalbuterol  - Continue home prednisone 20 mg PO Qday, bactrim for prophylaxis  - 01/04 Increase Prednisone to 40 mg daily. Plan 5 days then taper back to her home 20 mg daily.     - Trial of diuretics IV lasix 20 mg iv x 2 01/04; 20 mg iv x1 01/05  - Continue dilaudid prn for air hunger, increase Q 2 hour prn per Palliative.   - RT for assistance with ventilator management  - pulmonary toilet prn    # Achalasia - S/p GJ tube placement.  - G tube to gravity drainage  - Continue home TF: Isosource 1.5 @ 85 ms/hr x12 hours from [id number].  Free water 125 q4h during the day.    # Loose stools: C.diff neg. Nutrition consult to review tube feeding. Fiber. Probiotics. Imodium prn.     # Goals of Care: Palliative: consult appreciated. Code status changed to DNR.     Diet/IVF: Tube feeds. k mg protocol.   DVT ppx: Lovenox sQ  Disposition: pending medical stabilization.     Plan d/w SANCHO, CC & RN.   D/w Palliative teeam.   Updated daughter on phone        Geo Bui MD   Hospitalist (Internal Medicine)  Pager: [phone].

## 2017-01-05 NOTE — PROGRESS NOTES
[facility], [place]   Palliative Care Daily Progress Note          Recommendations, Patient/Family Counseling & Coordination     Recommendations  - Needs to have follow up with complex care clinic when leaves and ensure that accepting provider is in agreement with current regimen to treat the patients anxiety and air hunger.    Discussion  Met with patient today, discussed with challenging setting in which the treatments she is receiving extend her life and treatment of air hunger. When discussing the plan to go home and be seen by the complex clinic she is not sure about this plan. Also talked about a plan that would focus only on her comfort. She seems to be processing this information today and would like to talk more with her daughter. She does not think that she should leave the hospital yet but asking her about her breathing and air hunger and seems to think that it is not bad and thinks it has not gotten worse.   Per her daughter, patient had an experience with watching someone die that was having difficulty breathing. I discussed this with her today and she denies experiencing anything like this.  After discussion with primary team, this is her normal and she will likely not want to go home but she is likely at her baseline as she reports her shortness of breath is no worse.    Air hunger/Anxiety   She reports not being able to breath frequently. Increasing doses of clonazepam has not been helpful in the past and she historically does not participate in her anxiety management. Hopeful that with improved symptom management with opioids and her participation in non-pharmacological management will be able to decrease dose of clonazepam. Patient reports being ok with being more sleeping in order to treat her symptoms. Discussed with primary team, attempting to treat for exacerbation of COPD to see if this improves her symptoms as well. If changes are not helpful, the current regimen  would be challenging outside of the hospital setting without a clear hospice plan of care.  -Continue Hydromorphone liquid per Gtube 1 mg q2h PRN  -Encourage use of fan at bedside  -Continue PTA Clonazepam and ativan for now  -Continue PTA Seroquel, encourage use of PRN dose at bedtime  - Further visits by the palliative team including  for non-pharmacological support of anxiety management and  for spiritual support.      POLST - will need prior to discharge     Thank you for the opportunity to continue to participate in the care of this patient and family.  Please feel free to contact on-call palliative provider with any emergent needs.  We can be reached via team pager [phone] (answered 8-4:30 Monday-Friday); after-hours answering service ([phone]); Main Palliative Clinic - [facility] 1C: [phone].     LÁZARO Villarreal CNS  Palliative Care Consult Team  Pager: [phone]    35 minutes spent with patient, with >50% counseling and in care coordination.           Assessment      1) Diagnoses & symptoms:        Altered mental status with leukocytosis now improved  Increased lethargy, hypernatremia, shortness of breath  Advanced COPD  Ventilator dependent on trach  Anxiety   Achalasia s/p GJ tube placement      2)  Psychosocial/Spiritual Needs:   Ongoing:Will discuss case during palliative interdisciplinary team rounds and involve LICSW and  as needed.        Is new assessment/intervention required by palliative team?:  No         Interval History:   Triggered sepsis, xray showed mild increase opacities suggesting infection or atelectasis, bilateral pleural effusions, received 40 mg of lasix yesterday and 40 mg of prednisone.            Review of Systems:   Palliative Symptom Review (0=no symptom/no concern, 1=mild, 2=moderate, 3=severe):      Pain: 1      Fatigue: 0      Nausea: 0      Constipation: 0      Diarrhea: 0      Depressive Symptoms: 0      Anxiety: 2       Drowsiness: 0      Poor Appetite: 0      Shortness of Breath: 2      Insomnia: 0             Medications:   I have reviewed this patient's medication profile and medications given in past 24 hours.    Medications of Note  Clonazepam 1.5 mg 4 times a day    Gabapentin 150 mg two times a day  Melatonin 3 mg at bedtime  Prednisone 20 mg daily  Seroquel 50 mg 3 times a day, 75 mg at bedtime  Sertraline 100 mg daily     mg q4h PRN- x1  Hydromorphone 1 mg q2h PRN- x8 (8 mg yesterday)  Lorazepam 0.5 mg PO q3h PRN- x6 (3 mg yesterday)  Ondansetron PRN- x0  Compazine PRN- x0  Seroquel 25 mg PRN at bedtime- x0             Physical Exam:   Vitals were reviewed  Temp: 98.5  F (36.9  C) Temp src: Oral BP: 113/56 mmHg   Heart Rate: 97 Resp: 16 SpO2: 100 % O2 Device: Mechanical Ventilator    Constitutional: Awake, alert, cooperative, no apparent distress.  Lungs: No increased work of breathing, clear to auscultation bilaterally  Cardiovascular: Regular rate and rhythm  Abdomen: hypoactive bowel sounds, soft, non-distended, non-tender  Neurologic: Awake, alert, answers questions appropriately.  Neuropsychiatric: calm             Data Reviewed:   ROUTINE ICU LABS (Last four results)  CMP  Recent Labs  Lab 01/05/17  0655 01/04/17  0921 01/03/17  0729 01/02/17  1338 01/02/17  1335    138 139 130* 131*   POTASSIUM 4.0 4.3 3.3* 4.7 4.6   CHLORIDE 95 98 94  --  89*   CO2 35* 32 40*  --  37*   ANIONGAP 6 8 5  --  4   GLC 81 125* 97 146* 134*   BUN 19 15 11  --  16   CR 0.54 0.56 0.49*  --  0.47*   GFRESTIMATED >90Non  GFR Calc >90Non  GFR Calc >90Non  GFR Calc  --  >90Non  GFR Calc   GFRESTBLACK >90African American GFR Calc >90African American GFR Calc >90African American GFR Calc  --  >90African American GFR Calc   IZZY 8.8 8.3* 8.5  --  8.6   MAG  --  2.3 1.8  --  1.8   PHOS  --   --   --   --  3.1   PROTTOTAL  --   --   --   --  6.8   ALBUMIN  --   --   --   --   2.7*   BILITOTAL  --   --   --   --  0.2   ALKPHOS  --   --   --   --  164*   AST  --   --   --   --  17   ALT  --   --   --   --  39     CBC  Recent Labs  Lab 01/05/17  0655 01/04/17  0921 01/03/17  0729 01/02/17  1338 01/02/17  1335   WBC 10.6 13.4* 8.3  --  18.7*   RBC 3.04* 3.17* 3.09*  --  3.48*   HGB 8.9* 9.6* 9.1* 11.2* 10.5*   HCT 29.6* 29.9* 29.8*  --  33.7*   MCV 97 94 96  --  97   MCH 29.3 30.3 29.4  --  30.2   MCHC 30.1* 32.1 30.5*  --  31.2*   RDW 15.3* 15.4* 15.1*  --  15.0    218 270  --  343     INR  Recent Labs  Lab 01/02/17  1335   INR 0.99     Arterial Blood GasNo lab results found in last 7 days.

## 2017-01-05 NOTE — PLAN OF CARE
Problem: Goal Outcome Summary  Goal: Goal Outcome Summary  Outcome: No Change  Pt disoriented to time. BPs fluctuating with anxiety. HR 80s when resting comfortably, STach when awake. 50% FiO2 via mech vent to [place] 6. C/o not being able to breathe despite sats being 100% and scant secretions, also c/o generalized pain. PRN Dilaudid and Ativan given with little improvement in anxiety. Pt repeatedly pushing call light until evening medications given. Resting more comfortably after evening meds. Repo Q2H. Suction frequently per pt request with minimal secretions. Family at bedside for first part of shift, involved with care. Cycled TF stopped at 2000. Continue with POC and notify MD of any changes.

## 2017-01-05 NOTE — PLAN OF CARE
Problem: Goal Outcome Summary  Goal: Goal Outcome Summary  PT 6B: CANCEL; pt declining session despite max encouragement. Will reschedule.

## 2017-01-06 NOTE — DISCHARGE SUMMARY
Discharge Summary      Mary Wang MRN# [id number]   YOB: 1950 Age: 66 year old     Date of Admission:  1/2/2017  Date of Discharge:  1/6/2017  Admitting Physician:  Edi Blackburn MD  Discharge Physician:  Geo Bui MD   Discharging Service:  Internal Medicine     Primary Provider: Radha Mcmillan             Discharge Diagnosis:     Acute encephalopathy:suspect related to medications.    Copd exacerbation         Procedures:   No procedures performed during this admission           Consultations:   Consultation during this admission received from : Palliative Medicine.              Brief History of Illness:     See H and P dated 1/2/2017  Briefly,   Mary Wang is a 66 year old female with a history of severe COPD now s/p tracheostomy and chronic ventilator, anxiety, hypertension, achalasia s/p GJ tube placement who presented to the hospital with leukocytosis and altered mental status.  Of note she was just hospitalized from 12/1-12/5 with a UTI and then again from 12/25-12/28 with bacteruria.          Hospital Course:   Issue wise:     # Leukocytosis, altered mental status - better    ON adm, Reportedly patient more difficult to arouse over the past 3 days and now acutely confused and talking about family members who have been dead for years. However since admission she has been alert and interactive most times. At times she is bit sleepy after her anxiety medications but easily arousable.   Infectious work up unrevealing.   - CT Head Neg.   -  C diff : neg.  BC: NGTD. Respi virus panel. Influenza panel: Neg.   - UA  bender catheter not overly concerning for infection.   - CXR reviewed. No clear PNA.  ED started treatment with Zosyn/Vanco for possible HCaP.  discontinued by admitting team.    - VBG does not show significantly worsened hypercarbia    Suspect mostly related to medications including benzos, gabapentin. Initially dose reduced but quickly resumed back to her  home dose as requested by the patient and daughter POA.   Leucocytosis also resolved. Suspect acute stress reaction.     *Per Patient, daughter: would rather stay sleepy then suffer w anxiety and air hunger.        # Severe COPD - S/p tracheostomy and now 24/7 ventilator dependent.  Suspect COPD exacerbation plus mild pulmonary edema (likely related to vol overload ivf)    - Appears more comfortable after treated with iv lasix for 2 days and increased prednisone 40 mg daily.   - Will continue Prednisone 40 mg daily x 5 days and then resume her home 20 mg daily.     - Continue home nebs: BID pulmicort, ipatropium, levalbuterol  - Continue dilaudid prn for air hunger, increased Q 2 hour prn per Palliative.    - RT assisted with ventilator management. Settings unchanged.   - pulmonary toilet as needed. Mostly whitish secretions.     # Urinary retention - Ongoing issue with bender cath placed during last admission.    She has worked with urology in last admission. Family and the patient have agreed to differ further work up and continue with an indwelling catheter for the time being.  - Continue home bender catheter, changed 01/05/17.   * Outpatient Urology FU.     #  Hyponatremia - Recurrent issue which resolved previously with IVF.    Suspect hyponatremic hypovolemia given diarrhea, BUN/Cr ratio.    # Achalasia - S/p GJ tube placement.  - G tube to gravity drainage  - Continue home TF: Isosource 1.5 @ 85 ms/hr x12 hours from 7825-5720.  Free water 125 q4h during the day.    # Loose stools: C.diff neg. Nutrition consulted. Added fibre. Probiotics. Imodium prn.     # Goals of Care: Palliative: consult appreciated. Code status changed to DNR.      1/6/2017  Per Palliative team:   She is planning on discharging to day and will follow up with complex care clinic which she is arranged to see on Tuesday.  -Continue Hydromorphone liquid per Gtube 1 mg q2h PRN  -Encourage use of fan at bedside  -Continue PTA Clonazepam and  "ativan for now  -Agree with increase gabapentin back to PTA dosing  -D/C PRN bedtime Seroquel  -Start Seroquel 25 mg TID PRN for anxiety  -Increase zoloft to 150 mg daily  -If these changes do not improve symptoms, would consider  starting buspar    POLST - done today    Patient to fu with Complex care Clinic.   Daughter aware of the plan.     Diet/IVF: Tube feeds. k mg protocol.   DVT ppx: Lovenox sQ         Discharge Day Exam:         No significant events overnight.   Appears more comfortable breathing wise  Still complaints significant anxiety, \" can't breathe\"   Low back pain.   Afebrile.     Blood pressure 106/60, temperature 98.6  F (37  C), temperature source Oral, resp. rate 12, height 1.626 m (5' 4\"), weight 59 kg (130 lb 1.1 oz), SpO2 98 %, not currently breastfeeding.    Wt Readings from Last 5 Encounters:   17 59 kg (130 lb 1.1 oz)   16 53.5 kg (117 lb 15.1 oz)   16 53.978 kg (119 lb)   16 56.836 kg (125 lb 4.8 oz)   16 54.023 kg (119 lb 1.6 oz)       Temp (24hrs), Av.5  F (36.9  C), Min:98  F (36.7  C), Max:98.7  F (37.1  C)    PHYSICAL EXAM:  General: alert, anxious. tachypnea. More calm- On Ventilator.    HEENT: At/nc, anicteric.    Neck: Trach. Vented .  Respi/Chest:  diminished throughout. Poor effort. Minimal wheeze, exp. bl  CVS/Heart: S1S2 regular, no m/r/g,  Gi/Abd: Soft, non tender, PEG tube+  MSK/Ext: Distal pulses 2+. both UE edema+. (US Neg for DVT)     Neuro: Alert. Interactive. Unchanged.          Data:  All laboratory data reviewed          Significant Results:     LABS (Last four results)  CMP    Recent Labs  Lab 17  0655 17  0921 17  0729 17  1338 17  1335    138 139 130* 131*   POTASSIUM 4.0 4.3 3.3* 4.7 4.6   CHLORIDE 95 98 94  --  89*   CO2 35* 32 40*  --  37*   ANIONGAP 6 8 5  --  4   GLC 81 125* 97 146* 134*   BUN 19 15 11  --  16   CR 0.54 0.56 0.49*  --  0.47*   GFRESTIMATED >90Non  GFR Calc " >90Non  GFR Calc >90Non  GFR Calc  --  >90Non  GFR Calc   GFRESTBLACK >90African American GFR Calc >90African American GFR Calc >90African American GFR Calc  --  >90African American GFR Calc   IZZY 8.8 8.3* 8.5  --  8.6   MAG  --  2.3 1.8  --  1.8   PHOS  --   --   --   --  3.1   PROTTOTAL  --   --   --   --  6.8   ALBUMIN  --   --   --   --  2.7*   BILITOTAL  --   --   --   --  0.2   ALKPHOS  --   --   --   --  164*   AST  --   --   --   --  17   ALT  --   --   --   --  39     CBC    Recent Labs  Lab 01/05/17  0655 01/04/17  0921 01/03/17  0729 01/02/17  1338 01/02/17  1335   WBC 10.6 13.4* 8.3  --  18.7*   RBC 3.04* 3.17* 3.09*  --  3.48*   HGB 8.9* 9.6* 9.1* 11.2* 10.5*   HCT 29.6* 29.9* 29.8*  --  33.7*   MCV 97 94 96  --  97   MCH 29.3 30.3 29.4  --  30.2   MCHC 30.1* 32.1 30.5*  --  31.2*   RDW 15.3* 15.4* 15.1*  --  15.0    218 270  --  343     INR    Recent Labs  Lab 01/02/17  1335   INR 0.99     Arterial Blood GasNo lab results found in last 7 days.     Results for orders placed or performed during the hospital encounter of 01/02/17   CT Head w/o Contrast    Narrative    CT HEAD W/O CONTRAST 1/2/2017 5:02 PM    History: AMS    Comparison: 9/10/2016.    Findings:    No intracranial hemorrhage, mass effect, or midline shift. Mild  diffuse cerebral and cerebellar volume loss. Moderate hypodensity  within the white matter likely representing chronic small vessel  ischemic disease, unchanged. The ventricles are proportionate to the  cerebral sulci. The gray to white matter differentiation of the  cerebral hemispheres is preserved. The basal cisterns are patent.    The visualized paranasal sinuses are clear. The mastoid air cells are  clear. Bilateral pseudophakia.       Impression    Impression: No acute intracranial findings. Unchanged findings of  chronic small vessel ischemic disease.    I have personally reviewed the examination and initial  interpretation  and I agree with the findings.    LANI WAITE MD   Arm, left, venous US    Narrative    EXAMINATION: DOPPLER VENOUS ULTRASOUND OF THE LEFT UPPER EXTREMITY,  1/2/2017 4:11 PM     COMPARISON: No similar prior available.    HISTORY: Left upper extremity swelling and concern for forearm  cellulitis.        Impression    IMPRESSION:  1.  No evidence of left upper extremity deep venous thrombosis.  2.  Subcutaneous edema without drainable fluid collection in the area  of left upper forearm redness which may represent cellulitis.    MILLY HINDS MD   XR Chest Port 1 View    Narrative    XR CHEST PORT 1 VW 1/2/2017 5:53 PM    Comparison: 12/26/2016    History: AMS, leukocytosis    Findings: Single AP view of the chest. Cardiac silhouette is within  normal limits. Tracheostomy tube in stable position. Streaky left  costophrenic opacities. Stable diffuse granulomatosis. Biapical  scarring. Visualized abdomen is unremarkable.      Impression    Impression: Streaky left costophrenic angle opacities favored to  represent atelectasis.    I have personally reviewed the examination and initial interpretation  and I agree with the findings.    LUCIA TENA MD   XR Chest Port 1 View    Narrative    Exam:  XR CHEST PORT 1 VW, 1/4/2017 1:43 PM    History: acute dyspnea    Comparison:  Chest radiograph 1/2/2017    The visualized upper abdomen is unremarkable. No acute osseous  abnormality.      Impression    Impression:    1. Mildly increased streaky bibasilar opacities greater on the left  which could represent infection or atelectasis.  2. Small bilateral pleural effusions, right greater than left, which  are increased since prior.    I have personally reviewed the examination and initial interpretation  and I agree with the findings.    SHELL LUJAN MD               Pending Results:     Unresulted Labs Ordered in the Past 30 Days of this Admission     Date and Time Order Name Status Description     "1/2/2017 1509 Blood culture Preliminary     1/2/2017 1509 Blood culture Preliminary                Condition on Discharge:   Discharge condition: Stable   Discharge vitals: Blood pressure 106/60, temperature 98.6  F (37  C), temperature source Oral, resp. rate 12, height 1.626 m (5' 4\"), weight 59 kg (130 lb 1.1 oz), SpO2 98 %, not currently breastfeeding.   Code status on discharge: DNR            Discharge Medications:     Current Discharge Medication List      START taking these medications    Details   LORazepam (ATIVAN) 2 MG/ML (HIGH CONC) solution Take 0.25 mLs (0.5 mg) by mouth every 3 hours as needed for anxiety  Qty: 15 mL, Refills: 0    Associated Diagnoses: COPD exacerbation (H); Anxiety      lactobacillus rhamnosus, GG, (CULTURELL) capsule Take 1 capsule by mouth 2 times daily  Qty: 60 capsule, Refills: 0    Associated Diagnoses: Diarrhea, unspecified type      fiber modular (NUTRISOURCE FIBER) packet 1 packet by Per J Tube route 3 times daily  Qty: 42 packet, Refills: 0    Associated Diagnoses: Diarrhea, unspecified type         CONTINUE these medications which have CHANGED    Details   Cranberry 500 MG CAPS Take 1 capsule (500 mg) by mouth daily  Qty: 90 capsule      HYDROmorphone (DILAUDID) 1 MG/ML LIQD liquid Take 1 mL (1 mg) by mouth every 2 hours as needed for moderate to severe pain (air hunger)  Qty: 50 mL, Refills: 0    Associated Diagnoses: COPD exacerbation (H)      clonazePAM (KLONOPIN) 0.1 mg/mL Take 15 mLs (1.5 mg) by mouth 4 times daily Needs appt for refills  Qty: 420 mL, Refills: 0    Associated Diagnoses: Shortness of breath      !! fexofenadine (ALLEGRA) 60 MG tablet Take 3 tablets (180 mg) by mouth daily  Qty: 30 tablet, Refills: 0    Associated Diagnoses: Shortness of breath      !! predniSONE 5 MG/5ML solution Take 20 mLs (20 mg) by mouth daily  Qty: 500 mL, Refills: 0    Comments: Start after done with 40 mg daily x 3 more days  Associated Diagnoses: Shortness of breath      !! " "predniSONE 5 MG/5ML solution Take 40 mLs (40 mg) by mouth daily for 3 days  Qty: 120 mL, Refills: 0    Associated Diagnoses: COPD exacerbation (H)      polyethylene glycol (MIRALAX/GLYCOLAX) Packet 17 g by Per J Tube route 2 times daily as needed for constipation Hold for loose stools  Qty: 100 packet, Refills: 0    Comments: Hold for loose stools  Associated Diagnoses: Constipation, unspecified constipation type      sertraline (ZOLOFT) 20 MG/ML (HIGH CONC) solution 7.5 mLs (150 mg) by Per Feeding Tube route daily  Qty: 105 mL, Refills: 0    Associated Diagnoses: Anxiety      !! fexofenadine (ALLEGRA) 180 MG tablet Take 1 tablet (180 mg) by mouth daily  Qty: 30 tablet, Refills: 0    Associated Diagnoses: COPD exacerbation (H)      !! QUEtiapine (SEROQUEL) 25 MG tablet Take 1 tablet (25 mg) by mouth 3 times daily as needed (anxiety, sleep)  Qty: 30 tablet, Refills: 0    Associated Diagnoses: Anxiety; Insomnia due to medical condition; Air hunger      senna-docusate (SENOKOT-S;PERICOLACE) 8.6-50 MG per tablet 2 tablets by Per J Tube route 2 times daily as needed for constipation  Qty: 100 tablet, Refills: 0    Associated Diagnoses: Constipation, unspecified constipation type       !! - Potential duplicate medications found. Please discuss with provider.      CONTINUE these medications which have NOT CHANGED    Details   omeprazole (PRILOSEC) 2 mg/mL 10 mLs (20 mg) by Per Feeding Tube route 2 times daily  Qty: 600 mL, Refills: 3    Associated Diagnoses: Achalasia      Nutritional Supplements (JEVITY 1.5 IZZY) LIQD Take 5 Cans by mouth daily Per tube feeding  Qty: 150 Can, Refills: 11    Comments: Height 5'2\"  Weight 120 lbs  Length of need 99 months  Associated Diagnoses: Achalasia      loperamide (IMODIUM A-D) 2 MG tablet 2-2 mg tablets per J-tube qid prn frequent and/or loose stools. Do not use more than 8 tabs per day.  Qty: 30 tablet, Refills: 0    Associated Diagnoses: Frequent stools      melatonin (MELATONIN) 1 " MG/ML LIQD liquid 3 mLs (3 mg) by Per J Tube route At Bedtime  Qty: 300 mL, Refills: 0    Associated Diagnoses: Anxiety; Insomnia due to medical condition      acetaminophen (TYLENOL) 160 MG/5ML oral liquid 20.3 mLs (650 mg) by Per Feeding Tube route every 4 hours as needed for mild pain  Qty: 300 mL, Refills: 0    Associated Diagnoses: Other chronic pain      calcium carbonate (TUMS) 500 MG chewable tablet 1 tablet (500 mg) by Per G Tube route every 2 hours as needed for heartburn  Qty: 150 tablet, Refills: 0    Associated Diagnoses: Gastroesophageal reflux disease, esophagitis presence not specified      budesonide (PULMICORT) 0.5 MG/2ML nebulizer solution Take 2 mLs (0.5 mg) by nebulization 2 times daily  Qty: 60 ampule, Refills: 0    Associated Diagnoses: Shortness of breath; Respiratory difficulty      ipratropium (ATROVENT) 0.02 % nebulizer solution Take 2.5 mLs (0.5 mg) by nebulization every 4 hours  Qty: 450 mL, Refills: 0    Associated Diagnoses: Shortness of breath      levalbuterol (XOPENEX) 1.25 MG/3ML nebulizer solution Take 3 mLs (1.25 mg) by nebulization every 4 hours  Qty: 540 mL, Refills: 0    Associated Diagnoses: Chronic obstructive pulmonary disease with acute exacerbation (H)      gabapentin (NEURONTIN) 250 MG/5ML solution 6 mLs (300 mg) by Per J Tube route 2 times daily  Qty: 450 mL, Refills: 0    Associated Diagnoses: Anxiety      atorvastatin (LIPITOR) 20 MG tablet Take 1 tablet (20 mg) by mouth daily  Qty: 30 tablet, Refills: 0    Associated Diagnoses: NSTEMI (non-ST elevated myocardial infarction) (H)      !! QUEtiapine (SEROQUEL) 50 MG tablet Take 1 tablet (50 mg) by mouth 3 times daily  Qty: 120 tablet, Refills: 0    Associated Diagnoses: Air hunger; Anxiety; Insomnia due to medical condition      !! QUEtiapine (SEROQUEL) 25 MG tablet Take 3 tablets (75 mg) by mouth At Bedtime  Qty: 120 tablet, Refills: 0    Associated Diagnoses: Anxiety; Insomnia due to medical condition; Air hunger       guaiFENesin (ORGANIDIN) 200 MG TABS Take 1 tablet (200 mg) by mouth 4 times daily  Qty: 115 tablet, Refills: 0    Associated Diagnoses: Shortness of breath      fluticasone (FLONASE) 50 MCG/ACT nasal spray Spray 2 sprays into both nostrils daily  Qty: 1 Bottle, Refills: 0    Associated Diagnoses: Acute and chronic respiratory failure with hypercapnia (H)      sodium chloride (OCEAN) 0.65 % nasal spray Spray 1 spray into both nostrils daily as needed for congestion  Qty: 1 Bottle, Refills: 0    Associated Diagnoses: Chronic sinusitis, unspecified location      sulfamethoxazole-trimethoprim (BACTRIM,SEPTRA) 200-40 mg/5ml suspension 20 mLs (160 mg) by Per J Tube route daily Dose based on TMP component. 20 mLs = 160 mg  Qty: 560 mL, Refills: 0    Associated Diagnoses: Chronic obstructive pulmonary disease with acute exacerbation (H)      simethicone (MYLANTA GAS) 125 MG CHEW 1 tablet (125 mg) by Per Feeding Tube route 4 times daily  Qty: 120 tablet, Refills: 0    Associated Diagnoses: Achalasia      Pediatric Multivitamins-Iron (MULTIPLE VITAMINS-IRON) 15 MG CHEW 1 chew tab by Per J Tube route daily Contains ferrous gluconate, 15 mL = 9 mg iron  Qty: 30 tablet, Refills: 0    Associated Diagnoses: Dietary supplement-induced neuropathy (H)      lidocaine 15 mL, alum & mag hydroxide-simethicone 15 mL GI Cocktail Take 30 mLs by mouth 3 times daily as needed for moderate pain  Qty: 500 mL, Refills: 0    Associated Diagnoses: Gastroesophageal reflux disease without esophagitis      aspirin 81 MG chewable tablet 1 tablet (81 mg) by Per Feeding Tube route daily  Qty: 36 tablet, Refills: 0    Associated Diagnoses: NSTEMI (non-ST elevated myocardial infarction) (H)      polyethylene glycol 0.4%- propylene glycol 0.3% (SYSTANE ULTRA) 0.4-0.3 % SOLN ophthalmic solution Place 1-2 drops into both eyes 4 times daily 0900, 1300, 1700, 2100  Qty: 1 Bottle, Refills: 0    Associated Diagnoses: Dry eyes, bilateral       !! - Potential  duplicate medications found. Please discuss with provider.      STOP taking these medications       Cranberry 125 MG TABS Comments:   Reason for Stopping:                    Discharge Disposition:   Discharged to Group Home.            Discharge Instructions:     Discharge Procedure Orders  AntiEmbolism Stockings   Order Comments: Bilateral below knee length.On in the morning, off at night     Home care nursing referral   Referral Type: Home Health Therapies & Aides     Home Care PT Referral for Hospital Discharge   Referral Type: Home Health Therapies & Aides     Home Care OT Referral for Hospital Discharge     Reason for your hospital stay   Order Comments: Loose stools. Acute on chronic dyspnea suspect COPD exacerbation.     Activity   Order Comments: Your activity upon discharge: activity as tolerated. Turn Q 2 hours.   Order Specific Question Answer Comments   Is discharge order? Yes      Bladder scan   Order Comments: X 2 for post void residual     Glucose monitor nursing POCT   Order Comments: Before meals and at bedtime     Adult [facility] Follow-up and recommended labs and tests   Order Comments: Follow up with primary care provider, Radha Mcmillan, within 7 days for hospital follow- up.  The following labs/tests are recommended: cbc, bmp, mg.      Follow-up with Urology outpatient for urodynamics (for urinary retention) +/ cystoscopy (hx of hematuria)         Appointments on [place] and/or [facility] (with [facility] provider or service). Call [phone] if you haven't heard regarding these appointments within 7 days of discharge.     Intake and output   Order Comments: Every shift      Avendaño catheter   To straight gravity drainage. Change catheter every 2 weeks and PRN for leaking or decreased uring output with signs of bladder distention. DO NOT change catheter without a specific MD order IF diagnosis of benign prostatic hypertrophy (BPH), neurogenic bladder, or other urological  conditions      MD face to face encounter   Order Comments: Documentation of Face to Face and Certification for Home Health Services    I certify that patient: Mary Wang is under my care and that I, or a nurse practitioner or physician's assistant working with me, had a face-to-face encounter that meets the physician face-to-face encounter requirements with this patient on: 1/6/2017.    This encounter with the patient was in whole, or in part, for the following medical condition, which is the primary reason for home health care: respiratory failure.    I certify that, based on my findings, the following services are medically necessary home health services: Nursing.    My clinical findings support the need for the above services because: Nurse is needed: To assess respiratory status after changes in medications or other medical regimen..    Further, I certify that my clinical findings support that this patient is homebound (i.e. absences from home require considerable and taxing effort and are for medical reasons or [employer] services or infrequently or of short duration when for other reasons) because: Requires assistance of another person or specialized equipment to access medical services because patient: Is unable to exit home safely on own due to: chronic ventilator..    Based on the above findings. I certify that this patient is confined to the home and needs intermittent skilled nursing care, physical therapy and/or speech therapy.  The patient is under my care, and I have initiated the establishment of the plan of care.  This patient will be followed by a physician who will periodically review the plan of care.  Physician/Provider to provide follow up care: Radha Mcmillan    Attending [facility] physician (the Medicare certified [facility] provider): Geo Bui MD  Physician Signature: See electronic signature associated with these discharge orders.  Date: 1/6/2017     DNR (Do Not Resuscitate)      Ventilator   Order Comments: Continue prior home settings. No changes made.   Continue trach care, vent care.   Turn Q 2 hour.   Activity as tolerated, with assist.     Fall precautions     Diet   Order Comments: Follow this diet upon discharge: Orders Placed This Encounter  Adult Formula Drip Feeding: Continuous Isosource 1.5; Jejunostomy; Goal Rate: 85; mL/hr; From: 8:00 PM- 8:00 AM; Water flushes: 124 ml q 4 hours. Medication - Tube Feeding Flush Frequency: At least 15-30 mL water before and after medication administration and with tube clogging; [place]  NPO Time Specified Ice Chips   Order Specific Question Answer Comments   Is discharge order? Yes             Thank you for the opportunity to participate in the care of your patient.  Please do not hesitate to contact our service with questions or concerns.    Total time spent was greater than 30 minutes; Greater than 50% of the time was in counseling and care coordination. Care coordination included discussion of medication changes, lifestyle changes and reviewing instructions to the patient and [facility]. Palliative team.          Geo Bui MD   Hospitalist (Internal Medicine)  Pager: [phone].

## 2017-01-06 NOTE — PLAN OF CARE
Problem: Goal Outcome Summary  Goal: Goal Outcome Summary  Outcome: No Change  Oriented x3. Patient is unable to speak and it is difficult to assess orientation based on patient mouthing words. HR 's. Trach/vent dependent, FiO2 50%. Suctioned q 2 shift. Patient continues to abuse call light and display attention seeking behavior despite education and repeated boundary setting. Prn dilaudid and ativan given for air hunger and anxiety. Continue to monitor and follow plan of care. Patient continues to c/o of respiratory distress and back pain despite prn medications. Patient needs frequent reassurance. G to gravity. Cycled TF though [employer] patent. Continue to monitor and follow plan of care.

## 2017-01-06 NOTE — PROGRESS NOTES
[facility], [place]   Palliative Care Daily Progress Note          Recommendations, Patient/Family Counseling & Coordination       Air hunger/Anxiety   She reports not being able to breath frequently. Increasing doses of clonazepam has not been helpful in the past and she historically does not participate in her anxiety management. Hopeful that with improved symptom management with opioids and her participation in non-pharmacological management will be able to decrease dose of clonazepam. Patient reports being ok with being more sleeping in order to treat her symptoms. Discussed with primary team, attempting to treat for exacerbation of COPD to see if this improves her symptoms as well. If changes are not helpful, the current regimen would be challenging outside of the hospital setting without a clear hospice plan of care. She is planning on discharging to day and will follow up with complex care clinic which she is arranged to see on Tuesday.  -Continue Hydromorphone liquid per Gtube 1 mg q2h PRN  -Encourage use of fan at bedside  -Continue PTA Clonazepam and ativan for now  -Agree with increase gabapentin back to PTA dosing  -D/C PRN bedtime Seroquel  -Start Seroquel 25 mg TID PRN for anxiety  -Increase zoloft to 150 mg daily  -If these changes do not improve symptoms, would consider  starting buspar    POLST - done today     Thank you for the opportunity to continue to participate in the care of this patient and family.  Please feel free to contact on-call palliative provider with any emergent needs.  We can be reached via team pager [phone] (answered 8-4:30 Monday-Friday); after-hours answering service ([phone]); Main Palliative Clinic - [facility]: [phone].       LÁZARO Villarreal CNS  Palliative Care Consult Team  Pager: [phone]    60 minutes spent with patient, with >50% counseling and in care coordination.           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        Is new assessment/intervention required by palliative team?:  No         Interval History:   Ongoing pain and shortness of breath, frequent use of call light overnight.           Review of Systems:   Palliative Symptom Review (0=no symptom/no concern, 1=mild, 2=moderate, 3=severe):      Pain: 2      Fatigue: 0      Nausea: 0      Constipation: 0      Diarrhea: 0      Depressive Symptoms: 0      Anxiety: 2      Drowsiness: 0      Poor Appetite: 0      Shortness of Breath: 0      Insomnia: 0            Medications:   I have reviewed this patient's medication profile and medications given in past 24 hours.           Physical Exam:   Vitals were reviewed  Temp: 98.6  F (37  C) Temp src: Oral BP: 106/60 mmHg   Heart Rate: 96 Resp: 12 SpO2: 98 % O2 Device: Mechanical Ventilator    Constitutional: Awake, alert, cooperative, no apparent distress.  Lungs: No increased work of breathing, clear to auscultation bilaterally  Cardiovascular: Regular rate and rhythm  Abdomen: hypoactive bowel sounds, soft, non-distended, non-tender  Neurologic: Awake, alert, answers questions appropriately.  Neuropsychiatric: calm              Data Reviewed:   ROUTINE ICU LABS (Last four results)  CMP  Recent Labs  Lab 01/05/17  0655 01/04/17  0921 01/03/17  0729 01/02/17  1338 01/02/17  1335    138 139 130* 131*   POTASSIUM 4.0 4.3 3.3* 4.7 4.6   CHLORIDE 95 98 94  --  89*   CO2 35* 32 40*  --  37*   ANIONGAP 6 8 5  --  4   GLC 81 125* 97 146* 134*   BUN 19 15 11  --  16   CR 0.54 0.56 0.49*  --  0.47*   GFRESTIMATED >90Non  GFR Calc >90Non  GFR Calc  >90Non  GFR Calc  --  >90Non  GFR Calc   GFRESTBLACK >90African American GFR Calc >90African American GFR Calc >90African American GFR Calc  --  >90African American GFR Calc   IZZY 8.8 8.3* 8.5  --  8.6   MAG  --  2.3 1.8  --  1.8   PHOS  --   --   --   --  3.1   PROTTOTAL  --   --   --   --  6.8   ALBUMIN  --   --   --   --  2.7*   BILITOTAL  --   --   --   --  0.2   ALKPHOS  --   --   --   --  164*   AST  --   --   --   --  17   ALT  --   --   --   --  39     CBC  Recent Labs  Lab 01/05/17  0655 01/04/17  0921 01/03/17  0729 01/02/17  1338 01/02/17  1335   WBC 10.6 13.4* 8.3  --  18.7*   RBC 3.04* 3.17* 3.09*  --  3.48*   HGB 8.9* 9.6* 9.1* 11.2* 10.5*   HCT 29.6* 29.9* 29.8*  --  33.7*   MCV 97 94 96  --  97   MCH 29.3 30.3 29.4  --  30.2   MCHC 30.1* 32.1 30.5*  --  31.2*   RDW 15.3* 15.4* 15.1*  --  15.0    218 270  --  343     INR  Recent Labs  Lab 01/02/17  1335   INR 0.99     Arterial Blood GasNo lab results found in last 7 days.

## 2017-01-06 NOTE — PROGRESS NOTES
Care Coordinator- Discharge Planning     Admission Date/Time:  1/2/2017  Attending MD:  Geo Bui MD     Data  Date of initial CC assessment:  Chart reviewed, discussed with interdisciplinary team.   Patient was admitted for:   1. COPD exacerbation (H)    2. Cellulitis, unspecified cellulitis site    3. HCAP (healthcare-associated pneumonia)    4. Altered mental status, unspecified altered mental status type    5. Anxiety    6. Shortness of breath    7. Diarrhea, unspecified type    8. Constipation, unspecified constipation type           Assessment  Updated pt is medically cleared for discharge today.      Coordination of Care:  I have spoke to   Arley Buck ([phone]) from [facility] who informs me, 24hr Nsg care has been arranged.    Pt is set up with home vent here and this will go with her in ambulance and continue at group home.  I have arranged stretcher transport through [facility] for today at 4 pm. PCS form given to 6B charge nurse desk. Bedside RNGaby updated with the above plan. Pt's daughter, César has been updated regarding the above.      Plan  Anticipated Discharge Date: 01/06/17    Anticipated Discharge Plan: Return to group home with chronic vent.    Valdo Flowers, RN, BSN  Medicine Care Coordinator  [facility]  [phone]

## 2017-01-06 NOTE — PLAN OF CARE
"Problem: Goal Outcome Summary  Goal: Goal Outcome Summary  Outcome: Improving  /56 mmHg  Temp(Src) 98.7  F (37.1  C) (Axillary)  Resp 16  Ht 1.626 m (5' 4\")  Wt 59 kg (130 lb 1.1 oz)  BMI 22.32 kg/m2  SpO2 100%, bivona #6 trach, on 50% FI02. C/O air hunger. Sats 100% and RR 16-18.   Pt is trached, unable to speak. Able to nod and shake head and mouth words. Unable to fully assess orientation, frequent repeated requests and attention-seeking behavior. NPO, TF running at 85ml/hour from 8a-8pm. Pt anxious and c/o pain in abdomen and back, PRN Ativan & Dilaudid given as needed. 1 small BM. Urinating adequately per bender catheter. Changed bender today per MD. 20mg lasix x1. Will continue to monitor and follow plan of care.             "

## 2017-01-06 NOTE — PLAN OF CARE
Problem: Goal Outcome Summary  Goal: Goal Outcome Summary  Physical Therapy Discharge Summary    Reason for therapy discharge:    Discharged to home.    Progress towards therapy goal(s). See goals on Care Plan in [facility] electronic health record for goal details.  Goals partially met.  Barriers to achieving goals:   discharge from facility.    Therapy recommendation(s):    Continued therapy is recommended.  Rationale/Recommendations:  strengthening and functional independence training.

## 2017-01-06 NOTE — PROGRESS NOTES
CLINICAL NUTRITION SERVICES - BRIEF NOTE    NEW FINDINGS   Met with pt and daughter per RN request. Pt typically prefers to have her tube feedings run overnight as it helps to reduce nausea/anxiety per her explanation. It appears her TF regimen was switched to a daytime cycle during a recent previous admission (12/26/16) and has been documented to infuse during the daytime since.    Interventions  Adjusted cycling to overnight per pt and daughter request. Tube feeds will run 8pm-8am overnight. This change was updated in her discharge orders.     Norman Cornelius RD, LD  6B Clinical Dietitian  Pager: [phone]  [facility] [phone]

## 2017-01-06 NOTE — PROGRESS NOTES
Discharge orders received. Pt's daughter César and pt verbalized understanding of discharge plan and instructions. Telemetry and IV removed. Pt transferred to her home vent with charging cord for the trip to the [facility] via [employer]. Pt also has her cell phone. All belongings sent with César.

## 2017-01-07 PROBLEM — J96.20 ACUTE ON CHRONIC RESPIRATORY FAILURE (H): Status: ACTIVE | Noted: 2017-01-01

## 2017-01-07 NOTE — ED NOTES
Per EMS, pt 4LPM manually bagged, oxygen saturation kept at 100% en route.  Upon arrival to ED, pt on room air being manually ventilated through trach.  Oxygen saturation 58%. Ambubag immediately placed on 15 LPM and hyperventilated until oxygen saturation improved to 90% then weaned down to 3LPM. PT improved to mid-90s%

## 2017-01-07 NOTE — IP AVS SNAPSHOT
MRN:[id number]                      After Visit Summary   1/7/2017    Mary Wang    MRN: [id number]           Thank you!     Thank you for choosing [facility] for your care. Our goal is always to provide you with excellent care. Hearing back from our patients is one way we can continue to improve our services. Please take a few minutes to complete the written survey that you may receive in the mail after you visit with us. Thank you!        Patient Information     Date Of Birth          1950        About your hospital stay     You were admitted on:  January 7, 2017 You last received care in the:  [address] [facility]    You were discharged on:  January 10, 2017        Reason for your hospital stay       Acute on chronic hypoxic respiratory failure, suspect copd exacerbation.   Urinary tract infection, pseudomonas sp.                  Who to Call     For medical emergencies, please call 911.  For non-urgent questions about your medical care, please call your primary care provider or clinic, [phone]          Attending Provider     Provider    Camila Gómez MD Hibino, MD Natan Casiano, Enrique Das MD    [facility], MD Jorge Guarddao, MD Fam       Primary Care Provider Office Phone # Fax #    CambridgeErika Mcmillan [phone]       [address] [place] [address]        After Care Instructions     Activity       Your activity upon discharge: activity as tolerated, turn Q 2 hour.            Diet       Follow this diet upon discharge:resume prior to admission diet.          Orders Placed This Encounter  Adult Formula Drip Feeding: Specified Time Isosource 1.5; Jejunostomy; Goal Rate: 85; mL/hr; From: 8:00 PM; 8:00 AM; Medication - Tube Feeding Flush Frequency: At least 15-30 mL water before and after medication administration and with tube clogging;  NPO Time Specified Ice Chips                  Follow-up Appointments   "   Adult [facility] Follow-up and recommended labs and tests       Follow up with primary care provider, Radha Mcmillan, within 7 days for hospital follow- up.     Keep other appointments as scheduled.     Appointments on [place] and/or [facility] (with [facility] or [facility] provider or service). Call [phone] if you haven't heard regarding these appointments within 7 days of discharge.                  Pending Results     Date and Time Order Name Status Description    1/7/2017 1156 Blood culture Preliminary     1/7/2017 1156 Blood culture Preliminary             Statement of Approval     Ordered          01/10/17 1020  I have reviewed and agree with all the recommendations and orders detailed in this document.   EFFECTIVE NOW     Approved and electronically signed by:  Fam Patel MD             Admission Information        Provider Department Dept Phone    1/7/2017 Fam Patel MD [facility] [phone]      Your Vitals Were     Blood Pressure Pulse Temperature    137/65 mmHg 116 97.7  F (36.5  C) (Oral)    Respirations Height Weight    16 1.63 m (5' 4.17\") 60.51 kg (133 lb 6.4 oz)    BMI (Body Mass Index) Pulse Oximetry       22.77 kg/m2 97%       MyChart Information     [facility] gives you secure access to your electronic health record. If you see a primary care provider, you can also send messages to your care team and make appointments. If you have questions, please call your primary care clinic.  If you do not have a primary care provider, please call [phone] and they will assist you.        Care EveryWhere ID     This is your Care EveryWhere ID. This could be used by other organizations to access your [facility] medical records  [id number]           Review of your medicines      START taking        Dose / Directions    piperacillin-tazobactam 3-0.375 GM vial   Commonly known as:  ZOSYN   Indication:  Urinary Tract Infection   Used for:  Urinary tract infection without hematuria, site unspecified    "     Dose:  3.375 g   Inject 3.375 g into the vein every 6 hours for 9 days   Quantity:  540 mL   Refills:  0         CONTINUE these medicines which may have CHANGED, or have new prescriptions. If we are uncertain of the size of tablets/capsules you have at home, strength may be listed as something that might have changed.        Dose / Directions    * predniSONE 5 MG/5ML solution   This may have changed:    - Another medication with the same name was added. Make sure you understand how and when to take each.  - Another medication with the same name was removed. Continue taking this medication, and follow the directions you see here.   Used for:  Shortness of breath        Dose:  20 mg   Take 20 mLs (20 mg) by mouth daily   Quantity:  500 mL   Refills:  0       * predniSONE 20 MG tablet   Commonly known as:  DELTASONE   This may have changed:  You were already taking a medication with the same name, and this prescription was added. Make sure you understand how and when to take each.   Used for:  Acute on chronic respiratory failure with hypoxemia (H), COPD exacerbation (H)   Replaces:  predniSONE 5 MG/5ML solution        Take 3 tabs (60 mg) by mouth daily x 2 days, 2 tabs (40 mg) daily x 3 days, then continue 20 mg daily.   Quantity:  12 tablet   Refills:  0       * Notice:  This list has 2 medication(s) that are the same as other medications prescribed for you. Read the directions carefully, and ask your doctor or other care provider to review them with you.      CONTINUE these medicines which have NOT CHANGED        Dose / Directions    acetaminophen 160 MG/5ML solution   Commonly known as:  TYLENOL   Used for:  Other chronic pain        Dose:  650 mg   20.3 mLs (650 mg) by Per Feeding Tube route every 4 hours as needed for mild pain   Quantity:  300 mL   Refills:  0       aspirin 81 MG chewable tablet   Used for:  NSTEMI (non-ST elevated myocardial infarction) (H)        Dose:  81 mg   1 tablet (81 mg) by Per  Feeding Tube route daily   Quantity:  36 tablet   Refills:  0       atorvastatin 20 MG tablet   Commonly known as:  LIPITOR   Used for:  NSTEMI (non-ST elevated myocardial infarction) (H)        Dose:  20 mg   Take 1 tablet (20 mg) by mouth daily   Quantity:  30 tablet   Refills:  0       budesonide 0.5 MG/2ML neb solution   Commonly known as:  PULMICORT   Used for:  Shortness of breath, Respiratory difficulty        Dose:  0.5 mg   Take 2 mLs (0.5 mg) by nebulization 2 times daily   Quantity:  60 ampule   Refills:  0       calcium carbonate 500 MG chewable tablet   Commonly known as:  TUMS   Used for:  Gastroesophageal reflux disease, esophagitis presence not specified        Dose:  1 chew tab   1 tablet (500 mg) by Per G Tube route every 2 hours as needed for heartburn   Quantity:  150 tablet   Refills:  0       clonazePAM 0.1 mg/mL   Commonly known as:  klonoPIN   Used for:  Shortness of breath        Dose:  1.5 mg   Take 15 mLs (1.5 mg) by mouth 4 times daily Needs appt for refills   Quantity:  420 mL   Refills:  0       Cranberry 500 MG Caps        Dose:  500 mg   Take 1 capsule (500 mg) by mouth daily   Quantity:  90 capsule   Refills:  0       fexofenadine 180 MG tablet   Commonly known as:  ALLEGRA   Used for:  COPD exacerbation (H)        Dose:  180 mg   Take 1 tablet (180 mg) by mouth daily   Quantity:  30 tablet   Refills:  0       fiber modular packet   Used for:  Diarrhea, unspecified type        Dose:  1 packet   1 packet by Per J Tube route 3 times daily   Quantity:  42 packet   Refills:  0       fluticasone 50 MCG/ACT spray   Commonly known as:  FLONASE   Used for:  Acute and chronic respiratory failure with hypercapnia (H)        Dose:  2 spray   Spray 2 sprays into both nostrils daily   Quantity:  1 Bottle   Refills:  0       gabapentin 250 MG/5ML solution   Commonly known as:  NEURONTIN   Used for:  Anxiety        Dose:  300 mg   6 mLs (300 mg) by Per J Tube route 2 times daily   Quantity:  450 mL    Refills:  0       guaiFENesin 200 MG Tabs tablet   Commonly known as:  ORGANIDIN   Used for:  Shortness of breath        Dose:  200 mg   Take 1 tablet (200 mg) by mouth 4 times daily   Quantity:  115 tablet   Refills:  0       HYDROmorphone 1 MG/ML Liqd liquid   Commonly known as:  DILAUDID   Used for:  COPD exacerbation (H)        Dose:  1 mg   Take 1 mL (1 mg) by mouth every 2 hours as needed for moderate to severe pain (air hunger)   Quantity:  50 mL   Refills:  0       ipratropium 0.02 % neb solution   Commonly known as:  ATROVENT   Used for:  Shortness of breath        Dose:  0.5 mg   Take 2.5 mLs (0.5 mg) by nebulization every 4 hours   Quantity:  450 mL   Refills:  0       JEVITY 1.5 IZZY Liqd   Used for:  Achalasia        Dose:  5 Can   Take 5 Cans by mouth daily Per tube feeding   Quantity:  150 Can   Refills:  11       lactobacillus rhamnosus (GG) capsule   Used for:  Diarrhea, unspecified type        Dose:  1 capsule   Take 1 capsule by mouth 2 times daily   Quantity:  60 capsule   Refills:  0       levalbuterol 1.25 MG/3ML neb solution   Commonly known as:  XOPENEX   Used for:  Chronic obstructive pulmonary disease with acute exacerbation (H)        Dose:  1 ampule   Take 3 mLs (1.25 mg) by nebulization every 4 hours   Quantity:  540 mL   Refills:  0       lidocaine 15 mL, alum & mag hydroxide-simethicone 15 mL GI Cocktail   Used for:  Gastroesophageal reflux disease without esophagitis        Dose:  30 mL   Take 30 mLs by mouth 3 times daily as needed for moderate pain   Quantity:  500 mL   Refills:  0       loperamide 2 MG tablet   Commonly known as:  IMODIUM A-D   Used for:  Frequent stools        2-2 mg tablets per J-tube qid prn frequent and/or loose stools. Do not use more than 8 tabs per day.   Quantity:  30 tablet   Refills:  0       LORazepam 2 MG/ML (HIGH CONC) solution   Commonly known as:  ATIVAN   Used for:  COPD exacerbation (H), Anxiety        Dose:  0.5 mg   Take 0.25 mLs (0.5 mg) by  mouth every 3 hours as needed for anxiety   Quantity:  15 mL   Refills:  0       melatonin 1 MG/ML Liqd liquid   Used for:  Anxiety, Insomnia due to medical condition        Dose:  3 mg   3 mLs (3 mg) by Per J Tube route At Bedtime   Quantity:  300 mL   Refills:  0       Multiple Vitamins-Iron 15 MG Chew   Used for:  Dietary supplement-induced neuropathy (H)        Dose:  1 chew tab   1 chew tab by Per J Tube route daily Contains ferrous gluconate, 15 mL = 9 mg iron   Quantity:  30 tablet   Refills:  0       omeprazole 2 mg/mL   Commonly known as:  priLOSEC   Used for:  Achalasia        Dose:  20 mg   10 mLs (20 mg) by Per Feeding Tube route 2 times daily   Quantity:  600 mL   Refills:  3       polyethylene glycol 0.4%- propylene glycol 0.3% 0.4-0.3 % Soln ophthalmic solution   Commonly known as:  SYSTANE ULTRA   Used for:  Dry eyes, bilateral        Dose:  1-2 drop   Place 1-2 drops into both eyes 4 times daily 0900, 1300, 1700, 2100   Quantity:  1 Bottle   Refills:  0       polyethylene glycol Packet   Commonly known as:  MIRALAX/GLYCOLAX   Used for:  Constipation, unspecified constipation type        Dose:  17 g   17 g by Per J Tube route 2 times daily as needed for constipation Hold for loose stools   Quantity:  100 packet   Refills:  0       QUEtiapine 25 MG tablet   Commonly known as:  SEROquel   Used for:  Anxiety, Insomnia due to medical condition, Air hunger        Dose:  25 mg   Take 1 tablet (25 mg) by mouth 3 times daily as needed (anxiety, sleep)   Quantity:  30 tablet   Refills:  0       senna-docusate 8.6-50 MG per tablet   Commonly known as:  SENOKOT-S;PERICOLACE   Used for:  Constipation, unspecified constipation type        Dose:  2 tablet   2 tablets by Per J Tube route 2 times daily as needed for constipation   Quantity:  100 tablet   Refills:  0       sertraline 20 MG/ML (HIGH CONC) solution   Commonly known as:  ZOLOFT   Used for:  Anxiety        Dose:  150 mg   7.5 mLs (150 mg) by Per Feeding  Tube route daily   Quantity:  105 mL   Refills:  0       simethicone 125 MG Chew chewable tablet   Commonly known as:  MYLANTA GAS   Used for:  Achalasia        Dose:  125 mg   1 tablet (125 mg) by Per Feeding Tube route 4 times daily   Quantity:  120 tablet   Refills:  0       sodium chloride 0.65 % nasal spray   Commonly known as:  OCEAN   Used for:  Chronic sinusitis, unspecified location        Dose:  1 spray   Spray 1 spray into both nostrils daily as needed for congestion   Quantity:  1 Bottle   Refills:  0       sulfamethoxazole-trimethoprim suspension   Commonly known as:  BACTRIM/SEPTRA   Used for:  Chronic obstructive pulmonary disease with acute exacerbation (H)        Dose:  20 mL   20 mLs (160 mg) by Per J Tube route daily Dose based on TMP component. 20 mLs = 160 mg   Quantity:  560 mL   Refills:  0         STOP taking     predniSONE 5 MG/5ML solution   Replaced by:  predniSONE 20 MG tablet   You also have another medication with the same name that you need to continue taking as instructed.                Where to get your medicines      These medications were sent to [facility] [address]  [address], [facility] [address]     Phone:  [phone]    - predniSONE 20 MG tablet      Some of these will need a paper prescription and others can be bought over the counter. Ask your nurse if you have questions.     You don't need a prescription for these medications    - piperacillin-tazobactam 3-0.375 GM vial             Protect others around you: Learn how to safely use, store and throw away your medicines at www.disposemymeds.org.             Medication List: This is a list of all your medications and when to take them. Check marks below indicate your daily home schedule. Keep this list as a reference.      Medications           Morning Afternoon Evening Bedtime As Needed    acetaminophen 160 MG/5ML solution   Commonly known as:  TYLENOL   20.3 mLs (650 mg) by  Per Feeding Tube route every 4 hours as needed for mild pain   Last time this was given:  650 mg on 1/8/2017  2:08 AM                                aspirin 81 MG chewable tablet   1 tablet (81 mg) by Per Feeding Tube route daily   Last time this was given:  81 mg on 1/10/2017  8:29 AM                                atorvastatin 20 MG tablet   Commonly known as:  LIPITOR   Take 1 tablet (20 mg) by mouth daily   Last time this was given:  20 mg on 1/10/2017  8:28 AM                                budesonide 0.5 MG/2ML neb solution   Commonly known as:  PULMICORT   Take 2 mLs (0.5 mg) by nebulization 2 times daily   Last time this was given:  0.5 mg on 1/10/2017 12:44 AM                                calcium carbonate 500 MG chewable tablet   Commonly known as:  TUMS   1 tablet (500 mg) by Per G Tube route every 2 hours as needed for heartburn                                clonazePAM 0.1 mg/mL   Commonly known as:  klonoPIN   Take 15 mLs (1.5 mg) by mouth 4 times daily Needs appt for refills   Last time this was given:  1.5 mg on 1/10/2017  8:55 AM                                Cranberry 500 MG Caps   Take 1 capsule (500 mg) by mouth daily                                fexofenadine 180 MG tablet   Commonly known as:  ALLEGRA   Take 1 tablet (180 mg) by mouth daily   Last time this was given:  180 mg on 1/10/2017  8:29 AM                                fiber modular packet   1 packet by Per J Tube route 3 times daily   Last time this was given:  1 packet on 1/10/2017  8:28 AM                                fluticasone 50 MCG/ACT spray   Commonly known as:  FLONASE   Spray 2 sprays into both nostrils daily   Last time this was given:  2 sprays on 1/10/2017  8:34 AM                                gabapentin 250 MG/5ML solution   Commonly known as:  NEURONTIN   6 mLs (300 mg) by Per J Tube route 2 times daily   Last time this was given:  300 mg on 1/10/2017  8:32 AM                                guaiFENesin 200 MG  Tabs tablet   Commonly known as:  ORGANIDIN   Take 1 tablet (200 mg) by mouth 4 times daily                                HYDROmorphone 1 MG/ML Liqd liquid   Commonly known as:  DILAUDID   Take 1 mL (1 mg) by mouth every 2 hours as needed for moderate to severe pain (air hunger)   Last time this was given:  1.5 mg on 1/10/2017 10:23 AM                                ipratropium 0.02 % neb solution   Commonly known as:  ATROVENT   Take 2.5 mLs (0.5 mg) by nebulization every 4 hours   Last time this was given:  0.5 mg on 1/10/2017  8:14 AM                                JEVITY 1.5 IZZY Liqd   Take 5 Cans by mouth daily Per tube feeding                                lactobacillus rhamnosus (GG) capsule   Take 1 capsule by mouth 2 times daily   Last time this was given:  1 capsule on 1/10/2017  8:33 AM                                levalbuterol 1.25 MG/3ML neb solution   Commonly known as:  XOPENEX   Take 3 mLs (1.25 mg) by nebulization every 4 hours   Last time this was given:  1.25 mg on 1/10/2017  8:15 AM                                lidocaine 15 mL, alum & mag hydroxide-simethicone 15 mL GI Cocktail   Take 30 mLs by mouth 3 times daily as needed for moderate pain                                loperamide 2 MG tablet   Commonly known as:  IMODIUM A-D   2-2 mg tablets per J-tube qid prn frequent and/or loose stools. Do not use more than 8 tabs per day.                                LORazepam 2 MG/ML (HIGH CONC) solution   Commonly known as:  ATIVAN   Take 0.25 mLs (0.5 mg) by mouth every 3 hours as needed for anxiety   Last time this was given:  0.5 mg on 1/10/2017  8:29 AM                                melatonin 1 MG/ML Liqd liquid   3 mLs (3 mg) by Per J Tube route At Bedtime   Last time this was given:  3 mg on 1/9/2017  9:43 PM                                Multiple Vitamins-Iron 15 MG Chew   1 chew tab by Per J Tube route daily Contains ferrous gluconate, 15 mL = 9 mg iron                                 omeprazole 2 mg/mL   Commonly known as:  priLOSEC   10 mLs (20 mg) by Per Feeding Tube route 2 times daily   Last time this was given:  20 mg on 1/10/2017  8:33 AM                                piperacillin-tazobactam 3-0.375 GM vial   Commonly known as:  ZOSYN   Inject 3.375 g into the vein every 6 hours for 9 days   Last time this was given:  3.375 g on 1/10/2017  8:38 AM                                polyethylene glycol 0.4%- propylene glycol 0.3% 0.4-0.3 % Soln ophthalmic solution   Commonly known as:  SYSTANE ULTRA   Place 1-2 drops into both eyes 4 times daily 0900, 1300, 1700, 2100   Last time this was given:  2 drops on 1/9/2017  4:03 PM                                polyethylene glycol Packet   Commonly known as:  MIRALAX/GLYCOLAX   17 g by Per J Tube route 2 times daily as needed for constipation Hold for loose stools                                * predniSONE 5 MG/5ML solution   Take 20 mLs (20 mg) by mouth daily                                * predniSONE 20 MG tablet   Commonly known as:  DELTASONE   Take 3 tabs (60 mg) by mouth daily x 2 days, 2 tabs (40 mg) daily x 3 days, then continue 20 mg daily.   Last time this was given:  1/10/2017  8:29 AM                                QUEtiapine 25 MG tablet   Commonly known as:  SEROquel   Take 1 tablet (25 mg) by mouth 3 times daily as needed (anxiety, sleep)   Last time this was given:  25 mg on 1/10/2017  5:55 AM                                senna-docusate 8.6-50 MG per tablet   Commonly known as:  SENOKOT-S;PERICOLACE   2 tablets by Per J Tube route 2 times daily as needed for constipation                                sertraline 20 MG/ML (HIGH CONC) solution   Commonly known as:  ZOLOFT   7.5 mLs (150 mg) by Per Feeding Tube route daily   Last time this was given:  150 mg on 1/10/2017  8:31 AM                                simethicone 125 MG Chew chewable tablet   Commonly known as:  MYLANTA GAS   1 tablet (125 mg) by Per Feeding Tube route 4  times daily                                sodium chloride 0.65 % nasal spray   Commonly known as:  OCEAN   Spray 1 spray into both nostrils daily as needed for congestion                                sulfamethoxazole-trimethoprim suspension   Commonly known as:  BACTRIM/SEPTRA   20 mLs (160 mg) by Per J Tube route daily Dose based on TMP component. 20 mLs = 160 mg   Last time this was given:  160 mg on 1/10/2017  8:32 AM                                * Notice:  This list has 2 medication(s) that are the same as other medications prescribed for you. Read the directions carefully, and ask your doctor or other care provider to review them with you.

## 2017-01-07 NOTE — IP AVS SNAPSHOT
[address] [address]    Phone:  [phone]                                       After Visit Summary   1/7/2017    Mary Wang    MRN: [id number]           After Visit Summary Signature Page     I have received my discharge instructions, and my questions have been answered. I have discussed any challenges I see with this plan with the nurse or doctor.    ..........................................................................................................................................  Patient/Patient Representative Signature      ..........................................................................................................................................  Patient Representative Print Name and Relationship to Patient    ..................................................               ................................................  Date                                            Time    ..........................................................................................................................................  Reviewed by Signature/Title    ...................................................              ..............................................  Date                                                            Time

## 2017-01-07 NOTE — LETTER
Hand-off for Care Transitions to Next Level of Care Provider  Name: Mary Wang  MRN #: [id number]    Primary Care Provider: Radha Mcmillan  Primary Clinic: [address]  [facility] [address]        Reason for Hospitalization:  Acute on chronic respiratory failure with hypoxemia (H) [J96.21]  Admit Date/Time: 1/7/2017 11:33 AM  Discharge Date: 01/10/17    Reason for Communication Hand-off Referral: Fragility  Avoidable readmission within 30 days    Discharge Plan:  Discharged to: group home with 24 hr nursing services, RT and vent                   Patient agreeable to post-hospital support suggestions:  Yes  Discharge Plan:             Patient is on new medications:   Yes           MTM follow up recommended: Yes            Follow-up specialty is recommended: Yes  This would communicate all referrals e.g. , [facility] clinic, Homecare, Cardiac Rehab    Follow-up plan:  Complex Care appt with team at group home.              Key Recommendations:  Please assist in helping to prevent frequent re-admissions. Pt's family considering hospice, pt with very high anxiety.    Gisel Flowers, RNCC

## 2017-01-07 NOTE — IP AVS SNAPSHOT
` ` Patient Information     Patient Name Sex     Mary Wang ([id number]) Female 1950       Room Bed    35 6235-02      Patient Demographics     Address Phone E-mail Address    [address] [place] [address] [phone] (Home) *Preferred*  [phone] (Work)  [phone] (Mobile) [email or web address]      Patient Ethnicity & Race     Ethnic Group Patient Race    American White      Emergency Contact(s)     Name Relation Home Work Mobile    César Troy Power of  [phone]    No Secondary Contact Other None        Documents on File        Status Date Received Description       Documents for the Patient    Privacy Notice - [place] Received 14     Face Sheet Received 10/19/09     Insurance Card Received 14 bcbs/med    Patient ID Received 14 [place] ex 2018    Consent for Services - Hospital/Clinic Received 04/10/12     Consent for Services - Hospital/Clinic  04/10/12 GENERAL CONSENT FORM - ENGLISH    Consent to Communicate Received 12     Consent to Communicate  12 AUTHORIZATION TO DISCUSS PROTECTED HEALTH INFORMATION    Consent for Services - [facility] Received 12 CONSENT    [facility] Specified Other Received 16     Consent for Services - Hospital/Clinic Received 13     Consent for Services - Hospital/Clinic  13 CONSENT FOR SERVICE (SIGNED ON 13)    Consent for Services - Hospital/Clinic Received 14     Consent for Services - Hospital/Clinic Received 14 CONSENT FOR SERVICE    External Medication Information Consent Accepted 14     Insurance Card Received 14 Medicare Card    Insurance Card Received 14 Bcbs Federal Card    Business/Insurance/Care Coordination/Health Form - Patient  10/03/14 [employer] SUSTAINING MEDICAL EQUIPMENT FORM    Consent for Services - Hospital/Clinic Received 04/29/15     Power of  Not Received  Statutory Short form Power of      Advance Directives and Living Will Received 08/03/15 Health Care Directive 07/28/15    Advance Directives and Living Will Not Received  Validation of AD 07/28/15    HIM JOSE MARTIN Authorization - File Only  08/13/15 [facility] JOSE MARTIN Authorization - File Only  09/29/15 [facility] AUTHORIZATION FOR RELEASE OF PROTECTED HEALTH INFORMATION    HIM JOSE MARTIN Authorization - File Only  09/29/15 [facility] / [facility] AUTHORIZATION FOR RELEASE OF PROTECTED HEALTH INFORMATION    Consent for Services/Privacy Notice - Hospital/Clinic Received 01/20/16     Consent for Services/Privacy Notice - Hospital/Clinic  01/20/16 CONSENT FOR SERVICE/PRIVACY NOTICE    HIM JOSE MARTIN Authorization  02/04/16     HIM JOSE MARTIN Authorization  02/05/16     Business/Insurance/Care Coordination/Health Form - Patient  04/05/16 JUANI, PRIOR AUTH APPROVAL- LEVALBUTEROL HCL NEBU, 4/1/16    HIM JOSE MARTIN Authorization  04/25/16     HIM JOSE MARTIN Authorization  05/24/16     HIM JOSE MARTIN Authorization  06/20/16     HIM JOSE MARTIN Authorization  07/12/16 [facility]    Medicare Lifetime Days Consent Received 07/18/16     HIM JOSE MARTIN Authorization  07/21/16     HIM JOSE MARTIN Authorization  07/26/16 [facility]    Consent for Services/Privacy Notice - Hospital/Clinic-Esign       Medicare Lifetime Days Consent Received 09/13/16     Medicare Lifetime Days Consent Received 10/03/16     Consent for EHR Access Received 10/30/16     Medicare Lifetime Days Consent Received 11/01/16     Advance Directives and Living Will Received 01/09/17 POLST 01/06/17       Documents for the Encounter    CMS IM for Patient Signature Received 01/07/17       Admission Information     Attending Provider Admitting Provider Admission Type Admission Date/Time    Fam Patel MD Kaltenborn, Zachary Patrick, MD Emergency 01/07/17  1133    Discharge Date Hospital Service Auth/Cert Status Service Area     Internal Medicine [facility]    Unit Room/Bed Admission Status    [facility] U6B 6235/6235-02  Admission (Confirmed)            Admission     Complaint    Acute on chronic respiratory failure (H)      Hospital Account     Name Acct ID Class Status Primary Coverage    Mary Wang [id number] Inpatient Open MEDICARE - MEDICARE            Guarantor Account (for Hospital Account #[id number])     Name Relation to Pt Service Area Active? Acct Type    Mary Wang Self FCS Yes Personal/Family    Address Phone          [address], [place] [id number] [phone](H)              Coverage Information (for Hospital Account #[id number])     1. MEDICARE/MEDICARE     F/O Payor/Plan Precert #    MEDICARE/MEDICARE     Subscriber Subscriber #    Mary Wang [id number]    Address Phone    [employer] CLAIMS  [address]  [place] [address] [phone]          2. BCBS/FEDERAL EMPLOYEE PROGRAM     F/O Payor/Plan Precert #    [facility]/FEDERAL EMPLOYEE PROGRAM     Subscriber Subscriber #    Mary Wang [id number]    Address Phone    [address] [address]  [place] [address] [phone]          3. STIVEN/STIEVN CARRERO     F/O Payor/Plan Precert #    STIVEN/STIVEN CARRERO     Subscriber Subscriber #    Mary Wang [id number]    Address Phone    [address]  [place] [id number] [phone]

## 2017-01-07 NOTE — H&P
[facility] History and Physical  Department of Internal Medicine    Patient Name: Mary Wang MRN# [id number]   Age: 66 year old YOB: 1950     Date of Admission:1/7/2017    Primary care provider: Radha Mcmillan  Date of Service: 1/7/2017  Admitting Team: [facility]         Assessment and Plan:   Mary Wang is a 66 year old female with a history of severe COPD s/p trach on home vent 24/7, anxiety, hypertension, achalasia s/p GJ tube who presented to the hospital with acute on chronic hypoxic respiratory failure.  Of note she was just hospitalized here from 12/1-5 w/ a UTI, 12/25-28 w/bacteremia and 1/2-6 with encephalopathy ultimately attributed to her medications.    1) Severe COPD with Acute on chronic hypoxic respiratory failure - Presented to our ED with dyspnea and hypercapnia (pCO2 93 up from baseline 60s) now improved once ambu-bagged and placed back on home ventilator.  Still requiring mildly increased FiO2 (normally 30%, briefly required 50% and now stable on 35%.  Of note, still on steroid burst from prior admission.  Per nursing staff from her facility, the patient was never hypoxic prior to coming in and was instead complaining of subjective shortness of breath  - Will switch from oral prednisone to IV solumedrol overnight.  Of note patient's baseline is 20 mg prednisone PO and she remains on Bactrim for prophylaxis  - Procal pending, please follow  - Consult RT for vent management.  FiO2 35% , Tinsp 1, ,RR 14, PEEP 5  - Currently appears close to baseline.  If decompensates, would consider echo vs CT to eval for PE.  Otherwise this episode could potentially be attributed to anxiety as the patient has a history of air hunger.  - Scheduled ipatropium, levalbuterol while awake  - Continue BID pulmicort  - EKG reviewed and unchanged from prior  - Continue dilaudid for air hunger    2) Encephalopathy, resolved - Previously admitted with encephalopathy attributed  to oversedation from medications.  Attempted to wean benzos but patient did not tolerate and after goals of care discussion with patient and family we are erring on the side of keeping her comfortable even if that means episodes of oversedation.  - Continue home ativan, clonazepam, hydromorphone for air hunger, Seroquel.    3) Anxiety - On multiple agents.  Attempted to wean during last admission due to encephalopathy but ultimately unable to tolerate reduction in anxiolytics.  - Continue home clonazepam  - Continue home ativan  - Continue home hydromorphone for air hunger  - Continue home seroquel    4) Achalasia - S/p GH tube placement  - G tube to gravity drainage  - Continue home tube feeds: Isosource 1.5 @ 85 mls/hr x 12h from 5247-6166.  Free water 125 ccs q4h.    5) Urinary retention - Ongoing since prior admissions with plans for indwelling bender instead of urologic work up.  - Continue home bender catheter    CODE: DNR  Diet/IVF: On tube feeds  DVT ppx: Mechanical  Disposition/Admission Status: Inpatient    Jose David De La Cruz  Internal Medicine Hospitalist & [facility]  Pager: [phone]           Chief Complaint:   Shortness of breath         HPI:   Mary aWng is a 66 year old female with a history of severe COPD s/p trach on home vent 24/7, anxiety, hypertension, achalasia s/p GJ tube who presented to the hospital with acute on chronic hypoxic respiratory failure.  Of note she was just hospitalized here from 12/1-5 w/ a UTI, 12/25-28 w/bacteremia and 1/2-6 with encephalopathy ultimately attributed to her medications.    The patient is unable to talk secondary to her severe COPD and tracheostomy.  I spoke with her home care RN and the patient communicated by mouthing words and writing.    It appears the patient was just discharged from our facility yesterday and had significant anxiety overnight and did not sleep well.  This morning she told her nursing staff she could  not breath.  She was reportedly in significant distress, although she never desaturated.  Nursing staff confirmed her ventilator was working, briefly treated her with an ambu bag and called paramedics.  The patient was agreeable to coming into the hospital and was transported in.  On arrival the patient was hypoxic but apparently was being ambu-bagged without supplemental oxygen (normally requires FiO2 of 30%) and her oxygen saturations improved to just below her baseline once back on her home ventilator settings.    The patient confirms shortness of breath which is mildly improved, chest pain which is chronic, back pain which is chronic and ongoing diarrhea which is also chronic.  She still has a bender in place due to retention with no plans for removal or further urologic investigation.  She has a GJ tube in place for her achalasia.         Past Medical History:     Past Medical History   Diagnosis Date     COPD (chronic obstructive pulmonary disease) (H)      trach/vent dependent      Anxiety      TMJ pain dysfunction syndrome      Tracheostomy in place      Hypertension      GERD (gastroesophageal reflux disease)      Achalasia      Lung nodule      spiculated; followed in pulm clinic      Stress-induced cardiomyopathy      Anxiety and depression      Chronic pain        [facility] reviewed and up to date         Past Surgical History:     Past Surgical History   Procedure Laterality Date     Tracheostomy N/A 8/26/2015     Procedure: TRACHEOSTOMY;  Surgeon: Milena Thibodeaux MD;  Location: [facility]     Bronchoscopy, insert bronchial valve Right 9/2/2015     Procedure: BRONCHOSCOPY, INSERT BRONCHIAL VALVE;  Surgeon: Everardo Beach MD;  Location: [facility]     Esophagoscopy, gastroscopy, duodenoscopy (egd), combined N/A 12/11/2015     Procedure: COMBINED ESOPHAGOSCOPY, GASTROSCOPY, DUODENOSCOPY (EGD);  Surgeon: Dhruv Lyles MD;  Location: [facility] OR     Esophagoscopy, gastroscopy, duodenoscopy (egd), combined  N/A 12/31/2015     Procedure: COMBINED ESOPHAGOSCOPY, GASTROSCOPY, DUODENOSCOPY (EGD);  Surgeon: Brenden Plasencia MD;  Location: [facility]     Percutaneous insertion tube jejunostomy N/A 12/31/2015     Procedure: PERCUTANEOUS INSERTION TUBE JEJUNOSTOMY;  Surgeon: Brenden Plasencia MD;  Location: [facility]     Percutaneous insertion tube jejunostomy N/A 1/25/2016     Procedure: PERCUTANEOUS INSERTION TUBE JEJUNOSTOMY;  Surgeon: Carrie Coleman MD;  Location: [facility]     Anesthesia out of or mri N/A 4/18/2016     Procedure: ANESTHESIA OUT OF OR MRI;  Surgeon: GENERIC ANESTHESIA PROVIDER;  Location: [facility]     Endoscopic insertion tube gastrostomy N/A 7/9/2016     Procedure: ENDOSCOPIC INSERTION TUBE GASTROSTOMY;  Surgeon: Brenden Plasencia MD;  Location: [facility] OR       PS reviewed and up to date         Social History:     Social History     Social History     Marital Status:      Spouse Name: N/A     Number of Children: N/A     Years of Education: N/A     Occupational History     Not on file.     Social History Main Topics     Smoking status: Former Smoker -- 2.00 packs/day for 40 years     Types: Cigarettes     Start date: 01/01/1964     Quit date: 04/18/1998     Smokeless tobacco: Never Used     Alcohol Use: No     Drug Use: No     Sexual Activity: Not on file     Other Topics Concern     Not on file     Social History Narrative            Family History:     Family History   Problem Relation Age of Onset     CANCER Sister             Immunizations:     Immunization History   Administered Date(s) Administered     Influenza (High Dose) 3 valent vaccine 10/06/2014, 09/24/2015, 10/04/2016     Influenza (IIV3) 11/20/2012, 10/21/2013     Pneumococcal (PCV 13) 11/17/2014     Pneumococcal 23 valent 05/03/2012     Tetanus Not Indicated - By Hx 05/03/2014              Allergies:      Allergies   Allergen Reactions     Levaquin Other (See Comments)     Delirium     Toro Podhaler [Tobramycin] Other (See  Comments)     Severe congestion, SOB      Suprax [Cefixime]      See ceftibuten     Augmentin Nausea     Has tolerated for treatment of UTIs in 2016.     Avelox [Moxifloxacin] Anxiety     Cedax [Ceftibuten] Other (See Comments)     Pain in eyes     Penicillins Itching     Tolerated amoxicillin without issues.     Vantin [Cefpodoxime] Nausea            Medications:     Prior to Admission medications    Medication Sig Last Dose Taking? Auth Provider   Cranberry 500 MG CAPS Take 1 capsule (500 mg) by mouth daily   Geo Bui MD   HYDROmorphone (DILAUDID) 1 MG/ML LIQD liquid Take 1 mL (1 mg) by mouth every 2 hours as needed for moderate to severe pain (air hunger)   Geo Bui MD   LORazepam (ATIVAN) 2 MG/ML (HIGH CONC) solution Take 0.25 mLs (0.5 mg) by mouth every 3 hours as needed for anxiety   Geo Bui MD   clonazePAM (KLONOPIN) 0.1 mg/mL Take 15 mLs (1.5 mg) by mouth 4 times daily Needs appt for refills   Geo Bui MD   fexofenadine (ALLEGRA) 60 MG tablet Take 3 tablets (180 mg) by mouth daily   Geo Bui MD   predniSONE 5 MG/5ML solution Take 20 mLs (20 mg) by mouth daily   Geo Bui MD   predniSONE 5 MG/5ML solution Take 40 mLs (40 mg) by mouth daily for 3 days   Geo Bui MD   polyethylene glycol (MIRALAX/GLYCOLAX) Packet 17 g by Per J Tube route 2 times daily as needed for constipation Hold for loose stools   Geo Bui MD   sertraline (ZOLOFT) 20 MG/ML (HIGH CONC) solution 7.5 mLs (150 mg) by Per Feeding Tube route daily   Geo Bui MD   lactobacillus rhamnosus, GG, (CULTURELL) capsule Take 1 capsule by mouth 2 times daily   Geo Bui MD   fexofenadine (ALLEGRA) 180 MG tablet Take 1 tablet (180 mg) by mouth daily   Geo Bui MD   QUEtiapine (SEROQUEL) 25 MG tablet Take 1 tablet (25 mg) by mouth 3 times daily as needed (anxiety, sleep)   Geo Bui MD   fiber modular (NUTRISOURCE FIBER) packet 1 packet by Per J Tube route 3 times  daily   Geo Bui MD   senna-docusate (SENOKOT-S;PERICOLACE) 8.6-50 MG per tablet 2 tablets by Per J Tube route 2 times daily as needed for constipation   Geo Bui MD   omeprazole (PRILOSEC) 2 mg/mL 10 mLs (20 mg) by Per Feeding Tube route 2 times daily   Radha Mcmillan MD   Nutritional Supplements (JEVITY 1.5 IZZY) LIQD Take 5 Cans by mouth daily Per tube feeding   Radha Mcmillan MD   loperamide (IMODIUM A-D) 2 MG tablet 2-2 mg tablets per J-tube qid prn frequent and/or loose stools. Do not use more than 8 tabs per day.   Radha Mcmillan MD   melatonin (MELATONIN) 1 MG/ML LIQD liquid 3 mLs (3 mg) by Per J Tube route At Bedtime   Francisco Burt MD   acetaminophen (TYLENOL) 160 MG/5ML oral liquid 20.3 mLs (650 mg) by Per Feeding Tube route every 4 hours as needed for mild pain   Kiesha Martinez MD   calcium carbonate (TUMS) 500 MG chewable tablet 1 tablet (500 mg) by Per G Tube route every 2 hours as needed for heartburn   Kiesha Martinez MD   budesonide (PULMICORT) 0.5 MG/2ML nebulizer solution Take 2 mLs (0.5 mg) by nebulization 2 times daily   Kiesha Martinez MD   ipratropium (ATROVENT) 0.02 % nebulizer solution Take 2.5 mLs (0.5 mg) by nebulization every 4 hours   Kiesha Martinez MD   levalbuterol (XOPENEX) 1.25 MG/3ML nebulizer solution Take 3 mLs (1.25 mg) by nebulization every 4 hours   Kiesha Martniez MD   gabapentin (NEURONTIN) 250 MG/5ML solution 6 mLs (300 mg) by Per J Tube route 2 times daily   Kiesha Martinez MD   atorvastatin (LIPITOR) 20 MG tablet Take 1 tablet (20 mg) by mouth daily   Kiesha Martinez MD   QUEtiapine (SEROQUEL) 50 MG tablet Take 1 tablet (50 mg) by mouth 3 times daily   Kiesha Martinez MD   QUEtiapine (SEROQUEL) 25 MG tablet Take 3 tablets (75 mg) by mouth At Bedtime   Kiesha Martinez MD   guaiFENesin (ORGANIDIN) 200 MG TABS Take 1 tablet (200 mg) by mouth 4 times daily   Kiesha Martinez MD    fluticasone (FLONASE) 50 MCG/ACT nasal spray Spray 2 sprays into both nostrils daily   Kiesha Martinez MD   sodium chloride (OCEAN) 0.65 % nasal spray Spray 1 spray into both nostrils daily as needed for congestion   Kiesha Martinez MD   sulfamethoxazole-trimethoprim (BACTRIM,SEPTRA) 200-40 mg/5ml suspension 20 mLs (160 mg) by Per J Tube route daily Dose based on TMP component. 20 mLs = 160 mg   Kiesha Martinez MD   simethicone (MYLANTA GAS) 125 MG CHEW 1 tablet (125 mg) by Per Feeding Tube route 4 times daily   Kiesha Martinez MD   Pediatric Multivitamins-Iron (MULTIPLE VITAMINS-IRON) 15 MG CHEW 1 chew tab by Per J Tube route daily Contains ferrous gluconate, 15 mL = 9 mg iron   Kiesha Martinez MD   lidocaine 15 mL, alum & mag hydroxide-simethicone 15 mL GI Cocktail Take 30 mLs by mouth 3 times daily as needed for moderate pain   Kiesha Martinez MD   aspirin 81 MG chewable tablet 1 tablet (81 mg) by Per Feeding Tube route daily   Kiesha Martinez MD   polyethylene glycol 0.4%- propylene glycol 0.3% (SYSTANE ULTRA) 0.4-0.3 % SOLN ophthalmic solution Place 1-2 drops into both eyes 4 times daily 0900, 1300, 1700, 2100   Kiesha Martinez MD             Review of Systems:     A complete ROS was performed and is negative other than what is stated in the HPI.         Physical Exam:   Blood pressure 110/71, pulse 116, temperature 97.8  F (36.6  C), temperature source Oral, resp. rate 20, SpO2 91 %, not currently breastfeeding.    Physical Exam   Constitutional:   Chronically ill appearing, resting comfortably  Head: Normocephalic and atraumatic.    Eyes: Conjunctivae are normal. Pupils are equal, round, and reactive to light.    Oral: Pharynx has no erythema or exudate, mucous membranes are moist  Neck:   No adenopathy, no bony tenderness.  Trach in place.  Cardiovascular: Regular rate and rhythm without murmurs or gallops  Pulmonary/Chest: Crackles bilateraly, with mild  wheezes and no retractions. No respiratory distress.  GI: Soft with good bowel sounds.  Non-tender, non-distended, with no guarding, no rebound, no peritoneal signs.  GJ tube in place.  Back:  No bony or CVA tenderness   Musculoskeletal:  No edema or clubbing   Skin: Skin is warm and dry. No rash noted.   Neurological: Alert and oriented to person, place, and time. Nonfocal exam  Psychiatric:  Normal mood and affect.      Tubes/Lines/Devices: GJ tube in place, bender in place, trach in place           Data:   Exam:  XR CHEST PORT 1 VW, 1/7/2017 1:01 PM     History: respiratory distress     Comparison:  Chest x-ray 1/4/2017     Findings:  Single frontal radiograph of the chest. Tracheostomy tube  tip stable in the upper thoracic trachea. Cardiac silhouette and  pulmonary vasculature within normal limits. Calcifications of the  aortic knob. Emphysematous changes bilaterally. No change in streaky  bibasilar opacities, left greater than right. Blunting of both  costophrenic angles is unchanged. No pleural effusion or pneumothorax.                                                                       Impression:     Chronic changes of emphysema and bilateral costophrenic angle  blunting, likely representing a scarring or atelectasis. No acute  changes compared to 1/4/2017 radiograph.    I spoke with Dr Gama regarding patient's plan of care  I reviewed past notes, imaging and labs      ORESTES Grigsby

## 2017-01-07 NOTE — IP AVS SNAPSHOT
Mary Wang #[id number] (CSN: [id number])  (66 year old F)  (Adm: 17)     [id number]               [address] [facility] BANK: [phone]            Patient Demographics     Patient Name Sex          Age SSN Address Phone    Mary Wang Female 1950 (66 year old) [id number] [facility] [id number] [phone] (Home) *Preferred*  [phone] (Work)  [phone] (Mobile)      Emergency Contact(s)     Name Relation Home Work Mobile    César Troy Power of  [phone]    No Secondary Contact Other None        Admission Information     Attending Provider Admitting Provider Admission Type Admission Date/Time    Fam Patel MD Kaltenborn, Enrique Das MD Emergency 17  1133    Discharge Date Hospital Service Auth/Cert Status Service Area     Internal Medicine [facility]    Unit Room/Bed Admission Status     U6B 6235/6235-02 Admission (Confirmed)            Admission     Complaint    Acute on chronic respiratory failure (H)      Hospital Account     Name Acct ID Class Status Primary Coverage    Mary Wang [id number] Inpatient Open MEDICARE - MEDICARE            Guarantor Account (for Hospital Account #[id number])     Name Relation to Pt Service Area Active? Acct Type    Mary Wang Self FCS Yes Personal/Family    Address Phone          [address] [place], [place] [id number] [phone](H)              Coverage Information (for Hospital Account #[id number])     1. MEDICARE/MEDICARE     F/O Payor/Plan Precert #    MEDICARE/MEDICARE     Subscriber Subscriber #    Mary Wang [id number]    Address Phone    ATTN CLAIMS  [address]  [facility] [place] [address] [phone]          2. BCBS/FEDERAL EMPLOYEE PROGRAM     F/O Payor/Plan Precert #    BCBS/FEDERAL EMPLOYEE PROGRAM     Subscriber Subscriber #    Mary Wang [id number]    Address Phone    [address] [address]  [place],  "[place] [address] [phone]          3. STIVEN/STIVEN CARRERO     F/O Payor/Plan Precert #    STIVEN/STIVEN CARRERO     Subscriber Subscriber #    Mary Wang [id number]    Address Phone    [address]  [place] [id number] [phone]                                                      INTERAGENCY TRANSFER FORM - PHYSICIAN ORDERS   1/7/2017                       [address] [facility] BANK: [phone]            Attending Provider: Fam Patel MD     Allergies:  Levaquin, Toro Podhaler, Suprax, Augmentin, Avelox, Cedax, Penicillins, Vantin    Infection:  VRE-Contact Isolation, MRSA-Contact Isolation   Service:  INTERNAL MED    Ht:  1.63 m (5' 4.17\")   Wt:  60.51 kg (133 lb 6.4 oz)   Admission Wt:  58.106 kg (128 lb 1.6 oz)    BMI:  22.77 kg/m 2   BSA:  1.66 m 2            ED Clinical Impression     Diagnosis Description Comment Added By Time Added    Acute on chronic respiratory failure with hypoxemia (H) [J96.21] Acute on chronic respiratory failure with hypoxemia (H) [J96.21]  Camila Gómez MD 1/7/2017  3:50 PM      Hospital Problems as of 1/10/2017              Priority Class Noted POA    Acute on chronic respiratory failure (H) Medium  1/7/2017 Yes      Non-Hospital Problems as of 1/10/2017              Priority Class Noted    Lung infection Medium  4/17/2014    COPD exacerbation (H)   5/2/2014    Acute-on-chronic respiratory failure (H) Medium  5/3/2014    Shortness of breath   8/3/2014    Hyponatremia Medium  7/26/2015    Urinary retention Medium  8/11/2015    Altered mental status Medium  8/11/2015    NATA (acute kidney injury) (H) Medium  8/11/2015    Anxiety Medium  8/11/2015    Air hunger Medium  8/11/2015    Esophageal dysmotility Medium  8/11/2015    Achalasia Medium  8/11/2015    Delirium Medium  8/11/2015    HTN (hypertension) Medium  8/11/2015    GERD (gastroesophageal reflux disease) Medium  8/11/2015    Sepsis (H) Medium  8/11/2015    Aspiration pneumonia (H) Medium  8/11/2015    End stage COPD (H) " Medium  8/13/2015    ACP (advance care planning)   8/19/2015    Acute and chronic respiratory failure with hypercapnia (H) Medium  8/27/2015    Hypoxia Medium  12/30/2015    S/P percutaneous endoscopic gastrostomy (PEG) tube placement (H) Medium  1/25/2016    COPD (chronic obstructive pulmonary disease) (H) Medium  4/13/2016    NSTEMI (non-ST elevated myocardial infarction) (H) Medium  5/21/2016    ACS (acute coronary syndrome) (H) Medium  5/21/2016    Stress-induced cardiomyopathy Medium  5/22/2016    HCAP (healthcare-associated pneumonia) Medium  6/6/2016    Atypical chest pain Medium  6/7/2016    Elevated troponin Medium  6/7/2016    Encounter for gastrojejunal (GJ) tube placement Medium  7/8/2016    UTI (urinary tract infection) Medium  9/11/2016    Altered mental state Medium  1/2/2017      Code Status History     Date Active Date Inactive Code Status Order ID Comments User Context    1/6/2017 12:01 PM 1/7/2017  8:26 PM DNR [id number]  Geo Bui MD Outpatient    1/3/2017  5:24 PM 1/6/2017 12:01 PM DNR [id number]  Geo Bui MD Inpatient    1/2/2017 11:28 PM 1/3/2017  5:24 PM Full Code [id number]  Jose David De La Cruz APRN CNP Inpatient    12/28/2016  8:59 AM 1/2/2017 11:28 PM Full Code [id number]  Theresa Wagoner MD Outpatient    12/26/2016  4:49 AM 12/28/2016  8:59 AM Full Code [id number]  Alexi Fernandez MD Inpatient    12/5/2016  2:45 PM 12/26/2016  4:49 AM Full Code [id number]  Francisco Burt MD Outpatient    12/1/2016  6:05 PM 12/5/2016  2:45 PM Full Code [id number]  Viridiana Rockwell PA-C Inpatient    11/27/2016  6:02 PM 12/1/2016  6:05 PM Full Code [id number]  Francisco Burt MD Outpatient    11/13/2016  9:57 PM 11/27/2016  6:02 PM Full Code [id number]  Jose David De La Cruz APRN CNP Inpatient    10/31/2016  1:27 AM 11/11/2016  2:43 PM Full Code [id number]  Jose David De La Cruz APRN CNP Inpatient    10/1/2016 12:45 PM 10/4/2016  4:15 PM Full Code [id number]  Jamila Comer MD  Inpatient    9/11/2016  2:46 AM 9/16/2016  3:48 PM Full Code [id number]  Theresa Wagoner MD Inpatient    7/23/2016  7:05 AM 9/11/2016  2:46 AM Full Code [id number]  Doc Barrera MD Outpatient    7/16/2016  2:33 AM 7/23/2016  7:05 AM Full Code [id number]  Moe Cárdenas MD Inpatient    7/10/2016 11:38 AM 7/16/2016  2:33 AM Full Code [id number]  Enrique Gama MD Outpatient    7/8/2016  8:04 PM 7/10/2016 11:38 AM Full Code [id number]  Shantal Dee PA Inpatient    6/6/2016  5:19 PM 6/14/2016  2:32 PM Full Code [id number]  Shantal Dee PA Inpatient    5/21/2016  3:41 PM 5/23/2016  3:59 PM Full Code [id number]  Viridiana Butcher MD ED    4/13/2016  6:13 PM 4/21/2016  1:08 PM Full Code [id number]  Arley Guzman MD Inpatient    2/1/2016  8:43 AM 4/13/2016  6:13 PM Full Code [id number]  Emily Hoover MD Outpatient    1/20/2016  8:10 PM 2/1/2016  8:43 AM Full Code [id number]  Emily Hoover MD Inpatient    1/20/2016  7:17 PM 1/20/2016  8:10 PM Full Code [id number]  Emily Hoover MD ED    1/2/2016  8:32 AM 1/20/2016  7:17 PM Full Code [id number]  Elias Mcdermott MD Outpatient    1/1/2016  1:56 PM 1/2/2016  8:32 AM Full Code [id number]  Almaz Reagan MD Outpatient    12/30/2015  9:45 PM 1/1/2016  1:56 PM Full Code [id number]  Martinez Hamm MD Inpatient    9/24/2015 12:37 PM 12/30/2015  9:45 PM Full Code [id number]  Verner, James R, MD Outpatient    8/27/2015  4:30 AM 9/24/2015 12:37 PM Full Code [id number]  Eric Cosme MD Inpatient    8/13/2015  2:51 PM 8/27/2015  4:30 AM Full Code [id number]  Emilia Kurtz MD Inpatient    8/10/2015  7:50 AM 8/13/2015  2:51 PM DNR/DNI [id number]  Marcia Aleman MD Outpatient    8/7/2015 11:13 AM 8/10/2015  7:50 AM DNR/DNI [id number]  Marcia Aleman MD Inpatient    8/4/2015  2:55 PM 8/7/2015 11:13 AM DNR [id number]  Driss Huggins MD Inpatient    7/31/2015  6:35 PM  8/4/2015  2:55 PM DNR/DNI [id number]  Driss Huggins MD Inpatient    7/26/2015  8:21 PM 7/31/2015  6:35 PM Full Code [id number]  Larry Hong MD Inpatient    6/21/2015 11:33 AM 7/26/2015  8:21 PM Full Code [id number]  Mady Atkinson MD Outpatient    6/17/2015  6:34 AM 6/21/2015 11:33 AM Full Code [id number]  Swetha Bundy APRN CNP Inpatient    8/8/2014  7:29 AM 6/17/2015  6:34 AM Full Code [id number]  Ron Kirkland MD Outpatient    8/3/2014  6:08 PM 8/8/2014  7:29 AM Full Code [id number]  Ron Kirkland MD Inpatient    5/4/2014 12:08 PM 8/3/2014  6:08 PM Full Code [id number]  Darlene Del Real MD Outpatient    5/2/2014  9:16 AM 5/4/2014 12:08 PM Full Code [id number]  Darlene Del Real MD Inpatient      Current Code Status     Date Active Code Status Order ID Comments User Context       1/7/2017  8:26 PM DNR [id number]  Jose David De La Cruz, APRRICHIE CNP Inpatient       Summary of Visit     Reason for your hospital stay       Acute on chronic hypoxic respiratory failure, suspect copd exacerbation.   Urinary tract infection, pseudomonas sp.                Medication Review      START taking        Dose / Directions Comments    piperacillin-tazobactam 3-0.375 GM vial   Commonly known as:  ZOSYN   Indication:  Urinary Tract Infection   Used for:  Urinary tract infection without hematuria, site unspecified        Dose:  3.375 g   Inject 3.375 g into the vein every 6 hours for 9 days   Quantity:  540 mL   Refills:  0          CONTINUE these medications which may have CHANGED, or have new prescriptions. If we are uncertain of the size of tablets/capsules you have at home, strength may be listed as something that might have changed.        Dose / Directions Comments    * predniSONE 5 MG/5ML solution   This may have changed:    - Another medication with the same name was added. Make sure you understand how and when to take each.  - Another medication with the same name was removed.  Continue taking this medication, and follow the directions you see here.   Used for:  Shortness of breath        Dose:  20 mg   Take 20 mLs (20 mg) by mouth daily   Quantity:  500 mL   Refills:  0    Continue prednisone taper until reaches 20 mg daily, then continue 20 mg daily.       * predniSONE 20 MG tablet   Commonly known as:  DELTASONE   This may have changed:  You were already taking a medication with the same name, and this prescription was added. Make sure you understand how and when to take each.   Used for:  Acute on chronic respiratory failure with hypoxemia (H), COPD exacerbation (H)   Replaces:  predniSONE 5 MG/5ML solution        Take 3 tabs (60 mg) by mouth daily x 2 days, 2 tabs (40 mg) daily x 3 days, then continue 20 mg daily.   Quantity:  12 tablet   Refills:  0        * Notice:  This list has 2 medication(s) that are the same as other medications prescribed for you. Read the directions carefully, and ask your doctor or other care provider to review them with you.      CONTINUE these medications which have NOT CHANGED        Dose / Directions Comments    acetaminophen 160 MG/5ML solution   Commonly known as:  TYLENOL   Used for:  Other chronic pain        Dose:  650 mg   20.3 mLs (650 mg) by Per Feeding Tube route every 4 hours as needed for mild pain   Quantity:  300 mL   Refills:  0        aspirin 81 MG chewable tablet   Used for:  NSTEMI (non-ST elevated myocardial infarction) (H)        Dose:  81 mg   1 tablet (81 mg) by Per Feeding Tube route daily   Quantity:  36 tablet   Refills:  0        atorvastatin 20 MG tablet   Commonly known as:  LIPITOR   Used for:  NSTEMI (non-ST elevated myocardial infarction) (H)        Dose:  20 mg   Take 1 tablet (20 mg) by mouth daily   Quantity:  30 tablet   Refills:  0        budesonide 0.5 MG/2ML neb solution   Commonly known as:  PULMICORT   Used for:  Shortness of breath, Respiratory difficulty        Dose:  0.5 mg   Take 2 mLs (0.5 mg) by nebulization 2  times daily   Quantity:  60 ampule   Refills:  0        calcium carbonate 500 MG chewable tablet   Commonly known as:  TUMS   Used for:  Gastroesophageal reflux disease, esophagitis presence not specified        Dose:  1 chew tab   1 tablet (500 mg) by Per G Tube route every 2 hours as needed for heartburn   Quantity:  150 tablet   Refills:  0        clonazePAM 0.1 mg/mL   Commonly known as:  klonoPIN   Used for:  Shortness of breath        Dose:  1.5 mg   Take 15 mLs (1.5 mg) by mouth 4 times daily Needs appt for refills   Quantity:  420 mL   Refills:  0        Cranberry 500 MG Caps        Dose:  500 mg   Take 1 capsule (500 mg) by mouth daily   Quantity:  90 capsule   Refills:  0        fexofenadine 180 MG tablet   Commonly known as:  ALLEGRA   Used for:  COPD exacerbation (H)        Dose:  180 mg   Take 1 tablet (180 mg) by mouth daily   Quantity:  30 tablet   Refills:  0        fiber modular packet   Used for:  Diarrhea, unspecified type        Dose:  1 packet   1 packet by Per J Tube route 3 times daily   Quantity:  42 packet   Refills:  0        fluticasone 50 MCG/ACT spray   Commonly known as:  FLONASE   Used for:  Acute and chronic respiratory failure with hypercapnia (H)        Dose:  2 spray   Spray 2 sprays into both nostrils daily   Quantity:  1 Bottle   Refills:  0        gabapentin 250 MG/5ML solution   Commonly known as:  NEURONTIN   Used for:  Anxiety        Dose:  300 mg   6 mLs (300 mg) by Per J Tube route 2 times daily   Quantity:  450 mL   Refills:  0        guaiFENesin 200 MG Tabs tablet   Commonly known as:  ORGANIDIN   Used for:  Shortness of breath        Dose:  200 mg   Take 1 tablet (200 mg) by mouth 4 times daily   Quantity:  115 tablet   Refills:  0        HYDROmorphone 1 MG/ML Liqd liquid   Commonly known as:  DILAUDID   Used for:  COPD exacerbation (H)        Dose:  1 mg   Take 1 mL (1 mg) by mouth every 2 hours as needed for moderate to severe pain (air hunger)   Quantity:  50 mL  "  Refills:  0        ipratropium 0.02 % neb solution   Commonly known as:  ATROVENT   Used for:  Shortness of breath        Dose:  0.5 mg   Take 2.5 mLs (0.5 mg) by nebulization every 4 hours   Quantity:  450 mL   Refills:  0        JEVITY 1.5 IZZY Liqd   Used for:  Achalasia        Dose:  5 Can   Take 5 Cans by mouth daily Per tube feeding   Quantity:  150 Can   Refills:  11    - Height 5'2\"    - Weight 120 lbs    - Length of need 99 months       lactobacillus rhamnosus (GG) capsule   Used for:  Diarrhea, unspecified type        Dose:  1 capsule   Take 1 capsule by mouth 2 times daily   Quantity:  60 capsule   Refills:  0        levalbuterol 1.25 MG/3ML neb solution   Commonly known as:  XOPENEX   Used for:  Chronic obstructive pulmonary disease with acute exacerbation (H)        Dose:  1 ampule   Take 3 mLs (1.25 mg) by nebulization every 4 hours   Quantity:  540 mL   Refills:  0        lidocaine 15 mL, alum & mag hydroxide-simethicone 15 mL GI Cocktail   Used for:  Gastroesophageal reflux disease without esophagitis        Dose:  30 mL   Take 30 mLs by mouth 3 times daily as needed for moderate pain   Quantity:  500 mL   Refills:  0        loperamide 2 MG tablet   Commonly known as:  IMODIUM A-D   Used for:  Frequent stools        2-2 mg tablets per J-tube qid prn frequent and/or loose stools. Do not use more than 8 tabs per day.   Quantity:  30 tablet   Refills:  0        LORazepam 2 MG/ML (HIGH CONC) solution   Commonly known as:  ATIVAN   Used for:  COPD exacerbation (H), Anxiety        Dose:  0.5 mg   Take 0.25 mLs (0.5 mg) by mouth every 3 hours as needed for anxiety   Quantity:  15 mL   Refills:  0        melatonin 1 MG/ML Liqd liquid   Used for:  Anxiety, Insomnia due to medical condition        Dose:  3 mg   3 mLs (3 mg) by Per J Tube route At Bedtime   Quantity:  300 mL   Refills:  0        Multiple Vitamins-Iron 15 MG Chew   Used for:  Dietary supplement-induced neuropathy (H)        Dose:  1 chew tab "   1 chew tab by Per J Tube route daily Contains ferrous gluconate, 15 mL = 9 mg iron   Quantity:  30 tablet   Refills:  0        omeprazole 2 mg/mL   Commonly known as:  priLOSEC   Used for:  Achalasia        Dose:  20 mg   10 mLs (20 mg) by Per Feeding Tube route 2 times daily   Quantity:  600 mL   Refills:  3        polyethylene glycol 0.4%- propylene glycol 0.3% 0.4-0.3 % Soln ophthalmic solution   Commonly known as:  SYSTANE ULTRA   Used for:  Dry eyes, bilateral        Dose:  1-2 drop   Place 1-2 drops into both eyes 4 times daily 0900, 1300, 1700, 2100   Quantity:  1 Bottle   Refills:  0        polyethylene glycol Packet   Commonly known as:  MIRALAX/GLYCOLAX   Used for:  Constipation, unspecified constipation type        Dose:  17 g   17 g by Per J Tube route 2 times daily as needed for constipation Hold for loose stools   Quantity:  100 packet   Refills:  0    Hold for loose stools       QUEtiapine 25 MG tablet   Commonly known as:  SEROquel   Used for:  Anxiety, Insomnia due to medical condition, Air hunger        Dose:  25 mg   Take 1 tablet (25 mg) by mouth 3 times daily as needed (anxiety, sleep)   Quantity:  30 tablet   Refills:  0        senna-docusate 8.6-50 MG per tablet   Commonly known as:  SENOKOT-S;PERICOLACE   Used for:  Constipation, unspecified constipation type        Dose:  2 tablet   2 tablets by Per J Tube route 2 times daily as needed for constipation   Quantity:  100 tablet   Refills:  0        sertraline 20 MG/ML (HIGH CONC) solution   Commonly known as:  ZOLOFT   Used for:  Anxiety        Dose:  150 mg   7.5 mLs (150 mg) by Per Feeding Tube route daily   Quantity:  105 mL   Refills:  0        simethicone 125 MG Chew chewable tablet   Commonly known as:  MYLANTA GAS   Used for:  Achalasia        Dose:  125 mg   1 tablet (125 mg) by Per Feeding Tube route 4 times daily   Quantity:  120 tablet   Refills:  0        sodium chloride 0.65 % nasal spray   Commonly known as:  OCEAN   Used for:   Chronic sinusitis, unspecified location        Dose:  1 spray   Spray 1 spray into both nostrils daily as needed for congestion   Quantity:  1 Bottle   Refills:  0        sulfamethoxazole-trimethoprim suspension   Commonly known as:  BACTRIM/SEPTRA   Used for:  Chronic obstructive pulmonary disease with acute exacerbation (H)        Dose:  20 mL   20 mLs (160 mg) by Per J Tube route daily Dose based on TMP component. 20 mLs = 160 mg   Quantity:  560 mL   Refills:  0          STOP taking     predniSONE 5 MG/5ML solution   Replaced by:  predniSONE 20 MG tablet   You also have another medication with the same name that you need to continue taking as instructed.                   After Care     Activity       Your activity upon discharge: activity as tolerated, turn Q 2 hour.       Diet       Follow this diet upon discharge:resume prior to admission diet.          Orders Placed This Encounter  Adult Formula Drip Feeding: Specified Time Isosource 1.5; Jejunostomy; Goal Rate: 85; mL/hr; From: 8:00 PM; 8:00 AM; Medication - Tube Feeding Flush Frequency: At least 15-30 mL water before and after medication administration and with tube clogging;  NPO Time Specified Ice Chips             Follow-Up Appointment Instructions     Adult [facility] Follow-up and recommended labs and tests       Follow up with primary care provider, Radha Mcmillan, within 7 days for hospital follow- up.     Keep other appointments as scheduled.     Appointments on [place] and/or [facility] (with [facility] or [facility] provider or service). Call [phone] if you haven't heard regarding these appointments within 7 days of discharge.             Statement of Approval     Ordered          01/10/17 1020  I have reviewed and agree with all the recommendations and orders detailed in this document.   EFFECTIVE NOW     Approved and electronically signed by:  Fam Patel MD                                                 INTERAGENCY TRANSFER FORM -  "NURSING   1/7/2017                       [address] [facility]: [phone]            Attending Provider: Fam Patel MD     Allergies:  Levaquin, Toro Podhaler, Suprax, Augmentin, Avelox, Cedax, Penicillins, Vantin    Infection:  VRE-Contact Isolation, MRSA-Contact Isolation   Service:  INTERNAL MED    Ht:  1.63 m (5' 4.17\")   Wt:  60.51 kg (133 lb 6.4 oz)   Admission Wt:  58.106 kg (128 lb 1.6 oz)    BMI:  22.77 kg/m 2   BSA:  1.66 m 2            Advance Directives        Does patient have a scanned Advance Directive/ACP document in EPIC?           Yes        Immunizations     Name Date      Influenza (High Dose) 3 valent vaccine 10/04/16     Influenza (High Dose) 3 valent vaccine 09/24/15     Influenza (High Dose) 3 valent vaccine 10/06/14     Influenza (IIV3) 10/21/13     Influenza (IIV3) 11/20/12     Pneumococcal (PCV 13) 11/17/14     Pneumococcal 23 valent 05/03/12     Tetanus Not Indicated - By Hx 05/03/14       ASSESSMENT     Discharge Profile Flowsheet     DISCHARGE NEEDS ASSESSMENT     Last Bowel Movement  01/09/17 01/09/17 1618    Equipment Currently Used at Home  commode;hospital bed;walker, rolling;wheelchair 01/04/17 0924   GI Signs/Symptoms  no gastrointestinal signs/symptoms 01/09/17 1412    Transportation Available  van, wheelchair accessible 01/04/17 0917   COMMUNICATION ASSESSMENT      Equipment Used at Home  walker, rolling;grab bar;shower chair 01/22/16 1415   Patient's communication style  spoken language (English or Bilingual) 01/02/17 1317    FUNCTIONAL LEVEL CURRENT     FINAL RESOURCES      Ambulation  4-->completely dependent 01/07/17 2023   Resources List  DME 07/29/15 1006    Transferring  3-->assistive equipment and person 01/07/17 2023   Existing Resources/Services  DME 08/05/14 1504    Toileting  2-->assistive person 01/07/17 2023   SKIN      Bathing  2-->assistive person 01/07/17 2023   Inspection  Full 01/10/17 1002    Dressing  2-->assistive person 01/07/17 2023   Skin areas " "NOT inspected  Hip, left;Hip, right;Buttock, left;Buttock, right;Sacrum;Coccyx 01/09/17 1618    Eating  4-->completely dependent 01/07/17 2023   Skin WDL  ex 01/10/17 1002    Communication  0-->understands/communicates without difficulty 01/07/17 2023   Skin Color/Characteristics  bruised (ecchymotic);pale;redness blanchable 01/10/17 1002    Swallowing  0-->swallows foods/liquids without difficulty 01/07/17 2023   Skin Temperature  cool 01/10/17 1002    Change in Functional Status Since Onset of Current Illness/Injury  no 06/06/16 2137   Skin Moisture  dry 01/10/17 1002    GASTROINTESTINAL (ADULT,PEDIATRIC,OB)     Skin Integrity  bruise(s);drain/device(s);scar(s);wound(s) 01/10/17 1002    GI WDL  WDL 01/10/17 1002   SAFETY      Abdominal Appearance  flat 01/09/17 2109   Safety WDL  WDL 01/10/17 1002    All Quadrants Bowel Sounds  audible and active in all quadrants 01/10/17 1002   Safety Factors  bed in low position;wheels locked;call light in reach 01/08/17 0331                 Assessment WDL (Within Defined Limits) Definitions           Safety WDL     Effective: 09/28/15    Row Information: <b>WDL Definition:</b> Bed in low position, wheels locked; call light in reach; upper side rails up x 2; ID band on<br> <font color=\"gray\"><i>Item=AS safety wdl>>List=AS safety wdl>>Version=F14</i></font>      Skin WDL     Effective: 09/28/15    Row Information: <b>WDL Definition:</b> Warm; dry; intact; elastic; without discoloration; pressure points without redness<br> <font color=\"gray\"><i>Item=AS skin wdl>>List=AS skin wdl>>Version=F14</i></font>      Vitals     Vital Signs Flowsheet     VITAL SIGNS     Sleep  normal sleep 01/10/17 0731    Temp  97.7  F (36.5  C) 01/10/17 0742   ANALGESIA SIDE EFFECTS MONITORING      Temp src  Oral 01/10/17 0742   Side Effects Monitoring: Respiratory Quality  V 01/10/17 0936    Resp  16 01/10/17 0742   Side Effects Monitoring: Respiratory Depth  V 01/10/17 0936    Pulse  116 01/07/17 1131   " "Side Effects Monitoring: Sedation Level  S 01/10/17 0936    Heart Rate  87 01/10/17 0742   HEIGHT AND WEIGHT      Pulse/Heart Rate Source  Monitor 01/10/17 0742   Height  1.63 m (5' 4.17\") 01/08/17 1430    BP  137/65 mmHg 01/10/17 0742   Weight  60.51 kg (133 lb 6.4 oz) (bed scale) 01/10/17 0349    BP Location  Left arm 01/10/17 0742   BSA (Calculated - sq m)  1.62 01/08/17 1430    OXYGEN THERAPY     BMI (Calculated)  21.92 01/08/17 1430    SpO2  97 % 01/10/17 1003   POSITIONING      O2 Device  Mechanical Ventilator 01/10/17 1003   Body Position  left, side-lying 01/10/17 1002    FiO2 (%)  35 % 01/10/17 1003   Head of Bed (HOB)  HOB at 30-45 degrees 01/10/17 1002    Oxygen Delivery  3 LPM 01/10/17 0742   Positioning/Transfer Devices  pillows;in use 01/10/17 0136    Suction Occurrance  1 01/09/17 1100   DAILY CARE      PAIN/COMFORT     Activity Type  bedrest 01/10/17 1002    Patient Currently in Pain  yes 01/10/17 0415   Activity Level of Assistance  assistance, 1 person 01/10/17 1002    Preferred Pain Scale  CAPA (Clinically Aligned Pain Assessment) ([facility] Adults Only) 01/10/17 0415   ECG      Pain Location  Back 01/10/17 0415   ECG Rhythm  Normal sinus rhythm 01/10/17 1003    Pain Orientation  Lower 01/09/17 2021   Ectopy  None 01/10/17 1003    Pain Descriptors  Aching 01/09/17 2021   EKG MONITORING      Pain Intervention(s)  Medication (See eMAR) 01/10/17 0415   Cardiac Regularity  Regular 01/07/17 1439    Response to Interventions  No change in pain 01/10/17 0532   Cardiac Rhythm  ST 01/07/17 1439    CLINICALLY ALIGNED PAIN ASSESSMENT (CAPA) ([facility] ADULTS ONLY)     MARTHA COMA SCALE      Comfort  comfortably manageable 01/10/17 0936   Best Eye Response  4-->(E4) spontaneous 01/09/17 1615    Change in Pain  about the same 01/10/17 0936   Best Motor Response  6-->(M6) obeys commands 01/09/17 1613    Pain Control  partially effective 01/10/17 0936   Best Verbal Response  " 5-->(V5) oriented 01/09/17 1615    Functioning  can do everything I need to 01/10/17 0936   Reynaldo Coma Scale Score  15 01/09/17 1615            Patient Lines/Drains/Airways Status    Active LINES/DRAINS/AIRWAYS     Name: Placement date: Placement time: Site: Days: Last dressing change:    Airway - Adult/Peds 6 [place] 04/19/16  1300  6  265     Gastrostomy/Enterostomy Gastrojejunostomy RUQ 1  09/20/16  1510  RUQ  111     Gastrostomy/Enterostomy Gastrojejunostomy            Urethral Catheter Straight-tip 16 fr 01/05/17  1218  Straight-tip  4     Peripheral IV 01/02/17 Right Hand 01/02/17  1735  Hand  7     Pressure Injury 10/01/16 Coccyx 10/01/16  1215   100     Wound 09/11/16 Mid Coccyx Suspected pressure ulcer Suspected pressure ulcer on coccyx 09/11/16  0300  Coccyx  121     Wound 10/31/16 Left;Anterior;Right Arm Skin tear Large skin tear forearm 10/31/16  0800  Arm  71             Patient Lines/Drains/Airways Status    Active PICC/CVC     Name: Placement date: Placement time: Site: Days: Additional Info Last dressing change:    PICC Double Lumen 01/09/17 Right Basilic 01/09/17  1839  Basilic  less than 1 External Cath Length (cm): 1 cm  01/09/17 1839 (15.78 hrs)         Size (Fr): 5 Fr           Orientation: Right           Extremity Circumference (cm): 29 cm           Catheter Brand: Hootsuiteilyst           Dressing Intervention: Transparent;Securing device;New dressing;Chlorhexidine patch           Description: Valved;Power PICC           Total Catheter Length (cm) Trimmed: 38 cm           Site Prep: Alcohol;Chlorhexidine           Local Anesthetic: Injectable           Inserted by: DILCIA Singh, RN [facility]           Insertion attempts with ultrasound: 1           Patient Tolerance: Tolerated well           Placement Verification: Blood Return;Xray;Other (Comment)           Difficulty with threading line: No           Tip location: SVC           Full barrier precautions done: Yes           Consent Signed:  Yes           Time Out performed: Yes           Lot #: [id number]           Use for : Antibiotics. 6B RN notified that PICC is okay to use.              Intake/Output Detail Report     Date Intake       Output   Net    Shift I.V. NG/GT IV Piggyback Enteral Total Urine Emesis/NG output Total       Day 01/09/17 0000 - 01/09/17 0659 -- 325 -- 595 920 250 75 325 595    Emilie 01/09/17 0700 - 01/09/17 1459 -- 240 -- 0 240 450 50 500 -260    Noc 01/09/17 1500 - 01/09/17 2359 -- 460 -- 340 800 380 70 450 350    Day 01/10/17 0000 - 01/10/17 0659 100 360 -- 595 1055 400 235 635 420    Emilie 01/10/17 0700 - 01/10/17 1459 -- 180 -- 0 180 350 75 425 -245      Case Management/Discharge Planning     Case Management/Discharge Planning Flowsheet     REFERRAL INFORMATION     FINAL RESOURCES      Arrived From  admitted as an inpatient 01/03/17 0040   Equipment Currently Used at Home  commode;hospital bed;walker, rolling;wheelchair 01/04/17 0924    LIVING ENVIRONMENT     Resources List  [facility] 07/29/15 1006    Lives With  other (see comments) (group home) 01/07/17 2022   Existing Resources/Services  [facility] 08/05/14 1504    Living Arrangements  group home 01/04/17 0924   ABUSE RISK SCREEN      Provides Primary Care For  no one, unable/limited ability to care for self 10/31/16 0454   QUESTION TO PATIENT:  Has a member of your family or a partner(now or in the past) intimidated, hurt, manipulated, or controlled you in any way?  no 01/07/17 1131    COPING/STRESS     QUESTION TO PATIENT: Do you feel safe going back to the place where you are living?  yes 01/07/17 1131    Major Change/Loss/Stressor  illness 01/07/17 2045   OBSERVATION: Is there reason to believe there has been maltreatment of a vulnerable adult (ie. Physical/Sexual/Emotional abuse, self neglect, lack of adequate food, shelter, medical care, or financial exploitation)?  no 01/07/17 1131    DISCHARGE PLANNING     (R) MENTAL HEALTH SUICIDE RISK      Transportation Available  van, wheelchair  accessible 01/04/17 0917   Are you depressed or being treated for depression?  No 01/07/17 2043    Equipment Used at Home  walker, rolling;grab bar;shower chair 01/22/16 1415                       [address] [facility]: [phone]            Medication Administration Report for Mary Wang as of 01/10/17 1025   Legend:    Given Hold Not Given Due Canceled Entry Other Actions    Time Time (Time) Time  Time-Action       Inactive    Active    Linked        Medications 01/04/17 01/05/17 01/06/17 01/07/17 01/08/17 01/09/17 01/10/17    acetaminophen (TYLENOL) solution 650 mg  Dose: 650 mg Freq: EVERY 4 HOURS PRN Route: PER FEEDING   PRN Reason: mild pain  Start: 01/07/17 2025   Admin Instructions: Maximum acetaminophen dose from all sources= 75 mg/kg/day not to exceed 4 grams/day.         0208 (650 mg)-Given             aspirin chewable tablet 81 mg  Dose: 81 mg Freq: DAILY Route: PER FEEDING   Start: 01/08/17 0800        0844 (81 mg)-Given        0857 (81 mg)-Given        0829 (81 mg)-Given           atorvastatin (LIPITOR) tablet 20 mg  Dose: 20 mg Freq: DAILY Route: PO  Start: 01/08/17 0800        0844 (20 mg)-Given        0857 (20 mg)-Given        0828 (20 mg)-Given           budesonide (PULMICORT) neb solution 0.5 mg  Dose: 0.5 mg Freq: 2 TIMES DAILY Route: NEBULIZATION  Start: 01/07/17 2030       (2041)-Not Given        0017 (0.5 mg)-Given       1359 (0.5 mg)-Given       2329 (0.5 mg)-Given        1211 (0.5 mg)-Given        0044 (0.5 mg)-Given       [ ] 1200           calcium carbonate (TUMS) chewable tablet 500 mg  Dose: 500 mg Freq: EVERY 2 HOURS PRN Route: PER G TUBE  PRN Reason: heartburn  Start: 01/07/17 2025              clonazePAM (klonoPIN) suspension 1.5 mg  Dose: 1.5 mg Freq: 4 TIMES DAILY Route: ORAL OR FEED  Start: 01/07/17 2030   Admin Instructions: Shake Well.        2251 (1.5 mg)-Given        0916 (1.5 mg)-Given       1200 (1.5 mg)-Given       1604 (1.5 mg)-Given       1901 (1.5 mg)-Given         0855 (1.5 mg)-Given       1300 (1.5 mg)-Given       1640 (1.5 mg)-Given       2030 (1.5 mg)-Given        0855 (1.5 mg)-Given       [ ] 1200       [ ] 1600       [ ] 2000           fexofenadine (ALLEGRA) tablet 180 mg  Dose: 180 mg Freq: DAILY Route: PO  Start: 01/08/17 0800        0843 (180 mg)-Given        0857 (180 mg)-Given        0829 (180 mg)-Given           fiber modular (NUTRISOURCE FIBER) packet 1 packet  Dose: 1 packet Freq: 3 TIMES DAILY Route: PER J TUBE  Start: 01/07/17 2030   Admin Instructions: Not a pharmacy item  Mix each packet with 60 mL water before administering. Supplied by nutrition department.        2147 (1 packet)-Given        0955 (1 packet)-Given       1325 (1 packet)-Given       1902 (1 packet)-Given        0859 (1 packet)-Given       1408 (1 packet)-Given       2030 (1 packet)-Given        0828 (1 packet)-Given       [ ] 1400       [ ] 2000           fluticasone (FLONASE) 50 MCG/ACT spray 2 spray  Dose: 2 spray Freq: DAILY Route: BOTH NOSTRIL  Start: 01/08/17 0800        0852 (2 spray)-Given        0857 (2 spray)-Given        0834 (2 spray)-Given           gabapentin (NEURONTIN) solution 300 mg  Dose: 300 mg Freq: 2 TIMES DAILY Route: PER J TUBE  Start: 01/07/17 2030       (2028)-Not Given [C]        0846 (300 mg)-Given       1901 (300 mg)-Given        0856 (300 mg)-Given       2030 (300 mg)-Given        0832 (300 mg)-Given       [ ] 2000           guaiFENesin (ROBITUSSIN) 20 mg/mL solution 10 mL  Dose: 10 mL Freq: 4 TIMES DAILY Route: ORAL OR FEED  Start: 01/07/17 2130       (2252)-Not Given        0846 (10 mL)-Given       1200 (10 mL)-Given       1558 (10 mL)-Given       1901 (10 mL)-Given        0857 (10 mL)-Given       1300 (10 mL)-Given       1640 (10 mL)-Given       2030 (10 mL)-Given        0833 (10 mL)-Given       [ ] 1200       [ ] 1600       [ ] 2000           heparin lock flush 10 UNIT/ML injection 2-5 mL  Dose: 2-5 mL Freq: ONCE PRN Route: IK  PRN Reason: line flush  PRN  Comment: for locking each dormant lumen with line placement  Start: 01/09/17 1328   Admin Instructions: May repeat x 1               HYDROmorphone (DILAUDID) liquid 1.5 mg  Dose: 1.5 mg Freq: EVERY 2 HOURS PRN Route: ORAL OR FEED  PRN Reason: moderate to severe pain  PRN Comment: air hunger  Start: 01/07/17 2025        0017 (1.5 mg)-Given       0208 (1.5 mg)-Given       0543 (1.5 mg)-Given       0911 (1.5 mg)-Given       1115 (1.5 mg)-Given       1318 (1.5 mg)-Given       1517 (1.5 mg)-Given       1725 (1.5 mg)-Given       1926 (1.5 mg)-Given       2150 (1.5 mg)-Given       2356 (1.5 mg)-Given        0334 (1.5 mg)-Given       0544 (1.5 mg)-Given       0855 (1.5 mg)-Given       1047 (1.5 mg)-Given       1300 (1.5 mg)-Given       1502 (1.5 mg)-Given       1711 (1.5 mg)-Given       1917 (1.5 mg)-Given       2142 (1.5 mg)-Given        0053 (1.5 mg)-Given       0319 (1.5 mg)-Given       0555 (1.5 mg)-Given       0829 (1.5 mg)-Given       1023 (1.5 mg)-Given           ipratropium (ATROVENT) 0.02 % neb solution 0.5 mg  Dose: 0.5 mg Freq: EVERY 4 HOURS WHILE AWAKE Route: NEBULIZATION  Start: 01/07/17 2045       (2041)-Not Given        0017 (0.5 mg)-Given       1000 (0.5 mg)-Given       1400 (0.5 mg)-Given       1713 (0.5 mg)-Given       2022 (0.5 mg)-Given       2329 (0.5 mg)-Given        0829 (0.5 mg)-Given       1213 (0.5 mg)-Given       1541 (0.5 mg)-Given       2010 (0.5 mg)-Given        0044 (0.5 mg)-Given       0814 (0.5 mg)-Given       [ ] 1200       [ ] 1600       [ ] 2000           lactobacillus rhamnosus (GG) (CULTURELL) capsule 1 capsule  Dose: 1 capsule Freq: 2 TIMES DAILY Route: PO  Start: 01/07/17 2030   Admin Instructions: Administer at least 2 hours before or after oral antibiotics. Capsules may be opened.        2147 (1 capsule)-Given        0852 (1 capsule)-Given       1901 (1 capsule)-Given        0857 (1 capsule)-Given       2030 (1 capsule)-Given        0833 (1 capsule)-Given       [ ] 2000            "levalbuterol (XOPENEX) neb solution 1.25 mg  Dose: 1 ampule Freq: EVERY 4 HOURS WHILE AWAKE Route: NEBULIZATION  Start: 01/07/17 2030       (2042)-Not Given        1000 (1.25 mg)-Given       1400 (1.25 mg)-Given       1713 (1.25 mg)-Given       2022 (1.25 mg)-Given        0829 (1.25 mg)-Given       1210 (1.25 mg)-Given       1541 (1.25 mg)-Given       2010 (1.25 mg)-Given        0815 (1.25 mg)-Given       [ ] 1200       [ ] 1600       [ ] 2000           lidocaine (LMX4) kit  Freq: ONCE PRN Route: Top  PRN Reason: moderate pain  PRN Comment: for local anesthetic during PICC insertion  Start: 01/09/17 1328   Admin Instructions: Apply to PICC Insertion Site. Apply 30 minutes prior to procedure. MAX Dose: 2.5 gm  (1/2 of 5 gm tube)               lidocaine (LMX4) kit  Freq: EVERY 1 HOUR PRN Route: Top  PRN Reason: pain  PRN Comment: with VAD insertion or accessing implanted port.  Start: 01/07/17 2025   Admin Instructions: Do NOT give if patient has a history of allergy to any local anesthetic or any \"karina\" product.   Apply 30 minutes prior to VAD insertion or port access.  MAX Dose:  2.5 g (  of 5 g tube)               lidocaine (XYLOCAINE) 2 % 15 mL, alum & mag hydroxide-simethicone (MYLANTA ES/MAALOX  ES) 15 mL GI Cocktail  Dose: 30 mL Freq: 3 TIMES DAILY PRN Route: PO  PRN Reason: moderate pain  Start: 01/07/17 2025              lidocaine 1 % 1 mL  Dose: 1 mL Freq: EVERY 1 HOUR PRN Route: OTHER  PRN Comment: mild pain with VAD insertion or accessing implanted port  Start: 01/07/17 2025   Admin Instructions: Do NOT give if patient has a history of allergy to any local anesthetic or any \"karina\" product. MAX dose 1 mL subcutaneous OR intradermal in divided doses.               loperamide (IMODIUM) liquid 2 mg  Dose: 2 mg Freq: 4 TIMES DAILY PRN Route: PER J TUBE  PRN Reason: diarrhea  Start: 01/07/17 2025              LORazepam (ATIVAN) 2 MG/ML (HIGH CONC) solution 0.5 mg  Dose: 0.5 mg Freq: EVERY 3 HOURS PRN Route: " ORAL OR FEED  PRN Reason: anxiety  Start: 01/07/17 2025        0017 (0.5 mg)-Given       0319 (0.5 mg)-Given       0846 (0.5 mg)-Given       1200 (0.5 mg)-Given       1558 (0.5 mg)-Given       1901 (0.5 mg)-Given       2151 (0.5 mg)-Given        0128 (0.5 mg)-Given       0544 (0.5 mg)-Given       0855 (0.5 mg)-Given       1502 (0.5 mg)-Given       1815 (0.5 mg)-Given       2158 (0.5 mg)-Given        0054 (0.5 mg)-Given       0408 (0.5 mg)-Given       0829 (0.5 mg)-Given           melatonin liquid 3 mg  Dose: 3 mg Freq: AT BEDTIME Route: PER J TUBE  Start: 01/07/17 2200       2238 (3 mg)-Given        2150 (3 mg)-Given        2143 (3 mg)-Given        [ ] 2200           multivitamins with minerals (CERTAVITE/CEROVITE) liquid 15 mL  Dose: 15 mL Freq: DAILY Route: PER J TUBE  Start: 01/08/17 0800        0846 (15 mL)-Given        0857 (15 mL)-Given        0833 (15 mL)-Given           naloxone (NARCAN) injection 0.1-0.4 mg  Dose: 0.1-0.4 mg Freq: EVERY 2 MIN PRN Route: IV  PRN Reason: opioid reversal  Start: 01/07/17 2025   Admin Instructions: For respiratory rate LESS than or EQUAL to 8.  Partial reversal dose:  0.1 mg titrated q 2 minutes for Analgesia Side Effects Monitoring Sedation Level of 3 (frequently drowsy, arousable, drifts to sleep during conversation).Full reversal dose:  0.4 mg bolus for Analgesia Side Effects Monitoring Sedation Level of 4 (somnolent, minimal or no response to stimulation).               omeprazole (priLOSEC) suspension 20 mg  Dose: 20 mg Freq: 2 TIMES DAILY Route: PER FEEDING   Start: 01/07/17 2030   Admin Instructions: Shake well.        2238 (20 mg)-Given        0846 (20 mg)-Given       1901 (20 mg)-Given        0856 (20 mg)-Given       2030 (20 mg)-Given        0833 (20 mg)-Given       [ ] 2000           ondansetron (ZOFRAN-ODT) ODT tab 4 mg  Dose: 4 mg Freq: EVERY 6 HOURS PRN Route: PO  PRN Reason: nausea  Start: 01/07/17 2025   Admin Instructions: This is Step 1 of nausea and vomiting  management.  If nausea not resolved in 15 minutes, go to Step 2 prochlorperazine (COMPAZINE). Do not push through foil backing. Peel back foil and gently remove. Place on tongue immediately. Administration with liquid unnecessary                                   Or  ondansetron (ZOFRAN) injection 4 mg  Dose: 4 mg Freq: EVERY 6 HOURS PRN Route: IV  PRN Reasons: nausea,vomiting  Start: 01/07/17 2025   Admin Instructions: This is Step 1 of nausea and vomiting management.  If nausea not resolved in 15 minutes, go to Step 2 prochlorperazine (COMPAZINE).         1012 (4 mg)-Given       1631 (4 mg)-Given         0855 (4 mg)-Given           piperacillin-tazobactam (ZOSYN) 3.375 g vial to attach to  mL bag  Dose: 3.375 g Freq: EVERY 6 HOURS Route: IV  Indications of Use: URINARY TRACT INFECTION  Last Dose: 3.375 g (01/09/17 2023)  Start: 01/08/17 1345        1429 (3.375 g)-New Bag       1926 (3.375 g)-New Bag        0334 (3.375 g)-New Bag       0859 (3.375 g)-New Bag       1408 (3.375 g)-New Bag       2023 (3.375 g)-New Bag        0145 (3.375 g)-New Bag       0838 (3.375 g)-New Bag       [ ] 1400       [ ] 2000           polyethylene glycol 0.4%- propylene glycol 0.3% (SYSTANE ULTRA) ophthalmic solution 1-2 drop  Dose: 1-2 drop Freq: 4 TIMES DAILY Route: Both Eyes  Start: 01/07/17 2030       2220 (1 drop)-Given        0845 (1 drop)-Given       1200 (1 drop)-Given       (1558)-Not Given       (1902)-Not Given        (0858)-Not Given       (1301)-Not Given       1603 (2 drop)-Given       (2042)-Not Given        (0839)-Not Given       [ ] 1200       [ ] 1600       [ ] 2000           predniSONE (DELTASONE) tablet 60 mg  Dose: 60 mg Freq: DAILY Route: PO  Start: 01/09/17 1200         1301 (60 mg)-Given        0829 (60 mg)-Given           prochlorperazine (COMPAZINE) injection 5 mg  Dose: 5 mg Freq: EVERY 6 HOURS PRN Route: IV  PRN Reasons: nausea,vomiting  Start: 01/07/17 2025   Admin Instructions: This is Step 2 of nausea  and vomiting management.   If nausea not resolved in 15 minutes, give metoclopramide (REGLAN) if ordered (step 3 of nausea and vomiting management)              Or  prochlorperazine (COMPAZINE) tablet 5 mg  Dose: 5 mg Freq: EVERY 6 HOURS PRN Route: PO  PRN Reason: vomiting  Start: 01/07/17 2025   Admin Instructions: This is Step 2 of nausea and vomiting management.   If nausea not resolved in 15 minutes, give metoclopramide (REGLAN) if ordered (step 3 of nausea and vomiting management)              Or  prochlorperazine (COMPAZINE) Suppository 12.5 mg  Dose: 12.5 mg Freq: EVERY 12 HOURS PRN Route: RE  PRN Reasons: nausea,vomiting  Start: 01/07/17 2025   Admin Instructions: This is Step 2 of nausea and vomiting management.   If nausea not resolved in 15 minutes, give metoclopramide (REGLAN) if ordered (step 3 of nausea and vomiting management)               QUEtiapine (SEROquel) tablet 25 mg  Dose: 25 mg Freq: 3 TIMES DAILY PRN Route: PO  PRN Comment: anxiety, sleep  Start: 01/07/17 2025        0208 (25 mg)-Given        2236 (25 mg)-Given        0555 (25 mg)-Given           sertraline (ZOLOFT) 20 MG/ML (HIGH CONC) solution 150 mg  Dose: 150 mg Freq: DAILY Route: PER FEEDING   Start: 01/08/17 0800   Admin Instructions: Must dilute before use; mix with 4 oz of water, ginger ale, OJ, lemonade or lemon/lime soda.         0846 (150 mg)-Given        0856 (150 mg)-Given        0831 (150 mg)-Given           simethicone (MYLICON) suspension 125 mg  Dose: 125 mg Freq: 4 TIMES DAILY Route: PER FEEDING   Start: 01/07/17 2030       2237 (125 mg)-Given        0845 (125 mg)-Given       1200 (125 mg)-Given       1559 (125 mg)-Given       1902 (125 mg)-Given        0857 (125 mg)-Given       1301 (125 mg)-Given       (1633)-Not Given       (2035)-Not Given        0833 (125 mg)-Given       [ ] 1200       [ ] 1600       [ ] 2000           sodium chloride (OCEAN) 0.65 % nasal spray 1 spray  Dose: 1 spray Freq: DAILY PRN Route: BOTH  NOSTRIL  PRN Reason: congestion  Start: 01/07/17 2025              sodium chloride (PF) 0.9% PF flush 10 mL  Dose: 10 mL Freq: EVERY 7 DAYS Route: IK  Start: 01/09/17 1900   Admin Instructions: And Q1H PRN, to lock each CVC - Valved (Tunneled and Non-Tunneled) dormant lumen.          2027 (10 mL)-Given            sodium chloride (PF) 0.9% PF flush 10-20 mL  Dose: 10-20 mL Freq: EVERY 1 HOUR PRN Route: IK  PRN Reasons: line flush,post meds or blood draw  Start: 01/09/17 1852   Admin Instructions: to flush CVC - Valved (Tunneled and Non-Tunneled).   10 mL post IV meds; 20 mL post blood draw.               sodium chloride (PF) 0.9% PF flush 3 mL  Dose: 3 mL Freq: EVERY 8 HOURS Route: IK  Start: 01/07/17 2030   Admin Instructions: And Q1H PRN, to lock peripheral IV dormant line.        2147 (3 mL)-Given        0537 (3 mL)-Given       1318 (3 mL)-Given       (2105)-Not Given        0548 (3 mL)-Given       (1302)-Not Given       2143 (3 mL)-Given        0839 (3 mL)-Given       [ ] 1600           sodium chloride (PF) 0.9% PF flush 3 mL  Dose: 3 mL Freq: EVERY 1 HOUR PRN Route: IK  PRN Reason: line flush  PRN Comment: for peripheral IV flush post IV meds  Start: 01/07/17 2025              sulfamethoxazole-trimethoprim (BACTRIM/SEPTRA) suspension 160 mg  Dose: 20 mL Freq: DAILY Route: PER J TUBE  Indications Comment: prophylaxis  Start: 01/08/17 0800   Admin Instructions: Shake well.         0846 (160 mg)-Given        0856 (160 mg)-Given        0832 (160 mg)-Given          Completed Medications  Medications 01/04/17 01/05/17 01/06/17 01/07/17 01/08/17 01/09/17 01/10/17         Dose: 300 mg Freq: ONCE Route: PO  Start: 01/07/17 1807   End: 01/07/17 1948       1948 (300 mg)-Given                Dose: 1 mg Freq: ONCE Route: PER G TUBE  Start: 01/07/17 1141   End: 01/07/17 1419       1419 (1 mg)-Given                Dose: 1.25 mg Freq: ONCE Route: NEBULIZATION  Start: 01/07/17 1609   End: 01/07/17 1630       1630 (1.25  "mg)-Given                Dose: 1.25 mg Freq: ONCE Route: NEBULIZATION  Start: 01/07/17 1236   End: 01/07/17 1319       1319 (1.25 mg)-Given                Dose: 0.5-5 mL Freq: ONCE PRN Route: OTHER  PRN Comment: mild pain For local anesthetic during PICC insertion.  Start: 01/09/17 1328   End: 01/09/17 1839   Admin Instructions: Give Sub-Q/Intradermal.  Give in divided doses as needed.          1839 (2 mL)-Given              Dose: 0.26 mg Freq: ONCE Route: PO  Start: 01/07/17 1208   End: 01/07/17 1423       1423 (0.26 mg)-Given                Dose: 25 mg Freq: ONCE Route: PO  Start: 01/07/17 1807   End: 01/07/17 1834       1834 (25 mg)-Given                Dose: 5-50 mL Freq: ONCE PRN Route: IK  PRN Reason: line flush  PRN Comment: to flush each lumen with line placement  Start: 01/09/17 1328   End: 01/09/17 1839   Admin Instructions: May repeat x 1          1839 (20 mL)-Given           Discontinued Medications  Medications 01/04/17 01/05/17 01/06/17 01/07/17 01/08/17 01/09/17 01/10/17         Dose: 200 mg Freq: 4 TIMES DAILY Route: PER J TUBE  Start: 01/07/17 2030   End: 01/07/17 2129 2129-Med Discontinued                  Dose: 1 mg Freq: EVERY 2 HOURS PRN Route: PO  PRN Reason: moderate to severe pain  Start: 01/07/17 1552   End: 01/07/17 2025       1605 (1 mg)-Given       1815 (1 mg)-Given       2025-Med Discontinued            Freq: EVERY 1 HOUR PRN Route: Top  PRN Reason: pain  PRN Comment: with VAD insertion or accessing implanted port.  Start: 01/07/17 1155   End: 01/07/17 2025   Admin Instructions: Do NOT give if patient has a history of allergy to any local anesthetic or any \"karina\" product.   Apply 30 minutes prior to VAD insertion or port access.  MAX Dose:  2.5 g (  of 5 g tube)        2025-Med Discontinued            Dose: 1 mL Freq: EVERY 1 HOUR PRN Route: OTHER  PRN Comment: mild pain with VAD insertion or accessing implanted port  Start: 01/07/17 1155   End: 01/07/17 2025   Admin Instructions: " "Do NOT give if patient has a history of allergy to any local anesthetic or any \"karina\" product. MAX dose 1 mL subcutaneous OR intradermal in divided doses.        2025-Med Discontinued            Dose: 125 mg Freq: EVERY 8 HOURS Route: IV  Start: 01/07/17 2030   End: 01/08/17 0846   Admin Instructions: Doses greater than 125 mg need to be in at least 50 mL IVPB        2147 (125 mg)-Given        0537 (125 mg)-Given       0846-Med Discontinued           Dose: 62.5 mg Freq: EVERY 8 HOURS Route: IV  Start: 01/08/17 1400   End: 01/09/17 0857   Admin Instructions: Doses greater than 125 mg need to be in at least 50 mL IVPB         1318 (62.5 mg)-Given       2150 (62.5 mg)-Given        0602 (62.5 mg)-Given       0857-Med Discontinued          Dose: 25 mg Freq: 3 TIMES DAILY PRN Route: PO  PRN Comment: anxiety  Start: 01/07/17 2046   End: 01/07/17 2131 2131-Med Discontinued            Dose: 50 mg Freq: 3 TIMES DAILY Route: PO  Start: 01/07/17 2030   End: 01/07/17 2046 2046-Med Discontinued                  Dose: 75 mg Freq: AT BEDTIME Route: PO  Start: 01/07/17 2200   End: 01/07/17 2046 2046-Med Discontinued            Dose: 3 mL Freq: EVERY 8 HOURS Route: IK  Start: 01/07/17 1156   End: 01/07/17 2025   Admin Instructions: And Q1H PRN, to lock peripheral IV dormant line.               2025-Med Discontinued                  Dose: 3 mL Freq: EVERY 1 HOUR PRN Route: IK  PRN Reason: line flush  PRN Comment: for peripheral IV flush post IV meds  Start: 01/07/17 1155   End: 01/07/17 2025 2025-Med Discontinued       Medications 01/04/17 01/05/17 01/06/17 01/07/17 01/08/17 01/09/17 01/10/17               INTERAGENCY TRANSFER FORM - NOTES (H&P, Discharge Summary, Consults, Procedures, Therapies)   1/7/2017                       [address] [facility] BANK: [phone]               History & Physicals      H&P by Jose David De La Cruz APRN CNP at 1/7/2017  3:48 PM     Author:  Jose David De La Cruz APRN CNP " Service:  Internal Medicine Author Type:  Nurse Practitioner    Filed:  1/7/2017  5:10 PM Note Time:  1/7/2017  3:48 PM Status:  Attested    :  Jose David De La Cruz APRN CNP (Nurse Practitioner) Cosigner:  Enrique Gama MD at 1/7/2017  6:57 PM    Attestation signed by Enrique Gama MD at 1/7/2017  6:57 PM        Attestation:  10 point ROS is negative except as mentioned in the HPI  PMH, PSH, medications, SH, and FMH were reviewed and confirmed by myself    Labs and VS reviewed    /68 mmHg  Pulse 116  Temp(Src) 97.8  F (36.6  C) (Oral)  Resp 14  SpO2 97%  Chronically ill appearing woman on ventilator, alert and interactive. Patient is able to communicate.     I saw and evaluated this patient with FER De La Cruz. I agree with his/her assessment of Ms. Wang. Plan for today is to provide supportive care for acute on chronic hypercapnic hypoxic respiratory failure after an episode of breathlessness this morning. Unclear what may have triggered an increased in her symptoms this morning. Presently, she is slowly weaning back down to home Fi02 but does continue to require more suctioning than is typical for her baseline. VBG notable for significant hypercarbia but mental status appropriate on my visit. CXR has no focal infiltrate. Does have WBC elevation but procal is low risk. I suspect large component of anxiety may be playing a role here. Will conitnue steroids and nebs, hopefully we can move back to  soon. Patient may be re-orienting her overall goal of care and there is a meeting scheduled for Tuesday at the .     MD Zak Macias History and Physical  Department of Internal Medicine    Patient Name: Mary Wang MRN# [id number]   Age: 66 year old YOB: 1950     Date of Admission:1/7/2017    Primary care provider: Radha Mcmillan  Date of Service: 1/7/2017  Admitting Team: [facility]          Assessment and Plan:   Mary Wang is a 66 year old female with a history of severe COPD s/p trach on home vent 24/7, anxiety, hypertension, achalasia s/p GJ tube who presented to the hospital with acute on chronic hypoxic respiratory failure.  Of note she was just hospitalized here from 12/1-5 w/ a UTI, 12/25-28 w/bacteremia and 1/2-6 with encephalopathy ultimately attributed to her medications.    1) Severe COPD with Acute on chronic hypoxic respiratory failure - Presented to our ED with dyspnea and hypercapnia (pCO2 93 up from baseline 60s) now improved once ambu-bagged and placed back on home ventilator.  Still requiring mildly increased FiO2 (normally 30%, briefly required 50% and now stable on 35%.  Of note, still on steroid burst from prior admission.  Per nursing staff from her facility, the patient was never hypoxic prior to coming in and was instead complaining of subjective shortness of breath  - Will switch from oral prednisone to IV solumedrol overnight.  Of note patient's baseline is 20 mg prednisone PO and she remains on Bactrim for prophylaxis  - Procal pending, please follow  - Consult RT for vent management.  FiO2 35% , Tinsp 1, ,RR 14, PEEP 5  - Currently appears close to baseline.  If decompensates, would consider echo vs CT to eval for PE.  Otherwise this episode could potentially be attributed to anxiety as the patient has a history of air hunger.  - Scheduled ipatropium, levalbuterol while awake  - Continue BID pulmicort  - EKG reviewed and unchanged from prior  - Continue dilaudid for air hunger    2) Encephalopathy, resolved - Previously admitted with encephalopathy attributed to oversedation from medications.  Attempted to wean benzos but patient did not tolerate and after goals of care discussion with patient and family we are erring on the side of keeping her comfortable even if that means episodes of oversedation.  - Continue home ativan, clonazepam, hydromorphone for air  hunger, Seroquel.    3) Anxiety - On multiple agents.  Attempted to wean during last admission due to encephalopathy but ultimately unable to tolerate reduction in anxiolytics.  - Continue home clonazepam  - Continue home ativan  - Continue home hydromorphone for air hunger  - Continue home seroquel    4) Achalasia - S/p GH tube placement  - G tube to gravity drainage  - Continue home tube feeds: Isosource 1.5 @ 85 mls/hr x 12h from 3681-9648.  Free water 125 ccs q4h.    5) Urinary retention - Ongoing since prior admissions with plans for indwelling bender instead of urologic work up.  - Continue home bender catheter    CODE: DNR  Diet/IVF: On tube feeds  DVT ppx: Mechanical  Disposition/Admission Status: Inpatient    Jose David De La Cruz  Internal Medicine Hospitalist & [facility]  Pager: [phone]           Chief Complaint:   Shortness of breath         HPI:   Mary Wang is a 66 year old female with a history of severe COPD s/p trach on home vent 24/7, anxiety, hypertension, achalasia s/p GJ tube who presented to the hospital with acute on chronic hypoxic respiratory failure.  Of note she was just hospitalized here from 12/1-5 w/ a UTI, 12/25-28 w/bacteremia and 1/2-6 with encephalopathy ultimately attributed to her medications.    The patient is unable to talk secondary to her severe COPD and tracheostomy.  I spoke with her home care RN and the patient communicated by mouthing words and writing.    It appears the patient was just discharged from our facility yesterday and had significant anxiety overnight and did not sleep well.  This morning she told her nursing staff she could not breath.  She was reportedly in significant distress, although she never desaturated.  Nursing staff confirmed her ventilator was working, briefly treated her with an ambu bag and called paramedics.  The patient was agreeable to coming into the hospital and was transported in.  On arrival the patient  was hypoxic but apparently was being ambu-bagged without supplemental oxygen (normally requires FiO2 of 30%) and her oxygen saturations improved to just below her baseline once back on her home ventilator settings.    The patient confirms shortness of breath which is mildly improved, chest pain which is chronic, back pain which is chronic and ongoing diarrhea which is also chronic.  She still has a bender in place due to retention with no plans for removal or further urologic investigation.  She has a GJ tube in place for her achalasia.         Past Medical History:     Past Medical History   Diagnosis Date     COPD (chronic obstructive pulmonary disease) (H)      trach/vent dependent      Anxiety      TMJ pain dysfunction syndrome      Tracheostomy in place      Hypertension      GERD (gastroesophageal reflux disease)      Achalasia      Lung nodule      spiculated; followed in pulm clinic      Stress-induced cardiomyopathy      Anxiety and depression      Chronic pain        PMH reviewed and up to date         Past Surgical History:     Past Surgical History   Procedure Laterality Date     Tracheostomy N/A 8/26/2015     Procedure: TRACHEOSTOMY;  Surgeon: Milena Thibodeaux MD;  Location: [facility] OR     Bronchoscopy, insert bronchial valve Right 9/2/2015     Procedure: BRONCHOSCOPY, INSERT BRONCHIAL VALVE;  Surgeon: Everardo Beach MD;  Location: [facility] OR     Esophagoscopy, gastroscopy, duodenoscopy (egd), combined N/A 12/11/2015     Procedure: COMBINED ESOPHAGOSCOPY, GASTROSCOPY, DUODENOSCOPY (EGD);  Surgeon: Dhruv Lyles MD;  Location: [facility] OR     Esophagoscopy, gastroscopy, duodenoscopy (egd), combined N/A 12/31/2015     Procedure: COMBINED ESOPHAGOSCOPY, GASTROSCOPY, DUODENOSCOPY (EGD);  Surgeon: Brenden Plasencia MD;  Location: [facility] OR     Percutaneous insertion tube jejunostomy N/A 12/31/2015     Procedure: PERCUTANEOUS INSERTION TUBE JEJUNOSTOMY;  Surgeon: Brenden Plasencia MD;  Location: [facility]  OR     Percutaneous insertion tube jejunostomy N/A 1/25/2016     Procedure: PERCUTANEOUS INSERTION TUBE JEJUNOSTOMY;  Surgeon: Carrie Coleman MD;  Location: [facility] OR     Anesthesia out of or mri N/A 4/18/2016     Procedure: ANESTHESIA OUT OF OR MRI;  Surgeon: GENERIC ANESTHESIA PROVIDER;  Location: [facility] OR     Endoscopic insertion tube gastrostomy N/A 7/9/2016     Procedure: ENDOSCOPIC INSERTION TUBE GASTROSTOMY;  Surgeon: Brenden Plasencia MD;  Location: [facility] OR       [facility] reviewed and up to date         Social History:     Social History     Social History     Marital Status:      Spouse Name: N/A     Number of Children: N/A     Years of Education: N/A     Occupational History     Not on file.     Social History Main Topics     Smoking status: Former Smoker -- 2.00 packs/day for 40 years     Types: Cigarettes     Start date: 01/01/1964     Quit date: 04/18/1998     Smokeless tobacco: Never Used     Alcohol Use: No     Drug Use: No     Sexual Activity: Not on file     Other Topics Concern     Not on file     Social History Narrative            Family History:     Family History   Problem Relation Age of Onset     CANCER Sister             Immunizations:     Immunization History   Administered Date(s) Administered     Influenza (High Dose) 3 valent vaccine 10/06/2014, 09/24/2015, 10/04/2016     Influenza (IIV3) 11/20/2012, 10/21/2013     Pneumococcal (PCV 13) 11/17/2014     Pneumococcal 23 valent 05/03/2012     Tetanus Not Indicated - By Hx 05/03/2014              Allergies:      Allergies   Allergen Reactions     Levaquin Other (See Comments)     Delirium     Toro Podhaler [Tobramycin] Other (See Comments)     Severe congestion, SOB      Suprax [Cefixime]      See ceftibuten     Augmentin Nausea     Has tolerated for treatment of UTIs in 2016.     Avelox [Moxifloxacin] Anxiety     Cedax [Ceftibuten] Other (See Comments)     Pain in eyes     Penicillins Itching     Tolerated amoxicillin without issues.      Vantin [Cefpodoxime] Nausea            Medications:     Prior to Admission medications    Medication Sig Last Dose Taking? Auth Provider   Cranberry 500 MG CAPS Take 1 capsule (500 mg) by mouth daily   Geo Bui MD   HYDROmorphone (DILAUDID) 1 MG/ML LIQD liquid Take 1 mL (1 mg) by mouth every 2 hours as needed for moderate to severe pain (air hunger)   Geo Bui MD   LORazepam (ATIVAN) 2 MG/ML (HIGH CONC) solution Take 0.25 mLs (0.5 mg) by mouth every 3 hours as needed for anxiety   Geo Bui MD   clonazePAM (KLONOPIN) 0.1 mg/mL Take 15 mLs (1.5 mg) by mouth 4 times daily Needs appt for refills   Geo Bui MD   fexofenadine (ALLEGRA) 60 MG tablet Take 3 tablets (180 mg) by mouth daily   Geo Bui MD   predniSONE 5 MG/5ML solution Take 20 mLs (20 mg) by mouth daily   Geo Bui MD   predniSONE 5 MG/5ML solution Take 40 mLs (40 mg) by mouth daily for 3 days   Geo Bui MD   polyethylene glycol (MIRALAX/GLYCOLAX) Packet 17 g by Per J Tube route 2 times daily as needed for constipation Hold for loose stools   Geo Bui MD   sertraline (ZOLOFT) 20 MG/ML (HIGH CONC) solution 7.5 mLs (150 mg) by Per Feeding Tube route daily   Geo Bui MD   lactobacillus rhamnosus, GG, (CULTURELL) capsule Take 1 capsule by mouth 2 times daily   Geo Bui MD   fexofenadine (ALLEGRA) 180 MG tablet Take 1 tablet (180 mg) by mouth daily   Geo Bui MD   QUEtiapine (SEROQUEL) 25 MG tablet Take 1 tablet (25 mg) by mouth 3 times daily as needed (anxiety, sleep)   Geo Bui MD   fiber modular (NUTRISOURCE FIBER) packet 1 packet by Per J Tube route 3 times daily   Geo Bui MD   senna-docusate (SENOKOT-S;PERICOLACE) 8.6-50 MG per tablet 2 tablets by Per J Tube route 2 times daily as needed for constipation   Geo Bui MD   omeprazole (PRILOSEC) 2 mg/mL 10 mLs (20 mg) by Per Feeding Tube route 2 times daily   Radha Mcmillan MD   Nutritional  Supplements (JEVITY 1.5 IZZY) LIQD Take 5 Cans by mouth daily Per tube feeding   Radha Mcmillan MD   loperamide (IMODIUM A-D) 2 MG tablet 2-2 mg tablets per J-tube qid prn frequent and/or loose stools. Do not use more than 8 tabs per day.   Radha Mcmillan MD   melatonin (MELATONIN) 1 MG/ML LIQD liquid 3 mLs (3 mg) by Per J Tube route At Bedtime   Francisco Burt MD   acetaminophen (TYLENOL) 160 MG/5ML oral liquid 20.3 mLs (650 mg) by Per Feeding Tube route every 4 hours as needed for mild pain   Kiesha Martinez MD   calcium carbonate (TUMS) 500 MG chewable tablet 1 tablet (500 mg) by Per G Tube route every 2 hours as needed for heartburn   Kiesha Martinez MD   budesonide (PULMICORT) 0.5 MG/2ML nebulizer solution Take 2 mLs (0.5 mg) by nebulization 2 times daily   Kiesha Martinez MD   ipratropium (ATROVENT) 0.02 % nebulizer solution Take 2.5 mLs (0.5 mg) by nebulization every 4 hours   Kiesha Martinez MD   levalbuterol (XOPENEX) 1.25 MG/3ML nebulizer solution Take 3 mLs (1.25 mg) by nebulization every 4 hours   Kiesha Martinez MD   gabapentin (NEURONTIN) 250 MG/5ML solution 6 mLs (300 mg) by Per J Tube route 2 times daily   Kiesha Martinez MD   atorvastatin (LIPITOR) 20 MG tablet Take 1 tablet (20 mg) by mouth daily   Kiesha Martinez MD   QUEtiapine (SEROQUEL) 50 MG tablet Take 1 tablet (50 mg) by mouth 3 times daily   Kiesha Martinez MD   QUEtiapine (SEROQUEL) 25 MG tablet Take 3 tablets (75 mg) by mouth At Bedtime   Kiesha Martinez MD   guaiFENesin (ORGANIDIN) 200 MG TABS Take 1 tablet (200 mg) by mouth 4 times daily   Kiesha Martinez MD   fluticasone (FLONASE) 50 MCG/ACT nasal spray Spray 2 sprays into both nostrils daily   Kiesha Martinez MD   sodium chloride (OCEAN) 0.65 % nasal spray Spray 1 spray into both nostrils daily as needed for congestion   Kiesha Martinez MD   sulfamethoxazole-trimethoprim (BACTRIM,SEPTRA) 200-40 mg/5ml  suspension 20 mLs (160 mg) by Per J Tube route daily Dose based on TMP component. 20 mLs = 160 mg   Kiesha Martinez MD   simethicone (MYLANTA GAS) 125 MG CHEW 1 tablet (125 mg) by Per Feeding Tube route 4 times daily   Kiesha Martinez MD   Pediatric Multivitamins-Iron (MULTIPLE VITAMINS-IRON) 15 MG CHEW 1 chew tab by Per J Tube route daily Contains ferrous gluconate, 15 mL = 9 mg iron   Kiesha Martinez MD   lidocaine 15 mL, alum & mag hydroxide-simethicone 15 mL GI Cocktail Take 30 mLs by mouth 3 times daily as needed for moderate pain   Kiesha Martinez MD   aspirin 81 MG chewable tablet 1 tablet (81 mg) by Per Feeding Tube route daily   Kiesha Martinez MD   polyethylene glycol 0.4%- propylene glycol 0.3% (SYSTANE ULTRA) 0.4-0.3 % SOLN ophthalmic solution Place 1-2 drops into both eyes 4 times daily 0900, 1300, 1700, 2100   Kiesha Martinez MD             Review of Systems:     A complete ROS was performed and is negative other than what is stated in the HPI.         Physical Exam:   Blood pressure 110/71, pulse 116, temperature 97.8  F (36.6  C), temperature source Oral, resp. rate 20, SpO2 91 %, not currently breastfeeding.    Physical Exam   Constitutional:   Chronically ill appearing, resting comfortably  Head: Normocephalic and atraumatic.    Eyes: Conjunctivae are normal. Pupils are equal, round, and reactive to light.    Oral: Pharynx has no erythema or exudate, mucous membranes are moist  Neck:   No adenopathy, no bony tenderness.  Trach in place.  Cardiovascular: Regular rate and rhythm without murmurs or gallops  Pulmonary/Chest: Crackles bilateraly, with mild wheezes and no retractions. No respiratory distress.  GI: Soft with good bowel sounds.  Non-tender, non-distended, with no guarding, no rebound, no peritoneal signs.  GJ tube in place.  Back:  No bony or CVA tenderness   Musculoskeletal:  No edema or clubbing   Skin: Skin is warm and dry. No rash noted.    Neurological: Alert and oriented to person, place, and time. Nonfocal exam  Psychiatric:  Normal mood and affect.      Tubes/Lines/Devices: GJ tube in place, bender in place, trach in place           Data:   Exam:  XR CHEST PORT 1 VW, 1/7/2017 1:01 PM     History: respiratory distress     Comparison:  Chest x-ray 1/4/2017     Findings:  Single frontal radiograph of the chest. Tracheostomy tube  tip stable in the upper thoracic trachea. Cardiac silhouette and  pulmonary vasculature within normal limits. Calcifications of the  aortic knob. Emphysematous changes bilaterally. No change in streaky  bibasilar opacities, left greater than right. Blunting of both  costophrenic angles is unchanged. No pleural effusion or pneumothorax.                                                                       Impression:     Chronic changes of emphysema and bilateral costophrenic angle  blunting, likely representing a scarring or atelectasis. No acute  changes compared to 1/4/2017 radiograph.    I spoke with Dr Gama regarding patient's plan of care  I reviewed past notes, imaging and labs      Jose David De La Cruz [facility]                          Discharge Summaries      Discharge Summaries by Fam Patel MD at 1/9/2017 10:13 PM     Author:  Fam Patel MD Service:  Internal Medicine Author Type:  Physician    Filed:  1/10/2017 10:24 AM Note Time:  1/9/2017 10:13 PM Status:  Signed    :  Fam Patel MD (Physician)           Discharge Summary      Mary Wang MRN# [id number]   YOB: 1950 Age: 66 year old     Date of Admission:  1/7/2017  Date of Discharge:  1/10/2017  Admitting Physician:  Enrique Gama MD  Discharge Physician:  Fam Patel MD   Discharging Service:  Internal Medicine      Primary Provider: Radha Mcmillan           Discharge Diagnosis:     Acute on chronic respiratory failure, suspect copd exacerbation  UTI, pseudomonas sp.   Chronic urinary retention  with indwelling bender.   Anxiety disorder.            Procedures:   No procedures performed during this admission           Consultations:   Consultation during this admission received from infectious disease (jose miguel)             Brief History of Illness:     See H and P dated 1/7/2017    Mary Wang is a 66 year old female with a history of severe COPD s/p trach on home vent 24/7, anxiety, hypertension, achalasia s/p GJ tube who presented to the hospital with acute on chronic hypoxic respiratory failure.  Of note she was just hospitalized here from 12/1-5 w/ a UTI, 12/25-28 w/bacteremia and 1/2-6 with encephalopathy ultimately attributed to her medications.    The patient is unable to talk secondary to her severe COPD and tracheostomy.  I spoke with her home care RN and the patient communicated by mouthing words and writing.    It appears the patient was just discharged from our facility yesterday and had significant anxiety overnight and did not sleep well.  This morning she told her nursing staff she could not breath.  She was reportedly in significant distress, although she never desaturated.  Nursing staff confirmed her ventilator was working, briefly treated her with an ambu bag and called paramedics.  The patient was agreeable to coming into the hospital and was transported in.  On arrival the patient was hypoxic but apparently was being ambu-bagged without supplemental oxygen (normally requires FiO2 of 30%) and her oxygen saturations improved to just below her baseline once back on her home ventilator settings.          Hospital Course:     Issues:     Mary Wang is a 66 year old female with a history of severe COPD s/p trach on home vent 24/7, anxiety, hypertension, achalasia s/p GJ tube who presented to the hospital with acute on chronic hypoxic respiratory failure.  Of note she was just hospitalized here from 12/1-5 w/ a UTI, 12/25-28 w/bacteremia and 1/2-6 with encephalopathy ultimately  "attributed to her medications.    1) Severe COPD with Acute on chronic hypoxic respiratory failure - Presented to our ED with dyspnea and hypercapnia (pCO2 93 up from baseline 60s) now improved once ambu-bagged and placed back on home ventilator.  Still requiring mildly increased FiO2 (normally 30%, briefly required 50% and now stable on 35%.  Of note, still on steroid burst from prior admission.  Per nursing staff from her facility, the patient was never hypoxic prior to coming in and was instead complaining of subjective shortness of breath    CXR. Procal: no e/o new pna.    Acute leucocytosis: resolved.    Fi02 stable @ 35%  Patient continues to complaint \"cant breathe\" , however clinically appears stable to slightly better.     - Started empirically on iv Methylprednisolone, switched to oral 1/9/2017 60 mg daily.  Takes 20 mg prednisone daily at baseline. On bactrim for chronic steroid use. Continue.    - Dc on prednisone taper to 20 mg/daily baseline dosing.   - Vent management per RT. FiO2 35% , Tinsp 1, ,RR 14, PEEP 5  - Continue home inhalers, b'dilators. Scheduled ipatropium, levalbuterol while awake; BID pulmicort  - PRN Hydromorphone for air hunger.    - Aggressive pul toileting      2) Encephalopathy, resolved - Previously admitted with encephalopathy attributed to oversedation from medications.  Attempted to wean benzos but patient did not tolerate and after goals of care discussion with patient and family we are erring on the side of keeping her comfortable even if that means episodes of oversedation.  - Continue home ativan, clonazepam, hydromorphone for air hunger, Seroquel.    3) Anxiety - On multiple agents.  Attempted to wean during last admission due to encephalopathy but ultimately unable to tolerate reduction in anxiolytics.  - Continue home clonazepam  - Continue home ativan  - Continue home hydromorphone for air hunger  - Continue home seroquel    4) Achalasia - S/p GH tube placement  - " "G tube to gravity drainage  - Continue home tube feeds: Isosource 1.5 @ 85 mls/hr x 12h from 4852-5841.  Free water 125 ccs q4h.    5) Urinary retention - Ongoing since prior admissions with plans for indwelling bender instead of urologic work up.  - Continue home bender catheter    # UTI:     UA: positive. UC: Pseudomonas. Sensitivity pending. ? True infection vs colonization.    Renal US: normal.   D/w César POA. Prefers to treat with iv antibiotics.   Curbsided ID, agree with the plan.     Continue empiric iv Zosyn. Plan total 10 days. ( start date 17)    CODE: DNR  Diet/IVF: NPO. On tube feeds, continue.     DVT ppx: Mechanical       Discharge Day Exam:        Doing well.   Vitals stable.   No new concern.         Wt Readings from Last 5 Encounters:   17 60.555 kg (133 lb 8 oz)   17 59 kg (130 lb 1.1 oz)   16 53.5 kg (117 lb 15.1 oz)   16 53.978 kg (119 lb)   16 56.836 kg (125 lb 4.8 oz)       Blood pressure 137/65, pulse 116, temperature 97.7  F (36.5  C), temperature source Oral, resp. rate 16, height 1.63 m (5' 4.17\"), weight 60.51 kg (133 lb 6.4 oz), SpO2 97 %, not currently breastfeeding.    Temp (24hrs), Av  F (36.7  C), Min:97.7  F (36.5  C), Max:98.2  F (36.8  C)    General: alert, NAD.    HEENT: AT/NC, anicteric.     Neck: trach and vented.    Respi/Chest: diminished through out. Some bl basal crackles.    CVS/Heart: S1S2 regular, no m/r/g,  Gi/Abd: Soft, mild tenderness R flank. PEG/J tube. non distended, no g/r  MSK/Ext: Distal pulses 2+.      Neuro: Alert.        Data:  All laboratory data reviewed          Significant Results:     LABS (Last four results)  CMP    Recent Labs  Lab 17  0631 17  1202 17  0655 17  0921 17  0729    136 136 138 139   POTASSIUM 4.3 4.2 4.0 4.3 3.3*   CHLORIDE 89* 90* 95 98 94   CO2 39* 39* 35* 32 40*   ANIONGAP 5 8 6 8 5   * 87 81 125* 97   BUN 27 26 19 15 11   CR 0.61 0.65 0.54 0.56 0.49* "   GFRESTIMATED >90Non  GFR Calc >90Non  GFR Calc >90Non  GFR Calc >90Non  GFR Calc >90Non  GFR Calc   GFRESTBLACK >90African American GFR Calc >90African American GFR Calc >90African American GFR Calc >90African American GFR Calc >90African American GFR Calc   IZZY 9.1 8.9 8.8 8.3* 8.5   MAG  --   --   --  2.3 1.8     CBC    Recent Labs  Lab 01/08/17  0631 01/07/17  1354 01/07/17  1202 01/05/17  0655   WBC 9.3 18.1* Unsatisfactory specimen - clottedJEANNE GLASBIE ER AT 1/7/17 1321 BY MASOOD 10.6   RBC 3.48* 3.72* Unsatisfactory specimen - clottedJEANNE GLASBIE ER AT 1/7/17 1321 BY MASOOD 3.04*   HGB 10.4* 11.0* Unsatisfactory specimen - clottedJEANNE GLASBIE ER AT 1/7/17 1321 BY MASOOD 8.9*   HCT 32.8* 35.7 Unsatisfactory specimen - clottedJEANNE GLASBIE ER AT 1/7/17 1321 BY MASOOD 29.6*   MCV 94 96 Unsatisfactory specimen - clottedJEANNE GLASBIE ER AT 1/7/17 1321 BY MASOOD 97   MCH 29.9 29.6 Unsatisfactory specimen - clottedJEANNE GLASBIE ER AT 1/7/17 1321 BY MASOOD 29.3   MCHC 31.7 30.8* Unsatisfactory specimen - clottedJEANNE GLASBIE ER AT 1/7/17 1321 BY MASOOD 30.1*   RDW 15.2* 15.3* Unsatisfactory specimen - clottedJEANNE GLASBIE ER AT 1/7/17 1321 BY MASOOD 15.3*    342 Unsatisfactory specimen - clottedJEANNE GLASBIE ER AT 1/7/17 1321 BY MASOOD 278     INRNo lab results found in last 7 days.  Arterial Blood GasNo lab results found in last 7 days.     Results for orders placed or performed during the hospital encounter of 01/07/17   Chest  XR, 1 view portable    Narrative    Exam:  XR CHEST PORT 1 VW, 1/7/2017 1:01 PM    History: respiratory distress    Comparison:  Chest x-ray 1/4/2017    Findings:  Single frontal radiograph of the chest. Tracheostomy tube  tip stable in the upper thoracic trachea. Cardiac silhouette and  pulmonary vasculature within normal limits. Calcifications of the  aortic knob. Emphysematous changes bilaterally. No change in  streaky  bibasilar opacities, left greater than right. Blunting of both  costophrenic angles is unchanged. No pleural effusion or pneumothorax.      Impression    Impression:    Chronic changes of emphysema and bilateral costophrenic angle  blunting, likely representing scarring or atelectasis. No acute  changes compared to 1/4/2017 radiograph.    I have personally reviewed the examination and initial interpretation  and I agree with the findings.    BLAIR DODD MD   US Renal Complete    Narrative    EXAMINATION: US RENAL COMPLETE, 1/8/2017 1:30 PM     COMPARISON: Abdominal ultrasound 11/4/2016    HISTORY: Right flank pain, UTI.    FINDINGS:  Exam was performed with patient sitting upright.    Right kidney: Measures 8.1 cm in length. Parenchyma is of normal  thickness and echogenicity. No focal mass. No hydronephrosis.    Left kidney: Measures 9.3 cm in length. Parenchyma is of normal  thickness and echogenicity. No focal mass. No hydronephrosis.     Bladder: Not seen.        Impression    IMPRESSION:  1.  Normal renal ultrasound.    I have personally reviewed the examination and initial interpretation  and I agree with the findings.    BLAIR DODD MD   XR Chest Port 1 View    Narrative    XR CHEST PORT 1 VW  1/9/2017 6:51 PM    History:  RN placed PICC - verify tip placement.     Comparison: Chest radiograph dated 1/7/2017.    Findings:   Portable AP 60 degree chest radiograph is obtained. Patient is  rotated. Right-sided PICC line with the tip projecting at cavoatrial  junction. Stable position of tracheostomy tube.    Cardiac silhouette is stable. Atherosclerotic calcifications noted in  the aortic arch. Pulmonary vasculature is distinct. No significant  change of patchy opacities in the bilateral lower lobes, left greater  than the right. No evidence of pneumothorax. Improved blunting of  bilateral costophrenic sulci. Visualized upper abdomen is  unremarkable.      Impression    IMPRESSION:  1.  Right  "PICC tip projecting near the cavoatrial junction.  2.  No significant change of basilar patchy opacities, left greater  than right and emphysematous change.  3.  Stable to slightly improved costophrenic angle blunting  bilaterally.    I have personally reviewed the examination and initial interpretation  and I agree with the findings.    MILLY HINDS MD               Pending Results:     Unresulted Labs Ordered in the Past 30 Days of this Admission     Date and Time Order Name Status Description    1/7/2017 1502 Urine Culture Aerobic Bacterial Preliminary     1/7/2017 1156 Blood culture Preliminary     1/7/2017 1156 Blood culture Preliminary                Condition on Discharge:   Discharge condition: Stable   Discharge vitals: Blood pressure 122/72, pulse 116, temperature 97.8  F (36.6  C), temperature source Oral, resp. rate 14, height 1.63 m (5' 4.17\"), weight 60.555 kg (133 lb 8 oz), SpO2 96 %, not currently breastfeeding.   Code status on discharge: DNR            Discharge Medications:     Current Discharge Medication List      START taking these medications    Details   piperacillin-tazobactam (ZOSYN) 3-0.375 GM vial Inject 3.375 g into the vein every 6 hours for 9 days  Qty: 540 mL, Refills: 0    Associated Diagnoses: Urinary tract infection without hematuria, site unspecified      predniSONE (DELTASONE) 20 MG tablet Take 3 tabs (60 mg) by mouth daily x 2 days, 2 tabs (40 mg) daily x 3 days, then continue 20 mg daily.  Qty: 12 tablet, Refills: 0    Associated Diagnoses: Acute on chronic respiratory failure with hypoxemia (H); COPD exacerbation (H)         CONTINUE these medications which have CHANGED    Details   predniSONE 5 MG/5ML solution Take 20 mLs (20 mg) by mouth daily  Qty: 500 mL, Refills: 0    Comments: Continue prednisone taper until reaches 20 mg daily, then continue 20 mg daily.  Associated Diagnoses: Shortness of breath         CONTINUE these medications which have NOT CHANGED    Details "   Cranberry 500 MG CAPS Take 1 capsule (500 mg) by mouth daily  Qty: 90 capsule      HYDROmorphone (DILAUDID) 1 MG/ML LIQD liquid Take 1 mL (1 mg) by mouth every 2 hours as needed for moderate to severe pain (air hunger)  Qty: 50 mL, Refills: 0    Associated Diagnoses: COPD exacerbation (H)      LORazepam (ATIVAN) 2 MG/ML (HIGH CONC) solution Take 0.25 mLs (0.5 mg) by mouth every 3 hours as needed for anxiety  Qty: 15 mL, Refills: 0    Associated Diagnoses: COPD exacerbation (H); Anxiety      clonazePAM (KLONOPIN) 0.1 mg/mL Take 15 mLs (1.5 mg) by mouth 4 times daily Needs appt for refills  Qty: 420 mL, Refills: 0    Associated Diagnoses: Shortness of breath      polyethylene glycol (MIRALAX/GLYCOLAX) Packet 17 g by Per J Tube route 2 times daily as needed for constipation Hold for loose stools  Qty: 100 packet, Refills: 0    Comments: Hold for loose stools  Associated Diagnoses: Constipation, unspecified constipation type      sertraline (ZOLOFT) 20 MG/ML (HIGH CONC) solution 7.5 mLs (150 mg) by Per Feeding Tube route daily  Qty: 105 mL, Refills: 0    Associated Diagnoses: Anxiety      lactobacillus rhamnosus, GG, (CULTURELL) capsule Take 1 capsule by mouth 2 times daily  Qty: 60 capsule, Refills: 0    Associated Diagnoses: Diarrhea, unspecified type      fexofenadine (ALLEGRA) 180 MG tablet Take 1 tablet (180 mg) by mouth daily  Qty: 30 tablet, Refills: 0    Associated Diagnoses: COPD exacerbation (H)      QUEtiapine (SEROQUEL) 25 MG tablet Take 1 tablet (25 mg) by mouth 3 times daily as needed (anxiety, sleep)  Qty: 30 tablet, Refills: 0    Associated Diagnoses: Anxiety; Insomnia due to medical condition; Air hunger      fiber modular (NUTRISOURCE FIBER) packet 1 packet by Per J Tube route 3 times daily  Qty: 42 packet, Refills: 0    Associated Diagnoses: Diarrhea, unspecified type      senna-docusate (SENOKOT-S;PERICOLACE) 8.6-50 MG per tablet 2 tablets by Per J Tube route 2 times daily as needed for  "constipation  Qty: 100 tablet, Refills: 0    Associated Diagnoses: Constipation, unspecified constipation type      omeprazole (PRILOSEC) 2 mg/mL 10 mLs (20 mg) by Per Feeding Tube route 2 times daily  Qty: 600 mL, Refills: 3    Associated Diagnoses: Achalasia      Nutritional Supplements (JEVITY 1.5 IZZY) LIQD Take 5 Cans by mouth daily Per tube feeding  Qty: 150 Can, Refills: 11    Comments: Height 5'2\"  Weight 120 lbs  Length of need 99 months  Associated Diagnoses: Achalasia      loperamide (IMODIUM A-D) 2 MG tablet 2-2 mg tablets per J-tube qid prn frequent and/or loose stools. Do not use more than 8 tabs per day.  Qty: 30 tablet, Refills: 0    Associated Diagnoses: Frequent stools      melatonin (MELATONIN) 1 MG/ML LIQD liquid 3 mLs (3 mg) by Per J Tube route At Bedtime  Qty: 300 mL, Refills: 0    Associated Diagnoses: Anxiety; Insomnia due to medical condition      acetaminophen (TYLENOL) 160 MG/5ML oral liquid 20.3 mLs (650 mg) by Per Feeding Tube route every 4 hours as needed for mild pain  Qty: 300 mL, Refills: 0    Associated Diagnoses: Other chronic pain      calcium carbonate (TUMS) 500 MG chewable tablet 1 tablet (500 mg) by Per G Tube route every 2 hours as needed for heartburn  Qty: 150 tablet, Refills: 0    Associated Diagnoses: Gastroesophageal reflux disease, esophagitis presence not specified      budesonide (PULMICORT) 0.5 MG/2ML nebulizer solution Take 2 mLs (0.5 mg) by nebulization 2 times daily  Qty: 60 ampule, Refills: 0    Associated Diagnoses: Shortness of breath; Respiratory difficulty      ipratropium (ATROVENT) 0.02 % nebulizer solution Take 2.5 mLs (0.5 mg) by nebulization every 4 hours  Qty: 450 mL, Refills: 0    Associated Diagnoses: Shortness of breath      levalbuterol (XOPENEX) 1.25 MG/3ML nebulizer solution Take 3 mLs (1.25 mg) by nebulization every 4 hours  Qty: 540 mL, Refills: 0    Associated Diagnoses: Chronic obstructive pulmonary disease with acute exacerbation (H)    "   gabapentin (NEURONTIN) 250 MG/5ML solution 6 mLs (300 mg) by Per J Tube route 2 times daily  Qty: 450 mL, Refills: 0    Associated Diagnoses: Anxiety      atorvastatin (LIPITOR) 20 MG tablet Take 1 tablet (20 mg) by mouth daily  Qty: 30 tablet, Refills: 0    Associated Diagnoses: NSTEMI (non-ST elevated myocardial infarction) (H)      guaiFENesin (ORGANIDIN) 200 MG TABS Take 1 tablet (200 mg) by mouth 4 times daily  Qty: 115 tablet, Refills: 0    Associated Diagnoses: Shortness of breath      fluticasone (FLONASE) 50 MCG/ACT nasal spray Spray 2 sprays into both nostrils daily  Qty: 1 Bottle, Refills: 0    Associated Diagnoses: Acute and chronic respiratory failure with hypercapnia (H)      sodium chloride (OCEAN) 0.65 % nasal spray Spray 1 spray into both nostrils daily as needed for congestion  Qty: 1 Bottle, Refills: 0    Associated Diagnoses: Chronic sinusitis, unspecified location      sulfamethoxazole-trimethoprim (BACTRIM,SEPTRA) 200-40 mg/5ml suspension 20 mLs (160 mg) by Per J Tube route daily Dose based on TMP component. 20 mLs = 160 mg  Qty: 560 mL, Refills: 0    Associated Diagnoses: Chronic obstructive pulmonary disease with acute exacerbation (H)      simethicone (MYLANTA GAS) 125 MG CHEW 1 tablet (125 mg) by Per Feeding Tube route 4 times daily  Qty: 120 tablet, Refills: 0    Associated Diagnoses: Achalasia      Pediatric Multivitamins-Iron (MULTIPLE VITAMINS-IRON) 15 MG CHEW 1 chew tab by Per J Tube route daily Contains ferrous gluconate, 15 mL = 9 mg iron  Qty: 30 tablet, Refills: 0    Associated Diagnoses: Dietary supplement-induced neuropathy (H)      lidocaine 15 mL, alum & mag hydroxide-simethicone 15 mL GI Cocktail Take 30 mLs by mouth 3 times daily as needed for moderate pain  Qty: 500 mL, Refills: 0    Associated Diagnoses: Gastroesophageal reflux disease without esophagitis      aspirin 81 MG chewable tablet 1 tablet (81 mg) by Per Feeding Tube route daily  Qty: 36 tablet, Refills: 0     Associated Diagnoses: NSTEMI (non-ST elevated myocardial infarction) (H)      polyethylene glycol 0.4%- propylene glycol 0.3% (SYSTANE ULTRA) 0.4-0.3 % SOLN ophthalmic solution Place 1-2 drops into both eyes 4 times daily 0900, 1300, 1700, 2100  Qty: 1 Bottle, Refills: 0    Associated Diagnoses: Dry eyes, bilateral                  Discharge Disposition:   Discharged to [facility].            Discharge Instructions:     Discharge Procedure Orders  Reason for your hospital stay   Order Comments: Acute on chronic hypoxic respiratory failure, suspect copd exacerbation.   Urinary tract infection, pseudomonas sp.     Adult [facility] Follow-up and recommended labs and tests   Order Comments: Follow up with primary care provider, Radha Mcmillan, within 7 days for hospital follow- up.     Keep other appointments as scheduled.     Appointments on [place] and/or [facility] (with [facility] or [facility] provider or service). Call [phone] if you haven't heard regarding these appointments within 7 days of discharge.     Activity   Order Comments: Your activity upon discharge: activity as tolerated, turn Q 2 hour.   Order Specific Question Answer Comments   Is discharge order? Yes      Diet   Order Comments: Follow this diet upon discharge:resume prior to admission diet.         Orders Placed This Encounter  Adult Formula Drip Feeding: Specified Time Isosource 1.5; Jejunostomy; Goal Rate: 85; mL/hr; From: 8:00 PM; 8:00 AM; Medication - Tube Feeding Flush Frequency: At least 15-30 mL water before and after medication administration and with tube clogging;  NPO Time Specified Ice Chips   Order Specific Question Answer Comments   Is discharge order? Yes             Thank you for the opportunity to participate in the care of your patient.  Please do not hesitate to contact our service with questions or concerns.    Total time spent was greater than 30 minutes; Greater than 50% of the time was in counseling and care  coordination. Care coordination included discussion of medication changes, lifestyle changes and reviewing instructions to the patient .       Fam Patel MD   [phone]                Consult Notes     No notes of this type exist for this encounter.         Progress Notes - Physician (Notes for yesterday and today)      Progress Notes by Geo Bui MD at 2017  2:34 PM     Author:  Geo Bui MD Service:  Internal Medicine Author Type:  Physician    Filed:  2017  5:29 PM Note Time:  2017  2:34 PM Status:  Signed    :  Geo Bui MD (Physician)           Internal Medicine                                                     Progress Note  Mary Wang MRN: [id number]  1950  Date of Admission:2017  Primary care provider: Radha Mcmillan  ___________________________________  INTERVAL HISTORY (24 Hrs)/SUBJECTIVE:      Interval events reviewed.   Afebrile.   Breathing: stable to better on 35% fi02. Patient still reports sob.   RN: thin whitish secretions on suctioning.   Anxious.   Reports some back pain      ROS: 4 point ROS neg other than the symptoms noted above in the interval history.    OBJECTIVE :   VITALS:    Temp:  [96.9  F (36.1  C)-98.8  F (37.1  C)] 98.1  F (36.7  C)  Heart Rate:  [88-97] 97  Resp:  [14-20] 18  BP: (115-158)/(66-79) 147/77 mmHg  FiO2 (%):  [35 %] 35 %  SpO2:  [89 %-96 %] 95 %    Temp (24hrs), Av.1  F (36.7  C), Min:96.9  F (36.1  C), Max:98.8  F (37.1  C)        Wt Readings from Last 5 Encounters:   17 60.555 kg (133 lb 8 oz)   17 59 kg (130 lb 1.1 oz)   16 53.5 kg (117 lb 15.1 oz)   16 53.978 kg (119 lb)   16 56.836 kg (125 lb 4.8 oz)        Intake/Output Summary (Last 24 hours) at 17 1724  Last data filed at 17 1700   Gross per 24 hour   Intake   1830 ml   Output   1230 ml   Net    600 ml       PHYSICAL EXAM:  General: alert, NAD.   HEENT: AT/NC, anicteric.    Neck: trach and vented.    Respi/Chest: diminished through out. Some bl basal crackles.   CVS/Heart: S1S2 regular, no m/r/g,  Gi/Abd: Soft, mild tenderness R flank. PEG/J tube. g tube vented w brownish fluid. non distended, no g/r  MSK/Ext: Distal pulses 2+.     Neuro: Alert.      Medications: reviewed.       DIAGNOSTIC STUDIES:     LABS (Last four results)  CMP    Recent Labs  Lab 01/08/17  0631 01/07/17  1202 01/05/17  0655 01/04/17  0921 01/03/17  0729    136 136 138 139   POTASSIUM 4.3 4.2 4.0 4.3 3.3*   CHLORIDE 89* 90* 95 98 94   CO2 39* 39* 35* 32 40*   ANIONGAP 5 8 6 8 5   * 87 81 125* 97   BUN 27 26 19 15 11   CR 0.61 0.65 0.54 0.56 0.49*   GFRESTIMATED >90Non  GFR Calc >90Non  GFR Calc >90Non  GFR Calc >90Non  GFR Calc >90Non  GFR Calc   GFRESTBLACK >90African American GFR Calc >90African American GFR Calc >90African American GFR Calc >90African American GFR Calc >90African American GFR Calc   IZZY 9.1 8.9 8.8 8.3* 8.5   MAG  --   --   --  2.3 1.8     CBC    Recent Labs  Lab 01/08/17  0631 01/07/17  1354 01/07/17  1202 01/05/17  0655   WBC 9.3 18.1* Unsatisfactory specimen - clottedJEANNE GLASBIE ER AT 1/7/17 1321 BY MASOOD 10.6   RBC 3.48* 3.72* Unsatisfactory specimen - clottedJEANNE GLASBIE ER AT 1/7/17 1321 BY MASOOD 3.04*   HGB 10.4* 11.0* Unsatisfactory specimen - clottedJEANNE GLASBIE ER AT 1/7/17 1321 BY MASOOD 8.9*   HCT 32.8* 35.7 Unsatisfactory specimen - clottedJEANNE GLASBIE ER AT 1/7/17 1321 BY MASOOD 29.6*   MCV 94 96 Unsatisfactory specimen - clottedJEANNE GLASBIE ER AT 1/7/17 1321 BY MASOOD 97   MCH 29.9 29.6 Unsatisfactory specimen - clottedJEANNE GLASBIE ER AT 1/7/17 1321 BY MASOOD 29.3   MCHC 31.7 30.8* Unsatisfactory specimen - clottedJEANNE GLASBIE ER AT 1/7/17 1321 BY MASOOD 30.1*   RDW 15.2* 15.3* Unsatisfactory specimen - clottedJEANNE GLASBIE ER AT 1/7/17 1321 BY MASOOD 15.3*    342 Unsatisfactory specimen - clottedJEANNE GLASBIE  "ER AT 1/7/17 1321 BY MASOOD 278     INRNo lab results found in last 7 days.  Procal: 0.19    UA+. UC: >100 K Pseudomonas sp. Sensitivity pending.        Chest  XR, 1 view portable    Narrative    Exam:  XR CHEST PORT 1 VW, 1/7/2017 1:01 PM    History: respiratory distress    Comparison:  Chest x-ray 1/4/2017      Impression:    Chronic changes of emphysema and bilateral costophrenic angle  blunting, likely representing scarring or atelectasis. No acute  changes compared to 1/4/2017 radiograph.             ASSESSMENT & PLAN :    Mary Wang is a 66 year old female with a history of severe COPD s/p trach on home vent 24/7, anxiety, hypertension, achalasia s/p GJ tube who presented to the hospital with acute on chronic hypoxic respiratory failure.  Of note she was just hospitalized here from 12/1-5 w/ a UTI, 12/25-28 w/bacteremia and 1/2-6 with encephalopathy ultimately attributed to her medications.    1) Severe COPD with Acute on chronic hypoxic respiratory failure - Presented to our ED with dyspnea and hypercapnia (pCO2 93 up from baseline 60s) now improved once ambu-bagged and placed back on home ventilator.  Still requiring mildly increased FiO2 (normally 30%, briefly required 50% and now stable on 35%.  Of note, still on steroid burst from prior admission.  Per nursing staff from her facility, the patient was never hypoxic prior to coming in and was instead complaining of subjective shortness of breath    CXR. Procal: no e/o new pna.   Acute leucocytosis: resolved.   Fi02 stable.   Patient continues to complaint \"cant breathe\" , however clinically appears stable to slightly better.     - DC iv MP, start prednisone 60 mg daily. Takes 20 mg prednisone daily at baseline. On bactrim for chronic steroid use. Continue.   - Vent management per RT. FiO2 35% , Tinsp 1, ,RR 14, PEEP 5  - Continue home inhalers, b'dilators. Scheduled ipatropium, levalbuterol while awake; BID pulmicort  - PRN Hydromorphone for air " hunger.   - Aggressive pul toileting  - sputum c/s  - Pulse ox contd. Tele.     * likely dc on prednisone slow taper to baseline 20 mg daily.     2) Encephalopathy, resolved - Previously admitted with encephalopathy attributed to oversedation from medications.  Attempted to wean benzos but patient did not tolerate and after goals of care discussion with patient and family we are erring on the side of keeping her comfortable even if that means episodes of oversedation.  - Continue home ativan, clonazepam, hydromorphone for air hunger, Seroquel.    3) Anxiety - On multiple agents.  Attempted to wean during last admission due to encephalopathy but ultimately unable to tolerate reduction in anxiolytics.  - Continue home clonazepam  - Continue home ativan  - Continue home hydromorphone for air hunger  - Continue home seroquel    4) Achalasia - S/p GH tube placement  - G tube to gravity drainage  - Continue home tube feeds: Isosource 1.5 @ 85 mls/hr x 12h from 0118-8989.  Free water 125 ccs q4h.    5) Urinary retention - Ongoing since prior admissions with plans for indwelling bender instead of urologic work up.  - Continue home bender catheter  UTI:     UA: positive. UC: Pseudomonas. Sensitivity pending. ? True infection vs colonization.   Renal US: normal.     Continue empiric iv Zosyn. Plan total 10 days. curbsided ID. Also d/w daughter POA> would prefer treatment regardless. Will get picc line, order placed.     CODE: DNR  Diet/IVF: NPO. On tube feeds, continue. Nutrition consult.   DVT ppx: Mechanical.   Dispo: Inpatient. Pending medical stabilization. Will get SW, Palliative consult on Monday.   Likely tomorrow am. Patient has meeting w complex care clinic team at [facility] tomorrow 3 pm.     D/w RN. CC.  D/w ID    Called daughter Ms Lindsey (POA)  and updated.       Geo Bui MD   Hospitalist (Internal Medicine)  Pager: [phone].                          Procedure Notes     No notes of this type exist for  this encounter.      Progress Notes - Therapies (Notes from 01/07/17 through 01/10/17)     No notes of this type exist for this encounter.                                          INTERAGENCY TRANSFER FORM - LAB / IMAGING / EKG / EMG RESULTS   1/7/2017                       [facility]: [phone]            Unresulted Labs     None         Lab Results - 3 Days (01/10/17 - 01/07/17)      Urine Culture Aerobic Bacterial [[id number]] (Abnormal)  Resulted: 01/10/17 0711, Result status: Final result    Ordering provider: Camila Gómez MD  01/07/17 1502 Resulting lab: [facility]    Specimen Information    Type Source Collected On     01/07/17 1502          Components       Value Reference Range Flag Lab   Specimen Description Catheterized Urine   51   Special Requests Specimen received in preservative   75   Culture Micro --  A 225   Result:         >100,000 colonies/mL Pseudomonas aeruginosa  >100,000 colonies/mL Strain 2 Pseudomonas aeruginosa     Micro Report Status FINAL 01/10/2017   225   Result:     Organism: >100,000 colonies/mL Strain 2 Pseudomonas aeruginosa   225   Organism: >100,000 colonies/mL Pseudomonas aeruginosa   75            Blood culture [[id number]]  Resulted: 01/10/17 0256, Result status: Preliminary result    Ordering provider: Camila Gómez MD  01/07/17 1156 Resulting lab: [facility]    Specimen Information    Type Source Collected On   Blood Arm, Right 01/07/17 1354          Components       Value Reference Range Flag Lab   Specimen Description Blood Right Arm   75   Culture Micro No growth after 3 days   75   Micro Report Status Pending   75            Blood culture [[id number]]  Resulted: 01/10/17 0256, Result status: Preliminary result    Ordering provider: Camila Gómez MD  01/07/17 1156 Resulting lab: [facility]    Specimen Information    Type Source Collected On    Blood Arm, Left 01/07/17 1202          Components       Value Reference Range Flag Lab   Specimen Description Blood Left Arm   51   Culture Micro No growth after 3 days   75   Micro Report Status Pending   75            CRP inflammation [[id number]]  Resulted: 01/09/17 1406, Result status: Final result    Ordering provider: Geo Bui MD  01/09/17 1247 Resulting lab: [facility]    Specimen Information    Type Source Collected On   Blood  01/09/17 1339          Components       Value Reference Range Flag Lab   CRP Inflammation 4.0 0.0 - 8.0 mg/L  51            Basic metabolic panel [[id number]] (Abnormal)  Resulted: 01/08/17 0707, Result status: Final result    Ordering provider: Jose David De La Cruz APRN CNP  01/07/17 8739 Resulting lab: [facility]    Specimen Information    Type Source Collected On   Blood  01/08/17 0631          Components       Value Reference Range Flag Lab   Sodium 133 133 - 144 mmol/L  51   Potassium 4.3 3.4 - 5.3 mmol/L  51   Chloride 89 94 - 109 mmol/L L 51   Carbon Dioxide 39 20 - 32 mmol/L H 51   Anion Gap 5 3 - 14 mmol/L  51   Glucose 192 70 - 99 mg/dL H 51   Urea Nitrogen 27 7 - 30 mg/dL  51   Creatinine 0.61 0.52 - 1.04 mg/dL  51   GFR Estimate -- >60 mL/min/1.7m2  51   Result:         >90  Non  GFR Calc     GFR Estimate If Black -- >60 mL/min/1.7m2  51   Result:         >90   GFR Calc     Calcium 9.1 8.5 - 10.1 mg/dL  51   Result:              CBC with platelets [[id number]] (Abnormal)  Resulted: 01/08/17 0645, Result status: Final result    Ordering provider: Jose David De La Cruz APRN CNP  01/07/17 3310 Resulting lab: [facility]    Specimen Information    Type Source Collected On   Blood  01/08/17 0631          Components       Value Reference Range Flag Lab   WBC 9.3 4.0 - 11.0 10e9/L  51   RBC Count 3.48 3.8 - 5.2 10e12/L L 51   Hemoglobin 10.4  11.7 - 15.7 g/dL L 51   Hematocrit 32.8 35.0 - 47.0 % L 51   MCV 94 78 - 100 fl  51   MCH 29.9 26.5 - 33.0 pg  51   MCHC 31.7 31.5 - 36.5 g/dL  51   RDW 15.2 10.0 - 15.0 % H 51   Platelet Count 317 150 - 450 10e9/L  51            Procalcitonin [[id number]]  Resulted: 01/07/17 1608, Result status: Final result    Ordering provider: Camila Gómez MD  01/07/17 1156 Resulting lab: [facility]    Specimen Information    Type Source Collected On   Blood  01/07/17 1202          Components       Value Reference Range Flag Lab   Procalcitonin 0.19 ng/ml  51   Comment:         0.05-0.24 ng/ml Low risk of systemic bacterial infection. Local bacterial   infection possible.  Recommendation: Assess other clinical features of   infection. Discourage antibiotics unless strong clinical suspicion for   serious   infection.              UA with Microscopic reflex to Culture [[id number]] (Abnormal)  Resulted: 01/07/17 1554, Result status: Final result    Ordering provider: Camila Gómez MD  01/07/17 1431 Resulting lab: [facility]    Specimen Information    Type Source Collected On   Urine Urine catheter 01/07/17 1502          Components       Value Reference Range Flag Lab   Color Urine Light Yellow   51   Appearance Urine Slightly Cloudy   51   Glucose Urine Negative NEG mg/dL  51   Bilirubin Urine Negative NEG   51   Ketones Urine Negative NEG mg/dL  51   Specific Gravity Urine 1.010 1.003 - 1.035   51   Blood Urine Small NEG  A 51   pH Urine 8.0 5.0 - 7.0 pH H 51   Protein Albumin Urine 10 NEG mg/dL A 51   Urobilinogen mg/dL Normal 0.0 - 2.0 mg/dL  51   Nitrite Urine Negative NEG   51   Leukocyte Esterase Urine Moderate NEG  A 51   Source Catheterized Urine   51   WBC Urine 51 0 - 2 /HPF H 51   RBC Urine 44 0 - 2 /HPF H 51   Bacteria Urine Moderate NEG /HPF A 51   Squamous Epithelial /HPF Urine <1 0 - 1 /HPF  51   Transitional Epi 1 0 - 1 /HPF  51   Mucous  Urine Present NEG /LPF A 51            Lactic acid whole blood [[id number]]  Resulted: 01/07/17 1510, Result status: In process    Ordering provider: Camila Gómez MD  01/07/17 1505 Resulting lab: [facility]    Specimen Information    Type Source Collected On     01/07/17 1505            CBC with platelets differential [[id number]] (Abnormal)  Resulted: 01/07/17 1408, Result status: Final result    Ordering provider: Camila Gómez MD  01/07/17 1353 Resulting lab: [facility]    Specimen Information    Type Source Collected On   Blood  01/07/17 1354          Components       Value Reference Range Flag Lab   WBC 18.1 4.0 - 11.0 10e9/L H 51   RBC Count 3.72 3.8 - 5.2 10e12/L L 51   Hemoglobin 11.0 11.7 - 15.7 g/dL L 51   Hematocrit 35.7 35.0 - 47.0 %  51   MCV 96 78 - 100 fl  51   MCH 29.6 26.5 - 33.0 pg  51   MCHC 30.8 31.5 - 36.5 g/dL L 51   RDW 15.3 10.0 - 15.0 % H 51   Platelet Count 342 150 - 450 10e9/L  51   Diff Method Automated Method   51   % Neutrophils 68.3 %  51   % Lymphocytes 16.5 %  51   % Monocytes 10.7 %  51   % Eosinophils 3.8 %  51   % Basophils 0.4 %  51   % Immature Granulocytes 0.3 %  51   Nucleated RBCs 0 0 /100  51   Absolute Neutrophil 12.4 1.6 - 8.3 10e9/L H 51   Absolute Lymphocytes 3.0 0.8 - 5.3 10e9/L  51   Absolute Monocytes 1.9 0.0 - 1.3 10e9/L H 51   Absolute Eosinophils 0.7 0.0 - 0.7 10e9/L  51   Absolute Basophils 0.1 0.0 - 0.2 10e9/L  51   Abs Immature Granulocytes 0.1 0 - 0.4 10e9/L  51   Absolute Nucleated RBC 0.0   51            Basic metabolic panel [[id number]] (Abnormal)  Resulted: 01/07/17 1355, Result status: Final result    Ordering provider: Camila Gómez MD  01/07/17 1155 Resulting lab: [facility]    Specimen Information    Type Source Collected On   Blood  01/07/17 1202          Components       Value Reference Range Flag Lab   Sodium 136 133 - 144 mmol/L  51   Potassium 4.2 3.4 - 5.3 mmol/L  51    Chloride 90 94 - 109 mmol/L L 51   Carbon Dioxide 39 20 - 32 mmol/L H 51   Anion Gap 8 3 - 14 mmol/L  51   Glucose 87 70 - 99 mg/dL  51   Urea Nitrogen 26 7 - 30 mg/dL  51   Creatinine 0.65 0.52 - 1.04 mg/dL  51   GFR Estimate -- >60 mL/min/1.7m2  51   Result:         >90  Non  GFR Calc     GFR Estimate If Black -- >60 mL/min/1.7m2  51   Result:         >90   GFR Calc     Calcium 8.9 8.5 - 10.1 mg/dL  51   Result:              Troponin I [[id number]]  Resulted: 01/07/17 1355, Result status: Final result    Ordering provider: Camila Gómez MD  01/07/17 1156 Resulting lab: [facility]    Specimen Information    Type Source Collected On   Blood  01/07/17 1202          Components       Value Reference Range Flag Lab   Troponin I ES -- 0.000 - 0.045 ug/L  51   Result:         <0.015  The 99th percentile for upper reference range is 0.045 ug/L.  Troponin values in   the range of 0.045 - 0.120 ug/L may be associated with risks of adverse   clinical events.              CBC with platelets differential [[id number]]  Resulted: 01/07/17 1322, Result status: Final result    Ordering provider: Camila Gómez MD  01/07/17 1153 Resulting lab: [facility]    Specimen Information    Type Source Collected On   Blood  01/07/17 1202          Components       Value Reference Range Flag Lab   WBC -- 4.0 - 11.0 10e9/L  51   Result:         Unsatisfactory specimen - clotted  [facility] AT 1/7/17 1321 BY MASO[facility]     RBC Count -- 3.8 - 5.2 10e12/L  51   Result:         Unsatisfactory specimen - clotted  [facility] AT 1/7/17 1321 BY MASOOD     Hemoglobin -- 11.7 - 15.7 g/dL  51   Result:         Unsatisfactory specimen - clotted  [facility] AT 1/7/17 1321 BY [facility]OOD     Hematocrit -- 35.0 - 47.0 %  51   Result:         Unsatisfactory specimen - clotted  [facility] AT 1/7/17 1321 BY [facility]OOD     MCV -- 78 - 100 fl  51    Result:         Unsatisfactory specimen - clotted  [facility] AT 1/7/17 1321 BY MASOOD     MCH -- 26.5 - 33.0 pg  51   Result:         Unsatisfactory specimen - clotted  [facility] AT 1/7/17 1321 BY MASOOD     MCHC -- 31.5 - 36.5 g/dL  51   Result:         Unsatisfactory specimen - clotted  [facility] AT 1/7/17 1321 BY MASOOD     RDW -- 10.0 - 15.0 %  51   Result:         Unsatisfactory specimen - clotted  [facility] AT 1/7/17 1321 BY MASOOD     Platelet Count -- 150 - 450 10e9/L  51   Result:         Unsatisfactory specimen - clotted  [facility] AT 1/7/17 1321 BY MASOOD     Diff Method --   51   Result:         Unsatisfactory specimen - clotted  [facility] AT 1/7/17 1321 BY MASOOD              Lactic acid whole blood [[id number]]  Resulted: 01/07/17 1215, Result status: In process    Ordering provider: Camila Gómez MD  01/07/17 1209 Resulting lab: [facility]    Specimen Information    Type Source Collected On     01/07/17 1209            Testing Performed By     Lab - Abbreviation Name Director Address Valid Date Range    45 - [id number] [facility] Unknown Unknown 01/28/02 0000 - Present    51 - Unknown [facility] Unknown [address] [facility] [id number] 12/31/14 1010 - Present    75 - Unknown [facility] Unknown [address] [facility] [address] 01/15/15 1019 - Present    225 - Unknown [facility] Unknown [address] [facility] [id number] 12/19/14 0954 - Present               Imaging Results - 3 Days (01/09/17 - 01/07/17)      XR Chest Port 1 View [[id number]]  Resulted: 01/09/17 1936, Result status: Final result    Ordering provider: Geo Bui MD  01/09/17 1840 Resulted by: Peggy Bowers MD Wang, Xiao, MD    Performed: 01/09/17 1840 - 01/09/17 1851 Resulting lab: RADIOLOGY RESULTS    Narrative:       XR CHEST PORT 1 VW  1/9/2017 6:51 PM    History:  RN placed PICC - verify  tip placement.     Comparison: Chest radiograph dated 1/7/2017.    Findings:   Portable AP 60 degree chest radiograph is obtained. Patient is  rotated. Right-sided PICC line with the tip projecting at cavoatrial  junction. Stable position of tracheostomy tube.    Cardiac silhouette is stable. Atherosclerotic calcifications noted in  the aortic arch. Pulmonary vasculature is distinct. No significant  change of patchy opacities in the bilateral lower lobes, left greater  than the right. No evidence of pneumothorax. Improved blunting of  bilateral costophrenic sulci. Visualized upper abdomen is  unremarkable.      Impression:       IMPRESSION:  1.  Right PICC tip projecting near the cavoatrial junction.  2.  No significant change of basilar patchy opacities, left greater  than right and emphysematous change.  3.  Stable to slightly improved costophrenic angle blunting  bilaterally.    I have personally reviewed the examination and initial interpretation  and I agree with the findings.    MILLY HINDS MD    Specimen Information    Type Source Collected On                  US Renal Complete [[id number]]  Resulted: 01/08/17 1650, Result status: Final result    Ordering provider: Geo Bui MD  01/08/17 1146 Resulted by: Rodriguez Robert MD Baca, Vanessa, MD    Performed: 01/08/17 1256 - 01/08/17 1330 Resulting lab: RADIOLOGY RESULTS    Narrative:       EXAMINATION: US RENAL COMPLETE, 1/8/2017 1:30 PM     COMPARISON: Abdominal ultrasound 11/4/2016    HISTORY: Right flank pain, UTI.    FINDINGS:  Exam was performed with patient sitting upright.    Right kidney: Measures 8.1 cm in length. Parenchyma is of normal  thickness and echogenicity. No focal mass. No hydronephrosis.    Left kidney: Measures 9.3 cm in length. Parenchyma is of normal  thickness and echogenicity. No focal mass. No hydronephrosis.     Bladder: Not seen.        Impression:       IMPRESSION:  1.  Normal renal ultrasound.    I have  personally reviewed the examination and initial interpretation  and I agree with the findings.    RODRIGUEZ ROBERT MD    Specimen Information    Type Source Collected On                  Chest  XR, 1 view portable [[id number]]  Resulted: 01/07/17 1647, Result status: Final result    Ordering provider: Camila Gómez MD  01/07/17 1155 Resulted by: Rodriguez Robert MD Markese, Matthew, MD    Performed: 01/07/17 1202 - 01/07/17 1240 Resulting lab: RADIOLOGY RESULTS    Narrative:       Exam:  XR CHEST PORT 1 VW, 1/7/2017 1:01 PM    History: respiratory distress    Comparison:  Chest x-ray 1/4/2017    Findings:  Single frontal radiograph of the chest. Tracheostomy tube  tip stable in the upper thoracic trachea. Cardiac silhouette and  pulmonary vasculature within normal limits. Calcifications of the  aortic knob. Emphysematous changes bilaterally. No change in streaky  bibasilar opacities, left greater than right. Blunting of both  costophrenic angles is unchanged. No pleural effusion or pneumothorax.      Impression:       Impression:    Chronic changes of emphysema and bilateral costophrenic angle  blunting, likely representing scarring or atelectasis. No acute  changes compared to 1/4/2017 radiograph.    I have personally reviewed the examination and initial interpretation  and I agree with the findings.    RODRIGUEZ ROBERT MD    Specimen Information    Type Source Collected On                  Testing Performed By     Lab - Abbreviation Name Director Address Valid Date Range    104 - Rad R[facility]ts RADIOLOGY RESULTS Unknown Unknown 02/16/05 1553 - Present            Encounter-Level Documents:     There are no encounter-level documents.      Order-Level Documents:     There are no order-level documents.

## 2017-01-07 NOTE — ED NOTES
H/o severe COPD and respiratory failure BIBA for respiratory distress,   Pt takes spontaneous respirations but weak, needing manual support via BVM.  Dyspneic with watery eyes, skin warm and dry. A&O x3  RT paged and arrived at bedside 1130. Pt placed on mechanical ventilator

## 2017-01-07 NOTE — IP AVS SNAPSHOT
"    [address] [facility]: [phone]                                              INTERAGENCY TRANSFER FORM - PHYSICIAN ORDERS   2017                    Hospital Admission Date: 2017  MORRIS PYLE   : 1950  Sex: Female        Attending Provider: (none)    Allergies:  Levaquin, Toro Podhaler, Suprax, Augmentin, Avelox, Cedax, Penicillins, Vantin    Infection:  VRE-Contact Isolation, MRSA-Contact Isolation   Service:  INTERNAL MED    Ht:  1.63 m (5' 4.17\")   Wt:  60.51 kg (133 lb 6.4 oz)   Admission Wt:  58.106 kg (128 lb 1.6 oz)    BMI:  22.77 kg/m 2   BSA:  1.66 m 2            Patient PCP Information     Provider PCP Type    Radha Mcmillan MD General      ED Clinical Impression     Diagnosis Description Comment Added By Time Added    Acute on chronic respiratory failure with hypoxemia (H) [J96.21] Acute on chronic respiratory failure with hypoxemia (H) [J96.21]  Camila Gómez MD 2017  3:50 PM      Hospital Problems as of 1/10/2017              Priority Class Noted POA    Acute on chronic respiratory failure (H) Medium  2017 Yes      Non-Hospital Problems as of 1/10/2017              Priority Class Noted    Lung infection Medium  2014    COPD exacerbation (H)   2014    Acute-on-chronic respiratory failure (H) Medium  5/3/2014    Shortness of breath   8/3/2014    Hyponatremia Medium  2015    Urinary retention Medium  2015    Altered mental status Medium  2015    NATA (acute kidney injury) (H) Medium  2015    Anxiety Medium  2015    Air hunger Medium  2015    Esophageal dysmotility Medium  2015    Achalasia Medium  2015    Delirium Medium  2015    HTN (hypertension) Medium  2015    GERD (gastroesophageal reflux disease) Medium  2015    Sepsis (H) Medium  2015    Aspiration pneumonia (H) Medium  2015    End stage COPD (H) Medium  2015    ACP (advance care planning)   2015    Acute and chronic " respiratory failure with hypercapnia (H) Medium  8/27/2015    Hypoxia Medium  12/30/2015    S/P percutaneous endoscopic gastrostomy (PEG) tube placement (H) Medium  1/25/2016    COPD (chronic obstructive pulmonary disease) (H) Medium  4/13/2016    NSTEMI (non-ST elevated myocardial infarction) (H) Medium  5/21/2016    ACS (acute coronary syndrome) (H) Medium  5/21/2016    Stress-induced cardiomyopathy Medium  5/22/2016    HCAP (healthcare-associated pneumonia) Medium  6/6/2016    Atypical chest pain Medium  6/7/2016    Elevated troponin Medium  6/7/2016    Encounter for gastrojejunal (GJ) tube placement Medium  7/8/2016    UTI (urinary tract infection) Medium  9/11/2016    Altered mental state Medium  1/2/2017      Code Status History     Date Active Date Inactive Code Status Order ID Comments User Context    1/6/2017 12:01 PM 1/7/2017  8:26 PM DNR [id number]  Geo Bui MD Outpatient    1/3/2017  5:24 PM 1/6/2017 12:01 PM DNR [id number]  Geo Bui MD Inpatient    1/2/2017 11:28 PM 1/3/2017  5:24 PM Full Code [id number]  Jose David De La Cruz APRN CNP Inpatient    12/28/2016  8:59 AM 1/2/2017 11:28 PM Full Code [id number]  Theresa Wagoner MD Outpatient    12/26/2016  4:49 AM 12/28/2016  8:59 AM Full Code [id number]  Alexi Fernandez MD Inpatient    12/5/2016  2:45 PM 12/26/2016  4:49 AM Full Code [id number]  Francisco Burt MD Outpatient    12/1/2016  6:05 PM 12/5/2016  2:45 PM Full Code [id number]  Viridiana Rockwell PA-C Inpatient    11/27/2016  6:02 PM 12/1/2016  6:05 PM Full Code [id number]  Francisco Burt MD Outpatient    11/13/2016  9:57 PM 11/27/2016  6:02 PM Full Code [id number]  Jose David De La Cruz APRN CNP Inpatient    10/31/2016  1:27 AM 11/11/2016  2:43 PM Full Code [id number]  Jose David De La Cruz, LÁZARO CNP Inpatient    10/1/2016 12:45 PM 10/4/2016  4:15 PM Full Code [id number]  Jamila Comer MD Inpatient    9/11/2016  2:46 AM 9/16/2016  3:48 PM Full Code [id number]  [place]  Theresa Crowley MD Inpatient    7/23/2016  7:05 AM 9/11/2016  2:46 AM Full Code [id number]  Doc Barrera MD Outpatient    7/16/2016  2:33 AM 7/23/2016  7:05 AM Full Code [id number]  Moe Cárdenas MD Inpatient    7/10/2016 11:38 AM 7/16/2016  2:33 AM Full Code [id number]  Enrique Gama MD Outpatient    7/8/2016  8:04 PM 7/10/2016 11:38 AM Full Code [id number]  Shantal Dee PA Inpatient    6/6/2016  5:19 PM 6/14/2016  2:32 PM Full Code [id number]  Shantal Dee PA Inpatient    5/21/2016  3:41 PM 5/23/2016  3:59 PM Full Code [id number]  Viridiana Butcher MD ED    4/13/2016  6:13 PM 4/21/2016  1:08 PM Full Code [id number]  Arley Guzman MD Inpatient    2/1/2016  8:43 AM 4/13/2016  6:13 PM Full Code [id number]  Emily Hoover MD Outpatient    1/20/2016  8:10 PM 2/1/2016  8:43 AM Full Code [id number]  Emily Hoover MD Inpatient    1/20/2016  7:17 PM 1/20/2016  8:10 PM Full Code [id number]  Emily Hoover MD ED    1/2/2016  8:32 AM 1/20/2016  7:17 PM Full Code [id number]  Elias Mcdermott MD Outpatient    1/1/2016  1:56 PM 1/2/2016  8:32 AM Full Code [id number]  Almaz Reagan MD Outpatient    12/30/2015  9:45 PM 1/1/2016  1:56 PM Full Code [id number]  Martinez Hamm MD Inpatient    9/24/2015 12:37 PM 12/30/2015  9:45 PM Full Code [id number]  Verner, James R, MD Outpatient    8/27/2015  4:30 AM 9/24/2015 12:37 PM Full Code [id number]  Eric Cosme MD Inpatient    8/13/2015  2:51 PM 8/27/2015  4:30 AM Full Code [id number]  Emilia Kurtz MD Inpatient    8/10/2015  7:50 AM 8/13/2015  2:51 PM DNR/DNI [id number]  Marcia Aleman MD Outpatient    8/7/2015 11:13 AM 8/10/2015  7:50 AM DNR/DNI [id number]  Marcia Aleman MD Inpatient    8/4/2015  2:55 PM 8/7/2015 11:13 AM DNR [id number]  Driss Huggins MD Inpatient    7/31/2015  6:35 PM 8/4/2015  2:55 PM DNR/DNI [id number]  Driss Huggins MD Inpatient     7/26/2015  8:21 PM 7/31/2015  6:35 PM Full Code [id number]  Larry Hong MD Inpatient    6/21/2015 11:33 AM 7/26/2015  8:21 PM Full Code [id number]  Mady Atkinson MD Outpatient    6/17/2015  6:34 AM 6/21/2015 11:33 AM Full Code [id number]  Gregor SwethaLÁZARO Gregg CNP Inpatient    8/8/2014  7:29 AM 6/17/2015  6:34 AM Full Code [id number]  Ron Kirkland MD Outpatient    8/3/2014  6:08 PM 8/8/2014  7:29 AM Full Code [id number]  Ron Kirkland MD Inpatient    5/4/2014 12:08 PM 8/3/2014  6:08 PM Full Code [id number]  Darlene Del Real MD Outpatient    5/2/2014  9:16 AM 5/4/2014 12:08 PM Full Code [id number]  Darlene Del Real MD Inpatient         Medication Review      START taking        Dose / Directions Comments    meropenem 500 MG vial   Commonly known as:  MERREM   Used for:  Urinary tract infection without hematuria, site unspecified        Dose:  500 mg   Inject 500 mg into the vein every 8 hours for 10 days   Quantity:  300 mL   Refills:  0          CONTINUE these medications which may have CHANGED, or have new prescriptions. If we are uncertain of the size of tablets/capsules you have at home, strength may be listed as something that might have changed.        Dose / Directions Comments    * predniSONE 5 MG/5ML solution   This may have changed:    - Another medication with the same name was added. Make sure you understand how and when to take each.  - Another medication with the same name was removed. Continue taking this medication, and follow the directions you see here.   Used for:  Shortness of breath        Dose:  20 mg   Take 20 mLs (20 mg) by mouth daily   Quantity:  500 mL   Refills:  0    Continue prednisone taper until reaches 20 mg daily, then continue 20 mg daily.       * predniSONE 20 MG tablet   Commonly known as:  DELTASONE   This may have changed:  You were already taking a medication with the same name, and this prescription was added. Make sure you understand how  and when to take each.   Used for:  Acute on chronic respiratory failure with hypoxemia (H), COPD exacerbation (H)   Replaces:  predniSONE 5 MG/5ML solution        Take 3 tabs (60 mg) by mouth daily x 2 days, 2 tabs (40 mg) daily x 3 days, then continue 20 mg daily.   Quantity:  12 tablet   Refills:  0        * Notice:  This list has 2 medication(s) that are the same as other medications prescribed for you. Read the directions carefully, and ask your doctor or other care provider to review them with you.      CONTINUE these medications which have NOT CHANGED        Dose / Directions Comments    acetaminophen 160 MG/5ML solution   Commonly known as:  TYLENOL   Used for:  Other chronic pain        Dose:  650 mg   20.3 mLs (650 mg) by Per Feeding Tube route every 4 hours as needed for mild pain   Quantity:  300 mL   Refills:  0        aspirin 81 MG chewable tablet   Used for:  NSTEMI (non-ST elevated myocardial infarction) (H)        Dose:  81 mg   1 tablet (81 mg) by Per Feeding Tube route daily   Quantity:  36 tablet   Refills:  0        atorvastatin 20 MG tablet   Commonly known as:  LIPITOR   Used for:  NSTEMI (non-ST elevated myocardial infarction) (H)        Dose:  20 mg   Take 1 tablet (20 mg) by mouth daily   Quantity:  30 tablet   Refills:  0        budesonide 0.5 MG/2ML neb solution   Commonly known as:  PULMICORT   Used for:  Shortness of breath, Respiratory difficulty        Dose:  0.5 mg   Take 2 mLs (0.5 mg) by nebulization 2 times daily   Quantity:  60 ampule   Refills:  0        calcium carbonate 500 MG chewable tablet   Commonly known as:  TUMS   Used for:  Gastroesophageal reflux disease, esophagitis presence not specified        Dose:  1 chew tab   1 tablet (500 mg) by Per G Tube route every 2 hours as needed for heartburn   Quantity:  150 tablet   Refills:  0        clonazePAM 0.1 mg/mL   Commonly known as:  klonoPIN   Used for:  Shortness of breath        Dose:  1.5 mg   Take 15 mLs (1.5 mg) by  "mouth 4 times daily Needs appt for refills   Quantity:  420 mL   Refills:  0        Cranberry 500 MG Caps        Dose:  500 mg   Take 1 capsule (500 mg) by mouth daily   Quantity:  90 capsule   Refills:  0        fexofenadine 180 MG tablet   Commonly known as:  ALLEGRA   Used for:  COPD exacerbation (H)        Dose:  180 mg   Take 1 tablet (180 mg) by mouth daily   Quantity:  30 tablet   Refills:  0        fiber modular packet   Used for:  Diarrhea, unspecified type        Dose:  1 packet   1 packet by Per J Tube route 3 times daily   Quantity:  42 packet   Refills:  0        fluticasone 50 MCG/ACT spray   Commonly known as:  FLONASE   Used for:  Acute and chronic respiratory failure with hypercapnia (H)        Dose:  2 spray   Spray 2 sprays into both nostrils daily   Quantity:  1 Bottle   Refills:  0        gabapentin 250 MG/5ML solution   Commonly known as:  NEURONTIN   Used for:  Anxiety        Dose:  300 mg   6 mLs (300 mg) by Per J Tube route 2 times daily   Quantity:  450 mL   Refills:  0        guaiFENesin 200 MG Tabs tablet   Commonly known as:  ORGANIDIN   Used for:  Shortness of breath        Dose:  200 mg   Take 1 tablet (200 mg) by mouth 4 times daily   Quantity:  115 tablet   Refills:  0        HYDROmorphone 1 MG/ML Liqd liquid   Commonly known as:  DILAUDID   Used for:  COPD exacerbation (H)        Dose:  1 mg   Take 1 mL (1 mg) by mouth every 2 hours as needed for moderate to severe pain (air hunger)   Quantity:  50 mL   Refills:  0        ipratropium 0.02 % neb solution   Commonly known as:  ATROVENT   Used for:  Shortness of breath        Dose:  0.5 mg   Take 2.5 mLs (0.5 mg) by nebulization every 4 hours   Quantity:  450 mL   Refills:  0        JEVITY 1.5 IZZY Liqd   Used for:  Achalasia        Dose:  5 Can   Take 5 Cans by mouth daily Per tube feeding   Quantity:  150 Can   Refills:  11    - Height 5'2\"    - Weight 120 lbs    - Length of need 99 months       lactobacillus rhamnosus (GG) capsule "   Used for:  Diarrhea, unspecified type        Dose:  1 capsule   Take 1 capsule by mouth 2 times daily   Quantity:  60 capsule   Refills:  0        levalbuterol 1.25 MG/3ML neb solution   Commonly known as:  XOPENEX   Used for:  Chronic obstructive pulmonary disease with acute exacerbation (H)        Dose:  1 ampule   Take 3 mLs (1.25 mg) by nebulization every 4 hours   Quantity:  540 mL   Refills:  0        lidocaine 15 mL, alum & mag hydroxide-simethicone 15 mL GI Cocktail   Used for:  Gastroesophageal reflux disease without esophagitis        Dose:  30 mL   Take 30 mLs by mouth 3 times daily as needed for moderate pain   Quantity:  500 mL   Refills:  0        loperamide 2 MG tablet   Commonly known as:  IMODIUM A-D   Used for:  Frequent stools        2-2 mg tablets per J-tube qid prn frequent and/or loose stools. Do not use more than 8 tabs per day.   Quantity:  30 tablet   Refills:  0        LORazepam 2 MG/ML (HIGH CONC) solution   Commonly known as:  ATIVAN   Used for:  COPD exacerbation (H), Anxiety        Dose:  0.5 mg   Take 0.25 mLs (0.5 mg) by mouth every 3 hours as needed for anxiety   Quantity:  15 mL   Refills:  0        melatonin 1 MG/ML Liqd liquid   Used for:  Anxiety, Insomnia due to medical condition        Dose:  3 mg   3 mLs (3 mg) by Per J Tube route At Bedtime   Quantity:  300 mL   Refills:  0        Multiple Vitamins-Iron 15 MG Chew   Used for:  Dietary supplement-induced neuropathy (H)        Dose:  1 chew tab   1 chew tab by Per J Tube route daily Contains ferrous gluconate, 15 mL = 9 mg iron   Quantity:  30 tablet   Refills:  0        omeprazole 2 mg/mL   Commonly known as:  priLOSEC   Used for:  Achalasia        Dose:  20 mg   10 mLs (20 mg) by Per Feeding Tube route 2 times daily   Quantity:  600 mL   Refills:  3        polyethylene glycol 0.4%- propylene glycol 0.3% 0.4-0.3 % Soln ophthalmic solution   Commonly known as:  SYSTANE ULTRA   Used for:  Dry eyes, bilateral        Dose:  1-2  drop   Place 1-2 drops into both eyes 4 times daily 0900, 1300, 1700, 2100   Quantity:  1 Bottle   Refills:  0        polyethylene glycol Packet   Commonly known as:  MIRALAX/GLYCOLAX   Used for:  Constipation, unspecified constipation type        Dose:  17 g   17 g by Per J Tube route 2 times daily as needed for constipation Hold for loose stools   Quantity:  100 packet   Refills:  0    Hold for loose stools       QUEtiapine 25 MG tablet   Commonly known as:  SEROquel   Used for:  Anxiety, Insomnia due to medical condition, Air hunger        Dose:  25 mg   Take 1 tablet (25 mg) by mouth 3 times daily as needed (anxiety, sleep)   Quantity:  30 tablet   Refills:  0        senna-docusate 8.6-50 MG per tablet   Commonly known as:  SENOKOT-S;PERICOLACE   Used for:  Constipation, unspecified constipation type        Dose:  2 tablet   2 tablets by Per J Tube route 2 times daily as needed for constipation   Quantity:  100 tablet   Refills:  0        sertraline 20 MG/ML (HIGH CONC) solution   Commonly known as:  ZOLOFT   Used for:  Anxiety        Dose:  150 mg   7.5 mLs (150 mg) by Per Feeding Tube route daily   Quantity:  105 mL   Refills:  0        simethicone 125 MG Chew chewable tablet   Commonly known as:  MYLANTA GAS   Used for:  Achalasia        Dose:  125 mg   1 tablet (125 mg) by Per Feeding Tube route 4 times daily   Quantity:  120 tablet   Refills:  0        sodium chloride 0.65 % nasal spray   Commonly known as:  OCEAN   Used for:  Chronic sinusitis, unspecified location        Dose:  1 spray   Spray 1 spray into both nostrils daily as needed for congestion   Quantity:  1 Bottle   Refills:  0        sulfamethoxazole-trimethoprim suspension   Commonly known as:  BACTRIM/SEPTRA   Used for:  Chronic obstructive pulmonary disease with acute exacerbation (H)        Dose:  20 mL   20 mLs (160 mg) by Per J Tube route daily Dose based on TMP component. 20 mLs = 160 mg   Quantity:  560 mL   Refills:  0          STOP  taking     predniSONE 5 MG/5ML solution   Replaced by:  predniSONE 20 MG tablet   You also have another medication with the same name that you need to continue taking as instructed.                   Summary of Visit     Reason for your hospital stay       Acute on chronic hypoxic respiratory failure, suspect copd exacerbation.   Urinary tract infection, pseudomonas sp.             After Care     Activity       Your activity upon discharge: activity as tolerated, turn Q 2 hour.       Diet       Follow this diet upon discharge:resume prior to admission diet.          Orders Placed This Encounter  Adult Formula Drip Feeding: Specified Time Isosource 1.5; Jejunostomy; Goal Rate: 85; mL/hr; From: 8:00 PM; 8:00 AM; Medication - Tube Feeding Flush Frequency: At least 15-30 mL water before and after medication administration and with tube clogging;  NPO Time Specified Ice Chips             Referrals     Home infusion referral       Your provider has referred you to: [facility]: [facility] [place] [phone]   [email or web address]    Local Address (if different from home address): N/A    Anticipated Length of Therapy: per MD    Home Infusion Pharmacist to adjust therapy based on labs and clinical assessments: Yes    Labs:  May draw labs from Venous Catheter: Yes  Home Infusion Pharmacist to order labs based on therapy type and clinical assessments: Yes  Call/Fax Lab Results to:     Agency Staff to assess nursing needs for Infusion Therapy.    Access Device Management:  IV Access Type: PICC  Flush with Heparin and Normal Saline IVP PRN and routine site care (per agency protocol) to maintain access device? Yes             Follow-Up Appointment Instructions     Future Labs/Procedures    Adult [facility] Follow-up and recommended labs and tests     Comments:    Follow up with primary care provider, Radha Mcmillan, within 7 days for hospital follow- up.     Keep other  appointments as scheduled.     Appointments on [place] and/or [facility] (with [facility] or [facility] provider or service). Call [phone] if you haven't heard regarding these appointments within 7 days of discharge.      Follow-Up Appointment Instructions     Adult [facility] Follow-up and recommended labs and tests       Follow up with primary care provider, Radha Mcmillan, within 7 days for hospital follow- up.     Keep other appointments as scheduled.     Appointments on [place] and/or [facility] (with [facility] or [facility] provider or service). Call [phone] if you haven't heard regarding these appointments within 7 days of discharge.             Statement of Approval     Ordered          01/10/17 1020  I have reviewed and agree with all the recommendations and orders detailed in this document.   EFFECTIVE NOW     Approved and electronically signed by:  Fam Patel MD

## 2017-01-07 NOTE — ED PROVIDER NOTES
History     Chief Complaint   Patient presents with     Shortness of Breath     The history is provided by the patient and a caregiver (nursing staff). The history is limited by the condition of the patient.     Mary Wang is a 66 year old female with a past medical history significant for anxiety, hypertension, severe COPD now status post tracheostomy and ventilator dependent, achalasia status post GJ tube placement, urinary retention with bender catheter in place, and recent hospital admission 12/25/16-12/28/16 for sepsis from urine source discharged on 10 day course of Augmentin who presents with nursing staff for evaluation of shortness of breath. Of note, the patient was recently hospitalized 1/2/16-1/6/16 for leukocytosis and altered mental status felt likely secondary to benzos and gabapentin. The nurse who is taking care of her today reports that she has a tendency to become highly anxious after returning home from being hospitalized and subsequently became this way after being discharged from the hospital yesterday, having troubles sleeping throughout the night. She has been requesting her liquid hydromorphone every 2 hours and her Ativan every 3 hours, which is her maximum PRN dosage for these medications. She last received Ativan at 7 AM this morning (~4.5 hours ago) but declined her 9 AM dose. The nurse did not come on for her shift until this morning and has noted that the patient has become increasingly anxious and dyspneic with worsened diminished lung sounds from baseline bilaterally. The nurse notes that the patient has an allergy to Augmentin but the doctors prescribed her this medication anyway with her recent UTI with plan to watch for any signs or symptoms of reaction. She has not developed any complications aside from loose stool. At present, the patient complains of shortness of breath and lower back pain. She has no other concerns or complaints at this visit.     I have reviewed the  Medications, Allergies, Past Medical and Surgical History, and Social History in the [facility] system.    Past Medical History   Diagnosis Date     COPD (chronic obstructive pulmonary disease) (H)      trach/vent dependent      Anxiety      TMJ pain dysfunction syndrome      Tracheostomy in place      Hypertension      GERD (gastroesophageal reflux disease)      Achalasia      Lung nodule      spiculated; followed in pulm clinic      Stress-induced cardiomyopathy      Anxiety and depression      Chronic pain        Past Surgical History   Procedure Laterality Date     Tracheostomy N/A 8/26/2015     Procedure: TRACHEOSTOMY;  Surgeon: Milena Thibodeaux MD;  Location: [facility] OR     Bronchoscopy, insert bronchial valve Right 9/2/2015     Procedure: BRONCHOSCOPY, INSERT BRONCHIAL VALVE;  Surgeon: Everardo Beach MD;  Location: [facility] OR     Esophagoscopy, gastroscopy, duodenoscopy (egd), combined N/A 12/11/2015     Procedure: COMBINED ESOPHAGOSCOPY, GASTROSCOPY, DUODENOSCOPY (EGD);  Surgeon: Dhruv Lyles MD;  Location: [facility] OR     Esophagoscopy, gastroscopy, duodenoscopy (egd), combined N/A 12/31/2015     Procedure: COMBINED ESOPHAGOSCOPY, GASTROSCOPY, DUODENOSCOPY (EGD);  Surgeon: Brenden Plasencia MD;  Location: [facility] OR     Percutaneous insertion tube jejunostomy N/A 12/31/2015     Procedure: PERCUTANEOUS INSERTION TUBE JEJUNOSTOMY;  Surgeon: Brenden Plasencia MD;  Location: [facility] OR     Percutaneous insertion tube jejunostomy N/A 1/25/2016     Procedure: PERCUTANEOUS INSERTION TUBE JEJUNOSTOMY;  Surgeon: Carrie Coleman MD;  Location: [facility]     Anesthesia out of or mri N/A 4/18/2016     Procedure: ANESTHESIA OUT OF OR MRI;  Surgeon: GENERIC ANESTHESIA PROVIDER;  Location: [facility]     Endoscopic insertion tube gastrostomy N/A 7/9/2016     Procedure: ENDOSCOPIC INSERTION TUBE GASTROSTOMY;  Surgeon: Brenden Plasencia MD;  Location: [facility] OR       Family History   Problem Relation Age of Onset     CANCER  Sister        Social History   Substance Use Topics     Smoking status: Former Smoker -- 2.00 packs/day for 40 years     Types: Cigarettes     Start date: 01/01/1964     Quit date: 04/18/1998     Smokeless tobacco: Never Used     Alcohol Use: No        Allergies   Allergen Reactions     Levaquin Other (See Comments)     Delirium     Toro Podhaler [Tobramycin] Other (See Comments)     Severe congestion, SOB      Suprax [Cefixime]      See ceftibuten     Augmentin Nausea     Has tolerated for treatment of UTIs in 2016.     Avelox [Moxifloxacin] Anxiety     Cedax [Ceftibuten] Other (See Comments)     Pain in eyes     Penicillins Itching     Tolerated amoxicillin without issues.     Vantin [Cefpodoxime] Nausea       Review of Systems   Respiratory: Positive for shortness of breath.    Musculoskeletal: Positive for back pain (low back).   Psychiatric/Behavioral: The patient is nervous/anxious.    All other systems reviewed and are negative.      Physical Exam   BP: 200/86 mmHg  Pulse: 116  Temp: 97.8  F (36.6  C)  Resp: 18  SpO2: 92 %    Physical Exam  Gen: alert, anxious, cooperative. Occasional noisy breath wounds.   HEENT:PERRL, no facial tenderness or wounds, head atraumatic, oropharynx clear, mucous membranes moist, TMs clear bilaterally  Neck:no bony tenderness or step offs, no JVD, trachea midline  Back: no CVA tenderness, no midline bony tenderness  CV:RRR without murmurs  PULM:Clear to auscultation bilaterally  Abd:soft, nontender, nondistended. Bowel sounds present and normal  UE:No traumatic injuries, skin normal  LE:no traumatic injuries, skin normal  Neuro:CN II-XII intact, strength 5/5 throughout  Skin: no rashes or ecchymoses    ED Course   Procedures       11:36 AM  The patient was seen and examined by Dr. Gómez in Room 20.          EKG Interpretation:      Interpreted by Camila Gómez  Time reviewed: 12:52PM  Symptoms at time of EKG: dyspnea  Rhythm: sinus tachycardia  Rate: 107  Axis:  normal  Ectopy: none  Conduction: normal  ST Segments/ T Waves: No ST-T wave changes  Q Waves: none  Comparison to prior: Unchanged from Jan 2, 2017 other than a faster HR    Clinical Impression: sinus tachycardia      Critical Care time:  60 minutes, for ventilator management and suctioning/BVM ventilation performed by MD.     Labs Ordered and Resulted from Time of ED Arrival Up to the Time of Departure from the ED   BASIC METABOLIC PANEL - Abnormal; Notable for the following:     Chloride 90 (*)     Carbon Dioxide 39 (*)     All other components within normal limits   CBC WITH PLATELETS DIFFERENTIAL - Abnormal; Notable for the following:     WBC 18.1 (*)     RBC Count 3.72 (*)     Hemoglobin 11.0 (*)     MCHC 30.8 (*)     RDW 15.3 (*)     Absolute Neutrophil 12.4 (*)     Absolute Monocytes 1.9 (*)     All other components within normal limits   ROUTINE UA WITH MICROSCOPIC REFLEX TO CULTURE - Abnormal; Notable for the following:     Blood Urine Small (*)     pH Urine 8.0 (*)     Protein Albumin Urine 10 (*)     Leukocyte Esterase Urine Moderate (*)     WBC Urine 51 (*)     RBC Urine 44 (*)     Bacteria Urine Moderate (*)     Mucous Urine Present (*)     All other components within normal limits   ISTAT  GASES LACTATE JOSE ROBERTO POCT - Abnormal; Notable for the following:     PCO2 Venous 93 (*)     Bicarbonate Venous 49 (*)     All other components within normal limits   ISTAT  GASES LACTATE JOSE ROBERTO POCT - Abnormal; Notable for the following:     PCO2 Venous 60 (*)     Bicarbonate Venous 34 (*)     All other components within normal limits   CBC WITH PLATELETS DIFFERENTIAL   PROCALCITONIN   TROPONIN I   PERIPHERAL IV CATHETER   ISTAT CG4 GASES LACTATE JOSE ROBERTO NURSING POCT   ISTAT CG4 GASES LACTATE JOSE ROBERTO NURSING POCT   BLOOD CULTURE   BLOOD CULTURE   URINE CULTURE AEROBIC BACTERIAL       Assessments & Plan (with Medical Decision Making)   65 yo F presenting with respiratory distress. Hx of COPD with trach and chronic vent dependent.  Had increased anxiety and symptoms of severe dyspnea prompting EMS to be called.   Pt was transported with BVM ventilations with 4LPM O2, but was getting BVM ventilations without O2 on arrival. O2 sat 58% on room air on arrival.   BVM with 100% FiO2 initially, then placed on vent. FiO2 was able to be weaned to her baseline of 35%. Required inline suctioning a few times for return of low O2 sat, but improved immediately with suctioning.  EKG unchanged from baseline.  CBC with increased WBC to 18.   Hgb and plts stable.   BMP unremarkable.   VBG on arrival with elevated pCO2 of 93, though this improved to her baseline pCO2 of ~60 after being on the vent for a time.   CXR without acute changes.   UA and urine culture sent, but I was able to review a urology note from her recent hospitalization suggesting to not treat asymptomatic bacteriuria. Pt not clearly symptomatic at this time.    3:48 PM  Low O2 sat of 88% on vent with FIO2 35%. Inline suctioned x2 and FiO2 up titrated to 40% then 50%. Doing well currently on 50% FiO2 with reliable sat of 96%.     Weaned FiO2 to 40% than 35%. O2 sat settling around 93%.     Case discussed with pt's daughter and the IM triage hospitalist. We will plan to admit her to 6B, intermediate care, for monitoring tonight. Will administer an additional xopenex nebulizer treatment. Continue dilaudid 1mg per G tube q2 hours PRN.     I have reviewed the nursing notes.    I have reviewed the findings, diagnosis, plan and need for follow up with the patient.    New Prescriptions    No medications on file       Final diagnoses:   Acute on chronic respiratory failure with hypoxemia (H)     I, Ej Renee, am serving as a trained medical scribe to document services personally performed by Camila Gómez MD, based on the provider's statements to me.   I, Camila Gómez MD, was physically present and have reviewed and verified the accuracy of this note documented by Ej Renee.    1/7/2017   [facility],  [place], EMERGENCY DEPARTMENT  MD FRANCESCO Vines Katrina Anne, MD  01/07/17 [id number]

## 2017-01-08 NOTE — PROGRESS NOTES
Internal Medicine                                                     Progress Note  Mary Wang MRN: [id number]  1950  Date of Admission:2017  Primary care provider: Radha Mcmillan  ___________________________________  INTERVAL HISTORY (24 Hrs)/SUBJECTIVE:     HPI, Interval events reviewed.   Known to me from recent admission.   Reports R flank pain.   Afebrile.   Breathing: stable to better on 35% fi02. Improved from 40% last admission/discharge  RN: thin whitish secretions on suctioning.   Anxious.     ROS: 4 point ROS neg other than the symptoms noted above in the interval history.    OBJECTIVE :   VITALS:    Temp:  [97.8  F (36.6  C)-98.2  F (36.8  C)] 98.2  F (36.8  C)  Heart Rate:  [] 109  Resp:  [12-30] 16  BP: ()/(55-85) 127/67 mmHg  FiO2 (%):  [35 %-40 %] 35 %  SpO2:  [91 %-100 %] 91 %    Temp (24hrs), Av  F (36.7  C), Min:97.8  F (36.6  C), Max:98.2  F (36.8  C)      Wt Readings from Last 5 Encounters:   17 58.106 kg (128 lb 1.6 oz)   17 59 kg (130 lb 1.1 oz)   16 53.5 kg (117 lb 15.1 oz)   16 53.978 kg (119 lb)   16 56.836 kg (125 lb 4.8 oz)        Intake/Output Summary (Last 24 hours) at 17 1147  Last data filed at 17 1000   Gross per 24 hour   Intake   1525 ml   Output   2475 ml   Net   -950 ml       PHYSICAL EXAM:  General: alert, NAD.   HEENT: AT/NC, anicteric.    Neck: trach and vented.   Respi/Chest: diminished through out. Some bl basal crackles.   CVS/Heart: S1S2 regular, no m/r/g,  Gi/Abd: Soft, mild tenderness R flank. PEG/J tube. g tube vented w brownish fluid. non distended, no g/r  MSK/Ext: Distal pulses 2+.     Neuro: Alert.      Medications: reviewed.       DIAGNOSTIC STUDIES:     LABS (Last four results)  CMP  Recent Labs  Lab 17  0631 17  1202 17  0655 17  0921 17  0729  17  1335    136 136 138 139  < > 131*   POTASSIUM 4.3 4.2 4.0 4.3 3.3*  < > 4.6   CHLORIDE 89* 90*  95 98 94  --  89*   CO2 39* 39* 35* 32 40*  --  37*   ANIONGAP 5 8 6 8 5  --  4   * 87 81 125* 97  < > 134*   BUN 27 26 19 15 11  --  16   CR 0.61 0.65 0.54 0.56 0.49*  --  0.47*   GFRESTIMATED >90Non  GFR Calc >90Non  GFR Calc >90Non  GFR Calc >90Non  GFR Calc >90Non  GFR Calc  --  >90Non  GFR Calc   GFRESTBLACK >90African American GFR Calc >90African American GFR Calc >90African American GFR Calc >90African American GFR Calc >90African American GFR Calc  --  >90African American GFR Calc   IZZY 9.1 8.9 8.8 8.3* 8.5  --  8.6   MAG  --   --   --  2.3 1.8  --  1.8   PHOS  --   --   --   --   --   --  3.1   PROTTOTAL  --   --   --   --   --   --  6.8   ALBUMIN  --   --   --   --   --   --  2.7*   BILITOTAL  --   --   --   --   --   --  0.2   ALKPHOS  --   --   --   --   --   --  164*   AST  --   --   --   --   --   --  17   ALT  --   --   --   --   --   --  39   < > = values in this interval not displayed.  CBC  Recent Labs  Lab 01/08/17  0631 01/07/17  1354 01/07/17  1202 01/05/17  0655   WBC 9.3 18.1* Unsatisfactory specimen - clottedJEANNE GLASBIE ER AT 1/7/17 1321 BY MASOOD 10.6   RBC 3.48* 3.72* Unsatisfactory specimen - clottedJEANNE GLASBIE ER AT 1/7/17 1321 BY MASOOD 3.04*   HGB 10.4* 11.0* Unsatisfactory specimen - clottedJEANNE GLASBIE ER AT 1/7/17 1321 BY MASOOD 8.9*   HCT 32.8* 35.7 Unsatisfactory specimen - clottedJEANNE GLASBIE ER AT 1/7/17 1321 BY MASOOD 29.6*   MCV 94 96 Unsatisfactory specimen - clottedJEANNE GLASBIE ER AT 1/7/17 1321 BY MASOOD 97   MCH 29.9 29.6 Unsatisfactory specimen - clottedJEANNE GLASBIE ER AT 1/7/17 1321 BY MASOOD 29.3   MCHC 31.7 30.8* Unsatisfactory specimen - clottedJEANNE GLASBIE ER AT 1/7/17 1321 BY MASOOD 30.1*   RDW 15.2* 15.3* Unsatisfactory specimen - clottedJEANNE GLASBIE ER AT 1/7/17 1321 BY MASOOD 15.3*    342 Unsatisfactory specimen - clottedJEANNE GLASBIE ER AT 1/7/17 1321 BY MASOOD  278     INR  Recent Labs  Lab 01/02/17  1335   INR 0.99     Procal: 0.19    UA+. UC: >100 K NLF GNR.     Recent Results (from the past 48 hour(s))   Chest  XR, 1 view portable    Narrative    Exam:  XR CHEST PORT 1 VW, 1/7/2017 1:01 PM    History: respiratory distress    Comparison:  Chest x-ray 1/4/2017      Impression:    Chronic changes of emphysema and bilateral costophrenic angle  blunting, likely representing scarring or atelectasis. No acute  changes compared to 1/4/2017 radiograph.             ASSESSMENT & PLAN :    Mary Wang is a 66 year old female with a history of severe COPD s/p trach on home vent 24/7, anxiety, hypertension, achalasia s/p GJ tube who presented to the hospital with acute on chronic hypoxic respiratory failure.  Of note she was just hospitalized here from 12/1-5 w/ a UTI, 12/25-28 w/bacteremia and 1/2-6 with encephalopathy ultimately attributed to her medications.    1) Severe COPD with Acute on chronic hypoxic respiratory failure - Presented to our ED with dyspnea and hypercapnia (pCO2 93 up from baseline 60s) now improved once ambu-bagged and placed back on home ventilator.  Still requiring mildly increased FiO2 (normally 30%, briefly required 50% and now stable on 35%.  Of note, still on steroid burst from prior admission.  Per nursing staff from her facility, the patient was never hypoxic prior to coming in and was instead complaining of subjective shortness of breath    CXR. Procal: no e/o new pna.   Acute leucocytosis: resolved.     1/8/2017  Appears more comfortable. On 35% fi02 at current.     - Decrease iv MP to 60 tid. Takes 20 mg prednisone daily at baseline. On bactrim for chronic steroid use. Continue.   - Vent management per RT. FiO2 35% , Tinsp 1, ,RR 14, PEEP 5  - Continue home inhalers, b'dilators. Scheduled ipatropium, levalbuterol while awake; BID pulmicort  - PRN Hydromorphone for air hunger.   - Aggressive pul toileting  - sputum c/s  - Pulse ox contd.  Tele.     2) Encephalopathy, resolved - Previously admitted with encephalopathy attributed to oversedation from medications.  Attempted to wean benzos but patient did not tolerate and after goals of care discussion with patient and family we are erring on the side of keeping her comfortable even if that means episodes of oversedation.  - Continue home ativan, clonazepam, hydromorphone for air hunger, Seroquel.    3) Anxiety - On multiple agents.  Attempted to wean during last admission due to encephalopathy but ultimately unable to tolerate reduction in anxiolytics.  - Continue home clonazepam  - Continue home ativan  - Continue home hydromorphone for air hunger  - Continue home seroquel    4) Achalasia - S/p GH tube placement  - G tube to gravity drainage  - Continue home tube feeds: Isosource 1.5 @ 85 mls/hr x 12h from 5995-5669.  Free water 125 ccs q4h.    5) Urinary retention - Ongoing since prior admissions with plans for indwelling bender instead of urologic work up.  - Continue home bender catheter  UA: positive. UC: GNR, final c/s pending.   R flank pain.     Based on prior culture, will start empirically on iv Zosyn pending final culture.(has tolerated augmentin w no allergy)   Renal US to look for hydronephrosis.       CODE: DNR  Diet/IVF: NPO. On tube feeds, continue. Nutrition consult.   DVT ppx: Mechanical.   Dispo: Inpatient. Pending medical stabilization. Will get SW, Palliative consult on Monday.     D/w RN.   Called daughter Ms Lindsey (POA)  and updated.       Geo Bui MD   Hospitalist (Internal Medicine)  Pager: [phone].

## 2017-01-08 NOTE — PROGRESS NOTES
"CLINICAL NUTRITION SERVICES - ASSESSMENT NOTE     Nutrition Prescription    Malnutrition Status:    Does not meet criteria     Recommendations already ordered by Registered Dietitian (RD):  TF regimen:   Isosource 1.5 @ 85 ml/hr x 12 hrs (overnight cycle from 8pm - 8am)  Provides 1020 mls, 1530 kcals (26 kcal/kg), 69 gms protein (1.2 gm/kg), 15 gms fiber, 775 mls H2O    Future/Additional Recommendations:  Check TF tolerance and potential need to add protein modular     REASON FOR ASSESSMENT  Mary Wang is a/an 66 year old female assessed by the dietitian for Provider Order - Registered Dietitian to Assess and Order TF per Medical Nutrition Therapy Protocol    NUTRITION HISTORY  Pt does not consume orally, she is on chronic nightly tube feeds with trach.     Home regimen:   Isosource 1.5 @ 85 ml/hr x 12 hours overnight (8pm - 8am)  Fluid flushes 120 mls q4hr    CURRENT NUTRITION ORDERS  Diet: NPO  Intake/Tolerance: tolerates home tube feed regimen.    Discussed home tube feeding regimen w/ pt and daughter. Reports no new concerns and feels regimen is adequate. Prefers overnight schedule, orders modified per request.     LABS  Labs reviewed    MEDICATIONS  Medications reviewed    ANTHROPOMETRICS  Height: 163 cm (5' 4.173\")  Most Recent Weight: 58.106 kg (128 lb 1.6 oz)    IBW: 58 kg  BMI: Normal BMI  Weight History:   Wt Readings from Last 10 Encounters:   01/08/17 58.106 kg (128 lb 1.6 oz)   01/05/17 59 kg (130 lb 1.1 oz)   12/28/16 53.5 kg (117 lb 15.1 oz)   12/22/16 53.978 kg (119 lb)   12/05/16 56.836 kg (125 lb 4.8 oz)   11/28/16 54.023 kg (119 lb 1.6 oz)   11/11/16 62 kg (136 lb 11 oz)   10/04/16 56 kg (123 lb 7.3 oz)   09/16/16 53.2 kg (117 lb 4.6 oz)   09/06/16 50.349 kg (111 lb)     Dosing Weight: 58 kg    ASSESSED NUTRITION NEEDS  Estimated Energy Needs: 1450 - 1740 kcals/day (25 - 30 kcals/kg)  Justification: Maintenance  Estimated Protein Needs: 58 - 70 grams protein/day (1 - 1.2 grams of " pro/kg)  Justification: Maintenance  Estimated Fluid Needs: per MD    PHYSICAL FINDINGS  See malnutrition section below.    MALNUTRITION  % Intake: No decreased intake noted  % Weight Loss: None noted  Subcutaneous Fat Loss: None overt signs observed  Muscle Loss: pt appears to have moderate/severe wasting in arms and legs, but difficult to quantify  Fluid Accumulation/Edema: None noted  Malnutrition Diagnosis: Patient does not meet two of the above criteria necessary for diagnosing malnutrition    NUTRITION DIAGNOSIS  Inadequate oral intake related to chronic trach w/ inability to safely swallow as evidenced by NPO, on chronic tube feeds    INTERVENTIONS  Implementation  Enteral Nutrition - Initiate (home regimen)    Goals  Total avg nutritional intake to meet a minimum of 25 kcal/kg and 1.0 g PRO/kg daily (per dosing wt 58 kg).    Monitoring/Evaluation  Progress toward goals will be monitored and evaluated per protocol.    Moy Hayes MS, RD, LD, CNSC

## 2017-01-08 NOTE — PLAN OF CARE
"Problem: Goal Outcome Summary  Goal: Goal Outcome Summary  Outcome: No Change  /67 mmHg  Pulse 116  Temp(Src) 98.2  F (36.8  C) (Axillary)  Resp 16  Ht 1.63 m (5' 4.17\")  Wt 58.106 kg (128 lb 1.6 oz)  BMI 21.87 kg/m2  SpO2 91%, Mechanical vent 35% FiO2    Neuro: A&Ox4. Pt is able to mouth yes/no to questions. Encouraged pt to use single word phrases. Offered her a board to write on.  Cardiac: SR. VSS. Afebrile.  Respiratory: Sating 91-93% on Mechanical vent 35%FiO2, suctioning every 2-4 hours, small white secretions, scheduled nebs. Reports SOB, gave PRN ativan and dilaudid every 2-3 hours, pt reported feeling better after medication was given .  GI/: Avendaño patent and intact draining cloudy yellow urine, adequate urine output. No BM. G-tube to gravity  Diet/appetite: NPO, cyclic TF's at goal of 85cc/hr, stopped at 10am  Activity: T/R every 2 hours, pt refusing  Pain: At acceptable level on current regimen.   Skin: Redness noted on bottom, Mepliex in place for protection, bruises on body    R: Continue with POC. Notify primary team with changes.              "

## 2017-01-08 NOTE — PLAN OF CARE
Problem: Goal Outcome Summary  Goal: Goal Outcome Summary  Outcome: No Change  BP 99/70 mmHg  Pulse 116  Temp(Src) 98.1  F (36.7  C) (Axillary)  Resp 14  Wt 58.106 kg (128 lb 1.6 oz)  SpO2 95%    VSS. Afebrile. Alert and oriented x 4. Has slept most of shift with a 2 hour time frame of using call light constantly. PRN Dilaudid and Ativan utilized for pain and anxiety. Vent at 35 % FiO2 with sats mid 90s. Suctioning scant-medium output. Patient has refused pneumatic leg pumps. Cyclic TF's at goal of 85 cc/hr. They are to be stopped at 10:00 AM since they were started late. G-tube to gravity. Avendaño in place with adequate UOP. No BM. Continue to monitor.

## 2017-01-09 NOTE — PROGRESS NOTES
Received consult for suspected pressure injury on coccyx. Assessed the area and noted 1 x 1.5 x 0.0cm recently resurfaced pressure injury on right buttock (fleshy aspect).  No pressure ulcer detected. Able to shift weight from side to side with some assistance. Educated pt on pressure injury, risk factors, and preventive measures.Verbalized understanding, however unable to keep HOB lower than 30 degree due to breathing issues at this time. She is on ventilator.   P. Continue to follow pressure ulcer prevention protocol. No further visit planned, will sign off.  Face to face time- 15mins

## 2017-01-09 NOTE — PLAN OF CARE
"Problem: Goal Outcome Summary  Goal: Goal Outcome Summary  Outcome: No Change  /79 mmHg  Pulse 116  Temp(Src) 98.2  F (36.8  C) (Oral)  Resp 18  Ht 1.63 m (5' 4.17\")  Wt 60.555 kg (133 lb 8 oz)  BMI 22.79 kg/m2  SpO2 92%    Neuro: A&Ox4; forgetful; anxious. Able to mouth yes/no questions.   Cardiac: SR. VSS. Afebrile.    Respiratory: Sats 88-93% on vent with 35% FiO2. Bivona 6. Suctioned X3.  GI/: Avendaño w/ UO approx. 30 mL/hr. No BM overnight.   Diet/appetite: NPO w/ cycled TF @ 85 mL/hr- To be stopped at 0800. TF via J tube; G tube to gravity w/ minimal output.   Activity:  Repositioned Q2H. Refused X1.   Pain: PRN dilaudid & ativan given Q2-3H for pain/air hunger/anxiety.   Skin: Meplex in place over coccyx; C/D/I.     R: Continue with POC. Notify primary team with changes.            "

## 2017-01-09 NOTE — PROGRESS NOTES
Home Infusion  Referral made to [facility] for IV therapy after discharge.  Mary lives in a group home which does provide nursing to administer IV therapy.  She will be going home on q 6hr zosyn.  I spoke with Arley PHELPS from the group home to assess availability of staff for the dosing.   After discussion we decided on cadd pump administration which would only require a 1x/day bag change.  [facility] office staff will coordinate teaching at [facility] on cadd administration for tomorrow and make plan PICC dressing changes and lab draws.   Met with Lynda at bedside today introduced myself and my role to assist with her transition to home.  I updated her on the plan for her discharge tomorrow.  Answered her questions.  Lynda verbalized understanding and is in agreement with plan.    [facility] Liaison  [phone] pager

## 2017-01-09 NOTE — PROGRESS NOTES
Care Coordinator- Discharge Planning     Admission Date/Time:  1/7/2017  Attending MD:  Geo Bui MD     Data  Date of initial CC assessment:  Chart reviewed, discussed with interdisciplinary team.   Patient was admitted for:   1. Urinary tract infection without hematuria, site unspecified    2. Acute on chronic respiratory failure with hypoxemia (H)           Assessment  Updated that pt will be medically cleared for discharge to group home tomorrow.  She will need IV antibiotics at her discharge.  Pt has an order for PICC placement today.      Coordination of Care :    I spoke with Arley at [facility] ([phone]) to inquire about IV antibiotics at the group home.  Arley stated that it was possible with [facility] and that he would like to speak with liaison to plan for the antibiotic delivery system, etc.  I have asked MATTIE Pino liaison to contact Arley for discharge planning.  Pt has appt tomorrow with complex care team in her group home at 3 pm.  Per Dr. Bui, daughter César does not want her to miss appt so she requested pt discharge in time for appt.  I have set up [facility] stretcher transport for 11 am.  I will ensure PCS form is at 6B charge nurse desk prior to 11 am.  Bedside RN updated with the above plan.       Plan  Anticipated Discharge Date: 01/10/17    Anticipated Discharge Plan: Return to group home with new IV antibiotics.      Valdo Flowers, RN, BSN  Medicine Care Coordinator  [facility]  [phone]

## 2017-01-09 NOTE — PLAN OF CARE
"Problem: Goal Outcome Summary  Goal: Goal Outcome Summary  Outcome: No Change  /67 mmHg  Pulse 116  Temp(Src) 98.8  F (37.1  C) (Oral)  Resp 14  Ht 1.63 m (5' 4.17\")  Wt 58.106 kg (128 lb 1.6 oz)  BMI 21.87 kg/m2  SpO2 96%    VSS, A/Ox4, Mechanical vent 35%FiO2, Bivona #6, suctioning every 2-4 hrs. Pain and anxiety managed with Dilaudid and Ativan. Avendaño intact with adequate UOP and BM x1. G-tube to gravity with minimal output. TF at 85 ml/hr at goal. Pt refused repositioning q2h and pneumatic leg pumps. Will continue to monitor.         "

## 2017-01-09 NOTE — PLAN OF CARE
"Problem: Goal Outcome Summary  Goal: Goal Outcome Summary  Outcome: No Change  /77 mmHg  Pulse 116  Temp(Src) 98.1  F (36.7  C) (Oral)  Resp 18  Ht 1.63 m (5' 4.17\")  Wt 60.555 kg (133 lb 8 oz)  BMI 22.79 kg/m2  SpO2 95% Mechanical vent     Neuro: A&Ox4. Forgetful. Pt is able to mouth yes/no to questions. Encouraged pt to use single word phrases. Offered her a board to write on.  Cardiac: SR. VSS. Afebrile.  Respiratory: Bivona #6 Sating 95% on Mechanical vent 35%FiO2, suctioned x 3, small white secretions, scheduled nebs. Reports SOB, gave PRN ativan and dilaudid every 2-3 hours, pt reported feeling better after medication was given .  GI/: Avendaño patent and intact draining yellow urine, adequate urine output. Smear BM. G-tube to gravity  Diet/appetite: NPO, cyclic TF's at goal of 85cc/hr, stopped at 8am  Activity: T/R every 2 hours  Pain: At acceptable level on current regimen.    Skin: Redness noted on bottom, WOC RN assessed (see note), bruises on body    R: Continue with POC. Notify primary team with changes.        "

## 2017-01-09 NOTE — PROGRESS NOTES
Internal Medicine                                                     Progress Note  Mary Wang MRN: [id number]  1950  Date of Admission:2017  Primary care provider: Radha Mcmillan  ___________________________________  INTERVAL HISTORY (24 Hrs)/SUBJECTIVE:      Interval events reviewed.   Afebrile.   Breathing: stable to better on 35% fi02. Patient still reports sob.   RN: thin whitish secretions on suctioning.   Anxious.   Reports some back pain      ROS: 4 point ROS neg other than the symptoms noted above in the interval history.    OBJECTIVE :   VITALS:    Temp:  [96.9  F (36.1  C)-98.8  F (37.1  C)] 98.1  F (36.7  C)  Heart Rate:  [88-97] 97  Resp:  [14-20] 18  BP: (115-158)/(66-79) 147/77 mmHg  FiO2 (%):  [35 %] 35 %  SpO2:  [89 %-96 %] 95 %    Temp (24hrs), Av.1  F (36.7  C), Min:96.9  F (36.1  C), Max:98.8  F (37.1  C)        Wt Readings from Last 5 Encounters:   17 60.555 kg (133 lb 8 oz)   17 59 kg (130 lb 1.1 oz)   16 53.5 kg (117 lb 15.1 oz)   16 53.978 kg (119 lb)   16 56.836 kg (125 lb 4.8 oz)        Intake/Output Summary (Last 24 hours) at 17 1724  Last data filed at 17 1700   Gross per 24 hour   Intake   1830 ml   Output   1230 ml   Net    600 ml       PHYSICAL EXAM:  General: alert, NAD.   HEENT: AT/NC, anicteric.    Neck: trach and vented.   Respi/Chest: diminished through out. Some bl basal crackles.   CVS/Heart: S1S2 regular, no m/r/g,  Gi/Abd: Soft, mild tenderness R flank. PEG/J tube. g tube vented w brownish fluid. non distended, no g/r  MSK/Ext: Distal pulses 2+.     Neuro: Alert.      Medications: reviewed.       DIAGNOSTIC STUDIES:     LABS (Last four results)  CMP    Recent Labs  Lab 17  0631 17  1202 17  0655 17  0921 17  0729    136 136 138 139   POTASSIUM 4.3 4.2 4.0 4.3 3.3*   CHLORIDE 89* 90* 95 98 94   CO2 39* 39* 35* 32 40*   ANIONGAP 5 8 6 8 5   * 87 81 125* 97   BUN 27 26  19 15 11   CR 0.61 0.65 0.54 0.56 0.49*   GFRESTIMATED >90Non  GFR Calc >90Non  GFR Calc >90Non  GFR Calc >90Non  GFR Calc >90Non  GFR Calc   GFRESTBLACK >90African American GFR Calc >90African American GFR Calc >90African American GFR Calc >90African American GFR Calc >90African American GFR Calc   IZZY 9.1 8.9 8.8 8.3* 8.5   MAG  --   --   --  2.3 1.8     CBC    Recent Labs  Lab 01/08/17  0631 01/07/17  1354 01/07/17  1202 01/05/17  0655   WBC 9.3 18.1* Unsatisfactory specimen - clottedJEANNE GLASBIE ER AT 1/7/17 1321 BY MASOOD 10.6   RBC 3.48* 3.72* Unsatisfactory specimen - clottedJEANNE GLASBIE ER AT 1/7/17 1321 BY MASOOD 3.04*   HGB 10.4* 11.0* Unsatisfactory specimen - clottedJEANNE GLASBIE ER AT 1/7/17 1321 BY MASOOD 8.9*   HCT 32.8* 35.7 Unsatisfactory specimen - clottedJEANNE GLASBIE ER AT 1/7/17 1321 BY MASOOD 29.6*   MCV 94 96 Unsatisfactory specimen - clottedJEANNE GLASBIE ER AT 1/7/17 1321 BY MASOOD 97   MCH 29.9 29.6 Unsatisfactory specimen - clottedJEANNE GLASBIE ER AT 1/7/17 1321 BY MASOOD 29.3   MCHC 31.7 30.8* Unsatisfactory specimen - clottedJEANNE GLASBIE ER AT 1/7/17 1321 BY MASOOD 30.1*   RDW 15.2* 15.3* Unsatisfactory specimen - clottedJEANNE GLASBIE ER AT 1/7/17 1321 BY MASOOD 15.3*    342 Unsatisfactory specimen - clottedJEANNE GLASBIE ER AT 1/7/17 1321 BY MASOOD 278     INRNo lab results found in last 7 days.  Procal: 0.19    UA+. UC: >100 K Pseudomonas sp. Sensitivity pending.        Chest  XR, 1 view portable    Narrative    Exam:  XR CHEST PORT 1 VW, 1/7/2017 1:01 PM    History: respiratory distress    Comparison:  Chest x-ray 1/4/2017      Impression:    Chronic changes of emphysema and bilateral costophrenic angle  blunting, likely representing scarring or atelectasis. No acute  changes compared to 1/4/2017 radiograph.             ASSESSMENT & PLAN :    Mary Wang is a 66 year old female with a history of severe COPD  "s/p trach on home vent 24/7, anxiety, hypertension, achalasia s/p GJ tube who presented to the hospital with acute on chronic hypoxic respiratory failure.  Of note she was just hospitalized here from 12/1-5 w/ a UTI, 12/25-28 w/bacteremia and 1/2-6 with encephalopathy ultimately attributed to her medications.    1) Severe COPD with Acute on chronic hypoxic respiratory failure - Presented to our ED with dyspnea and hypercapnia (pCO2 93 up from baseline 60s) now improved once ambu-bagged and placed back on home ventilator.  Still requiring mildly increased FiO2 (normally 30%, briefly required 50% and now stable on 35%.  Of note, still on steroid burst from prior admission.  Per nursing staff from her facility, the patient was never hypoxic prior to coming in and was instead complaining of subjective shortness of breath    CXR. Procal: no e/o new pna.   Acute leucocytosis: resolved.   Fi02 stable.   Patient continues to complaint \"cant breathe\" , however clinically appears stable to slightly better.     - DC iv MP, start prednisone 60 mg daily. Takes 20 mg prednisone daily at baseline. On bactrim for chronic steroid use. Continue.   - Vent management per RT. FiO2 35% , Tinsp 1, ,RR 14, PEEP 5  - Continue home inhalers, b'dilators. Scheduled ipatropium, levalbuterol while awake; BID pulmicort  - PRN Hydromorphone for air hunger.   - Aggressive pul toileting  - sputum c/s  - Pulse ox contd. Tele.     * likely dc on prednisone slow taper to baseline 20 mg daily.     2) Encephalopathy, resolved - Previously admitted with encephalopathy attributed to oversedation from medications.  Attempted to wean benzos but patient did not tolerate and after goals of care discussion with patient and family we are erring on the side of keeping her comfortable even if that means episodes of oversedation.  - Continue home ativan, clonazepam, hydromorphone for air hunger, Seroquel.    3) Anxiety - On multiple agents.  Attempted to wean " during last admission due to encephalopathy but ultimately unable to tolerate reduction in anxiolytics.  - Continue home clonazepam  - Continue home ativan  - Continue home hydromorphone for air hunger  - Continue home seroquel    4) Achalasia - S/p GH tube placement  - G tube to gravity drainage  - Continue home tube feeds: Isosource 1.5 @ 85 mls/hr x 12h from 8772-1625.  Free water 125 ccs q4h.    5) Urinary retention - Ongoing since prior admissions with plans for indwelling bender instead of urologic work up.  - Continue home bender catheter  UTI:     UA: positive. UC: Pseudomonas. Sensitivity pending. ? True infection vs colonization.   Renal US: normal.     Continue empiric iv Zosyn. Plan total 10 days. curbsided ID. Also d/w daughter POA> would prefer treatment regardless. Will get picc line, order placed.     CODE: DNR  Diet/IVF: NPO. On tube feeds, continue. Nutrition consult.   DVT ppx: Mechanical.   Dispo: Inpatient. Pending medical stabilization. Will get SW, Palliative consult on Monday.   Likely tomorrow am. Patient has meeting w complex care clinic team at [facility] tomorrow 3 pm.     D/w RN. CC.  D/w ID    Called daughter Ms Lindsey (POA)  and updated.       Geo Bui MD   Hospitalist (Internal Medicine)  Pager: [phone].

## 2017-01-10 NOTE — PROGRESS NOTES
"[facility]  \"Hello, my name is Karen Martines , and I am calling from the [facility].  I want to check in and see how you are doing, after leaving the hospital.  You will also receive a call from your Care Coordinator (care team), but I want to make sure you don t have any urgent needs.  I have a couple questions to review with you:     Post-Discharge Outreach                                                    Mary Wang is a 66 year old female     Date of Admission:                 1/7/2017  Date of Discharge:                  1/10/2017  Admitting Physician:              Enrique Gama MD  Discharge Physician:              Fam Patel MD    Discharging Service:               Internal Medicine      Primary Provider: Radha Mcmillan            Discharge Diagnosis:      Acute on chronic respiratory failure, suspect copd exacerbation  UTI, pseudomonas sp.    Chronic urinary retention with indwelling bender.    Anxiety disorder.             Follow-up Appointments      Adult [facility] Follow-up and recommended labs and tests        Follow up with primary care provider, Radha Mcmillan, within 7 days for hospital follow- up.                  Care Team:    Patient Care Team       Relationship Specialty Notifications Start End    Radha Mcmillan MD PCP - General Internal Medicine  10/24/16     Phone: [phone] Fax: [phone]         [facility] [address] [facility] [address] [facility] [address]    Kahlil Nuñez MD MD Internal Medicine  7/1/15     Phone: [phone] Fax: [phone]         [facility] [address] [facility] [address] [facility] [address]    Criselda Long APRN CNP Nurse Practitioner Nurse Practitioner  7/13/15     Phone: [phone] Pager: [phone] Fax: [phone]        [facility] [address] [facility] [address]    Dhruv Lyles MD MD Gastroenterology  11/10/15     Phone: [phone] Fax: " [phone]         [address] [facility] [address]    Soledad Santos MD Resident Student in organized health care education/training program  9/14/16     Phone: [phone] Fax: [phone]         [address] [facility] [address]    Radha Mcmillan MD MD Internal Medicine  9/15/16     Phone: [phone] Fax: [phone]         [address] [facility] [address]    Kahlil Nuñez MD MD [facility]  10/24/16     Phone: [phone] Fax: [phone]         [address] [facility] [address]    Cecilia Grewal PA Physician Assistant Physician Assistant  12/7/16     Phone: [phone] Fax: [phone]         [address] [facility] [address]            Transition of Care Review                                                      Patient was called three times and no answer so post 24 hr DC follow up calls will be closed out         Plan                                                      Thanks for your time.  Your Care Coordinator will follow-up within the next couple days.  In the meantime if you have questions, concerns or problems call your care team.        Karen Martines

## 2017-01-10 NOTE — PLAN OF CARE
"Problem: Goal Outcome Summary  Goal: Goal Outcome Summary  /85 mmHg  Pulse 116  Temp(Src) 98  F (36.7  C) (Oral)  Resp 16  Ht 1.63 m (5' 4.17\")  Wt 60.51 kg (133 lb 6.4 oz)  BMI 22.77 kg/m2  SpO2 96%    Alert, oriented x4, VSS,afebrile, sats mid 90s on 35% vent. Suction q3-4hours with minimal secretions. Complains of back pain and anxiety, PRN dilaudid and ativan given as available.  Seroquel given x1.  Was able to sleep through much of the night. Avendaño patent with good UOP.  G to gravity, moderate output.  TF at goal, cycled, will be off at 8am.  Turned/Repositioned q2h.  Patient able to make needs known, continue with plan of care.  Plan for discharge at 11am today.        "

## 2017-01-10 NOTE — PLAN OF CARE
Problem: Goal Outcome Summary  Goal: Goal Outcome Summary  Outcome: Adequate for Discharge Date Met:  01/10/17  DISCHARGE                         1/10/2017 11:41 AM  ----------------------------------------------------------------------------  Discharged to: Group home in [place]  Via: [facility] transport  Accompanied by: Paramedics  Discharge Instructions: Diet NPO, tube feeds, Turn and reposition, medications, follow up appointments, when to call the MD, aftercare instructions.  Prescriptions: Medication list faxed over to group home  Follow Up Appointments: a conference meeting at the group home at 3pm regarding her complex care; Family is aware  Belongings: All sent with pt  IV: PICC double lumen for IV abx  Telemetry: d/c'd  Pt exhibits understanding of above discharge instructions; all questions answered.    Discharge Paperwork: Signed, copied, and sent home with patient/ [facility] transport.

## 2017-01-10 NOTE — DISCHARGE SUMMARY
Discharge Summary      Mary Wang MRN# [id number]   YOB: 1950 Age: 66 year old     Date of Admission:  1/7/2017  Date of Discharge:  1/10/2017  Admitting Physician:  Enrique Gama MD  Discharge Physician:  Fam Patel MD   Discharging Service:  Internal Medicine      Primary Provider: Radha Mcmillan           Discharge Diagnosis:     Acute on chronic respiratory failure, suspect copd exacerbation  UTI, pseudomonas sp.   Chronic urinary retention with indwelling bender.   Anxiety disorder.            Procedures:   No procedures performed during this admission           Consultations:   Consultation during this admission received from infectious disease ([facility])             Brief History of Illness:     See H and P dated 1/7/2017    Mary Wang is a 66 year old female with a history of severe COPD s/p trach on home vent 24/7, anxiety, hypertension, achalasia s/p GJ tube who presented to the hospital with acute on chronic hypoxic respiratory failure.  Of note she was just hospitalized here from 12/1-5 w/ a UTI, 12/25-28 w/bacteremia and 1/2-6 with encephalopathy ultimately attributed to her medications.    The patient is unable to talk secondary to her severe COPD and tracheostomy.  I spoke with her home care RN and the patient communicated by mouthing words and writing.    It appears the patient was just discharged from our facility yesterday and had significant anxiety overnight and did not sleep well.  This morning she told her nursing staff she could not breath.  She was reportedly in significant distress, although she never desaturated.  Nursing staff confirmed her ventilator was working, briefly treated her with an ambu bag and called paramedics.  The patient was agreeable to coming into the hospital and was transported in.  On arrival the patient was hypoxic but apparently was being ambu-bagged without supplemental oxygen (normally requires FiO2 of 30%) and her  "oxygen saturations improved to just below her baseline once back on her home ventilator settings.          Hospital Course:     Issues:     Mary Wang is a 66 year old female with a history of severe COPD s/p trach on home vent 24/7, anxiety, hypertension, achalasia s/p GJ tube who presented to the hospital with acute on chronic hypoxic respiratory failure.  Of note she was just hospitalized here from 12/1-5 w/ a UTI, 12/25-28 w/bacteremia and 1/2-6 with encephalopathy ultimately attributed to her medications.    1) Severe COPD with Acute on chronic hypoxic respiratory failure - Presented to our ED with dyspnea and hypercapnia (pCO2 93 up from baseline 60s) now improved once ambu-bagged and placed back on home ventilator.  Still requiring mildly increased FiO2 (normally 30%, briefly required 50% and now stable on 35%.  Of note, still on steroid burst from prior admission.  Per nursing staff from her facility, the patient was never hypoxic prior to coming in and was instead complaining of subjective shortness of breath    CXR. Procal: no e/o new pna.    Acute leucocytosis: resolved.    Fi02 stable @ 35%  Patient continues to complaint \"cant breathe\" , however clinically appears stable to slightly better.     - Started empirically on iv Methylprednisolone, switched to oral 1/9/2017 60 mg daily.  Takes 20 mg prednisone daily at baseline. On bactrim for chronic steroid use. Continue.    - Dc on prednisone taper to 20 mg/daily baseline dosing.   - Vent management per RT. FiO2 35% , Tinsp 1, ,RR 14, PEEP 5  - Continue home inhalers, b'dilators. Scheduled ipatropium, levalbuterol while awake; BID pulmicort  - PRN Hydromorphone for air hunger.    - Aggressive pul toileting      2) Encephalopathy, resolved - Previously admitted with encephalopathy attributed to oversedation from medications.  Attempted to wean benzos but patient did not tolerate and after goals of care discussion with patient and family we are " "erring on the side of keeping her comfortable even if that means episodes of oversedation.  - Continue home ativan, clonazepam, hydromorphone for air hunger, Seroquel.    3) Anxiety - On multiple agents.  Attempted to wean during last admission due to encephalopathy but ultimately unable to tolerate reduction in anxiolytics.  - Continue home clonazepam  - Continue home ativan  - Continue home hydromorphone for air hunger  - Continue home seroquel    4) Achalasia - S/p GH tube placement  - G tube to gravity drainage  - Continue home tube feeds: Isosource 1.5 @ 85 mls/hr x 12h from [id number].  Free water 125 ccs q4h.    5) Urinary retention - Ongoing since prior admissions with plans for indwelling bender instead of urologic work up.  - Continue home bender catheter    # UTI:     UA: positive. UC: Pseudomonas. Sensitivity pending. ? True infection vs colonization.    Renal US: normal.   D/w César YOUNG. Prefers to treat with iv antibiotics.   Curbsided ID, agree with the plan.     Continue empiric iv Zosyn. Plan total 10 days. ( start date 17)    CODE: DNR  Diet/IVF: NPO. On tube feeds, continue.     DVT ppx: Mechanical       Discharge Day Exam:        Doing well.   Vitals stable.   No new concern.         Wt Readings from Last 5 Encounters:   17 60.555 kg (133 lb 8 oz)   17 59 kg (130 lb 1.1 oz)   16 53.5 kg (117 lb 15.1 oz)   16 53.978 kg (119 lb)   16 56.836 kg (125 lb 4.8 oz)       Blood pressure 137/65, pulse 116, temperature 97.7  F (36.5  C), temperature source Oral, resp. rate 16, height 1.63 m (5' 4.17\"), weight 60.51 kg (133 lb 6.4 oz), SpO2 97 %, not currently breastfeeding.    Temp (24hrs), Av  F (36.7  C), Min:97.7  F (36.5  C), Max:98.2  F (36.8  C)    General: alert, NAD.    HEENT: AT/NC, anicteric.     Neck: trach and vented.    Respi/Chest: diminished through out. Some bl basal crackles.    CVS/Heart: S1S2 regular, no m/r/g,  Gi/Abd: Soft, mild tenderness R flank. " PEG/J tube. non distended, no g/r  MSK/Ext: Distal pulses 2+.      Neuro: Alert.        Data:  All laboratory data reviewed          Significant Results:     LABS (Last four results)  CMP    Recent Labs  Lab 01/08/17  0631 01/07/17  1202 01/05/17  0655 01/04/17  0921 01/03/17  0729    136 136 138 139   POTASSIUM 4.3 4.2 4.0 4.3 3.3*   CHLORIDE 89* 90* 95 98 94   CO2 39* 39* 35* 32 40*   ANIONGAP 5 8 6 8 5   * 87 81 125* 97   BUN 27 26 19 15 11   CR 0.61 0.65 0.54 0.56 0.49*   GFRESTIMATED >90Non  GFR Calc >90Non  GFR Calc >90Non  GFR Calc >90Non  GFR Calc >90Non  GFR Calc   GFRESTBLACK >90African American GFR Calc >90African American GFR Calc >90African American GFR Calc >90African American GFR Calc >90African American GFR Calc   IZZY 9.1 8.9 8.8 8.3* 8.5   MAG  --   --   --  2.3 1.8     CBC    Recent Labs  Lab 01/08/17  0631 01/07/17  1354 01/07/17  1202 01/05/17  0655   WBC 9.3 18.1* Unsatisfactory specimen - clottedJEANNE GLASBIE ER AT 1/7/17 1321 BY MASOOD 10.6   RBC 3.48* 3.72* Unsatisfactory specimen - clottedJEANNE GLASBIE ER AT 1/7/17 1321 BY MASOOD 3.04*   HGB 10.4* 11.0* Unsatisfactory specimen - clottedJEANNE GLASBIE ER AT 1/7/17 1321 BY MASOOD 8.9*   HCT 32.8* 35.7 Unsatisfactory specimen - clottedJEANNE GLASBIE ER AT 1/7/17 1321 BY MASOOD 29.6*   MCV 94 96 Unsatisfactory specimen - clottedJEANNE GLASBIE ER AT 1/7/17 1321 BY MASOOD 97   MCH 29.9 29.6 Unsatisfactory specimen - clottedJEANNE GLASBIE ER AT 1/7/17 1321 BY MASOOD 29.3   MCHC 31.7 30.8* Unsatisfactory specimen - clottedJEANNE GLASBIE ER AT 1/7/17 1321 BY MASOOD 30.1*   RDW 15.2* 15.3* Unsatisfactory specimen - clottedJEANNE GLASBIE ER AT 1/7/17 1321 BY MASOOD 15.3*    342 Unsatisfactory specimen - clottedJEANNE GLASBIE ER AT 1/7/17 1321 BY MASOOD 278     INRNo lab results found in last 7 days.  Arterial Blood GasNo lab results found in last 7 days.     Results for orders  placed or performed during the hospital encounter of 01/07/17   Chest  XR, 1 view portable    Narrative    Exam:  XR CHEST PORT 1 VW, 1/7/2017 1:01 PM    History: respiratory distress    Comparison:  Chest x-ray 1/4/2017    Findings:  Single frontal radiograph of the chest. Tracheostomy tube  tip stable in the upper thoracic trachea. Cardiac silhouette and  pulmonary vasculature within normal limits. Calcifications of the  aortic knob. Emphysematous changes bilaterally. No change in streaky  bibasilar opacities, left greater than right. Blunting of both  costophrenic angles is unchanged. No pleural effusion or pneumothorax.      Impression    Impression:    Chronic changes of emphysema and bilateral costophrenic angle  blunting, likely representing scarring or atelectasis. No acute  changes compared to 1/4/2017 radiograph.    I have personally reviewed the examination and initial interpretation  and I agree with the findings.    BLAIR DODD MD   US Renal Complete    Narrative    EXAMINATION: US RENAL COMPLETE, 1/8/2017 1:30 PM     COMPARISON: Abdominal ultrasound 11/4/2016    HISTORY: Right flank pain, UTI.    FINDINGS:  Exam was performed with patient sitting upright.    Right kidney: Measures 8.1 cm in length. Parenchyma is of normal  thickness and echogenicity. No focal mass. No hydronephrosis.    Left kidney: Measures 9.3 cm in length. Parenchyma is of normal  thickness and echogenicity. No focal mass. No hydronephrosis.     Bladder: Not seen.        Impression    IMPRESSION:  1.  Normal renal ultrasound.    I have personally reviewed the examination and initial interpretation  and I agree with the findings.    BLAIR DODD MD   XR Chest Port 1 View    Narrative    XR CHEST PORT 1 VW  1/9/2017 6:51 PM    History:  RN placed PICC - verify tip placement.     Comparison: Chest radiograph dated 1/7/2017.    Findings:   Portable AP 60 degree chest radiograph is obtained. Patient is  rotated. Right-sided PICC  "line with the tip projecting at cavoatrial  junction. Stable position of tracheostomy tube.    Cardiac silhouette is stable. Atherosclerotic calcifications noted in  the aortic arch. Pulmonary vasculature is distinct. No significant  change of patchy opacities in the bilateral lower lobes, left greater  than the right. No evidence of pneumothorax. Improved blunting of  bilateral costophrenic sulci. Visualized upper abdomen is  unremarkable.      Impression    IMPRESSION:  1.  Right PICC tip projecting near the cavoatrial junction.  2.  No significant change of basilar patchy opacities, left greater  than right and emphysematous change.  3.  Stable to slightly improved costophrenic angle blunting  bilaterally.    I have personally reviewed the examination and initial interpretation  and I agree with the findings.    MILLY HINDS MD               Pending Results:     Unresulted Labs Ordered in the Past 30 Days of this Admission     Date and Time Order Name Status Description    1/7/2017 1502 Urine Culture Aerobic Bacterial Preliminary     1/7/2017 1156 Blood culture Preliminary     1/7/2017 1156 Blood culture Preliminary                Condition on Discharge:   Discharge condition: Stable   Discharge vitals: Blood pressure 122/72, pulse 116, temperature 97.8  F (36.6  C), temperature source Oral, resp. rate 14, height 1.63 m (5' 4.17\"), weight 60.555 kg (133 lb 8 oz), SpO2 96 %, not currently breastfeeding.   Code status on discharge: DNR            Discharge Medications:     Current Discharge Medication List      START taking these medications    Details   piperacillin-tazobactam (ZOSYN) 3-0.375 GM vial Inject 3.375 g into the vein every 6 hours for 9 days  Qty: 540 mL, Refills: 0    Associated Diagnoses: Urinary tract infection without hematuria, site unspecified      predniSONE (DELTASONE) 20 MG tablet Take 3 tabs (60 mg) by mouth daily x 2 days, 2 tabs (40 mg) daily x 3 days, then continue 20 mg daily.  Qty: 12 " tablet, Refills: 0    Associated Diagnoses: Acute on chronic respiratory failure with hypoxemia (H); COPD exacerbation (H)         CONTINUE these medications which have CHANGED    Details   predniSONE 5 MG/5ML solution Take 20 mLs (20 mg) by mouth daily  Qty: 500 mL, Refills: 0    Comments: Continue prednisone taper until reaches 20 mg daily, then continue 20 mg daily.  Associated Diagnoses: Shortness of breath         CONTINUE these medications which have NOT CHANGED    Details   Cranberry 500 MG CAPS Take 1 capsule (500 mg) by mouth daily  Qty: 90 capsule      HYDROmorphone (DILAUDID) 1 MG/ML LIQD liquid Take 1 mL (1 mg) by mouth every 2 hours as needed for moderate to severe pain (air hunger)  Qty: 50 mL, Refills: 0    Associated Diagnoses: COPD exacerbation (H)      LORazepam (ATIVAN) 2 MG/ML (HIGH CONC) solution Take 0.25 mLs (0.5 mg) by mouth every 3 hours as needed for anxiety  Qty: 15 mL, Refills: 0    Associated Diagnoses: COPD exacerbation (H); Anxiety      clonazePAM (KLONOPIN) 0.1 mg/mL Take 15 mLs (1.5 mg) by mouth 4 times daily Needs appt for refills  Qty: 420 mL, Refills: 0    Associated Diagnoses: Shortness of breath      polyethylene glycol (MIRALAX/GLYCOLAX) Packet 17 g by Per J Tube route 2 times daily as needed for constipation Hold for loose stools  Qty: 100 packet, Refills: 0    Comments: Hold for loose stools  Associated Diagnoses: Constipation, unspecified constipation type      sertraline (ZOLOFT) 20 MG/ML (HIGH CONC) solution 7.5 mLs (150 mg) by Per Feeding Tube route daily  Qty: 105 mL, Refills: 0    Associated Diagnoses: Anxiety      lactobacillus rhamnosus, GG, (CULTURELL) capsule Take 1 capsule by mouth 2 times daily  Qty: 60 capsule, Refills: 0    Associated Diagnoses: Diarrhea, unspecified type      fexofenadine (ALLEGRA) 180 MG tablet Take 1 tablet (180 mg) by mouth daily  Qty: 30 tablet, Refills: 0    Associated Diagnoses: COPD exacerbation (H)      QUEtiapine (SEROQUEL) 25 MG  "tablet Take 1 tablet (25 mg) by mouth 3 times daily as needed (anxiety, sleep)  Qty: 30 tablet, Refills: 0    Associated Diagnoses: Anxiety; Insomnia due to medical condition; Air hunger      fiber modular (NUTRISOURCE FIBER) packet 1 packet by Per J Tube route 3 times daily  Qty: 42 packet, Refills: 0    Associated Diagnoses: Diarrhea, unspecified type      senna-docusate (SENOKOT-S;PERICOLACE) 8.6-50 MG per tablet 2 tablets by Per J Tube route 2 times daily as needed for constipation  Qty: 100 tablet, Refills: 0    Associated Diagnoses: Constipation, unspecified constipation type      omeprazole (PRILOSEC) 2 mg/mL 10 mLs (20 mg) by Per Feeding Tube route 2 times daily  Qty: 600 mL, Refills: 3    Associated Diagnoses: Achalasia      Nutritional Supplements (JEVITY 1.5 IZZY) LIQD Take 5 Cans by mouth daily Per tube feeding  Qty: 150 Can, Refills: 11    Comments: Height 5'2\"  Weight 120 lbs  Length of need 99 months  Associated Diagnoses: Achalasia      loperamide (IMODIUM A-D) 2 MG tablet 2-2 mg tablets per J-tube qid prn frequent and/or loose stools. Do not use more than 8 tabs per day.  Qty: 30 tablet, Refills: 0    Associated Diagnoses: Frequent stools      melatonin (MELATONIN) 1 MG/ML LIQD liquid 3 mLs (3 mg) by Per J Tube route At Bedtime  Qty: 300 mL, Refills: 0    Associated Diagnoses: Anxiety; Insomnia due to medical condition      acetaminophen (TYLENOL) 160 MG/5ML oral liquid 20.3 mLs (650 mg) by Per Feeding Tube route every 4 hours as needed for mild pain  Qty: 300 mL, Refills: 0    Associated Diagnoses: Other chronic pain      calcium carbonate (TUMS) 500 MG chewable tablet 1 tablet (500 mg) by Per G Tube route every 2 hours as needed for heartburn  Qty: 150 tablet, Refills: 0    Associated Diagnoses: Gastroesophageal reflux disease, esophagitis presence not specified      budesonide (PULMICORT) 0.5 MG/2ML nebulizer solution Take 2 mLs (0.5 mg) by nebulization 2 times daily  Qty: 60 ampule, Refills: 0    " Associated Diagnoses: Shortness of breath; Respiratory difficulty      ipratropium (ATROVENT) 0.02 % nebulizer solution Take 2.5 mLs (0.5 mg) by nebulization every 4 hours  Qty: 450 mL, Refills: 0    Associated Diagnoses: Shortness of breath      levalbuterol (XOPENEX) 1.25 MG/3ML nebulizer solution Take 3 mLs (1.25 mg) by nebulization every 4 hours  Qty: 540 mL, Refills: 0    Associated Diagnoses: Chronic obstructive pulmonary disease with acute exacerbation (H)      gabapentin (NEURONTIN) 250 MG/5ML solution 6 mLs (300 mg) by Per J Tube route 2 times daily  Qty: 450 mL, Refills: 0    Associated Diagnoses: Anxiety      atorvastatin (LIPITOR) 20 MG tablet Take 1 tablet (20 mg) by mouth daily  Qty: 30 tablet, Refills: 0    Associated Diagnoses: NSTEMI (non-ST elevated myocardial infarction) (H)      guaiFENesin (ORGANIDIN) 200 MG TABS Take 1 tablet (200 mg) by mouth 4 times daily  Qty: 115 tablet, Refills: 0    Associated Diagnoses: Shortness of breath      fluticasone (FLONASE) 50 MCG/ACT nasal spray Spray 2 sprays into both nostrils daily  Qty: 1 Bottle, Refills: 0    Associated Diagnoses: Acute and chronic respiratory failure with hypercapnia (H)      sodium chloride (OCEAN) 0.65 % nasal spray Spray 1 spray into both nostrils daily as needed for congestion  Qty: 1 Bottle, Refills: 0    Associated Diagnoses: Chronic sinusitis, unspecified location      sulfamethoxazole-trimethoprim (BACTRIM,SEPTRA) 200-40 mg/5ml suspension 20 mLs (160 mg) by Per J Tube route daily Dose based on TMP component. 20 mLs = 160 mg  Qty: 560 mL, Refills: 0    Associated Diagnoses: Chronic obstructive pulmonary disease with acute exacerbation (H)      simethicone (MYLANTA GAS) 125 MG CHEW 1 tablet (125 mg) by Per Feeding Tube route 4 times daily  Qty: 120 tablet, Refills: 0    Associated Diagnoses: Achalasia      Pediatric Multivitamins-Iron (MULTIPLE VITAMINS-IRON) 15 MG CHEW 1 chew tab by Per J Tube route daily Contains ferrous  gluconate, 15 mL = 9 mg iron  Qty: 30 tablet, Refills: 0    Associated Diagnoses: Dietary supplement-induced neuropathy (H)      lidocaine 15 mL, alum & mag hydroxide-simethicone 15 mL GI Cocktail Take 30 mLs by mouth 3 times daily as needed for moderate pain  Qty: 500 mL, Refills: 0    Associated Diagnoses: Gastroesophageal reflux disease without esophagitis      aspirin 81 MG chewable tablet 1 tablet (81 mg) by Per Feeding Tube route daily  Qty: 36 tablet, Refills: 0    Associated Diagnoses: NSTEMI (non-ST elevated myocardial infarction) (H)      polyethylene glycol 0.4%- propylene glycol 0.3% (SYSTANE ULTRA) 0.4-0.3 % SOLN ophthalmic solution Place 1-2 drops into both eyes 4 times daily 0900, 1300, 1700, 2100  Qty: 1 Bottle, Refills: 0    Associated Diagnoses: Dry eyes, bilateral                  Discharge Disposition:   Discharged to Group Home.            Discharge Instructions:     Discharge Procedure Orders  Reason for your hospital stay   Order Comments: Acute on chronic hypoxic respiratory failure, suspect copd exacerbation.   Urinary tract infection, pseudomonas sp.     Adult [facility] Follow-up and recommended labs and tests   Order Comments: Follow up with primary care provider, Radha Mcmillan, within 7 days for hospital follow- up.     Keep other appointments as scheduled.     Appointments on [place] and/or [facility] (with [facility] or [facility] provider or service). Call [phone] if you haven't heard regarding these appointments within 7 days of discharge.     Activity   Order Comments: Your activity upon discharge: activity as tolerated, turn Q 2 hour.   Order Specific Question Answer Comments   Is discharge order? Yes      Diet   Order Comments: Follow this diet upon discharge:resume prior to admission diet.         Orders Placed This Encounter  Adult Formula Drip Feeding: Specified Time Isosource 1.5; Jejunostomy; Goal Rate: 85; mL/hr; From: 8:00 PM; 8:00 AM; Medication - Tube Feeding Flush  Frequency: At least 15-30 mL water before and after medication administration and with tube clogging;  NPO Time Specified Ice Chips   Order Specific Question Answer Comments   Is discharge order? Yes             Thank you for the opportunity to participate in the care of your patient.  Please do not hesitate to contact our service with questions or concerns.    Total time spent was greater than 30 minutes; Greater than 50% of the time was in counseling and care coordination. Care coordination included discussion of medication changes, lifestyle changes and reviewing instructions to the patient .       Fam Patel MD   [phone]     Addendum:   Urine cultures showed two strains of pseudomonas , one is resistant zosyn and other strain is intermediate sensitive to zosyn , but both were sensitive to Meropenum. Antibiotics changes to meropenum .  Notified care coordinator who notified home infusion. Patient will be started on Meropenum today.

## 2017-01-10 NOTE — PLAN OF CARE
Problem: Goal Outcome Summary  Goal: Goal Outcome Summary  Outcome: No Change  Pt is alert and oriented x4; trached with Bivona 6.0; spare at bedside; vent at 35%. NSR 90s - low 100s with activity; afebrile. Sx x2; minimal secretions. C/o back pain; frequent use of prn dilaudid and ativan. Refused tylenol. Chronic bender in place; good UOP. Cycled TF at 85/hr goal. R triple lumen PICC placed this evening; will discharge tomorrow; transport set for 11am. Wll continue to monitor.

## 2017-01-11 NOTE — TELEPHONE ENCOUNTER
This patient has end-stage COPD and is on a home ventilator. Spoke with Shanon at  Hospice, and she said that hospice is not able to get involved with vent patients due to payor issues. They would not get paid to see her.    Shanon gave me the number for the hospice medical director, Dr. Radha Reyes, and said I could discuss the new home palliative team program with her.    Will call pt's daughter and group Provident  back once I find out what is available for the patient.    Bianka Wilson RN

## 2017-01-11 NOTE — PROGRESS NOTES
SUBJECTIVE:                                                      Mary Wang is a 66-year-old female being seen at home for an RN/SW assessment to determine eligibility for the mobile [facility].      Patient was seen along with an RN, Jeanette.  RN Case Manager with [facility]- [phone].    Top medical concerns:   1. Difficulty breathing- air hunger.  2. Anxiety due to shortness of breath  3. Pain in back - chronic    Patient is interested in home palliative care     HOMEBOUND STATUS:  Patient needs the aid of supportive devices or assistance of others  Unable to leave home without considerable, taxing effort    FUNCTIONAL STATUS:  WHODAS:   WHODAS 2.0 TOTAL SCORES 1/11/2017   Total Score 50       Standing - Unable to stand  Walking - Unable to walk  Nutrition - NPO -  J-Tube. She reports this has been in place for years  Bathing - Bed baths  Dressing - Needs total assistance  Toileting - Avendaño catheter    Medications - All medications administered through the J tube    Cooking - Pt takes all nutrition via J tube.   Laundry - Group home staff do all laundry  Housework - Group home staff do all housework  Managing finances - César (daughter) is her guardian and manages all her finances      Transportation: St[facility]cher transport  Home Care: [facility]  Frequency of Care 24/7 RN care  PCA:  No  Hearing and Vision: Patient reports no deficits  Safety:  No identified safety concerns at this time  DME: Grab Bars, Hospital Bed, Lift chair, Nebulizer, O2, Ramps in use at home and W/C  Medical Supply Company:  [employer]    Fall Risk for Medicare Annual Wellness Home safety: Fallen 2 or more times in the past year?: No  Any fall with injury in the past year?: No    PSYCHOSOCIAL:  Legal guardian/conservator/POA: César (daughter) is legal guardian  Financial: Unknown if she has any [facility] services or waivers for services.  Insurance: Medicare and [facility]  Communication Barrier:  Unable to speak loudly. Mouths words to communicate.  Cultural/Spiritual Preferences:  Gnosticist  Support System Patient/Caregivers: DaughterCésar  [facility]. Pt reports she is satisfied with the care she is receiving.   Community Care Team: Unknown     MENTAL HEALTH/COGNITION:   Anxiety: Ativan PRN every 3 hours for anxiety. RN reports she is using this upon request, not regularly. Anxiety presents when pt is short of breath  Chemical Dependency: None  Depression: Has been treated for depression in the past  Suicide/Harmful behavior: None reported    PHQ-9, BARRETT-7, and MMSE not completed at this visit.     Specialists:  Pulmonology, palliative care - Dr. Bolton      HEALTH CARE GOALS:    Do you have a POLST? Yes: POLST has been received and scanned.  Do you have a Health Care Directive?: Yes: Advance Directive has been received and scanned.    Patient Active Problem List   Diagnosis     Lung infection     COPD exacerbation (H)     Acute-on-chronic respiratory failure (H)     Shortness of breath     Hyponatremia     Urinary retention     Altered mental status     NATA (acute kidney injury) (H)     Anxiety     Air hunger     Esophageal dysmotility     Achalasia     Delirium     HTN (hypertension)     GERD (gastroesophageal reflux disease)     Sepsis (H)     Aspiration pneumonia (H)     End stage COPD (H)     ACP (advance care planning)     Acute and chronic respiratory failure with hypercapnia (H)     Hypoxia     S/P percutaneous endoscopic gastrostomy (PEG) tube placement (H)     COPD (chronic obstructive pulmonary disease) (H)     NSTEMI (non-ST elevated myocardial infarction) (H)     ACS (acute coronary syndrome) (H)     Stress-induced cardiomyopathy     HCAP (healthcare-associated pneumonia)     Atypical chest pain     Elevated troponin     Encounter for gastrojejunal (GJ) tube placement     UTI (urinary tract infection)     Altered mental state     Acute on chronic respiratory  failure (H)         Current outpatient prescriptions:      meropenem (MERREM) 500 MG vial, Inject 500 mg into the vein every 8 hours for 10 days, Disp: 300 mL, Rfl: 0     predniSONE 5 MG/5ML solution, Take 20 mLs (20 mg) by mouth daily, Disp: 500 mL, Rfl: 0     predniSONE (DELTASONE) 20 MG tablet, Take 3 tabs (60 mg) by mouth daily x 2 days, 2 tabs (40 mg) daily x 3 days, then continue 20 mg daily., Disp: 12 tablet, Rfl: 0     Cranberry 500 MG CAPS, Take 1 capsule (500 mg) by mouth daily, Disp: 90 capsule, Rfl:      HYDROmorphone (DILAUDID) 1 MG/ML LIQD liquid, Take 1 mL (1 mg) by mouth every 2 hours as needed for moderate to severe pain (air hunger), Disp: 50 mL, Rfl: 0     LORazepam (ATIVAN) 2 MG/ML (HIGH CONC) solution, Take 0.25 mLs (0.5 mg) by mouth every 3 hours as needed for anxiety, Disp: 15 mL, Rfl: 0     clonazePAM (KLONOPIN) 0.1 mg/mL, Take 15 mLs (1.5 mg) by mouth 4 times daily Needs appt for refills, Disp: 420 mL, Rfl: 0     polyethylene glycol (MIRALAX/GLYCOLAX) Packet, 17 g by Per J Tube route 2 times daily as needed for constipation Hold for loose stools, Disp: 100 packet, Rfl: 0     sertraline (ZOLOFT) 20 MG/ML (HIGH CONC) solution, 7.5 mLs (150 mg) by Per Feeding Tube route daily, Disp: 105 mL, Rfl: 0     lactobacillus rhamnosus, GG, (CULTURELL) capsule, Take 1 capsule by mouth 2 times daily, Disp: 60 capsule, Rfl: 0     fexofenadine (ALLEGRA) 180 MG tablet, Take 1 tablet (180 mg) by mouth daily, Disp: 30 tablet, Rfl: 0     QUEtiapine (SEROQUEL) 25 MG tablet, Take 1 tablet (25 mg) by mouth 3 times daily as needed (anxiety, sleep), Disp: 30 tablet, Rfl: 0     fiber modular (NUTRISOURCE FIBER) packet, 1 packet by Per J Tube route 3 times daily, Disp: 42 packet, Rfl: 0     senna-docusate (SENOKOT-S;PERICOLACE) 8.6-50 MG per tablet, 2 tablets by Per J Tube route 2 times daily as needed for constipation, Disp: 100 tablet, Rfl: 0     omeprazole (PRILOSEC) 2 mg/mL, 10 mLs (20 mg) by Per Feeding Tube route  2 times daily, Disp: 600 mL, Rfl: 3     Nutritional Supplements (JEVITY 1.5 IZZY) LIQD, Take 5 Cans by mouth daily Per tube feeding, Disp: 150 Can, Rfl: 11     loperamide (IMODIUM A-D) 2 MG tablet, 2-2 mg tablets per J-tube qid prn frequent and/or loose stools. Do not use more than 8 tabs per day., Disp: 30 tablet, Rfl: 0     melatonin (MELATONIN) 1 MG/ML LIQD liquid, 3 mLs (3 mg) by Per J Tube route At Bedtime, Disp: 300 mL, Rfl: 0     acetaminophen (TYLENOL) 160 MG/5ML oral liquid, 20.3 mLs (650 mg) by Per Feeding Tube route every 4 hours as needed for mild pain, Disp: 300 mL, Rfl: 0     calcium carbonate (TUMS) 500 MG chewable tablet, 1 tablet (500 mg) by Per G Tube route every 2 hours as needed for heartburn, Disp: 150 tablet, Rfl: 0     budesonide (PULMICORT) 0.5 MG/2ML nebulizer solution, Take 2 mLs (0.5 mg) by nebulization 2 times daily, Disp: 60 ampule, Rfl: 0     ipratropium (ATROVENT) 0.02 % nebulizer solution, Take 2.5 mLs (0.5 mg) by nebulization every 4 hours, Disp: 450 mL, Rfl: 0     levalbuterol (XOPENEX) 1.25 MG/3ML nebulizer solution, Take 3 mLs (1.25 mg) by nebulization every 4 hours, Disp: 540 mL, Rfl: 0     gabapentin (NEURONTIN) 250 MG/5ML solution, 6 mLs (300 mg) by Per J Tube route 2 times daily, Disp: 450 mL, Rfl: 0     atorvastatin (LIPITOR) 20 MG tablet, Take 1 tablet (20 mg) by mouth daily, Disp: 30 tablet, Rfl: 0     guaiFENesin (ORGANIDIN) 200 MG TABS, Take 1 tablet (200 mg) by mouth 4 times daily, Disp: 115 tablet, Rfl: 0     fluticasone (FLONASE) 50 MCG/ACT nasal spray, Spray 2 sprays into both nostrils daily, Disp: 1 Bottle, Rfl: 0     sodium chloride (OCEAN) 0.65 % nasal spray, Spray 1 spray into both nostrils daily as needed for congestion, Disp: 1 Bottle, Rfl: 0     sulfamethoxazole-trimethoprim (BACTRIM,SEPTRA) 200-40 mg/5ml suspension, 20 mLs (160 mg) by Per J Tube route daily Dose based on TMP component. 20 mLs = 160 mg, Disp: 560 mL, Rfl: 0     simethicone (MYLANTA GAS) 125 MG  CHEW, 1 tablet (125 mg) by Per Feeding Tube route 4 times daily, Disp: 120 tablet, Rfl: 0     Pediatric Multivitamins-Iron (MULTIPLE VITAMINS-IRON) 15 MG CHEW, 1 chew tab by Per J Tube route daily Contains ferrous gluconate, 15 mL = 9 mg iron, Disp: 30 tablet, Rfl: 0     lidocaine 15 mL, alum & mag hydroxide-simethicone 15 mL GI Cocktail, Take 30 mLs by mouth 3 times daily as needed for moderate pain, Disp: 500 mL, Rfl: 0     aspirin 81 MG chewable tablet, 1 tablet (81 mg) by Per Feeding Tube route daily, Disp: 36 tablet, Rfl: 0     polyethylene glycol 0.4%- propylene glycol 0.3% (SYSTANE ULTRA) 0.4-0.3 % SOLN ophthalmic solution, Place 1-2 drops into both eyes 4 times daily 0900, 1300, 1700, 2100, Disp: 1 Bottle, Rfl: 0  No current facility-administered medications for this visit.    Facility-Administered Medications Ordered in Other Visits:      midazolam (VERSED) injection, , Intravenous, PRN, Gulshan Frances MD, 1 mg at 07/09/16 1510      ASSESSMENT/PLAN:                                                      Due to the patient's medical and/or and behavioral health complexity they would require regularly scheduled primary care visits every 1-3 months LIST:  YES       Homebound Status:  Comment: Patient is homebound  Plan: Complex Care Clinic    Functional status:  Comment: Patient requires total assistance with all ADLs/IADLs.  Plan: Lives in a group home with comprehensive home care services 24/7 and her daughter, César, is her POA.    Mental health/Cognition:  Comment: Pt reports symptoms of anxiety related to her shortness of breath.  Plan: C at visits.     Health Care Goals:  Comment: POLST and Advanced Directive are on file. Just changed code status to DNR on 1/6/17. Patient decided she does not want to go back to the hospital. Patient uses 24/7 mechanical ventilation due to chronic respiratory failure and severe COPD.  Plan: DNR       The patient qualifies for the Complex Care Clinic and is  scheduled to establish care with Steff Robertson NP on 1/13/17.  He/She will be reassessed in six months to determine ongoing eligibility for Complex Care services. Dr. Brito will see the patient on 1/20/17, along with a [facility].    LUZ Cuevas  Social Work Care Coordinator    Bianka Wilson RN

## 2017-01-11 NOTE — PROGRESS NOTES
See Care Coordination note dated 1/11 for detailed eligibility assessment.    Bianka Wilson RN

## 2017-01-12 NOTE — TELEPHONE ENCOUNTER
Discussed with Steff Robertson NP, who plans to contact Dr. Reyes and is also scheduled to see the patient tomorrow.

## 2017-01-13 NOTE — PATIENT INSTRUCTIONS
Today we have discontinued your Atorvastatin    We will discontinue your dilaudid and start morphine     We will start some famotidine 20mg 2x daily as needed and stopped your Prilosec    We have discontinued her flonase today    We have ordered you a palliative care consult, they will come out next week    We will see you on 1/20

## 2017-01-13 NOTE — MR AVS SNAPSHOT
After Visit Summary   1/13/2017    Mary Wang    MRN: [id number]           Patient Information     Date Of Birth          1950        Visit Information        Provider Department      1/13/2017 2:00 PM Theresa Robertson APRN CNP [facility]        Today's Diagnoses     Generalized anxiety disorder    -  1     Air hunger         End stage COPD (H)         Mild gas use disorder         Frequent stools         Dry eyes, bilateral         Dietary supplement-induced neuropathy (H)         Diarrhea, unspecified type           Care Instructions    Today we have discontinued your Atorvastatin    We will discontinue your dilaudid and start morphine     We will start some famotidine 20mg 2x daily as needed and stopped your Prilosec    We have discontinued her flonase today    We have ordered you a palliative care consult, they will come out next week    We will see you on 1/20            Follow-ups after your visit        Follow-up notes from your care team     Return in about 1 week (around 1/20/2017).      Your next 10 appointments already scheduled     Jan 17, 2017  3:15 PM   Office Visit with Haydee Feliciano [facility] ([facility])    [address] [place], [place] [address] [phone]           Bring a current list of meds and any records pertaining to this visit.  For Physicals, please bring immunization records and any forms needing to be filled out.  Please arrive 10 minutes early to complete paperwork.            Jan 20, 2017  2:00 PM   New Visit with Norman Brito MD   [facility] ([facility])    [address] [place] [address] [phone]            Jan 20, 2017  2:00 PM   New Visit with Corinne E Melling, LMFT   [facility] ([facility])    [address] [place] [address]  [address]  [facility] [id number] [phone]            Feb 17, 2017 12:15 PM   Return Visit with Norman Brito MD   [facility] ([facility])    [address]  [address]  [facility] [id number] [phone]              Who to contact     If you have questions or need follow up information about today's clinic visit or your schedule please contact [facility] directly at [phone].  Normal or non-critical lab and imaging results will be communicated to you by [employer] Alliance[employer], letter or phone within 4 business days after the clinic has received the results. If you do not hear from us within 7 days, please contact the clinic through [employer] or phone. If you have a critical or abnormal lab result, we will notify you by phone as soon as possible.  Submit refill requests through [employer] or call your pharmacy and they will forward the refill request to us. Please allow 3 business days for your refill to be completed.          Additional Information About Your Visit        [employer] Access Information     [employer] gives you secure access to your electronic health record. If you see a primary care provider, you can also send messages to your care team and make appointments. If you have questions, please call your primary care clinic.  If you do not have a primary care provider, please call [phone] and they will assist you.        Care EveryWhere ID     This is your Care EveryWhere ID. This could be used by other organizations to access your [facility] medical records  [id number]        Your Vitals Were     Pulse Temperature Respirations Pulse Oximetry          86 98.6  F (37  C) (Oral) 16 98%         Blood Pressure from Last 3 Encounters:   01/13/17 116/76   01/10/17 137/65   01/06/17 106/60    Weight from Last 3 Encounters:   01/10/17 133 lb 6.4 oz (60.51 kg)   01/05/17 130 lb 1.1 oz (59 kg)   12/28/16 117 lb 15.1 oz (53.5 kg)              Today, you had the following      No orders found for display         Today's Medication Changes          These changes are accurate as of: 1/13/17 11:59 PM.  If you have any questions, ask your nurse or doctor.               Start taking these medicines.        Dose/Directions    famotidine 40 MG/5ML suspension   Commonly known as:  PEPCID   Used for:  Mild gas use disorder   Started by:  Theresa Robertson APRN CNP        Dose:  20 mg   Take 2.5 mLs (20 mg) by mouth 2 times daily as needed for heartburn   Quantity:  75 mL   Refills:  3       morphine sulfate (high concentrate) 20 MG/ML concentrated solution   Commonly known as:  ROXANOL-CONCENTRATED   Used for:  Air hunger   Started by:  Theresa Robertson APRN CNP        Dose:  15 mg   Take 0.75 mLs (15 mg) by mouth every 2 hours as needed for shortness of breath / dyspnea or moderate to severe pain   Quantity:  30 mL   Refills:  0         These medicines have changed or have updated prescriptions.        Dose/Directions    polyethylene glycol 0.4%- propylene glycol 0.3% 0.4-0.3 % Soln ophthalmic solution   Commonly known as:  SYSTANE ULTRA   This may have changed:    - when to take this  - reasons to take this   Used for:  Dry eyes, bilateral   Changed by:  Theresa Robertson APRN CNP        Dose:  1-2 drop   Place 1-2 drops into both eyes 4 times daily as needed for dry eyes 0900, 1300, 1700, 2100   Quantity:  1 Bottle   Refills:  3         Stop taking these medicines if you haven't already. Please contact your care team if you have questions.     atorvastatin 20 MG tablet   Commonly known as:  LIPITOR   Stopped by:  Theresa Robertson APRN CNP           calcium carbonate 500 MG chewable tablet   Commonly known as:  TUMS   Stopped by:  Theresa Robertson APRN CNP           fluticasone 50 MCG/ACT spray   Commonly known as:  FLONASE   Stopped by:  Theresa Robertson APRN CNP           HYDROmorphone 1 MG/ML Liqd liquid   Commonly known as:   DILAUDID   Stopped by:  Theresa Robertson APRN CNP           omeprazole 2 mg/mL   Commonly known as:  priLOSEC   Stopped by:  Theresa Robertson APRN CNP           simethicone 125 MG Chew chewable tablet   Commonly known as:  MYLANTA GAS   Stopped by:  Theresa Robertson APRN CNP                Where to get your medicines      These medications were sent to [facility] [place], [place] - [address]  [address] [facility] [facility] [address], [facility] [address]     Phone:  [phone]    - famotidine 40 MG/5ML suspension  - lactobacillus rhamnosus (GG) capsule  - loperamide 2 MG tablet  - Multiple Vitamins-Iron 15 MG Chew  - polyethylene glycol 0.4%- propylene glycol 0.3% 0.4-0.3 % Soln ophthalmic solution  - QUEtiapine 25 MG tablet      Some of these will need a paper prescription and others can be bought over the counter.  Ask your nurse if you have questions.     You don't need a prescription for these medications    - morphine sulfate (high concentrate) 20 MG/ML concentrated solution             Primary Care Provider Office Phone # Fax #    Norman Brito [phone]        [facility] [address] [place] [address] [address] [place] [address]        Thank you!     Thank you for choosing [facility]  for your care. Our goal is always to provide you with excellent care. Hearing back from our patients is one way we can continue to improve our services. Please take a few minutes to complete the written survey that you may receive in the mail after your visit with us. Thank you!             Your Updated Medication List - Protect others around you: Learn how to safely use, store and throw away your medicines at www.disposemymeds.org.          This list is accurate as of: 1/13/17 11:59 PM.  Always use your most recent med list.                   Brand Name Dispense Instructions for use    acetaminophen 160 MG/5ML solution    TYLENOL    300 mL    20.3 mLs  (650 mg) by Per Feeding Tube route every 4 hours as needed for mild pain       aspirin 81 MG chewable tablet     36 tablet    1 tablet (81 mg) by Per Feeding Tube route daily       budesonide 0.5 MG/2ML neb solution    PULMICORT    60 ampule    Take 2 mLs (0.5 mg) by nebulization 2 times daily       clonazePAM 0.1 mg/mL    klonoPIN    420 mL    Take 15 mLs (1.5 mg) by mouth 4 times daily Needs appt for refills       Cranberry 500 MG Caps     90 capsule    Take 1 capsule (500 mg) by mouth daily       famotidine 40 MG/5ML suspension    PEPCID    75 mL    Take 2.5 mLs (20 mg) by mouth 2 times daily as needed for heartburn       fexofenadine 180 MG tablet    ALLEGRA    30 tablet    Take 1 tablet (180 mg) by mouth daily       fiber modular packet     42 packet    1 packet by Per J Tube route 3 times daily       gabapentin 250 MG/5ML solution    NEURONTIN    450 mL    6 mLs (300 mg) by Per J Tube route 2 times daily       guaiFENesin 200 MG Tabs tablet    ORGANIDIN    115 tablet    Take 1 tablet (200 mg) by mouth 4 times daily       ipratropium 0.02 % neb solution    ATROVENT    450 mL    Take 2.5 mLs (0.5 mg) by nebulization every 4 hours       JEVITY 1.5 IZZY Liqd     150 Can    Take 5 Cans by mouth daily Per tube feeding       lactobacillus rhamnosus (GG) capsule     60 capsule    Take 1 capsule by mouth 2 times daily       levalbuterol 1.25 MG/3ML neb solution    XOPENEX    540 mL    Take 3 mLs (1.25 mg) by nebulization every 4 hours       lidocaine 15 mL, alum & mag hydroxide-simethicone 15 mL GI Cocktail     500 mL    Take 30 mLs by mouth 3 times daily as needed for moderate pain       loperamide 2 MG tablet    IMODIUM A-D    30 tablet    2-2 mg tablets per J-tube qid prn frequent and/or loose stools. Do not use more than 8 tabs per day.       LORazepam 2 MG/ML (HIGH CONC) solution    ATIVAN    15 mL    Take 0.25 mLs (0.5 mg) by mouth every 3 hours as needed for anxiety       melatonin 1 MG/ML Liqd liquid     300 mL     3 mLs (3 mg) by Per J Tube route At Bedtime       meropenem 500 MG vial    MERREM    300 mL    Inject 500 mg into the vein every 8 hours for 10 days       morphine sulfate (high concentrate) 20 MG/ML concentrated solution    ROXANOL-CONCENTRATED    30 mL    Take 0.75 mLs (15 mg) by mouth every 2 hours as needed for shortness of breath / dyspnea or moderate to severe pain       Multiple Vitamins-Iron 15 MG Chew     30 tablet    1 chew tab by Per J Tube route daily Contains ferrous gluconate, 15 mL = 9 mg iron       polyethylene glycol 0.4%- propylene glycol 0.3% 0.4-0.3 % Soln ophthalmic solution    SYSTANE ULTRA    1 Bottle    Place 1-2 drops into both eyes 4 times daily as needed for dry eyes 0900, 1300, 1700, 2100       polyethylene glycol Packet    MIRALAX/GLYCOLAX    100 packet    17 g by Per J Tube route 2 times daily as needed for constipation Hold for loose stools       * predniSONE 5 MG/5ML solution     500 mL    Take 20 mLs (20 mg) by mouth daily       * predniSONE 20 MG tablet    DELTASONE    12 tablet    Take 3 tabs (60 mg) by mouth daily x 2 days, 2 tabs (40 mg) daily x 3 days, then continue 20 mg daily.       * QUEtiapine 25 MG tablet    SEROquel    90 tablet    Take 1 tablet (25 mg) by mouth 3 times daily as needed (anxiety, sleep)       * SEROQUEL 50 MG tablet   Generic drug:  QUEtiapine     30 tablet    Take 1 tablet (50 mg) by mouth 3 times daily       * SEROQUEL 25 MG tablet   Generic drug:  QUEtiapine     60 tablet    Take 3 tablets (75 mg) by mouth At Bedtime       senna-docusate 8.6-50 MG per tablet    SENOKOT-S;PERICOLACE    100 tablet    2 tablets by Per J Tube route 2 times daily as needed for constipation       sertraline 20 MG/ML (HIGH CONC) solution    ZOLOFT    105 mL    7.5 mLs (150 mg) by Per Feeding Tube route daily       sodium chloride 0.65 % nasal spray    OCEAN    1 Bottle    Spray 1 spray into both nostrils daily as needed for congestion       sulfamethoxazole-trimethoprim  suspension    BACTRIM/SEPTRA    560 mL    20 mLs (160 mg) by Per J Tube route daily Dose based on TMP component. 20 mLs = 160 mg       * Notice:  This list has 5 medication(s) that are the same as other medications prescribed for you. Read the directions carefully, and ask your doctor or other care provider to review them with you.

## 2017-01-13 NOTE — PROGRESS NOTES
SUBJECTIVE:                                                    Mary Wang is a 66-year-old female being seen at home, Patient is interested in home palliative care, end stage COPD, vent, acute on chronic respiratory failure, HTN, severe anxiety    Attempted to get patient into hospice but denied because patient is on a vent, palliative care consult done in the hospital, HC palliative consult ordered and they will make a visit next week    HOMEBOUND STATUS:  Patient needs the aid of supportive devices or assistance of others  Unable to leave home without considerable, taxing effort    New Patient/Transfer of Care  Patient appears to understand conversation but basically nonverbal, can mouth words or write requests, hx today comes from her HC nurses and her daughters, one present and one on the phone     Hypertension Follow-up      Outpatient blood pressures are being checked at home by [facility] agency,  Results are 116/70.    Low Salt Diet: all food thru J tube     COPD Follow-Up    Symptoms are currently:  air hunger daily, on dilaudid 1mg every 2 hours, seroquel and Ativan and clonazepam 4x daily, Pulmicort and xopenex and Atrovent nebs daily    Current fatigue or dyspnea with ambulation: not walking    Shortness of breath: daily, not improving much with current meds     Increased or change in Cough/Sputum: No    Fever(s): No    Baseline ambulation without stopping to rest unable to ambulate      Any ER/UC or hospital admissions since your last visit? Yes - Hospital, recent discharge from hospital 1/9/17,     Hospital Course:      Issues:     Mary Wang is a 66 year old female with a history of severe COPD s/p trach on home vent 24/7, anxiety, hypertension, achalasia s/p GJ tube who presented to the hospital with acute on chronic hypoxic respiratory failure.  Of note she was just hospitalized here from 12/1-5 w/ a UTI, 12/25-28 w/bacteremia and 1/2-6 with encephalopathy ultimately attributed to  "her medications.    1) Severe COPD with Acute on chronic hypoxic respiratory failure - Presented to our ED with dyspnea and hypercapnia (pCO2 93 up from baseline 60s) now improved once ambu-bagged and placed back on home ventilator.  Still requiring mildly increased FiO2 (normally 30%, briefly required 50% and now stable on 35%.  Of note, still on steroid burst from prior admission.  Per nursing staff from her facility, the patient was never hypoxic prior to coming in and was instead complaining of subjective shortness of breath    CXR. Procal: no e/o new pna.    Acute leucocytosis: resolved.    Fi02 stable @ 35%  Patient continues to complaint \"cant breathe\" , however clinically appears stable to slightly better.     - Started empirically on iv Methylprednisolone, switched to oral 1/9/2017 60 mg daily.  Takes 20 mg prednisone daily at baseline. On bactrim for chronic steroid use. Continue.    - Dc on prednisone taper to 20 mg/daily baseline dosing.    - Vent management per RT. FiO2 35% , Tinsp 1, ,RR 14, PEEP 5  - Continue home inhalers, b'dilators. Scheduled ipatropium, levalbuterol while awake; BID pulmicort  - PRN Hydromorphone for air hunger.    - Aggressive pul toileting    2) Encephalopathy, resolved - Previously admitted with encephalopathy attributed to oversedation from medications.  Attempted to wean benzos but patient did not tolerate and after goals of care discussion with patient and family we are erring on the side of keeping her comfortable even if that means episodes of oversedation.  - Continue home ativan, clonazepam, hydromorphone for air hunger, Seroquel.    3) Anxiety - On multiple agents.  Attempted to wean during last admission due to encephalopathy but ultimately unable to tolerate reduction in anxiolytics.  - Continue home clonazepam  - Continue home ativan  - Continue home hydromorphone for air hunger  - Continue home seroquel    4) Achalasia - S/p GH tube placement  - G tube to " gravity drainage  - Continue home tube feeds: Isosource 1.5 @ 85 mls/hr x 12h from 2742-2990.  Free water 125 ccs q4h.    5) Urinary retention - Ongoing since prior admissions with plans for indwelling bender instead of urologic work up.  - Continue home bender catheter    # UTI:     UA: positive. UC: Pseudomonas. Sensitivity pending. ? True infection vs colonization.    Renal US: normal.    D/w César YOUNG. Prefers to treat with iv antibiotics.    Curbsided ID, agree with the plan.     Continue empiric iv Zosyn. Plan total 10 days. ( start date 1/8/17)    CODE: DNR  Diet/IVF: NPO. On tube feeds, continue.     DVT ppx: Mechanical         Discharge Day Exam:          Doing well.    Vitals stable.    No new concern.                Specialists:  Pulmonology, palliative care - Dr. Bolton    History   Smoking status     Former Smoker -- 2.00 packs/day for 40 years     Types: Cigarettes     Start date: 01/01/1964     Quit date: 04/18/1998   Smokeless tobacco     Never Used     No results found for this basename: FEV1, MWF2PKR     Amount of exercise or physical activity:Standing - Unable to stand  Walking - Unable to walk  Nutrition - NPO -  J-Tube. She reports this has been in place for years  Bathing - Bed baths  Dressing - Needs total assistance    Toileting - Bender catheter       Problems taking medications regularly: All medications administered through the J tube    Medication side effects: fatigue from dilaudid  Diet: all nutrition through J tube Isosource 1.5 @ 85 mls/hr x 12h from 9368-4411.  Free water 125 ccs q4h. Has a G-J tube, G tube for drainage and J tube is used for food and meds   GI DISTRESS: no longer taking Prilosec because her insurance doesn't pay for it, no increase in sx and using Zofran as needed with good relief, willing to not restart and use famotidine 20mg 2x daily as needed     SKIN: red area over her coccyx, interested in a matteress overlay and will work with [facility] OT for recommendations,  they will contact us for the prescription     ALLERGIES: currently taking Allegra, not taking flonase or NS spray, ok with stopping flonase     Anxiety Follow-Up    Status since last visit: Anxiety severe: Ativan PRN every 3 hours which she takes regulalry,  seroquel, 4x daily and has a PRN dose she only takes occ at night, also taking clonazepam 4x , Anxiety presents when pt is short of breath, also taking Sertraline 150mg daily    Other associated symptoms:fatigue    Complicating factors:   Significant life event: Yes-  Worsening of her COPD   Current substance abuse: None  Depression symptoms: Yes-  Treated for at one time nothing current  Legal guardian/conservator/POA: César (daughter) is legal guardian  Financial: Unknown if she has any [facility] services or waivers for services.  Insurance: Medicare and [employer]  Communication Barrier: Unable to speak loudly. Mouths words to communicate.  Cultural/Spiritual Preferences:  Worship  Support System Patient/Caregivers: César Pyle  [facility]   No flowsheet data found.     GAD7     Problem list and histories reviewed & adjusted, as indicated.  Additional history: as documented    BP Readings from Last 3 Encounters:   01/13/17 116/76   01/10/17 137/65   01/06/17 106/60    Wt Readings from Last 3 Encounters:   01/10/17 133 lb 6.4 oz (60.51 kg)   01/05/17 130 lb 1.1 oz (59 kg)   12/28/16 117 lb 15.1 oz (53.5 kg)            Labs reviewed in EPIC  Problem list, Medication list, Allergies, and Medical/Social/Surgical histories reviewed in [facility] and updated as appropriate.    ROS:  Constitutional, HEENT, cardiovascular, pulmonary, gi and gu systems are negative, except as otherwise noted.    OBJECTIVE:                                                    /76 mmHg  Pulse 86  Temp(Src) 98.6  F (37  C) (Oral)  Resp 16  SpO2 98%  There is no weight on file to calculate BMI.  GENERAL: moderate distress, over weight, pale, frail, elderly,  fatigued, appears older than stated age and laying in bed with O2 on, sleeps through most of the interview, when awake trys to mouth answers to questions, needs suctioning during interview   FACE: puffy from steroids   EYES: Eyes grossly normal to inspection, PERRL and conjunctivae and sclerae normal  RESP: lungs clear to auscultation - no rales, rhonchi or wheezes, coarse BS  CV: regular rate and rhythm, normal S1 S2, no S3 or S4, no murmur, click or rub, no peripheral edema   ABDOMEN: soft, nontender, no masses and bowel sounds normal, G-J tube intact and draining   MS: muscle wasting throughout, decreased range of motion in all major joints, no edema and unable to move independently  SKIN: no suspicious lesions or rashes  PSYCH: mentation appears normal, affect normal/bright    Diagnostic Test Results:  Results for orders placed or performed during the hospital encounter of 01/07/17   Chest  XR, 1 view portable    Narrative    Exam:  XR CHEST PORT 1 VW, 1/7/2017 1:01 PM    History: respiratory distress    Comparison:  Chest x-ray 1/4/2017    Findings:  Single frontal radiograph of the chest. Tracheostomy tube  tip stable in the upper thoracic trachea. Cardiac silhouette and  pulmonary vasculature within normal limits. Calcifications of the  aortic knob. Emphysematous changes bilaterally. No change in streaky  bibasilar opacities, left greater than right. Blunting of both  costophrenic angles is unchanged. No pleural effusion or pneumothorax.      Impression    Impression:    Chronic changes of emphysema and bilateral costophrenic angle  blunting, likely representing scarring or atelectasis. No acute  changes compared to 1/4/2017 radiograph.    I have personally reviewed the examination and initial interpretation  and I agree with the findings.    BLAIR DODD MD   US Renal Complete    Narrative    EXAMINATION: US RENAL COMPLETE, 1/8/2017 1:30 PM     COMPARISON: Abdominal ultrasound 11/4/2016    HISTORY: Right  flank pain, UTI.    FINDINGS:  Exam was performed with patient sitting upright.    Right kidney: Measures 8.1 cm in length. Parenchyma is of normal  thickness and echogenicity. No focal mass. No hydronephrosis.    Left kidney: Measures 9.3 cm in length. Parenchyma is of normal  thickness and echogenicity. No focal mass. No hydronephrosis.     Bladder: Not seen.        Impression    IMPRESSION:  1.  Normal renal ultrasound.    I have personally reviewed the examination and initial interpretation  and I agree with the findings.    BLAIR DODD MD   XR Chest Port 1 View    Narrative    XR CHEST PORT 1 VW  1/9/2017 6:51 PM    History:  RN placed PICC - verify tip placement.     Comparison: Chest radiograph dated 1/7/2017.    Findings:   Portable AP 60 degree chest radiograph is obtained. Patient is  rotated. Right-sided PICC line with the tip projecting at cavoatrial  junction. Stable position of tracheostomy tube.    Cardiac silhouette is stable. Atherosclerotic calcifications noted in  the aortic arch. Pulmonary vasculature is distinct. No significant  change of patchy opacities in the bilateral lower lobes, left greater  than the right. No evidence of pneumothorax. Improved blunting of  bilateral costophrenic sulci. Visualized upper abdomen is  unremarkable.      Impression    IMPRESSION:  1.  Right PICC tip projecting near the cavoatrial junction.  2.  No significant change of basilar patchy opacities, left greater  than right and emphysematous change.  3.  Stable to slightly improved costophrenic angle blunting  bilaterally.    I have personally reviewed the examination and initial interpretation  and I agree with the findings.    MILLY HINDS MD   CBC with platelets differential   Result Value Ref Range    WBC  4.0 - 11.0 10e9/L     Unsatisfactory specimen - clotted  [facility] ER AT 1/7/17 1321 BY MASOOD      RBC Count  3.8 - 5.2 10e12/L     Unsatisfactory specimen - clotted  TIA [facility] ER AT 1/7/17  1321 BY MASOOD      Hemoglobin  11.7 - 15.7 g/dL     Unsatisfactory specimen - clotted  TIA [facility] ER AT 1/7/17 1321 BY MASOOD      Hematocrit  35.0 - 47.0 %     Unsatisfactory specimen - clotted  [facility] ER AT 1/7/17 1321 BY MASOOD      MCV  78 - 100 fl     Unsatisfactory specimen - clotted  [facility]IA [facility] ER AT 1/7/17 1321 BY MASOOD      MCH  26.5 - 33.0 pg     Unsatisfactory specimen - clotted  [facility] ER AT 1/7/17 1321 BY MASOOD      MCHC  31.5 - 36.5 g/dL     Unsatisfactory specimen - clotted  [facility] ER AT 1/7/17 1321 BY MASOOD      RDW  10.0 - 15.0 %     Unsatisfactory specimen - clotted  [facility] ER AT 1/7/17 1321 BY MASOOD      Platelet Count  150 - 450 10e9/L     Unsatisfactory specimen - clotted  [facility] ER AT 1/7/17 1321 BY MASOOD      Diff Method       Unsatisfactory specimen - clotted  [facility] ER AT 1/7/17 1321 BY MASOOD     Basic metabolic panel   Result Value Ref Range    Sodium 136 133 - 144 mmol/L    Potassium 4.2 3.4 - 5.3 mmol/L    Chloride 90 (L) 94 - 109 mmol/L    Carbon Dioxide 39 (H) 20 - 32 mmol/L    Anion Gap 8 3 - 14 mmol/L    Glucose 87 70 - 99 mg/dL    Urea Nitrogen 26 7 - 30 mg/dL    Creatinine 0.65 0.52 - 1.04 mg/dL    GFR Estimate >90  Non  GFR Calc   >60 mL/min/1.7m2    GFR Estimate If Black >90   GFR Calc   >60 mL/min/1.7m2    Calcium 8.9 8.5 - 10.1 mg/dL   Procalcitonin   Result Value Ref Range    Procalcitonin 0.19 ng/ml   Troponin I   Result Value Ref Range    Troponin I ES  0.000 - 0.045 ug/L     <0.015  The 99th percentile for upper reference range is 0.045 ug/L.  Troponin values in   the range of 0.045 - 0.120 ug/L may be associated with risks of adverse   clinical events.     CBC with platelets differential   Result Value Ref Range    WBC 18.1 (H) 4.0 - 11.0 10e9/L    RBC Count 3.72 (L) 3.8 - 5.2 10e12/L    Hemoglobin 11.0 (L) 11.7 - 15.7 g/dL    Hematocrit 35.7 35.0 - 47.0 %    MCV 96 78 - 100 fl    MCH 29.6 26.5 - 33.0 pg     MCHC 30.8 (L) 31.5 - 36.5 g/dL    RDW 15.3 (H) 10.0 - 15.0 %    Platelet Count 342 150 - 450 10e9/L    Diff Method Automated Method     % Neutrophils 68.3 %    % Lymphocytes 16.5 %    % Monocytes 10.7 %    % Eosinophils 3.8 %    % Basophils 0.4 %    % Immature Granulocytes 0.3 %    Nucleated RBCs 0 0 /100    Absolute Neutrophil 12.4 (H) 1.6 - 8.3 10e9/L    Absolute Lymphocytes 3.0 0.8 - 5.3 10e9/L    Absolute Monocytes 1.9 (H) 0.0 - 1.3 10e9/L    Absolute Eosinophils 0.7 0.0 - 0.7 10e9/L    Absolute Basophils 0.1 0.0 - 0.2 10e9/L    Abs Immature Granulocytes 0.1 0 - 0.4 10e9/L    Absolute Nucleated RBC 0.0    UA with Microscopic reflex to Culture   Result Value Ref Range    Color Urine Light Yellow     Appearance Urine Slightly Cloudy     Glucose Urine Negative NEG mg/dL    Bilirubin Urine Negative NEG    Ketones Urine Negative NEG mg/dL    Specific Gravity Urine 1.010 1.003 - 1.035    Blood Urine Small (A) NEG    pH Urine 8.0 (H) 5.0 - 7.0 pH    Protein Albumin Urine 10 (A) NEG mg/dL    Urobilinogen mg/dL Normal 0.0 - 2.0 mg/dL    Nitrite Urine Negative NEG    Leukocyte Esterase Urine Moderate (A) NEG    Source Catheterized Urine     WBC Urine 51 (H) 0 - 2 /HPF    RBC Urine 44 (H) 0 - 2 /HPF    Bacteria Urine Moderate (A) NEG /HPF    Squamous Epithelial /HPF Urine <1 0 - 1 /HPF    Transitional Epi 1 0 - 1 /HPF    Mucous Urine Present (A) NEG /LPF   Basic metabolic panel   Result Value Ref Range    Sodium 133 133 - 144 mmol/L    Potassium 4.3 3.4 - 5.3 mmol/L    Chloride 89 (L) 94 - 109 mmol/L    Carbon Dioxide 39 (H) 20 - 32 mmol/L    Anion Gap 5 3 - 14 mmol/L    Glucose 192 (H) 70 - 99 mg/dL    Urea Nitrogen 27 7 - 30 mg/dL    Creatinine 0.61 0.52 - 1.04 mg/dL    GFR Estimate >90  Non  GFR Calc   >60 mL/min/1.7m2    GFR Estimate If Black >90   GFR Calc   >60 mL/min/1.7m2    Calcium 9.1 8.5 - 10.1 mg/dL   CBC with platelets   Result Value Ref Range    WBC 9.3 4.0 - 11.0 10e9/L    RBC  Count 3.48 (L) 3.8 - 5.2 10e12/L    Hemoglobin 10.4 (L) 11.7 - 15.7 g/dL    Hematocrit 32.8 (L) 35.0 - 47.0 %    MCV 94 78 - 100 fl    MCH 29.9 26.5 - 33.0 pg    MCHC 31.7 31.5 - 36.5 g/dL    RDW 15.2 (H) 10.0 - 15.0 %    Platelet Count 317 150 - 450 10e9/L   CRP inflammation   Result Value Ref Range    CRP Inflammation 4.0 0.0 - 8.0 mg/L   EKG 12 lead   Result Value Ref Range    Interpretation ECG Click View Image link to view waveform and result    ISTAT gases lactate miles POCT   Result Value Ref Range    Ph Venous 7.33 7.32 - 7.43 pH    PCO2 Venous 93 (HH) 40 - 50 mm Hg    PO2 Venous 33 25 - 47 mm Hg    Bicarbonate Venous 49 (HH) 21 - 28 mmol/L    O2 Sat Venous 55 %    Lactic Acid 1.6 0.7 - 2.1 mmol/L   ISTAT gases lactate miles POCT   Result Value Ref Range    Ph Venous 7.36 7.32 - 7.43 pH    PCO2 Venous 60 (H) 40 - 50 mm Hg    PO2 Venous 29 25 - 47 mm Hg    Bicarbonate Venous 34 (H) 21 - 28 mmol/L    O2 Sat Venous 51 %    Lactic Acid 0.9 0.7 - 2.1 mmol/L   Blood culture   Result Value Ref Range    Specimen Description Blood Left Arm     Culture Micro No growth     Micro Report Status FINAL 01/13/2017    Blood culture   Result Value Ref Range    Specimen Description Blood Right Arm     Culture Micro No growth     Micro Report Status FINAL 01/13/2017    Urine Culture Aerobic Bacterial   Result Value Ref Range    Specimen Description Catheterized Urine     Special Requests Specimen received in preservative     Culture Micro (A)      >100,000 colonies/mL Pseudomonas aeruginosa  >100,000 colonies/mL Strain 2 Pseudomonas aeruginosa      Micro Report Status FINAL 01/10/2017     Organism:       >100,000 colonies/mL Strain 2 Pseudomonas aeruginosa    Organism: >100,000 colonies/mL Pseudomonas aeruginosa        Susceptibility    >100,000 colonies/ml pseudomonas aeruginosa (antonio) -  (no method available)     AMIKACIN <=2 Susceptible  ug/mL     CEFEPIME 8 Susceptible  ug/mL     CEFTAZIDIME 16 Intermediate  ug/mL      CIPROFLOXACIN >=4 Resistant  ug/mL     GENTAMICIN <=1 Susceptible  ug/mL     LEVOFLOXACIN >=8 Resistant  ug/mL     Piperacillin/Tazo 32 Intermediate  ug/mL     TOBRAMYCIN <=1 Susceptible  ug/mL     MEROPENEM 1 Susceptible  ug/mL    >100,000 colonies/ml strain 2 pseudomonas aeruginosa (antonio) -  (no method available)     AMIKACIN 4 Susceptible  ug/mL     CEFEPIME 16 Intermediate  ug/mL     CEFTAZIDIME 16 Intermediate  ug/mL     CIPROFLOXACIN >=4 Resistant  ug/mL     GENTAMICIN <=1 Susceptible  ug/mL     LEVOFLOXACIN >=8 Resistant  ug/mL     TOBRAMYCIN <=1 Susceptible  ug/mL     MEROPENEM 1 Susceptible  ug/mL     Piperacillin/Tazo >64.0 Resistant  ug/mL        ASSESSMENT/PLAN:                                                      (F41.1) Generalized anxiety disorder  (primary encounter diagnosis)  Comment: Severe anxiety increasing with air hunger, currently taking Seroquel, lorazepam and klonopin with some relief, attempt to wean in hospital was unsuccessful and patient and family are now interested in palliative care, doesn't qualify for hospice    Plan: QUEtiapine (SEROQUEL) 50 MG tablet, QUEtiapine         (SEROQUEL) 25 MG tablet        Encouraged staff to use PRN Seroquel to help with anxiety, more med changes may occur after HC palliative care consult next week     (R09.89) Air hunger  Comment: Getting worse, taking dilaudid 1mg every 2 hours   Plan: morphine sulfate, high concentrate,         (ROXANOL-CONCENTRATED) 20 MG/ML concentrated         solution        Will switch patient to morphine sulfate 0.75mls ( 15mg) every 2 hours as needed for pain and air hunger, will continue dilaudid until morphine arrives, written rx dropped off and [facility] on [address], medication will be delivered on Monday, orders written for HC staff  HC Palliative care consult ordered and will occur next week     (J44.9) End stage COPD (H)  Comment: Worsening, patient is now a DNR and desires palliative care as does her  family, currently taking Bactrim daily as a preventive for lung infections,  Plan: Will switch her to Morphine to increase comfort with her breathing, other treatments to stay the same for now, will examine need for chronic antibiotic at next visit      (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.10) Mild gas use disorder  Comment: hx of prilosec use for years but nothing for 2 weeks since insurance will no longer cover, nurses report on increase in GI sx, using zofran as needed with relief  Plan: famotidine (PEPCID) 40 MG/5ML suspension        Will try famotidine 20mg 2x daily as needed    (K52.9) Frequent stools  Comment: increase in diarrhea since significant antibiotic use   Plan: loperamide (IMODIUM A-D) 2 MG tablet        Reordered imodium to use as needed    (H04.123) Dry eyes, bilateral  Comment: chronic   Plan: polyethylene glycol 0.4%- propylene glycol 0.3%        (SYSTANE ULTRA) 0.4-0.3 % SOLN ophthalmic         solution        Medication reordered     (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,  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)  Comment: taking daily multivits, crushing tabs   Plan: Pediatric Multivitamins-Iron (MULTIPLE         VITAMINS-IRON) 15 MG CHEW        Question need for multivit discussed with nursing staff and will leave on for now     (R19.7) Diarrhea, unspecified type  Comment: increase due to antibiotic use  Plan: lactobacillus rhamnosus, GG, (CULTURELL)         capsule        Discussed with daughter restarting and they will get some for the HC staff    MEDS discontinued today:  Flonase, Prilosec, Atorvastatin- no cardiac hx, no hyperlipidemia, prognosis poor      Patient Instructions   Today we have discontinued your Atorvastatin    We will discontinue your dilaudid and start morphine on Monday     We will start some famotidine 20mg 2x daily as needed and not restart your Prilosec    We have discontinued your flonase today    We have ordered you a palliative care consult, they will come out next week    We will see you on 1/20      Theresa Ruelas  Ailyn NP, APRN [facility]    Visit time 60   min with > than 50% of the time spent in counseling on: care coordination, end stage COPD, anxiety, air hunger, gas disorder, diarrhea

## 2017-01-13 NOTE — TELEPHONE ENCOUNTER
Call from Josefa RN with [facility], requesting resumption of care orders:  - Skilled nursing visits for infusion/PICC line care     - 1 time per week for 2 weeks     - 3 PRN visits  - Speech eval  - PT eval  - OT eval    Approval given for above orders.

## 2017-01-16 NOTE — TELEPHONE ENCOUNTER
Returned Iman's call and gave okay for PT orders below, per Steff Robertson NP.    Bianka Wilson RN

## 2017-01-16 NOTE — TELEPHONE ENCOUNTER
Per Steff's office visit note from 1/13, the patient will be seen by the palliative care team.    Closing this encounter.

## 2017-01-17 NOTE — PROGRESS NOTES
"SUBJECTIVE/OBJECTIVE:                                                    Mary Wang is a 66 year old female seen at their home for an initial visit for Medication Therapy Management.  She was referred to me from initial [facility] referral. Spoke briefly with patient but difficulty communicating due to trach, majority of visit completed with her daughter César.      Chief Complaint: daytime sedation, staying awake at night. Pt had a palliative care consult today.   Personal Healthcare Goals: reduce hospitalizations, reduce air hunger    Allergies/ADRs: Reviewed in Epic  Tobacco: History of tobacco dependence - quit     Alcohol: not currently using  Caffeine: no caffeine  Activity: bed bound recently, set back during hospitalization but was able to get into a chair a few weeks ago  PMH: Reviewed in Epic. Severe COPD and anxiety, trach placed about 18 months ago. Has GJ tube    Medication Adherence: no issues reported and has assistance setting up med boxes (group home staff).  All medications given via J tube. Has not had problems with clogging at group [place], though did have frequent clogging at the nursing home. She is on tube feeds.     COPD: Current medications: budesonide 0.5mg neb BID, ipratropium and Xopenex nebs Q4 hours around the clock. O2 at 3L.  She never filled the Rx for morphine liquid and has continued with Dilaudid liquid Q2 hours for air hunger. Had been changed because Dilaudid hadn't been working very well in the hospital and was not giving much relief. Since taking meropenem, feels like Dilaudid is working again now and decided to continue since nobody was able to  the Rx from the pharmacy for morphine. Per MAR, appears to receive Dilaudid 4-8 times a day recently. Spoke with the palliative care specialist today about continue prn Dilaudid and using a \"long acting\" opioid as well, however was not sure of the name. César has discussed use of opioids for air hunger with pt and decided she " would rather have less air hunger, even if it meant she would be more sedated.   Pt is experiencing the following side effects: constipation (see below).    Pt reports the following symptoms: ongoing SOB and feeling of air hunger, seems to be at baseline now.  Back pain: currently taking gabapentin 300mg TID. Dose had been reduced during recent hospitalization to 150mg BID to see if this would reduce daytime sedation per daughter's request but found that her back pain worsened significantly so increased to 300mg BID. However, since coming back to group home she has resumed TID dosing seemingly in error. Back pain is currently stable again.   Depression/anxiety/insomnia: currently taking sertraline 150mg daily. Has been very helpful for reducing sadness. Takes both clonazepam 1.5mg QID scheduled as well as lorazepam 0.5mg Q3 hours prn. Unclear to daughter when they are offering lorazepam vs Dilaudid for air hunger/anxiety or if staff will admin both.  Pt is sedated during the day and awake at night. She receives lorazepam per MAR 3+ doses per day recently. She also receives melatonin 3mg HS and unclear how beneficial this has been.  constipation: currently taking Miralax BID and senna S 2 tabs BID which has been controlling constipation lately. Has not had problems since starting meropenem. Will be picking up Culturelle per recommendation from NP to give after abx end.   GERD: Has not needed any medication lately but has a history of significant problems with stomach; unclear if that was at a time she was receiving tube feed/meds via G tube instead of J tube. She has a number of PRN medications available, all with instructions to give via J tube, including calcium carbonate, milk of mag, GI cocktail and new order for famotidine PRN.   Hx UTI: currently taking cranberry caps for prophylaxis. She has catheter and hx recurrent UTI.   Vitamin: currently taking daily MVI with small amt of iron (9mg daily). Also receives  tube feeds.   CVD: currently taking low dose aspirin. César states palliative care recommends stopping aspirin since pt is having some bruising and not a strong indication for aspirin. Daughter in agreement.     Current labs include:  BP Readings from Last 3 Encounters:   01/13/17 116/76   01/10/17 137/65   01/06/17 106/60     A1C      5.1   11/19/2016.  No results found for this basename: chol  TRIG      106   7/27/2015  No results found for this basename: hdl  No results found for this basename: ldl    Liver Function Studies -   Recent Labs   Lab Test  01/13/17   1300  01/02/17   1335   PROTTOTAL   --   6.8   ALBUMIN   --   2.7*   BILITOTAL   --   0.2   ALKPHOS   --   164*   AST  24  17   ALT   --   39       Lab Results   Component Value Date    UCRR 19 04/13/2016       Last Basic Metabolic Panel:  NA      137   1/13/2017   POTASSIUM      4.5   1/13/2017  CHLORIDE       95   1/13/2017  BUN       26   1/13/2017  CR     0.61   1/13/2017  GFR ESTIMATE   Date Value Ref Range Status   01/13/2017 >90  Non  GFR Calc   >60 mL/min/1.7m2 Final   01/08/2017 >90  Non  GFR Calc   >60 mL/min/1.7m2 Final   01/07/2017 >90  Non  GFR Calc   >60 mL/min/1.7m2 Final     GFR ESTIMATE IF BLACK   Date Value Ref Range Status   01/13/2017 >90   GFR Calc   >60 mL/min/1.7m2 Final   01/08/2017 >90   GFR Calc   >60 mL/min/1.7m2 Final   01/07/2017 >90   GFR Calc   >60 mL/min/1.7m2 Final     TSH   Date Value Ref Range Status   07/26/2015 0.68 0.40 - 4.00 mU/L Final   ]  /83 mmHg  Pulse 88  SpO2 97% BP today higher than normal; usually 120-140s systolic    Most Recent Immunizations   Administered Date(s) Administered     Influenza (High Dose) 3 valent vaccine 10/04/2016     Influenza (IIV3) 10/21/2013     Pneumococcal (PCV 13) 11/17/2014     Pneumococcal 23 valent 05/03/2012     Tetanus Not Indicated - By Hx 05/03/2014     ASSESSMENT:                                                        Current medications were reviewed today.     Medication Adherence: no issues identified    COPD: needs improvement. Pt needs additional therapy to control sx air hunger. Will await recommendations from palliative care who was out earlier today for consult. Encouraged César to talk to staff about using Dilaudid prior to lorazepam for anxiety related to breathing symptoms especially during the day to see if this helps reduce sedation from polypharmacy.   Back pain: stable. Will continue gabapentin at current dosing schedule TID and consider a dose reduction to BID if needed to reduce daytime sedation.   Depression/anxiety/insomnia: needs improvement, most of current anxiety symptoms seems to be related to SOB. OK to continue benzos considering life expectancy; will wait further recommendations from palliative care.   constipation: stable  GERD: stable, however will change instructions for staff to give meds via G tube PRN. Meds would not be effective with J tube administration.   Hx UTI: stable. Cranberry likely ineffective for prophylaxis, provided information to daughter who will choose whether or not to continue once out of current med supply.   Vitamin: unnecessary to continue MVI considering multiple vitamins contained in tube feed. Will d/c.   CVD: d/c aspirin, limited benefit given history and life expectancy. Pt also having SE.     PLAN:                                                      D/c MVI  D/c aspirin  Change all acid reducing medications to administration in G-tube, not J-tube.    I spent 60 minutes with this patient today.  All changes were made via collaborative practice agreement with Steff Robertson. A copy of the visit note was provided to the patient's primary care provider.    Will follow up in 1 month as needed.    The patient was sent via [employer] a summary of these recommendations as an after visit summary.     Haydee Feliciano, PharmD, BCACP

## 2017-01-18 NOTE — PROGRESS NOTES
66-year-old patient seen for a palliative care consult at the request of the Complex care clinic. Patient has end-stage COPD, is ventilator dependent for approximately 1-1/2 years and experiences chronic air hunger for which she is currently receiving Dilaudid 1 mg 4-8 times per day. Morphine sulfate was ordered for the patient to replace dilaudid but it did not arrive from Pharmacy. According to the group home staff, she is often somnolent during the day and wakeful during the night. When asked if she was fearful at night, the patient shakes her head yes. She also has chronic anxiety for which she is treated with Klonopin 1.5 mg q.i.d.and lorazepam p.r.n. She had a recent urinary tract infection for which she is finishing a course of antibiotics. She has other chronic medical problems including esophageal dysmotility likely due to achalasia, GERD, hypertension, coronary artery disease and recurrent UTI. She has scattered ecchymoses on her upper arms bilaterally. Her daughter and nurse at the facility indicate that she has an erythematous area on the coccyx. She is fed with tube feedings. She does not have constipation at present. Her daughter César is her POA.    Medications: Reviewed    Past medical history: Reviewed    Allergies and social history: See chart    Palliative review of systems: See above    Examination:  Gen.: Patient is somnolent, arouses to questioning but falls quickly back to sleep, able to answer questions appropriately, Ventilator dependent with tracheostomy  Skin: Scattered ecchymoses on both arms  Eyes: Full EOM  Lungs: Tones distant  Heart: S1 and S2 with no murmur   Abdomen: Soft, nontender  Extremities: No edema present in the lower extremities, strength not tested    Impression:  1. Ventilator-dependent COPD with recurrent air hunger  2. Chronic anxiety  3. Nighttime wakefulness, daytime somnolence  4. Scattered ecchymoses on arms  5. Possible skin breakdown in the coccygeal area (not  examined)    Recommendations:  1. Continue current Dilaudid p.r.n.Dyspnea  2. Add MS Contin 10 mg b.i.d.  3. Consider dose adjustment of Klonopin,possible small reduction, lower daytime dose  4. Consider discontinuation of low-dose aspirin  5. Pressure relieving mattress  6. In-depth discussion with patient's daughter including the above recommendations    60 minutes spent with patient, group home staff and daughter with greater than 50% in counseling and coordination of care related to symptomatic management.    Radha Reyes MD, MPH, [facility] Medical Director

## 2017-01-19 NOTE — TELEPHONE ENCOUNTER
Please call home care:  I believe she has started seeing the complex care clinic for primary care.  Labs should no longer be ordered under my name.  New PCP is copied to this note.  Thanks,  Radha Mcmillan MD  Internal Medicine      Documented.  Gina Ruelas RN 8:35 AM on 1/24/2017.

## 2017-01-19 NOTE — TELEPHONE ENCOUNTER
Fax from [facility] stating that famotidine suspension is non formulary.  Requesting new order for tablets if possible as insurance will cover these.

## 2017-01-20 NOTE — IP AVS SNAPSHOT
"    UNIT [address] [facility]: [phone]                                              INTERAGENCY TRANSFER FORM - LAB / IMAGING / EKG / EMG RESULTS   2017                    Hospital Admission Date: 2017  MORRIS PYLE   : 1950  Sex: Female        Attending Provider: Jenni Tellez MD     Allergies:  Levaquin, Toro Podhaler, Suprax, Augmentin, Avelox, Cedax, Penicillins, Vantin    Infection:  VRE-Contact Isolation, MRSA-Contact Isolation   Service:  INTERNAL MED    Ht:  1.63 m (5' 4.17\")   Wt:  55.339 kg (122 lb)   Admission Wt:  52 kg (114 lb 10.2 oz)    BMI:  20.83 kg/m 2   BSA:  1.58 m 2            Patient PCP Information     Provider PCP Type    Norman Brito MD General         Lab Results - 3 Days (17 - 17)      Blood culture [[id number]]  Resulted: 17, Result status: Preliminary result    Ordering provider: Jenni Tellez MD  17 1103 Resulting lab: [facility]    Specimen Information    Type Source Collected On   Blood  17 1845          Components       Value Reference Range Flag Lab   Specimen Description Blood Left Arm   51   Culture Micro No growth after 3 days   225   Micro Report Status Pending   225            Blood culture [[id number]]  Resulted: 17 034, Result status: Preliminary result    Ordering provider: Jenni Tellez MD  17 1103 Resulting lab: [facility]    Specimen Information    Type Source Collected On   Blood  17 1727          Components       Value Reference Range Flag Lab   Specimen Description Blood PICC   75   Culture Micro No growth after 3 days   225   Micro Report Status Pending   225            Blood culture [[id number]]  Resulted: 17 034, Result status: Preliminary result    Ordering provider: Eric Amezquita MD  17 0309 Resulting lab: [facility]    " Specimen Information    Type Source Collected On   Blood Arm, Left 01/20/17 0406          Components       Value Reference Range Flag Lab   Specimen Description Blood Right Hand   75   Culture Micro No growth after 4 days   225   Micro Report Status Pending   225            Vancomycin level [[id number]]  Resulted: 01/23/17 1057, Result status: Final result    Ordering provider: Jenni Tellez MD  01/23/17 0720 Resulting lab: [facility]    Specimen Information    Type Source Collected On   Blood  01/23/17 1006          Components       Value Reference Range Flag Lab   Vancomycin Level 21.8 mg/L  51   Comment:         Traditional Dosing therapeutic Range:          Trough 8-20 mg/L          Peak 20-50 mg/L              CRP inflammation [[id number]] (Abnormal)  Resulted: 01/23/17 0954, Result status: Final result    Ordering provider: Jenni Tellez MD  01/23/17 0615 Resulting lab: [facility]    Specimen Information    Type Source Collected On     01/23/17 0615          Components       Value Reference Range Flag Lab   CRP Inflammation 45.0 0.0 - 8.0 mg/L H 51            Blood culture [[id number]] (Abnormal)  Resulted: 01/23/17 0727, Result status: Final result    Ordering provider: Eric Amezquita MD  01/20/17 0309 Resulting lab: [facility]    Specimen Information    Type Source Collected On   Blood Arm, Right 01/20/17 0300          Components       Value Reference Range Flag Lab   Specimen Description Blood Right Arm   226   Culture Micro --  A 225   Result:         Cultured on the 1st day of incubation: Staphylococcus hominis This isolate is   presumed to be clindamycin resistant based on detection of inducible   clindamycin resistance. Erythromycin and clindamycin are resistant, therefore,   they are not recommended for use.  Critical Value/Significant Value, preliminary result only,  called to and read   back by Susanne Blackburn @ 1021.cg 01/21/17  (Note)  POSITIVE for Staphylococci other than S.aureus, S.epidermidis and  S.lugdunensis, by Verigene multiplex nucleic acid test.  Coagulase-negative staphylococci are the most common venipuncture or  collection associated skin CONTAMINANTS grown in blood cultures.  Final identification and antimicrobial susceptibility testing will be  verified by standard methods.        Specimen tested with Verigene multiplex, gram-positive blood culture  nucleic acid test for the following targets: Staph aureus, Staph  epidermidis, Staph lugdunensis, other Staph species, Enterococcus  faecalis, Enterococcus faecium,  Streptococcus species, S. agalactiae,  S. anginosus grp., S. pneumoniae, S. pyogenes, Listeria sp., mecA  (methicillin resistance) and Claudia/B (vancomycin resistance).          Critical Value/Significant Value called to and read back by MATTIE MULLER 1.21.2017 AT 1.20 PM,       Micro Report Status FINAL 01/23/2017   225   Result:     Organism: --   225   Result:         Cultured on the 1st day of incubation: Staphylococcus hominis This isolate is   presumed to be clindamycin resistant based on detection of inducible   clindamycin resistance. Erythromycin and clindamycin are resistant, therefore,   they are not recommended for use.              Magnesium [[id number]]  Resulted: 01/23/17 0705, Result status: Final result    Ordering provider: Jenni Tellez MD  01/22/17 8967 Resulting lab: [facility]    Specimen Information    Type Source Collected On   Blood  01/23/17 0615          Components       Value Reference Range Flag Lab   Magnesium 2.1 1.6 - 2.3 mg/dL  51            Phosphorus [[id number]] (Abnormal)  Resulted: 01/23/17 0705, Result status: Final result    Ordering provider: Jenni Tellez MD  01/22/17 4793 Resulting lab: [facility]    Specimen  Information    Type Source Collected On   Blood  01/23/17 0615          Components       Value Reference Range Flag Lab   Phosphorus 2.0 2.5 - 4.5 mg/dL L 51            Comprehensive metabolic panel [[id number]] (Abnormal)  Resulted: 01/23/17 0705, Result status: Final result    Ordering provider: Jenni Tellez MD  01/22/17 8232 Resulting lab: [facility]    Specimen Information    Type Source Collected On   Blood  01/23/17 0615          Components       Value Reference Range Flag Lab   Sodium 140 133 - 144 mmol/L  51   Potassium 3.7 3.4 - 5.3 mmol/L  51   Chloride 95 94 - 109 mmol/L  51   Carbon Dioxide 42 20 - 32 mmol/L H 51   Anion Gap 4 3 - 14 mmol/L  51   Glucose 103 70 - 99 mg/dL H 51   Urea Nitrogen 19 7 - 30 mg/dL  51   Creatinine 0.53 0.52 - 1.04 mg/dL  51   GFR Estimate -- >60 mL/min/1.7m2  51   Result:         >90  Non  GFR Calc     GFR Estimate If Black -- >60 mL/min/1.7m2  51   Result:         >90   GFR Calc     Calcium 7.9 8.5 - 10.1 mg/dL L 51   Result:     Bilirubin Total 0.2 0.2 - 1.3 mg/dL  51   Albumin 2.1 3.4 - 5.0 g/dL L 51   Protein Total 5.6 6.8 - 8.8 g/dL L 51   Alkaline Phosphatase 82 40 - 150 U/L  51   ALT 21 0 - 50 U/L  51   AST 14 0 - 45 U/L  51            CBC with platelets [[id number]] (Abnormal)  Resulted: 01/23/17 0644, Result status: Final result    Ordering provider: Jenni Tellez MD  01/22/17 8356 Resulting lab: [facility]    Specimen Information    Type Source Collected On   Blood  01/23/17 0615          Components       Value Reference Range Flag Lab   WBC 10.0 4.0 - 11.0 10e9/L  51   RBC Count 2.99 3.8 - 5.2 10e12/L L 51   Hemoglobin 8.9 11.7 - 15.7 g/dL L 51   Hematocrit 29.4 35.0 - 47.0 % L 51   MCV 98 78 - 100 fl  51   MCH 29.8 26.5 - 33.0 pg  51   MCHC 30.3 31.5 - 36.5 g/dL L 51   RDW 15.2 10.0 - 15.0 % H 51   Platelet Count 205 150 - 450 10e9/L  51             Potassium [[id number]]  Resulted: 01/22/17 1750, Result status: Final result    Ordering provider: Jenni Tellez MD  01/22/17 1310 Resulting lab: [facility]    Specimen Information    Type Source Collected On   Blood  01/22/17 1637          Components       Value Reference Range Flag Lab   Potassium 4.0 3.4 - 5.3 mmol/L  51            Urine Culture Aerobic Bacterial [[id number]]  Resulted: 01/22/17 1256, Result status: Final result    Ordering provider: Eric Amezquita MD  01/20/17 0309 Resulting lab: [facility]    Specimen Information    Type Source Collected On   Urine Urine catheter 01/20/17 0549          Components       Value Reference Range Flag Lab   Specimen Description Catheterized Urine   51   Special Requests Specimen received in preservative   75   Culture Micro No growth   225   Micro Report Status FINAL 01/22/2017   225            Magnesium [[id number]]  Resulted: 01/22/17 0641, Result status: Final result    Ordering provider: Jenni Tellez MD  01/21/17 2330 Resulting lab: [facility]    Specimen Information    Type Source Collected On   Blood  01/22/17 0558          Components       Value Reference Range Flag Lab   Magnesium 2.0 1.6 - 2.3 mg/dL  51            Phosphorus [[id number]]  Resulted: 01/22/17 0641, Result status: Final result    Ordering provider: Jenni Tellez MD  01/21/17 2330 Resulting lab: [facility]    Specimen Information    Type Source Collected On   Blood  01/22/17 0558          Components       Value Reference Range Flag Lab   Phosphorus 2.6 2.5 - 4.5 mg/dL  51            Basic metabolic panel [[id number]] (Abnormal)  Resulted: 01/22/17 0641, Result status: Final result    Ordering provider: Jenni Tellez MD  01/21/17 2330 Resulting lab: [facility]  [place]    Specimen Information    Type Source Collected On   Blood  01/22/17 0558          Components       Value Reference Range Flag Lab   Sodium 139 133 - 144 mmol/L  51   Potassium 3.3 3.4 - 5.3 mmol/L L 51   Chloride 95 94 - 109 mmol/L  51   Carbon Dioxide 38 20 - 32 mmol/L H 51   Anion Gap 6 3 - 14 mmol/L  51   Glucose 102 70 - 99 mg/dL H 51   Urea Nitrogen 17 7 - 30 mg/dL  51   Creatinine 0.58 0.52 - 1.04 mg/dL  51   GFR Estimate -- >60 mL/min/1.7m2  51   Result:         >90  Non  GFR Calc     GFR Estimate If Black -- >60 mL/min/1.7m2  51   Result:         >90   GFR Calc     Calcium 8.2 8.5 - 10.1 mg/dL L 51   Result:              CBC with platelets [[id number]] (Abnormal)  Resulted: 01/22/17 0619, Result status: Final result    Ordering provider: Jenni Tellez MD  01/21/17 5641 Resulting lab: [facility]    Specimen Information    Type Source Collected On   Blood  01/22/17 0558          Components       Value Reference Range Flag Lab   WBC 11.8 4.0 - 11.0 10e9/L H 51   RBC Count 2.93 3.8 - 5.2 10e12/L L 51   Hemoglobin 8.8 11.7 - 15.7 g/dL L 51   Hematocrit 28.6 35.0 - 47.0 % L 51   MCV 98 78 - 100 fl  51   MCH 30.0 26.5 - 33.0 pg  51   MCHC 30.8 31.5 - 36.5 g/dL L 51   RDW 15.2 10.0 - 15.0 % H 51   Platelet Count 187 150 - 450 10e9/L  51            Magnesium [[id number]]  Resulted: 01/21/17 1004, Result status: Final result    Ordering provider: Jenni Tellez MD  01/21/17 0642 Resulting lab: [facility]    Specimen Information    Type Source Collected On     01/21/17 0642          Components       Value Reference Range Flag Lab   Magnesium 2.0 1.6 - 2.3 mg/dL  51            Phosphorus [[id number]] (Abnormal)  Resulted: 01/21/17 1004, Result status: Final result    Ordering provider: Jenni Tellez MD  01/21/17 0642 Resulting lab: [facility]  [place]    Specimen Information    Type Source Collected On     01/21/17 0642          Components       Value Reference Range Flag Lab   Phosphorus 2.3 2.5 - 4.5 mg/dL L 51            Troponin I [[id number]]  Resulted: 01/21/17 0912, Result status: Final result    Ordering provider: Jenni Tellez MD  01/21/17 0642 Resulting lab: [facility]    Specimen Information    Type Source Collected On     01/21/17 0642          Components       Value Reference Range Flag Lab   Troponin I ES -- 0.000 - 0.045 ug/L  51   Result:         <0.015  The 99th percentile for upper reference range is 0.045 ug/L.  Troponin values in   the range of 0.045 - 0.120 ug/L may be associated with risks of adverse   clinical events.              Comprehensive metabolic panel [[id number]] (Abnormal)  Resulted: 01/21/17 0737, Result status: Final result    Ordering provider: Jenni Tellez MD  01/21/17 0514 Resulting lab: [facility]    Specimen Information    Type Source Collected On   Blood  01/21/17 0642          Components       Value Reference Range Flag Lab   Sodium 142 133 - 144 mmol/L  51   Potassium 3.4 3.4 - 5.3 mmol/L  51   Chloride 99 94 - 109 mmol/L  51   Carbon Dioxide 40 20 - 32 mmol/L H 51   Anion Gap 4 3 - 14 mmol/L  51   Glucose 82 70 - 99 mg/dL  51   Urea Nitrogen 16 7 - 30 mg/dL  51   Creatinine 0.46 0.52 - 1.04 mg/dL L 51   GFR Estimate -- >60 mL/min/1.7m2  51   Result:         >90  Non  GFR Calc     GFR Estimate If Black -- >60 mL/min/1.7m2  51   Result:         >90   GFR Calc     Calcium 7.7 8.5 - 10.1 mg/dL L 51   Result:     Bilirubin Total 0.4 0.2 - 1.3 mg/dL  51   Albumin 2.0 3.4 - 5.0 g/dL L 51   Protein Total 5.2 6.8 - 8.8 g/dL L 51   Alkaline Phosphatase 79 40 - 150 U/L  51   ALT 22 0 - 50 U/L  51   AST 23 0 - 45 U/L  51            CRP inflammation [[id number]] (Abnormal)  Resulted: 01/21/17 0737,  Result status: Final result    Ordering provider: Jenni Tellez MD  01/21/17 0514 Resulting lab: [facility]    Specimen Information    Type Source Collected On   Blood  01/21/17 0642          Components       Value Reference Range Flag Lab   CRP Inflammation 140.0 0.0 - 8.0 mg/L H 51            CBC with platelets [[id number]] (Abnormal)  Resulted: 01/21/17 0712, Result status: Final result    Ordering provider: Jenni Tellez MD  01/21/17 0514 Resulting lab: [facility]    Specimen Information    Type Source Collected On   Blood  01/21/17 0642          Components       Value Reference Range Flag Lab   WBC 10.7 4.0 - 11.0 10e9/L  51   RBC Count 2.68 3.8 - 5.2 10e12/L L 51   Hemoglobin 8.0 11.7 - 15.7 g/dL L 51   Hematocrit 26.2 35.0 - 47.0 % L 51   MCV 98 78 - 100 fl  51   MCH 29.9 26.5 - 33.0 pg  51   MCHC 30.5 31.5 - 36.5 g/dL L 51   RDW 15.7 10.0 - 15.0 % H 51   Platelet Count 164 150 - 450 10e9/L  51            Testing Performed By     Lab - Abbreviation Name Director Address Valid Date Range    51 - Unknown [facility] Unknown [address] [facility] [id number] 12/31/14 1010 - Present    75 - Unknown [facility] Unknown [address] [facility] [id number] 01/15/15 1019 - Present    225 - Unknown [facility] Unknown [address] [facility] [id number] 12/19/14 0954 - Present    226 - Unknown MICRO RAPID TESTING LAB Unknown [address] [facility] [id number] 12/19/14 0955 - Present            Unresulted Labs (24h ago through future)    Start       Ordered    01/24/17 0745  Potassium   TIMED - ONCE,   Timed      01/24/17 0733 01/23/17 0530  Magnesium   EVERY MONDAY,   Routine     Comments:  Every Monday while on enteral tube feeding.    01/21/17 0923 01/23/17 0530  Phosphorus   EVERY MONDAY,   Routine    "  Comments:  Every Monday while on enteral tube feeding.    01/21/17 0923    Unscheduled  Potassium -  (Potassium Replacement - \"High\" - Replacement for all levels less than 4.1 mmol/L - UU,UR,UA,RH,SH,PH,W[place] )   CONDITIONAL (SPECIFY),   Routine     Comments:  Obtain Potassium Level for these conditions:  *IF no potassium result within 24 hrs before initiation of order set, draw potassium level with next lab collect.    *2 HOURS AFTER last IV potassium replacement dose and 4 hours after an oral replacement dose when potassium replacement given for level less than 3.4.  *Next morning after potassium dose.     Repeat Potassium Replacement if necessary.    01/22/17 0936      Encounter-Level Documents:     There are no encounter-level documents.      Order-Level Documents:     There are no order-level documents.      "

## 2017-01-20 NOTE — PROGRESS NOTES
Angi was admitted early this morning by Dr Yadi Loja.  Please see separate H&P from this morning.  Per sign out, angi on admission appeaed cyanotic and drowsy and had improved after suctioning and IV fluids.   Subsequently, she became drowsy again after transferring to the floor and was practically unarousable.  Only received klonopin of her morning medications  O/E  ANTHONY. Moving all 4 limbs to pain stimulus  Coarse breath sounds on the ventilator  Soft abdomen  Normal heart sounds    ABG with no evidence of CO2 retention  Electrolyes largely within normal limits   CXR ordered    Family arrived and patient woke up when family spoke to her.  For sake of completeness, will order a CT head without contrast to evaluate drowsiness/ somnolence    Dr THUAN Mckinney MD, [facility]  Hospitalist ( Internal medicine)  Pager: [phone]

## 2017-01-20 NOTE — IP AVS SNAPSHOT
` `     [address] [facility]: [phone]            Medication Administration Report for Mary Wang as of 01/24/17 1607   Legend:    Given Hold Not Given Due Canceled Entry Other Actions    Time Time (Time) Time  Time-Action       Inactive    Active    Linked        Medications 01/18/17 01/19/17 01/20/17 01/21/17 01/22/17 01/23/17 01/24/17    0.9% sodium chloride infusion  Rate: 100 mL/hr Freq: CONTINUOUS Route: IV  Last Dose: Stopped (01/24/17 1453)  Start: 01/20/17 0659      1359 ( )-New Bag        0000 ( )-Rate/Dose Verify       0541 ( )-New Bag       1454 ( )-New Bag         1009 ( )-New Bag       2131 ( )-New Bag        0948 ( )-New Bag       1453-Stopped           acetaminophen (TYLENOL) solution 650 mg  Dose: 650 mg Freq: EVERY 4 HOURS PRN Route: PER FEEDING   PRN Reason: mild pain  Start: 01/20/17 0658   Admin Instructions: Maximum acetaminophen dose from all sources= 75 mg/kg/day not to exceed 4 grams/day.       1337 (650 mg)-Given        0749 (650 mg)-Given              budesonide (PULMICORT) neb solution 0.5 mg  Dose: 0.5 mg Freq: 2 TIMES DAILY Route: NEBULIZATION  Start: 01/20/17 0800      0820 (0.5 mg)-Given       2003 (0.5 mg)-Given        0827 (0.5 mg)-Given       2003 (0.5 mg)-Given        0756 (0.5 mg)-Given       1955 (0.5 mg)-Given        0824 (0.5 mg)-Given       2020 (0.5 mg)-Given        0857 (0.5 mg)-Given       [ ] 2000           calcium carbonate (TUMS) chewable tablet 500 mg  Dose: 500 mg Freq: EVERY 2 HOURS PRN Route: PO  PRN Reason: heartburn  Start: 01/20/17 0658       1134 (500 mg)-Given       1504 (500 mg)-Given              clonazePAM (klonoPIN) suspension 1.5 mg  Dose: 1.5 mg Freq: 4 TIMES DAILY Route: PO  Start: 01/20/17 0800   Admin Instructions: Shake Well.       0838 (1.5 mg)-Given       (1206)-Not Given [C]       (2038)-Not Given [C]       (5803)-Not Given        (0737)-Not Given [C]       1134 (1.5 mg)-Given       1504 (1.5 mg)-Given       2111 (1.5 mg)-Given         0816 (1.5 mg)-Given       1230 (1.5 mg)-Given       1534 (1.5 mg)-Given       2051 (1.5 mg)-Given        0819 (1.5 mg)-Given       1322 (1.5 mg)-Given       (1650)-Not Given [C]       1729 (1.5 mg)-Given       2128 (1.5 mg)-Given        (1020)-Not Given       1129 (1.5 mg)-Given       (1555)-Not Given       [ ] 2000           dextrose 10 % 1,000 mL infusion  Freq: CONTINUOUS PRN Route: IV  PRN Comment: Hypoglycemia prevention  Start: 01/21/17 0921   Admin Instructions: For Hypoglycemia Prevention for patients on long-acting subcutaneous basal insulin (Glargine, Detemir, NPH) or continuous insulin infusion. Whenever nutrition support is held or interrupted:   1) Infuse IV D10W at nutrition support rate  2) Notify provider for further instructions               famotidine (PEPCID) suspension 20 mg  Dose: 20 mg Freq: 2 TIMES DAILY PRN Route: PO  PRN Reason: heartburn  Start: 01/20/17 0658              fexofenadine (ALLEGRA) tablet 180 mg  Dose: 180 mg Freq: DAILY Route: PO  Start: 01/20/17 0800      1310 (180 mg)-Given        0750 (180 mg)-Given        0817 (180 mg)-Given        0818 (180 mg)-Given        0918 (180 mg)-Given           fiber modular (NUTRISOURCE FIBER) packet 1 packet  Dose: 1 packet Freq: 3 TIMES DAILY Route: PER J TUBE  Start: 01/20/17 0800   Admin Instructions: Mix each packet with 60 mL water before administering. Supplied by nutrition department.       (1256)-Not Given       1717 (1 packet)-Given       2123 (1 packet)-Given        0750 (1 packet)-Given       (1347)-Not Given       (2348)-Not Given        (0818)-Not Given       (1444)-Not Given       (2041)-Not Given        (0823)-Not Given       (1302)-Not Given       (2016)-Not Given        (0921)-Not Given              [ ] 2000           gabapentin (NEURONTIN) solution 300 mg  Dose: 300 mg Freq: 3 TIMES DAILY Route: PER J TUBE  Start: 01/20/17 0800      0820 (300 mg)-Given       1311 (300 mg)-Given       2123 (300 mg)-Given        0749 (300  mg)-Given       1327 (300 mg)-Given       1949 (300 mg)-Given        0817 (300 mg)-Given       1533 (300 mg)-Given       2049 (300 mg)-Given        0820 (300 mg)-Given       1323 (300 mg)-Given       2010 (300 mg)-Given        0919 (300 mg)-Given       1310 (300 mg)-Given       [ ] 2000           guaiFENesin (ROBITUSSIN) 20 mg/mL solution 200 mg  Dose: 200 mg Freq: 4 TIMES DAILY Route: PO  Start: 01/20/17 0800      0819 (200 mg)-Given       1310 (200 mg)-Given       1718 (200 mg)-Given       2123 (200 mg)-Given        0750 (200 mg)-Given       1134 (200 mg)-Given       1504 (200 mg)-Given       1949 (200 mg)-Given        0817 (200 mg)-Given       1231 (200 mg)-Given       1533 (200 mg)-Given       2050 (200 mg)-Given        0820 (200 mg)-Given       1138 (200 mg)-Given       1729 (200 mg)-Given       2009 (200 mg)-Given        0919 (200 mg)-Given       1129 (200 mg)-Given       (1553)-Not Given       [ ] 2000           HYDROmorphone (DILAUDID) liquid 1 mg  Dose: 1 mg Freq: EVERY 2 HOURS PRN Route: PO  PRN Reason: moderate to severe pain  Start: 01/24/17 1349              ipratropium (ATROVENT) 0.02 % neb solution 0.5 mg  Dose: 0.5 mg Freq: EVERY 4 HOURS Route: NEBULIZATION  Start: 01/20/17 2000 2004 (0.5 mg)-Given        0107 (0.5 mg)-Given       0403 (0.5 mg)-Given       0827 (0.5 mg)-Given       1223 (0.5 mg)-Given       1610 (0.5 mg)-Given       2003 (0.5 mg)-Given        0044 (0.5 mg)-Given       0422 (0.5 mg)-Given       0756 (0.5 mg)-Given       1224 (0.5 mg)-Given       1550 (0.5 mg)-Given       1955 (0.5 mg)-Given        0004 (0.5 mg)-Given       0321 (0.5 mg)-Given       0825 (0.5 mg)-Given       1224 (0.5 mg)-Given       1546 (0.5 mg)-Given       2020 (0.5 mg)-Given        0018 (0.5 mg)-Given       0332 (0.5 mg)-Given       0857 (0.5 mg)-Given       1226 (0.5 mg)-Given       [ ] 1600       [ ] 2000           levalbuterol (XOPENEX) neb solution 1.25 mg  Dose: 1 ampule Freq: EVERY 4 HOURS Route:  NEBULIZATION  Start: 01/20/17 2000 2004 (1.25 mg)-Given        0107 (1.25 mg)-Given       0402 (1.25 mg)-Given       0827 (1.25 mg)-Given       1223 (1.25 mg)-Given       1610 (1.25 mg)-Given       2003 (1.25 mg)-Given        0044 (1.25 mg)-Given       0422 (1.25 mg)-Given       0756 (1.25 mg)-Given       1224 (1.25 mg)-Given       1549 (1.25 mg)-Given       1955 (1.25 mg)-Given        0004 (1.25 mg)-Given       0321 (1.25 mg)-Given       0825 (1.25 mg)-Given       1224 (1.25 mg)-Given       1546 (1.25 mg)-Given       2020 (1.25 mg)-Given        0018 (1.25 mg)-Given       0332 (1.25 mg)-Given       0857 (1.25 mg)-Given       1226 (1.25 mg)-Given       [ ] 1600       [ ] 2000           lidocaine (LIDODERM) 5 % Patch 1-2 patch  Dose: 1-2 patch Freq: EVERY 24 HOURS 2000 Route: TD  Start: 01/20/17 2000   Admin Instructions: Apply patch(s) to the back . To prevent lidocaine toxicity, patient should be patch free for 12 hrs daily. Patches may be cut to smaller size prior to removing release liner.  NEVER APPLY HEAT OVER PATCH which will increase absorption and may lead to risk of local anesthetic toxicity. Do not apply over area where liposomal bupivacaine was injected for 96 hours post injection.       (2134)-Not Given        2111 (1 patch)-Given [C]        2049 (1 patch)-Given [C]                [ ] 2000          And  lidocaine (LIDODERM) patch REMOVAL  Freq: EVERY 24 HOURS 0800 Route: TD  Start: 01/21/17 0800   Admin Instructions: Remove lidocaine Patch.        (0811)-Not Given [C]        0845 ( )-Patch Removed        0824 ( )-Patch Removed        0921 ( )-Patch Removed          And  lidocaine (LIDODERM) Patch in Place  Freq: EVERY 8 HOURS Route: TD  Start: 01/20/17 2000   Admin Instructions: Chart every shift, confirming that patch is still in place on patient (no barcode scan needed). See patch order for dose information.  NEVER APPLY HEAT OVER PATCH which will increase absorption and may lead to risk of local  anesthetic toxicity. Do not apply over area where liposomal bupivacaine injected for 96 hours.                             2113 ( )-Patch in Place        0320 ( )-Patch in Place              2051 ( )-Patch in Place        0354 ( )-Patch in Place              2011 ( )-Patch in Place        0338 ( )-Patch in Place [C]              [ ] 2000           LORazepam (ATIVAN) 2 MG/ML (HIGH CONC) solution 0.5 mg  Dose: 0.5 mg Freq: EVERY 3 HOURS PRN Route: PO  PRN Reason: anxiety  Start: 01/20/17 0658       0430 (0.5 mg)-Given       0916 (0.5 mg)-Given       1301 (0.5 mg)-Given       1747 (0.5 mg)-Given       2044 (0.5 mg)-Given        0011 (0.5 mg)-Given       0316 (0.5 mg)-Given       0916 (0.5 mg)-Given       2153 (0.5 mg)-Given        0102 (0.5 mg)-Given       0622 (0.5 mg)-Given       1009 (0.5 mg)-Given       1322 (0.5 mg)-Given       2300 (0.5 mg)-Given        0154 (0.5 mg)-Given       1016 (0.5 mg)-Given           melatonin liquid 3 mg  Dose: 3 mg Freq: AT BEDTIME Route: PER J TUBE  Start: 01/20/17 2200      (2322)-Not Given [C]        0131 (3 mg)-Given       2111 (3 mg)-Given        2136 (3 mg)-Given        2223 (3 mg)-Given        [ ] 2200           meropenem (MERREM) 500 mg vial to attach to  mL bag for ADULTS or 25 mL bag for PEDS  Dose: 500 mg Freq: EVERY 6 HOURS Route: IV  Indications of Use: URINARY TRACT INFECTION  Last Dose: 500 mg (01/22/17 2304)  Start: 01/20/17 0435      0544 (500 mg)-New Bag       0623-Stopped       (1040)-Not Given [C]       1057 (500 mg)-New Bag       1106-ED Infusing on Admission/transfer       1718 (500 mg)-New Bag       2328 (500 mg)-New Bag        0442 (500 mg)-New Bag       0955 (500 mg)-New Bag              1754 (500 mg)-New Bag       2339 (500 mg)-New Bag        0610 (500 mg)-New Bag       1215 (500 mg)-New Bag       1740 (500 mg)-New Bag       2304 (500 mg)-New Bag        0500 (500 mg)-New Bag       1138 (500 mg)-New Bag       1724 (500 mg)-New Bag       2300 (500 mg)-New  Bag        0530 (500 mg)-New Bag       1129 (500 mg)-New Bag       [ ] 1800           multivitamins with minerals (CERTAVITE/CEROVITE) liquid 15 mL  Dose: 15 mL Freq: DAILY Route: PER FEEDING   Start: 01/21/17 0930       0949 (15 mL)-Given        0817 (15 mL)-Given        0820 (15 mL)-Given        0920 (15 mL)-Given           naloxone (NARCAN) injection 0.1-0.4 mg  Dose: 0.1-0.4 mg Freq: EVERY 2 MIN PRN Route: IV  PRN Reason: opioid reversal  Start: 01/20/17 0658   Admin Instructions: For respiratory rate LESS than or EQUAL to 8.  Partial reversal dose:  0.1 mg titrated q 2 minutes for Analgesia Side Effects Monitoring Sedation Level of 3 (frequently drowsy, arousable, drifts to sleep during conversation).Full reversal dose:  0.4 mg bolus for Analgesia Side Effects Monitoring Sedation Level of 4 (somnolent, minimal or no response to stimulation).               ondansetron (ZOFRAN) injection 4 mg  Dose: 4 mg Freq: EVERY 6 HOURS PRN Route: IV  PRN Reasons: nausea,vomiting  Start: 01/20/17 1300      1311 (4 mg)-Given       1849 (4 mg)-Given        0131 (4 mg)-Given       0834 (4 mg)-Given        0209 (4 mg)-Given       0819 (4 mg)-Given        0220 (4 mg)-Given       1847 (4 mg)-Given            ondansetron (ZOFRAN) tablet 4 mg  Dose: 4 mg Freq: EVERY 4 HOURS PRN Route: PO  PRN Reason: nausea  Start: 01/20/17 0658              polyethylene glycol (MIRALAX/GLYCOLAX) Packet 17 g  Dose: 17 g Freq: 2 TIMES DAILY Route: PER J TUBE  Start: 01/20/17 0800   Admin Instructions: 1 Packet = 17 grams. Mixed prescribed dose in 8 ounces of water. Follow with 8 oz. of water.       (1311)-Not Given [C]       (2110)-Not Given [C]        0749 (17 g)-Given       2113 (17 g)-Given        0816 (17 g)-Given       2051 (17 g)-Given        0821 (17 g)-Given       2010 (17 g)-Given        0918 (17 g)-Given       [ ] 2000           polyethylene glycol 0.4%- propylene glycol 0.3% (SYSTANE ULTRA) ophthalmic solution 1-2 drop  Dose: 1-2 drop Freq:  4 TIMES DAILY PRN Route: Both Eyes  PRN Reason: dry eyes  Start: 01/20/17 0658              potassium chloride (KLOR-CON) Packet 20-40 mEq  Dose: 20-40 mEq Freq: EVERY 2 HOURS PRN Route: ORAL OR FEED  PRN Reason: potassium supplementation  Start: 01/22/17 0936   Admin Instructions: Use if unable to tolerate tablets.    If Serum K+ 3.4-4.0, dose = 20 mEq x1. Recheck K+ level the next AM.  If Serum K+ 3.0-3.3, dose = 60 mEq po total dose (40 mEq x 1 followed in 2 hours by 20 mEq X1). Recheck K+ level 4 hours after dose and the next AM.  If Serum K+ 2.5-2.9, dose = 80 mEq po total dose (40 mEq Q2H x2). Recheck K+ level 4 hours after dose and the next AM.  If Serum K+ less than 2.5, See IV order.  Dissolve packet contents in 4-8 ounces of cold water or juice.          0819 (20 mEq)-Given            potassium chloride 10 mEq in 100 mL intermittent infusion  Dose: 10 mEq Freq: EVERY 1 HOUR PRN Route: IV  PRN Reason: potassium supplementation  Start: 01/22/17 0936   Admin Instructions: Infuse via PERIPHERAL LINE or CENTRAL LINE. Use for central line replacement if patient weight less than 65 kg, if patient is on TPN with high potassium content or if unit does not stock 20 mEq bags.  If Serum K+ 3.4-4.0, dose = 10 mEq/hr x2 doses. Recheck K+ level the next AM.  If Serum K+ 3.0-3.3, dose = 10 mEq/hr x4 doses (40 mEq IV total dose). Recheck K+ level 2 hours after dose and the next AM.  If Serum K+ less than 3.0, dose = 10 mEq/hr x6 doses (60 mEq IV total dose). Recheck K+ level 2 hours after dose and the next AM.               potassium chloride 10 mEq in 100 mL intermittent infusion with 10 mg lidocaine  Dose: 10 mEq Freq: EVERY 1 HOUR PRN Route: IV  PRN Reason: potassium supplementation  Start: 01/22/17 0936   Admin Instructions: Infuse via PERIPHERAL LINE. Use potassium with lidocaine for pain with peripheral administration.  If Serum K+ 3.4-4.0, dose = 10 mEq/hr x2 doses. Recheck K+ level the next AM.  If Serum K+  3.0-3.3, dose = 10 mEq/hr x4 doses (40 mEq IV total dose). Recheck K+ level 2 hours after dose and the next AM.  If Serum K+ less than 3.0, dose = 10 mEq/hr x6 doses (60 mEq IV total dose). Recheck K+ level 2 hours after dose and the next AM.               potassium chloride 20 mEq in 50 mL intermittent infusion  Dose: 20 mEq Freq: EVERY 1 HOUR PRN Route: IV  PRN Reason: potassium supplementation  Last Dose: 20 mEq (01/22/17 1247)  Start: 01/22/17 0936   Admin Instructions: Infuse via CENTRAL LINE Only.  May need EKG if less than 65 kg or on TPN - Max rate is 0.3 mEq/kg/hr for patients not on EKG monitoring.    If Serum K+ 3.4-4.0, dose = 20 mEq/hr x1 doses. Recheck K+ level the next AM.  If Serum K+ 3.0-3.3, dose = 20 mEq/hr x2 doses (40 mEq IV total dose).  Recheck K+ level 2 hours after dose and the next AM.  If Serum K+ less than 3.0, dose = 20 mEq/hr x3 doses (60 mEq IV total dose). Recheck K+ level 2 hours after dose and the next AM.         1010 (20 mEq)-New Bag       1247 (20 mEq)-New Bag             potassium chloride SA (K-DUR/KLOR-CON M) CR tablet 20-40 mEq  Dose: 20-40 mEq Freq: EVERY 2 HOURS PRN Route: PO  PRN Reason: potassium supplementation  Start: 01/22/17 0936   Admin Instructions: Use if able to take PO.   If Serum K+ 3.4-4.0, dose = 20 mEq x1. Recheck K+ level the next AM.  If Serum K+ 3.0-3.3, dose = 60 mEq po total dose (40 mEq x1 followed in 2 hours by 20 mEq x1). Recheck K+ level 4 hours after dose and the next AM.  If Serum K+ 2.5-2.9, dose = 80 mEq po total dose (40 mEq Q2H x2). Recheck K+ level 4 hours after dose and the next AM.  If Serum K+ less than 2.5, See IV order.  DO NOT CRUSH.               predniSONE solution 20 mg  Dose: 20 mg Freq: DAILY Route: PO  Start: 01/20/17 0800      0820 (20 mg)-Given        0750 (20 mg)-Given        0816 (20 mg)-Given        0819 (20 mg)-Given        0919 (20 mg)-Given           QUEtiapine (SEROquel) tablet 50 mg  Dose: 50 mg Freq: 2 TIMES DAILY Route:  PO  Start: 01/20/17 2200      (2322)-Not Given [C]        0130 (50 mg)-Given       1504 (50 mg)-Given       2111 (50 mg)-Given        1534 (50 mg)-Given       2136 (50 mg)-Given        1446 (50 mg)-Given       2223 (50 mg)-Given        1453 (50 mg)-Given       [ ] 2200           sennosides (SENOKOT) syrup 10 mL  Dose: 10 mL Freq: 2 TIMES DAILY Route: PER J TUBE  Start: 01/21/17 2000       2121 (10 mL)-Given        0817 (10 mL)-Given       2049 (10 mL)-Given        0820 (10 mL)-Given       2008 (10 mL)-Given        0919 (10 mL)-Given       [ ] 2000          And  docusate (COLACE) 50 MG/5ML liquid 100 mg  Dose: 100 mg Freq: 2 TIMES DAILY Route: PER J TUBE  Start: 01/21/17 2000   Admin Instructions: Should be administered in milk or fruit juice to mask the taste.        2121 (100 mg)-Given        0817 (100 mg)-Given       2050 (100 mg)-Given        0820 (100 mg)-Given       2009 (100 mg)-Given        0920 (100 mg)-Given       [ ] 2000           sertraline (ZOLOFT) 20 MG/ML (HIGH CONC) solution 150 mg  Dose: 150 mg Freq: DAILY Route: PER FEEDING   Start: 01/20/17 0800   Admin Instructions: Must dilute before use; mix with 4 oz of water, ginger ale, OJ, lemonade or lemon/lime soda.       0820 (150 mg)-Given        0749 (150 mg)-Given        0817 (150 mg)-Given        0819 (150 mg)-Given        0920 (150 mg)-Given           sodium chloride (OCEAN) 0.65 % nasal spray 1 spray  Dose: 1 spray Freq: DAILY PRN Route: BOTH NOSTRIL  PRN Reason: congestion  Start: 01/20/17 0659              sulfamethoxazole-trimethoprim (BACTRIM/SEPTRA) suspension 160 mg  Dose: 20 mL Freq: DAILY Route: PER J TUBE  Indications Comment: PCP Prophy  Start: 01/20/17 0800   Admin Instructions: Shake well.       0820 (160 mg)-Given        0750 (160 mg)-Given        0817 (160 mg)-Given        0820 (160 mg)-Given        0920 (160 mg)-Given          Completed Medications  Medications 01/18/17 01/19/17 01/20/17 01/21/17 01/22/17 01/23/17 01/24/17          Dose: 20 mL Freq: ONCE Route: IK  Start: 01/23/17 0615   End: 01/23/17 0608         0608 (20 mL)-Given              Dose: 20 mL Freq: ONCE Route: IK  Start: 01/22/17 1645   End: 01/22/17 1633        1633 (20 mL)-Given               Dose: 3 mL Freq: ONCE Route: IK  Start: 01/22/17 0600   End: 01/22/17 0552        0552 (20 mL)-Given            Discontinued Medications  Medications 01/18/17 01/19/17 01/20/17 01/21/17 01/22/17 01/23/17 01/24/17         Dose: 1 mg Freq: EVERY 2 HOURS PRN Route: PO  PRN Reason: moderate to severe pain  Start: 01/20/17 0658   End: 01/24/17 1350       0430 (1 mg)-Given       1341 (1 mg)-Given       1835 (1 mg)-Given       2044 (1 mg)-Given        0005 (1 mg)-Given       0151 (1 mg)-Given       0523 (1 mg)-Given       1018 (1 mg)-Given       1230 (1 mg)-Given       2334 (1 mg)-Given        0133 (1 mg)-Given       0334 (1 mg)-Given       0701 (1 mg)-Given       1124 (1 mg)-Given       1446 (1 mg)-Given       2009 (1 mg)-Given        0033 (1 mg)-Given       0245 (1 mg)-Given       0539 (1 mg)-Given       0910 (1 mg)-Given       1310 (1 mg)-Given       1350-Med Discontinued         Dose: 1,000 mg Freq: EVERY 12 HOURS Route: IV  Indications of Use: BACTEREMIA  Last Dose: 1,000 mg (01/22/17 2210)  Start: 01/21/17 1100   End: 01/23/17 1216   Admin Instructions: Need ID/AMT ok to continue past 1/23  IF central catheter, doses below 2 g may be given over 1 hour.        1142 (1,000 mg)-New Bag       2339 (1,000 mg)-New Bag        1230 (1,000 mg)-New Bag       2210 (1,000 mg)-New Bag        1009 (1,000 mg)-New Bag       1216-Med Discontinued          Dose: 750 mg Freq: EVERY 12 HOURS Route: IV  Indications of Use: BACTEREMIA  Start: 01/23/17 2300   End: 01/23/17 1549   Admin Instructions: Need ID/AMT ok to continue past 1/23  IF central catheter, doses below 2 g may be given over 1 hour.          1549-Med Discontinued

## 2017-01-20 NOTE — PROGRESS NOTES
[facility]  Patient is currently open to home care services with [facility].  The patient is currently receiving ,PT,OTand ST       services.  [facility]  and team have been notified of patient admission.  [facility] liaison will continue to follow patient during stay.  If appropriate provide orders to resume home care at time of discharge.    Leela Ortega RN  [facility] Liaison

## 2017-01-20 NOTE — PROVIDER NOTIFICATION
Provider contacted as patient arrived on 6b. VSS but minimally responsive to sternal rub and repeated stimulus. Provider at bedside. Continue to monitor.

## 2017-01-20 NOTE — IP AVS SNAPSHOT
"    [address] [facility]: [phone]                                              INTERAGENCY TRANSFER FORM - PHYSICIAN ORDERS   2017                    Hospital Admission Date: 2017  MORRIS PYLE   : 1950  Sex: Female        Attending Provider: Jenni Tellez MD     Allergies:  Levaquin, Toro Podhaler, Suprax, Augmentin, Avelox, Cedax, Penicillins, Vantin    Infection:  VRE-Contact Isolation, MRSA-Contact Isolation   Service:  INTERNAL MED    Ht:  1.63 m (5' 4.17\")   Wt:  55.339 kg (122 lb)   Admission Wt:  52 kg (114 lb 10.2 oz)    BMI:  20.83 kg/m 2   BSA:  1.58 m 2            Patient PCP Information     Provider PCP Type    Norman Brito MD General      ED Clinical Impression     Diagnosis Description Comment Added By Time Added    SIRS (systemic inflammatory response syndrome) (H) [R65.10] SIRS (systemic inflammatory response syndrome) (H) [R65.10]  Eric Amezquita MD 2017  3:57 AM    Urinary tract infection, site not specified [N39.0] Urinary tract infection, site not specified [N39.0]  Eric Amezquita MD 2017  9:22 AM    Pneumonia of left lower lobe due to infectious organism [J18.9] Pneumonia of left lower lobe due to infectious organism [J18.9]  Eric Amezquita MD 2017  9:22 AM      Hospital Problems as of 2017              Priority Class Noted POA    Sepsis (H) Medium  2015 Yes      Non-Hospital Problems as of 2017              Priority Class Noted    Lung infection Medium  2014    COPD exacerbation (H)   2014    Acute-on-chronic respiratory failure (H) Medium  5/3/2014    Shortness of breath   8/3/2014    Hyponatremia Medium  2015    Urinary retention Medium  2015    Altered mental status Medium  2015    NATA (acute kidney injury) (H) Medium  2015    Anxiety Medium  2015    Air hunger Medium  2015    Esophageal dysmotility Medium  2015    Achalasia " Medium  8/11/2015    Delirium Medium  8/11/2015    HTN (hypertension) Medium  8/11/2015    GERD (gastroesophageal reflux disease) Medium  8/11/2015    Aspiration pneumonia (H) Medium  8/11/2015    End stage COPD (H) Medium  8/13/2015    ACP (advance care planning)   8/19/2015    Acute and chronic respiratory failure with hypercapnia (H) Medium  8/27/2015    Hypoxia Medium  12/30/2015    S/P percutaneous endoscopic gastrostomy (PEG) tube placement (H) Medium  1/25/2016    COPD (chronic obstructive pulmonary disease) (H) Medium  4/13/2016    NSTEMI (non-ST elevated myocardial infarction) (H) Medium  5/21/2016    ACS (acute coronary syndrome) (H) Medium  5/21/2016    Stress-induced cardiomyopathy Medium  5/22/2016    HCAP (healthcare-associated pneumonia) Medium  6/6/2016    Atypical chest pain Medium  6/7/2016    Elevated troponin Medium  6/7/2016    Encounter for gastrojejunal (GJ) tube placement Medium  7/8/2016    UTI (urinary tract infection) Medium  9/11/2016    Altered mental state Medium  1/2/2017    Acute on chronic respiratory failure (H) Medium  1/7/2017      Code Status History     Date Active Date Inactive Code Status Order ID Comments User Context    1/23/2017  3:34 PM 1/23/2017  8:10 PM DNR [id number]  Jenni Tellez MD Outpatient    1/21/2017 10:50 AM 1/23/2017  3:34 PM DNR [id number]  Jenni Tellez MD Inpatient    1/20/2017  6:58 AM 1/21/2017 10:50 AM DNR/DNI [id number]  Yadi Loja MD ED    1/7/2017  8:26 PM 1/10/2017  1:42 PM DNR [id number]  Jose David De La Cruz APRN CNP Inpatient    1/6/2017 12:01 PM 1/7/2017  8:26 PM DNR [id number]  Geo Bui MD Outpatient    1/3/2017  5:24 PM 1/6/2017 12:01 PM DNR [id number]  Geo Bui MD Inpatient    1/2/2017 11:28 PM 1/3/2017  5:24 PM Full Code [id number]  Jose David De La Cruz APRN CNP Inpatient    12/28/2016  8:59 AM 1/2/2017 11:28 PM Full Code [id number]  Theresa Wagoner MD Outpatient    12/26/2016  4:49 AM  12/28/2016  8:59 AM Full Code [id number]  Alexi Fernandez MD Inpatient    12/5/2016  2:45 PM 12/26/2016  4:49 AM Full Code [id number]  Francisco Burt MD Outpatient    12/1/2016  6:05 PM 12/5/2016  2:45 PM Full Code [id number]  Viridiana Rockwell PA-CRYSTAL Inpatient    11/27/2016  6:02 PM 12/1/2016  6:05 PM Full Code [id number]  Francisco Burt MD Outpatient    11/13/2016  9:57 PM 11/27/2016  6:02 PM Full Code [id number]  Jose David De La Cruz, APRN CNP Inpatient    10/31/2016  1:27 AM 11/11/2016  2:43 PM Full Code [id number]  Jose David De La Cruz, APRN CNP Inpatient    10/1/2016 12:45 PM 10/4/2016  4:15 PM Full Code [id number]  Jamila Comer MD Inpatient    9/11/2016  2:46 AM 9/16/2016  3:48 PM Full Code [id number]  Theresa Wagoner MD Inpatient    7/23/2016  7:05 AM 9/11/2016  2:46 AM Full Code [id number]  Doc Barrera MD Outpatient    7/16/2016  2:33 AM 7/23/2016  7:05 AM Full Code [id number]  Moe Cárdenas MD Inpatient    7/10/2016 11:38 AM 7/16/2016  2:33 AM Full Code [id number]  Enrique Gama MD Outpatient    7/8/2016  8:04 PM 7/10/2016 11:38 AM Full Code [id number]  Shantal Dee PA Inpatient    6/6/2016  5:19 PM 6/14/2016  2:32 PM Full Code [id number]  Shantal Dee PA Inpatient    5/21/2016  3:41 PM 5/23/2016  3:59 PM Full Code [id number]  Viridiana Butcher MD ED    4/13/2016  6:13 PM 4/21/2016  1:08 PM Full Code [id number]  Arley Guzman MD Inpatient    2/1/2016  8:43 AM 4/13/2016  6:13 PM Full Code [id number]  Emily Hoover MD Outpatient    1/20/2016  8:10 PM 2/1/2016  8:43 AM Full Code [id number]  Emily Hoover MD Inpatient    1/20/2016  7:17 PM 1/20/2016  8:10 PM Full Code [id number]  Emily Hoover MD ED    1/2/2016  8:32 AM 1/20/2016  7:17 PM Full Code [id number]  Elias Mcdermott MD Outpatient    1/1/2016  1:56 PM 1/2/2016  8:32 AM Full Code [id number]  Almaz Reagan MD Outpatient     12/30/2015  9:45 PM 1/1/2016  1:56 PM Full Code [id number]  Martinez Hamm MD Inpatient    9/24/2015 12:37 PM 12/30/2015  9:45 PM Full Code [id number]  Verner, James R, MD Outpatient    8/27/2015  4:30 AM 9/24/2015 12:37 PM Full Code [id number]  Eric Cosme MD Inpatient    8/13/2015  2:51 PM 8/27/2015  4:30 AM Full Code [id number]  Emilia Kurtz MD Inpatient    8/10/2015  7:50 AM 8/13/2015  2:51 PM DNR/DNI [id number]  Marcia Aelman MD Outpatient    8/7/2015 11:13 AM 8/10/2015  7:50 AM DNR/DNI [id number]  Marcia Aleman MD Inpatient    8/4/2015  2:55 PM 8/7/2015 11:13 AM DNR [id number]  Driss Huggins MD Inpatient    7/31/2015  6:35 PM 8/4/2015  2:55 PM DNR/DNI [id number]  Driss Huggins MD Inpatient    7/26/2015  8:21 PM 7/31/2015  6:35 PM Full Code [id number]  Larry Hong MD Inpatient    6/21/2015 11:33 AM 7/26/2015  8:21 PM Full Code [id number]  Mady Atkinson MD Outpatient    6/17/2015  6:34 AM 6/21/2015 11:33 AM Full Code [id number]  Swetha Bundy APRN CNP Inpatient    8/8/2014  7:29 AM 6/17/2015  6:34 AM Full Code [id number]  Ron Kirkland MD Outpatient    8/3/2014  6:08 PM 8/8/2014  7:29 AM Full Code [id number]  Ron Kirkland MD Inpatient    5/4/2014 12:08 PM 8/3/2014  6:08 PM Full Code [id number]  Darlene Del Real MD Outpatient    5/2/2014  9:16 AM 5/4/2014 12:08 PM Full Code [id number]  Darlene Del Real MD Inpatient         Medication Review      START taking        Dose / Directions Comments    lidocaine 5 % Patch   Commonly known as:  LIDODERM   Used for:  Urinary tract infection, site not specified        Dose:  1-2 patch   Place 1-2 patches onto the skin every 24 hours Apply to the lower back   Quantity:  30 patch   Refills:  0          CONTINUE these medications which may have CHANGED, or have new prescriptions. If we are uncertain of the size of tablets/capsules you have at home, strength may be listed as something that might have  changed.        Dose / Directions Comments    famotidine 40 MG/5ML suspension   Commonly known as:  PEPCID   This may have changed:  Another medication with the same name was removed. Continue taking this medication, and follow the directions you see here.   Used for:  Mild gas use disorder        Dose:  20 mg   Take 2.5 mLs (20 mg) by mouth 2 times daily as needed for heartburn   Quantity:  75 mL   Refills:  3        meropenem 500 MG vial   Commonly known as:  MERREM   Indication:  Urinary Tract Infection   This may have changed:  when to take this   Used for:  SIRS (systemic inflammatory response syndrome) (H)        Dose:  500 mg   Inject 500 mg into the vein every 6 hours for 5 days   Quantity:  200 mL   Refills:  0        QUEtiapine 50 MG tablet   Commonly known as:  SEROQUEL   This may have changed:    - when to take this  - additional instructions   Used for:  SIRS (systemic inflammatory response syndrome) (H)        Dose:  50 mg   Take 1 tablet (50 mg) by mouth 2 times daily   Quantity:  120 tablet   Refills:  0          CONTINUE these medications which have NOT CHANGED        Dose / Directions Comments    acetaminophen 160 MG/5ML solution   Commonly known as:  TYLENOL   Used for:  Other chronic pain        Dose:  650 mg   20.3 mLs (650 mg) by Per Feeding Tube route every 4 hours as needed for mild pain   Quantity:  300 mL   Refills:  0        budesonide 0.5 MG/2ML neb solution   Commonly known as:  PULMICORT   Used for:  Shortness of breath, Respiratory difficulty        Dose:  0.5 mg   Take 2 mLs (0.5 mg) by nebulization 2 times daily   Quantity:  60 ampule   Refills:  0        calcium carbonate 500 MG chewable tablet   Commonly known as:  TUMS        Dose:  1 chew tab   Take 1 tablet (500 mg) by mouth every 2 hours as needed for heartburn   Quantity:  150 tablet   Refills:  0        clonazePAM 0.1 mg/mL   Commonly known as:  klonoPIN   Used for:  Shortness of breath        Dose:  1.5 mg   Take 15 mLs (1.5  "mg) by mouth 4 times daily Needs appt for refills   Quantity:  420 mL   Refills:  0        Cranberry 500 MG Caps        Dose:  500 mg   Take 1 capsule (500 mg) by mouth daily   Quantity:  90 capsule   Refills:  0        fexofenadine 180 MG tablet   Commonly known as:  ALLEGRA   Used for:  COPD exacerbation (H)        Dose:  180 mg   Take 1 tablet (180 mg) by mouth daily   Quantity:  30 tablet   Refills:  0        fiber modular packet   Used for:  Diarrhea, unspecified type        Dose:  1 packet   1 packet by Per J Tube route 3 times daily   Quantity:  42 packet   Refills:  0        gabapentin 250 MG/5ML solution   Commonly known as:  NEURONTIN   Used for:  Anxiety        Dose:  300 mg   6 mLs (300 mg) by Per J Tube route 3 times daily   Quantity:  450 mL   Refills:  0        guaiFENesin 200 MG Tabs tablet   Commonly known as:  ORGANIDIN   Used for:  Shortness of breath        Dose:  200 mg   Take 1 tablet (200 mg) by mouth 4 times daily   Quantity:  115 tablet   Refills:  0        HYDROmorphone 1 MG/ML Liqd liquid   Commonly known as:  DILAUDID   Used for:  Atypical chest pain        Dose:  1 mg   Take 1 mL (1 mg) by mouth every 2 hours as needed for moderate to severe pain   Quantity:  30 mL   Refills:  0        ipratropium 0.02 % neb solution   Commonly known as:  ATROVENT   Used for:  Shortness of breath        Dose:  0.5 mg   Take 2.5 mLs (0.5 mg) by nebulization every 4 hours   Quantity:  450 mL   Refills:  0        JEVITY 1.5 IZZY Liqd   Used for:  Achalasia        Dose:  5 Can   Take 5 Cans by mouth daily Per tube feeding   Quantity:  150 Can   Refills:  11    - Height 5'2\"    - Weight 120 lbs    - Length of need 99 months       lactobacillus rhamnosus (GG) capsule   Used for:  Diarrhea, unspecified type        Dose:  1 capsule   Take 1 capsule by mouth 2 times daily   Quantity:  60 capsule   Refills:  0        levalbuterol 1.25 MG/3ML neb solution   Commonly known as:  XOPENEX   Used for:  Chronic " obstructive pulmonary disease with acute exacerbation (H)        Dose:  1 ampule   Take 3 mLs (1.25 mg) by nebulization every 4 hours   Quantity:  540 mL   Refills:  0        lidocaine 15 mL, alum & mag hydroxide-simethicone 15 mL GI Cocktail   Used for:  Gastroesophageal reflux disease without esophagitis        Dose:  30 mL   Take 30 mLs by mouth 3 times daily as needed for moderate pain   Quantity:  500 mL   Refills:  0        loperamide 2 MG tablet   Commonly known as:  IMODIUM A-D   Used for:  Frequent stools        2-2 mg tablets per J-tube qid prn frequent and/or loose stools. Do not use more than 8 tabs per day.   Quantity:  30 tablet   Refills:  0        LORazepam 2 MG/ML (HIGH CONC) solution   Commonly known as:  ATIVAN   Used for:  COPD exacerbation (H), Anxiety        Dose:  0.5 mg   Take 0.25 mLs (0.5 mg) by mouth every 3 hours as needed for anxiety   Quantity:  15 mL   Refills:  0        melatonin 1 MG/ML Liqd liquid   Used for:  Anxiety, Insomnia due to medical condition        Dose:  3 mg   3 mLs (3 mg) by Per J Tube route At Bedtime   Quantity:  300 mL   Refills:  0        ondansetron 4 MG tablet   Commonly known as:  ZOFRAN        Dose:  4 mg   Take 1 tablet (4 mg) by mouth every 4 hours as needed for nausea   Quantity:  18 tablet   Refills:  0        polyethylene glycol 0.4%- propylene glycol 0.3% 0.4-0.3 % Soln ophthalmic solution   Commonly known as:  SYSTANE ULTRA   Used for:  Dry eyes, bilateral        Dose:  1-2 drop   Place 1-2 drops into both eyes 4 times daily as needed for dry eyes 0900, 1300, 1700, 2100   Quantity:  1 Bottle   Refills:  3        polyethylene glycol Packet   Commonly known as:  MIRALAX/GLYCOLAX   Used for:  Constipation, unspecified constipation type        Dose:  17 g   17 g by Per J Tube route 2 times daily Hold for loose stools   Quantity:  100 packet   Refills:  0        predniSONE 5 MG/5ML solution   Used for:  Shortness of breath        Dose:  20 mg   Take 20 mLs  (20 mg) by mouth daily   Quantity:  500 mL   Refills:  0    Continue prednisone taper until reaches 20 mg daily, then continue 20 mg daily.       senna-docusate 8.6-50 MG per tablet   Commonly known as:  SENOKOT-S;PERICOLACE   Used for:  Constipation, unspecified constipation type        Dose:  2 tablet   2 tablets by Per J Tube route 2 times daily   Quantity:  100 tablet   Refills:  0        sertraline 20 MG/ML (HIGH CONC) solution   Commonly known as:  ZOLOFT   Used for:  Anxiety        Dose:  150 mg   7.5 mLs (150 mg) by Per Feeding Tube route daily   Quantity:  105 mL   Refills:  0        sodium chloride 0.65 % nasal spray   Commonly known as:  OCEAN   Used for:  Chronic sinusitis, unspecified location        Dose:  1 spray   Spray 1 spray into both nostrils daily as needed for congestion   Quantity:  1 Bottle   Refills:  0        sulfamethoxazole-trimethoprim suspension   Commonly known as:  BACTRIM/SEPTRA   Used for:  Chronic obstructive pulmonary disease with acute exacerbation (H)        Dose:  20 mL   20 mLs (160 mg) by Per J Tube route daily Dose based on TMP component. 20 mLs = 160 mg   Quantity:  560 mL   Refills:  0          STOP taking     MILK OF MAGNESIA 400 MG/5ML suspension   Generic drug:  magnesium hydroxide                   Summary of Visit     Reason for your hospital stay       SIRS             After Care     Activity       Your activity upon discharge: activity as tolerated       Diet       Follow this diet upon discharge: Orders Placed This Encounter  Adult Formula Drip Feeding: Specified Time Isosource 1.5; Jejunostomy; Goal Rate: 85; mL/hr; From: 8:00 PM; 8:00 AM; Medication - Tube Feeding Flush Frequency: At least 15-30 mL water before and after medication administration and with tube clogging;  NPO Time Specified             Procedures     Ventilator       Per usual home settings             Referrals     Home Care OT Referral for Hospital Discharge       Resumption of Home Care  Services:  _______________________  [facility]  Phone  [phone]  Fax  [phone]  ______________________  OT to eval and treat    Your provider has ordered home care - occupational therapy. If you have not been contacted within 2 days of your discharge please call the department phone number listed on the top of this document.       Home Care PT Referral for Hospital Discharge       Resumption of Home Care Services:  _______________________  [facility]  Phone  [phone]  Fax  [phone]  ______________________      PT to eval and treat    Your provider has ordered home care - physical therapy. If you have not been contacted within 2 days of your discharge please call the department phone number listed on the top of this document.       Home Care SLP Referral for Hospital Discharge       Resumption of Home Care Services:  _______________________  [facility]  Phone  [phone]  Fax  [phone]  ______________________  SLP to eval and treat    Your provider has ordered home care - speech therapy. If you have not been contacted within 2 days of your discharge please call the department phone number listed on the top of this document.       Home infusion referral       [facility]  Phone # [phone]  Fax # [phone]     RESUMPTION OF SERVICE    Your provider has referred you to: [facility]: [facility] [place] [phone]   [email or web address]    Anticipated Length of Therapy: Additional service until 1/28/17    Home Infusion Pharmacist to adjust therapy based on labs and clinical assessments: Yes    Labs:  May draw labs from Venous Catheter: Yes  Home Infusion Pharmacist to order labs based on therapy type and clinical assessments: Yes  Call/Fax Lab Results to: ordering provider    Agency Staff to assess nursing needs for Infusion Therapy.    Access Device Management:  IV Access Type: PICC  Flush with Heparin and  Normal Saline IVP PRN and routine site care (per agency protocol) to maintain access device? Yes             Your next 10 appointments already scheduled     Jan 26, 2017  9:45 AM   Return Visit with Norman Brito MD   [facility] ([facility])    [address]  [address]  [facility] [address]   [phone]              Follow-Up Appointment Instructions     Future Labs/Procedures    Adult [facility] Follow-up and recommended labs and tests     Comments:    Follow up with PCP in 1 week    CBC and BMP in 1 week  Appointments on [place] and/or [facility] (with [facility] or [facility] provider or service). Call [phone] if you haven't heard regarding these appointments within 7 days of discharge.      Follow-Up Appointment Instructions     Adult [facility] Follow-up and recommended labs and tests       Follow up with PCP in 1 week    CBC and BMP in 1 week  Appointments on [place] and/or [facility] (with [facility] or [facility] provider or service). Call [phone] if you haven't heard regarding these appointments within 7 days of discharge.             Statement of Approval     Ordered          01/24/17 3229  I have reviewed and agree with all the recommendations and orders detailed in this document.   EFFECTIVE NOW     Approved and electronically signed by:  Steven Sousa MD           01/23/17 4075  I have reviewed and agree with all the recommendations and orders detailed in this document.   EFFECTIVE NOW     Approved and electronically signed by:  Jenni Tellez MD

## 2017-01-20 NOTE — PLAN OF CARE
Problem: Goal Outcome Summary  Goal: Goal Outcome Summary  Outcome: No Change  /72 mmHg  Pulse 134  Temp(Src) 98.1  F (36.7  C) (Oral)  Resp 18  Wt 52 kg (114 lb 10.2 oz)  SpO2 98%    Lethargic but arouses to voice. Afebrile. Chronic vent, bivona 6. G-tube to gravity. J-clamped. Minimal back pain. Admitted to  via ED at approximately 1100 at which point patient was minimally responsive to repeated stimulation and sternal rub. Resolved with arrival of family. Avendaño in place with adequate urine output. Last BM 1/19/17. IVF @100/hour. Daughter present at bedside, supportive. Generalized bruising, fragile skin. Continue current plan of care.     Patient belongings include watch. Wedding ring at group home. Daughter to bring cell phone this evening. Family taking own linens home.

## 2017-01-20 NOTE — IP AVS SNAPSHOT
` `     [address] [facility]: [phone]                 INTERAGENCY TRANSFER FORM - NOTES (H&P, Discharge Summary, Consults, Procedures, Therapies)   2017                    Hospital Admission Date: 2017  MORRIS PYLE   : 1950  Sex: Female        Patient PCP Information     Provider PCP Type    Norman Brito MD General         History & Physicals      H&P by Yadi Loja MD at 2017  6:07 AM     Author:  Yadi Loja MD Service:  General Medicine Author Type:  Physician    Filed:  2017  6:51 AM Note Time:  2017  6:07 AM Status:  Signed    :  Yadi Loja MD (Physician)               INTERNAL MEDICINE HISTORY & PHYSICAL   Morris Pyle ([id number]) admitted on 2017  Primary care provider: Norman Brito    Chief Complaint:  Tachycardia    History of Present Illness:   Morris Pyle is a 66 year old female with history of severe COPD s/p trach on home vent , anxiety, hypertension, achalasia s/p GJ tube who presented to the hospital with tachycardia. She has been hospitalized multiple times in the recent months and was recently discharged on 1/10/17 for respiratory failure and pseudomonas UTI.    History was primarily obtained from her caretaker as she is non verbal (due to trach).    The caretaker reports that Morris was doing well after discharge and was at baseline. She was doing well before the shift change, but right after midnight, the caretaker noted tachycardia, tachypnea, cyanosis around her lips. She was not responding to her questions and seemed very sleepy and unarousable. She tried to suction her through the trach, but despite that she did not respond and her HR was still elevated, which prompted to call EMS to bring her into the hospital.    In the ED, she was noted to be tachycardic to 130s. Respiratory cares were continued and IV fluid bolus was given, after which she was more awake and back to her baseline. However, due to her  worsening leukocytosis and tachycardia, the main concern was sepsis and is therefore being admitted for further evaluation.      Past Medical History:   Patient Active Problem List   Diagnosis     Lung infection     COPD exacerbation (H)     Acute-on-chronic respiratory failure (H)     Shortness of breath     Hyponatremia     Urinary retention     Altered mental status     NATA (acute kidney injury) (H)     Anxiety     Air hunger     Esophageal dysmotility     Achalasia     Delirium     HTN (hypertension)     GERD (gastroesophageal reflux disease)     Sepsis (H)     Aspiration pneumonia (H)     End stage COPD (H)     ACP (advance care planning)     Acute and chronic respiratory failure with hypercapnia (H)     Hypoxia     S/P percutaneous endoscopic gastrostomy (PEG) tube placement (H)     COPD (chronic obstructive pulmonary disease) (H)     NSTEMI (non-ST elevated myocardial infarction) (H)     ACS (acute coronary syndrome) (H)     Stress-induced cardiomyopathy     HCAP (healthcare-associated pneumonia)     Atypical chest pain     Elevated troponin     Encounter for gastrojejunal (GJ) tube placement     UTI (urinary tract infection)     Altered mental state     Acute on chronic respiratory failure (H)       Past Surgical History:   Past Surgical History   Procedure Laterality Date     Tracheostomy N/A 8/26/2015     Procedure: TRACHEOSTOMY;  Surgeon: Milena Thibodeaux MD;  Location: [facility] OR     Bronchoscopy, insert bronchial valve Right 9/2/2015     Procedure: BRONCHOSCOPY, INSERT BRONCHIAL VALVE;  Surgeon: Everardo Beach MD;  Location: [facility] OR     Esophagoscopy, gastroscopy, duodenoscopy (egd), combined N/A 12/11/2015     Procedure: COMBINED ESOPHAGOSCOPY, GASTROSCOPY, DUODENOSCOPY (EGD);  Surgeon: Dhruv Lyles MD;  Location: [facility] OR     Esophagoscopy, gastroscopy, duodenoscopy (egd), combined N/A 12/31/2015     Procedure: COMBINED ESOPHAGOSCOPY, GASTROSCOPY, DUODENOSCOPY (EGD);  Surgeon: Erinn  Brenden Silva MD;  Location: [facility] OR     Percutaneous insertion tube jejunostomy N/A 12/31/2015     Procedure: PERCUTANEOUS INSERTION TUBE JEJUNOSTOMY;  Surgeon: Brenden Plasencia MD;  Location: [facility]     Percutaneous insertion tube jejunostomy N/A 1/25/2016     Procedure: PERCUTANEOUS INSERTION TUBE JEJUNOSTOMY;  Surgeon: Carrie Coleman MD;  Location: [facility]     Anesthesia out of or mri N/A 4/18/2016     Procedure: ANESTHESIA OUT OF OR MRI;  Surgeon: GENERIC ANESTHESIA PROVIDER;  Location: [facility]     Endoscopic insertion tube gastrostomy N/A 7/9/2016     Procedure: ENDOSCOPIC INSERTION TUBE GASTROSTOMY;  Surgeon: Brenden Plasencia MD;  Location: [facility]     Picc insertion Right 1/9/2017     5fr DL BioFlo PICC, 38cm (1cm external) in the R basilic vein.       Medications (outpatient):    Current Facility-Administered Medications on File Prior to Encounter:  midazolam (VERSED) injection     Current Outpatient Prescriptions on File Prior to Encounter:  famotidine (PEPCID) 20 MG tablet Take 1 tablet (20 mg) by mouth 2 times daily   clonazePAM (KLONOPIN) 0.1 mg/mL Take 15 mLs (1.5 mg) by mouth 4 times daily Needs appt for refills   famotidine (PEPCID) 40 MG/5ML suspension Take 2.5 mLs (20 mg) by mouth 2 times daily as needed for heartburn   loperamide (IMODIUM A-D) 2 MG tablet 2-2 mg tablets per J-tube qid prn frequent and/or loose stools. Do not use more than 8 tabs per day.   polyethylene glycol 0.4%- propylene glycol 0.3% (SYSTANE ULTRA) 0.4-0.3 % SOLN ophthalmic solution Place 1-2 drops into both eyes 4 times daily as needed for dry eyes 0900, 1300, 1700, 2100   lactobacillus rhamnosus, GG, (CULTURELL) capsule Take 1 capsule by mouth 2 times daily   LORazepam (ATIVAN) 2 MG/ML (HIGH CONC) solution Take 0.25 mLs (0.5 mg) by mouth every 3 hours as needed for anxiety   sertraline (ZOLOFT) 20 MG/ML (HIGH CONC) solution 7.5 mLs (150 mg) by Per Feeding Tube route daily   fexofenadine (ALLEGRA) 180 MG tablet Take  1 tablet (180 mg) by mouth daily   fiber modular (NUTRISOURCE FIBER) packet 1 packet by Per J Tube route 3 times daily   Nutritional Supplements (JEVITY 1.5 IZZY) LIQD Take 5 Cans by mouth daily Per tube feeding   melatonin (MELATONIN) 1 MG/ML LIQD liquid 3 mLs (3 mg) by Per J Tube route At Bedtime   acetaminophen (TYLENOL) 160 MG/5ML oral liquid 20.3 mLs (650 mg) by Per Feeding Tube route every 4 hours as needed for mild pain   budesonide (PULMICORT) 0.5 MG/2ML nebulizer solution Take 2 mLs (0.5 mg) by nebulization 2 times daily   ipratropium (ATROVENT) 0.02 % nebulizer solution Take 2.5 mLs (0.5 mg) by nebulization every 4 hours   levalbuterol (XOPENEX) 1.25 MG/3ML nebulizer solution Take 3 mLs (1.25 mg) by nebulization every 4 hours   guaiFENesin (ORGANIDIN) 200 MG TABS Take 1 tablet (200 mg) by mouth 4 times daily   sodium chloride (OCEAN) 0.65 % nasal spray Spray 1 spray into both nostrils daily as needed for congestion   sulfamethoxazole-trimethoprim (BACTRIM,SEPTRA) 200-40 mg/5ml suspension 20 mLs (160 mg) by Per J Tube route daily Dose based on TMP component. 20 mLs = 160 mg   lidocaine 15 mL, alum & mag hydroxide-simethicone 15 mL GI Cocktail Take 30 mLs by mouth 3 times daily as needed for moderate pain   gabapentin (NEURONTIN) 250 MG/5ML solution 6 mLs (300 mg) by Per J Tube route 3 times daily   polyethylene glycol (MIRALAX/GLYCOLAX) Packet 17 g by Per J Tube route 2 times daily Hold for loose stools   senna-docusate (SENOKOT-S;PERICOLACE) 8.6-50 MG per tablet 2 tablets by Per J Tube route 2 times daily   QUEtiapine (SEROQUEL) 50 MG tablet Take 1 tablet (50 mg) by mouth 3 times daily Take 75mg at bedtime. Can take an additional 25mg 3 times a day and HS PRN   HYDROmorphone (DILAUDID) 1 MG/ML LIQD liquid Take 1 mL (1 mg) by mouth every 2 hours as needed for moderate to severe pain   calcium carbonate (TUMS) 500 MG chewable tablet Take 1 tablet (500 mg) by mouth every 2 hours as needed for heartburn    ondansetron (ZOFRAN) 4 MG tablet Take 1 tablet (4 mg) by mouth every 4 hours as needed for nausea   magnesium hydroxide (MILK OF MAGNESIA) 400 MG/5ML suspension Take via G-tube prn   meropenem (MERREM) 500 MG vial Inject 500 mg into the vein every 8 hours for 10 days   predniSONE 5 MG/5ML solution Take 20 mLs (20 mg) by mouth daily   Cranberry 500 MG CAPS Take 1 capsule (500 mg) by mouth daily       Allergy:  Allergies   Allergen Reactions     Levaquin Other (See Comments)     Delirium     Toro Podhaler [Tobramycin] Other (See Comments)     Severe congestion, SOB      Suprax [Cefixime]      See ceftibuten     Augmentin Nausea     Has tolerated for treatment of UTIs in 2016.     Avelox [Moxifloxacin] Anxiety     Cedax [Ceftibuten] Other (See Comments)     Pain in eyes     Penicillins Itching     Tolerated amoxicillin without issues.     Vantin [Cefpodoxime] Nausea       Social History:   Social History     Social History     Marital Status:      Spouse Name: N/A     Number of Children: N/A     Years of Education: N/A     Occupational History     Not on file.     Social History Main Topics     Smoking status: Former Smoker -- 2.00 packs/day for 40 years     Types: Cigarettes     Start date: 01/01/1964     Quit date: 04/18/1998     Smokeless tobacco: Never Used     Alcohol Use: No     Drug Use: No     Sexual Activity: Not on file     Other Topics Concern     Not on file     Social History Narrative       Family History:  Family History   Problem Relation Age of Onset     CANCER Sister        ROS:   10 point ROS obtained with yes no questions due to patients non verbal status.  She complains of diffuse chest pain, abdominal pain and back pain, and confirms that none of these pain are new in any way.    Objective:  Physical exam:  /66 mmHg  Pulse 134  Temp(Src) 99.6  F (37.6  C) (Oral)  Resp 14  Wt 52 kg (114 lb 10.2 oz)  SpO2 97%  Wt Readings from Last 2 Encounters:   01/20/17 52 kg (114 lb 10.2 oz)    01/10/17 60.51 kg (133 lb 6.4 oz)     No intake or output data in the 24 hours ending 01/20/17 0608    Physical Exam:   General: Chronically ill appearing woman, awake, on ventilator through trach  HEENT: No Scleral icterus. NCAT, MMM. PERRL, EOMI.   Cardiac: Tachycardic, regular rhythm. No m/r/g  Pulm: coarse breath sounds bilaterally, ventilator sounds  Abd: Soft, mildly distended, non tender, no guarding or rigidity, but does nod yes to pain when asked during palpation  Skin: No jaundice, No rash. Multiple small tattoos on the lower extremities  Extremities: No LE edema  Joints: No inflammation noted on joints  Neuro: Awake and alert, no focal neurological deficits but minimal movement of lower extremities likely secondary to deconditioning  Psych: flat affect, anxious appearing    Labs (Past three days):  CBC  Recent Labs  Lab 01/20/17 0300 01/19/17  0900 01/13/17  1300   WBC 26.1* 19.5* 23.5*   RBC 3.64* 3.73* 3.83   HGB 10.9* 11.4* 11.6*   HCT 36.0 36.5 36.9   MCV 99 98 96   MCH 29.9 30.6 30.3   MCHC 30.3* 31.2* 31.4*   RDW 15.8* 16.0* 15.5*    268 344       BMP    Recent Labs  Lab 01/20/17 0300 01/19/17  0900 01/13/17  1300     --  137   POTASSIUM 4.3  --  4.5   CHLORIDE 89*  --  95   CO2 >45Critical Value called to and read back byJOHN MARINO ON 1/20/17  BY 3922.*  --  39*   ANIONGAP Not Calculated  --  3   GLC 93  --  132*   BUN 29 32* 26   CR 0.58 0.58 0.61   GFRESTIMATED >90Non  GFR Calc >90Non  GFR Calc >90Non  GFR Calc   GFRESTBLACK >90African American GFR Calc >90African American GFR Calc >90African American GFR Calc   IZZY 8.5  --  8.7        INRNo lab results found in last 7 days.    Liver panel  Recent Labs  Lab 01/20/17 0300 01/19/17  0900 01/13/17  1300   PROTTOTAL 6.5*  --   --    ALBUMIN 2.7*  --   --    BILITOTAL 0.4  --   --    ALKPHOS 113  --   --    AST 22 19 24   ALT 26  --   --        Urinalysis  Recent Labs   Lab Test   01/07/17   1502   COLOR  Light Yellow   APPEARANCE  Slightly Cloudy   URINEGLC  Negative   URINEBILI  Negative   URINEKETONE  Negative   SG  1.010   UBLD  Small*   URINEPH  8.0*   PROTEIN  10*   NITRITE  Negative   LEUKEST  Moderate*   RBCU  44*   WBCU  51*       Cultures  Blood: Obtained X 2 pending    Urine: Old urine cultures reviewed, one from 1/7/17 shows two strains of pseudomonas with multidrug resistance, including pip/tazo. It is susceptible to Meropenem.    Imaging/procedure results:  # CXR:  IMPRESSION:  1. New small left pleural effusion and basilar opacity, likely  infection versus atelectasis.  2. Stable emphysema.      ASSESSMENT & PLAN :  66 year old female with history of severe COPD s/p trach on home vent 24/7, anxiety, hypertension, achalasia s/p GJ tube who presented to the hospital with tachycardia.    #) Possible Sepsis  Patient with sinus tach and worsening leukocytosis, altered mental status, and possible evidence of either pulmonary infection or urinary tract infection. During her last admission, she was treated with Zosyn from 1/8-1/18, however, cultures showed that Pseudomonas from her urine was resistant to Zosyn. She has been on Meropenem for the past two days in her nursing facility. The caretaker reports that her acute episode of altered mental status had improved when I interviewed the patient, and was back at baseline, so I wonder if sepsis is really driving her current condition. However, objectively, her leukocytosis is worsening, and therefore will pursue ID workup  - repeat cultures  - continue meropenem for now  - Tachycardia improved after fluid hydration  - procal added, pending  - has indwelling bender, will need changed after completion of abx, has hx of urinary retention and the plan is to continue to keep the bender    #) Chronic Respiratory Failure  #) Chronic respiratory acidosis , metabolic alkalosis  Stable O2 needs and ventilator needs. Hypoxia prior to arrival seems to  have resolved after suctioning by the nurse at her home.  - RT consult for chronic vent management  - Scheduled ipatropium, levalbuterol while awake  - Continue BID pulmicort    #) Chronic Anxiety  Currently on multiple agents. Attempted to wean during prior admission due to encephalopathy but ultimately unable to tolerate reduction in anxiolytics.  - Continue home clonazepam  - Continue home ativan  - Continue home hydromorphone for air hunger  - Continue home seroquel    FEN: Isosource 1.5 @ 85 mls/hr x 12h from 0909-3761.  Free water 125 ccs q4h.  Prophylaxis:  DVT: Mechanical  Disposition: pending further evaluation by primary team  Code Status: DNR/DNI    Yadi BRIONES MD  Internal Medicine  [facility]                Discharge Summaries      Discharge Summaries by Jenni Tellez MD at 1/23/2017  3:34 PM     Author:  Jenni Tellez MD Service:  Hospitalist Author Type:  Physician    Filed:  1/23/2017  3:48 PM Note Time:  1/23/2017  3:34 PM Status:  Signed    :  Jenni Tellez MD (Physician)           [facility] Discharge Summary    Mary Wang MRN# [id number]   Age: 66 year old YOB: 1950     Date of Admission:  1/20/2017  Date of Discharge::  1/24/2017  Admitting Physician:  Yadi Briones MD  Discharge Physician:         Dr Costa Mckinney MD   Primary Physician: Norman Brito  Transferring Facility: [facility]            Discharge Diagnosis:   Principle diagnosis:   SIRS due to Pneumonia   Secondary diagnoses:  Chronic respiratory failure   Chronic respiratory acidosis/ metabolic alkalosis   Chronic anxiety           Procedures:   No procedures performed during this admission         Allergies:      Allergies   Allergen Reactions     Levaquin Other (See Comments)     Delirium     Toro Podhaler [Tobramycin] Other (See Comments)     Severe congestion, SOB      Suprax [Cefixime]      See  ceftibuten     Augmentin Nausea     Has tolerated for treatment of UTIs in 2016.     Avelox [Moxifloxacin] Anxiety     Cedax [Ceftibuten] Other (See Comments)     Pain in eyes     Penicillins Itching     Tolerated amoxicillin without issues.     Vantin [Cefpodoxime] Nausea               Discharge Medications:     Current Discharge Medication List      START taking these medications    Details   lidocaine (LIDODERM) 5 % Patch Place 1-2 patches onto the skin every 24 hours Apply to the lower back  Qty: 30 patch, Refills: 0    Associated Diagnoses: Urinary tract infection, site not specified         CONTINUE these medications which have CHANGED    Details   HYDROmorphone (DILAUDID) 1 MG/ML LIQD liquid Take 1 mL (1 mg) by mouth every 2 hours as needed for moderate to severe pain  Qty: 30 mL, Refills: 0    Associated Diagnoses: Atypical chest pain      LORazepam (ATIVAN) 2 MG/ML (HIGH CONC) solution Take 0.25 mLs (0.5 mg) by mouth every 3 hours as needed for anxiety  Qty: 15 mL, Refills: 0    Associated Diagnoses: COPD exacerbation (H); Anxiety      clonazePAM (KLONOPIN) 0.1 mg/mL Take 15 mLs (1.5 mg) by mouth 4 times daily Needs appt for refills  Qty: 420 mL, Refills: 0    Associated Diagnoses: Shortness of breath      meropenem (MERREM) 500 MG vial Inject 500 mg into the vein every 6 hours for 5 days  Qty: 200 mL, Refills: 0    Associated Diagnoses: SIRS (systemic inflammatory response syndrome) (H)      QUEtiapine (SEROQUEL) 50 MG tablet Take 1 tablet (50 mg) by mouth 2 times daily  Qty: 120 tablet    Associated Diagnoses: SIRS (systemic inflammatory response syndrome) (H)         CONTINUE these medications which have NOT CHANGED    Details   famotidine (PEPCID) 40 MG/5ML suspension Take 2.5 mLs (20 mg) by mouth 2 times daily as needed for heartburn  Qty: 75 mL, Refills: 3    Associated Diagnoses: Mild gas use disorder      loperamide (IMODIUM A-D) 2 MG tablet 2-2 mg tablets per J-tube qid prn frequent and/or loose  "stools. Do not use more than 8 tabs per day.  Qty: 30 tablet, Refills: 0    Associated Diagnoses: Frequent stools      polyethylene glycol 0.4%- propylene glycol 0.3% (SYSTANE ULTRA) 0.4-0.3 % SOLN ophthalmic solution Place 1-2 drops into both eyes 4 times daily as needed for dry eyes 0900, 1300, 1700, 2100  Qty: 1 Bottle, Refills: 3    Associated Diagnoses: Dry eyes, bilateral      lactobacillus rhamnosus, GG, (CULTURELL) capsule Take 1 capsule by mouth 2 times daily  Qty: 60 capsule, Refills: 0    Associated Diagnoses: Diarrhea, unspecified type      sertraline (ZOLOFT) 20 MG/ML (HIGH CONC) solution 7.5 mLs (150 mg) by Per Feeding Tube route daily  Qty: 105 mL, Refills: 0    Associated Diagnoses: Anxiety      fexofenadine (ALLEGRA) 180 MG tablet Take 1 tablet (180 mg) by mouth daily  Qty: 30 tablet, Refills: 0    Associated Diagnoses: COPD exacerbation (H)      fiber modular (NUTRISOURCE FIBER) packet 1 packet by Per J Tube route 3 times daily  Qty: 42 packet, Refills: 0    Associated Diagnoses: Diarrhea, unspecified type      Nutritional Supplements (JEVITY 1.5 IZZY) LIQD Take 5 Cans by mouth daily Per tube feeding  Qty: 150 Can, Refills: 11    Comments: Height 5'2\"  Weight 120 lbs  Length of need 99 months  Associated Diagnoses: Achalasia      melatonin (MELATONIN) 1 MG/ML LIQD liquid 3 mLs (3 mg) by Per J Tube route At Bedtime  Qty: 300 mL, Refills: 0    Associated Diagnoses: Anxiety; Insomnia due to medical condition      acetaminophen (TYLENOL) 160 MG/5ML oral liquid 20.3 mLs (650 mg) by Per Feeding Tube route every 4 hours as needed for mild pain  Qty: 300 mL, Refills: 0    Associated Diagnoses: Other chronic pain      budesonide (PULMICORT) 0.5 MG/2ML nebulizer solution Take 2 mLs (0.5 mg) by nebulization 2 times daily  Qty: 60 ampule, Refills: 0    Associated Diagnoses: Shortness of breath; Respiratory difficulty      ipratropium (ATROVENT) 0.02 % nebulizer solution Take 2.5 mLs (0.5 mg) by nebulization " every 4 hours  Qty: 450 mL, Refills: 0    Associated Diagnoses: Shortness of breath      levalbuterol (XOPENEX) 1.25 MG/3ML nebulizer solution Take 3 mLs (1.25 mg) by nebulization every 4 hours  Qty: 540 mL, Refills: 0    Associated Diagnoses: Chronic obstructive pulmonary disease with acute exacerbation (H)      guaiFENesin (ORGANIDIN) 200 MG TABS Take 1 tablet (200 mg) by mouth 4 times daily  Qty: 115 tablet, Refills: 0    Associated Diagnoses: Shortness of breath      sodium chloride (OCEAN) 0.65 % nasal spray Spray 1 spray into both nostrils daily as needed for congestion  Qty: 1 Bottle, Refills: 0    Associated Diagnoses: Chronic sinusitis, unspecified location      sulfamethoxazole-trimethoprim (BACTRIM,SEPTRA) 200-40 mg/5ml suspension 20 mLs (160 mg) by Per J Tube route daily Dose based on TMP component. 20 mLs = 160 mg  Qty: 560 mL, Refills: 0    Associated Diagnoses: Chronic obstructive pulmonary disease with acute exacerbation (H)      lidocaine 15 mL, alum & mag hydroxide-simethicone 15 mL GI Cocktail Take 30 mLs by mouth 3 times daily as needed for moderate pain  Qty: 500 mL, Refills: 0    Associated Diagnoses: Gastroesophageal reflux disease without esophagitis      gabapentin (NEURONTIN) 250 MG/5ML solution 6 mLs (300 mg) by Per J Tube route 3 times daily  Qty: 450 mL, Refills: 0    Associated Diagnoses: Anxiety      polyethylene glycol (MIRALAX/GLYCOLAX) Packet 17 g by Per J Tube route 2 times daily Hold for loose stools  Qty: 100 packet, Refills: 0    Associated Diagnoses: Constipation, unspecified constipation type      senna-docusate (SENOKOT-S;PERICOLACE) 8.6-50 MG per tablet 2 tablets by Per J Tube route 2 times daily  Qty: 100 tablet, Refills: 0    Associated Diagnoses: Constipation, unspecified constipation type      calcium carbonate (TUMS) 500 MG chewable tablet Take 1 tablet (500 mg) by mouth every 2 hours as needed for heartburn  Qty: 150 tablet      ondansetron (ZOFRAN) 4 MG tablet Take 1  tablet (4 mg) by mouth every 4 hours as needed for nausea  Qty: 18 tablet      predniSONE 5 MG/5ML solution Take 20 mLs (20 mg) by mouth daily  Qty: 500 mL, Refills: 0    Comments: Continue prednisone taper until reaches 20 mg daily, then continue 20 mg daily.  Associated Diagnoses: Shortness of breath      Cranberry 500 MG CAPS Take 1 capsule (500 mg) by mouth daily  Qty: 90 capsule         STOP taking these medications       famotidine (PEPCID) 20 MG tablet Comments:   Reason for Stopping:         magnesium hydroxide (MILK OF MAGNESIA) 400 MG/5ML suspension Comments:   Reason for Stopping:                     Consultations:   No consultations were requested during this admission          Brief History of Presenting Illness:   Mary Wang is a 66 year old female with history of severe COPD s/p trach on home vent 24/7, anxiety, hypertension, achalasia s/p GJ tube who presented to the hospital with tachycardia on 1/20/2017. She has been hospitalized multiple times in the recent months and was recently discharged on 1/10/17 for respiratory failure and pseudomonas UTI.    History was primarily obtained from her caretaker as she is non verbal (due to trach).    The caretaker reports that Mary was doing well after discharge and was at baseline. She was doing well before the shift change, but right after midnight, the caretaker noted tachycardia, tachypnea, cyanosis around her lips. She was not responding to her questions and seemed very sleepy and unarousable. She tried to suction her through the trach, but despite that she did not respond and her HR was still elevated, which prompted to call EMS to bring her into the hospital.    In the ED, she was noted to be tachycardic to 130s. Respiratory cares were continued and IV fluid bolus was given, after which she was more awake and back to her baseline. However, due to her worsening leukocytosis and tachycardia, the main concern was sepsis and is therefore being  admitted for further evaluation.          Hospital Course:   66 year old female with history of severe COPD s/p trach on home vent 24/7, anxiety, hypertension, achalasia s/p GJ tube who presented to the hospital with tachycardia, now thought to be due to bacteremia.  #) SIRS likley due oversedation/ aspiration pneumonitis   Patient  Presented with sinus tachycardia  and worsening leukocytosis, altered mental status.     She was already being treated with meropenem from 1/18  For a pulmonary infection at the  Care facility  During her last admission, she was treated with Zosyn from 1/8-1/18, however, cultures showed that Pseudomonas from her urine was resistant to Zosyn. Urine analysis this time ( 1/20) does not show any evidence of infection, cultures pending    - Cultures here positive for gram positive cocci from the right hand on 1/20. This was speciated as staph hominis, which is likley a contaminant   Repeat cultures from 1/21 with negative growth so far. This , I dicussed with general ID staff   Vancomycin which was initiated on 1/22 has now been discontinued   Given that the symptoms / signs improved rapidly , this may likley be related to aspiration pneumonitis in the setting of significant somnolence   Continue Meropenem for 4 more days   Avendaño changed while in the hospital on 1/23    #) Chronic Respiratory Failure  #) Chronic respiratory acidosis , metabolic alkalosis  Stable O2 needs and ventilator needs. Hypoxia prior to arrival seems to have resolved after suctioning by the nurse at her home.  - RT consult for chronic vent management( On assist control mode with RR at 14, TV at 400 and PEEP of 5)  - Scheduled ipatropium, levalbuterol while awake  - Continue BID pulmicort    #) Chronic Anxiety  Currently on multiple agents. Attempted to wean during prior admission due to encephalopathy but ultimately unable to tolerate reduction in anxiolytics.  - Continue home clonazepam  - Continue home ativan  -  "Continue home hydromorphone for air hunger  - Continue home seroquel       Hold above medications if drowsy                PROGRESS ONE DAY PRIOR TO  DISCHARGE DAY   Feels well this morning  Still anxious  No fevers or chills  Tolerating tube feeds     BP 91/52 mmHg  Pulse 134  Temp(Src) 97.9  F (36.6  C) (Axillary)  Resp 14  Ht 1.63 m (5' 4.17\")  Wt 56 kg (123 lb 7.3 oz)  BMI 21.08 kg/m2  SpO2 99%    CVS: Normal Heart sounds   RS: Coarse breath sounds bilaterally   Abdomen: Soft and non tender. Normal bowel sounds.   Neuro: Alert and orientated X 3          Pending Tests at Discharge:     Unresulted Labs Ordered in the Past 30 Days of this Admission     Date and Time Order Name Status Description    1/21/2017 1103 Blood culture Preliminary     1/21/2017 1103 Blood culture Preliminary     1/20/2017 0309 Blood culture Preliminary                  Discharge instructions and Follow up:       Discharge Procedure Orders  Home infusion referral     Reason for your hospital stay   Order Comments: SIRS     Adult [facility] Follow-up and recommended labs and tests   Order Comments: Follow up with PCP in 1 week   CBC and BMP in 1 week  Appointments on [place] and/or [facility] (with [facility] or [facility] provider or service). Call [phone] if you haven't heard regarding these appointments within 7 days of discharge.     Activity   Order Comments: Your activity upon discharge: activity as tolerated   Order Specific Question Answer Comments   Is discharge order? Yes      DNR (Do Not Resuscitate)     Ventilator   Order Comments: Per usual home settings     Diet   Order Comments: Follow this diet upon discharge: Orders Placed This Encounter  Adult Formula Drip Feeding: Specified Time Isosource 1.5; Jejunostomy; Goal Rate: 85; mL/hr; From: 8:00 PM; 8:00 AM; Medication - Tube Feeding Flush Frequency: At least 15-30 mL water before and after medication administration and with tube clogging;  NPO Time Specified   Order " Specific Question Answer Comments   Is discharge order? Yes                Discharge Disposition:   Discharged to group home           Time spent : 35  mts total with patient and coordination of care       Dr THUAN Mckinney MD  Hospitalist ( Internal medicine)  Pager: [phone]                   Consult Notes     No notes of this type exist for this encounter.         Progress Notes - Physician (Notes from 01/21/17 through 01/24/17)      Progress Notes by Sapphire De Leon RN at 1/24/2017  3:38 PM     Author:  Sapphire De Leon RN Service:  Care Coordinator Author Type:  Care Coordinator    Filed:  1/24/2017  3:50 PM Note Time:  1/24/2017  3:38 PM Status:  Signed    :  Sapphire De Leon RN (Care Coordinator)             Care Coordinator- Discharge Planning     Admission Date/Time:  1/20/2017  Attending MD:  Costa Tellez*     Data  Date of initial CC assessment: 1/23/17  Chart reviewed, discussed with interdisciplinary team.   Patient was admitted for:   1. Atypical chest pain    2. SIRS (systemic inflammatory response syndrome) (H)    3. Urinary tract infection, site not specified    4. Pneumonia of left lower lobe due to infectious organism    5. COPD exacerbation (H)    6. Anxiety    7. Shortness of breath    8. Urinary tract infection without hematuria, site unspecified           Assessment  Per MD team, pt is medically stable for discharge today. Spoke with krishna Tubbs's  with [facility] (phone: [phone]), who stated that nursing can be available at 4pm today. Called [facility] and changed transportation time to 4:00pm. Updated pt's RN and Lois Reno, , called pt's daughter. Discharge orders faxed to [facility] (fax: [phone]).       Plan  Anticipated Discharge Date: 1/24/17  Anticipated Discharge Plan: Pt will discharge back to home with 24 hour nursing cares.     Sapphire De Leon       Progress Notes by Nayeli Seymour, RN at 1/23/2017   3:10 PM     Author:  Nayeli Seymour RN Service:  Care Coordinator Author Type:  Care Coordinator    Filed:  1/23/2017  3:34 PM Note Time:  1/23/2017  3:10 PM Status:  Signed    :  Nayeli Seymour RN (Care Coordinator)           Care Coordinator- Discharge Planning Note     Admission Date/Time:  1/20/2017  Attending MD:  Costa Tellez*     Data  Chart reviewed, discussed with interdisciplinary team.   Patient was admitted for:   1. SIRS (systemic inflammatory response syndrome) (H)    2. Urinary tract infection, site not specified    3. Pneumonia of left lower lobe due to infectious organism         RNCC Intervention: Staffing unable to go home today due to staffing situation. Ride set for tomorrow with [employer] transportation for 2 pm. Tomorrow.     Plan  Anticipated Discharge Date:  1/24/17  Anticipated Discharge Plan:  Home to [facility] home.    CTS Handoff completed: dion Seymour RN, BSN, PHN, RNCCPH: [phone]  Pager: [phone]  Covering for : medicine RNCC                               Progress Notes by Nayeli Seymour RN at 1/23/2017 11:27 AM     Author:  Nayeli Seymour RN Service:  Care Coordinator Author Type:  Care Coordinator    Filed:  1/23/2017 11:59 AM Note Time:  1/23/2017 11:27 AM Status:  Addendum    :  Nayeli Seymour RN (Care Coordinator)      Related Notes: Original Note by Nayeli Seymour RN (Care Coordinator) filed at 1/23/2017 11:42 AM         Care Coordinator- Assessment Note     Admission Date/Time:  1/20/2017  Attending MD:  Costa Tellez*     Data  Chart reviewed, discussed with interdisciplinary team.   Patient was admitted for:   1. SIRS (systemic inflammatory response syndrome) (H)    2. Urinary tract infection, site not specified    3. Pneumonia of left lower lobe due to infectious organism         History: COPD s/p trach on home vent 24/7, anxiety, hypertension, achalasia     Patient currently  admitted with: infection work up     RNCC Assessment  Concerns with insurance coverage for discharge needs: None.  Current Living Situation: Patient lives in a group home.  Support System: Supportive  Services Involved: group home care.  Transportation: ALISE lanier,  [employer] [phone]  Barriers to Discharge: medical plan of care  Coordination of Care and Referrals: Provided patient/family with options for Home Infusion.        Referrals: Provided patient/family with options for Patient was on service with [facility]  Phone # [phone]--Fax # [phone]. If she goes home on IV antibiotics she will return to their services. I spoke with Arley at [facility] ([phone]), they will get back to me regarding ability to for Aylin to return home today.    Plan  Anticipated Discharge Date:  01/23/2017    Anticipated Discharge Plan:  Home to group home with IV antibiotics and tube feeds.      CTS Handoff completed: yes  Nayeli Seymour RN, BSN, PHN, RNCCPH: [phone]  Pager: [phone]  Covering for : Medicine, RNCC                             Progress Notes by Tyra Stiles RN at 1/23/2017 11:48 AM     Author:  Tyra Stiles RN Service:  WO Nurse Author Type:  Registered Nurse    Filed:  1/23/2017 11:55 AM Note Time:  1/23/2017 11:48 AM Status:  Signed    :  Tyra Stiles RN (Registered Nurse)           [facility] Nurse Inpatient Adult Pressure INJURY (PI) Wound Assessment     Initial assessment of PU(s) on pt's:   Sacrum     Data:   Patient History:      per MD note(s):  66 year old female with history of severe COPD s/p trach on home vent 24/7, anxiety, hypertension, achalasia s/p GJ tube who presented to the hospital with tachycardia, now thought to be due to bacteremia  Here to assess sacral pressure injury reported by nursing     Current mattress:  AtmosAir  Current pressure relieving devices:  Pillows    Moisture Management:   Diaper    Catheter secured? Not applicable    Current Diet / Nutrition:           Active Diet Order  NPO Time Specified    Tube feeding    Jose F Assessment and sub scores:   Jose F Score  Av.6  Min: 12  Max: 15   Labs:   Recent Labs   Lab Test  17   0615   17   1335   16   0331   ALBUMIN  2.1*   < >  2.7*   < >   --    HGB  8.9*   < >  10.5*   < >  8.4*   INR   --    --   0.99   --    --    WBC  10.0   < >  18.7*   < >  11.8*   A1C   --    --    --    --   5.1   CRP  45.0*   < >  18.0*   < >   --     < > = values in this interval not displayed.                                                                                                                          Pressure Injury Assessment  (location #1):   Sacrum    Wound History:   Appears as a resolving blister with pink intact epidermis with residual dried peeling epidermis, no erythema noted    Wound Base: as above     Specific Dimensions (length x width x depth, in cm) :   2 x 2 x 0 cm    Palpation of the wound bed:  normal    Periwound Skin: intact,      Color: normal and consistent with surrounding tissue    Temperature  normal     Drainage:  none     Pain:  absent ,          Intervention:     Patient's chart evaluated.      Jose F Interventions:  Current Jose F Interventions and Care Plan reviewed and updated, appropriate at this time.    Wound was assessed.    Wound Care: was done: Removal of existing dressing    Visual inspection,    Orders  Written    Supplies  N/A    Discussed plan of care with Patient and Nurse           Assessment:     Pressure Injury (PI) located on Sacrum: Healed Stage 2 pressure injury    Status: wound  initial assessment, Stable    Healed blister with small amount of adherent residual epidermis         Plan:     Nursing to notify the Provider(s) and re-consult the [facility] Nurse if wound(s) deteriorate(s).    Plan of care for wound located on Sacrum: Change dressing every 3 days, Mepilex Border for  protection    WOC Nurse will return: No further WOC f/u planned  Face to face time: 10 minutes         Progress Notes by Jenni Tellez MD at 1/22/2017 11:31 AM     Author:  Jenni Tellez MD Service:  Hospitalist Author Type:  Physician    Filed:  1/22/2017  1:56 PM Note Time:  1/22/2017 11:31 AM Status:  Addendum    :  Jenni Tellez MD (Physician)      Related Notes: Original Note by Jenni Tellez MD (Physician) filed at 1/22/2017 11:34 AM         [facility], [place]   Internal Medicine Daily Note          Assessment and Plan:     66 year old female with history of severe COPD s/p trach on home vent 24/7, anxiety, hypertension, achalasia s/p GJ tube who presented to the hospital with tachycardia, now thought to be due to bacteremia.  #) Sepsis due to gram positive bacteremia   Patient  Presented with sinus tachycardia  and worsening leukocytosis, altered mental status.    She was already being treated with meropenem from 1/18  For a pulmonary infection at the  Care facility  During her last admission, she was treated with Zosyn from 1/8-1/18, however, cultures showed that Pseudomonas from her urine was resistant to Zosyn. Urine analysis this time ( 1/20) does not show any evidence of infection, cultures pending   - Cultures here positive for gram positive cocci from the right hand on 1/20. This is awaiting speciation. Repeat cultures from 1/21 with negative growth so far.  - continue meropenem  And vancomycin initiated on 1/21  - has indwelling bender, will need changed after completion of abx, has hx of urinary retention and the plan is to continue to keep the bender    #) Chronic Respiratory Failure  #) Chronic respiratory acidosis , metabolic alkalosis  Stable O2 needs and ventilator needs. Hypoxia prior to arrival seems to have resolved after suctioning by the nurse at her home.  - RT consult for chronic vent  "management( On assist control mode with RR at 14, TV at 400 and PEEP of 5)  - Scheduled ipatropium, levalbuterol while awake  - Continue BID pulmicort    #) Chronic Anxiety  Currently on multiple agents. Attempted to wean during prior admission due to encephalopathy but ultimately unable to tolerate reduction in anxiolytics.  - Continue home clonazepam  - Continue home ativan  - Continue home hydromorphone for air hunger  - Continue home seroquel       Hold above medications if drowsy       Consulting teams: None   Code status: DNR  DVT Prophylaxis: PCD's   Gastric prophylaxis: none   Diet: Tube feeds   Disposition: to be determined       Patient seen and examined with RN    Discussed plan of care with Daughter César         Interval History:   Progress notes in the last 24 hrs by MDT team has been reviewed.   Mary appears more alert and awake this morning   No fevers or chills overnight  Appeared to say that she was feeling better  No new complaints          Review of Systems:   A comprehensive review of systems was performed and found to be negative except: Those that are outlined in interval history              Medications:   I have reviewed this patient's current medications which are outlined in the \"current medication\" section of EPIC             Physical Exam:   Vitals were reviewed  Temp: 97.9  F (36.6  C) Temp src: Oral BP: 119/65 mmHg   Heart Rate: 106 Resp: 16 SpO2: 99 % O2 Device: Mechanical Ventilator Oxygen Delivery: 3 LPM  Constitutional:   fatigued, alert, cooperative, mild distress and appears stated age     Lungs:   Coarse breath sounds bilaterally      Cardiovascular:   Normal apical impulse, regular rate and rhythm, normal S1 and S2, no S3 or S4, and no murmur noted     Abdomen:   No scars, normal bowel sounds, soft, non-distended, non-tender, no masses palpated, no hepatosplenomegally     Musculoskeletal:   no lower extremity pitting edema present  there is no redness, warmth, or swelling of " the joints     Neurologic:   Awake, alert, oriented to name, place and time.  Cranial nerves II-XII are grossly intact.             Data:     Most recent labs have been evaluated and relevant labs are outlined below :  BMP    Recent Labs  Lab 01/22/17  0558 01/21/17  0642 01/20/17  0300 01/19/17  0900    142 138  --    POTASSIUM 3.3* 3.4 4.3  --    CHLORIDE 95 99 89*  --    IZZY 8.2* 7.7* 8.5  --    CO2 38* 40* >45Critical Value called to and read back byJOHN MARINO ON 1/20/17  BY 3922.*  --    BUN 17 16 29 32*   CR 0.58 0.46* 0.58 0.58   * 82 93  --      CBC    Recent Labs  Lab 01/22/17  0558 01/21/17  0642 01/20/17  0300 01/19/17  0900   WBC 11.8* 10.7 26.1* 19.5*   RBC 2.93* 2.68* 3.64* 3.73*   HGB 8.8* 8.0* 10.9* 11.4*   HCT 28.6* 26.2* 36.0 36.5   MCV 98 98 99 98   MCH 30.0 29.9 29.9 30.6   MCHC 30.8* 30.5* 30.3* 31.2*   RDW 15.2* 15.7* 15.8* 16.0*    164 251 268     INRNo lab results found in last 7 days.  LFTs    Recent Labs  Lab 01/21/17  0642 01/20/17  0300 01/19/17  0900   ALKPHOS 79 113  --    AST 23 22 19   ALT 22 26  --    BILITOTAL 0.4 0.4  --    PROTTOTAL 5.2* 6.5*  --    ALBUMIN 2.0* 2.7*  --       PANCNo lab results found in last 7 days.      Dr THUAN Mckinney MD  Hospitalist ( Internal medicine)  Pager: [phone]             Progress Notes by Jenni Tellez MD at 1/21/2017 10:48 AM     Author:  Jenni Tellez MD Service:  Hospitalist Author Type:  Physician    Filed:  1/21/2017 11:02 AM Note Time:  1/21/2017 10:48 AM Status:  Signed    :  Jenni Tellez MD (Physician)           [facility], [place]   Internal Medicine Daily Note          Assessment and Plan:     66 year old female with history of severe COPD s/p trach on home vent 24/7, anxiety, hypertension, achalasia s/p GJ tube who presented to the hospital with tachycardia, now thought to be due to bacteremia.  #) Sepsis due to gram positive  bacteremia   Patient with sinus tach and worsening leukocytosis, altered mental status.    She was already being treated with meropenem from 1/18  For a pulmonary infection at the  Care facility  During her last admission, she was treated with Zosyn from 1/8-1/18, however, cultures showed that Pseudomonas from her urine was resistant to Zosyn. Urine analysis this time ( 1/20) does not show any evidence of infection.  - Cultures here positive for gram positive cocci from the right hand on 1/20 . Will repeat blood cultures today ( 1/21)   - continue meropenem  And add vancomycin   - Fluid hydration   - has indwelling bender, will need changed after completion of abx, has hx of urinary retention and the plan is to continue to keep the bender    #) Chronic Respiratory Failure  #) Chronic respiratory acidosis , metabolic alkalosis  Stable O2 needs and ventilator needs. Hypoxia prior to arrival seems to have resolved after suctioning by the nurse at her home.  - RT consult for chronic vent management  - Scheduled ipatropium, levalbuterol while awake  - Continue BID pulmicort    #) Chronic Anxiety  Currently on multiple agents. Attempted to wean during prior admission due to encephalopathy but ultimately unable to tolerate reduction in anxiolytics.  - Continue home clonazepam  - Continue home ativan  - Continue home hydromorphone for air hunger  - Continue home seroquel       Hold above medications if drowsy       Consulting teams: None   Code status: DNR  DVT Prophylaxis: PCD's   Gastric prophylaxis: none   Diet: Tube feeds   Disposition: to be determined       Patient seen and examined with RN         Interval History:   Progress notes in the last 24 hrs by MDT team has been reviewed.  Mary was drowsy for most of the night and her benzodiazepines were held. This morning, she says that she has trouble breathing. However, on evaluation of her ventilator, she was not seen to be taking any breaths at all. Machine was set at  "14 breaths/ minute and she was not taking nay extra    No fevers or chills   Positive blood culture noted          Review of Systems:   A comprehensive review of systems was performed and found to be negative except: Those that are outlined in interval history              Medications:   I have reviewed this patient's current medications which are outlined in the \"current medication\" section of EPIC             Physical Exam:   Vitals were reviewed  Temp: 97.4  F (36.3  C) Temp src: Oral BP: 121/59 mmHg   Heart Rate: 71 Resp: 14 SpO2: 98 % O2 Device: Mechanical Ventilator Oxygen Delivery: 3 LPM  Constitutional:   fatigued, alert, cooperative, mild distress and appears stated age     Lungs:   Coarse breath sounds bilaterally      Cardiovascular:   Normal apical impulse, regular rate and rhythm, normal S1 and S2, no S3 or S4, and no murmur noted     Abdomen:   No scars, normal bowel sounds, soft, non-distended, non-tender, no masses palpated, no hepatosplenomegally     Musculoskeletal:   no lower extremity pitting edema present  there is no redness, warmth, or swelling of the joints     Neurologic:   Awake, alert, oriented to name, place and time.  Cranial nerves II-XII are grossly intact.             Data:     Most recent labs have been evaluated and relevant labs are outlined below :  BMP    Recent Labs  Lab 01/21/17  0642 01/20/17  0300 01/19/17  0900    138  --    POTASSIUM 3.4 4.3  --    CHLORIDE 99 89*  --    IZZY 7.7* 8.5  --    CO2 40* >45Critical Value called to and read back byJOHN MARINO ON 1/20/17  BY 3922.*  --    BUN 16 29 32*   CR 0.46* 0.58 0.58   GLC 82 93  --      CBC    Recent Labs  Lab 01/21/17  0642 01/20/17  0300 01/19/17  0900   WBC 10.7 26.1* 19.5*   RBC 2.68* 3.64* 3.73*   HGB 8.0* 10.9* 11.4*   HCT 26.2* 36.0 36.5   MCV 98 99 98   MCH 29.9 29.9 30.6   MCHC 30.5* 30.3* 31.2*   RDW 15.7* 15.8* 16.0*    251 268     INRNo lab results found in last 7 days.  LFTs    Recent Labs  Lab " 01/21/17  0642 01/20/17  0300 01/19/17  0900   ALKPHOS 79 113  --    AST 23 22 19   ALT 22 26  --    BILITOTAL 0.4 0.4  --    PROTTOTAL 5.2* 6.5*  --    ALBUMIN 2.0* 2.7*  --       PANCNo lab results found in last 7 days.      Dr THUAN Mckinney MD  Hospitalist ( Internal medicine)  Pager: [phone]             Progress Notes by Moy Hayes RD at 1/21/2017  9:05 AM     Author:  Moy Hayes RD Service:  Nutrition Author Type:  Registered Dietitian    Filed:  1/21/2017  9:19 AM Note Time:  1/21/2017  9:05 AM Status:  Signed    :  Moy Hayes RD (Registered Dietitian)           CLINICAL NUTRITION SERVICES - ASSESSMENT NOTE     Nutrition Prescription    Malnutrition Status:    Does not meet criteria, maintained with chronic tube feeds    Recommendations already ordered by Registered Dietitian (RD):  Home TPN:  Isosource 1.5 @ 85 ml/hr x 12 hrs (overnight cycle from 8pm - 8am)  Provides 1020 mls, 1530 kcals (26 kcal/kg), 69 gms protein (1.2 gm/kg), 15 gms fiber, 775 mls H2O    Future/Additional Recommendations:  Check TF tolerance and potential need to add protein modular     REASON FOR ASSESSMENT  Mary Wang is a/an 66 year old female assessed by the dietitian for Provider Order - Registered Dietitian to Assess and Order TF per Medical Nutrition Therapy Protocol    NUTRITION HISTORY  Pt known well to service; pt does not consume orally, she is on chronic nightly tube feeds with trach.  Tube feeds at home - Isosource 1.5 @ 85 ml/hr x 12 hrs (overnight cycle from 8pm - 8am), flush with 125 mls q4hr    CURRENT NUTRITION ORDERS  Diet: NPO    RD consulted to initiate and manage tube feeds. Will restart pt's home regimen as seen above. RD to continue to monitor tolerance and the potential need to adjust.   Will continue to monitor weight, may need to consider increasing length of cycle vs increasing hourly volume; for now, monitor with home regimen.     LABS  Labs reviewed    MEDICATIONS  Medications  reviewed    ANTHROPOMETRICS  Height: 163 cm  Most Recent Weight: 52 kg (114 lb 10.2 oz)    IBW: 55 kg  BMI: Normal BMI  Weight History:   Wt Readings from Last 10 Encounters:   01/21/17 52 kg (114 lb 10.2 oz)   01/10/17 60.51 kg (133 lb 6.4 oz)   01/05/17 59 kg (130 lb 1.1 oz)   12/28/16 53.5 kg (117 lb 15.1 oz)   12/22/16 53.978 kg (119 lb)   12/05/16 56.836 kg (125 lb 4.8 oz)   11/28/16 54.023 kg (119 lb 1.6 oz)   11/11/16 62 kg (136 lb 11 oz)   10/04/16 56 kg (123 lb 7.3 oz)   09/16/16 53.2 kg (117 lb 4.6 oz)   Weight down, question accuracy vs true loss - 2% loss in the past 5 monts    Dosing Weight: 52 kg    ASSESSED NUTRITION NEEDS  Estimated Energy Needs: 1300 - 1560 kcals/day (25 - 30 kcals/kg)  Justification: Maintenance  Estimated Protein Needs: 62 - 78 grams protein/day (1.2 - 1.5 grams of pro/kg)  Justification: Repletion  Estimated Fluid Needs: per MD    PHYSICAL FINDINGS  See malnutrition section below.    MALNUTRITION  % Intake: No decreased intake noted  % Weight Loss: Does not meet criteria  Subcutaneous Fat Loss: No overt signs observed  Muscle Loss: pt appears to have moderate/severe wasting in arms and legs, but difficult to quantify  Fluid Accumulation/Edema: None noted  Malnutrition Diagnosis: Patient does not meet two of the above criteria necessary for diagnosing malnutrition    NUTRITION DIAGNOSIS  Inadequate oral intake related to chronic trach w/ inability to safely swallow as evidenced by NPO, on chronic tube feeds    INTERVENTIONS  Implementation  Enteral Nutrition - Initiate    Goals  Total avg nutritional intake to meet a minimum of 25 kcal/kg and 1.2 g PRO/kg daily (per dosing wt 52 kg).    Monitoring/Evaluation  Progress toward goals will be monitored and evaluated per protocol.    Moy Hayes MS, RD, LD, CNSC               Procedure Notes     No notes of this type exist for this encounter.      Progress Notes - Therapies (Notes from 01/21/17 through 01/24/17)     No notes of this  type exist for this encounter.

## 2017-01-20 NOTE — ED PROVIDER NOTES
History     Chief Complaint   Patient presents with     Tachycardia     HPI  Mary Wang is a 66 year old female with a history of ventilator-dependent COPD who presents to ER for tachycardia and respiratory distress.  Patient is able to provide only limited history due to tracheostomy and ventilator dependence.  She denies any chest pain, no lightheadedness, no headache.  She reports some shortness of breath and coughing.  She denied any need to suction currently but did receive suction with EMS when they arrived.  She denies any palpitations despite reported tachycardia from nursing who cares for her 24 hours a day at the [facility].  Per the nurse who presented with her she had come on shift at midnight.  She states that the patient was doing otherwise well when she noted the heart rate be 130 around 12:30.  They tried a nebulized treatment and suctioning her along with a dose of anxiety medication.  She continued to have elevated heart rate and after these attempts transported her to the ER.  Nurse does report that she has been under antibody treatment recently for urinary tract infection.  And has chronic indwelling Avendaño as well.  Nursing was unsure what antibiotic coverage check a chart it does appear the patient had been discharged after recent evaluation on IV Zosyn at home.    I have reviewed the Medications, Allergies, Past Medical and Surgical History, and Social History in the [email or web address] system.  Past Medical History   Diagnosis Date     COPD (chronic obstructive pulmonary disease) (H)      trach/vent dependent      Anxiety      TMJ pain dysfunction syndrome      Tracheostomy in place      Hypertension      GERD (gastroesophageal reflux disease)      Achalasia      Lung nodule      spiculated; followed in pulm clinic      Stress-induced cardiomyopathy      Anxiety and depression      Chronic pain        Past Surgical History   Procedure Laterality Date     Tracheostomy N/A 8/26/2015     Procedure:  TRACHEOSTOMY;  Surgeon: Milena Thibodeaux MD;  Location: [facility] OR     Bronchoscopy, insert bronchial valve Right 9/2/2015     Procedure: BRONCHOSCOPY, INSERT BRONCHIAL VALVE;  Surgeon: Everardo Beach MD;  Location: [facility] OR     Esophagoscopy, gastroscopy, duodenoscopy (egd), combined N/A 12/11/2015     Procedure: COMBINED ESOPHAGOSCOPY, GASTROSCOPY, DUODENOSCOPY (EGD);  Surgeon: Dhruv Lyles MD;  Location: [facility] OR     Esophagoscopy, gastroscopy, duodenoscopy (egd), combined N/A 12/31/2015     Procedure: COMBINED ESOPHAGOSCOPY, GASTROSCOPY, DUODENOSCOPY (EGD);  Surgeon: Brenden Plasencia MD;  Location: [facility] OR     Percutaneous insertion tube jejunostomy N/A 12/31/2015     Procedure: PERCUTANEOUS INSERTION TUBE JEJUNOSTOMY;  Surgeon: Brenden Plasencia MD;  Location: [facility] OR     Percutaneous insertion tube jejunostomy N/A 1/25/2016     Procedure: PERCUTANEOUS INSERTION TUBE JEJUNOSTOMY;  Surgeon: Carrie Coleman MD;  Location: [facility] OR     Anesthesia out of or mri N/A 4/18/2016     Procedure: ANESTHESIA OUT OF OR MRI;  Surgeon: GENERIC ANESTHESIA PROVIDER;  Location: [facility]     Endoscopic insertion tube gastrostomy N/A 7/9/2016     Procedure: ENDOSCOPIC INSERTION TUBE GASTROSTOMY;  Surgeon: Brenden Plasencia MD;  Location: [facility] OR     Picc insertion Right 1/9/2017     5fr DL BioFlo PICC, 38cm (1cm external) in the R basilic vein.       Family History   Problem Relation Age of Onset     CANCER Sister        Social History   Substance Use Topics     Smoking status: Former Smoker -- 2.00 packs/day for 40 years     Types: Cigarettes     Start date: 01/01/1964     Quit date: 04/18/1998     Smokeless tobacco: Never Used     Alcohol Use: No        Allergies   Allergen Reactions     Levaquin Other (See Comments)     Delirium     Toro Podhaler [Tobramycin] Other (See Comments)     Severe congestion, SOB      Suprax [Cefixime]      See ceftibuten     Augmentin Nausea     Has tolerated for treatment of  UTIs in 2016.     Avelox [Moxifloxacin] Anxiety     Cedax [Ceftibuten] Other (See Comments)     Pain in eyes     Penicillins Itching     Tolerated amoxicillin without issues.     Vantin [Cefpodoxime] Nausea     Current Facility-Administered Medications   Medication     lactated ringers infusion     meropenem (MERREM) 500 mg vial to attach to  mL bag for ADULTS or 25 mL bag for PEDS     acetaminophen (TYLENOL) solution 650 mg     budesonide (PULMICORT) neb solution 0.5 mg     calcium carbonate (TUMS) chewable tablet 500 mg     clonazePAM (klonoPIN) suspension 1.5 mg     famotidine (PEPCID) suspension 20 mg     fexofenadine (ALLEGRA) tablet 180 mg     fiber modular (NUTRISOURCE FIBER) packet 1 packet     gabapentin (NEURONTIN) solution 300 mg     guaiFENesin (ROBITUSSIN) 20 mg/mL solution 200 mg     HYDROmorphone (DILAUDID) liquid 1 mg     ipratropium (ATROVENT) 0.02 % neb solution 0.5 mg     levalbuterol (XOPENEX) neb solution 1.25 mg     LORazepam (ATIVAN) 2 MG/ML (HIGH CONC) solution 0.5 mg     melatonin liquid 3 mg     JEVITY 1.5 IZZY LIQD 5 Can     ondansetron (ZOFRAN) tablet 4 mg     polyethylene glycol (MIRALAX/GLYCOLAX) Packet 17 g     polyethylene glycol 0.4%- propylene glycol 0.3% (SYSTANE ULTRA) ophthalmic solution 1-2 drop     predniSONE solution 20 mg     QUEtiapine (SEROquel) tablet 50 mg     senna-docusate (SENOKOT-S;PERICOLACE) 8.6-50 MG per tablet 2 tablet     sertraline (ZOLOFT) 20 MG/ML (HIGH CONC) solution 150 mg     sodium chloride (OCEAN) 0.65 % nasal spray 1 spray     sulfamethoxazole-trimethoprim (BACTRIM/SEPTRA) suspension 160 mg     naloxone (NARCAN) injection 0.1-0.4 mg     0.9% sodium chloride infusion     Current Outpatient Prescriptions   Medication     famotidine (PEPCID) 20 MG tablet     clonazePAM (KLONOPIN) 0.1 mg/mL     famotidine (PEPCID) 40 MG/5ML suspension     loperamide (IMODIUM A-D) 2 MG tablet     polyethylene glycol 0.4%- propylene glycol 0.3% (SYSTANE ULTRA) 0.4-0.3 %  SOLN ophthalmic solution     lactobacillus rhamnosus, GG, (CULTURELL) capsule     LORazepam (ATIVAN) 2 MG/ML (HIGH CONC) solution     sertraline (ZOLOFT) 20 MG/ML (HIGH CONC) solution     fexofenadine (ALLEGRA) 180 MG tablet     fiber modular (NUTRISOURCE FIBER) packet     Nutritional Supplements (JEVITY 1.5 IZZY) LIQD     melatonin (MELATONIN) 1 MG/ML LIQD liquid     acetaminophen (TYLENOL) 160 MG/5ML oral liquid     budesonide (PULMICORT) 0.5 MG/2ML nebulizer solution     ipratropium (ATROVENT) 0.02 % nebulizer solution     levalbuterol (XOPENEX) 1.25 MG/3ML nebulizer solution     guaiFENesin (ORGANIDIN) 200 MG TABS     sodium chloride (OCEAN) 0.65 % nasal spray     sulfamethoxazole-trimethoprim (BACTRIM,SEPTRA) 200-40 mg/5ml suspension     lidocaine 15 mL, alum & mag hydroxide-simethicone 15 mL GI Cocktail     gabapentin (NEURONTIN) 250 MG/5ML solution     polyethylene glycol (MIRALAX/GLYCOLAX) Packet     senna-docusate (SENOKOT-S;PERICOLACE) 8.6-50 MG per tablet     QUEtiapine (SEROQUEL) 50 MG tablet     HYDROmorphone (DILAUDID) 1 MG/ML LIQD liquid     calcium carbonate (TUMS) 500 MG chewable tablet     ondansetron (ZOFRAN) 4 MG tablet     magnesium hydroxide (MILK OF MAGNESIA) 400 MG/5ML suspension     meropenem (MERREM) 500 MG vial     predniSONE 5 MG/5ML solution     Cranberry 500 MG CAPS     Facility-Administered Medications Ordered in Other Encounters   Medication     midazolam (VERSED) injection     Review of Systems   Respiratory: Positive for shortness of breath.    Cardiovascular: Negative for chest pain.   Psychiatric/Behavioral: The patient is nervous/anxious.    All other systems reviewed and are negative.    Physical Exam   BP: 123/82 mmHg  Pulse: 134  Temp: 99.6  F (37.6  C)  Resp: 18  Weight: 52 kg (114 lb 10.2 oz)  SpO2: 93 %  Physical Exam   Constitutional: She appears well-developed and well-nourished. No distress.   HENT:   Head: Normocephalic and atraumatic.   Neck:   Trach site clear without  any signs of erythema or breakdown.   Cardiovascular: Regular rhythm, normal heart sounds and intact distal pulses.  Tachycardia present.    Pulmonary/Chest: Tachypnea noted. No respiratory distress. She has decreased breath sounds in the right lower field and the left lower field. She has no wheezes. She has no rhonchi. She has rales in the left lower field.   Abdominal: Soft. There is no tenderness. There is no guarding.   Neurological: She is alert.   Skin: Skin is warm and dry. She is not diaphoretic. No pallor.   Psychiatric: Her mood appears anxious.   Nursing note and vitals reviewed.    ED Course     [unfilled]  Procedures             EKG Interpretation:      Interpreted by Eric Amezquita  Time reviewed: 0330  Symptoms at time of EKG: shortness of breath   Rhythm: sinus tachycardia  Rate: Tachycardia  Axis: Normal  Ectopy: none  Conduction: normal  ST Segments/ T Waves: Non-specific ST-T wave changes  Q Waves: none  Comparison to prior: Unchanged from January 7, 2016.    Clinical Impression: sinus tachycardia    Critical Care time:  was 30 minutes for this patient excluding procedures.        Labs Ordered and Resulted from Time of ED Arrival Up to the Time of Departure from the ED   COMPREHENSIVE METABOLIC PANEL - Abnormal; Notable for the following:     Chloride 89 (*)     Carbon Dioxide   (*)     Value: >45  Critical Value called to and read back by  JOHN MARNIO ON 1/20/17  BY 3922.      Albumin 2.7 (*)     Protein Total 6.5 (*)     All other components within normal limits   ROUTINE UA WITH MICROSCOPIC - Abnormal; Notable for the following:     Blood Urine Moderate (*)     pH Urine 8.0 (*)     Protein Albumin Urine 30 (*)     WBC Urine 4 (*)     RBC Urine 35 (*)     All other components within normal limits   CBC WITH PLATELETS DIFFERENTIAL - Abnormal; Notable for the following:     WBC 26.1 (*)     RBC Count 3.64 (*)     Hemoglobin 10.9 (*)     MCHC 30.3 (*)     RDW 15.8 (*)     Absolute  Neutrophil 23.0 (*)     All other components within normal limits   BLOOD GAS ARTERIAL - Abnormal; Notable for the following:     pCO2 Arterial 66 (*)     Bicarbonate Arterial 45 (*)     All other components within normal limits   LACTIC ACID WHOLE BLOOD   TROPONIN I   PROCALCITONIN   TROPONIN I   PULSE OXIMETRY NURSING   CARDIAC CONTINUOUS MONITORING   MEASURE URINE OUTPUT   PATIENT CARE ORDER   VITAL SIGNS   TELEMETRY MONITORING MED/SURG   URINE CULTURE AEROBIC BACTERIAL   BLOOD CULTURE   BLOOD CULTURE   INFLUENZA A AND B AND RSV PCR     Xr Chest Port 1 View    1/20/2017  XR CHEST PORT 1 VW  1/20/2017 3:39 AM History:  Fever. Comparison: Chest radiograph dated 1/7/2017. Findings: Portable AP 60 degree chest radiograph is obtained. Patient is minimally rotated. Right-sided PICC line with the tip projecting at cavoatrial junction. Stable position of tracheostomy tube. Cardiac silhouette is stable. Pulmonary vasculature is distinct. New small left pleural effusion and basilar opacity. No pneumothorax. The right costophrenic angle is chronically blunted.     1/20/2017  IMPRESSION: 1. New small left pleural effusion and basilar opacity, likely infection versus atelectasis. 2. Stable emphysema.    Assessments & Plan (with Medical Decision Making)   I was physically present and have reviewed and verified the accuracy of this note documented by (myself).     Disclaimer: This note consists of symbols derived from keyboarding, dictation, and/or voice recognition software. As a result, there may be errors in the script that have gone undetected.  Please consider this when interpreting information found in the chart.These sections of the chart were reviewed for accuracy to the best of my knowledge and ability.    Patient was clinically assessed and consented to treatment. After assessment, medical decision making and workup were discussed with the patient. The patient was agreeable to plan for testing, workup, and  treatment.  Mary Wang is a 66 year old female who presents today for tachycardia and respiratory distress.  Patient well-known to the ER for ventilator dependent COPD.  Patient here with some decreased breath sounds and crackles on the left.  She does have a history urine tract infection and on review her chart was started on antibiotics after urinating cultures were positive on December 7.  On review of cultures the organism is resistant to Zosyn.  Patient had been on a home Zosyn for this and it appears on review of her labs here are that her white blood cell count is elevated from what it was previously this month.  White blood cell count was 26 although lactic acid was within normal limits.  Chemistries were baseline for the patient and troponin was negative.  EKG showed sinus tachycardia with no signs of ischemia.  Blood cultures were also obtained along with urine culture.  Given the tachycardia patient was given sepsis resuscitation fluids and tachycardia improved.  I also obtained a chest x-ray which did show a new left-sided effusion and opacity.  Given patient's history.  She likely has SIRS or early urosepsis given the history of urinary tract infection and culture with Zosyn resistant organism.  Reviewing her written records from the group home further she did just get started on meropenem however patient still with significant systemic symptoms at this time.  Resuscitation was continued with fluids and given a new finding on the lungs I discussed the patient with medicine for admission.  Patient will be plan for admission to medicine and after discussion will be continued on the meropenem.  It is possible patient has a new left-sided pneumonia which could've caused some of the respiratory problems left-sided but likely the tachycardia reportedly is related to the continued urosepsis.    I have reviewed the nursing notes.  I have reviewed the findings, diagnosis, plan and need for follow up with  the patient.  New Prescriptions    No medications on file       Final diagnoses:   SIRS (systemic inflammatory response syndrome) (H)   Urinary tract infection, site not specified   Pneumonia of left lower lobe due to infectious organism       1/20/2017   [facility], [place], EMERGENCY DEPARTMENT      Eric Amezquita MD  01/20/17 0924

## 2017-01-20 NOTE — IP AVS SNAPSHOT
[address] [address]    Phone:  [phone]                                       After Visit Summary   1/20/2017    Mary Wang    MRN: [id number]           After Visit Summary Signature Page     I have received my discharge instructions, and my questions have been answered. I have discussed any challenges I see with this plan with the nurse or doctor.    ..........................................................................................................................................  Patient/Patient Representative Signature      ..........................................................................................................................................  Patient Representative Print Name and Relationship to Patient    ..................................................               ................................................  Date                                            Time    ..........................................................................................................................................  Reviewed by Signature/Title    ...................................................              ..............................................  Date                                                            Time

## 2017-01-20 NOTE — ED NOTES
Pt with lower SBP, pt assessed and spoke with Dr Amezquita. 1L NSS bolus started for BP. Pt rec'd morning dose of Klonipin and Zoloft at 0820. Pt with good color during assessment, drowsiness noted. Pt responds to verbal stimuli.

## 2017-01-20 NOTE — IP AVS SNAPSHOT
"` `           [address] [facility]: [phone]                                              INTERAGENCY TRANSFER FORM - NURSING   2017                    Hospital Admission Date: 2017  MORRIS PYLE   : 1950  Sex: Female        Attending Provider: Jenni Tellez MD     Allergies:  Levaquin, Toro Podhaler, Suprax, Augmentin, Avelox, Cedax, Penicillins, Vantin    Infection:  VRE-Contact Isolation, MRSA-Contact Isolation   Service:  INTERNAL MED    Ht:  1.63 m (5' 4.17\")   Wt:  55.339 kg (122 lb)   Admission Wt:  52 kg (114 lb 10.2 oz)    BMI:  20.83 kg/m 2   BSA:  1.58 m 2            Patient PCP Information     Provider PCP Type    Norman Brito MD General      Current Code Status     Date Active Code Status Order ID Comments User Context       2017  8:10 PM DNR [id number]  Jenni Tellez MD Inpatient       Code Status History     Date Active Date Inactive Code Status Order ID Comments User Context    2017  3:34 PM 2017  8:10 PM DNR [id number]  Jenni Tellez MD Outpatient    2017 10:50 AM 2017  3:34 PM DNR [id number]  Jenni Tellez MD Inpatient    2017  6:58 AM 2017 10:50 AM DNR/DNI [id number]  Yadi Loja MD ED    2017  8:26 PM 1/10/2017  1:42 PM DNR [id number]  Jose David De La Cruz APRN CNP Inpatient    2017 12:01 PM 2017  8:26 PM DNR [id number]  Geo Bui MD Outpatient    1/3/2017  5:24 PM 2017 12:01 PM DNR [id number]  Geo Bui MD Inpatient    2017 11:28 PM 1/3/2017  5:24 PM Full Code [id number]  Jose David De La Cruz APRN CNP Inpatient    2016  8:59 AM 2017 11:28 PM Full Code [id number]  Theresa Wagoner MD Outpatient    2016  4:49 AM 2016  8:59 AM Full Code [id number]  Alexi Fernandez MD Inpatient    2016  2:45 PM 2016  4:49 AM Full Code [id number]  Francisco Burt MD Outpatient    2016  6:05 PM 2016  2:45 PM " Full Code [id number]  Viridiana Rockwell PA-C Inpatient    11/27/2016  6:02 PM 12/1/2016  6:05 PM Full Code [id number]  Francisco Burt MD Outpatient    11/13/2016  9:57 PM 11/27/2016  6:02 PM Full Code [id number]  Dorothy Jose David Aung, APRN CNP Inpatient    10/31/2016  1:27 AM 11/11/2016  2:43 PM Full Code [id number]  Jose David De La Cruz, APRN CNP Inpatient    10/1/2016 12:45 PM 10/4/2016  4:15 PM Full Code [id number]  Jamila Comer MD Inpatient    9/11/2016  2:46 AM 9/16/2016  3:48 PM Full Code [id number]  Theresa Wagoner MD Inpatient    7/23/2016  7:05 AM 9/11/2016  2:46 AM Full Code [id number]  Doc Barrera MD Outpatient    7/16/2016  2:33 AM 7/23/2016  7:05 AM Full Code [id number]  Moe Cárdenas MD Inpatient    7/10/2016 11:38 AM 7/16/2016  2:33 AM Full Code [id number]  Enrique Gama MD Outpatient    7/8/2016  8:04 PM 7/10/2016 11:38 AM Full Code [id number]  Shantal Dee PA Inpatient    6/6/2016  5:19 PM 6/14/2016  2:32 PM Full Code [id number]  Shantal Dee PA Inpatient    5/21/2016  3:41 PM 5/23/2016  3:59 PM Full Code [id number]  Viridiana Butcher MD ED    4/13/2016  6:13 PM 4/21/2016  1:08 PM Full Code [id number]  Arley Guzman MD Inpatient    2/1/2016  8:43 AM 4/13/2016  6:13 PM Full Code [id number]  Emily Hoover MD Outpatient    1/20/2016  8:10 PM 2/1/2016  8:43 AM Full Code [id number]  Emily Hoover MD Inpatient    1/20/2016  7:17 PM 1/20/2016  8:10 PM Full Code [id number]  Emily Hoover MD ED    1/2/2016  8:32 AM 1/20/2016  7:17 PM Full Code [id number]  Elias Mcdermott MD Outpatient    1/1/2016  1:56 PM 1/2/2016  8:32 AM Full Code [id number]  Almaz Reagan MD Outpatient    12/30/2015  9:45 PM 1/1/2016  1:56 PM Full Code [id number]  Martinez Hamm MD Inpatient    9/24/2015 12:37 PM 12/30/2015  9:45 PM Full Code [id number]  Verner, James R, MD Outpatient    8/27/2015  4:30 AM  9/24/2015 12:37 PM Full Code [id number]  Eric Cosme MD Inpatient    8/13/2015  2:51 PM 8/27/2015  4:30 AM Full Code [id number]  Emilia Kurtz MD Inpatient    8/10/2015  7:50 AM 8/13/2015  2:51 PM DNR/DNI [id number]  Marcia Aleman MD Outpatient    8/7/2015 11:13 AM 8/10/2015  7:50 AM DNR/DNI [id number]  Marcia Aleman MD Inpatient    8/4/2015  2:55 PM 8/7/2015 11:13 AM DNR [id number]  Driss Huggins MD Inpatient    7/31/2015  6:35 PM 8/4/2015  2:55 PM DNR/DNI [id number]  Driss Huggins MD Inpatient    7/26/2015  8:21 PM 7/31/2015  6:35 PM Full Code [id number]  Larry Hong MD Inpatient    6/21/2015 11:33 AM 7/26/2015  8:21 PM Full Code [id number]  Mady Atkinson MD Outpatient    6/17/2015  6:34 AM 6/21/2015 11:33 AM Full Code [id number]  Swetha Bundy APRN CNP Inpatient    8/8/2014  7:29 AM 6/17/2015  6:34 AM Full Code [id number]  Ron Kirkland MD Outpatient    8/3/2014  6:08 PM 8/8/2014  7:29 AM Full Code [id number]  Ron Kirkland MD Inpatient    5/4/2014 12:08 PM 8/3/2014  6:08 PM Full Code [id number]  Darlene Del Real MD Outpatient    5/2/2014  9:16 AM 5/4/2014 12:08 PM Full Code [id number]  Darlene Del Real MD Inpatient      Advance Directives        Does patient have a scanned Advance Directive/ACP document in EPIC?           Yes        Hospital Problems as of 1/24/2017              Priority Class Noted POA    Sepsis (H) Medium  8/11/2015 Yes      Non-Hospital Problems as of 1/24/2017              Priority Class Noted    Lung infection Medium  4/17/2014    COPD exacerbation (H)   5/2/2014    Acute-on-chronic respiratory failure (H) Medium  5/3/2014    Shortness of breath   8/3/2014    Hyponatremia Medium  7/26/2015    Urinary retention Medium  8/11/2015    Altered mental status Medium  8/11/2015    NATA (acute kidney injury) (H) Medium  8/11/2015    Anxiety Medium  8/11/2015    Air hunger Medium  8/11/2015    Esophageal dysmotility Medium  8/11/2015     Achalasia Medium  8/11/2015    Delirium Medium  8/11/2015    HTN (hypertension) Medium  8/11/2015    GERD (gastroesophageal reflux disease) Medium  8/11/2015    Aspiration pneumonia (H) Medium  8/11/2015    End stage COPD (H) Medium  8/13/2015    ACP (advance care planning)   8/19/2015    Acute and chronic respiratory failure with hypercapnia (H) Medium  8/27/2015    Hypoxia Medium  12/30/2015    S/P percutaneous endoscopic gastrostomy (PEG) tube placement (H) Medium  1/25/2016    COPD (chronic obstructive pulmonary disease) (H) Medium  4/13/2016    NSTEMI (non-ST elevated myocardial infarction) (H) Medium  5/21/2016    ACS (acute coronary syndrome) (H) Medium  5/21/2016    Stress-induced cardiomyopathy Medium  5/22/2016    HCAP (healthcare-associated pneumonia) Medium  6/6/2016    Atypical chest pain Medium  6/7/2016    Elevated troponin Medium  6/7/2016    Encounter for gastrojejunal (GJ) tube placement Medium  7/8/2016    UTI (urinary tract infection) Medium  9/11/2016    Altered mental state Medium  1/2/2017    Acute on chronic respiratory failure (H) Medium  1/7/2017      Immunizations     Name Date      Influenza (High Dose) 3 valent vaccine 10/04/16     Influenza (High Dose) 3 valent vaccine 09/24/15     Influenza (High Dose) 3 valent vaccine 10/06/14     Influenza (IIV3) 10/21/13     Influenza (IIV3) 11/20/12     Pneumococcal (PCV 13) 11/17/14     Pneumococcal 23 valent 05/03/12     Tetanus Not Indicated - By Hx 05/03/14          END      ASSESSMENT     Discharge Profile Flowsheet     EXPECTED DISCHARGE     FINAL RESOURCES      Expected Discharge Date  -- (TCU) 01/23/17 1242   Resources List  DME 07/29/15 1006    DISCHARGE NEEDS ASSESSMENT     Group Home Agency  provident 01/23/17 1200    Equipment Currently Used at Home  commode;hospital bed;walker, rolling;wheelchair 01/04/17 0974   Group Home Contact Kathy Tubbs [phone] 01/23/17 1200    Transportation Available  van, wheelchair accessible  "01/04/17 0917   Existing Resources/Services  [facility] 08/05/14 1504    Equipment Used at Home  walker, rolling;grab bar;shower chair 01/22/16 1415   SKIN      FUNCTIONAL LEVEL CURRENT     Inspection  Full 01/24/17 1148    Change in Functional Status Since Onset of Current Illness/Injury  no 06/06/16 2137   Skin areas NOT inspected  Spine;Hip, left;Hip, right;Buttock, left;Buttock, right;Sacrum;Coccyx 01/23/17 2105    GASTROINTESTINAL (ADULT,PEDIATRIC,OB)     Skin WDL  ex 01/24/17 1148    GI WDL  ex 01/24/17 1148   Skin Temperature  cool 01/24/17 1148    Abdominal Appearance  rounded 01/24/17 1148   Skin Moisture  dry 01/24/17 1148    All Quadrants Bowel Sounds  audible and normoactive 01/24/17 1148   Skin Integrity  wound(s);bruise(s);drain/device(s);pressure ulcer(s);skin tear(s);tattoo(s) 01/24/17 1148    Last Bowel Movement  01/24/17 01/24/17 1148   Skin Elasticity  slow return to original state 01/24/17 0648    COMMUNICATION ASSESSMENT     SAFETY      Patient's communication style  spoken language (English or Bilingual) 01/20/17 0240   Safety WDL  WDL 01/24/17 1148                 Assessment WDL (Within Defined Limits) Definitions           Safety WDL     Effective: 09/28/15    Row Information: <b>WDL Definition:</b> Bed in low position, wheels locked; call light in reach; upper side rails up x 2; ID band on<br> <font color=\"gray\"><i>Item=AS safety wdl>>List=AS safety wdl>>Version=F14</i></font>      Skin WDL     Effective: 09/28/15    Row Information: <b>WDL Definition:</b> Warm; dry; intact; elastic; without discoloration; pressure points without redness<br> <font color=\"gray\"><i>Item=AS skin wdl>>List=AS skin wdl>>Version=F14</i></font>      Vitals     Vital Signs Flowsheet     VITAL SIGNS     Change in Pain  about the same 01/24/17 1428    Temp  96.8  F (36  C) 01/24/17 1534   Pain Control  partially effective 01/24/17 1341    Temp src  Oral 01/24/17 1534   Functioning  can do everything I need to 01/24/17 0917 " "   Resp  20 01/24/17 1534   Sleep  awake with occasional pain 01/24/17 0649    Pulse  134 01/20/17 0243   ANALGESIA SIDE EFFECTS MONITORING      Heart Rate  95 01/24/17 1534   Side Effects Monitoring: Respiratory Quality  V 01/24/17 0917    Pulse/Heart Rate Source  Monitor 01/23/17 0810   Side Effects Monitoring: Respiratory Depth  N 01/24/17 0917    BP  147/84 mmHg 01/24/17 1534   Side Effects Monitoring: Sedation Level  1 01/24/17 0917    BP Location  Left arm 01/24/17 1534   HEIGHT AND WEIGHT      OXYGEN THERAPY     Height  1.63 m (5' 4.17\") 01/21/17 0912    SpO2  98 % 01/24/17 1141   Weight  55.339 kg (122 lb) (bedscale) 01/24/17 0619    O2 Device  Mechanical Ventilator 01/24/17 1141   BSA (Calculated - sq m)  1.53 01/21/17 0912    FiO2 (%)  21 % 01/23/17 1506   BMI (Calculated)  19.61 01/21/17 0912    Oxygen Delivery  3 LPM 01/24/17 1141   POSITIONING      Suction Occurrance  2 01/24/17 0559   Body Position  left, side-lying 01/24/17 1148    PAIN/COMFORT     Head of Bed (HOB)  HOB at 30-45 degrees 01/24/17 1148    Patient Currently in Pain  yes 01/24/17 1341   DAILY CARE      Preferred Pain Scale  CAPA (Clinically Aligned Pain Assessment) ([facility] Adults Only) 01/24/17 1341   Activity Type  bedrest 01/24/17 1148    Pain Location  Back 01/24/17 1341   Activity Level of Assistance  assistance, 2 people 01/24/17 1027    Pain Orientation  Lower 01/24/17 1341   ECG      Pain Descriptors  Constant;Aching 01/24/17 1341   ECG Rhythm  Normal sinus rhythm 01/24/17 1141    Pain Intervention(s)  Medication (See eMAR);Repositioned 01/24/17 1341   Ectopy  PAC 01/23/17 0358    Response to Interventions  Decrease in pain 01/24/17 1428   EKG MONITORING      CLINICALLY ALIGNED PAIN ASSESSMENT (CAPA) ([facility] ADULTS ONLY)     Cardiac Regularity  Regular 01/20/17 0835    Comfort  comfortably manageable 01/24/17 1428   Cardiac Rhythm  VT 01/20/17 0835            Patient Lines/Drains/Airways " Status    Active LINES/DRAINS/AIRWAYS     Name: Placement date: Placement time: Site: Days: Last dressing change:    Airway - Adult/Peds 6 [place] 04/19/16  1300  6  280     Gastrostomy/Enterostomy Gastrojejunostomy RUQ 1  09/20/16  1510  RUQ  126     Gastrostomy/Enterostomy Gastrojejunostomy            Urethral Catheter Straight-tip 01/23/17  1710  Straight-tip  less than 1     Pressure Injury 10/01/16 Coccyx 10/01/16  1215   115     Wound 09/11/16 Mid Coccyx Suspected pressure ulcer Suspected pressure ulcer on coccyx 09/11/16  0300  Coccyx  135     Wound 10/31/16 Left;Anterior;Right Arm Other (comment) Large skin tear forearm 10/31/16  0800  Arm  85             Patient Lines/Drains/Airways Status    Active PICC/CVC     Name: Placement date: Placement time: Site: Days: Additional Info Last dressing change:    PICC Double Lumen 01/09/17 Right Basilic 01/09/17  1839  Basilic  14 External Cath Length (cm): 1 cm  01/24/17 1236 (3.53 hrs)         Size (Fr): 5 Fr           Orientation: Right           Extremity Circumference (cm): 29 cm           Catheter Brand: [employer]           Dressing Intervention: Transparent;Securing device;New dressing;Chlorhexidine patch           Description: Valved;Power PICC           Total Catheter Length (cm) Trimmed: 38 cm           Site Prep: Alcohol;Chlorhexidine           Local Anesthetic: Injectable           Inserted by: DILCIA Singh, RN [facility]           Insertion attempts with ultrasound: 1           Patient Tolerance: Tolerated well           Placement Verification: Blood Return;Xray;Other (Comment)           Difficulty with threading line: No           Tip location: SVC           Full barrier precautions done: Yes           Consent Signed: Yes           Time Out performed: Yes           Lot #: [id number]           Use for : Antibiotics. 6B RN notified that PICC is okay to use.              Intake/Output Detail Report     Date Intake         Output   Net    Shift P.O. I.V.  NG/GT IV Piggyback Enteral Total Urine Emesis/NG output Total       Emilie 01/23/17 0700 - 01/23/17 1459 -- 1100 455 -- 1020 2575 200 1300 1500 1075    Noc 01/23/17 1500 - 01/23/17 2359 -- 1000 370 -- 340 1710 9997 106 0431 -15    Day 01/24/17 0000 - 01/24/17 0659 -- 900 30 -- 595 1525 275     Emilie 01/24/17 0700 - 01/24/17 1459 -- 1188.33 340 -- 170 1698.33 550 210 760 938.33    Noc 01/24/17 1500 - 01/24/17 2359 -- -- -- -- -- -- 1100 -- 1100 -1100      Last Void/BM       Most Recent Value    Urine Occurrence     Stool Occurrence 1 at 01/24/2017 0930      Case Management/Discharge Planning     Case Management/Discharge Planning Flowsheet     REFERRAL INFORMATION     [place]  [facility] [place] 1006    Arrived From  admitted as an inpatient 01/03/17 0040   Group Home Agency  [facility] 01/23/17 1200    LIVING ENVIRONMENT     Group Home Contact Info  Arley [phone] 01/23/17 1200    Lives With  other (see comments) (group home) 01/20/17 1327   [place]isting Resources/Services  [facility] [place] 1504    Living Arrangements  group home 01/04/17 0924   MH/BH CAREGIVER      Provides Primary Care For  no one, unable/limited ability to care for self 10/31/16 0454   Filed Complexity Screen Score  12 01/23/17 1121    COPING/STRESS     ABUSE RISK SCREEN      Major Change/Loss/Stressor  illness 01/20/17 1330   QUESTION TO PATIENT:  Has a member of your family or a partner(now or in the past) intimidated, hurt, manipulated, or controlled you in any way?  no 01/20/17 0245    EXPECTED DISCHARGE     QUESTION TO PATIENT: Do you feel safe going back to the place where you are living?  yes 01/20/17 0245    Expected Discharge Date  -- (TCU) 01/23/17 1242   OBSERVATION: Is there reason to believe there has been maltreatment of a vulnerable adult (ie. Physical/Sexual/Emotional abuse, self neglect, lack of adequate food, shelter, medical care, or financial exploitation)?  no 01/20/17 0245    DISCHARGE PLANNING     (R) MENTAL HEALTH  SUICIDE RISK      Transportation Available  van, wheelchair accessible 01/04/17 0967   Are you depressed or being treated for depression?  No 01/20/17 1328    Equipment Used at Home  walker, rolling;grab bar;shower chair 01/22/16 1415   HOMICIDE RISK      FINAL RESOURCES     Homicidal Ideation  no 01/20/17 0245    Equipment Currently Used at Home  commode;hospital bed;walker, rolling;wheelchair 01/04/17 0906

## 2017-01-20 NOTE — H&P
INTERNAL MEDICINE HISTORY & PHYSICAL   Mary Wang ([id number]) admitted on 1/20/2017  Primary care provider: Norman Brito    Chief Complaint:  Tachycardia    History of Present Illness:   Mary Wang is a 66 year old female with history of severe COPD s/p trach on home vent 24/7, anxiety, hypertension, achalasia s/p GJ tube who presented to the hospital with tachycardia. She has been hospitalized multiple times in the recent months and was recently discharged on 1/10/17 for respiratory failure and pseudomonas UTI.    History was primarily obtained from her caretaker as she is non verbal (due to trach).    The caretaker reports that Mary was doing well after discharge and was at baseline. She was doing well before the shift change, but right after midnight, the caretaker noted tachycardia, tachypnea, cyanosis around her lips. She was not responding to her questions and seemed very sleepy and unarousable. She tried to suction her through the trach, but despite that she did not respond and her HR was still elevated, which prompted to call EMS to bring her into the hospital.    In the ED, she was noted to be tachycardic to 130s. Respiratory cares were continued and IV fluid bolus was given, after which she was more awake and back to her baseline. However, due to her worsening leukocytosis and tachycardia, the main concern was sepsis and is therefore being admitted for further evaluation.      Past Medical History:   Patient Active Problem List   Diagnosis     Lung infection     COPD exacerbation (H)     Acute-on-chronic respiratory failure (H)     Shortness of breath     Hyponatremia     Urinary retention     Altered mental status     NATA (acute kidney injury) (H)     Anxiety     Air hunger     Esophageal dysmotility     Achalasia     Delirium     HTN (hypertension)     GERD (gastroesophageal reflux disease)     Sepsis (H)     Aspiration pneumonia (H)     End stage COPD (H)     ACP (advance  care planning)     Acute and chronic respiratory failure with hypercapnia (H)     Hypoxia     S/P percutaneous endoscopic gastrostomy (PEG) tube placement (H)     COPD (chronic obstructive pulmonary disease) (H)     NSTEMI (non-ST elevated myocardial infarction) (H)     ACS (acute coronary syndrome) (H)     Stress-induced cardiomyopathy     HCAP (healthcare-associated pneumonia)     Atypical chest pain     Elevated troponin     Encounter for gastrojejunal (GJ) tube placement     UTI (urinary tract infection)     Altered mental state     Acute on chronic respiratory failure (H)       Past Surgical History:   Past Surgical History   Procedure Laterality Date     Tracheostomy N/A 8/26/2015     Procedure: TRACHEOSTOMY;  Surgeon: Milena Thibodeaux MD;  Location: [facility]     Bronchoscopy, insert bronchial valve Right 9/2/2015     Procedure: BRONCHOSCOPY, INSERT BRONCHIAL VALVE;  Surgeon: Everardo Beach MD;  Location: [facility]     Esophagoscopy, gastroscopy, duodenoscopy (egd), combined N/A 12/11/2015     Procedure: COMBINED ESOPHAGOSCOPY, GASTROSCOPY, DUODENOSCOPY (EGD);  Surgeon: Dhruv Lyles MD;  Location[facility]     Esophagoscopy, gastroscopy, duodenoscopy (egd), combined N/A 12/31/2015     Procedure: COMBINED ESOPHAGOSCOPY, GASTROSCOPY, DUODENOSCOPY (EGD);  Surgeon: Brenden Plasencia MD;  Location[facility]     Percutaneous insertion tube jejunostomy N/A 12/31/2015     Procedure: PERCUTANEOUS INSERTION TUBE JEJUNOSTOMY;  Surgeon: Brenden Plasencia MD;  [facility]     Percutaneous insertion tube jejunostomy N/A 1/25/2016     Procedure: PERCUTANEOUS INSERTION TUBE JEJUNOSTOMY;  Surgeon: Carrie Coleman MD;  [facility]     Anesthesia out of or mri N/A 4/18/2016     Procedure: ANESTHESIA OUT OF OR MRI;  Surgeon: GENERIC ANESTHESIA PROVIDER;  [facility]     Endoscopic insertion tube gastrostomy N/A 7/9/2016     Procedure: ENDOSCOPIC INSERTION TUBE GASTROSTOMY;  Surgeon: Erinn  Brenden Silva MD;  Location: [facility]     Picc insertion Right 1/9/2017     5fr DL BioFlo PICC, 38cm (1cm external) in the R basilic vein.       Medications (outpatient):    Current Facility-Administered Medications on File Prior to Encounter:  midazolam (VERSED) injection     Current Outpatient Prescriptions on File Prior to Encounter:  famotidine (PEPCID) 20 MG tablet Take 1 tablet (20 mg) by mouth 2 times daily   clonazePAM (KLONOPIN) 0.1 mg/mL Take 15 mLs (1.5 mg) by mouth 4 times daily Needs appt for refills   famotidine (PEPCID) 40 MG/5ML suspension Take 2.5 mLs (20 mg) by mouth 2 times daily as needed for heartburn   loperamide (IMODIUM A-D) 2 MG tablet 2-2 mg tablets per J-tube qid prn frequent and/or loose stools. Do not use more than 8 tabs per day.   polyethylene glycol 0.4%- propylene glycol 0.3% (SYSTANE ULTRA) 0.4-0.3 % SOLN ophthalmic solution Place 1-2 drops into both eyes 4 times daily as needed for dry eyes 0900, 1300, 1700, 2100   lactobacillus rhamnosus, GG, (CULTURELL) capsule Take 1 capsule by mouth 2 times daily   LORazepam (ATIVAN) 2 MG/ML (HIGH CONC) solution Take 0.25 mLs (0.5 mg) by mouth every 3 hours as needed for anxiety   sertraline (ZOLOFT) 20 MG/ML (HIGH CONC) solution 7.5 mLs (150 mg) by Per Feeding Tube route daily   fexofenadine (ALLEGRA) 180 MG tablet Take 1 tablet (180 mg) by mouth daily   fiber modular (NUTRISOURCE FIBER) packet 1 packet by Per J Tube route 3 times daily   Nutritional Supplements (JEVITY 1.5 IZZY) LIQD Take 5 Cans by mouth daily Per tube feeding   melatonin (MELATONIN) 1 MG/ML LIQD liquid 3 mLs (3 mg) by Per J Tube route At Bedtime   acetaminophen (TYLENOL) 160 MG/5ML oral liquid 20.3 mLs (650 mg) by Per Feeding Tube route every 4 hours as needed for mild pain   budesonide (PULMICORT) 0.5 MG/2ML nebulizer solution Take 2 mLs (0.5 mg) by nebulization 2 times daily   ipratropium (ATROVENT) 0.02 % nebulizer solution Take 2.5 mLs (0.5 mg) by nebulization every 4 hours    levalbuterol (XOPENEX) 1.25 MG/3ML nebulizer solution Take 3 mLs (1.25 mg) by nebulization every 4 hours   guaiFENesin (ORGANIDIN) 200 MG TABS Take 1 tablet (200 mg) by mouth 4 times daily   sodium chloride (OCEAN) 0.65 % nasal spray Spray 1 spray into both nostrils daily as needed for congestion   sulfamethoxazole-trimethoprim (BACTRIM,SEPTRA) 200-40 mg/5ml suspension 20 mLs (160 mg) by Per J Tube route daily Dose based on TMP component. 20 mLs = 160 mg   lidocaine 15 mL, alum & mag hydroxide-simethicone 15 mL GI Cocktail Take 30 mLs by mouth 3 times daily as needed for moderate pain   gabapentin (NEURONTIN) 250 MG/5ML solution 6 mLs (300 mg) by Per J Tube route 3 times daily   polyethylene glycol (MIRALAX/GLYCOLAX) Packet 17 g by Per J Tube route 2 times daily Hold for loose stools   senna-docusate (SENOKOT-S;PERICOLACE) 8.6-50 MG per tablet 2 tablets by Per J Tube route 2 times daily   QUEtiapine (SEROQUEL) 50 MG tablet Take 1 tablet (50 mg) by mouth 3 times daily Take 75mg at bedtime. Can take an additional 25mg 3 times a day and HS PRN   HYDROmorphone (DILAUDID) 1 MG/ML LIQD liquid Take 1 mL (1 mg) by mouth every 2 hours as needed for moderate to severe pain   calcium carbonate (TUMS) 500 MG chewable tablet Take 1 tablet (500 mg) by mouth every 2 hours as needed for heartburn   ondansetron (ZOFRAN) 4 MG tablet Take 1 tablet (4 mg) by mouth every 4 hours as needed for nausea   magnesium hydroxide (MILK OF MAGNESIA) 400 MG/5ML suspension Take via G-tube prn   meropenem (MERREM) 500 MG vial Inject 500 mg into the vein every 8 hours for 10 days   predniSONE 5 MG/5ML solution Take 20 mLs (20 mg) by mouth daily   Cranberry 500 MG CAPS Take 1 capsule (500 mg) by mouth daily       Allergy:  Allergies   Allergen Reactions     Levaquin Other (See Comments)     Delirium     Toro Podhaler [Tobramycin] Other (See Comments)     Severe congestion, SOB      Suprax [Cefixime]      See ceftibuten     Augmentin Nausea     Has  tolerated for treatment of UTIs in 2016.     Avelox [Moxifloxacin] Anxiety     Cedax [Ceftibuten] Other (See Comments)     Pain in eyes     Penicillins Itching     Tolerated amoxicillin without issues.     Vantin [Cefpodoxime] Nausea       Social History:   Social History     Social History     Marital Status:      Spouse Name: N/A     Number of Children: N/A     Years of Education: N/A     Occupational History     Not on file.     Social History Main Topics     Smoking status: Former Smoker -- 2.00 packs/day for 40 years     Types: Cigarettes     Start date: 01/01/1964     Quit date: 04/18/1998     Smokeless tobacco: Never Used     Alcohol Use: No     Drug Use: No     Sexual Activity: Not on file     Other Topics Concern     Not on file     Social History Narrative       Family History:  Family History   Problem Relation Age of Onset     CANCER Sister        ROS:   10 point ROS obtained with yes no questions due to patients non verbal status.  She complains of diffuse chest pain, abdominal pain and back pain, and confirms that none of these pain are new in any way.    Objective:  Physical exam:  /66 mmHg  Pulse 134  Temp(Src) 99.6  F (37.6  C) (Oral)  Resp 14  Wt 52 kg (114 lb 10.2 oz)  SpO2 97%  Wt Readings from Last 2 Encounters:   01/20/17 52 kg (114 lb 10.2 oz)   01/10/17 60.51 kg (133 lb 6.4 oz)     No intake or output data in the 24 hours ending 01/20/17 0608    Physical Exam:   General: Chronically ill appearing woman, awake, on ventilator through trach  HEENT: No Scleral icterus. NCAT, MMM. PERRL, EOMI.   Cardiac: Tachycardic, regular rhythm. No m/r/g  Pulm: coarse breath sounds bilaterally, ventilator sounds  Abd: Soft, mildly distended, non tender, no guarding or rigidity, but does nod yes to pain when asked during palpation  Skin: No jaundice, No rash. Multiple small tattoos on the lower extremities  Extremities: No LE edema  Joints: No inflammation noted on joints  Neuro: Awake and  alert, no focal neurological deficits but minimal movement of lower extremities likely secondary to deconditioning  Psych: flat affect, anxious appearing    Labs (Past three days):  CBC  Recent Labs  Lab 01/20/17  0300 01/19/17  0900 01/13/17  1300   WBC 26.1* 19.5* 23.5*   RBC 3.64* 3.73* 3.83   HGB 10.9* 11.4* 11.6*   HCT 36.0 36.5 36.9   MCV 99 98 96   MCH 29.9 30.6 30.3   MCHC 30.3* 31.2* 31.4*   RDW 15.8* 16.0* 15.5*    268 344       BMP    Recent Labs  Lab 01/20/17  0300 01/19/17  0900 01/13/17  1300     --  137   POTASSIUM 4.3  --  4.5   CHLORIDE 89*  --  95   CO2 >45Critical Value called to and read back byJOHN MARINO ON 1/20/17  BY 3922.*  --  39*   ANIONGAP Not Calculated  --  3   GLC 93  --  132*   BUN 29 32* 26   CR 0.58 0.58 0.61   GFRESTIMATED >90Non  GFR Calc >90Non  GFR Calc >90Non  GFR Calc   GFRESTBLACK >90African American GFR Calc >90African American GFR Calc >90African American GFR Calc   IZZY 8.5  --  8.7        INRNo lab results found in last 7 days.    Liver panel  Recent Labs  Lab 01/20/17  0300 01/19/17  0900 01/13/17  1300   PROTTOTAL 6.5*  --   --    ALBUMIN 2.7*  --   --    BILITOTAL 0.4  --   --    ALKPHOS 113  --   --    AST 22 19 24   ALT 26  --   --        Urinalysis  Recent Labs   Lab Test  01/07/17   1502   COLOR  Light Yellow   APPEARANCE  Slightly Cloudy   URINEGLC  Negative   URINEBILI  Negative   URINEKETONE  Negative   SG  1.010   UBLD  Small*   URINEPH  8.0*   PROTEIN  10*   NITRITE  Negative   LEUKEST  Moderate*   RBCU  44*   WBCU  51*       Cultures  Blood: Obtained X 2 pending    Urine: Old urine cultures reviewed, one from 1/7/17 shows two strains of pseudomonas with multidrug resistance, including pip/tazo. It is susceptible to Meropenem.    Imaging/procedure results:  # CXR:  IMPRESSION:  1. New small left pleural effusion and basilar opacity, likely  infection versus atelectasis.  2. Stable  emphysema.      ASSESSMENT & PLAN :  66 year old female with history of severe COPD s/p trach on home vent 24/7, anxiety, hypertension, achalasia s/p GJ tube who presented to the hospital with tachycardia.    #) Possible Sepsis  Patient with sinus tach and worsening leukocytosis, altered mental status, and possible evidence of either pulmonary infection or urinary tract infection. During her last admission, she was treated with Zosyn from 1/8-1/18, however, cultures showed that Pseudomonas from her urine was resistant to Zosyn. She has been on Meropenem for the past two days in her nursing facility. The caretaker reports that her acute episode of altered mental status had improved when I interviewed the patient, and was back at baseline, so I wonder if sepsis is really driving her current condition. However, objectively, her leukocytosis is worsening, and therefore will pursue ID workup  - repeat cultures  - continue meropenem for now  - Tachycardia improved after fluid hydration  - procal added, pending  - has indwelling bender, will need changed after completion of abx, has hx of urinary retention and the plan is to continue to keep the bender    #) Chronic Respiratory Failure  #) Chronic respiratory acidosis , metabolic alkalosis  Stable O2 needs and ventilator needs. Hypoxia prior to arrival seems to have resolved after suctioning by the nurse at her home.  - RT consult for chronic vent management  - Scheduled ipatropium, levalbuterol while awake  - Continue BID pulmicort    #) Chronic Anxiety  Currently on multiple agents. Attempted to wean during prior admission due to encephalopathy but ultimately unable to tolerate reduction in anxiolytics.  - Continue home clonazepam  - Continue home ativan  - Continue home hydromorphone for air hunger  - Continue home seroquel    FEN: Isosource 1.5 @ 85 mls/hr x 12h from 7243-6750.  Free water 125 ccs q4h.  Prophylaxis:  DVT: Mechanical  Disposition: pending further  evaluation by primary team  Code Status: DNR/DNI    Mandip ANALI COLEMAN  Internal Medicine  [facility]

## 2017-01-20 NOTE — IP AVS SNAPSHOT
` ` Patient Information     Patient Name Sex     Mary Wang ([id number]) Female 1950       Room Bed    27 6227-01      Patient Demographics     Address Phone E-mail Address    [address] [place] [address] [phone] (Home) *Preferred*  [phone] (Work)  [phone] (Mobile) [email or web address]      Patient Ethnicity & Race     Ethnic Group Patient Race    American White      Emergency Contact(s)     Name Relation Home Work Mobile    César Troy Power of  [phone]    No Secondary Contact Other None        Documents on File        Status Date Received Description       Documents for the Patient    Privacy Notice - [place] Received 14     Face Sheet Received 10/19/09     Insurance Card Received 14 bcbs/med    Patient ID Received 14 [place] ex 2018    Consent for Services - Hospital/Clinic Received 04/10/12     Consent for Services - Hospital/Clinic  04/10/12 GENERAL CONSENT FORM - ENGLISH    Consent to Communicate Received 12     Consent to Communicate  12 AUTHORIZATION TO DISCUSS PROTECTED HEALTH INFORMATION    Consent for Services - [facility] Received 12 CONSENT    [facility] Specified Other Received 16     Consent for Services - Hospital/Clinic Received 13     Consent for Services - Hospital/Clinic  13 CONSENT FOR SERVICE (SIGNED ON 13)    Consent for Services - Hospital/Clinic Received 14     Consent for Services - Hospital/Clinic Received 14 CONSENT FOR SERVICE    External Medication Information Consent Accepted 14     Insurance Card Received 14 Medicare Card    Insurance Card Received 14 Bc[employer] Federal Card    Business/Insurance/Care Coordination/Health Form - Patient  10/03/14 [employer] SUSTAINING MEDICAL EQUIPMENT FORM    Consent for Services - Hospital/Clinic Received 04/29/15     Power of  Not Received  Statutory Short form Power of      Advance Directives and Living Will Received 08/03/15 Health Care Directive 07/28/15    Advance Directives and Living Will Not Received  Validation of AD 07/28/15    HIM JOSE MARTIN Authorization - File Only  08/13/15 [facility] JOSE MARTIN Authorization - File Only  09/29/15 [facility] AUTHORIZATION FOR RELEASE OF PROTECTED HEALTH INFORMATION    HIM JOSE MARTIN Authorization - File Only  09/29/15 [facility] / [facility] AUTHORIZATION FOR RELEASE OF PROTECTED HEALTH INFORMATION    Consent for Services/Privacy Notice - Hospital/Clinic Received 01/20/16     Consent for Services/Privacy Notice - Hospital/Clinic  01/20/16 CONSENT FOR SERVICE/PRIVACY NOTICE    HIM JOSE MARTIN Authorization  02/04/16     HIM JOSE MARTIN Authorization  02/05/16     Business/Insurance/Care Coordination/Health Form - Patient  04/05/16 [employer], PRIOR AUTH APPROVAL- LEVALBUTEROL HCL NEBU, 4/1/16    HIM JOSE MARTIN Authorization  04/25/16     HIM JOSE MARTIN Authorization  05/24/16     HIM JOSE MARTIN Authorization  06/20/16     HIM JOSE MARTIN Authorization  07/12/16 [facility]    Medicare Lifetime Days Consent Received 07/18/16     HIM JOSE MARTIN Authorization  07/21/16     HIM JOSE MARTIN Authorization  07/26/16 [facility]    Consent for Services/Privacy Notice - Hospital/Clinic-[facility]sign Received 01/20/17     Medicare Lifetime Days Consent Received 09/13/16     Medicare Lifetime Days Consent Received 10/03/16     Consent for EHR Access Received 10/30/16     Medicare Lifetime Days Consent Received 11/01/16     Advance Directives and Living Will Received 01/09/17 POLST 01/06/17       Documents for the Encounter    CMS IM for Patient Signature Received 01/20/17       Admission Information     Attending Provider Admitting Provider Admission Type Admission Date/Time    Jenni Tellez MD Kc, MD [facility] 01/20/17  0245    Discharge Date Hospital Service Auth/Cert Status Service Area     Internal Medicine     Unit Room/Bed Admission Status    [facility] U6B  6227/6227-01 Admission (Confirmed)            Admission     Complaint    Sepsis (H)      Hospital Account     Name Acct ID Class Status Primary Coverage    Mary Wang [id number] Inpatient Open MEDICARE - MEDICARE            Guarantor Account (for Hospital Account #[id number])     Name Relation to Pt Service Area Active? Acct Type    Mary Wang Self FCS Yes Personal/Family    Address Phone          [address] [place] [address] [phone](H)              Coverage Information (for Hospital Account #[id number])     1. MEDICARE/MEDICARE     F/O Payor/Plan Precert #    MEDICARE/MEDICARE     Subscriber Subscriber #    Mary Wang [id number]    Address Phone    [employer]  [address]  [place] [address] [phone]          2. [employer]BS/[employer]     F/O Payor/Plan Precert #    BCBS/[employer] PRO[employer]     Subscriber Subscriber #    Mary Wang [id number]    Address Phone    [address] [address]  [place], [place] [id number] [phone]          3. STIVEN/STIVEN CARRERO     F/O Payor/Plan Precert #    STIVEN/STIVEN CARRERO     Subscriber Subscriber #    Mary Wang [id number]    Address Phone    [address]  [place] [id number] [phone]

## 2017-01-20 NOTE — IP AVS SNAPSHOT
MRN:[id number]                      After Visit Summary   1/20/2017    Mary Wang    MRN: [id number]           Thank you!     Thank you for choosing [facility] for your care. Our goal is always to provide you with excellent care. Hearing back from our patients is one way we can continue to improve our services. Please take a few minutes to complete the written survey that you may receive in the mail after you visit with us. Thank you!        Patient Information     Date Of Birth          1950        About your hospital stay     You were admitted on:  January 20, 2017 You last received care in the:  [address] [facility]    You were discharged on:  January 24, 2017        Reason for your hospital stay       SIRS                  Who to Call     For medical emergencies, please call 911.  For non-urgent questions about your medical care, please call your primary care provider or clinic, [phone]          Attending Provider     Provider    Eric Amezquita MD Kc, MD Davi Grullon, MD Jenni       Primary Care Provider Office Phone # Fax #    Norman Brito [phone]        [facility] [address] [place] [id number]        After Care Instructions     Activity       Your activity upon discharge: activity as tolerated            Diet       Follow this diet upon discharge: Orders Placed This Encounter  Adult Formula Drip Feeding: Specified Time Isosource 1.5; Jejunostomy; Goal Rate: 85; mL/hr; From: 8:00 PM; 8:00 AM; Medication - Tube Feeding Flush Frequency: At least 15-30 mL water before and after medication administration and with tube clogging;  NPO Time Specified            Ventilator       Per usual home settings                  Follow-up Appointments     Adult [facility] Follow-up and recommended labs and tests       Follow up with PCP in 1 week    CBC and BMP in 1 week  Appointments on [place]  and/or [facility] (with [facility] or [facility] provider or service). Call [phone] if you haven't heard regarding these appointments within 7 days of discharge.                  Your next 10 appointments already scheduled     Jan 26, 2017  9:45 AM   Return Visit with Norman Brito MD   [facility]    [address]  [address]  [facility] [address] [phone]              Additional Services     Home Care OT Referral for Hospital Discharge       Resumption of Home Care Services:  _______________________  [facility]  Phone  [phone]  Fax  [phone]  ______________________  OT to eval and treat    Your provider has ordered home care - occupational therapy. If you have not been contacted within 2 days of your discharge please call the department phone number listed on the top of this document.            Home Care PT Referral for Hospital Discharge       Resumption of Home Care Services:  _______________________  [facility]  Phone  [phone]  Fax  [phone]  ______________________      PT to eval and treat    Your provider has ordered home care - physical therapy. If you have not been contacted within 2 days of your discharge please call the department phone number listed on the top of this document.            Home Care SLP Referral for Hospital Discharge       Resumption of Home Care Services:  _______________________  [facility]  Phone  [phone]  Fax  [phone]  ______________________  SLP to eval and treat    Your provider has ordered home care - speech therapy. If you have not been contacted within 2 days of your discharge please call the department phone number listed on the top of this document.            Home infusion referral       [facility]  Phone # [phone]  Fax # [phone]     RESUMPTION OF SERVICE    Your provider has referred you to: JESE: [facility] [place] [phone]  [phone]   [email or web address]    Anticipated Length of Therapy: Additional service until 1/28/17    Home Infusion Pharmacist to adjust therapy based on labs and clinical assessments: Yes    Labs:  May draw labs from Venous Catheter: Yes  Home Infusion Pharmacist to order labs based on therapy type and clinical assessments: Yes  Call/Fax Lab Results to: ordering provider    Agency Staff to assess nursing needs for Infusion Therapy.    Access Device Management:  IV Access Type: PICC  Flush with Heparin and Normal Saline IVP PRN and routine site care (per agency protocol) to maintain access device? Yes                  Pending Results     Date and Time Order Name Status Description    1/21/2017 1103 Blood culture Preliminary     1/21/2017 1103 Blood culture Preliminary     1/20/2017 0309 Blood culture Preliminary             Statement of Approval     Ordered          01/24/17 8898  I have reviewed and agree with all the recommendations and orders detailed in this document.   EFFECTIVE NOW     Approved and electronically signed by:  Steven Sousa MD           01/23/17 3430  I have reviewed and agree with all the recommendations and orders detailed in this document.   EFFECTIVE NOW     Approved and electronically signed by:  Jenni Tellez MD             Admission Information        Provider Department Dept Phone    1/20/2017 Hank Tomlinson MD  [facility] [phone]      Your Vitals Were     Blood Pressure Pulse Temperature    147/84 mmHg 134 96.8  F (36  C) (Oral)    Respirations Height Weight    20 1.63 m (5' 4.17\") 55.339 kg (122 lb)    BMI (Body Mass Index) Pulse Oximetry       20.83 kg/m2 98%       MyChart Information     [facility] gives you secure access to your electronic health record. If you see a primary care provider, you can also send messages to your care team and make appointments. If you have questions, please call your primary care clinic.  If " you do not have a primary care provider, please call [phone] and they will assist you.        Care EveryWhere ID     This is your Care EveryWhere ID. This could be used by other organizations to access your [facility] medical records  [id number]           Review of your medicines      START taking        Dose / Directions    lidocaine 5 % Patch   Commonly known as:  LIDODERM   Used for:  Urinary tract infection, site not specified        Dose:  1-2 patch   Place 1-2 patches onto the skin every 24 hours Apply to the lower back   Quantity:  30 patch   Refills:  0         CONTINUE these medicines which may have CHANGED, or have new prescriptions. If we are uncertain of the size of tablets/capsules you have at home, strength may be listed as something that might have changed.        Dose / Directions    famotidine 40 MG/5ML suspension   Commonly known as:  PEPCID   This may have changed:  Another medication with the same name was removed. Continue taking this medication, and follow the directions you see here.   Used for:  Mild gas use disorder        Dose:  20 mg   Take 2.5 mLs (20 mg) by mouth 2 times daily as needed for heartburn   Quantity:  75 mL   Refills:  3       meropenem 500 MG vial   Commonly known as:  MERREM   Indication:  Urinary Tract Infection   This may have changed:  when to take this   Used for:  SIRS (systemic inflammatory response syndrome) (H)        Dose:  500 mg   Inject 500 mg into the vein every 6 hours for 5 days   Quantity:  200 mL   Refills:  0       QUEtiapine 50 MG tablet   Commonly known as:  SEROQUEL   This may have changed:    - when to take this  - additional instructions   Used for:  SIRS (systemic inflammatory response syndrome) (H)        Dose:  50 mg   Take 1 tablet (50 mg) by mouth 2 times daily   Quantity:  120 tablet   Refills:  0         CONTINUE these medicines which have NOT CHANGED        Dose / Directions    acetaminophen 160 MG/5ML solution   Commonly known as:   TYLENOL   Used for:  Other chronic pain        Dose:  650 mg   20.3 mLs (650 mg) by Per Feeding Tube route every 4 hours as needed for mild pain   Quantity:  300 mL   Refills:  0       budesonide 0.5 MG/2ML neb solution   Commonly known as:  PULMICORT   Used for:  Shortness of breath, Respiratory difficulty        Dose:  0.5 mg   Take 2 mLs (0.5 mg) by nebulization 2 times daily   Quantity:  60 ampule   Refills:  0       calcium carbonate 500 MG chewable tablet   Commonly known as:  TUMS        Dose:  1 chew tab   Take 1 tablet (500 mg) by mouth every 2 hours as needed for heartburn   Quantity:  150 tablet   Refills:  0       clonazePAM 0.1 mg/mL   Commonly known as:  klonoPIN   Used for:  Shortness of breath        Dose:  1.5 mg   Take 15 mLs (1.5 mg) by mouth 4 times daily Needs appt for refills   Quantity:  420 mL   Refills:  0       Cranberry 500 MG Caps        Dose:  500 mg   Take 1 capsule (500 mg) by mouth daily   Quantity:  90 capsule   Refills:  0       fexofenadine 180 MG tablet   Commonly known as:  ALLEGRA   Used for:  COPD exacerbation (H)        Dose:  180 mg   Take 1 tablet (180 mg) by mouth daily   Quantity:  30 tablet   Refills:  0       fiber modular packet   Used for:  Diarrhea, unspecified type        Dose:  1 packet   1 packet by Per J Tube route 3 times daily   Quantity:  42 packet   Refills:  0       gabapentin 250 MG/5ML solution   Commonly known as:  NEURONTIN   Used for:  Anxiety        Dose:  300 mg   6 mLs (300 mg) by Per J Tube route 3 times daily   Quantity:  450 mL   Refills:  0       guaiFENesin 200 MG Tabs tablet   Commonly known as:  ORGANIDIN   Used for:  Shortness of breath        Dose:  200 mg   Take 1 tablet (200 mg) by mouth 4 times daily   Quantity:  115 tablet   Refills:  0       HYDROmorphone 1 MG/ML Liqd liquid   Commonly known as:  DILAUDID   Used for:  Atypical chest pain        Dose:  1 mg   Take 1 mL (1 mg) by mouth every 2 hours as needed for moderate to severe pain    Quantity:  30 mL   Refills:  0       ipratropium 0.02 % neb solution   Commonly known as:  ATROVENT   Used for:  Shortness of breath        Dose:  0.5 mg   Take 2.5 mLs (0.5 mg) by nebulization every 4 hours   Quantity:  450 mL   Refills:  0       JEVITY 1.5 IZZY Liqd   Used for:  Achalasia        Dose:  5 Can   Take 5 Cans by mouth daily Per tube feeding   Quantity:  150 Can   Refills:  11       lactobacillus rhamnosus (GG) capsule   Used for:  Diarrhea, unspecified type        Dose:  1 capsule   Take 1 capsule by mouth 2 times daily   Quantity:  60 capsule   Refills:  0       levalbuterol 1.25 MG/3ML neb solution   Commonly known as:  XOPENEX   Used for:  Chronic obstructive pulmonary disease with acute exacerbation (H)        Dose:  1 ampule   Take 3 mLs (1.25 mg) by nebulization every 4 hours   Quantity:  540 mL   Refills:  0       lidocaine 15 mL, alum & mag hydroxide-simethicone 15 mL GI Cocktail   Used for:  Gastroesophageal reflux disease without esophagitis        Dose:  30 mL   Take 30 mLs by mouth 3 times daily as needed for moderate pain   Quantity:  500 mL   Refills:  0       loperamide 2 MG tablet   Commonly known as:  IMODIUM A-D   Used for:  Frequent stools        2-2 mg tablets per J-tube qid prn frequent and/or loose stools. Do not use more than 8 tabs per day.   Quantity:  30 tablet   Refills:  0       LORazepam 2 MG/ML (HIGH CONC) solution   Commonly known as:  ATIVAN   Used for:  COPD exacerbation (H), Anxiety        Dose:  0.5 mg   Take 0.25 mLs (0.5 mg) by mouth every 3 hours as needed for anxiety   Quantity:  15 mL   Refills:  0       melatonin 1 MG/ML Liqd liquid   Used for:  Anxiety, Insomnia due to medical condition        Dose:  3 mg   3 mLs (3 mg) by Per J Tube route At Bedtime   Quantity:  300 mL   Refills:  0       ondansetron 4 MG tablet   Commonly known as:  ZOFRAN        Dose:  4 mg   Take 1 tablet (4 mg) by mouth every 4 hours as needed for nausea   Quantity:  18 tablet   Refills:   0       polyethylene glycol 0.4%- propylene glycol 0.3% 0.4-0.3 % Soln ophthalmic solution   Commonly known as:  SYSTANE ULTRA   Used for:  Dry eyes, bilateral        Dose:  1-2 drop   Place 1-2 drops into both eyes 4 times daily as needed for dry eyes 0900, 1300, 1700, 2100   Quantity:  1 Bottle   Refills:  3       polyethylene glycol Packet   Commonly known as:  MIRALAX/GLYCOLAX   Used for:  Constipation, unspecified constipation type        Dose:  17 g   17 g by Per J Tube route 2 times daily Hold for loose stools   Quantity:  100 packet   Refills:  0       predniSONE 5 MG/5ML solution   Used for:  Shortness of breath        Dose:  20 mg   Take 20 mLs (20 mg) by mouth daily   Quantity:  500 mL   Refills:  0       senna-docusate 8.6-50 MG per tablet   Commonly known as:  SENOKOT-S;PERICOLACE   Used for:  Constipation, unspecified constipation type        Dose:  2 tablet   2 tablets by Per J Tube route 2 times daily   Quantity:  100 tablet   Refills:  0       sertraline 20 MG/ML (HIGH CONC) solution   Commonly known as:  ZOLOFT   Used for:  Anxiety        Dose:  150 mg   7.5 mLs (150 mg) by Per Feeding Tube route daily   Quantity:  105 mL   Refills:  0       sodium chloride 0.65 % nasal spray   Commonly known as:  OCEAN   Used for:  Chronic sinusitis, unspecified location        Dose:  1 spray   Spray 1 spray into both nostrils daily as needed for congestion   Quantity:  1 Bottle   Refills:  0       sulfamethoxazole-trimethoprim suspension   Commonly known as:  BACTRIM/SEPTRA   Used for:  Chronic obstructive pulmonary disease with acute exacerbation (H)        Dose:  20 mL   20 mLs (160 mg) by Per J Tube route daily Dose based on TMP component. 20 mLs = 160 mg   Quantity:  560 mL   Refills:  0         STOP taking     MILK OF MAGNESIA 400 MG/5ML suspension   Generic drug:  magnesium hydroxide                Where to get your medicines      These medications were sent to [facility] -  [place], [place] - [address]  [address], [facility] [address]     Phone:  [phone]    - lidocaine 5 % Patch      Some of these will need a paper prescription and others can be bought over the counter. Ask your nurse if you have questions.     Bring a paper prescription for each of these medications    - clonazePAM 0.1 mg/mL    You don't need a prescription for these medications    - meropenem 500 MG vial  - QUEtiapine 50 MG tablet      Information about where to get these medications is not yet available     ! Ask your nurse or doctor about these medications    - HYDROmorphone 1 MG/ML Liqd liquid  - LORazepam 2 MG/ML (HIGH CONC) solution             Protect others around you: Learn how to safely use, store and throw away your medicines at www.disposemymeds.org.             Medication List: This is a list of all your medications and when to take them. Check marks below indicate your daily home schedule. Keep this list as a reference.      Medications           Morning Afternoon Evening Bedtime As Needed    acetaminophen 160 MG/5ML solution   Commonly known as:  TYLENOL   20.3 mLs (650 mg) by Per Feeding Tube route every 4 hours as needed for mild pain   Last time this was given:  650 mg on 1/21/2017  7:49 AM                                budesonide 0.5 MG/2ML neb solution   Commonly known as:  PULMICORT   Take 2 mLs (0.5 mg) by nebulization 2 times daily   Last time this was given:  0.5 mg on 1/24/2017  8:57 AM                                calcium carbonate 500 MG chewable tablet   Commonly known as:  TUMS   Take 1 tablet (500 mg) by mouth every 2 hours as needed for heartburn   Last time this was given:  500 mg on 1/21/2017  3:04 PM                                clonazePAM 0.1 mg/mL   Commonly known as:  klonoPIN   Take 15 mLs (1.5 mg) by mouth 4 times daily Needs appt for refills   Last time this was given:  1.5 mg on 1/24/2017 11:29 AM                                Cranberry 500 MG Caps    Take 1 capsule (500 mg) by mouth daily                                famotidine 40 MG/5ML suspension   Commonly known as:  PEPCID   Take 2.5 mLs (20 mg) by mouth 2 times daily as needed for heartburn                                fexofenadine 180 MG tablet   Commonly known as:  ALLEGRA   Take 1 tablet (180 mg) by mouth daily   Last time this was given:  180 mg on 1/24/2017  9:18 AM                                fiber modular packet   1 packet by Per J Tube route 3 times daily   Last time this was given:  1 packet on 1/21/2017  7:50 AM                                gabapentin 250 MG/5ML solution   Commonly known as:  NEURONTIN   6 mLs (300 mg) by Per J Tube route 3 times daily   Last time this was given:  300 mg on 1/24/2017  1:10 PM                                guaiFENesin 200 MG Tabs tablet   Commonly known as:  ORGANIDIN   Take 1 tablet (200 mg) by mouth 4 times daily                                HYDROmorphone 1 MG/ML Liqd liquid   Commonly known as:  DILAUDID   Take 1 mL (1 mg) by mouth every 2 hours as needed for moderate to severe pain   Last time this was given:  1 mg on 1/24/2017  1:10 PM                                ipratropium 0.02 % neb solution   Commonly known as:  ATROVENT   Take 2.5 mLs (0.5 mg) by nebulization every 4 hours   Last time this was given:  0.5 mg on 1/24/2017 12:26 PM                                JEVITY 1.5 IZZY Liqd   Take 5 Cans by mouth daily Per tube feeding                                lactobacillus rhamnosus (GG) capsule   Take 1 capsule by mouth 2 times daily                                levalbuterol 1.25 MG/3ML neb solution   Commonly known as:  XOPENEX   Take 3 mLs (1.25 mg) by nebulization every 4 hours   Last time this was given:  1.25 mg on 1/24/2017 12:26 PM                                lidocaine 15 mL, alum & mag hydroxide-simethicone 15 mL GI Cocktail   Take 30 mLs by mouth 3 times daily as needed for moderate pain                                lidocaine  5 % Patch   Commonly known as:  LIDODERM   Place 1-2 patches onto the skin every 24 hours Apply to the lower back   Last time this was given:  1 patch on 1/22/2017  8:49 PM                                loperamide 2 MG tablet   Commonly known as:  IMODIUM A-D   2-2 mg tablets per J-tube qid prn frequent and/or loose stools. Do not use more than 8 tabs per day.                                LORazepam 2 MG/ML (HIGH CONC) solution   Commonly known as:  ATIVAN   Take 0.25 mLs (0.5 mg) by mouth every 3 hours as needed for anxiety   Last time this was given:  0.5 mg on 1/24/2017 10:16 AM                                melatonin 1 MG/ML Liqd liquid   3 mLs (3 mg) by Per J Tube route At Bedtime   Last time this was given:  3 mg on 1/23/2017 10:23 PM                                meropenem 500 MG vial   Commonly known as:  MERREM   Inject 500 mg into the vein every 6 hours for 5 days   Last time this was given:  500 mg on 1/24/2017 11:29 AM                                ondansetron 4 MG tablet   Commonly known as:  ZOFRAN   Take 1 tablet (4 mg) by mouth every 4 hours as needed for nausea                                polyethylene glycol 0.4%- propylene glycol 0.3% 0.4-0.3 % Soln ophthalmic solution   Commonly known as:  SYSTANE ULTRA   Place 1-2 drops into both eyes 4 times daily as needed for dry eyes 0900, 1300, 1700, 2100                                polyethylene glycol Packet   Commonly known as:  MIRALAX/GLYCOLAX   17 g by Per J Tube route 2 times daily Hold for loose stools   Last time this was given:  17 g on 1/24/2017  9:18 AM                                predniSONE 5 MG/5ML solution   Take 20 mLs (20 mg) by mouth daily   Last time this was given:  20 mg on 1/24/2017  9:19 AM                                QUEtiapine 50 MG tablet   Commonly known as:  SEROQUEL   Take 1 tablet (50 mg) by mouth 2 times daily   Last time this was given:  50 mg on 1/24/2017  2:53 PM                                senna-docusate  8.6-50 MG per tablet   Commonly known as:  SENOKOT-S;PERICOLACE   2 tablets by Per J Tube route 2 times daily   Last time this was given:  2 tablets on 1/21/2017  7:49 AM                                sertraline 20 MG/ML (HIGH CONC) solution   Commonly known as:  ZOLOFT   7.5 mLs (150 mg) by Per Feeding Tube route daily   Last time this was given:  150 mg on 1/24/2017  9:20 AM                                sodium chloride 0.65 % nasal spray   Commonly known as:  OCEAN   Spray 1 spray into both nostrils daily as needed for congestion                                sulfamethoxazole-trimethoprim suspension   Commonly known as:  BACTRIM/SEPTRA   20 mLs (160 mg) by Per J Tube route daily Dose based on TMP component. 20 mLs = 160 mg   Last time this was given:  160 mg on 1/24/2017  9:20 AM

## 2017-01-21 NOTE — PHARMACY-VANCOMYCIN DOSING SERVICE
Pharmacy Vancomycin Initial Note  Date of Service 2017  Patient's  1950  66 year old, female    Indication: Bacteremia    Current estimated CrCl = Estimated Creatinine Clearance: 97.4 mL/min (based on Cr of 0.46).    Creatinine for last 3 days  2017:  9:00 AM Creatinine 0.58 mg/dL  2017:  3:00 AM Creatinine 0.58 mg/dL  2017:  6:42 AM Creatinine 0.46 mg/dL*    Recent Vancomycin Level(s) for last 3 days  No results found for requested labs within last 3 days.      Vancomycin IV Administrations (past 72 hours)      No vancomycin orders with administrations in past 72 hours.                Nephrotoxins and other renal medications (Future)    Start     Dose/Rate Route Frequency Ordered Stop    17 1100  vancomycin (VANCOCIN) 1000 mg in dextrose 5% 200 mL PREMIX      1,000 mg Intravenous EVERY 12 HOURS 17 1032            Contrast Orders - past 72 hours     None                Plan:  1.  Start vancomycin  1000 mg IV q12h.   2.  Goal Trough Level: 15-20 mg/L   3.  Pharmacy will check trough levels as appropriate in 1-3 Days.    4. Serum creatinine levels will be ordered daily for the first week of therapy and at least twice weekly for subsequent weeks.    5. [place] method utilized to dose vancomycin therapy: Method 2    Cristofer Hudson

## 2017-01-21 NOTE — PATIENT INSTRUCTIONS
Recommendations from today's MTM visit:                                                    MTM (medication therapy management) is a service provided by a clinical pharmacist designed to help you get the most of out of your medicines.   Today we reviewed what your medicines are for, how to know if they are working, that your medicines are safe and how to make your medicine regimen as easy as possible.     1. Stop multivitamin and aspirin    2. Switched GI medicines to G-tube administration    3. Will wait for palliative care recommendations to make additional changes      Next MTM visit: 1 month as needed    To schedule another MTM appointment, please call the clinic directly or you may call the MTM scheduling line at [phone] or toll-free at [phone].     My Clinical Pharmacist's contact information:                                                      It was a pleasure seeing you today!  Please feel free to contact me with any questions or concerns you have.      Haydee Feliciano, PharmD, [facility]   [phone]    You may receive a survey about the MTM services you received.  I would appreciate your feedback to help me serve you better in the future. Please fill it out and return it when you can. Your comments will be anonymous.

## 2017-01-21 NOTE — PROGRESS NOTES
CLINICAL NUTRITION SERVICES - ASSESSMENT NOTE     Nutrition Prescription    Malnutrition Status:    Does not meet criteria, maintained with chronic tube feeds    Recommendations already ordered by Registered Dietitian (RD):  Home TPN:  Isosource 1.5 @ 85 ml/hr x 12 hrs (overnight cycle from 8pm - 8am)  Provides 1020 mls, 1530 kcals (26 kcal/kg), 69 gms protein (1.2 gm/kg), 15 gms fiber, 775 mls H2O    Future/Additional Recommendations:  Check TF tolerance and potential need to add protein modular     REASON FOR ASSESSMENT  Mary Wang is a/an 66 year old female assessed by the dietitian for Provider Order - Registered Dietitian to Assess and Order TF per Medical Nutrition Therapy Protocol    NUTRITION HISTORY  Pt known well to service; pt does not consume orally, she is on chronic nightly tube feeds with trach.  Tube feeds at home - Isosource 1.5 @ 85 ml/hr x 12 hrs (overnight cycle from 8pm - 8am), flush with 125 mls q4hr    CURRENT NUTRITION ORDERS  Diet: NPO    RD consulted to initiate and manage tube feeds. Will restart pt's home regimen as seen above. RD to continue to monitor tolerance and the potential need to adjust.   Will continue to monitor weight, may need to consider increasing length of cycle vs increasing hourly volume; for now, monitor with home regimen.     LABS  Labs reviewed    MEDICATIONS  Medications reviewed    ANTHROPOMETRICS  Height: 163 cm  Most Recent Weight: 52 kg (114 lb 10.2 oz)    IBW: 55 kg  BMI: Normal BMI  Weight History:   Wt Readings from Last 10 Encounters:   01/21/17 52 kg (114 lb 10.2 oz)   01/10/17 60.51 kg (133 lb 6.4 oz)   01/05/17 59 kg (130 lb 1.1 oz)   12/28/16 53.5 kg (117 lb 15.1 oz)   12/22/16 53.978 kg (119 lb)   12/05/16 56.836 kg (125 lb 4.8 oz)   11/28/16 54.023 kg (119 lb 1.6 oz)   11/11/16 62 kg (136 lb 11 oz)   10/04/16 56 kg (123 lb 7.3 oz)   09/16/16 53.2 kg (117 lb 4.6 oz)   Weight down, question accuracy vs true loss - 2% loss in the past 5  monts    Dosing Weight: 52 kg    ASSESSED NUTRITION NEEDS  Estimated Energy Needs: 1300 - 1560 kcals/day (25 - 30 kcals/kg)  Justification: Maintenance  Estimated Protein Needs: 62 - 78 grams protein/day (1.2 - 1.5 grams of pro/kg)  Justification: Repletion  Estimated Fluid Needs: per MD    PHYSICAL FINDINGS  See malnutrition section below.    MALNUTRITION  % Intake: No decreased intake noted  % Weight Loss: Does not meet criteria  Subcutaneous Fat Loss: No overt signs observed  Muscle Loss: pt appears to have moderate/severe wasting in arms and legs, but difficult to quantify  Fluid Accumulation/Edema: None noted  Malnutrition Diagnosis: Patient does not meet two of the above criteria necessary for diagnosing malnutrition    NUTRITION DIAGNOSIS  Inadequate oral intake related to chronic trach w/ inability to safely swallow as evidenced by NPO, on chronic tube feeds    INTERVENTIONS  Implementation  Enteral Nutrition - Initiate    Goals  Total avg nutritional intake to meet a minimum of 25 kcal/kg and 1.2 g PRO/kg daily (per dosing wt 52 kg).    Monitoring/Evaluation  Progress toward goals will be monitored and evaluated per protocol.    Moy Hayes MS, RD, LD, CNSC

## 2017-01-21 NOTE — PROVIDER NOTIFICATION
DATE:  1/21/2017   TIME OF RECEIPT FROM LAB: 5756  LAB TEST:  Blood cx (R arm) strain idenitifed  LAB VALUE: Staphylococcus  RESULTS GIVEN WITH READ-BACK TO (PROVIDER):  Dr. Willy Crespo [facility]  TIME LAB VALUE REPORTED TO PROVIDER:  7830

## 2017-01-21 NOTE — PLAN OF CARE
Problem: Goal Outcome Summary  Goal: Goal Outcome Summary  Outcome: No Change  Pt alert when family here and became increasingly somnolent after they left, responding to repeated stimulation. VSS on mech ventilator to Bivona 6 trach. Held Klonopin, Seroquel and Melatonin d/t somnolence. Head CT neg. Jtube clamped, G to gravity, nutrition consult place to start TF tomorrow. Avendaño patent with adequate urine output. Continue with POC and notify MD of any changes.

## 2017-01-21 NOTE — PROGRESS NOTES
[facility], [place]   Internal Medicine Daily Note          Assessment and Plan:     66 year old female with history of severe COPD s/p trach on home vent 24/7, anxiety, hypertension, achalasia s/p GJ tube who presented to the hospital with tachycardia, now thought to be due to bacteremia.  #) Sepsis due to gram positive bacteremia   Patient with sinus tach and worsening leukocytosis, altered mental status.    She was already being treated with meropenem from 1/18  For a pulmonary infection at the  Care facility  During her last admission, she was treated with Zosyn from 1/8-1/18, however, cultures showed that Pseudomonas from her urine was resistant to Zosyn. Urine analysis this time ( 1/20) does not show any evidence of infection.  - Cultures here positive for gram positive cocci from the right hand on 1/20 . Will repeat blood cultures today ( 1/21)   - continue meropenem  And add vancomycin   - Fluid hydration   - has indwelling bender, will need changed after completion of abx, has hx of urinary retention and the plan is to continue to keep the bender    #) Chronic Respiratory Failure  #) Chronic respiratory acidosis , metabolic alkalosis  Stable O2 needs and ventilator needs. Hypoxia prior to arrival seems to have resolved after suctioning by the nurse at her home.  - RT consult for chronic vent management  - Scheduled ipatropium, levalbuterol while awake  - Continue BID pulmicort    #) Chronic Anxiety  Currently on multiple agents. Attempted to wean during prior admission due to encephalopathy but ultimately unable to tolerate reduction in anxiolytics.  - Continue home clonazepam  - Continue home ativan  - Continue home hydromorphone for air hunger  - Continue home seroquel       Hold above medications if drowsy       Consulting teams: None   Code status: DNR  DVT Prophylaxis: PCD's   Gastric prophylaxis: none   Diet: Tube feeds   Disposition: to be determined       Patient seen and  "examined with RN         Interval History:   Progress notes in the last 24 hrs by MDT team has been reviewed.  Mary was drowsy for most of the night and her benzodiazepines were held. This morning, she says that she has trouble breathing. However, on evaluation of her ventilator, she was not seen to be taking any breaths at all. Machine was set at 14 breaths/ minute and she was not taking nay extra    No fevers or chills   Positive blood culture noted          Review of Systems:   A comprehensive review of systems was performed and found to be negative except: Those that are outlined in interval history              Medications:   I have reviewed this patient's current medications which are outlined in the \"current medication\" section of EPIC             Physical Exam:   Vitals were reviewed  Temp: 97.4  F (36.3  C) Temp src: Oral BP: 121/59 mmHg   Heart Rate: 71 Resp: 14 SpO2: 98 % O2 Device: Mechanical Ventilator Oxygen Delivery: 3 LPM  Constitutional:   fatigued, alert, cooperative, mild distress and appears stated age     Lungs:   Coarse breath sounds bilaterally      Cardiovascular:   Normal apical impulse, regular rate and rhythm, normal S1 and S2, no S3 or S4, and no murmur noted     Abdomen:   No scars, normal bowel sounds, soft, non-distended, non-tender, no masses palpated, no hepatosplenomegally     Musculoskeletal:   no lower extremity pitting edema present  there is no redness, warmth, or swelling of the joints     Neurologic:   Awake, alert, oriented to name, place and time.  Cranial nerves II-XII are grossly intact.             Data:     Most recent labs have been evaluated and relevant labs are outlined below :  BMP    Recent Labs  Lab 01/21/17  0642 01/20/17  0300 01/19/17  0900    138  --    POTASSIUM 3.4 4.3  --    CHLORIDE 99 89*  --    IZZY 7.7* 8.5  --    CO2 40* >45Critical Value called to and read back Domenic MARINO ON 1/20/17  BY 3922.*  --    BUN 16 29 32*   CR 0.46* 0.58 0.58 "   GLC 82 93  --      CBC    Recent Labs  Lab 01/21/17  0642 01/20/17  0300 01/19/17  0900   WBC 10.7 26.1* 19.5*   RBC 2.68* 3.64* 3.73*   HGB 8.0* 10.9* 11.4*   HCT 26.2* 36.0 36.5   MCV 98 99 98   MCH 29.9 29.9 30.6   MCHC 30.5* 30.3* 31.2*   RDW 15.7* 15.8* 16.0*    251 268     INRNo lab results found in last 7 days.  LFTs    Recent Labs  Lab 01/21/17  0642 01/20/17  0300 01/19/17  0900   ALKPHOS 79 113  --    AST 23 22 19   ALT 22 26  --    BILITOTAL 0.4 0.4  --    PROTTOTAL 5.2* 6.5*  --    ALBUMIN 2.0* 2.7*  --       PANCNo lab results found in last 7 days.      Dr THUAN Mckinney MD  Hospitalist ( Internal medicine)  Pager: [phone]

## 2017-01-21 NOTE — PLAN OF CARE
Problem: Goal Outcome Summary  Goal: Goal Outcome Summary  Outcome: No Change  Neuro: Awake, alert and anxious for the majority of the night. PRN ativan given X1.   Cardiac: SR w/ rates 70-80s. VSS. Afebrile.              Respiratory: Sating high 90s on vent w/ 3L bleed. Minimal suctioning required.   GI/: Adequate urine output via bender. No BM overnight.   Diet/appetite: NPO. To start tube feedings today.  Activity:  Up with assist of 2. Repositioned as frequently as pt allowed.   Pain: C/o back pain; oxycodone given.   Skin: Fragile skin. No new skin issues noted.     R: Continue with POC. Notify primary team with changes.

## 2017-01-21 NOTE — PHARMACY-CONSULT NOTE
Pharmacy Tube Feeding Consult    Medication reviewed for administration by feeding tube and for potential food/drug interactions.    Recommendation: Recommend changing the following medications to a liquid dosage form: Senna-S tablets (done)     Pharmacy will continue to follow as new medications are ordered.

## 2017-01-22 NOTE — PROGRESS NOTES
Calorie Counts    Intake recorded for: 1/21 Kcals: 684 Protein: 32g    # Meals recorded: 100% Meatloaf with gravy, 8 oz Glass of 1% Milk, Sausage Sarahi with Gravy, 2 Fruit Ices    # Supplements recorded: 0

## 2017-01-22 NOTE — PLAN OF CARE
"Problem: Goal Outcome Summary  Goal: Goal Outcome Summary  Outcome: No Change  /83 mmHg  Pulse 134  Temp(Src) 98.6  F (37  C) (Oral)  Resp 14  Ht 1.63 m (5' 4.17\")  Wt 52 kg (114 lb 10.2 oz)  BMI 19.57 kg/m2  SpO2 98%    NEURO: Lethargic, oriented x4, forgetful, anxious, repeated requests. Pressing call button q 30-60 seconds. Generalized weakness & mild bilateral foot drop.  CV: Tele SR 80s. SBP 130s-150s.   PULM: LS coarse in upper lobes. SpO2 > 97% on mechanical ventilator + 3L O2 (home settings). Inline suctioning every 3hrs- thin, white sputum.   GI: NPO. Will resume home TF regimen @ 2000. G tube to gravity drainage. Meds via J tube. Taking protonix and tums for indigestion.    : Avendaño patent with adequate UOP.   SKIN: Pale, small area of nonintact skin on sacrum (pre-existing)- covered with foam dressing.   LABS: WBCs 10.7, , Lactic Acid 1.3. Blood cx pos for staph, vanco initiated.   LDA: R PICC.   PAIN: Repositioning, tylenol 650mg, hydromorphone 1mg for generalized pain & back pain. PRN 0.5mg lorazepam & scheduled 1.5mg clonazepam for anxiety.  ACTIVITY: Turn/repo with 1-2 assist q1-2hrs.   PLAN: Continue to monitor s/s worsening infection. Continue plan of care.         "

## 2017-01-22 NOTE — PROGRESS NOTES
[facility], [place]   Internal Medicine Daily Note          Assessment and Plan:     66 year old female with history of severe COPD s/p trach on home vent 24/7, anxiety, hypertension, achalasia s/p GJ tube who presented to the hospital with tachycardia, now thought to be due to bacteremia.  #) Sepsis due to gram positive bacteremia   Patient  Presented with sinus tachycardia  and worsening leukocytosis, altered mental status.    She was already being treated with meropenem from 1/18  For a pulmonary infection at the  Care facility  During her last admission, she was treated with Zosyn from 1/8-1/18, however, cultures showed that Pseudomonas from her urine was resistant to Zosyn. Urine analysis this time ( 1/20) does not show any evidence of infection, cultures pending   - Cultures here positive for gram positive cocci from the right hand on 1/20. This is awaiting speciation. Repeat cultures from 1/21 with negative growth so far.  - continue meropenem  And vancomycin initiated on 1/21  - has indwelling bender, will need changed after completion of abx, has hx of urinary retention and the plan is to continue to keep the bender    #) Chronic Respiratory Failure  #) Chronic respiratory acidosis , metabolic alkalosis  Stable O2 needs and ventilator needs. Hypoxia prior to arrival seems to have resolved after suctioning by the nurse at her home.  - RT consult for chronic vent management( On assist control mode with RR at 14, TV at 400 and PEEP of 5)  - Scheduled ipatropium, levalbuterol while awake  - Continue BID pulmicort    #) Chronic Anxiety  Currently on multiple agents. Attempted to wean during prior admission due to encephalopathy but ultimately unable to tolerate reduction in anxiolytics.  - Continue home clonazepam  - Continue home ativan  - Continue home hydromorphone for air hunger  - Continue home seroquel       Hold above medications if drowsy       Consulting teams: None   Code  "status: DNR  DVT Prophylaxis: PCD's   Gastric prophylaxis: none   Diet: Tube feeds   Disposition: to be determined       Patient seen and examined with RN    Discussed plan of care with Daughter César         Interval History:   Progress notes in the last 24 hrs by MDT team has been reviewed.   Mary appears more alert and awake this morning   No fevers or chills overnight  Appeared to say that she was feeling better  No new complaints          Review of Systems:   A comprehensive review of systems was performed and found to be negative except: Those that are outlined in interval history              Medications:   I have reviewed this patient's current medications which are outlined in the \"current medication\" section of EPIC             Physical Exam:   Vitals were reviewed  Temp: 97.9  F (36.6  C) Temp src: Oral BP: 119/65 mmHg   Heart Rate: 106 Resp: 16 SpO2: 99 % O2 Device: Mechanical Ventilator Oxygen Delivery: 3 LPM  Constitutional:   fatigued, alert, cooperative, mild distress and appears stated age     Lungs:   Coarse breath sounds bilaterally      Cardiovascular:   Normal apical impulse, regular rate and rhythm, normal S1 and S2, no S3 or S4, and no murmur noted     Abdomen:   No scars, normal bowel sounds, soft, non-distended, non-tender, no masses palpated, no hepatosplenomegally     Musculoskeletal:   no lower extremity pitting edema present  there is no redness, warmth, or swelling of the joints     Neurologic:   Awake, alert, oriented to name, place and time.  Cranial nerves II-XII are grossly intact.             Data:     Most recent labs have been evaluated and relevant labs are outlined below :  BMP    Recent Labs  Lab 01/22/17  0558 01/21/17  0642 01/20/17  0300 01/19/17  0900    142 138  --    POTASSIUM 3.3* 3.4 4.3  --    CHLORIDE 95 99 89*  --    IZZY 8.2* 7.7* 8.5  --    CO2 38* 40* >45Critical Value called to and read back Domenic MARINO ON 1/20/17  BY 3920.*  --    BUN 17 16 29 32* "   CR 0.58 0.46* 0.58 0.58   * 82 93  --      CBC    Recent Labs  Lab 01/22/17  0558 01/21/17  0642 01/20/17  0300 01/19/17  0900   WBC 11.8* 10.7 26.1* 19.5*   RBC 2.93* 2.68* 3.64* 3.73*   HGB 8.8* 8.0* 10.9* 11.4*   HCT 28.6* 26.2* 36.0 36.5   MCV 98 98 99 98   MCH 30.0 29.9 29.9 30.6   MCHC 30.8* 30.5* 30.3* 31.2*   RDW 15.2* 15.7* 15.8* 16.0*    164 251 268     INRNo lab results found in last 7 days.  LFTs    Recent Labs  Lab 01/21/17  0642 01/20/17  0300 01/19/17  0900   ALKPHOS 79 113  --    AST 23 22 19   ALT 22 26  --    BILITOTAL 0.4 0.4  --    PROTTOTAL 5.2* 6.5*  --    ALBUMIN 2.0* 2.7*  --       PANCNo lab results found in last 7 days.      Dr THUAN Mckinney MD  Hospitalist ( Internal medicine)  Pager: [phone]

## 2017-01-22 NOTE — PLAN OF CARE
Problem: Goal Outcome Summary  Goal: Goal Outcome Summary  Outcome: No Change  Pt sleeping after pain interventions. Vent settings unchanged. Respiratory status unchanged. No issues or concerns.

## 2017-01-23 NOTE — PROGRESS NOTES
Care Coordinator- Assessment Note     Admission Date/Time:  1/20/2017  Attending MD:  Costa Tellez*     Data  Chart reviewed, discussed with interdisciplinary team.   Patient was admitted for:   1. SIRS (systemic inflammatory response syndrome) (H)    2. Urinary tract infection, site not specified    3. Pneumonia of left lower lobe due to infectious organism         History: COPD s/p trach on home vent 24/7, anxiety, hypertension, achalasia     Patient currently admitted with: infection work up     RNCC Assessment  Concerns with insurance coverage for discharge needs: None.  Current Living Situation: Patient lives in a group home.  Support System: Supportive  Services Involved: group home care.  Transportation: [place] transportation,  [facility] [phone]  Barriers to Discharge: medical plan of care  Coordination of Care and Referrals: Provided patient/family with options for Home Infusion.        Referrals: Provided patient/family with options for Patient was on service with [facility]  Phone # [phone]--Fax # [phone]. If she goes home on IV antibiotics she will return to their services. I spoke with Arley at [facility] ([phone]), they will get back to me regarding ability to for Aylin to return home today.    Plan  Anticipated Discharge Date:  01/23/2017    Anticipated Discharge Plan:  Home to group home with IV antibiotics and tube feeds.      CTS Handoff completed: yes  Nayeli Seymour RN, BSN, PHN, RNCCPH: [phone]  Pager: [phone]  Covering for : Medicine, RNCC

## 2017-01-23 NOTE — PLAN OF CARE
Problem: Goal Outcome Summary  Goal: Goal Outcome Summary  Outcome: Improving  Neuro: A&Ox4. Drowsy throughout shift, intermittently lethargic.   Cardiac: SR. VSS.       Respiratory: Sating 97% on Home vent settings with 3L 02 bled in. Suctioned Q3-4 hours.   GI/: Adequate urine output. BM X2  Diet/appetite: NPO. Cycled tube feeds 8pm-8am. G-tube to gravity.  Activity:  Bedrest. Weight shifted Q2H, pt refuses full repositions, only lays on Left side.  Pain: Tolerable. Pt has pain med/anxiety med regimen to manage symptoms. Alternated PRN ativan and dilaudid between Scheduled meds.   Skin: Coccyx sore healing, mepilex lite changed today.     R: Avendaño exchanged today per MD order. Antibiotic regimen in place. Plan to DC to group home tomorrow (1/24) at 2pm. Continue with POC. Notify primary team with changes.

## 2017-01-23 NOTE — DISCHARGE SUMMARY
[facility] Discharge Summary    Mary Wang MRN# [id number]   Age: 66 year old YOB: 1950     Date of Admission:  1/20/2017  Date of Discharge::  1/24/2017  Admitting Physician:  Yadi Loja MD  Discharge Physician:         Dr Costa Mckinney MD   Primary Physician: Norman Brito  Transferring Facility: [facility]            Discharge Diagnosis:   Principle diagnosis:   SIRS due to Pneumonia   Secondary diagnoses:  Chronic respiratory failure   Chronic respiratory acidosis/ metabolic alkalosis   Chronic anxiety           Procedures:   No procedures performed during this admission         Allergies:      Allergies   Allergen Reactions     Levaquin Other (See Comments)     Delirium     Toro Podhaler [Tobramycin] Other (See Comments)     Severe congestion, SOB      Suprax [Cefixime]      See ceftibuten     Augmentin Nausea     Has tolerated for treatment of UTIs in 2016.     Avelox [Moxifloxacin] Anxiety     Cedax [Ceftibuten] Other (See Comments)     Pain in eyes     Penicillins Itching     Tolerated amoxicillin without issues.     Vantin [Cefpodoxime] Nausea               Discharge Medications:     Current Discharge Medication List      START taking these medications    Details   lidocaine (LIDODERM) 5 % Patch Place 1-2 patches onto the skin every 24 hours Apply to the lower back  Qty: 30 patch, Refills: 0    Associated Diagnoses: Urinary tract infection, site not specified         CONTINUE these medications which have CHANGED    Details   HYDROmorphone (DILAUDID) 1 MG/ML LIQD liquid Take 1 mL (1 mg) by mouth every 2 hours as needed for moderate to severe pain  Qty: 30 mL, Refills: 0    Associated Diagnoses: Atypical chest pain      LORazepam (ATIVAN) 2 MG/ML (HIGH CONC) solution Take 0.25 mLs (0.5 mg) by mouth every 3 hours as needed for anxiety  Qty: 15 mL, Refills: 0    Associated Diagnoses: COPD exacerbation (H); Anxiety      clonazePAM  (KLONOPIN) 0.1 mg/mL Take 15 mLs (1.5 mg) by mouth 4 times daily Needs appt for refills  Qty: 420 mL, Refills: 0    Associated Diagnoses: Shortness of breath      meropenem (MERREM) 500 MG vial Inject 500 mg into the vein every 6 hours for 5 days  Qty: 200 mL, Refills: 0    Associated Diagnoses: SIRS (systemic inflammatory response syndrome) (H)      QUEtiapine (SEROQUEL) 50 MG tablet Take 1 tablet (50 mg) by mouth 2 times daily  Qty: 120 tablet    Associated Diagnoses: SIRS (systemic inflammatory response syndrome) (H)         CONTINUE these medications which have NOT CHANGED    Details   famotidine (PEPCID) 40 MG/5ML suspension Take 2.5 mLs (20 mg) by mouth 2 times daily as needed for heartburn  Qty: 75 mL, Refills: 3    Associated Diagnoses: Mild gas use disorder      loperamide (IMODIUM A-D) 2 MG tablet 2-2 mg tablets per J-tube qid prn frequent and/or loose stools. Do not use more than 8 tabs per day.  Qty: 30 tablet, Refills: 0    Associated Diagnoses: Frequent stools      polyethylene glycol 0.4%- propylene glycol 0.3% (SYSTANE ULTRA) 0.4-0.3 % SOLN ophthalmic solution Place 1-2 drops into both eyes 4 times daily as needed for dry eyes 0900, 1300, 1700, 2100  Qty: 1 Bottle, Refills: 3    Associated Diagnoses: Dry eyes, bilateral      lactobacillus rhamnosus, GG, (CULTURELL) capsule Take 1 capsule by mouth 2 times daily  Qty: 60 capsule, Refills: 0    Associated Diagnoses: Diarrhea, unspecified type      sertraline (ZOLOFT) 20 MG/ML (HIGH CONC) solution 7.5 mLs (150 mg) by Per Feeding Tube route daily  Qty: 105 mL, Refills: 0    Associated Diagnoses: Anxiety      fexofenadine (ALLEGRA) 180 MG tablet Take 1 tablet (180 mg) by mouth daily  Qty: 30 tablet, Refills: 0    Associated Diagnoses: COPD exacerbation (H)      fiber modular (NUTRISOURCE FIBER) packet 1 packet by Per J Tube route 3 times daily  Qty: 42 packet, Refills: 0    Associated Diagnoses: Diarrhea, unspecified type      Nutritional Supplements  "(JEVITY 1.5 IZZY) LIQD Take 5 Cans by mouth daily Per tube feeding  Qty: 150 Can, Refills: 11    Comments: Height 5'2\"  Weight 120 lbs  Length of need 99 months  Associated Diagnoses: Achalasia      melatonin (MELATONIN) 1 MG/ML LIQD liquid 3 mLs (3 mg) by Per J Tube route At Bedtime  Qty: 300 mL, Refills: 0    Associated Diagnoses: Anxiety; Insomnia due to medical condition      acetaminophen (TYLENOL) 160 MG/5ML oral liquid 20.3 mLs (650 mg) by Per Feeding Tube route every 4 hours as needed for mild pain  Qty: 300 mL, Refills: 0    Associated Diagnoses: Other chronic pain      budesonide (PULMICORT) 0.5 MG/2ML nebulizer solution Take 2 mLs (0.5 mg) by nebulization 2 times daily  Qty: 60 ampule, Refills: 0    Associated Diagnoses: Shortness of breath; Respiratory difficulty      ipratropium (ATROVENT) 0.02 % nebulizer solution Take 2.5 mLs (0.5 mg) by nebulization every 4 hours  Qty: 450 mL, Refills: 0    Associated Diagnoses: Shortness of breath      levalbuterol (XOPENEX) 1.25 MG/3ML nebulizer solution Take 3 mLs (1.25 mg) by nebulization every 4 hours  Qty: 540 mL, Refills: 0    Associated Diagnoses: Chronic obstructive pulmonary disease with acute exacerbation (H)      guaiFENesin (ORGANIDIN) 200 MG TABS Take 1 tablet (200 mg) by mouth 4 times daily  Qty: 115 tablet, Refills: 0    Associated Diagnoses: Shortness of breath      sodium chloride (OCEAN) 0.65 % nasal spray Spray 1 spray into both nostrils daily as needed for congestion  Qty: 1 Bottle, Refills: 0    Associated Diagnoses: Chronic sinusitis, unspecified location      sulfamethoxazole-trimethoprim (BACTRIM,SEPTRA) 200-40 mg/5ml suspension 20 mLs (160 mg) by Per J Tube route daily Dose based on TMP component. 20 mLs = 160 mg  Qty: 560 mL, Refills: 0    Associated Diagnoses: Chronic obstructive pulmonary disease with acute exacerbation (H)      lidocaine 15 mL, alum & mag hydroxide-simethicone 15 mL GI Cocktail Take 30 mLs by mouth 3 times daily as needed " for moderate pain  Qty: 500 mL, Refills: 0    Associated Diagnoses: Gastroesophageal reflux disease without esophagitis      gabapentin (NEURONTIN) 250 MG/5ML solution 6 mLs (300 mg) by Per J Tube route 3 times daily  Qty: 450 mL, Refills: 0    Associated Diagnoses: Anxiety      polyethylene glycol (MIRALAX/GLYCOLAX) Packet 17 g by Per J Tube route 2 times daily Hold for loose stools  Qty: 100 packet, Refills: 0    Associated Diagnoses: Constipation, unspecified constipation type      senna-docusate (SENOKOT-S;PERICOLACE) 8.6-50 MG per tablet 2 tablets by Per J Tube route 2 times daily  Qty: 100 tablet, Refills: 0    Associated Diagnoses: Constipation, unspecified constipation type      calcium carbonate (TUMS) 500 MG chewable tablet Take 1 tablet (500 mg) by mouth every 2 hours as needed for heartburn  Qty: 150 tablet      ondansetron (ZOFRAN) 4 MG tablet Take 1 tablet (4 mg) by mouth every 4 hours as needed for nausea  Qty: 18 tablet      predniSONE 5 MG/5ML solution Take 20 mLs (20 mg) by mouth daily  Qty: 500 mL, Refills: 0    Comments: Continue prednisone taper until reaches 20 mg daily, then continue 20 mg daily.  Associated Diagnoses: Shortness of breath      Cranberry 500 MG CAPS Take 1 capsule (500 mg) by mouth daily  Qty: 90 capsule         STOP taking these medications       famotidine (PEPCID) 20 MG tablet Comments:   Reason for Stopping:         magnesium hydroxide (MILK OF MAGNESIA) 400 MG/5ML suspension Comments:   Reason for Stopping:                     Consultations:   No consultations were requested during this admission          Brief History of Presenting Illness:   Mary Wang is a 66 year old female with history of severe COPD s/p trach on home vent 24/7, anxiety, hypertension, achalasia s/p GJ tube who presented to the hospital with tachycardia on 1/20/2017. She has been hospitalized multiple times in the recent months and was recently discharged on 1/10/17 for respiratory failure and  pseudomonas UTI.    History was primarily obtained from her caretaker as she is non verbal (due to trach).    The caretaker reports that Mary was doing well after discharge and was at baseline. She was doing well before the shift change, but right after midnight, the caretaker noted tachycardia, tachypnea, cyanosis around her lips. She was not responding to her questions and seemed very sleepy and unarousable. She tried to suction her through the trach, but despite that she did not respond and her HR was still elevated, which prompted to call EMS to bring her into the hospital.    In the ED, she was noted to be tachycardic to 130s. Respiratory cares were continued and IV fluid bolus was given, after which she was more awake and back to her baseline. However, due to her worsening leukocytosis and tachycardia, the main concern was sepsis and is therefore being admitted for further evaluation.          Hospital Course:   66 year old female with history of severe COPD s/p trach on home vent 24/7, anxiety, hypertension, achalasia s/p GJ tube who presented to the hospital with tachycardia, now thought to be due to bacteremia.  #) SIRS likley due oversedation/ aspiration pneumonitis   Patient  Presented with sinus tachycardia  and worsening leukocytosis, altered mental status.     She was already being treated with meropenem from 1/18  For a pulmonary infection at the  Care facility  During her last admission, she was treated with Zosyn from 1/8-1/18, however, cultures showed that Pseudomonas from her urine was resistant to Zosyn. Urine analysis this time ( 1/20) does not show any evidence of infection, cultures pending    - Cultures here positive for gram positive cocci from the right hand on 1/20. This was speciated as staph hominis, which is likley a contaminant   Repeat cultures from 1/21 with negative growth so far. This , I dicussed with general ID staff   Vancomycin which was initiated on 1/22 has now been  "discontinued   Given that the symptoms / signs improved rapidly , this may likley be related to aspiration pneumonitis in the setting of significant somnolence   Continue Meropenem for 4 more days   Avendaño changed while in the hospital on 1/23    #) Chronic Respiratory Failure  #) Chronic respiratory acidosis , metabolic alkalosis  Stable O2 needs and ventilator needs. Hypoxia prior to arrival seems to have resolved after suctioning by the nurse at her home.  - RT consult for chronic vent management( On assist control mode with RR at 14, TV at 400 and PEEP of 5)  - Scheduled ipatropium, levalbuterol while awake  - Continue BID pulmicort    #) Chronic Anxiety  Currently on multiple agents. Attempted to wean during prior admission due to encephalopathy but ultimately unable to tolerate reduction in anxiolytics.  - Continue home clonazepam  - Continue home ativan  - Continue home hydromorphone for air hunger  - Continue home seroquel       Hold above medications if drowsy                PROGRESS ONE DAY PRIOR TO  DISCHARGE DAY   Feels well this morning  Still anxious  No fevers or chills  Tolerating tube feeds     BP 91/52 mmHg  Pulse 134  Temp(Src) 97.9  F (36.6  C) (Axillary)  Resp 14  Ht 1.63 m (5' 4.17\")  Wt 56 kg (123 lb 7.3 oz)  BMI 21.08 kg/m2  SpO2 99%    CVS: Normal Heart sounds   RS: Coarse breath sounds bilaterally   Abdomen: Soft and non tender. Normal bowel sounds.   Neuro: Alert and orientated X 3          Pending Tests at Discharge:     Unresulted Labs Ordered in the Past 30 Days of this Admission     Date and Time Order Name Status Description    1/21/2017 1103 Blood culture Preliminary     1/21/2017 1103 Blood culture Preliminary     1/20/2017 0309 Blood culture Preliminary                  Discharge instructions and Follow up:       Discharge Procedure Orders  Home infusion referral     Reason for your hospital stay   Order Comments: SIRS     Adult P/[facility] Follow-up and recommended labs and " tests   Order Comments: Follow up with PCP in 1 week   CBC and BMP in 1 week  Appointments on [place] and/or [facility] (with [facility] or [facility] provider or service). Call [phone] if you haven't heard regarding these appointments within 7 days of discharge.     Activity   Order Comments: Your activity upon discharge: activity as tolerated   Order Specific Question Answer Comments   Is discharge order? Yes      DNR (Do Not Resuscitate)     Ventilator   Order Comments: Per usual home settings     Diet   Order Comments: Follow this diet upon discharge: Orders Placed This Encounter  Adult Formula Drip Feeding: Specified Time Isosource 1.5; Jejunostomy; Goal Rate: 85; mL/hr; From: 8:00 PM; 8:00 AM; Medication - Tube Feeding Flush Frequency: At least 15-30 mL water before and after medication administration and with tube clogging;  NPO Time Specified   Order Specific Question Answer Comments   Is discharge order? Yes                Discharge Disposition:   Discharged to group home           Time spent : 35  mts total with patient and coordination of care       Dr THUAN Mckinney MD  Hospitalist ( Internal medicine)  Pager: [phone]

## 2017-01-23 NOTE — PROGRESS NOTES
[facility]  WO Nurse Inpatient Adult Pressure INJURY (PI) Wound Assessment     Initial assessment of PU(s) on pt's:   Sacrum     Data:   Patient History:      per MD note(s):  66 year old female with history of severe COPD s/p trach on home vent , anxiety, hypertension, achalasia s/p GJ tube who presented to the hospital with tachycardia, now thought to be due to bacteremia  Here to assess sacral pressure injury reported by nursing     Current mattress:  AtmosAir  Current pressure relieving devices:  Pillows    Moisture Management:  Diaper    Catheter secured? Not applicable    Current Diet / Nutrition:           Active Diet Order  NPO Time Specified    Tube feeding    Jose F Assessment and sub scores:   Jose F Score  Av.6  Min: 12  Max: 15   Labs:   Recent Labs   Lab Test  17   0615   17   1335   16   0331   ALBUMIN  2.1*   < >  2.7*   < >   --    HGB  8.9*   < >  10.5*   < >  8.4*   INR   --    --   0.99   --    --    WBC  10.0   < >  18.7*   < >  11.8*   A1C   --    --    --    --   5.1   CRP  45.0*   < >  18.0*   < >   --     < > = values in this interval not displayed.                                                                                                                          Pressure Injury Assessment  (location #1):   Sacrum    Wound History:   Appears as a resolving blister with pink intact epidermis with residual dried peeling epidermis, no erythema noted    Wound Base: as above     Specific Dimensions (length x width x depth, in cm) :   2 x 2 x 0 cm    Palpation of the wound bed:  normal    Periwound Skin: intact,      Color: normal and consistent with surrounding tissue    Temperature  normal     Drainage:  none     Pain:  absent ,          Intervention:     Patient's chart evaluated.      Jose F Interventions:  Current Jose F Interventions and Care Plan reviewed and updated, appropriate at this time.    Wound was assessed.    Wound Care: was done:  Removal of existing dressing    Visual inspection,    Orders  Written    Supplies  N/A    Discussed plan of care with Patient and Nurse           Assessment:     Pressure Injury (PI) located on Sacrum: Healed Stage 2 pressure injury    Status: wound  initial assessment, Stable    Healed blister with small amount of adherent residual epidermis         Plan:     Nursing to notify the Provider(s) and re-consult the WOC Nurse if wound(s) deteriorate(s).    Plan of care for wound located on Sacrum: Change dressing every 3 days, Mepilex Border for protection    WOC Nurse will return: No further WOC f/u planned  Face to face time: 10 minutes

## 2017-01-23 NOTE — PHARMACY-VANCOMYCIN DOSING SERVICE
Pharmacy Vancomycin Note  Date of Service 2017  Patient's  1950   66 year old, female    Indication: Bacteremia  Goal Trough Level: 15-20 mg/L  Day of Therapy: 3  Current Vancomycin regimen:  1000 mg IV q12h    Current estimated CrCl = Estimated Creatinine Clearance: 91.1 mL/min (based on Cr of 0.53).    Creatinine for last 3 days  2017:  6:42 AM Creatinine 0.46 mg/dL*  2017:  5:58 AM Creatinine 0.58 mg/dL  2017:  6:15 AM Creatinine 0.53 mg/dL    Recent Vancomycin Levels (past 3 days)  2017: 10:06 AM Vancomycin Level 21.8 mg/L    Vancomycin IV Administrations (past 72 hours)                   vancomycin (VANCOCIN) 1000 mg in dextrose 5% 200 mL PREMIX (mg) 1,000 mg New Bag 17 1009     1,000 mg New Bag 17 2210     1,000 mg New Bag  1230     1,000 mg New Bag 17 2339     1,000 mg New Bag  1142              Nephrotoxins and other renal medications (Future)    Start     Dose/Rate Route Frequency Ordered Stop    17 2300  vancomycin (VANCOCIN) 750 mg in NaCl 0.9 % 250 mL intermittent infusion      750 mg Intravenous EVERY 12 HOURS 17 1216             Contrast Orders - past 72 hours     None        Interpretation of levels and current regimen:  Trough level is Supratherapeutic (~12hr trough in 12hr regimen)    Has serum creatinine changed > 50% in last 72 hours: No    Urine output:  good urine output    Renal Function: Stable    Plan:  1.  Decrease Dose to 750 mg IV q12h  2.  Pharmacy will check trough levels as appropriate in 1-3 Days.    3. Serum creatinine levels will be ordered daily for the first week of therapy and at least twice weekly for subsequent weeks.      Lisa Medina, PharmD Student          .

## 2017-01-23 NOTE — PLAN OF CARE
Problem: Goal Outcome Summary  Goal: Goal Outcome Summary  Outcome: No Change  Patient is A&Ox3. Vitally has been stable on home vent settings Tolerated TF overnight, off at 0900. Meds in J tube, G tube to gravity. Avendaño with adequate output. Refuses repositioning. Suctioned every 3 hours. Ativan and dilaudid given this morning and afternoon, this evening patient states that her breathing is better. Will continue current plan of care and update MDs with any changes.

## 2017-01-23 NOTE — PROGRESS NOTES
Care Coordinator- Discharge Planning Note     Admission Date/Time:  1/20/2017  Attending MD:  Costa Tellez*     Data  Chart reviewed, discussed with interdisciplinary team.   Patient was admitted for:   1. SIRS (systemic inflammatory response syndrome) (H)    2. Urinary tract infection, site not specified    3. Pneumonia of left lower lobe due to infectious organism         RNCC Intervention: Staffing unable to go home today due to staffing situation. Ride set for tomorrow with [employer] transportation for 2 pm. Tomorrow.     Plan  Anticipated Discharge Date:  1/24/17  Anticipated Discharge Plan:  Home to group home.    CTS Handoff completed: dion Seymour RN, BSN, PHN, RNCCPH: [phone]  Pager: [phone]  Covering for : medicine RNCC

## 2017-01-23 NOTE — PLAN OF CARE
Problem: Goal Outcome Summary  Goal: Goal Outcome Summary  Tolerated TF overnight. Other VSS. Sat's maintained on vent with 3L of bleed through. Reminded of importance of position changes but pt still declining repositioning. No new issues or concerns.

## 2017-01-24 NOTE — PROGRESS NOTES
Care Coordinator- Discharge Planning     Admission Date/Time:  1/20/2017  Attending MD:  Costa Tellez*     Data  Date of initial CC assessment: 1/23/17  Chart reviewed, discussed with interdisciplinary team.   Patient was admitted for:   1. Atypical chest pain    2. SIRS (systemic inflammatory response syndrome) (H)    3. Urinary tract infection, site not specified    4. Pneumonia of left lower lobe due to infectious organism    5. COPD exacerbation (H)    6. Anxiety    7. Shortness of breath    8. Urinary tract infection without hematuria, site unspecified           Assessment  Per MD team, pt is medically stable for discharge today. Spoke with Arley pt's  with [facility] (phone: [phone]), who stated that nursing can be available at 4pm today. Called [facility] and changed transportation time to 4:00pm. Updated pt's RN and Lo[facility], , called pt's daughter. Discharge orders faxed to [facility] (fax: [phone]).       Plan  Anticipated Discharge Date: 1/24/17  Anticipated Discharge Plan: Pt will discharge back to home with 24 hour nursing cares.     Sapphire De Leon

## 2017-01-24 NOTE — PLAN OF CARE
Problem: Goal Outcome Summary  Goal: Goal Outcome Summary  Outcome: No Change      A: A&Ox4, lethargic throughout shift, pt did complain of moments of feeling anxiety. VSS. SR. Afebrile. Complaining of lower back pain, prn Dilaudid given, also repositioned q2h. NPO diet, with TF at goal 85ml/h cycled 8PM-8AM. G to gravity with moderate green output. Avnedaño with clear yellow adequate urine output. One small BM over night, bowel sounds hypoactive. Home vent with 3L O2  Sating >94%, suctioned every 2-3 hours. Plan to d/c today.     I/O this shift:  In: 1340 [I.V.:800; NG/GT:30]  Out: 425 [Urine:275; Emesis/NG output:150]    Temp:  [97.6  F (36.4  C)-98.9  F (37.2  C)] 97.6  F (36.4  C)  Heart Rate:  [66-93] 81  Resp:  [14] 14  BP: ()/(45-94) 139/69 mmHg  FiO2 (%):  [21 %] 21 %  SpO2:  [89 %-100 %] 100 %     R: Continue with POC. Notify primary team with changes.

## 2017-01-24 NOTE — DISCHARGE SUMMARY
DISCHARGE                         1/24/2017  4:23 PM  ----------------------------------------------------------------------------  Discharged to: Group Home  Via: [employer]  Accompanied by: [employer]  Discharge Instructions: diet, activity, medications, follow up appointments, when to call the MD, aftercare instructions.  Prescriptions: To be filled by pharmacy of pt choice; medication list sent with pt  Follow Up Appointments: arranged; information given  Belongings: All sent with pt  IV:  PICC remains in place for IV ABx  Telemetry: d/c'd  Pt exhibits understanding of above discharge instructions; all questions answered.    Discharge Paperwork: Sent with patient.

## 2017-01-24 NOTE — PLAN OF CARE
Problem: Goal Outcome Summary  Goal: Goal Outcome Summary  Outcome: Adequate for Discharge Date Met:  01/24/17  Neuro: Oriented X4. Answers questions appropriately/nods mouths words. Nonverbal d/t vent. Slightly lethargic throughout day (unchanged). Anxiety with frequent call light use intermittent throughout day. Managed with therapeutic conversation/medications/repositioning.  Cardiac: SR. VSS. Hypertensive with anxiety.           Respiratory: Sating 97% on home Vent with 3L. Suctioned Q2-3hours.   GI/: Adequate urine output via bender. BM X1  Diet/appetite: NPO. G-tube to gravity. J-tube cycled tube feeds and meds.   Activity:  Bedrest.  Pain: Tolerable. PRN dilaudid X2+ scheduled meds for pain. PRN ativan X1.+scheduled meds for anxiety.   Skin: Intact, no new deficits noted.    R: Plan DC home today at 4pm with 4 days IV antibiotics. Pt and family (daughter César) aware of transfer. Continue with POC. Notify primary team with changes.

## 2017-01-24 NOTE — PROGRESS NOTES
Interviewed and examined the patient   Patient feels short of breath, but no change from last few days  No fever, chills, nausea, vomiting  Per RN, no acute changes    Alert, awake, and oriented  S1, S2 normal  B/L CTA  Soft NT, BS+    Labs reviewed    A/P   1. Aspiration pneumonitis  Meropenem for 4 more days.   2. Continue home medications for chronic anxiety.   3. Chronic respiratory failure- On vent    Okay to discharge from medical point view. As patient has no acute changes. Her BP high in the morning but better during the day.     Stveen Burnetthal  Internal Medicine  Hospitalist  Pager no: [phone]

## 2017-01-25 PROBLEM — J96.20 ACUTE ON CHRONIC RESPIRATORY FAILURE (H): Status: RESOLVED | Noted: 2017-01-01 | Resolved: 2017-01-01

## 2017-01-25 PROBLEM — R41.82 ALTERED MENTAL STATE: Status: RESOLVED | Noted: 2017-01-01 | Resolved: 2017-01-01

## 2017-01-25 NOTE — PROGRESS NOTES
This is a patient of [facility] and will be follow by one of their Care Coordinators for Post Discharge Follow up      [employer] Discharge Summary      Mary Wang  MRN# [id number]    Age: 66 year old  YOB: 1950      Date of Admission:               1/20/2017  Date of Discharge::              1/24/2017  Admitting Physician:            Yadi Loja MD  Discharge Physician:         Dr Costa Mckinney MD           Primary Physician: Norman Brito  Transferring Facility: [facility]

## 2017-01-26 PROBLEM — K59.03 CONSTIPATION DUE TO OPIOID THERAPY: Status: ACTIVE | Noted: 2017-01-01

## 2017-01-26 PROBLEM — F41.1 GENERALIZED ANXIETY DISORDER: Status: ACTIVE | Noted: 2017-01-01

## 2017-01-26 PROBLEM — T40.2X5A CONSTIPATION DUE TO OPIOID THERAPY: Status: ACTIVE | Noted: 2017-01-01

## 2017-01-26 PROBLEM — Z71.89 ADVANCED DIRECTIVES, COUNSELING/DISCUSSION: Status: ACTIVE | Noted: 2017-01-01

## 2017-01-26 NOTE — PATIENT INSTRUCTIONS
1.  I hand wrote orders today at the group home, and will not repeat them here.  Please ask a staff member to review those orders with you if you'd like to review them.    2.  We changed the timing of some medications today, added a couple of things for relief of constipation, but didn't change her usual medications otherwise.    3.  I'll be back to see you in about a month; we'll call to schedule a date and time.

## 2017-01-26 NOTE — TELEPHONE ENCOUNTER
Sarahi RN with [facility] calling for verbal orders as follows:    SN 1x per wk for weekly visits rip lab draws and PICC line cares.    OT lesli Dasilva MA

## 2017-01-26 NOTE — MR AVS SNAPSHOT
After Visit Summary   1/26/2017    Mary Wang    MRN: [id number]           Patient Information     Date Of Birth          1950        Visit Information        Provider Department      1/26/2017 12:15 PM Norman Brito MD [facility]        Today's Diagnoses     Centrilobular emphysema (H)    -  1     Acute and chronic respiratory failure with hypercapnia (H)         Air hunger         Generalized anxiety disorder         Constipation due to opioid therapy         Advanced directives, counseling/discussion           Care Instructions    1.  I hand wrote orders today at the group home, and will not repeat them here.  Please ask a staff member to review those orders with you if you'd like to review them.    2.  We changed the timing of some medications today, added a couple of things for relief of constipation, but didn't change her usual medications otherwise.    3.  I'll be back to see you in about a month; we'll call to schedule a date and time.        Follow-ups after your visit        Who to contact     If you have questions or need follow up information about today's clinic visit or your schedule please contact [facility] directly at [phone].  Normal or non-critical lab and imaging results will be communicated to you by WUThart, letter or phone within 4 business days after the clinic has received the results. If you do not hear from us within 7 days, please contact the clinic through Yowzat or phone. If you have a critical or abnormal lab result, we will notify you by phone as soon as possible.  Submit refill requests through Ubersense or call your pharmacy and they will forward the refill request to us. Please allow 3 business days for your refill to be completed.          Additional Information About Your Visit        WUTharOpenPortal Information     Ubersense gives you secure access to your electronic health record. If you see a primary care provider,  you can also send messages to your care team and make appointments. If you have questions, please call your primary care clinic.  If you do not have a primary care provider, please call [phone] and they will assist you.        Care EveryWhere ID     This is your Care EveryWhere ID. This could be used by other organizations to access your [place] medical records  [id number]        Your Vitals Were     Pulse Respirations Pulse Oximetry             98 14 100%          Blood Pressure from Last 3 Encounters:   01/30/17 148/75   01/26/17 132/68   01/24/17 147/84    Weight from Last 3 Encounters:   01/28/17 127 lb 13.9 oz (58 kg)   01/24/17 122 lb (55.339 kg)   01/10/17 133 lb 6.4 oz (60.51 kg)              Today, you had the following     No orders found for display         Today's Medication Changes          These changes are accurate as of: 1/26/17 11:59 PM.  If you have any questions, ask your nurse or doctor.               Start taking these medicines.        Dose/Directions    bisacodyl 10 MG Suppository   Commonly known as:  DULCOLAX   Used for:  Constipation due to opioid therapy   Started by:  Norman Brito MD        Dose:  10 mg   Place 1 suppository (10 mg) rectally daily as needed for constipation   Quantity:  28 suppository   Refills:  3       FLEET NATURALS CLEANSING ENEMA Enem   Used for:  Constipation due to opioid therapy   Started by:  Norman Brito MD        Dose:  1 enema   Place 1 enema rectally daily as needed   Quantity:  3 Bottle   Refills:  11       order for DME   Started by:  Norman Brito MD        Equipment being ordered: 1) Avendaño catheter 16F, balloon 10 mL, silicone.  2) Urinary collection bag.  3) Elastic leg strap for Avendaño catheter.  Please fax to [facility].   Quantity:  3 each   Refills:  11         These medicines have changed or have updated prescriptions.        Dose/Directions    clonazePAM 0.1 mg/mL   Commonly known as:  klonoPIN   This may have  changed:  additional instructions   Changed by:  Norman Brito MD        Dose:  1.5 mg   Take 15 mLs (1.5 mg) by mouth 4 times daily   Quantity:  1800 mL   Refills:  1       ipratropium 0.02 % neb solution   Commonly known as:  ATROVENT   This may have changed:    - when to take this  - additional instructions   Changed by:  Norman Brito MD        Dose:  0.5 mg   Take 2.5 mLs (0.5 mg) by nebulization 4 times daily and every four hours at night PRN.   Quantity:  450 mL   Refills:  0       lactobacillus rhamnosus (GG) capsule   This may have changed:    - how to take this  - when to take this   Changed by:  Norman Brito MD        Dose:  1 capsule   1 capsule by Per G Tube route daily   Quantity:  60 capsule   Refills:  0       levalbuterol 1.25 MG/3ML neb solution   Commonly known as:  XOPENEX   This may have changed:    - when to take this  - additional instructions   Changed by:  Norman Brito MD        Dose:  1 ampule   Take 3 mLs (1.25 mg) by nebulization 4 times daily and every four hours at night PRN.   Quantity:  540 mL   Refills:  0            Where to get your medicines      These medications were sent to [facility] - [place], [place] - [address]  [address], [place] [facility] [address]     Phone:  [phone]    - bisacodyl 10 MG Suppository  - FLEET NATURALS CLEANSING ENEMA Enem  - ipratropium 0.02 % neb solution  - lactobacillus rhamnosus (GG) capsule  - levalbuterol 1.25 MG/3ML neb solution  - QUEtiapine 50 MG tablet      Some of these will need a paper prescription and others can be bought over the counter.  Ask your nurse if you have questions.     Bring a paper prescription for each of these medications    - clonazePAM 0.1 mg/mL  - HYDROmorphone 1 MG/ML Liqd liquid  - LORazepam 2 MG/ML (HIGH CONC) solution  - order for DME             Primary Care Provider Office Phone # Fax #    Norman Brito [phone]        [facility] [address] [facility] [address]     [facility] [address]        Thank you!     Thank you for choosing [facility]  for your care. Our goal is always to provide you with excellent care. Hearing back from our patients is one way we can continue to improve our services. Please take a few minutes to complete the written survey that you may receive in the mail after your visit with us. Thank you!             Your Updated Medication List - Protect others around you: Learn how to safely use, store and throw away your medicines at www.disposemymeds.org.          This list is accurate as of: 1/26/17 11:59 PM.  Always use your most recent med list.                   Brand Name Dispense Instructions for use    acetaminophen 160 MG/5ML solution    TYLENOL    300 mL    20.3 mLs (650 mg) by Per Feeding Tube route every 4 hours as needed for mild pain       bisacodyl 10 MG Suppository    DULCOLAX    28 suppository    Place 1 suppository (10 mg) rectally daily as needed for constipation       budesonide 0.5 MG/2ML neb solution    PULMICORT    60 ampule    Take 2 mLs (0.5 mg) by nebulization 2 times daily       calcium carbonate 500 MG chewable tablet    TUMS    150 tablet    Take 1 tablet (500 mg) by mouth every 2 hours as needed for heartburn       clonazePAM 0.1 mg/mL    klonoPIN    1800 mL    Take 15 mLs (1.5 mg) by mouth 4 times daily       Cranberry 500 MG Caps     90 capsule    Take 1 capsule (500 mg) by mouth daily       famotidine 40 MG/5ML suspension    PEPCID    75 mL    Take 2.5 mLs (20 mg) by mouth 2 times daily as needed for heartburn       fexofenadine 180 MG tablet    ALLEGRA    30 tablet    Take 1 tablet (180 mg) by mouth daily       fiber modular packet     42 packet    1 packet by Per J Tube route 3 times daily       FLEET NATURALS CLEANSING ENEMA Enem     3 Bottle    Place 1 enema rectally daily as needed       gabapentin 250 MG/5ML solution    NEURONTIN    450 mL    6 mLs (300 mg) by Per J Tube  route 3 times daily       guaiFENesin 200 MG Tabs tablet    ORGANIDIN    115 tablet    Take 1 tablet (200 mg) by mouth 4 times daily       HYDROmorphone 1 MG/ML Liqd liquid    DILAUDID    180 mL    Take 1 mL (1 mg) by mouth every 2 hours as needed for moderate to severe pain       ipratropium 0.02 % neb solution    ATROVENT    450 mL    Take 2.5 mLs (0.5 mg) by nebulization 4 times daily and every four hours at night PRN.       JEVITY 1.5 IZZY Liqd     150 Can    Take 5 Cans by mouth daily Per tube feeding       lactobacillus rhamnosus (GG) capsule     60 capsule    1 capsule by Per G Tube route daily       levalbuterol 1.25 MG/3ML neb solution    XOPENEX    540 mL    Take 3 mLs (1.25 mg) by nebulization 4 times daily and every four hours at night PRN.       lidocaine 15 mL, alum & mag hydroxide-simethicone 15 mL GI Cocktail     500 mL    Take 30 mLs by mouth 3 times daily as needed for moderate pain       lidocaine 5 % Patch    LIDODERM    30 patch    Place 1-2 patches onto the skin every 24 hours Apply to the lower back       loperamide 2 MG tablet    IMODIUM A-D    30 tablet    2-2 mg tablets per J-tube qid prn frequent and/or loose stools. Do not use more than 8 tabs per day.       LORazepam 2 MG/ML (HIGH CONC) solution    ATIVAN    30 mL    Take 0.25 mLs (0.5 mg) by mouth every 3 hours as needed for anxiety       melatonin 1 MG/ML Liqd liquid     300 mL    3 mLs (3 mg) by Per J Tube route At Bedtime       meropenem 500 MG vial    MERREM    200 mL    Inject 500 mg into the vein every 6 hours for 5 days       ondansetron 4 MG tablet    ZOFRAN    18 tablet    Take 1 tablet (4 mg) by mouth every 4 hours as needed for nausea       order for DME     3 each    Equipment being ordered: 1) Avendaño catheter 16F, balloon 10 mL, silicone.  2) Urinary collection bag.  3) Elastic leg strap for Avendaño catheter.  Please fax to [facility].       polyethylene glycol 0.4%- propylene glycol 0.3% 0.4-0.3 % Soln ophthalmic solution     SYSTANE ULTRA    1 Bottle    Place 1-2 drops into both eyes 4 times daily as needed for dry eyes 0900, 1300, 1700, 2100       polyethylene glycol Packet    MIRALAX/GLYCOLAX    100 packet    17 g by Per J Tube route 2 times daily Hold for loose stools       predniSONE 5 MG/5ML solution     500 mL    Take 20 mLs (20 mg) by mouth daily       QUEtiapine 50 MG tablet    SEROQUEL    180 tablet    Take 1 tablet (50 mg) by mouth 2 times daily       senna-docusate 8.6-50 MG per tablet    SENOKOT-S;PERICOLACE    100 tablet    2 tablets by Per J Tube route 2 times daily       sertraline 20 MG/ML (HIGH CONC) solution    ZOLOFT    105 mL    7.5 mLs (150 mg) by Per Feeding Tube route daily       sodium chloride 0.65 % nasal spray    OCEAN    1 Bottle    Spray 1 spray into both nostrils daily as needed for congestion       sulfamethoxazole-trimethoprim suspension    BACTRIM/SEPTRA    560 mL    20 mLs (160 mg) by Per J Tube route daily Dose based on TMP component. 20 mLs = 160 mg

## 2017-01-26 NOTE — TELEPHONE ENCOUNTER
Returned Sarahi's call and left vmail with okay for HC orders below, per Steff Robertson NP.    Bianka Wilson RN

## 2017-01-27 NOTE — TELEPHONE ENCOUNTER
Called and left detailed message on Arvin's voicemail stating to discontinue IV antibiotics per Dr. Brito. Call back if any further questions.     Caitlyn Servin RN  [facility]

## 2017-01-27 NOTE — TELEPHONE ENCOUNTER
Received T.C. from  Arvin, [facility] regarding patient's HC  IV abx orders that are good through 1/27/17.  He would like a call back with new orders.  Phone # [phone]-direct line.  OK to leave voice messages.  Will send to pcp.    Susanne Richards R.N.  [facility]

## 2017-01-28 PROBLEM — J96.21 ACUTE ON CHRONIC RESPIRATORY FAILURE WITH HYPOXIA (H): Status: ACTIVE | Noted: 2017-01-01

## 2017-01-28 NOTE — IP AVS SNAPSHOT
` `     [address] [facility] BANK: [phone]            Medication Administration Report for Mary Wang as of 02/03/17 1617   Legend:    Given Hold Not Given Due Canceled Entry Other Actions    Time Time (Time) Time  Time-Action       Inactive    Active    Linked        Medications 01/28/17 01/29/17 01/30/17 01/31/17 02/01/17 02/02/17 02/03/17    acetaminophen (TYLENOL) solution 650 mg  Dose: 650 mg Freq: EVERY 4 HOURS PRN Route: PER FEEDING   PRN Reasons: mild pain,fever  Start: 01/28/17 2210   Admin Instructions: Maximum acetaminophen dose from all sources= 75 mg/kg/day not to exceed 4 grams/day.       0848 (650 mg)-Given        1549 (650 mg)-Given        1621 (650 mg)-Given       2327 (650 mg)-Given             bisacodyl (DULCOLAX) Suppository 10 mg  Dose: 10 mg Freq: DAILY PRN Route: RE  PRN Reason: constipation  Start: 01/28/17 2210              budesonide (PULMICORT) neb solution 0.5 mg  Dose: 0.5 mg Freq: 2 TIMES DAILY Route: NEBULIZATION  Start: 01/28/17 2215     (0229)-Not Given       0833 (0.5 mg)-Given       2016 (0.5 mg)-Given        0723 (0.5 mg)-Given       1957 (0.5 mg)-Given        0804 (0.5 mg)-Given        0010 (0.5 mg)-Given       0950 (0.5 mg)-Given       1914 (0.5 mg)-Given        0830 (0.5 mg)-Given       2101 (0.5 mg)-Given        0913 (0.5 mg)-Given       [ ] 2000           calcium carbonate (TUMS) chewable tablet 500 mg  Dose: 500 mg Freq: EVERY 2 HOURS PRN Route: PO  PRN Reason: heartburn  Start: 01/28/17 2210      2339 (500 mg)-Given        0600 (500 mg)-Given              clonazePAM (klonoPIN) suspension 1.5 mg  Dose: 1.5 mg Freq: 4 TIMES DAILY Route: PO  Start: 01/29/17 0800   Admin Instructions: Shake Well.  Medication is located in the pyxis narc drip drawer (might be in the narc wall cabinet if drawer is full)      0804 (1.5 mg)-Given       1259 (1.5 mg)-Given       1722 (1.5 mg)-Given       2233 (1.5 mg)-Given        0848 (1.5 mg)-Given       1302 (1.5 mg)-Given       1622  (1.5 mg)-Given       (2151)-Not Given        0757 (1.5 mg)-Given       1150 (1.5 mg)-Given       1549 (1.5 mg)-Given       2007 (1.5 mg)-Given        0812 (1.5 mg)-Given       1145 (1.5 mg)-Given       1547 (1.5 mg)-Given       2049 (1.5 mg)-Given        0837 (1.5 mg)-Given       1154 (1.5 mg)-Given       1642 (1.5 mg)-Given       1932 (1.5 mg)-Given        0806 (1.5 mg)-Given       1101 (1.5 mg)-Given       1601 (1.5 mg)-Given       [ ] 2000           famotidine (PEPCID) suspension 20 mg  Dose: 20 mg Freq: 2 TIMES DAILY PRN Route: PO  PRN Reason: heartburn  Start: 01/28/17 2210              fexofenadine (ALLEGRA) tablet 180 mg  Dose: 180 mg Freq: DAILY Route: PO  Start: 01/29/17 0800     0804 (180 mg)-Given        0849 (180 mg)-Given        0756 (180 mg)-Given        0805 (180 mg)-Given        0838 (180 mg)-Given        0805 (180 mg)-Given           fiber modular (NUTRISOURCE FIBER) packet 1 packet  Dose: 1 packet Freq: 3 TIMES DAILY Route: PER J TUBE  Start: 01/29/17 0800   Admin Instructions: Mix each packet with 60 mL water before administering. Supplied by nutrition department.      (1215)-Not Given [C]       1721 (1 packet)-Given       2023 (1 packet)-Given        0849 (1 packet)-Given       (1633)-Not Given       (2018)-Not Given        0925 (1 packet)-Given       1418 (1 packet)-Given       2007 (1 packet)-Given        (0954)-Not Given       1412 (1 packet)-Given       2052 (1 packet)-Given        0901 (1 packet)-Given       1421 (1 packet)-Given       2111 (1 packet)-Given        0811 (1 packet)-Given       1434 (1 packet)-Given       [ ] 2000           gabapentin (NEURONTIN) solution 300 mg  Dose: 300 mg Freq: 3 TIMES DAILY Route: PER J TUBE  Start: 01/29/17 0800     0805 (300 mg)-Given       1531 (300 mg)-Given       2029 (300 mg)-Given        0848 (300 mg)-Given       1449 (300 mg)-Given       (2018)-Not Given        0757 (300 mg)-Given       1418 (300 mg)-Given       2007 (300 mg)-Given        0810 (300  mg)-Given       (1412)-Not Given       2048 (300 mg)-Given        0837 (300 mg)-Given       1421 (300 mg)-Given       2111 (300 mg)-Given        0806 (300 mg)-Given       1434 (300 mg)-Given       [ ] 2000           guaiFENesin (ROBITUSSIN) 20 mg/mL solution 200 mg  Dose: 200 mg Freq: 4 TIMES DAILY Route: PO  Start: 01/29/17 0800     0805 (200 mg)-Given       1259 (200 mg)-Given       1721 (200 mg)-Given       2023 (200 mg)-Given        0848 (200 mg)-Given       1302 (200 mg)-Given       1622 (200 mg)-Given       2015 (200 mg)-Given        0757 (200 mg)-Given       1150 (200 mg)-Given       1549 (200 mg)-Given       2007 (200 mg)-Given        0812 (200 mg)-Given       1145 (200 mg)-Given       1547 (200 mg)-Given       2050 (200 mg)-Given        0837 (200 mg)-Given       1154 (200 mg)-Given       1642 (200 mg)-Given       2057-Hold        0807 (200 mg)-Given       1101 (200 mg)-Given       1601 (200 mg)-Given       [ ] 2000           heparin sodium PF injection 5,000 Units  Dose: 5,000 Units Freq: EVERY 8 HOURS Route: SC  Start: 01/28/17 2315   Admin Instructions: High concentration HEParin. Not for line flush or cath care.     2350 (5,000 Units)-Given        0806 (5,000 Units)-Given       1606 (5,000 Units)-Given        0038 (5,000 Units)-Given       0849 (5,000 Units)-Given       (1622)-Not Given       (2339)-Not Given        0757 (5,000 Units)-Given       1549 (5,000 Units)-Given       2353 (5,000 Units)-Given        (0815)-Not Given       1548 (5,000 Units)-Given        0024 (5,000 Units)-Given       0838 (5,000 Units)-Given       1641 (5,000 Units)-Given        0051 (5,000 Units)-Given       0807 (5,000 Units)-Given       1601 (5,000 Units)-Given           HYDROmorphone (DILAUDID) liquid 1 mg  Dose: 1 mg Freq: EVERY 2 HOURS PRN Route: PO  PRN Reason: moderate to severe pain  PRN Comment: air hunger  Start: 01/28/17 2210        0340 (1 mg)-Given        0151 (1 mg)-Given       0355 (1 mg)-Given       0837 (1  mg)-Given       1154 (1 mg)-Given        0554 (1 mg)-Given       0807 (1 mg)-Given           hypromellose-dextran (ARTIFICAL TEARS) ophthalmic solution 1-2 drop  Dose: 1-2 drop Freq: 4 TIMES DAILY PRN Route: Both Eyes  PRN Reason: dry eyes  Start: 01/28/17 2210   Admin Instructions: Formulary lubricant eye drop substituted        1549 (2 drop)-Given              ipratropium (ATROVENT) 0.02 % neb solution 0.5 mg  Dose: 0.5 mg Freq: 4 TIMES DAILY Route: NEBULIZATION  Start: 01/29/17 0800     0833 (0.5 mg)-Given       1205 (0.5 mg)-Given       1540 (0.5 mg)-Given       2015 (0.5 mg)-Given        0723 (0.5 mg)-Given       1127 (0.5 mg)-Given       1537 (0.5 mg)-Given       (1539)-Not Given       1957 (0.5 mg)-Given        0803 (0.5 mg)-Given       1217 (0.5 mg)-Given       1605 (0.5 mg)-Given        0010 (0.5 mg)-Given       0950 (0.5 mg)-Given       1340 (0.5 mg)-Given       1606 (0.5 mg)-Given       1914 (0.5 mg)-Given        0830 (0.5 mg)-Given       1321 (0.5 mg)-Given       1635 (0.5 mg)-Given       2101 (0.5 mg)-Given        0913 (0.5 mg)-Given       1247 (0.5 mg)-Given       [ ] 1600       [ ] 2000           lactobacillus rhamnosus (GG) (CULTURELL) capsule 1 capsule  Dose: 1 capsule Freq: DAILY Route: PER G TUBE  Start: 01/29/17 0800   Admin Instructions: Administer at least 2 hours before or after oral antibiotics. Capsules may be opened.      0805 (1 capsule)-Given        0905 (1 capsule)-Given        0756 (1 capsule)-Given        0806 (1 capsule)-Given        0838 (1 capsule)-Given        0806 (1 capsule)-Given           levalbuterol (XOPENEX) neb solution 1.25 mg  Dose: 1 ampule Freq: EVERY 4 HOURS Route: NEBULIZATION  Start: 01/30/17 0016      0016 (1.25 mg)-Given       0437 (1.25 mg)-Given       0720 (1.25 mg)-Given              1129 (1.25 mg)-Given       (1130)-Not Given       1540 (1.25 mg)-Given       1957 (1.25 mg)-Given        0000 (1.25 mg)-Given       0417 (1.25 mg)-Given       0803 (1.25  "mg)-Given       1217 (1.25 mg)-Given       1605 (1.25 mg)-Given       2000 (1.25 mg)-Given        0011 (1.25 mg)-Given       (0401)-Not Given       0950 (1.25 mg)-Given       1340 (1.25 mg)-Given       1606 (1.25 mg)-Given       1915 (1.25 mg)-Given               0034 (1.25 mg)-Given       0324 (1.25 mg)-Given              0831 (1.25 mg)-Given       (1319)-Not Given       1636 (1.25 mg)-Given       2101 (1.25 mg)-Given       2342 (1.25 mg)-Given        0417 (1.25 mg)-Given       0913 (1.25 mg)-Given       1247 (1.25 mg)-Given       [ ] 1630       [ ] 2030           lidocaine (LMX4) kit  Freq: EVERY 1 HOUR PRN Route: Top  PRN Reason: pain  PRN Comment: with VAD insertion or accessing implanted port.  Start: 01/28/17 2210   Admin Instructions: Do NOT give if patient has a history of allergy to any local anesthetic or any \"karina\" product.   Apply 30 minutes prior to VAD insertion or port access.  MAX Dose:  2.5 g (  of 5 g tube)               lidocaine (XYLOCAINE) 2 % 15 mL, alum & mag hydroxide-simethicone (MYLANTA ES/MAALOX  ES) 15 mL GI Cocktail  Dose: 30 mL Freq: 3 TIMES DAILY PRN Route: PO  PRN Reason: moderate pain  Start: 01/28/17 2210          0838 (30 mL)-Given           lidocaine 1 % 1 mL  Dose: 1 mL Freq: EVERY 1 HOUR PRN Route: OTHER  PRN Comment: mild pain with VAD insertion or accessing implanted port  Start: 01/28/17 2210   Admin Instructions: Do NOT give if patient has a history of allergy to any local anesthetic or any \"karina\" product. MAX dose 1 mL subcutaneous OR intradermal in divided doses.               LORazepam (ATIVAN) 2 MG/ML (HIGH CONC) solution 0.5 mg  Dose: 0.5 mg Freq: EVERY 3 HOURS PRN Route: PO  PRN Reason: anxiety  Start: 01/28/17 2210     1130 (0.5 mg)-Given [C]        0224 (0.5 mg)-Given       0510 (0.5 mg)-Given         0032 (0.5 mg)-Given       0319 (0.5 mg)-Given        0013 (0.5 mg)-Given       0517 (0.5 mg)-Given       0836 (0.5 mg)-Given       1154 (0.5 mg)-Given        0422 " (0.5 mg)-Given       0806 (0.5 mg)-Given           melatonin liquid 3 mg  Dose: 3 mg Freq: AT BEDTIME Route: PER J TUBE  Start: 01/28/17 2215     0001 (3 mg)-Given       2233 (3 mg)-Given        2341 (3 mg)-Given        2138 (3 mg)-Given        2327 (3 mg)-Given        2251-Hold [C]        [ ] 2200           naloxone (NARCAN) injection 0.1-0.4 mg  Dose: 0.1-0.4 mg Freq: EVERY 2 MIN PRN Route: IV  PRN Reason: opioid reversal  Start: 01/28/17 2210   Admin Instructions: For respiratory rate LESS than or EQUAL to 8.  Partial reversal dose:  0.1 mg titrated q 2 minutes for Analgesia Side Effects Monitoring Sedation Level of 3 (frequently drowsy, arousable, drifts to sleep during conversation).Full reversal dose:  0.4 mg bolus for Analgesia Side Effects Monitoring Sedation Level of 4 (somnolent, minimal or no response to stimulation).               nystatin (MYCOSTATIN) suspension 500,000 Units  Dose: 500,000 Units Freq: 4 TIMES DAILY Route: MT  Start: 02/01/17 2000   Admin Instructions: Shake Well.         2053 (500,000 Units)-Given        (0840)-Not Given       (1154)-Not Given       1642 (500,000 Units)-Given       2057-Hold        (0807)-Not Given       (1100)-Not Given       (1601)-Not Given       [ ] 2000           ondansetron (ZOFRAN-ODT) ODT tab 4 mg  Dose: 4 mg Freq: EVERY 6 HOURS PRN Route: PO  PRN Reason: nausea  Start: 01/28/17 2210   Admin Instructions: This is Step 1 of nausea and vomiting management.  If nausea not resolved in 15 minutes, go to Step 2 prochlorperazine (COMPAZINE). Do not push through foil backing. Peel back foil and gently remove. Place on tongue immediately. Administration with liquid unnecessary                     Or  ondansetron (ZOFRAN) injection 4 mg  Dose: 4 mg Freq: EVERY 6 HOURS PRN Route: IV  PRN Reasons: nausea,vomiting  Start: 01/28/17 2210   Admin Instructions: This is Step 1 of nausea and vomiting management.  If nausea not resolved in 15 minutes, go to Step 2 prochlorperazine  (COMPAZINE).           0431 (4 mg)-Given           polyethylene glycol (MIRALAX/GLYCOLAX) Packet 17 g  Dose: 17 g Freq: 2 TIMES DAILY Route: PER J TUBE  Start: 01/28/17 2215   Admin Instructions: Hold for loose stools  1 Packet = 17 grams. Mixed prescribed dose in 8 ounces of water. Follow with 8 oz. of water.      0004-Hold [C]       0805 (17 g)-Given       2023 (17 g)-Given        0848 (17 g)-Given       (2006)-Not Given        (0826)-Not Given       (1929)-Not Given        (0810)-Not Given       (2051)-Not Given        (0837)-Not Given       2252 (17 g)-Given        (0832)-Not Given       [ ] 2000           potassium chloride (KLOR-CON) Packet 20-40 mEq  Dose: 20-40 mEq Freq: EVERY 2 HOURS PRN Route: ORAL OR FEED  PRN Reason: potassium supplementation  Start: 01/30/17 1616   Admin Instructions: Use if unable to tolerate tablets.  If Serum K+ 3.0-3.3, dose = 60 mEq po total dose (40 mEq x1 followed in 2 hours by 20 mEq x1). Recheck K+ level 4 hours after dose and the next AM.  If Serum K+ 2.5-2.9, dose = 80 mEq po total dose (40 mEq Q2H x2). Recheck K+ level 4 hours after dose and the next AM.  If Serum K+ less than 2.5, See IV order.  Dissolve packet contents in 4-8 ounces of cold water or juice.       1847 (40 mEq)-Given       2248 (20 mEq)-Given               potassium chloride 10 mEq in 100 mL intermittent infusion  Dose: 10 mEq Freq: EVERY 1 HOUR PRN Route: IV  PRN Reason: potassium supplementation  Start: 01/30/17 1616   Admin Instructions: Infuse via PERIPHERAL LINE or CENTRAL LINE. Use for central line replacement if patient weight less than 65 kg, if patient is on TPN with high potassium content or if unit does not stock 20 mEq bags.   If Serum K+ 3.0-3.3, dose = 10 mEq/hr x4 doses (40 mEq IV total dose). Recheck K+ level 2 hours after dose and the next AM.   If Serum K+ less than 3.0, dose = 10 mEq/hr x6 doses (60 mEq IV total dose). Recheck K+ level 2 hours after dose and the next AM.                potassium chloride 10 mEq in 100 mL intermittent infusion with 10 mg lidocaine  Dose: 10 mEq Freq: EVERY 1 HOUR PRN Route: IV  PRN Reason: potassium supplementation  Start: 01/30/17 1616   Admin Instructions: Infuse via PERIPHERAL LINE. Use potassium with lidocaine for pain with peripheral administration.  If Serum K+ 3.0-3.3, dose = 10 mEq/hr x4 doses (40 mEq IV total dose). Recheck K+ level 2 hours after dose and the next AM.  If Serum K+ less than 3.0, dose = 10 mEq/hr x6 doses (60 mEq IV total dose). Recheck K+ level 2 hours after dose and the next AM.               potassium chloride 20 mEq in 50 mL intermittent infusion  Dose: 20 mEq Freq: EVERY 2 HOURS PRN Route: IV  PRN Reason: potassium supplementation  Start: 01/30/17 1621   Admin Instructions: Infuse via CENTRAL LINE Only. May need EKG if less than 65 kg or on TPN - Max rate is 0.3 mEq/kg/hr for patients not on EKG monitoring.   If Serum K+ 3.0-3.3, dose = 20 mEq/hr x2 doses (40 mEq IV total dose). Recheck K+ level 2 hours after dose and the next AM.  If Serum K+ less than 3.0, dose = 20 mEq/hr x3 doses (60 mEq IV total dose). Recheck K+ level 2 hours after dose and the next AM.               potassium chloride SA (K-DUR/KLOR-CON M) CR tablet 20-40 mEq  Dose: 20-40 mEq Freq: EVERY 2 HOURS PRN Route: PO  PRN Reason: potassium supplementation  Start: 01/30/17 1616   Admin Instructions: Use if able to take PO.   If Serum K+ 3.0-3.3, dose = 60 mEq po total dose (40 mEq x1 followed in 2 hours by 20 mEq x1). Recheck K+ level 4 hours after dose and the next AM.  If Serum K+ 2.5-2.9, dose = 80 mEq po total dose (40 mEq Q2H x2). Recheck K+ level 4 hours after dose and the next AM.  If Serum K+ less than 2.5, See IV order.  DO NOT CRUSH.               predniSONE solution 20 mg  Dose: 20 mg Freq: DAILY Route: PO  Start: 01/29/17 0800     0804 (20 mg)-Given        0848 (20 mg)-Given        0757 (20 mg)-Given        0809 (20 mg)-Given        0837 (20 mg)-Given         0805 (20 mg)-Given           QUEtiapine (SEROquel) tablet 50 mg  Dose: 50 mg Freq: 2 TIMES DAILY Route: PO  Start: 01/28/17 2215    2350 (50 mg)-Given        0805 (50 mg)-Given       2022 (50 mg)-Given        0850-Hold       (2018)-Not Given        0756 (50 mg)-Given       2007 (50 mg)-Given        0806 (50 mg)-Given       2048 (50 mg)-Given [C]        0838 (50 mg)-Given       1932 (50 mg)-Given        0807 (50 mg)-Given       [ ] 2000           senna-docusate (SENOKOT-S;PERICOLACE) 8.6-50 MG per tablet 2 tablet  Dose: 2 tablet Freq: 2 TIMES DAILY Route: PER J TUBE  Start: 01/28/17 2215     0004-Hold [C]       (0957)-Not Given       2022-Hold [C]        0849-Hold       (2006)-Not Given        (0826)-Not Given       (1928)-Not Given        (0810)-Not Given       (2048)-Not Given        0838 (2 tablet)-Given       1946-Hold        (0807)-Not Given       [ ] 2000           sertraline (ZOLOFT) 20 MG/ML (HIGH CONC) solution 150 mg  Dose: 150 mg Freq: DAILY Route: PER FEEDING   Start: 01/29/17 0800   Admin Instructions: Must dilute before use; mix with 4 oz of water, ginger ale, OJ, lemonade or lemon/lime soda.      1259 (150 mg)-Given        0849 (150 mg)-Given        0757 (150 mg)-Given        0809 (150 mg)-Given        0836 (150 mg)-Given        0807 (150 mg)-Given           sodium chloride (OCEAN) 0.65 % nasal spray 1 spray  Dose: 1 spray Freq: DAILY PRN Route: BOTH NOSTRIL  PRN Reason: congestion  Start: 01/28/17 2210              sodium chloride (PF) 0.9% PF flush 3 mL  Dose: 3 mL Freq: EVERY 8 HOURS Route: IK  Start: 01/28/17 2215   Admin Instructions: And Q1H PRN, to lock peripheral IV dormant line.      (0005)-Not Given       0621 (3 mL)-Given       (1534)-Not Given [C]       2234 (3 mL)-Given        (0628)-Not Given       1451 (3 mL)-Given       2342 (3 mL)-Given        0545 (3 mL)-Given       1031 (20 mL)-Given [C]       1255 (20 mL)-Given [C]       (1418)-Not Given       2138 (3 mL)-Given        (0738)-Not  Given       1413 (3 mL)-Given       2109 (3 mL)-Given               0901 (3 mL)-Given       1422 (3 mL)-Given       (2252)-Not Given [C]        0833 (3 mL)-Given       (1434)-Not Given       [ ] 2215           sodium chloride (PF) 0.9% PF flush 3 mL  Dose: 3 mL Freq: EVERY 1 HOUR PRN Route: IK  PRN Reason: line flush  PRN Comment: for peripheral IV flush post IV meds  Start: 01/28/17 2210     0630 (20 mL)-Given        0915 (20 mL)-Given        0741 (20 mL)-Given [C]       0829 (20 mL)-Given        0538 (30 mL)-Given [C]             sodium chloride 3 % neb solution 3 mL  Dose: 3 mL Freq: EVERY 3 HOURS PRN Route: NEBULIZATION  PRN Reason: wheezing  Start: 01/29/17 0948              sulfamethoxazole-trimethoprim (BACTRIM/SEPTRA) suspension 160 mg  Dose: 20 mL Freq: DAILY Route: PER J TUBE  Indications Comment: prophylaxis  Start: 01/29/17 0800   Admin Instructions: Shake well.      0804 (160 mg)-Given        0849 (160 mg)-Given        0757 (160 mg)-Given        0809 (160 mg)-Given        0836 (160 mg)-Given        0806 (160 mg)-Given          Future Medications  Medications 01/28/17 01/29/17 01/30/17 01/31/17 02/01/17 02/02/17 02/03/17       carbamide peroxide (DEBROX) 6.5 % otic solution 5 drop  Dose: 5 drop Freq: 2 TIMES DAILY Route: RIGHT EAR  Start: 02/03/17 2000          [ ] 2000          Completed Medications  Medications 01/28/17 01/29/17 01/30/17 01/31/17 02/01/17 02/02/17 02/03/17         Dose: 100 mL Freq: ONCE Route: IV  Start: 02/02/17 2100   End: 02/02/17 2054 2054 (100 mL)-New Bag              Dose: 7.5 mL Freq: ONCE Route: IV  Start: 02/02/17 2100   End: 02/02/17 2054 2054 (5 mL)-Given              Dose: 1 mg Freq: ONCE Route: IV  Start: 02/02/17 1445   End: 02/02/17 1932   Admin Instructions: 20 minutes before MRI  For IV PUSH: Dilute with equal volume of NS.          1932 (1 mg)-Given           Discontinued Medications  Medications 01/28/17 01/29/17 01/30/17 01/31/17 02/01/17 02/02/17  02/03/17         Dose: 2 mL Freq: EVERY 4 HOURS Route: NEBULIZATION  Start: 01/29/17 1200   End: 02/03/17 1108   Admin Instructions: Nebulization tubing with a FILTER is recommended with acetylcysteine nebs.             1205 (2 mL)-Given       1540 (2 mL)-Given       2015 (2 mL)-Given        0016 (2 mL)-Given       0438 (2 mL)-Given       0723 (2 mL)-Given       1130 (2 mL)-Given       1541 (2 mL)-Given       1957 (2 mL)-Given        0001 (2 mL)-Given       0417 (2 mL)-Given       0804 (2 mL)-Given       1217 (2 mL)-Given       1605 (2 mL)-Given       2000 (2 mL)-Given        0011 (2 mL)-Given       (0401)-Not Given       0950 (2 mL)-Given       1340 (2 mL)-Given       1606 (2 mL)-Given       1914 (2 mL)-Given        0035 (2 mL)-Given       0323 (2 mL)-Given       0831 (2 mL)-Given       (1318)-Not Given [C]       1635 (2 mL)-Given       2101 (2 mL)-Given       2342 (2 mL)-Given        0417 (2 mL)-Given       0914 (2 mL)-Given       1108-Med Discontinued         Dose: 4 mg Freq: EVERY 4 HOURS PRN Route: PO  PRN Reason: nausea  Start: 01/28/17 2210   End: 02/02/17 1353         1353-Med Discontinued

## 2017-01-28 NOTE — IP AVS SNAPSHOT
` `     [address] [facility]: [phone]                 INTERAGENCY TRANSFER FORM - NOTES (H&P, Discharge Summary, Consults, Procedures, Therapies)   2017                    Hospital Admission Date: 2017  MORRIS PYLE   : 1950  Sex: Female        Patient PCP Information     Provider PCP Type    Norman Brito MD General         History & Physicals      H&P by Viridiana Rockwell PA-C at 2017  7:51 PM     Author:  Viridiana Rockwell PA-C Service:  Hospitalist Author Type:  Physician Assistant    Filed:  2017 11:06 PM Note Time:  2017  7:51 PM Status:  Attested    :  Viridiaan Rockwell PA-C (Physician Assistant)      Related Notes: Original Note by Viridiana Rockwell PA-C (Physician Assistant) filed at 2017  8:13 PM    Cosigner:  Enrique Gama MD at 2017 11:16 PM        Attestation signed by Enrique Gama MD at 2017 11:16 PM        Attestation:  10 point ROS is negative except as mentioned in the HPI  PMH, PSH, medications, SH, and FMH were reviewed and confirmed by myself    Labs and VS reviewed    /82 mmHg  Pulse 98  Temp(Src) 97.9  F (36.6  C) (Oral)  Resp 16  Ht 1.524 m (5')  Wt 58 kg (127 lb 13.9 oz)  BMI 24.97 kg/m2  SpO2 97%  Frail, very ill appearing woman in mild distress. Lungs are largely clear but there is very poor air movement. She has pitting edema of the bilateral hands. No edema of the feet or legs    I saw and evaluated this patient with FER Rockwell. I agree with his/her assessment of Mrs Pyle. She is a 65 yo woman with chronic hybercarbic hypoxic respiratory failure admitted from her  today after a sudden and severe worsening of her hypoxic failure which has now improved after aggressive suctioning. She has minimal signs of recurrent infection and her CXR is largely unchanged. Planning on hold further abx today as no other signs of infection except for mild leukocytosis. She has  mild tachycardia but this is actually better than at prior admissions. Her VBG's were followed in the ED and noted acute on chronic hyercarbic respiratory failure with slow improvement since arrival. Her metabolic acidosis may reflect mild volume depletion vs delayed renal adjustment to prior acidosis. Given PICC in RUE, plan for ultrasound evaluation for DVT. Overall, Mrs. Munoz's quality of life is very poor with multiple recurrent admissions for issues related to her chronic respiratory failure. There have been many conversations regarding her goals of care. She clearly feels more comfortable in the hospital. Given this, a care conference could be considered with family to discuss the idea of hospice services here in the hospital (or a hospice facility) where her shortness of breath and anxiety could be aggressively and appropriately treated.      Justo Gama MD                          [facility] History and Physical  Department of Internal Medicine    Patient Name: Mary Wang MRN# [id number]   Age: 66 year old YOB: 1950     Date of Admission: 1/28/2017    Home clinic: [facility]   Primary care provider: Norman Brito         Assessment and Plan:   Mary Wang is a 66 year old female with a history of severe COPD s/p trach on home vent 24/7 (3-3.5 LPM), anxiety, hypertension, achalasia s/p GJ tube who presented to the hospital with acute on chronic hypoxic and hypercarbic respiratory failure.    *Multiple admissions in last 2 months, 12/1-12/5 for UTI, 12/25-12/28 for bacteremia, 1/2-1/6 with encephalopathy ultimately attributed to her medications, 01/07-01/09 for acute on chronic respiratory failure likely 2/2 COPD exacerbation, 1/20-1/24 for oversedation/aspiration pneumonitis.    #Acute on chronic respiratory failure, severe COPD s/p trach: On ventilator (settings CMV, 400/14/5/40%). Multiple admissions in 6 weeks, see above. For chronic respiratory  failure PTA the patient is maintained on chronic prednisone at 20 mg qday, scheduled ipratropium and xoponex nebs, BID Pulmicort, guaifenesin 200 mg QID, bactrim for PCP prophylaxis, allegra BID. S/P 5 days of meropenem (last dose today) for aspiration pneumonitis on admission from 01/20-01/24. New problem today includes mucous plugging w/ O2 sats dropping to 27%, group home staff deep suctioned for 15 minutes before sats improved, in 90s on admission. VBG w/ hypercarbia a bit worse than her baseline. CXR w/ mildly increased patchy basilar opacities not significant changed from 01/20 xray- atelectasis vs. Infx. WBC count mildly elevated at 11.1 w/ mildly elevated ANC. Afebrile, normotensive, mildly tachcyardic, sating well on home 3LPM.  -Continue home nebulizers  -Continue home steroids w/ PCP prophylaxis   -VBG in AM  -Trach cares per RT, suctioning per nursing and RT  -Continue anxiolytics and medications for air hunger  -Will not continue antibiotics tonight  -Consider further antibiotics pending clinical course, will trend CBC and CRP for AM    #Achalasia s/p GJ tube   -Continue tube feeds    #Severe anxiety, depression: Likely due to air hunger from COPD. Psychiatry and Palliative care have seen patient in the past. Continues to have significant anxiety and frequent requests for hospital admission per daughter. PTA maintained on seroquel (50 mg BID), sertraline (150 mg qday), scheduled Klonopin (1.5 mg QID) and PRN ativan (0.5 q3 PRN) & dilaudid (1 mg per G q2 hours PRN) for air hunger. Currently endorses stable symptoms although daughter notes intermittent anxious periods.   -Continue home regimen  -Consider palliative, psychiatry consult pending symptoms    #Urinary retention: Chronic indwelling bender. Last changed 1/23 while inpatient.  -Continue bender    #Normocytic anemia: At baseline.     #CAD: On aspirin and statin. Continue.      #Constipation: On senna and miralax PTA.     #Bilateral UE edema, L>R:  PICC in RUE. Otherwise does not appear hypervolemic.   -US to r/o DVT  -SubQ heparin for now    CODE: DNR, trached and on vent chronically  FEN: NPO, G tube feeds  DVT: Mechanical  LINES: Right UE PICC  Disposition/Admission Status: >2 midnights for acute on chronic respiratory failure, if stable off antibiotics may go back tomorrow (less likely).    Plan of care was discussed with attending physician, Dr. Gama.     Viridiana Rockwell PA-C  Hospitalist Service           Chief Complaint:   Nothing         HPI:   History is obtained from the patient, Daughter, and medical record.     The patient is non verbal and sleepy, doesn't answer questions but frequently reaches out for daughters hand.    The patients daughter notes that she was discharged Tuesday with antiobiotcs through today. She had an episode of mucous plugging on Thursday that improved with suctioning. She had a significant amount of anxiety yesterday and requested transfer to the hospital, but she was treated supportively at home. The group home staff noted the patient to be hypoxic in the 20s at the group home with tachycardia. Suctioning was attempted and approximately 15 minutes later a mucous plug was suctioned and the patients sats improved to the 90s again. EMS was called where apparently she desated again, was suctioned, on arrival was in the 90s. The daughter notes increased mucous plugging. Otherwise symptoms are stable. She is intermittently somnolent especially after medications.          Review of Systems:   A 10 point ROS was performed and negative unless otherwise noted in HPI.          Past Medical History:   Reviewed and updated in Epic.   Past Medical History   Diagnosis Date     COPD (chronic obstructive pulmonary disease) (H)      trach/vent dependent      Anxiety      TMJ pain dysfunction syndrome      Tracheostomy in place      Hypertension      GERD (gastroesophageal reflux disease)      Achalasia      Lung nodule      spiculated;  followed in pulm clinic      Stress-induced cardiomyopathy      Anxiety and depression      Chronic pain             Past Surgical History:   Reviewed and updated in Epic.   Past Surgical History   Procedure Laterality Date     Tracheostomy N/A 8/26/2015     Procedure: TRACHEOSTOMY;  Surgeon: Milena Thibodeaux MD;  Location: [facility] OR     Bronchoscopy, insert bronchial valve Right 9/2/2015     Procedure: BRONCHOSCOPY, INSERT BRONCHIAL VALVE;  Surgeon: Everardo Beach MD;  Location: [facility] OR     Esophagoscopy, gastroscopy, duodenoscopy (egd), combined N/A 12/11/2015     Procedure: COMBINED ESOPHAGOSCOPY, GASTROSCOPY, DUODENOSCOPY (EGD);  Surgeon: Dhruv Lyles MD;  Location: [facility] OR     Esophagoscopy, gastroscopy, duodenoscopy (egd), combined N/A 12/31/2015     Procedure: COMBINED ESOPHAGOSCOPY, GASTROSCOPY, DUODENOSCOPY (EGD);  Surgeon: Brenden Plasencia MD;  Location: [facility] OR     Percutaneous insertion tube jejunostomy N/A 12/31/2015     Procedure: PERCUTANEOUS INSERTION TUBE JEJUNOSTOMY;  Surgeon: Brenden Plasencia MD;  Location: [facility] OR     Percutaneous insertion tube jejunostomy N/A 1/25/2016     Procedure: PERCUTANEOUS INSERTION TUBE JEJUNOSTOMY;  Surgeon: Carrie Coleman MD;  Location: [facility] OR     Anesthesia out of or mri N/A 4/18/2016     Procedure: ANESTHESIA OUT OF OR MRI;  Surgeon: GENERIC ANESTHESIA PROVIDER;  Location: [facility]     Endoscopic insertion tube gastrostomy N/A 7/9/2016     Procedure: ENDOSCOPIC INSERTION TUBE GASTROSTOMY;  Surgeon: Brenden Plasencia MD;  Location: [facility] OR     Picc insertion Right 1/9/2017     5fr DL BioFlo PICC, 38cm (1cm external) in the R basilic vein.            Social History:   Reviewed and updated in [facility].   Social History     Social History     Marital Status:      Spouse Name: N/A     Number of Children: N/A     Years of Education: N/A     Occupational History     Not on file.     Social History Main Topics     Smoking status: Former Smoker  -- 2.00 packs/day for 40 years     Types: Cigarettes     Start date: 01/01/1964     Quit date: 04/18/1998     Smokeless tobacco: Never Used     Alcohol Use: No     Drug Use: No     Sexual Activity: Not on file     Other Topics Concern     Not on file     Social History Narrative            Family History:   Reviewed and updated in Epic.   Family History   Problem Relation Age of Onset     CANCER Sister             Allergies:      Allergies   Allergen Reactions     Levaquin Other (See Comments)     Delirium     Toro Podhaler [Tobramycin] Other (See Comments)     Severe congestion, SOB      Suprax [Cefixime]      See ceftibuten     Augmentin Nausea     Has tolerated for treatment of UTIs in 2016.     Avelox [Moxifloxacin] Anxiety     Cedax [Ceftibuten] Other (See Comments)     Pain in eyes     Penicillins Itching     Tolerated amoxicillin without issues.     Vantin [Cefpodoxime] Nausea            Medications:     (Not in a hospital admission)          Physical Exam:   Blood pressure 112/63, temperature 98.5  F (36.9  C), temperature source Oral, resp. rate 13, height 1.524 m (5'), weight 58 kg (127 lb 13.9 oz), SpO2 92 %, not currently breastfeeding.  GENERAL: Alert, intermittently lethargic, didn't answer questions.   HEENT: Anicteric sclera. PERRL. Mucous membranes moist and without lesions.   CV: Distant heart sounds, Tachycardic rate, regular rhythm. S1, S2. No murmurs appreciated.   RESPIRATORY: Effort increased on 3 lpm via trach. Lung sounds diffusely diminished with coarse sounds in bases, with no wheezing.   GI: Abdomen soft and non distended, bowel sounds present. No tenderness, rebound, guarding. G tube in place, green bilious drainage in vented G tube bag.   MUSCULOSKELETAL: No joint swelling or tenderness.   NEUROLOGICAL: No focal deficits.   EXTREMITIES: Edema in bilateral UE, L>R, no LE peripheral edema. Intact bilateral pedal pulses.   SKIN: No jaundice. No rashes.   : Avendaño in place.      Lines/Tubes/Drains:   PICC Double Lumen 01/09/17 Right Basilic (Active)   Site Assessment WDL 1/24/2017 12:36 PM   External Cath Length (cm) 1 cm 1/24/2017 12:36 PM   Extremity Circumference (cm) 29 cm 1/9/2017  6:39 PM   Dressing Intervention Chlorhexidine patch;Securing device;New dressing 1/24/2017 12:36 PM   Extravasation? No 1/9/2017  6:39 PM   Dressing Change Due 01/31/17 1/24/2017 12:36 PM   PICC Comment statseal is off 1/22/2017  7:00 AM   Number of days:19            Data:   I personally reviewed the following studies:  CMP, lactic acid, lipase, BNP, trop, glucose, CBC, INR   Unresulted Labs Ordered in the Past 30 Days of this Admission     No orders found from 11/30/2016 to 1/29/2017.                        Discharge Summaries      Discharge Summaries by Celia Chavis MD at 2/3/2017 11:13 AM     Author:  Celia Chavis MD Service:  Internal Medicine Author Type:  Physician    Filed:  2/3/2017  1:42 PM Note Time:  2/3/2017 11:13 AM Status:  Signed    :  Celia Chavis MD (Physician)           [facility] - Internal Medicine Discharge Summary   Date of Service: 2/3/2017    Mary Wang MRN# [id number]   YOB: 1950 Age: 66 year old     Date of Admission:  1/28/2017  Date of Discharge:  2/3  Admitting Physician:  Enrique Gama MD  Discharge Physician:  Celia Chavis   Discharging Service:  Internal Medicine, [facility]     Primary Provider: Norman Brito         Reason for Admission:   Difficulty breathing           Discharge Diagnosis:   Acute on chronic respiratory Failure  Vent dependent   Dysphagia . Achalasia . G Tube depenedent  Malnutrition on TF  Urinary retention ; on bender catheter   Severe Anxiety and depression   Normocytic Anemia         Procedures & Significant Findings:   MRI brain ; with no findings of infarction or hemorrage         Consultations:   Palliative team          Hospital Course by Problem:      Mary Wang is a 66  year old female with a history of severe COPD s/p trach on home vent 24/7 (3-3.5 LPM), anxiety, hypertension, achalasia s/p GJ tube who presented to the hospital with acute on chronic hypoxic and hypercarbic respiratory failure.     *Multiple admissions in last 2 months, 12/1-12/5 for UTI, 12/25-12/28 for bacteremia, 1/2-1/6 with encephalopathy ultimately attributed to her medications, 01/07-01/09 for acute on chronic respiratory failure likely 2/2 COPD exacerbation, 1/20-1/24 for oversedation/aspiration pneumonitis.    Goals of a care ; greatly appreciate Palliative following patient. Please note 1/31. FCC was held 1/31 and the plan is that Mary will not be admitted to hospital again for respiratory decompensation . Cares being organized . New Polst completed  Spoke with daughter César 2/3 and updated on progress. César would like to have in place regarding respiratory decline in group home so that Mary is not under duress.    #Acute on chronic respiratory failure, severe COPD s/p trach: On ventilator (settings CMV, 400/14/5/40%). Multiple admissions in 6 weeks, see above. For chronic respiratory failure PTA the patient is maintained on chronic prednisone at 20 mg qday, scheduled ipratropium and xoponex nebs, BID Pulmicort, guaifenesin 200 mg QID, bactrim for PCP prophylaxis, allegra BID. Mucous plugging w/ O2 sats dropping to 27%, group home staff deep suctioned for 15 minutes before sats improved, in 90s on admission. XR chest  w/ mildly increased patchy basilar opacities not significant changed from 01/20 xray- atelectasis vs. Infx.  Afebrile, normotensive, mildly tachcyardic, sating  on home 2-3 LPM. CT Angiogram was negative for Pulmonary embolism. She is currently at her baseline oxygen requirements. CT did show some right lung opacity as she is improving without antibiotics , none were initiated , Family was aware and in agreement with this plan     #Achalasia s/p GJ tube   -Continue tube  feeds    #Severe anxiety, depression: Likely due to air hunger from COPD. Psychiatry and Palliative care have seen patient in the past. Continued to have significant anxiety and frequent requests for hospital admission per daughter. PTA maintained on seroquel (50 mg BID), sertraline (150 mg qday), scheduled Klonopin (1.5 mg QID) and PRN ativan (0.5 q3 PRN) & dilaudid (1 mg per G q2 hours PRN) for air hunger. Currently endorses stable symptoms-Continued home regimen  -     # Pulmonary nodule: Seen on CT 01/29. 1 cm right lower lobe lung nodule, slightly more prominent than prior      # Urinary retention: Chronic indwelling bender. Last changed 1/23 while inpatient.  -Continue bender    # Normocytic anemia: At baseline.     # CAD: On aspirin and statin. Continue.      # Constipation: On senna and miralax PTA.     CODE: DNR, trached and on vent chronically. Discussed with Daughter HANNAH Lindsey  2/3  FEN: NPO, G tube feeds  DVT: Heparin Q 8h while hospitalized      On Exam ;     Alert and oriented . No acute distress  Vital signs:  Temp: 98.3  F (36.8  C) Temp src: Oral BP: 91/48 mmHg   Heart Rate: 94 Resp: 16 SpO2: 97 % O2 Device: Mechanical Ventilator Oxygen Delivery: 2 LPM Height: 152.4 cm (5') Weight: 57.471 kg (126 lb 11.2 oz) (bedscale)  Estimated body mass index is 24.74 kg/(m^2) as calculated from the following:    Height as of this encounter: 1.524 m (5').    Weight as of this encounter: 57.471 kg (126 lb 11.2 oz).    Neck ;trach  Chest ; bibasilar fine crepts  CVS; RRR, no murmur /rubs or gallops  GI ; soft abdomen, non tender, BS positive  Neuro ; CN 2 to 12 grossly intact ,knows year , month, date, able to pronounce and say doctors name   Psych ; appropriate mood and effect  Skin ; no rash or purpura.           Pending Results:   Sputum cs         Discharge Medications:     Current Discharge Medication List      CONTINUE these medications which have NOT CHANGED    Details   !! bisacodyl (DULCOLAX) 10 MG  Suppository Place 1 suppository (10 mg) rectally daily as needed for constipation  Qty: 25 suppository, Refills: 1    Associated Diagnoses: Constipation due to opioid therapy      order for DME Equipment being ordered: 1) Avendaño catheter 16F, balloon 10 mL, silicone.  2) Urinary collection bag.  3) Elastic leg strap for Avendaño catheter.  Please fax to [employer].  Qty: 3 each, Refills: 11      LORazepam (ATIVAN) 2 MG/ML (HIGH CONC) solution Take 0.25 mLs (0.5 mg) by mouth every 3 hours as needed for anxiety  Qty: 30 mL, Refills: 1      clonazePAM (KLONOPIN) 0.1 mg/mL Take 15 mLs (1.5 mg) by mouth 4 times daily  Qty: 1800 mL, Refills: 1      HYDROmorphone (DILAUDID) 1 MG/ML LIQD liquid Take 1 mL (1 mg) by mouth every 2 hours as needed for moderate to severe pain  Qty: 180 mL, Refills: 0      QUEtiapine (SEROQUEL) 50 MG tablet Take 1 tablet (50 mg) by mouth 2 times daily  Qty: 180 tablet, Refills: 3      ipratropium (ATROVENT) 0.02 % neb solution Take 2.5 mLs (0.5 mg) by nebulization 4 times daily and every four hours at night PRN.  Qty: 450 mL, Refills: 0      levalbuterol (XOPENEX) 1.25 MG/3ML neb solution Take 3 mLs (1.25 mg) by nebulization 4 times daily and every four hours at night PRN.  Qty: 540 mL, Refills: 0      lactobacillus rhamnosus, GG, (CULTURELL) capsule 1 capsule by Per G Tube route daily  Qty: 60 capsule, Refills: 0      lidocaine (LIDODERM) 5 % Patch Place 1-2 patches onto the skin every 24 hours Apply to the lower back  Qty: 30 patch, Refills: 0    Associated Diagnoses: Urinary tract infection, site not specified      gabapentin (NEURONTIN) 250 MG/5ML solution 6 mLs (300 mg) by Per J Tube route 3 times daily  Qty: 450 mL, Refills: 0    Associated Diagnoses: Anxiety      polyethylene glycol (MIRALAX/GLYCOLAX) Packet 17 g by Per J Tube route 2 times daily Hold for loose stools  Qty: 100 packet, Refills: 0    Associated Diagnoses: Constipation, unspecified constipation type      senna-docusate  "(SENOKOT-S;PERICOLACE) 8.6-50 MG per tablet 2 tablets by Per J Tube route 2 times daily  Qty: 100 tablet, Refills: 0    Associated Diagnoses: Constipation, unspecified constipation type      ondansetron (ZOFRAN) 4 MG tablet Take 1 tablet (4 mg) by mouth every 4 hours as needed for nausea  Qty: 18 tablet      famotidine (PEPCID) 40 MG/5ML suspension Take 2.5 mLs (20 mg) by mouth 2 times daily as needed for heartburn  Qty: 75 mL, Refills: 3    Associated Diagnoses: Mild gas use disorder      loperamide (IMODIUM A-D) 2 MG tablet 2-2 mg tablets per J-tube qid prn frequent and/or loose stools. Do not use more than 8 tabs per day.  Qty: 30 tablet, Refills: 0    Associated Diagnoses: Frequent stools      polyethylene glycol 0.4%- propylene glycol 0.3% (SYSTANE ULTRA) 0.4-0.3 % SOLN ophthalmic solution Place 1-2 drops into both eyes 4 times daily as needed for dry eyes 0900, 1300, 1700, 2100  Qty: 1 Bottle, Refills: 3    Associated Diagnoses: Dry eyes, bilateral      predniSONE 5 MG/5ML solution Take 20 mLs (20 mg) by mouth daily  Qty: 500 mL, Refills: 0    Comments: Continue prednisone taper until reaches 20 mg daily, then continue 20 mg daily.  Associated Diagnoses: Shortness of breath      Cranberry 500 MG CAPS Take 1 capsule (500 mg) by mouth daily  Qty: 90 capsule      sertraline (ZOLOFT) 20 MG/ML (HIGH CONC) solution 7.5 mLs (150 mg) by Per Feeding Tube route daily  Qty: 105 mL, Refills: 0    Associated Diagnoses: Anxiety      fexofenadine (ALLEGRA) 180 MG tablet Take 1 tablet (180 mg) by mouth daily  Qty: 30 tablet, Refills: 0    Associated Diagnoses: COPD exacerbation (H)      fiber modular (NUTRISOURCE FIBER) packet 1 packet by Per J Tube route 3 times daily  Qty: 42 packet, Refills: 0    Associated Diagnoses: Diarrhea, unspecified type      Nutritional Supplements (JEVITY 1.5 IZZY) LIQD Take 5 Cans by mouth daily Per tube feeding  Qty: 150 Can, Refills: 11    Comments: Height 5'2\"  Weight 120 lbs  Length of need 99 " months  Associated Diagnoses: Achalasia      melatonin (MELATONIN) 1 MG/ML LIQD liquid 3 mLs (3 mg) by Per J Tube route At Bedtime  Qty: 300 mL, Refills: 0    Associated Diagnoses: Anxiety; Insomnia due to medical condition      acetaminophen (TYLENOL) 160 MG/5ML oral liquid 20.3 mLs (650 mg) by Per Feeding Tube route every 4 hours as needed for mild pain  Qty: 300 mL, Refills: 0    Associated Diagnoses: Other chronic pain      budesonide (PULMICORT) 0.5 MG/2ML nebulizer solution Take 2 mLs (0.5 mg) by nebulization 2 times daily  Qty: 60 ampule, Refills: 0    Associated Diagnoses: Shortness of breath; Respiratory difficulty      guaiFENesin (ORGANIDIN) 200 MG TABS Take 1 tablet (200 mg) by mouth 4 times daily  Qty: 115 tablet, Refills: 0    Associated Diagnoses: Shortness of breath      sodium chloride (OCEAN) 0.65 % nasal spray Spray 1 spray into both nostrils daily as needed for congestion  Qty: 1 Bottle, Refills: 0    Associated Diagnoses: Chronic sinusitis, unspecified location      sulfamethoxazole-trimethoprim (BACTRIM,SEPTRA) 200-40 mg/5ml suspension 20 mLs (160 mg) by Per J Tube route daily Dose based on TMP component. 20 mLs = 160 mg  Qty: 560 mL, Refills: 0    Associated Diagnoses: Chronic obstructive pulmonary disease with acute exacerbation (H)      Rectal Cleansers ([employer] CLEANSING ENEMA) ENEM Place 1 enema rectally daily as needed  Qty: 3 Bottle, Refills: 11    Associated Diagnoses: Constipation due to opioid therapy      !! bisacodyl (DULCOLAX) 10 MG Suppository Place 1 suppository (10 mg) rectally daily as needed for constipation  Qty: 28 suppository, Refills: 3    Associated Diagnoses: Constipation due to opioid therapy      calcium carbonate (TUMS) 500 MG chewable tablet Take 1 tablet (500 mg) by mouth every 2 hours as needed for heartburn  Qty: 150 tablet      lidocaine 15 mL, alum & mag hydroxide-simethicone 15 mL GI Cocktail Take 30 mLs by mouth 3 times daily as needed for moderate  pain  Qty: 500 mL, Refills: 0    Associated Diagnoses: Gastroesophageal reflux disease without esophagitis       !! - Potential duplicate medications found. Please discuss with provider.      STOP taking these medications       meropenem (MERREM) 500 MG vial Comments:   Reason for Stopping:                    Discharge Instructions and Follow-Up:     Discharge Procedure Orders  Home Care PT Referral for Hospital Discharge   Referral Type: Home Health Therapies & Aides     Home care nursing referral   Referral Type: Home Health Therapies & Aides     Reason for your hospital stay   Order Comments: You were admitted with difficulty breathing     Tracheostomy care   Order Comments: Per routine     Reason for your hospital stay   Order Comments: You were admitted due to difficulty breathing     Follow Up and recommended labs and tests   Order Comments: Complex care MD 1 week     Tubes and drains   Order Comments: Avendaño   trach     Discharge Instructions   Order Comments: See instructions on POLST form     DNR (Do Not Resuscitate)     Oxygen Adult   Order Comments: Trilogy vent     Diet   Order Comments: NPO Time Specified Ice Chips  Adult Formula Drip Feeding: Specified Time Isosource 1.5; Gastrostomy; Goal Rate: 85 ml/hour; mL/hr; From: 8:00 PM; 8:00 AM; Medication - Tube Feeding Flush Frequency: At least 15-30 mL water before and after medication administration   Order Specific Question Answer Comments   Is discharge order? Yes      Diet   Order Comments: Npo except ice chips  Adult Formula Drip Feeding: Specified Time Isosource 1.5; Gastrostomy; Goal Rate: 85 ml/hour; mL/hr; From: 8:00 PM; 8:00 AM; Medication - Tube Feeding Flush Frequency: At least 15-30 mL water before and after medication administration   Order Specific Question Answer Comments   Is discharge order? Yes              Discharge Disposition:   Group home         Condition on Discharge:   Discharge condition: {stable   Code status on discharge: DNR         Date of service: 2/3/2017     60 minutes spent in discharge, including >50% in counseling and coordination of care, medication review and plan of care recommended on follow up. Questions were answered   Spoke with RN, palliative team, CC , daughter César, RT Celia Chavis  Internal Medicine Hospitalist & Staff Physician  [facility]                                Consult Notes      Consults by Maddie Santiago APRN CNS at 1/31/2017  7:47 AM     Author:  Maddie Santiago APRN CNS Service:  Palliative Author Type:  Clinical Nurse Specialist    Filed:  1/31/2017  5:31 PM Note Time:  1/31/2017  7:47 AM Status:  Signed    :  Maddie Santiago APRN CNS (Clinical Nurse Specialist)       Consult Orders:    1. Palliative Care ADULT: Patient to be seen: Routine within 24 hrs; Call back #: [phone]; Goals of care; Consultant may enter orders: Yes [[id number]] ordered by Steven Sousa MD at 01/30/17 0917                [facility]  Palliative Care Consultation         Mary Wang MRN# [id number]   Age: 66 year old YOB: 1950   Date of Admission: 1/28/2017    Reason for consult: Symptom management  Goals of care  Decisional support  Patient and family support       Requesting physician/service: Steven Sousa MD       Recommendations        Goals of Care  Visit today with Palliative Care Clinical , Madelyn, and family including César- daughter, Uli- son, Dorota- daughter-in-law, and Brent- patients . Patient was able to make her needs known and has the ability in my assessment to understand and appreciate her current medical situation. She clearly states that she no longer would like to come back to the hospital. She understands that this could mean that she has a significant event that it could end her life. We discussed her wishes if she were to have a significant event, such as a mucus plug, that  "her wish would be to have comfort medications provided and receive interventions such as suctioning to help her feel better, knowing that it could end her life. In my assessment, her symptoms at the moment seem to be much improved and she is very alert and clear in her interactions with myself and her family.   -Ongoing planning and coordination for her going back to the group home with plan for no more hospitalizations and to manage her symptoms at the group home  -Discussed with Chelsie Sanches, the Lead Director of the home care agency and Arley,  with [facility] updates to her plan of care    POLST -form was updated and discussed, patient and family members present were able to participate in the completion of the form and all in agreement with the document and that it is what Mary has stated that her wishes are.    LÁZARO Villarreal CNS  Palliative Care Consult Team  Pager: [phone]    120 minutes spent with patient, with >50% counseling and in care coordination.   Face to face- [phone]       Disease Process/es & Symptoms     Acute on chronic respiratory failure, severe COPD s/p trach  Achalasia s/o GJ tube  Severe anxiety and depression  Pulmonary nodule  Urinary retention  Normocytic anemia  CAD  Constipation  Bilateral UE edema       Support/Coping   (We typically ask each of our patients the following questions:)    How do you make sense of this? Not discussed  Are you Restorationist? Spiritual? Not so much? Not discussed  Are you at peace? Not discussed  What are your concerns, medical and non-medical, that are not being addressed? None  Who are your \"go-to\" people when you need support? Family    Plan for psychosocial/spiritual support/follow-up from palliative team: Will discuss case during palliative interdisciplinary team rounds and involve LICSW and  as needed.       Decision-Making & Goals of Care     Discussion/counseling today about prognosis/goals of " care/decisions:   See above  Patient has a completed health care directive available in the chart (Y/N): Yes  Health care agent (only if patient has an available, complete HCD): César Troy  Physician orders for life-sustaining treatment (POLST) form is on file  Code Status: Do not resuscitate   Patient has decision-making capacity (Y/N): Yes    Coordination of Care     Findings & plan of care discussed with: Zak Combs, Dr. Chavis, bedside RN,   Follow-up plan from palliative team: will continue to follow for further care coordination and recommendations for symptom management.  Thank you for involving us in the patient's care.           Chief Complaint     Goals of care         History of Present Illness     History obtained from: chart review    Mary Wang is a 65 yo woman with chronic hybercarbic hypoxic respiratory failure admitted from her  today after a sudden and severe worsening of her hypoxic failure which  improved after aggressive suctioning. She recently started having her medical management with the complex care clinic and recently had palliative home care consultation. She has had 16 admissions over the last year with more frequent as below. She has historically always wanted to continue to come back to the hospital and is known to the Palliative care team for management of anxiety and air hunger. She now has elevated WBCs and additional evaluation is in progress.    Her hospital admissions include most recently: 12/01/2016-12/05/2016, 12/25/2016-12/28/2016, 1/02/2017-1/06/2017, 1/7/2017-1/10/2017, 1/20/2017-1/24/2017.     Palliative Care team consulted 1/30 for goals of care          Past Medical History:   I have reviewed this patient's past medical history  This patient  has a past medical history of COPD (chronic obstructive pulmonary disease) (H); Anxiety; TMJ pain dysfunction syndrome; Tracheostomy in place; Hypertension; GERD (gastroesophageal reflux disease); Achalasia; Lung nodule;  Stress-induced cardiomyopathy; Anxiety and depression; and Chronic pain.           Past Surgical History:   I have reviewed this patient's past surgical history  This patient  has past surgical history that includes Tracheostomy (N/A, 8/26/2015); Bronchoscopy, Insert Bronchial Valve (Right, 9/2/2015); Esophagoscopy, gastroscopy, duodenoscopy (EGD), combined (N/A, 12/11/2015); Esophagoscopy, gastroscopy, duodenoscopy (EGD), combined (N/A, 12/31/2015); Percutaneous insertion tube jejunostomy (N/A, 12/31/2015); Percutaneous insertion tube jejunostomy (N/A, 1/25/2016); Anesthesia out of OR MRI (N/A, 4/18/2016); Endoscopic insertion tube gastrostomy (N/A, 7/9/2016); and picc insertion (Right, 1/9/2017).            Social/Spiritual History:     Living situation: group home  Family system: daughter César, - Brent, Uli- son, Dorota-   Functional status (needs help with ADLs or IADLs): not discussed  Employment/education: not discussed  Use of community resources: home health care involvement, receives 24/7 care nursing  Activities/interests: not discussed  History of substance use/abuse: Former smoker            Family History:   The patient has no family status information on file.               Allergies:   All allergies reviewed and addressed  This patient is allergic to is allergic to levaquin; sarabjit podhaler; suprax; augmentin; avelox; cedax; penicillins; and vantin.           Medications:   I have reviewed this patient's medication profile and medications during this hospitalization    Medications of Note  Clonazepam 1.5 mg 4 times a day  Gabapentin 300 mg 3 times a day  Guaifenesin 200 mg 4 times a day  Melatonin 3 mg at bedtime  Polyethylene glycol 17 g 2 times a day  Prednisone 60 mg daily  Quetiapine 50 my BID  Senna-docusate 2 tablets 2 times a day  Sertraline 150 mg daily  Acetaminophen 650 mg q4h PRN- x1 yesterday  Bisacodyl suppository 10 mg daily PRN  Calcium carbonate 500 mg q2h PRN- x1  yesterday  Hydromorphone 1 mg q2h PRN  Lorazepam 0.5 mg q3h PRN- x2  Ondansetron 4 mg q4h PRN           Review of Systems:   The comprehensive review of systems is negative other than noted here and in the HPI.    Palliative Symptom Review (0=no symptom/no concern, 1=mild, 2=moderate, 3=severe):      Denies symptoms at this time            Physical Exam:   Vitals were reviewed  Temp: 99.7  F (37.6  C) Temp src: Axillary BP: 107/65 mmHg   Heart Rate: 91 Resp: 28 SpO2: 97 % O2 Device: Mechanical Ventilator Oxygen Delivery: 2 LPM  Constitutional: Awake, alert, cooperative, no apparent distress  Lungs: No increased work of breathing, good air exchange  Musculoskeletal: generalized weakness  Neurologic: Awake, alert, oriented to name, place and time.  Answers questions appropriately through mouthing words and nodding yes and no consistently.  Neuropsychiatric: calm, normal orientation, memory and insight.           Data Reviewed:     ROUTINE ICU LABS (Last four results)  CMP  Recent Labs  Lab 01/31/17  0305 01/30/17  0917 01/29/17  0638 01/28/17  1605 01/28/17  1604   NA  --  136 139 136 134   POTASSIUM 4.0 3.2* 3.3* 4.1 4.2   CHLORIDE  --  90* 89* 86*  --    CO2  --  40* >45Critical Value called to and read back byMORRIS MIGUEL RN 6B 1/29/17 0715 BY SAMIRA* >45Critical Value called to and read back byMATTIE MEJIAS RN UER 1655 1/28/2017 BY VALENTINE*  --    ANIONGAP  --  6 Not Calculated Not Calculated  --    GLC  --  163* 114* 126* 134*   BUN  --  26 28 26  --    CR  --  0.52 0.50* 0.47*  --    GFRESTIMATED  --  >90Non  GFR Calc >90Non  GFR Calc >90Non  GFR Calc  --    GFRESTBLACK  --  >90African American GFR Calc >90African American GFR Calc >90African American GFR Calc  --    IZZY  --  9.0 8.5 8.6  --    PROTTOTAL  --   --   --  6.3*  --    ALBUMIN  --   --   --  2.1*  --    BILITOTAL  --   --   --  0.2  --    ALKPHOS  --   --   --  91  --    AST  --   --   --  19  --    ALT  --    --   --  22  --      CBC  Recent Labs  Lab 01/30/17  0917 01/29/17  0638 01/28/17  1605 01/28/17  1604   WBC 11.9* 9.0 11.1*  --    RBC 2.98* 2.96* 3.26*  --    HGB 8.7* 8.7* 9.5* 10.2*   HCT 28.5* 28.1* 30.9*  --    MCV 96 95 95  --    MCH 29.2 29.4 29.1  --    MCHC 30.5* 31.0* 30.7*  --    RDW 15.6* 15.6* 15.2*  --     300 279  --      INR  Recent Labs  Lab 01/28/17  1605   INR 0.91     Arterial Blood Gas  Recent Labs  Lab 01/29/17  0638 01/28/17  2221   O2PER 2L 3L                           Progress Notes - Physician (Notes from 01/31/17 through 02/03/17)      Progress Notes by Gisel Flowers RN at 2/3/2017  2:44 PM     Author:  Gisel Flowers RN Service:  Care Coordinator Author Type:  Care Coordinator    Filed:  2/3/2017  2:51 PM Note Time:  2/3/2017  2:44 PM Status:  Signed    :  Gisel Flowers RN (Care Coordinator)             Care Coordinator- Discharge Planning     Admission Date/Time:  1/28/2017  Attending MD:  Enrique Gama*     Data  Date of initial CC assessment:  Chart reviewed, discussed with interdisciplinary team.   Patient was admitted for:   1. Constipation due to opioid therapy    2. Acute and chronic respiratory failure with hypercapnia (H)           Assessment  Updated that pt has been medically cleared for discharge to her group home today.      Coordination of Care:    I have contacted Arley at [facility] ([phone]) and they will be able to provide staffing for today after 4 pm.  I have set up [facility] stretcher transport with pt's home vent for 5 pm today.  I have updated pt's bedside RN, Sidney and she will update pt and her daughter César of the above plan.  I have paged Dr. Chavis and Palliative NP, Maddie of 5 pm transportation.       Plan  Anticipated Discharge Date: 02/03/17    Anticipated Discharge Plan: Return to group home with resumption of home cares, 24 hrs/day.       Valdo Flowers RN, BSN  Medicine Care Coordinator  Pager:  [phone]  Phone:  [phone]           Progress Notes by Lisa Johnson, RN at 2/3/2017 10:57 AM     Author:  Lisa Johnson RN Service:  [facility] Nurse Author Type:  Registered Nurse    Filed:  2/3/2017 10:58 AM Note Time:  2/3/2017 10:57 AM Status:  Signed    :  Lisa Johnson RN (Registered Nurse)           [facility] Nurse Inpatient Adult Pressure INJURY (PI) Wound Assessment     Initial assessment of PU(s) on pt's:   Sacrum and Left upper inner thigh     Data:   Patient History:      per MD note(s):  66 year old female with a history of severe COPD s/p trach on home vent  (3-3.5 LPM), anxiety, hypertension, achalasia s/p GJ tube who presented to the hospital with acute on chronic hypoxic and hypercarbic respiratory failure.     *Multiple admissions in last 2 months, - for UTI, - for bacteremia, - with encephalopathy ultimately attributed to her medications, - for acute on chronic respiratory failure likely 2/2 COPD exacerbation, - for oversedation/aspiration pneumonitis. chronic hybercarbic hypoxic respiratory failure admitted from her  today after a sudden and severe worsening of her hypoxic failure   Here to assess sacrum for pressure injury that was present on admission. Pressure injury has deteriorated since last discharge     Current mattress:  AtmosAir  Current pressure relieving devices:  Pillows (Patient refuses foam heel lift boots    Moisture Management:  Incontinence Protocol, Rectal Pouch and Urinary Catheter    Catheter secured? Yes    Current Diet / Nutrition:       Active Diet Order  NPO Time Specified Ice Chips  Diet        Jose F Assessment and sub scores:   Jose F Score  Av.6  Min: 12  Max: 16   Labs:   Recent Labs   Lab Test  17   0917  17   0638  17   1605   16   0331   ALBUMIN   --    --   2.1*   < >   --    HGB  8.7*  8.7*  9.5*   < >  8.4*   INR   --    --   0.91   < >   --    WBC  11.9*   9.0  11.1*   < >  11.8*   A1C   --    --    --    --   5.1   CRP   --   83.0*   --    < >   --     < > = values in this interval not displayed.                                                                                                                          Pressure Injury Assessment  (location #1):   Sacrum      1/30/17 sacral pressure injury    Wound History:   Present on admission, has deteriorated since last discharge    Wound Base: Purple non blanchable erythema ,  non-blanchable erythema and epidermis, dry    Specific Dimensions (length x width x depth, in cm) :   1.5 x 1.7 x 0 cm    Palpation of the wound bed:  Firm over bone    Periwound Skin: intact and exfoliating, pink, blanchable epidermis     Drainage:  none      Pain:  minimal ,     Pressure Injury Assessment  (location #2):   Left inner thigh    Wound History:   Present on admission  Appears to be device related (catheter or diaper    Wound Base: Red area now starting to radha easily    Specific Dimensions (length x width x depth, in cm) :   12 x 0.3 x 0 cm    Palpation of the wound bed:  normal    Periwound Skin: intact,      Drainage:  none  Amount:     Pain:  absent ,              Intervention:     Patient's chart evaluated.      Jose F Interventions:  Current Jose F Interventions and Care Plan reviewed and updated, appropriate at this time.    Wound was assessed.    Wound Care: was done: Removal of existing dressing    Visual inspection    Cleansing with NS solution    Application of clean dressing,    Orders  Written    Supplies  N/A    Discussed plan of care with Nurse           Assessment:     Pressure Injury (PI) located on Sacrum: Suspected DTI    Status: wound  initial assessment and is evolving, Worsening    Symptomatic      Pressure Injury (PI) located on Left Inner Thigh: I    Status: wound  initial assessment, appears Stable    Symptomatic    Both pressure injuries present on admission         Plan:     Nursing to notify the  Provider(s) and re-consult the [facility] Nurse if wound(s) deteriorate(s).    Plan of care for wound located on Sacrum: Change dressing every 2 days, Clean with Microklenz, Cover with Mepilex Border Dressing    [facility] Nurse will return: Friday  Face to face time: 10 minutes         Progress Notes by Mendez Ho RT at 2/2/2017  7:21 PM     Author:  Mendez Ho RT Service:  (none) Author Type:  Respiratory Therapist    Filed:  2/2/2017  7:23 PM Note Time:  2/2/2017  7:21 PM Status:  Signed    :  Mendez Ho RT (Respiratory Therapist)           Pt Bivona trach is MRI compatible.       Progress Notes by Maddie Santiago APRN CNS at 2/2/2017 10:23 AM     Author:  Maddie Santiago APRN CNS Service:  Palliative Author Type:  Clinical Nurse Specialist    Filed:  2/2/2017  4:28 PM Note Time:  2/2/2017 10:23 AM Status:  Signed    :  Maddie Santiago APRN CNS (Clinical Nurse Specialist)           [facility]   Palliative Care Daily Progress Note          Recommendations, Patient/Family Counseling & Coordination     Mary today is more tired today during visit. She is slightly confused during visit and limited engagement. During visit she was focused on getting dressed in her own clothes and was not able to engage in further discussion today. I let her know that we are continuing to work on a plan for her to go home and be taken care of so she will not come back to the hospital, and she nods to me saying this.    Discussed with Dr. Brito plan of care for Mary who agrees with the current plan. Also Discussed with Dr. Reyes who has seen Mary recently and is willing to arrange with the  Palliative Home Care to ensure that nursing staff are aware that instead of calling the primary care physician that an on-call individual will be identified to assist with symptom management. Discussed with Blaire Jones current plan and next steps to identify the  on-call individual.    POLST - in paper chart     Thank you for the opportunity to continue to participate in the care of this patient and family.  Please feel free to contact on-call palliative provider with any emergent needs.  We can be reached via team pager [phone] (answered 8-4:30 Monday-Friday); after-hours answering service ([phone]); Main Palliative Clinic - [facility]: [phone].       LÁZARO Villarreal CNS  Palliative Care Consult Team  Pager: [phone]    25 minutes spent with patient, with >50% counseling and in care coordination.           Assessment      1) Diagnoses & symptoms:        Acute on chronic respiratory failure, severe COPD s/p trach  Achalasia s/o GJ tube  Severe anxiety and depression  Pulmonary nodule  Urinary retention  Normocytic anemia  CAD  Constipation  Bilateral UE edema      2)  Psychosocial/Spiritual Needs:   Ongoing:Will discuss case during palliative interdisciplinary team rounds and involve LICSW and  as needed.        Is new assessment/intervention required by palliative team?:  No         Interval History:   Anxiety over night, continues to use ativan and dilaudid, has become more disoriented,            Review of Systems:   Palliative Symptom Review (0=no symptom/no concern, 1=mild, 2=moderate, 3=severe):      Pain: 1      Fatigue: 0      Nausea: 0      Constipation: 0      Diarrhea: 0      Depressive Symptoms: 0      Anxiety: 2      Drowsiness: 1      Poor Appetite: 0      Shortness of Breath: 0      Insomnia: 0            Medications:   I have reviewed this patient's medication profile and medications given in past 24 hours.    Medications of Note  Clonazepam 1.5 mg 4 times a day  Gabapentin 300 mg 3 times a day  Guaifenesin 200 mg 4 times a day  Melatonin 3 mg at bedtime  Polyethylene glycol 17 g 2 times a day  Prednisone 60 mg daily  Quetiapine 50 my BID  Senna-docusate 2 tablets 2 times a day  Sertraline 150 mg daily  Acetaminophen 650 mg q4h  PRN  Bisacodyl suppository 10 mg daily PRN  Calcium carbonate 500 mg q2h PRN  Hydromorphone 1 mg q2h PRN- x3  Lorazepam 0.5 mg q3h PRN- x3  Ondansetron 4 mg q4h PRN            Physical Exam:   Vitals were reviewed  Temp: 98.1  F (36.7  C) Temp src: Oral BP: 165/75 mmHg Pulse: 90 Heart Rate: 92 Resp: 16 SpO2: 95 % O2 Device: Mechanical Ventilator Oxygen Delivery: 2 LPM  Constitutional: drowsy, responds to voice, cooperative, no apparent distress  Lungs: No increased work of breathing, wet sounding respirations  Neurologic: Awake, alert, responds to question appropriately.  Answers questions by mouthing words.  Neuropsychiatric: Normal affect, mood, orientation, memory and insight.             Data Reviewed:   ROUTINE ICU LABS (Last four results)  CMP  Recent Labs  Lab 02/02/17  0740 02/01/17  0543 01/31/17  1302 01/31/17  0747  01/30/17  0917  01/28/17  1605    136  --  135  --  136  < > 136   POTASSIUM 3.4 3.7 3.9 5.9*  < > 3.2*  < > 4.1   CHLORIDE 90* 90*  --  94  --  90*  < > 86*   CO2 41* 41*  --  39*  --  40*  < > >45Critical Value called to and read back byMATTIE MEJIAS RN UER 1260 1/28/2017 BY AA*   ANIONGAP 5 5  --  2*  --  6  < > Not Calculated   * 159*  --  153*  --  163*  < > 126*   BUN 24 21  --  19  --  26  < > 26   CR 0.58 0.55  --  0.37*  --  0.52  < > 0.47*   GFRESTIMATED >90Non  GFR Calc >90Non  GFR Calc  --  >90Non  GFR Calc  --  >90Non  GFR Calc  < > >90Non  GFR Calc   GFRESTBLACK >90African American GFR Calc >90African American GFR Calc  --  >90African American GFR Calc  --  >90African American GFR Calc  < > >90African American GFR Calc   IZZY 8.9 8.8  --  8.7  --  9.0  < > 8.6   PROTTOTAL  --   --   --   --   --   --   --  6.3*   ALBUMIN  --   --   --   --   --   --   --  2.1*   BILITOTAL  --   --   --   --   --   --   --  0.2   ALKPHOS  --   --   --   --   --   --   --  91   AST  --   --   --   --   --    --   --  19   ALT  --   --   --   --   --   --   --  22   < > = values in this interval not displayed.  CBC  Recent Labs  Lab 02/02/17  0740 02/01/17  0543 01/31/17  0747 01/30/17  0917   WBC 9.3 13.2* 18.7* 11.9*   RBC 2.57* 2.57* 3.07* 2.98*   HGB 7.6* 7.5* 9.1* 8.7*   HCT 23.8* 24.3* 29.5* 28.5*   MCV 93 95 96 96   MCH 29.6 29.2 29.6 29.2   MCHC 31.9 30.9* 30.8* 30.5*   RDW 15.3* 15.5* 16.0* 15.6*    288 326 275     INR  Recent Labs  Lab 01/28/17  1605   INR 0.91     Arterial Blood Gas  Recent Labs  Lab 02/01/17  2018 01/29/17  0638 01/28/17  2221   O2PER 2L 2L 3L                    Progress Notes by Celia Chavis MD at 2/2/2017  7:29 AM     Author:  Celia Chavis MD Service:  Internal Medicine Author Type:  Physician    Filed:  2/2/2017 12:25 PM Note Time:  2/2/2017  7:29 AM Status:  Signed    :  Celia Chavis MD (Physician)               Was a bit restless last night   Okay now  Seen with bedside RN  No cp or worsening sob    No fever or chills  On exam ;   Vital signs:  Temp: 97.8  F (36.6  C) Temp src: Axillary BP: 127/58 mmHg Pulse: 90 Heart Rate: 92 Resp: 16 SpO2: 93 % O2 Device: Mechanical Ventilator Oxygen Delivery: 1.5 LPM Height: 152.4 cm (5') Weight: 59.421 kg (131 lb) (bedscale)  Estimated body mass index is 25.58 kg/(m^2) as calculated from the following:    Height as of this encounter: 1.524 m (5').    Weight as of this encounter: 59.421 kg (131 lb).  Neck ; supple , trach  Chest ; reduces bilaterally with bibasilar rales  CVS; s1 and s2 and tachy  GI ; soft abdomen, non tender, BS positive. FT  Ext ; trace edema , no cynosis  Psych ; appropriate mood and effect, anxious  Skin ; no rash or purpura.  Labs;pending    wbc 9.3( 18.7) , lactic acid 0.9. Creatinine 0.55. bcx and ucx 1/31 negative  A/P:  Mary Wang is a 66 year old female with a history of severe COPD s/p trach on home vent 24/7 (3-3.5 LPM), anxiety, hypertension, achalasia s/p GJ tube who presented to  the hospital with acute on chronic hypoxic and hypercarbic respiratory failure.     *Multiple admissions in last 2 months, 12/1-12/5 for UTI, 12/25-12/28 for bacteremia, 1/2-1/6 with encephalopathy ultimately attributed to her medications, 01/07-01/09 for acute on chronic respiratory failure likely 2/2 COPD exacerbation, 1/20-1/24 for oversedation/aspiration pneumonitis.    Leucocytosis : resolved   . Likely due to dehydration as almost corrected with NS and cultures negative so far . Sputum growing GNR, probably colonization     Low threshold for starting pip/tazo and vanco if worsening     Goals of a care ; greatly appreciate Palliative following patient. Please note 1/31. FCC was held 1/31 and the plan is that Mary will not be admitted to hospital again for respiratory decompensation . Cares being organized . New Polst completed  Spoke with daughter César 2/1 and updated on progress. César would like to have in place regarding respiratory decline in group home so that Mary is not under duress.   Spoke with palliative team 2/1 and updtaed on family wishes . Acre being co-ordinated    #Acute on chronic respiratory failure, severe COPD s/p trach: On ventilator (settings CMV, 400/14/5/40%). Multiple admissions in 6 weeks, see above. For chronic respiratory failure PTA the patient is maintained on chronic prednisone at 20 mg qday, scheduled ipratropium and xoponex nebs, BID Pulmicort, guaifenesin 200 mg QID, bactrim for PCP prophylaxis, allegra BID. Mucous plugging w/ O2 sats dropping to 27%, group home staff deep suctioned for 15 minutes before sats improved, in 90s on admission. XR chest  w/ mildly increased patchy basilar opacities not significant changed from 01/20 xray- atelectasis vs. Infx.  Afebrile, normotensive, mildly tachcyardic, sating  on home 2-3 LPM. CT Angiogram was negative for Pulmonary embolism. She is currently at her baseline oxygen requirements. CT did show some right lung opacity as she is  improving without antibiotics will continue to hold antibiotics (low threshold to start)  -Continue home nebulizers  -Continue home steroids w/ PCP prophylaxis    - Hypertonic saline nebs, and NAC nebs    -Trach cares per RT, suctioning per nursing and RT  -Continue anxiolytics and medications for air hunger      #Achalasia s/p GJ tube   -Continue tube feeds    #Severe anxiety, depression: Likely due to air hunger from COPD. Psychiatry and Palliative care have seen patient in the past. Continues to have significant anxiety and frequent requests for hospital admission per daughter. PTA maintained on seroquel (50 mg BID), sertraline (150 mg qday), scheduled Klonopin (1.5 mg QID) and PRN ativan (0.5 q3 PRN) & dilaudid (1 mg per G q2 hours PRN) for air hunger. Currently endorses stable symptoms although daughter notes intermittent anxious periods.    -Continue home regimen  -     # Pulmonary nodule: Seen on CT 01/29. 1 cm right lower lobe lung nodule, slightly more prominent than prior  - Recommend short-term CT chest follow-up and possible evaluation in lung nodule clinic    # Urinary retention: Chronic indwelling bender. Last changed 1/23 while inpatient.  -Continue bender    # Normocytic anemia: At baseline.     # CAD: On aspirin and statin. Continue.      # Constipation: On senna and miralax PTA.     # Bilateral UE edema, L>R: PICC in RUE.    US RUE on 01/29 negative for DVT  -SubQ heparin for now    CODE: DNR, trached and on vent chronically. Discussed with Daughter POA on 01/29  FEN: NPO, G tube feeds  DVT: Heparin Q 8h  LINES: Right UE PICC    Disposition/Admission Status: TBD, back to group home when arrangements made . DANIS RN and CC and palliative team                                                           Progress Notes by Madelyn Cheema LICSW at 2/1/2017  2:42 PM     Author:  Madelyn Cheema LICSW Service:  Palliative Author Type:      Filed:  2/2/2017  8:00 AM Note Time:  2/1/2017  2:42 PM  Status:  Signed    :  Madelyn Cheema LICSW ()           Palliative Care Inpatient Clinical Social Work Visit    Patient Information:  Mary is a 66 year old woman with acute on chronic respiratory failure and severe COPD status post tracheostomy currently living in a group home with 24 hour support on her ventilator.     I met with Mary briefly this afternoon in her room. I also spoke to her daughter César by phone.    Relevant Symptoms/Concerns  - New information since last visit   Physical:  Mary was concerned that something might be wrong with her throat. The medical team is aware. She also stated (by mouthing the words) that she has lost her voice and is no longer able to speak. Her daughter César wonders if she might have a minor brain injury due to the episode last week when her O2 sats went down to 17% when the group home was attempting to clear a mucous plug. César has noted that during this hospital stay Mary goes from being very clear and making sense to not being as clear and somewhat disoriented. Today she also felt that she had moved rooms and that her family would not be able to find her despite reassurance from several staff members that she had not moved rooms.   Psychological/Emotional/Existential:     Family/Social/Caregiver:   Mary wanted to make sure that her daughter, Sissy, is aware of where she is in the hospital. I spoke with daughter César by phone who indicated that all family is aware of Mary's room number. Daughter Sissy is also aware of the plan and in agreement. Daughter Sissy and Mary have been very close throughout the years so this is difficult yet per CésarSissy is coping appropriately and in agreement.   Developmental:      Mental Health:     End of Life:      Cultural/Baptist/Spiritual:      Grief/Loss:      Concurrent Stressors:        Comments:       Strengths - New information since last visit    Physical:      Psychological/Emotional/Existential:  Overall Mary was calm today.   Family/Social/Caregiver:  Family continues to be in agreement with plan for discharge. Supportive of Mary and her decision. Daughter César has reassured Mary that she will continue to help other daughter Sissy.   Developmental:      Mental Health:  Bedside nurse reported that Mary has been calm most of the day and her anxiety has been controlled.   End of Life:      Cultural/Yazidism/Spiritual:      Grief/Loss:         Comments:       Goals/Decision Making/Advance Care Planning - New information since last visit   Preferences:  Mary and her family hope that she can discharge back to her group home with 24 hour care with the plan to not return to the hospital but rather stay at her home and be kept comfortable if a life threatening event were to occur.   Concerns:  Although daughter César is concerned that her mother is disoriented at times and might have a brain injury, she does believe her mother has been clear about her wishes to not return to the hospital. She also says that family is supportive and in agreement with this plan.   Documents:  POLST in process   Decision Making Issues:        Comments:  Medical teams (palliative NP & RN CC) communicating with group home and necessary medical providers to get appropriate plan in place before discharge.    Resource Needs     Discharge Planning:  Per Unit/Program  and/or Care Coordinator   Other:      Comments:       Intervention (Check all that apply)    X   Assessment of palliative specific issues          Introduction of Palliative clinical social work interventions        Adjustment to illness counseling        Advanced care planning        Attended/participated in care conference        Behavioral interventions for symptom management    X   Facilitation of processing of thoughts/feelings    X   Family communication facilitated        Grief counseling        Goals  of care discussion/facilitation        Life legacy work        Life review facilitation        Psychoeducation    X   Re-framing        Resource referral            Other     Comments:  Mary was alert, engaged and receptive. She was able to clearly mouth words today. Daughter César was engaged, talkative and receptive by phone.    Plan:  I will continue assessment/intervention as indicated.    [facility], [facility]  Palliative Care Clinical   Pager [phone]         Progress Notes by Maddie Santiago APRN CNS at 2/1/2017  2:08 PM     Author:  Maddie Santiago APRN CNS Service:  Palliative Author Type:  Clinical Nurse Specialist    Filed:  2/1/2017  4:22 PM Note Time:  2/1/2017  2:08 PM Status:  Signed    :  Maddie Santiago APRN CNS (Clinical Nurse Specialist)           [facility]   Palliative Care Daily Progress Note          Recommendations, Patient/Family Counseling & Coordination     Goals of Care  Visited with Mary today. She wonders about her getting back home. She continues to not want to come back to the hospital per our previous discussion. She does seem to be worried about her daughter not being able to find where she is because Mary states that her room number has changed. Also Mary attempts to mouth words and I think that she is trying to figure out if something in her throat has changed, she does state that she has pain. Discussed with Blaire Jones, unit  and Alisa Seymour for additional assistance with care coordination. Discussed with RNClair who works with Dr. Brito about the update in Louann plan of care. Discussed with Dr. Chavis and will continue to work on a plan of care for Mary to be successful and remain out of the hospital.  -Ongoing support and coordination to move forward with plan for Mary to remain out of the hospital  -Plan to discuss with Dr. Brito ability to  pursue plan of care and assess for need of additional support    POLST -in paper chart     Thank you for the opportunity to continue to participate in the care of this patient and family.  Please feel free to contact on-call palliative provider with any emergent needs.  We can be reached via team pager [phone] (answered 8-4:30 Monday-Friday); after-hours answering service ([phone]); Main Palliative Clinic - [facility]: [phone].     LÁZARO Villarreal CNS  Palliative Care Consult Team  Pager: [phone]    25 minutes spent with patient, with >50% counseling and in care coordination.           Assessment      1) Diagnoses & symptoms:        Acute on chronic respiratory failure, severe COPD s/p trach  Achalasia s/o GJ tube  Severe anxiety and depression  Pulmonary nodule  Urinary retention  Normocytic anemia  CAD  Constipation  Bilateral UE edema     2)  Psychosocial/Spiritual Needs:   Ongoing:Will discuss case during palliative interdisciplinary team rounds and involve LICSW and  as needed.        Is new assessment/intervention required by palliative team?:  No         Interval History:   Returned to somewhat of Mary's baseline, had overnight anxiety and pain requiring medications.           Review of Systems:   Palliative Symptom Review (0=no symptom/no concern, 1=mild, 2=moderate, 3=severe):      Pain: 0      Fatigue: 0      Nausea: 0      Constipation: 0      Diarrhea: 0      Depressive Symptoms: 0      Anxiety: 1      Drowsiness: 0      Poor Appetite: 0      Shortness of Breath: 0      Insomnia: 0                Medications:   I have reviewed this patient's medication profile and medications given in past 24 hours.    Medications of Note  Clonazepam 1.5 mg 4 times a day  Gabapentin 300 mg 3 times a day  Guaifenesin 200 mg 4 times a day  Melatonin 3 mg at bedtime  Polyethylene glycol 17 g 2 times a day  Prednisone 60 mg daily  Quetiapine 50 my BID  Senna-docusate 2 tablets 2 times a  day  Sertraline 150 mg daily  Acetaminophen 650 mg q4h PRN  Bisacodyl suppository 10 mg daily PRN  Calcium carbonate 500 mg q2h PRN- x1   Hydromorphone 1 mg q2h PRN- x1  Lorazepam 0.5 mg q3h PRN- x2 today  Ondansetron 4 mg q4h PRN              Physical Exam:   Vitals were reviewed  Temp: 98.2  F (36.8  C) Temp src: Oral BP: 130/73 mmHg   Heart Rate: 96 Resp: 14 SpO2: 95 % O2 Device: Mechanical Ventilator Oxygen Delivery: 2 LPM  Constitutional: Awake, alert, cooperative, no apparent distress  Lungs: No increased work of breathing, on ventilator with good oxygenation  Neurologic: Awake, alert, responds to question appropriately.  Answers questions by mouthing words.  Neuropsychiatric: Normal affect, mood, orientation, memory and insight.             Data Reviewed:   ROUTINE ICU LABS (Last four results)  CMP  Recent Labs  Lab 02/01/17  0543 01/31/17  1302 01/31/17  0747 01/31/17  0305 01/30/17  0917 01/29/17  0638 01/28/17  1605     --  135  --  136 139 136   POTASSIUM 3.7 3.9 5.9* 4.0 3.2* 3.3* 4.1   CHLORIDE 90*  --  94  --  90* 89* 86*   CO2 41*  --  39*  --  40* >45Critical Value called to and read back byMORRIS MIGUEL RN 6B 1/29/17 0715 BY SAMIRA* >45Critical Value called to and read back byMATTIE MEJIAS RN UER 1655 1/28/2017 BY VALENTINE*   ANIONGAP 5  --  2*  --  6 Not Calculated Not Calculated   *  --  153*  --  163* 114* 126*   BUN 21  --  19  --  26 28 26   CR 0.55  --  0.37*  --  0.52 0.50* 0.47*   GFRESTIMATED >90Non  GFR Calc  --  >90Non  GFR Calc  --  >90Non  GFR Calc >90Non  GFR Calc >90Non  GFR Calc   GFRESTBLACK >90African American GFR Calc  --  >90African American GFR Calc  --  >90African American GFR Calc >90African American GFR Calc >90African American GFR Calc   IZZY 8.8  --  8.7  --  9.0 8.5 8.6   PROTTOTAL  --   --   --   --   --   --  6.3*   ALBUMIN  --   --   --   --   --   --  2.1*   BILITOTAL  --   --   --    --   --   --  0.2   ALKPHOS  --   --   --   --   --   --  91   AST  --   --   --   --   --   --  19   ALT  --   --   --   --   --   --  22     CBC  Recent Labs  Lab 02/01/17  0543 01/31/17  0747 01/30/17  0917 01/29/17  0638   WBC 13.2* 18.7* 11.9* 9.0   RBC 2.57* 3.07* 2.98* 2.96*   HGB 7.5* 9.1* 8.7* 8.7*   HCT 24.3* 29.5* 28.5* 28.1*   MCV 95 96 96 95   MCH 29.2 29.6 29.2 29.4   MCHC 30.9* 30.8* 30.5* 31.0*   RDW 15.5* 16.0* 15.6* 15.6*    326 275 300     INR  Recent Labs  Lab 01/28/17  1605   INR 0.91     Arterial Blood Gas  Recent Labs  Lab 01/29/17  0638 01/28/17  2221   O2PER 2L 3L                    Progress Notes by Nayeli Seymour RN at 2/1/2017 12:04 PM     Author:  Nayeli Seymour RN Service:  Care Coordinator Author Type:  Care Coordinator    Filed:  2/1/2017  3:04 PM Note Time:  2/1/2017 12:04 PM Status:  Addendum    :  Nayeli Seymour RN (Care Coordinator)      Related Notes: Original Note by Nayeli Seymour RN (Care Coordinator) filed at 2/1/2017 12:50 PM         Care Coordinator- Discharge Planning Note     Admission Date/Time:  1/28/2017  Attending MD:  Enrique Gama*     Data  Chart reviewed, discussed with interdisciplinary team.   Patient was admitted for:   1. Acute and chronic respiratory failure with hypercapnia (H)         RNCC Intervention: Per team patient would like to have a do not hospitalize order in place. Call placed to Arley schumacher to discuss (awaiting return call) Also placed a call to [facility] to confirm if a patient isn't registered with hospice and dies at home, police and  will have to investigate.        Plan  Anticipated Discharge Date:  TBD  Anticipated Discharge Plan:  Anticipate home with home care.    CTS Handoff completed: dion Seymour RN, BSN, PHN, RNCCPH: [phone]  Pager: [phone]  Covering for : Medicine RNCC    [id number] with Arley from [facility] they will be able  to work with Mary's POLST order. He is working on staffing and will call in the morning to let me know when they are staffed to accept Mary back.                           Progress Notes by Celia Chavis MD at 2/1/2017  7:30 AM     Author:  Celia Chavis MD Service:  Internal Medicine Author Type:  Physician    Filed:  2/1/2017  1:02 PM Note Time:  2/1/2017  7:30 AM Status:  Signed    :  Celia Chavis MD (Physician)           No new issues reported ? Voice low.  Seen with bedside RN  No cp or worsening sob   No fever or chills  On exam ;   Temp: 98.8  F (37.1  C) Temp src: Oral BP: 120/71 mmHg   Heart Rate: 96 Resp: 14 SpO2: 97 % O2 Device: Mechanical Ventilator Oxygen Delivery: 2 LPM      Neck ; supple , no JVD  Chest ; reduces bilaterally with bibasilar rales  CVS; s1 and s2 and tachy  GI ; soft abdomen, non tender, BS positive. FT  Ext ; trace edema , no cynosis  Psych ; appropriate mood and effect, anxious  Skin ; no rash or purpura.  Labs; wbc 13.2( 18.7) , lactic acid 0.9. Creatinine 0.55. bcx and ucx 1/31 negative  A/P:  Mary Wang is a 66 year old female with a history of severe COPD s/p trach on home vent 24/7 (3-3.5 LPM), anxiety, hypertension, achalasia s/p GJ tube who presented to the hospital with acute on chronic hypoxic and hypercarbic respiratory failure.     *Multiple admissions in last 2 months, 12/1-12/5 for UTI, 12/25-12/28 for bacteremia, 1/2-1/6 with encephalopathy ultimately attributed to her medications, 01/07-01/09 for acute on chronic respiratory failure likely 2/2 COPD exacerbation, 1/20-1/24 for oversedation/aspiration pneumonitis.    Leucocytosis : improved today . Likely due to dehydration as almost corrected with NS and cultures negative so far     Low threshold for starting pip/tazo and vanco if worsening     Goals of a care ; greatly appreciate Palliative following patient. Please note 1/31. FCC was held 1/31 and the plan is that Mary will not  be admitted to hospital again for respiratory decompensation . Cares being organized . New Polst completed    #Acute on chronic respiratory failure, severe COPD s/p trach: On ventilator (settings CMV, 400/14/5/40%). Multiple admissions in 6 weeks, see above. For chronic respiratory failure PTA the patient is maintained on chronic prednisone at 20 mg qday, scheduled ipratropium and xoponex nebs, BID Pulmicort, guaifenesin 200 mg QID, bactrim for PCP prophylaxis, allegra BID. Mucous plugging w/ O2 sats dropping to 27%, group home staff deep suctioned for 15 minutes before sats improved, in 90s on admission. XR chest  w/ mildly increased patchy basilar opacities not significant changed from 01/20 xray- atelectasis vs. Infx.  Afebrile, normotensive, mildly tachcyardic, sating  on home 2-3 LPM. CT Angiogram was negative for Pulmonary embolism. She is currently at her baseline oxygen requirements. CT did show some right lung opacity as she is improving without antibiotics will continue to hold antibiotics (low threshold to start)  -Continue home nebulizers  -Continue home steroids w/ PCP prophylaxis    - Hypertonic saline nebs, and NAC nebs    -Trach cares per RT, suctioning per nursing and RT  -Continue anxiolytics and medications for air hunger      #Achalasia s/p GJ tube   -Continue tube feeds    #Severe anxiety, depression: Likely due to air hunger from COPD. Psychiatry and Palliative care have seen patient in the past. Continues to have significant anxiety and frequent requests for hospital admission per daughter. PTA maintained on seroquel (50 mg BID), sertraline (150 mg qday), scheduled Klonopin (1.5 mg QID) and PRN ativan (0.5 q3 PRN) & dilaudid (1 mg per G q2 hours PRN) for air hunger. Currently endorses stable symptoms although daughter notes intermittent anxious periods.    -Continue home regimen  -     # Pulmonary nodule: Seen on CT 01/29. 1 cm right lower lobe lung nodule, slightly more prominent than  "prior  - Recommend short-term CT chest follow-up and possible evaluation in lung nodule clinic    # Urinary retention: Chronic indwelling bender. Last changed 1/23 while inpatient.  -Continue bender    # Normocytic anemia: At baseline.     # CAD: On aspirin and statin. Continue.      # Constipation: On senna and miralax PTA.     # Bilateral UE edema, L>R: PICC in RUE.    US RUE on 01/29 negative for DVT  -SubQ heparin for now    CODE: DNR, trached and on vent chronically. Discussed with Daughter POA on 01/29  FEN: NPO, G tube feeds  DVT: Heparin Q 8h  LINES: Right UE PICC    Disposition/Admission Status: TBD, back to group home 1-2 days will connect with daughter César today . DW RN and CC                             Progress Notes by Norman Cornelius RD at 2/1/2017 10:25 AM     Author:  Norman Cornelius RD Service:  Nutrition Author Type:  Registered Dietitian    Filed:  2/1/2017 12:54 PM Note Time:  2/1/2017 10:25 AM Status:  Signed    :  Norman Cornelius RD (Registered Dietitian)           CLINICAL NUTRITION SERVICES - ASSESSMENT NOTE     Nutrition Prescription    RECOMMENDATIONS FOR MDs/PROVIDERS TO ORDER:  Continue home TF regimen as ordered. If would like RD to assist in management of tube feedings, place consult \"RD to assess and order TF per MNT protocol\".    Please note: to get fiber ordered with her tube feeding, need to put amount of fiber to send under \"Additional comments\" section in the tube feeding order.    Malnutrition Status:    Criteria not met, but at risk       REASON FOR ASSESSMENT  Mary Wang is a/an 66 year old female assessed by the dietitian for Admission Nutrition Risk Screen for tube feeding or parenteral nutrition (screen placed 1/31 @ 1800).    NUTRITION HISTORY  Pt well known to this service from frequent readmission. Pt is chronically NPO d/t history of achalasia. Has a GJ tube (placed 9/12/16). Gtube is usually to gravity, with Jtube for feeding. Pt received Isosource @ 85 ml/hr " "x 12 hrs nightly (8PM-8AM) + 124 ml q 4 hr water flushes + 3 packet of fiber daily vis her Jtube. Pt fluctuates between episode of diarrhea and constipation.    CURRENT NUTRITION ORDERS  Diet: NPO    Enteral Nutrition: Isosource 1.5 @ 85 ml/hr x 12 hrs (8pm-8am) + 124 ml q 4 hr water flushes.    Intake/Tolerance: received ave of 880 ml x 3 days since admission, equating to 1320 kcal, 60g protein daily.      LABS  Labs reviewed    MEDICATIONS  Medications reviewed    ANTHROPOMETRICS  Height: 152.4 cm (5' 0\")  Most Recent Weight: 59.421 kg (131 lb) (bedscale)    IBW: 45.5 kg  BMI: Overweight BMI 25-29.9  Weight History:   Wt Readings from Last 25 Encounters:   02/01/17 59.421 kg (131 lb)   01/24/17 55.339 kg (122 lb)   01/10/17 60.51 kg (133 lb 6.4 oz)   01/05/17 59 kg (130 lb 1.1 oz)   12/28/16 53.5 kg (117 lb 15.1 oz)   12/22/16 53.978 kg (119 lb)   12/05/16 56.836 kg (125 lb 4.8 oz)   11/28/16 54.023 kg (119 lb 1.6 oz)   11/11/16 62 kg (136 lb 11 oz)   10/04/16 56 kg (123 lb 7.3 oz)   09/16/16 53.2 kg (117 lb 4.6 oz)   09/06/16 50.349 kg (111 lb)   07/22/16 57 kg (125 lb 10.6 oz)   07/10/16 50 kg (110 lb 3.7 oz)   06/14/16 56.065 kg (123 lb 9.6 oz)   05/31/16 50.349 kg (111 lb)   05/23/16 50.7 kg (111 lb 12.4 oz)   05/11/16 49.442 kg (109 lb)   04/21/16 52 kg (114 lb 10.2 oz)   02/09/16 47.038 kg (103 lb 11.2 oz)   02/01/16 51 kg (112 lb 7 oz)   01/20/16 50.576 kg (111 lb 8 oz)   01/02/16 55.5 kg (122 lb 5.7 oz)   12/11/15 52.3 kg (115 lb 4.8 oz)   12/09/15 52.436 kg (115 lb 9.6 oz)   Variable weight trends in the past several months. Fluctuates between 62 - 54 kgs.     Dosing Weight: 58 kg (lowest weight this admission of 57.607 kg on 1/31/17)    ASSESSED NUTRITION NEEDS  Estimated Energy Needs: 2661-8059 kcals/day (25 - 30 kcals/kg)  Justification: Maintenance  Estimated Protein Needs: 58-70 grams protein/day (1 - 1.2 grams of pro/kg)  Justification: Maintenance of LBM  Estimated Fluid Needs: 1 " mL/kcal  Justification: Maintenance    PHYSICAL FINDINGS  Mechanical ventilation; FiO2 @ 40%.  Jose F: 14  Skin dry, ecchymotic on UEs.    Pale and thin LEs.    MALNUTRITION  % Intake: Decreased intake does not meet criteria  % Weight Loss: None noted  Subcutaneous Fat Loss: None observed  Muscle Loss: Carries weight centrally, but noted losses in upper leg (quadricep, hamstring), Patellar region, and Posterior calf.   Fluid Accumulation/Edema: None noted  Malnutrition Diagnosis: Patient does not meet two of the above criteria necessary for diagnosing malnutrition but is at risk for malnutrition    NUTRITION DIAGNOSIS  Predicted inadequate nutrient intake (protein-energy) related to may have interruptions to EN throughout this admission      INTERVENTIONS  Implementation  None at this time.    Goals  Total avg nutritional intake to meet a minimum of 25 kcal/kg and 1 g PRO/kg daily (per dosing wt 58 kg).    Monitoring/Evaluation  Progress toward goals will be monitored and evaluated per protocol.    Norman Cornelius RD, LD  6B Clinical Dietitian  Pager: [phone]  [facility] [id number]                    Progress Notes by Blaire Jones MSW at 2/1/2017 11:42 AM     Author:  Blaire Jones MSW Service:  Social Work Author Type:      Filed:  2/1/2017 11:47 AM Note Time:  2/1/2017 11:42 AM Status:  Signed    :  Blaire Jones MSW ()           Social Work Services Progress Note    Hospital Day: 5  Date of Initial Social Work Evaluation:  EBENEZER  Collaborated with:  Medical team, RNCC    Data:  Pt is a 66 year old female being assessed for plan of care post hospitalization.  Per medical team, pt's goals of care are to not return to the hospital if able.  Pt is not medically appropriate for hospice at this time.     Intervention:  SANCHO discussed POC needs with medical team and RNCC.   Pt has a 1:1 nurse at group home per RNCC.  RNCC will assist with developing a plan for the pt to have symptoms  "managed at home as long as possible.  RNCC will also work with medical team on emergency planning as to meet pt's wishes to not return to the hospital.  Please see RNCC notes for discharge planning    Assessment:    Significant relationship at present time:  Pt has support at her group home.  Family of origin is available for support:  Unknown from this meeting  Other support available:  Pt has 1:1 nurse at group home including homecare services from .  Gaps in support system:  Concern for plan is if there is an MD able to assist with managing emergencies at home.  RNCC and medical team will work with homecare and PCP to determine emergency plan.  Patient is caregiver to:  None    Plan:    Anticipated Disposition:  Return to group home with previous services.    Barriers to d/c plan:  Medical stability    Follow Up:  Please see RNCC notes for discharge planning.  SW will be available as needed.    INES Luna, APSW  6C Unit   Pager: [phone]  Phone: [phone]               Progress Notes by Madelyn Cheema LICSW at 1/31/2017  2:16 PM     Author:  Madelyn Cheema LICSW Service:  Palliative Author Type:      Filed:  2/1/2017  8:20 AM Note Time:  1/31/2017  2:16 PM Status:  Signed    :  Madelyn Cheema LICSW ()           Palliative Care Inpatient Clinical Social Work Assessment    Patient Information:  Mary is a 66 year old woman with acute on chronic respiratory failure and severe COPD status post tracheostomy currently living in a group home with 24 hour support on her ventilator.     Summary of Visit:  I met with Mary and her family today along with Maddie Santiago, Palliative Care NP. Her daughter César, , son Uli and Uli's wife Dorota were also present.    Goals/Decision Making/Advance Care Planning   Preferences:  Mary was able to indicate that if an \"urgent event\" were to happen at her group home, she would not want to be " transported to the hospital. Her daughter gave the example of another mucous plug, which was the event that brought her into the hospital this admission. We explained to Mary the options of 1) returning to the hospital as she has been doing now or 2) focusing more on comfort and not returning to the hospital in the event of a mucous plug, knowing that if this were managed at the group home one outcome might be that she could die but be kept comfortable throughout the episode. Today she clearly indicated that she would not want to return to the hospital if possible. Her family was present and all heard this wish expressed. It was made clear that this could mean that she would die at her group home and she calmly expressed understanding.   Concerns:  Daughter César concerned about protocol that group home would take if another emergent event, like a mucous plug, were to happen. Maddie Santiago, Palliative NP, planned to speak with the group home directly about who they would call during an emergent event besides 911. Because Mary receives 24 hour care in a group home, she and her family would not want to pursue a hospice plan of care. She would like to remain ventilated without any plans to wean the ventilator. They have already had a hospice consult/discussion and decided that hospice would not meet her goals. It seems like either calling [facility] (nursing service at her group home) or the Complex Care Team in an emergency are two reasonable options. Maddie Santiago, Palliative NP, plans to follow up with group [place].   Documents:  Detailed POLST form will be completed before discharge. This document has been started yet not completed pending detailed discussions with the group home regarding orders and instructions needed in emergent situations of discomfort at the group home.   Decision Making Issues:  Mary was alert, engaged and able to actively communicate her wishes today. Her daughter César, ,  son Uli and daughter in law Dorota were all present for this conversation and in agreement to follow Mary's wishes.     Comments:  Mary was calm for the duration of the 60 minute visit today. She was breathing comfortably and able to express her wishes. Today was the most alert and calm that I have ever seen Mary. When asked what has changed Mary did not comment. Her daughter César noted that the nurses at the group home have been siting with her, holding her hand and rubbing her hair. She also says that Mary is much more calm when her CO2 levels are under control. Plan for now is to continue hospital cares with no changes to current plan. She would like to feel as good as possible before returning to group home, with the understanding that she will likely not return to the hospital.    Resource Needs     Discharge Planning:  Per Unit/Program  and/or Care Coordinator   Other:      Comments:  Ongoing discussions with the group home regarding how Mary can remain in the group home on the ventilator with a comfort focused plan of care rather than returning to the hospital.    Sources of Information   Patient:  Mary   Family:  , daughter César, son Uli and daughter in law Dorota   Staff:  Maddie Santiago, Palliative NP   Chart Review:  Recent nursing notes   Other:       Intervention (Check all that apply)    X   Assessment of palliative specific issues          Introduction of Palliative clinical social work interventions        Adjustment to illness counseling    X   Advanced care planning    X   Attended/participated in care conference        Behavioral interventions for symptom management        Facilitation of processing of thoughts/feelings        Family communication facilitated        Grief counseling    X   Goals of care discussion/facilitation        Life legacy work        Life review facilitation        Psychoeducation        Re-framing        Resource  referral        Other:       Comments:  All present were engaged and receptive during this goals of care discussion. Her daughter César and son Uli were talkative and expressed support to Mary. Her  did not talk during the discussion but indicated agreement and support. Mary communicated by mouthing words and was engaged and alert for the duration of the visit.    Plan:  I will continue assessment/intervention as indicated.       [facility], [facility]  Palliative Care Clinical   Pager [phone]         Progress Notes by Celia Chavis MD at 1/31/2017  7:28 AM     Author:  Celia Chavis MD Service:  Internal Medicine Author Type:  Physician    Filed:  1/31/2017  9:48 AM Note Time:  1/31/2017  7:28 AM Status:  Signed    :  Celia Chavis MD (Physician)           Hand off taken from Dr Sousa   No new issues reported per patient   Continues to be anxious( although better )  Spoke with nurse   On Exam ;     Alert and oriented . anxious  Vital signs:  Temp: 98.4  F (36.9  C) Temp src: Oral BP: (!) 173/98 mmHg   Heart Rate: 108 Resp: 24 SpO2: 95 % O2 Device: Mechanical Ventilator Oxygen Delivery: 2 LPM Height: 152.4 cm (5') Weight: 57.607 kg (127 lb) (bed scale)  Estimated body mass index is 24.8 kg/(m^2) as calculated from the following:    Height as of this encounter: 1.524 m (5').    Weight as of this encounter: 57.607 kg (127 lb).      Neck ; supple , no JVD  Chest ; reduces bilaterally with bibasilar rales  CVS; s1 and s2 and tachy  GI ; soft abdomen, non tender, BS positive. FT  Ext ; trace edema , no cynosis  Psych ; appropriate mood and effect, anxious  Skin ; no rash or purpura.  Labs;  A/P:  Mary Wang is a 66 year old female with a history of severe COPD s/p trach on home vent 24/7 (3-3.5 LPM), anxiety, hypertension, achalasia s/p GJ tube who presented to the hospital with acute on chronic hypoxic and hypercarbic respiratory failure.     *Multiple  admissions in last 2 months, 12/1-12/5 for UTI, 12/25-12/28 for bacteremia, 1/2-1/6 with encephalopathy ultimately attributed to her medications, 01/07-01/09 for acute on chronic respiratory failure likely 2/2 COPD exacerbation, 1/20-1/24 for oversedation/aspiration pneumonitis.    Leucocytosis : check ua and uc and bcx and sputum cx. Will hold off abx for now as clinically seems to be doing well ? Stress response .   Low threshold for starting pip/tazo and vanco if worsening     #Acute on chronic respiratory failure, severe COPD s/p trach: On ventilator (settings CMV, 400/14/5/40%). Multiple admissions in 6 weeks, see above. For chronic respiratory failure PTA the patient is maintained on chronic prednisone at 20 mg qday, scheduled ipratropium and xoponex nebs, BID Pulmicort, guaifenesin 200 mg QID, bactrim for PCP prophylaxis, allegra BID. Mucous plugging w/ O2 sats dropping to 27%, group home staff deep suctioned for 15 minutes before sats improved, in 90s on admission. XR chest  w/ mildly increased patchy basilar opacities not significant changed from 01/20 xray- atelectasis vs. Infx.  Afebrile, normotensive, mildly tachcyardic, sating  on home 2-3 LPM. CT Angiogram was negative for Pulmonary embolism. She is currently at her baseline oxygen requirements. CT did show some right lung opacity as she is improving without antibiotics will continue to hold antibiotics (low threshold to start)  -Continue home nebulizers  -Continue home steroids w/ PCP prophylaxis    - Hypertonic saline nebs, and NAC nebs    -Trach cares per RT, suctioning per nursing and RT  -Continue anxiolytics and medications for air hunger  -Consider further antibiotics pending clinical course    #Achalasia s/p GJ tube   -Continue tube feeds    #Severe anxiety, depression: Likely due to air hunger from COPD. Psychiatry and Palliative care have seen patient in the past. Continues to have significant anxiety and frequent requests for hospital  admission per daughter. PTA maintained on seroquel (50 mg BID), sertraline (150 mg qday), scheduled Klonopin (1.5 mg QID) and PRN ativan (0.5 q3 PRN) & dilaudid (1 mg per G q2 hours PRN) for air hunger. Currently endorses stable symptoms although daughter notes intermittent anxious periods.    -Continue home regimen  - palliative to meet daughter César today 1/31     # Pulmonary nodule: Seen on CT 01/29. 1 cm right lower lobe lung nodule, slightly more prominent than prior  - Recommend short-term CT chest follow-up and possible evaluation in lung nodule clinic    # Urinary retention: Chronic indwelling bender. Last changed 1/23 while inpatient.  -Continue bender    # Normocytic anemia: At baseline.     # CAD: On aspirin and statin. Continue.      # Constipation: On senna and miralax PTA.     # Bilateral UE edema, L>R: PICC in RUE. Otherwise does not appear hypervolemic.    US RUE on 01/29 negative for DVT  -SubQ heparin for now    CODE: DNR, trached and on vent chronically. Discussed with Daughter POA on 01/29  FEN: NPO, G tube feeds  DVT: Heparin Q 8h  LINES: Right UE PICC  Goals of care: Ms. Wang has been repeatedly hospitalized and unable to have any quality of life. Will obtain Palliative care consult, and care conference in mid week to see if we can focus more on palliative/hospice care.   Disposition/Admission Status: >2 midnights for acute on chronic respiratory failure             Procedure Notes     No notes of this type exist for this encounter.         Progress Notes - Therapies (Notes from 01/31/17 through 02/03/17)      Progress Notes by Mendez Ho RT at 2/2/2017  7:21 PM     Author:  Mendez Ho RT Service:  (none) Author Type:  Respiratory Therapist    Filed:  2/2/2017  7:23 PM Note Time:  2/2/2017  7:21 PM Status:  Signed    :  Mendez Ho RT (Respiratory Therapist)           Pt Bivona trach is MRI compatible.

## 2017-01-28 NOTE — ED PROVIDER NOTES
History     Chief Complaint   Patient presents with     Shortness of Breath     HPI  Mary Wang is a 66 year old female with a medical history significant for 80 pack years of tobacco use, COPD requiring 3 L O2 chronically, ventilator dependence, cardiomyopathy, and recent admission from 1/20 to 1/24/17 (discharge 4 days ago) who was brought in by ambulance from her group home after she was found to have an oxygen saturation of 29 %. Patient is accompanied by a group home staff member who states that the patient had been at her baseline earlier today, but was noted to appear in respiratory distress this afternoon. He states that the patient initially had an oxygen saturation of 29, and states this improved to 95 following ~15 suctions and removal of a mucus plug and large amounts of secretions. EMS report the patient's saturations peaked at 70 en route, but worsened to 48 just prior to arrival. The group home staff member reports no recent fevers.  In addition, the group home emergently changed her trach after this happened and she has a new trach in place.  He does state that the patient has been very anxious lately and was actually going to be brought in to the emergency department by her family today for management of her anxiety.     Patient's family is en route to the emergency department and later came to be with the patient.   Social: Here with 2 daughters, granddaughter, son-in-law and 1 daughter's boyfriend.    I have reviewed the Medications, Allergies, Past Medical and Surgical History, and Social History in the [facility] system.    PAST MEDICAL HISTORY:   Past Medical History   Diagnosis Date     COPD (chronic obstructive pulmonary disease) (H)      trach/vent dependent      Anxiety      TMJ pain dysfunction syndrome      Tracheostomy in place      Hypertension      GERD (gastroesophageal reflux disease)      Achalasia      Lung nodule      spiculated; followed in pulm clinic      Stress-induced  cardiomyopathy      Anxiety and depression      Chronic pain      PAST SURGICAL HISTORY:   Past Surgical History   Procedure Laterality Date     Tracheostomy N/A 8/26/2015     Procedure: TRACHEOSTOMY;  Surgeon: Milena Thibodeaux MD;  Location: [facility] OR     Bronchoscopy, insert bronchial valve Right 9/2/2015     Procedure: BRONCHOSCOPY, INSERT BRONCHIAL VALVE;  Surgeon: Everardo Beach MD;  Location: [facility] OR     Esophagoscopy, gastroscopy, duodenoscopy (egd), combined N/A 12/11/2015     Procedure: COMBINED ESOPHAGOSCOPY, GASTROSCOPY, DUODENOSCOPY (EGD);  Surgeon: Dhruv Lyles MD;  Location: [facility]     Esophagoscopy, gastroscopy, duodenoscopy (egd), combined N/A 12/31/2015     Procedure: COMBINED ESOPHAGOSCOPY, GASTROSCOPY, DUODENOSCOPY (EGD);  Surgeon: Brenden Plasencia MD;  Location: [facility] OR     Percutaneous insertion tube jejunostomy N/A 12/31/2015     Procedure: PERCUTANEOUS INSERTION TUBE JEJUNOSTOMY;  Surgeon: Brenden Plasencia MD;  Location: [facility]     Percutaneous insertion tube jejunostomy N/A 1/25/2016     Procedure: PERCUTANEOUS INSERTION TUBE JEJUNOSTOMY;  Surgeon: Carrie Coleman MD;  Location: [facility]     Anesthesia out of or mri N/A 4/18/2016     Procedure: ANESTHESIA OUT OF OR MRI;  Surgeon: GENERIC ANESTHESIA PROVIDER;  Location: [facility]     Endoscopic insertion tube gastrostomy N/A 7/9/2016     Procedure: ENDOSCOPIC INSERTION TUBE GASTROSTOMY;  Surgeon: Brenden Plasencia MD;  Location: [facility] OR     Picc insertion Right 1/9/2017     5fr DL BioFlo PICC, 38cm (1cm external) in the R basilic vein.       FAMILY HISTORY:   Family History   Problem Relation Age of Onset     CANCER Sister        SOCIAL HISTORY:   Social History   Substance Use Topics     Smoking status: Former Smoker -- 2.00 packs/day for 40 years     Types: Cigarettes     Start date: 01/01/1964     Quit date: 04/18/1998     Smokeless tobacco: Never Used     Alcohol Use: No       Patient's Medications   New  Prescriptions    No medications on file   Previous Medications    ACETAMINOPHEN (TYLENOL) 160 MG/5ML ORAL LIQUID    20.3 mLs (650 mg) by Per Feeding Tube route every 4 hours as needed for mild pain    BISACODYL (DULCOLAX) 10 MG SUPPOSITORY    Place 1 suppository (10 mg) rectally daily as needed for constipation    BUDESONIDE (PULMICORT) 0.5 MG/2ML NEBULIZER SOLUTION    Take 2 mLs (0.5 mg) by nebulization 2 times daily    CALCIUM CARBONATE (TUMS) 500 MG CHEWABLE TABLET    Take 1 tablet (500 mg) by mouth every 2 hours as needed for heartburn    CLONAZEPAM (KLONOPIN) 0.1 MG/ML    Take 15 mLs (1.5 mg) by mouth 4 times daily    CRANBERRY 500 MG CAPS    Take 1 capsule (500 mg) by mouth daily    FAMOTIDINE (PEPCID) 40 MG/5ML SUSPENSION    Take 2.5 mLs (20 mg) by mouth 2 times daily as needed for heartburn    FEXOFENADINE (ALLEGRA) 180 MG TABLET    Take 1 tablet (180 mg) by mouth daily    FIBER MODULAR (NUTRISOURCE FIBER) PACKET    1 packet by Per J Tube route 3 times daily    GABAPENTIN (NEURONTIN) 250 MG/5ML SOLUTION    6 mLs (300 mg) by Per J Tube route 3 times daily    GUAIFENESIN (ORGANIDIN) 200 MG TABS    Take 1 tablet (200 mg) by mouth 4 times daily    HYDROMORPHONE (DILAUDID) 1 MG/ML LIQD LIQUID    Take 1 mL (1 mg) by mouth every 2 hours as needed for moderate to severe pain    IPRATROPIUM (ATROVENT) 0.02 % NEB SOLUTION    Take 2.5 mLs (0.5 mg) by nebulization 4 times daily and every four hours at night PRN.    LACTOBACILLUS RHAMNOSUS, GG, (CULTURELL) CAPSULE    1 capsule by Per G Tube route daily    LEVALBUTEROL (XOPENEX) 1.25 MG/3ML NEB SOLUTION    Take 3 mLs (1.25 mg) by nebulization 4 times daily and every four hours at night PRN.    LIDOCAINE (LIDODERM) 5 % PATCH    Place 1-2 patches onto the skin every 24 hours Apply to the lower back    LIDOCAINE 15 ML, ALUM & MAG HYDROXIDE-SIMETHICONE 15 ML GI COCKTAIL    Take 30 mLs by mouth 3 times daily as needed for moderate pain    LOPERAMIDE (IMODIUM A-D) 2 MG TABLET     2-2 mg tablets per J-tube qid prn frequent and/or loose stools. Do not use more than 8 tabs per day.    LORAZEPAM (ATIVAN) 2 MG/ML (HIGH CONC) SOLUTION    Take 0.25 mLs (0.5 mg) by mouth every 3 hours as needed for anxiety    MELATONIN (MELATONIN) 1 MG/ML LIQD LIQUID    3 mLs (3 mg) by Per J Tube route At Bedtime    MEROPENEM (MERREM) 500 MG VIAL    Inject 500 mg into the vein every 6 hours for 5 days    NUTRITIONAL SUPPLEMENTS (JEVITY 1.5 IZZY) LIQD    Take 5 Cans by mouth daily Per tube feeding    ONDANSETRON (ZOFRAN) 4 MG TABLET    Take 1 tablet (4 mg) by mouth every 4 hours as needed for nausea    ORDER FOR DME    Equipment being ordered: 1) Avendaño catheter 16F, balloon 10 mL, silicone.  2) Urinary collection bag.  3) Elastic leg strap for Avendaño catheter.  Please fax to [facility].    POLYETHYLENE GLYCOL (MIRALAX/GLYCOLAX) PACKET    17 g by Per J Tube route 2 times daily Hold for loose stools    POLYETHYLENE GLYCOL 0.4%- PROPYLENE GLYCOL 0.3% (SYSTANE ULTRA) 0.4-0.3 % SOLN OPHTHALMIC SOLUTION    Place 1-2 drops into both eyes 4 times daily as needed for dry eyes 0900, 1300, 1700, 2100    PREDNISONE 5 MG/5ML SOLUTION    Take 20 mLs (20 mg) by mouth daily    QUETIAPINE (SEROQUEL) 50 MG TABLET    Take 1 tablet (50 mg) by mouth 2 times daily    RECTAL CLEANSERS (FLEET NATURALS CLEANSING ENEMA) ENEM    Place 1 enema rectally daily as needed    SENNA-DOCUSATE (SENOKOT-S;PERICOLACE) 8.6-50 MG PER TABLET    2 tablets by Per J Tube route 2 times daily    SERTRALINE (ZOLOFT) 20 MG/ML (HIGH CONC) SOLUTION    7.5 mLs (150 mg) by Per Feeding Tube route daily    SODIUM CHLORIDE (OCEAN) 0.65 % NASAL SPRAY    Spray 1 spray into both nostrils daily as needed for congestion    SULFAMETHOXAZOLE-TRIMETHOPRIM (BACTRIM,SEPTRA) 200-40 MG/5ML SUSPENSION    20 mLs (160 mg) by Per J Tube route daily Dose based on TMP component. 20 mLs = 160 mg   Modified Medications    No medications on file   Discontinued Medications    No medications  on file     Allergies   Allergen Reactions     Levaquin Other (See Comments)     Delirium     Toro Podhaler [Tobramycin] Other (See Comments)     Severe congestion, SOB      Suprax [Cefixime]      See ceftibuten     Augmentin Nausea     Has tolerated for treatment of UTIs in 2016.     Avelox [Moxifloxacin] Anxiety     Cedax [Ceftibuten] Other (See Comments)     Pain in eyes     Penicillins Itching     Tolerated amoxicillin without issues.     Vantin [Cefpodoxime] Nausea     Review of Systems   Unable to perform ROS: Other       Physical Exam     Physical Exam  Physical Exam   Constitutional:   Elderly female, ventilator dependent, responsive to voice.  HENT:   Head: Normocephalic and atraumatic.   pharynx has no erythema or exudate, mucous membranes are dry  Neck:   no adenopathy, no bony tenderness, trach in place  Cardiovascular: regular rate and rhythm without murmurs or gallops  Pulmonary/Chest: Diffuse crackles and wheezes all lung fields with retractions moderate respiratory distress  GI: Soft with good bowel sounds.  Non-tender, non-distended, with no guarding, no rebound  Back:  No bony or CVA tenderness   Musculoskeletal:  no edema or clubbing   Skin: Skin is warm and dry. No rash noted. scattered petechial lesions to anterior chest and upper right arm.  Neurological: alert and oriented , responds to voice and follows commands    ED Course     Procedures       3:37 PM  The patient was seen and examined by Sofya Man MD in Room 1.          EKG Interpretation:      Interpreted by Sofya Man  Time reviewed: 1545 pm  Symptoms at time of EKG: see hpi   Rhythm: sinus tachycardia  Rate: 100-110  Axis: Normal  Ectopy: none  Conduction: normal  ST Segments/ T Waves: Non-specific ST-T wave changes  Q Waves: none  Comparison to prior: Unchanged from 1/20/2017    Clinical Impression: Sinus tachycardia, rate of 101 bpm with nonspecific ST-T wave changes                Critical Care time:  none     Lactate is  greater than 2 due to mucous plugging and hypoxia, at this time there is no sign of sepsis.        Results for orders placed or performed during the hospital encounter of 01/28/17 (from the past 24 hour(s))   ISTAT gases elec ica gluc miles POCT   Result Value Ref Range    Ph Venous 7.40 7.32 - 7.43 pH    PCO2 Venous 78 (HH) 40 - 50 mm Hg    PO2 Venous 36 25 - 47 mm Hg    Bicarbonate Venous 48 (HH) 21 - 28 mmol/L    O2 Sat Venous 64 %    Sodium 134 133 - 144 mmol/L    Potassium 4.2 3.4 - 5.3 mmol/L    Glucose 134 (H) 70 - 99 mg/dL    Calcium Ionized 4.5 4.4 - 5.2 mg/dL    Hemoglobin 10.2 (L) 11.7 - 15.7 g/dL    Hematocrit - POCT 30 (L) 35.0 - 47.0 %PCV   CBC with platelets differential   Result Value Ref Range    WBC 11.1 (H) 4.0 - 11.0 10e9/L    RBC Count 3.26 (L) 3.8 - 5.2 10e12/L    Hemoglobin 9.5 (L) 11.7 - 15.7 g/dL    Hematocrit 30.9 (L) 35.0 - 47.0 %    MCV 95 78 - 100 fl    MCH 29.1 26.5 - 33.0 pg    MCHC 30.7 (L) 31.5 - 36.5 g/dL    RDW 15.2 (H) 10.0 - 15.0 %    Platelet Count 279 150 - 450 10e9/L    Diff Method Automated Method     % Neutrophils 94.0 %    % Lymphocytes 2.3 %    % Monocytes 3.0 %    % Eosinophils 0.1 %    % Basophils 0.3 %    % Immature Granulocytes 0.3 %    Nucleated RBCs 0 0 /100    Absolute Neutrophil 10.5 (H) 1.6 - 8.3 10e9/L    Absolute Lymphocytes 0.3 (L) 0.8 - 5.3 10e9/L    Absolute Monocytes 0.3 0.0 - 1.3 10e9/L    Absolute Eosinophils 0.0 0.0 - 0.7 10e9/L    Absolute Basophils 0.0 0.0 - 0.2 10e9/L    Abs Immature Granulocytes 0.0 0 - 0.4 10e9/L    Absolute Nucleated RBC 0.0    INR   Result Value Ref Range    INR 0.91 0.86 - 1.14   Comprehensive metabolic panel   Result Value Ref Range    Sodium 136 133 - 144 mmol/L    Potassium 4.1 3.4 - 5.3 mmol/L    Chloride 86 (L) 94 - 109 mmol/L    Carbon Dioxide (HH) 20 - 32 mmol/L     >45  Critical Value called to and read back by  MATTIE MEJIAS RN [facility] [id number] 1/28/2017 BY AA      Anion Gap Not Calculated 3 - 14 mmol/L    Glucose 126 (H) 70 - 99  mg/dL    Urea Nitrogen 26 7 - 30 mg/dL    Creatinine 0.47 (L) 0.52 - 1.04 mg/dL    GFR Estimate >90  Non  GFR Calc   >60 mL/min/1.7m2    GFR Estimate If Black >90   GFR Calc   >60 mL/min/1.7m2    Calcium 8.6 8.5 - 10.1 mg/dL    Bilirubin Total 0.2 0.2 - 1.3 mg/dL    Albumin 2.1 (L) 3.4 - 5.0 g/dL    Protein Total 6.3 (L) 6.8 - 8.8 g/dL    Alkaline Phosphatase 91 40 - 150 U/L    ALT 22 0 - 50 U/L    AST 19 0 - 45 U/L   Lipase   Result Value Ref Range    Lipase 56 (L) 73 - 393 U/L   Lactic acid whole blood   Result Value Ref Range    Lactic Acid 2.5 (H) 0.7 - 2.1 mmol/L   Troponin I   Result Value Ref Range    Troponin I ES  0.000 - 0.045 ug/L     <0.015  The 99th percentile for upper reference range is 0.045 ug/L.  Troponin values in   the range of 0.045 - 0.120 ug/L may be associated with risks of adverse   clinical events.     BNP   Result Value Ref Range    N-Terminal Pro BNP Inpatient 248 0 - 900 pg/mL        Labs Ordered and Resulted from Time of ED Arrival Up to the Time of Departure from the ED   CBC WITH PLATELETS DIFFERENTIAL - Abnormal; Notable for the following:     WBC 11.1 (*)     RBC Count 3.26 (*)     Hemoglobin 9.5 (*)     Hematocrit 30.9 (*)     MCHC 30.7 (*)     RDW 15.2 (*)     Absolute Neutrophil 10.5 (*)     Absolute Lymphocytes 0.3 (*)     All other components within normal limits   COMPREHENSIVE METABOLIC PANEL - Abnormal; Notable for the following:     Chloride 86 (*)     Carbon Dioxide   (*)     Value: >45  Critical Value called to and read back by  MATTIE MEJIAS RN [facility] [id number] 1/28/2017 BY VAELNTINE      Glucose 126 (*)     Creatinine 0.47 (*)     Albumin 2.1 (*)     Protein Total 6.3 (*)     All other components within normal limits   LIPASE - Abnormal; Notable for the following:     Lipase 56 (*)     All other components within normal limits   LACTIC ACID WHOLE BLOOD - Abnormal; Notable for the following:     Lactic Acid 2.5 (*)     All other components within normal  limits   ISTAT GASES ELEC ICA GLUC JOSE ROBERTO POCT - Abnormal; Notable for the following:     PCO2 Venous 78 (*)     Bicarbonate Venous 48 (*)     Glucose 134 (*)     Hemoglobin 10.2 (*)     Hematocrit - POCT 30 (*)     All other components within normal limits   INR   TROPONIN I   NT PROBNP INPATIENT   ISTAT CG8 GAS ELEC ICA GLUC JOSE ROBERTO NURSE POCT       Assessments & Plan (with Medical Decision Making)       I have reviewed the nursing notes.  Emergency Department course:  The patient was seen and examined at 1529 pm, on arrival.   The patient is ventilator dependent and is usually on 3-3.5 L of oxygen at home.  Respiratory was called and was able to suction the patient quite well.  Her vital signs are much improved.  There is some concern that she had some bleeding from her airway earlier today.  EKG shows sinus tachycardia, rate of 10 1 bpm with nonspecific ST-T wave changes.  Laboratory studies show leukocytosis, with a WBC of 11.1.  She is anemic, with a hemoglobin of 9.5.  INR 0.91.  Comprehensive metabolic panel is notable for a carbon dioxide of greater than 45.  Lactate is elevated at 2.5.  BNP is 248 and troponin I is less than 0.015.  Her blood pressure was somewhat low and I treated her with a 500 cc normal saline bolus IV.  Preliminary read on portable chest x-ray shows left pleural effusion with no acute changes. (discussed with radiology)  The patient was more alert and comfortable  after the above interventions  The patient has a history of hypercapnia.  She had acute on chronic respiratory failure today likely secondary to mucous plugging.  Her symptoms have improved with frequent suctioning and trach replacement.  She will be admitted to a step down bed under the care of medicine. I spoke with Dr. Gama regarding admission.   *I asked the family about a recent care conference they had on this patient.  The patient's daughter states that the patient had decided that she wants to continue to come to the  hospital when episodes like this happen.*  I have reviewed the findings, diagnosis, plan and need for follow up with the patient.  New Prescriptions    No medications on file       Final diagnoses:   Acute and chronic respiratory failure with hypercapnia (H)     I, Celestino Corona, am serving as a trained medical scribe to document services personally performed by Sofya Man MD, based on the provider's statements to me.   I, Sofya Man MD, was physically present and have reviewed and verified the accuracy of this note documented by Celestino Corona.  1/28/2017   [facility] EMERGENCY DEPARTMENT  This note was created in part by the use of Dragon voice recognition dictation system. Inadvertent grammatical errors and typographical errors may still exist.  MD Donovan Caldera Alda L, MD  01/28/17 [id number]

## 2017-01-28 NOTE — ED NOTES
PT presents to ED from nursing home via EMS. PT was found by at home nurse with low O2 sats and respiratory distress. Pt was found to have large mucus plug at home, Pt also had emergent trach change.

## 2017-01-28 NOTE — PHARMACY-ADMISSION MEDICATION HISTORY
Admission medication history interview status for the 1/28/2017 admission is complete. See Epic admission navigator for allergy information, pharmacy, prior to admission medications and immunization status.     Medication history interview sources:  Daughter (César [phone]), [facility] ([phone]) medication list and MAR     Changes made to PTA medication list (reason)  Added: none  Deleted: none  Changed: none    Additional medication history information (including reliability of information, actions taken by pharmacist):   *PTA list was very up to date with medication list provided by daughter and MAR from [facility]   *Patient was started on a 5 day course of meropenem on 1/23/17 and today (1/28/17) was her last day of therapy.  The patient has taken her first of four doses today.    *Per daughter and [facility], patient has received an influenza vaccine this season      Prior to Admission medications    Medication Sig Last Dose Taking? Auth Provider   order for DME Equipment being ordered: 1) Avendaño catheter 16F, balloon 10 mL, silicone.  2) Urinary collection bag.  3) Elastic leg strap for Avendaño catheter.  Please fax to [id number].  Yes Norman Brito MD   LORazepam (ATIVAN) 2 MG/ML (HIGH CONC) solution Take 0.25 mLs (0.5 mg) by mouth every 3 hours as needed for anxiety 1/28/2017 at 0930 Yes Norman Brito MD   clonazePAM (KLONOPIN) 0.1 mg/mL Take 15 mLs (1.5 mg) by mouth 4 times daily 1/28/2017 at 1430 Yes Norman Brito MD   HYDROmorphone (DILAUDID) 1 MG/ML LIQD liquid Take 1 mL (1 mg) by mouth every 2 hours as needed for moderate to severe pain 1/28/2017 at 1341 Yes Norman Brito MD   QUEtiapine (SEROQUEL) 50 MG tablet Take 1 tablet (50 mg) by mouth 2 times daily 1/28/2017 at am Yes Norman Brito MD   ipratropium (ATROVENT) 0.02 % neb solution Take 2.5 mLs (0.5 mg) by nebulization 4 times daily and every four hours at night PRN. 1/28/2017 at 1300 Yes  Norman Brito MD   levalbuterol (XOPENEX) 1.25 MG/3ML neb solution Take 3 mLs (1.25 mg) by nebulization 4 times daily and every four hours at night PRN. 1/28/2017 at 1300 Yes Norman Brito MD   lactobacillus rhamnosus, GG, (CULTURELL) capsule 1 capsule by Per G Tube route daily 1/28/2017 at 1300 Yes Norman Brito MD   lidocaine (LIDODERM) 5 % Patch Place 1-2 patches onto the skin every 24 hours Apply to the lower back Past Month at Unknown time Yes Jenni Tellez MD   meropenem (MERREM) 500 MG vial Inject 500 mg into the vein every 6 hours for 5 days 1/28/2017 at 0500 Yes Jenni Tellez MD   gabapentin (NEURONTIN) 250 MG/5ML solution 6 mLs (300 mg) by Per J Tube route 3 times daily 1/28/2017 at 0830 Yes    polyethylene glycol (MIRALAX/GLYCOLAX) Packet 17 g by Per J Tube route 2 times daily Hold for loose stools 1/28/2017 at 0830 Yes    senna-docusate (SENOKOT-S;PERICOLACE) 8.6-50 MG per tablet 2 tablets by Per J Tube route 2 times daily 1/28/2017 at 0830 Yes    ondansetron (ZOFRAN) 4 MG tablet Take 1 tablet (4 mg) by mouth every 4 hours as needed for nausea 1/15/2017 Yes    famotidine (PEPCID) 40 MG/5ML suspension Take 2.5 mLs (20 mg) by mouth 2 times daily as needed for heartburn Past Month at Unknown time Yes Theresa Robertson APRN CNP   loperamide (IMODIUM A-D) 2 MG tablet 2-2 mg tablets per J-tube qid prn frequent and/or loose stools. Do not use more than 8 tabs per day. Past Month at Unknown time Yes Theresa Robertson APRN CNP   polyethylene glycol 0.4%- propylene glycol 0.3% (SYSTANE ULTRA) 0.4-0.3 % SOLN ophthalmic solution Place 1-2 drops into both eyes 4 times daily as needed for dry eyes 0900, 1300, 1700, 2100 1/28/2017 at 0900 Yes Theresa Robertson APRN CNP   predniSONE 5 MG/5ML solution Take 20 mLs (20 mg) by mouth daily 1/28/2017 at am Yes Geo Bui MD   Cranberry 500 MG CAPS Take 1 capsule (500 mg) by mouth daily  1/28/2017 at 1000 Yes Geo Bui MD   sertraline (ZOLOFT) 20 MG/ML (HIGH CONC) solution 7.5 mLs (150 mg) by Per Feeding Tube route daily 1/28/2017 at 0830 Yes Geo Bui MD   fexofenadine (ALLEGRA) 180 MG tablet Take 1 tablet (180 mg) by mouth daily 1/28/2017 at am Yes Geo Bui MD   fiber modular (NUTRISOURCE FIBER) packet 1 packet by Per J Tube route 3 times daily 1/28/2017 at am Yes Geo Bui MD   Nutritional Supplements (JEVITY 1.5 IZZY) LIQD Take 5 Cans by mouth daily Per tube feeding 1/28/2017 at Unknown time Yes Radha Mcmillan MD   melatonin (MELATONIN) 1 MG/ML LIQD liquid 3 mLs (3 mg) by Per J Tube route At Bedtime 1/27/2017 at pm Yes Francisco Burt MD   acetaminophen (TYLENOL) 160 MG/5ML oral liquid 20.3 mLs (650 mg) by Per Feeding Tube route every 4 hours as needed for mild pain Past Week at Unknown time Yes Kiesha Martinez MD   budesonide (PULMICORT) 0.5 MG/2ML nebulizer solution Take 2 mLs (0.5 mg) by nebulization 2 times daily 1/28/2017 at 1000 Yes Kiesha Martinez MD   guaiFENesin (ORGANIDIN) 200 MG TABS Take 1 tablet (200 mg) by mouth 4 times daily 1/28/2017 at 0830 Yes Kiesha Martinez MD   sodium chloride (OCEAN) 0.65 % nasal spray Spray 1 spray into both nostrils daily as needed for congestion Past Month at Unknown time Yes Kiesha Martinez MD   sulfamethoxazole-trimethoprim (BACTRIM,SEPTRA) 200-40 mg/5ml suspension 20 mLs (160 mg) by Per J Tube route daily Dose based on TMP component. 20 mLs = 160 mg 1/28/2017 at 0500 Yes Kiesha Martinez MD   Rectal Cleansers (FLEET NATURALS CLEANSING ENEMA) ENEM Place 1 enema rectally daily as needed More than a month at Unknown time  Norman Brito MD   bisacodyl (DULCOLAX) 10 MG Suppository Place 1 suppository (10 mg) rectally daily as needed for constipation More than a month at Unknown time  Norman Brito MD   calcium carbonate (TUMS) 500 MG chewable tablet Take 1 tablet (500 mg) by mouth  every 2 hours as needed for heartburn More than a month at Unknown time     lidocaine 15 mL, alum & mag hydroxide-simethicone 15 mL GI Cocktail Take 30 mLs by mouth 3 times daily as needed for moderate pain More than a month at Unknown time  Kiesha Martinez MD         Medication history completed by: Madeline Lora ,Pharmacy Intern

## 2017-01-28 NOTE — IP AVS SNAPSHOT
Mary Wang #[id number] (CSN: [id number])  (66 year old F)  (Adm: 17)     [id number]               [address] [facility] BANK: [phone]            Patient Demographics     Patient Name Sex          Age SSN Address Phone    Mary Wang Female 1950 (66 year old) [id number] [facility] [address] [phone] (Home) *Preferred*  [phone] (Work)  [phone] (Mobile)      Emergency Contact(s)     Name Relation Home Work Mobile    César Troy Power of  [phone]    No Secondary Contact Other None        Admission Information     Attending Provider Admitting Provider Admission Type Admission Date/Time    Enrique Gama MD Kaltenborn, Zachary Patrick, MD Emergency 17  1532    Discharge Date Hospital Service Auth/Cert Status Service Area     Internal Medicine [facility]    Unit Room/Bed Admission Status     U6B 6224/6224-01 Admission (Confirmed)            Admission     Complaint    Acute on chronic respiratory failure with hypoxia (H)      Hospital Account     Name Acct ID Class Status Primary Coverage    Mary Wang [id number] Inpatient Open MEDICARE - MEDICARE            Guarantor Account (for Hospital Account #[id number])     Name Relation to Pt Service Area Active? Acct Type    Mary Wang Self FCS Yes Personal/Family    Address Phone          [address] [place] [address] [phone](H)              Coverage Information (for Hospital Account #[id number])     1. MEDICARE/MEDICARE     F/O Payor/Plan Precert #    MEDICARE/MEDICARE     Subscriber Subscriber #    Mary Wang [id number]    Address Phone    ATTN CLAIMS  [address]  [place] [address] [phone]          2. [employer]/F[employer]AL EMPLOYEE PROGRAM     F/O Payor/Plan Precert #    BCBS/FEDERAL EMPLOYEE PROGRAM     Subscriber Subscriber #    Mary Wang [id number]    Address  Phone    [address] [address]  [place], [place] [address] [phone]          3. STIVEN/STIVEN CARRERO     F/O Payor/Plan Precert #    STIVEN/STIVEN CARRERO     Subscriber Subscriber #    Mary Wang [id number]    Address Phone    [address]  [place] [address] [phone]                                                      INTERAGENCY TRANSFER FORM - PHYSICIAN ORDERS   1/28/2017                       [address] [facility] BANK: [phone]            Attending Provider: Enrique Gama MD     Allergies:  Levaquin, Toro Podhaler, Suprax, Augmentin, Avelox, Cedax, Penicillins, Vantin    Infection:  VRE-Contact Isolation, MRSA-Contact Isolation   Service:  INTERNAL MED    Ht:  1.524 m (5')   Wt:  57.471 kg (126 lb 11.2 oz)   Admission Wt:  58 kg (127 lb 13.9 oz)    BMI:  24.74 kg/m 2   BSA:  1.56 m 2            ED Clinical Impression     Diagnosis Description Comment Added By Time Added    Acute and chronic respiratory failure with hypercapnia (H) [J96.22] Acute and chronic respiratory failure with hypercapnia (H) [J96.22]  Sofya Man MD 1/28/2017  5:08 PM      Hospital Problems as of 2/3/2017              Priority Class Noted POA    Acute on chronic respiratory failure with hypoxia (H) Medium  1/28/2017 Yes      Non-Hospital Problems as of 2/3/2017              Priority Class Noted    Shortness of breath   8/3/2014    Urinary retention Medium  8/11/2015    Air hunger Medium  8/11/2015    Achalasia Medium  8/11/2015    Delirium Medium  8/11/2015    HTN (hypertension) Medium  8/11/2015    GERD (gastroesophageal reflux disease) Medium  8/11/2015    ACP (advance care planning)   8/19/2015    Acute and chronic respiratory failure with hypercapnia (H) Medium  8/27/2015    S/P percutaneous endoscopic gastrostomy (PEG) tube placement (H) Medium  1/25/2016    COPD (chronic obstructive pulmonary disease) (H) Medium  4/13/2016    NSTEMI (non-ST elevated myocardial infarction) (H) Medium  5/21/2016    Atypical chest pain  Medium  6/7/2016    Encounter for gastrojejunal (GJ) tube placement Medium  7/8/2016    Constipation due to opioid therapy Medium  1/26/2017    Generalized anxiety disorder Medium  1/26/2017    Advanced directives, counseling/discussion Medium  1/26/2017      Code Status History     Date Active Date Inactive Code Status Order ID Comments User Context    2/3/2017 11:12 AM  DNR [id number]  Celia Chavis MD Outpatient    1/28/2017 10:10 PM 2/3/2017 11:12 AM DNR [id number]  Viridiana Rockwell PA-C Inpatient    1/23/2017  8:10 PM 1/24/2017  6:24 PM DNR [id number]  Jenni Tellez MD Inpatient    1/23/2017  3:34 PM 1/23/2017  8:10 PM DNR [id number]  Jenni Tellez MD Outpatient    1/21/2017 10:50 AM 1/23/2017  3:34 PM DNR [id number]  Jenni Tellez MD Inpatient    1/20/2017  6:58 AM 1/21/2017 10:50 AM DNR/DNI [id number]  Yadi Loja MD ED    1/7/2017  8:26 PM 1/10/2017  1:42 PM DNR [id number]  Jose David De La Cruz APRN CNP Inpatient    1/6/2017 12:01 PM 1/7/2017  8:26 PM DNR [id number]  Geo Bui MD Outpatient    1/3/2017  5:24 PM 1/6/2017 12:01 PM DNR [id number]  Geo Bui MD Inpatient    1/2/2017 11:28 PM 1/3/2017  5:24 PM Full Code [id number]  Jose David De La Cruz APRN CNP Inpatient    12/28/2016  8:59 AM 1/2/2017 11:28 PM Full Code [id number]  Theresa Wagoner MD Outpatient    12/26/2016  4:49 AM 12/28/2016  8:59 AM Full Code [id number]  Alexi Fernandez MD Inpatient    12/5/2016  2:45 PM 12/26/2016  4:49 AM Full Code [id number]  Francisco Burt MD Outpatient    12/1/2016  6:05 PM 12/5/2016  2:45 PM Full Code [id number]  Viridiana Rockwell PA-C Inpatient    11/27/2016  6:02 PM 12/1/2016  6:05 PM Full Code [id number]  Francisco Burt MD Outpatient    11/13/2016  9:57 PM 11/27/2016  6:02 PM Full Code [id number]  Jose David De La Cruz APRN CNP Inpatient    10/31/2016  1:27 AM 11/11/2016  2:43 PM Full Code [id number]  Jose David De La Cruz APRN CNP Inpatient     10/1/2016 12:45 PM 10/4/2016  4:15 PM Full Code [id number]  Jamila Comer MD Inpatient    9/11/2016  2:46 AM 9/16/2016  3:48 PM Full Code [id number]  Theresa Wagoner MD Inpatient    7/23/2016  7:05 AM 9/11/2016  2:46 AM Full Code [id number]  Doc Barrera MD Outpatient    7/16/2016  2:33 AM 7/23/2016  7:05 AM Full Code [id number]  Moe Cárdenas MD Inpatient    7/10/2016 11:38 AM 7/16/2016  2:33 AM Full Code [id number]  Enrique Gama MD Outpatient    7/8/2016  8:04 PM 7/10/2016 11:38 AM Full Code [id number]  Shantal Dee PA Inpatient    6/6/2016  5:19 PM 6/14/2016  2:32 PM Full Code [id number]  Shantal Dee PA Inpatient    5/21/2016  3:41 PM 5/23/2016  3:59 PM Full Code [id number]  Viridiana Butcher MD ED    4/13/2016  6:13 PM 4/21/2016  1:08 PM Full Code [id number]  Arley Guzman MD Inpatient    2/1/2016  8:43 AM 4/13/2016  6:13 PM Full Code [id number]  Emily Hoover MD Outpatient    1/20/2016  8:10 PM 2/1/2016  8:43 AM Full Code [id number]  Emily Hoover MD Inpatient    1/20/2016  7:17 PM 1/20/2016  8:10 PM Full Code [id number]  Emily Hoover MD ED    1/2/2016  8:32 AM 1/20/2016  7:17 PM Full Code [id number]  Elias Mcdermott MD Outpatient    1/1/2016  1:56 PM 1/2/2016  8:32 AM Full Code [id number]  Almaz Reagan MD Outpatient    12/30/2015  9:45 PM 1/1/2016  1:56 PM Full Code [id number]  Martinez Hamm MD Inpatient    9/24/2015 12:37 PM 12/30/2015  9:45 PM Full Code [id number]  Verner, James R, MD Outpatient    8/27/2015  4:30 AM 9/24/2015 12:37 PM Full Code [id number]  Eric Cosme MD Inpatient    8/13/2015  2:51 PM 8/27/2015  4:30 AM Full Code [id number]  Emilia Kurtz MD Inpatient    8/10/2015  7:50 AM 8/13/2015  2:51 PM DNR/DNI [id number]  Marcia Aleman MD Outpatient    8/7/2015 11:13 AM 8/10/2015  7:50 AM DNR/DNI [id number]  Marcia Aleman MD Inpatient    8/4/2015  2:55 PM  8/7/2015 11:13 AM DNR [id number]  Driss Huggins MD Inpatient    7/31/2015  6:35 PM 8/4/2015  2:55 PM DNR/DNI [id number]  Driss Huggins MD Inpatient    7/26/2015  8:21 PM 7/31/2015  6:35 PM Full Code [id number]  Larry Hong MD Inpatient    6/21/2015 11:33 AM 7/26/2015  8:21 PM Full Code [id number]  Mady Atkinson MD Outpatient    6/17/2015  6:34 AM 6/21/2015 11:33 AM Full Code [id number]  Swetha Bundy APRN CNP Inpatient    8/8/2014  7:29 AM 6/17/2015  6:34 AM Full Code [id number]  Ron Kirkland MD Outpatient    8/3/2014  6:08 PM 8/8/2014  7:29 AM Full Code [id number]  Ron Kirkland MD Inpatient    5/4/2014 12:08 PM 8/3/2014  6:08 PM Full Code [id number]  Darlene Del Real MD Outpatient    5/2/2014  9:16 AM 5/4/2014 12:08 PM Full Code [id number]  Darlene Del Real MD Inpatient      Current Code Status     Date Active Code Status Order ID Comments User Context       Prior      Summary of Visit     Reason for your hospital stay       You were admitted with difficulty breathing       Reason for your hospital stay       You were admitted due to difficulty breathing                Medication Review      START taking        Dose / Directions Comments    carbamide peroxide 6.5 % otic solution   Commonly known as:  DEBROX   Used for:  Impacted cerumen, unspecified laterality        Dose:  5 drop   Place 5 drops into the right ear 2 times daily for 3 days   Quantity:  1.5 mL   Refills:  0          CONTINUE these medications which may have CHANGED, or have new prescriptions. If we are uncertain of the size of tablets/capsules you have at home, strength may be listed as something that might have changed.        Dose / Directions Comments    * order for DME   This may have changed:  Another medication with the same name was added. Make sure you understand how and when to take each.        Equipment being ordered: 1) Avendaño catheter 16F, balloon 10 mL, silicone.  2) Urinary  collection bag.  3) Elastic leg strap for Avendaño catheter.  Please fax to [employer].   Quantity:  3 each   Refills:  11        * order for DME   This may have changed:  You were already taking a medication with the same name, and this prescription was added. Make sure you understand how and when to take each.   Used for:  Acute and chronic respiratory failure with hypercapnia (H)        Equipment being ordered: Methylex foam dressings, alternating pressure pad for mattress and pump.   Quantity:  1 Device   Refills:  0        * Notice:  This list has 2 medication(s) that are the same as other medications prescribed for you. Read the directions carefully, and ask your doctor or other care provider to review them with you.      CONTINUE these medications which have NOT CHANGED        Dose / Directions Comments    acetaminophen 160 MG/5ML solution   Commonly known as:  TYLENOL   Used for:  Other chronic pain        Dose:  650 mg   20.3 mLs (650 mg) by Per Feeding Tube route every 4 hours as needed for mild pain   Quantity:  300 mL   Refills:  0        * bisacodyl 10 MG Suppository   Commonly known as:  DULCOLAX   Used for:  Constipation due to opioid therapy        Dose:  10 mg   Place 1 suppository (10 mg) rectally daily as needed for constipation   Quantity:  28 suppository   Refills:  3        * bisacodyl 10 MG Suppository   Commonly known as:  DULCOLAX   Used for:  Constipation due to opioid therapy        Dose:  10 mg   Place 1 suppository (10 mg) rectally daily as needed for constipation   Quantity:  25 suppository   Refills:  1        budesonide 0.5 MG/2ML neb solution   Commonly known as:  PULMICORT   Used for:  Shortness of breath, Respiratory difficulty        Dose:  0.5 mg   Take 2 mLs (0.5 mg) by nebulization 2 times daily   Quantity:  60 ampule   Refills:  0        calcium carbonate 500 MG chewable tablet   Commonly known as:  TUMS        Dose:  1 chew tab   Take 1 tablet (500 mg) by mouth every 2 hours  as needed for heartburn   Quantity:  150 tablet   Refills:  0        clonazePAM 0.1 mg/mL   Commonly known as:  klonoPIN        Dose:  1.5 mg   Take 15 mLs (1.5 mg) by mouth 4 times daily   Quantity:  1800 mL   Refills:  1        Cranberry 500 MG Caps        Dose:  500 mg   Take 1 capsule (500 mg) by mouth daily   Quantity:  90 capsule   Refills:  0        famotidine 40 MG/5ML suspension   Commonly known as:  PEPCID   Used for:  Mild gas use disorder        Dose:  20 mg   Take 2.5 mLs (20 mg) by mouth 2 times daily as needed for heartburn   Quantity:  75 mL   Refills:  3        fexofenadine 180 MG tablet   Commonly known as:  ALLEGRA   Used for:  COPD exacerbation (H)        Dose:  180 mg   Take 1 tablet (180 mg) by mouth daily   Quantity:  30 tablet   Refills:  0        fiber modular packet   Used for:  Diarrhea, unspecified type        Dose:  1 packet   1 packet by Per J Tube route 3 times daily   Quantity:  42 packet   Refills:  0        FLEET NATURALS CLEANSING ENEMA Enem   Used for:  Constipation due to opioid therapy        Dose:  1 enema   Place 1 enema rectally daily as needed   Quantity:  3 Bottle   Refills:  11        gabapentin 250 MG/5ML solution   Commonly known as:  NEURONTIN   Used for:  Anxiety        Dose:  300 mg   6 mLs (300 mg) by Per J Tube route 3 times daily   Quantity:  450 mL   Refills:  0        guaiFENesin 200 MG Tabs tablet   Commonly known as:  ORGANIDIN   Used for:  Shortness of breath        Dose:  200 mg   Take 1 tablet (200 mg) by mouth 4 times daily   Quantity:  115 tablet   Refills:  0        HYDROmorphone 1 MG/ML Liqd liquid   Commonly known as:  DILAUDID        Dose:  1 mg   Take 1 mL (1 mg) by mouth every 2 hours as needed for moderate to severe pain   Quantity:  180 mL   Refills:  0        ipratropium 0.02 % neb solution   Commonly known as:  ATROVENT        Dose:  0.5 mg   Take 2.5 mLs (0.5 mg) by nebulization 4 times daily and every four hours at night PRN.   Quantity:  450  "mL   Refills:  0        JEVITY 1.5 IZZY Liqd   Used for:  Achalasia        Dose:  5 Can   Take 5 Cans by mouth daily Per tube feeding   Quantity:  150 Can   Refills:  11    - Height 5'2\"    - Weight 120 lbs    - Length of need 99 months       lactobacillus rhamnosus (GG) capsule        Dose:  1 capsule   1 capsule by Per G Tube route daily   Quantity:  60 capsule   Refills:  0        levalbuterol 1.25 MG/3ML neb solution   Commonly known as:  XOPENEX        Dose:  1 ampule   Take 3 mLs (1.25 mg) by nebulization 4 times daily and every four hours at night PRN.   Quantity:  540 mL   Refills:  0        lidocaine 15 mL, alum & mag hydroxide-simethicone 15 mL GI Cocktail   Used for:  Gastroesophageal reflux disease without esophagitis        Dose:  30 mL   Take 30 mLs by mouth 3 times daily as needed for moderate pain   Quantity:  500 mL   Refills:  0        lidocaine 5 % Patch   Commonly known as:  LIDODERM   Used for:  Urinary tract infection, site not specified        Dose:  1-2 patch   Place 1-2 patches onto the skin every 24 hours Apply to the lower back   Quantity:  30 patch   Refills:  0        loperamide 2 MG tablet   Commonly known as:  IMODIUM A-D   Used for:  Frequent stools        2-2 mg tablets per J-tube qid prn frequent and/or loose stools. Do not use more than 8 tabs per day.   Quantity:  30 tablet   Refills:  0        LORazepam 2 MG/ML (HIGH CONC) solution   Commonly known as:  ATIVAN        Dose:  0.5 mg   Take 0.25 mLs (0.5 mg) by mouth every 3 hours as needed for anxiety   Quantity:  30 mL   Refills:  1        melatonin 1 MG/ML Liqd liquid   Used for:  Anxiety, Insomnia due to medical condition        Dose:  3 mg   3 mLs (3 mg) by Per J Tube route At Bedtime   Quantity:  300 mL   Refills:  0        ondansetron 4 MG tablet   Commonly known as:  ZOFRAN        Dose:  4 mg   Take 1 tablet (4 mg) by mouth every 4 hours as needed for nausea   Quantity:  18 tablet   Refills:  0        polyethylene glycol " 0.4%- propylene glycol 0.3% 0.4-0.3 % Soln ophthalmic solution   Commonly known as:  SYSTANE ULTRA   Used for:  Dry eyes, bilateral        Dose:  1-2 drop   Place 1-2 drops into both eyes 4 times daily as needed for dry eyes 0900, 1300, 1700, 2100   Quantity:  1 Bottle   Refills:  3        polyethylene glycol Packet   Commonly known as:  MIRALAX/GLYCOLAX   Used for:  Constipation, unspecified constipation type        Dose:  17 g   17 g by Per J Tube route 2 times daily Hold for loose stools   Quantity:  100 packet   Refills:  0        predniSONE 5 MG/5ML solution   Used for:  Shortness of breath        Dose:  20 mg   Take 20 mLs (20 mg) by mouth daily   Quantity:  500 mL   Refills:  0    Continue prednisone taper until reaches 20 mg daily, then continue 20 mg daily.       QUEtiapine 50 MG tablet   Commonly known as:  SEROQUEL        Dose:  50 mg   Take 1 tablet (50 mg) by mouth 2 times daily   Quantity:  180 tablet   Refills:  3        senna-docusate 8.6-50 MG per tablet   Commonly known as:  SENOKOT-S;PERICOLACE   Used for:  Constipation, unspecified constipation type        Dose:  2 tablet   2 tablets by Per J Tube route 2 times daily   Quantity:  100 tablet   Refills:  0        sertraline 20 MG/ML (HIGH CONC) solution   Commonly known as:  ZOLOFT   Used for:  Anxiety        Dose:  150 mg   7.5 mLs (150 mg) by Per Feeding Tube route daily   Quantity:  105 mL   Refills:  0        sodium chloride 0.65 % nasal spray   Commonly known as:  OCEAN   Used for:  Chronic sinusitis, unspecified location        Dose:  1 spray   Spray 1 spray into both nostrils daily as needed for congestion   Quantity:  1 Bottle   Refills:  0        sulfamethoxazole-trimethoprim suspension   Commonly known as:  BACTRIM/SEPTRA   Used for:  Chronic obstructive pulmonary disease with acute exacerbation (H)        Dose:  20 mL   20 mLs (160 mg) by Per J Tube route daily Dose based on TMP component. 20 mLs = 160 mg   Quantity:  560 mL   Refills:   0        * Notice:  This list has 2 medication(s) that are the same as other medications prescribed for you. Read the directions carefully, and ask your doctor or other care provider to review them with you.      STOP taking     meropenem 500 MG vial   Commonly known as:  MERREM                   After Care     Diet       NPO Time Specified Ice Chips  Adult Formula Drip Feeding: Specified Time Isosource 1.5; Gastrostomy; Goal Rate: 85 ml/hour; mL/hr; From: 8:00 PM; 8:00 AM; Medication - Tube Feeding Flush Frequency: At least 15-30 mL water before and after medication administration       Diet       Npo except ice chips  Adult Formula Drip Feeding: Specified Time Isosource 1.5; Gastrostomy; Goal Rate: 85 ml/hour; mL/hr; From: 8:00 PM; 8:00 AM; Medication - Tube Feeding Flush Frequency: At least 15-30 mL water before and after medication administration       Discharge Instructions       See instructions on POLST form       Tracheostomy care       Per routine       Tubes and drains       Avendaño   trach               Further instructions from your care team       [facility] AND [facility] STAFF:     PLEASE CALL PALLIATIVE ON-CALL PHYSICIAN FOR ACUTE DISTRESS SYMPTOM MANAGEMENT:  PH: [phone]  Pager # [phone]    Procedures     Oxygen Adult       Trilogy vent             Referrals     Home Care PT Referral for Hospital Discharge       [facility]  [phone]  Fax [phone]    RESUMPTION OF SERVICES  Palliative services    PT to eval and treat  OT to eval and treat  Your provider has ordered home care - physical therapy. If you have not been contacted within 2 days of your discharge please call the department phone number listed on the top of this document.       Home care nursing referral       [facility]   PH: [phone]    Palliative RN Services    RESUMPTION OF SERVICES    Your provider has ordered home care nursing services. If you have not been contacted within 2 days  of your discharge please call the inpatient department phone number at [phone] .             Follow-Up Appointment Instructions     Follow Up and recommended labs and tests       Complex care MD 1 week             Your next 10 appointments already scheduled     Feb 08, 2017  2:00 PM   Return Visit with Norman Brito MD   [facility]    [address]  [address]  [facility] [address]   [phone]            Mar 17, 2017  2:00 PM   [facility] Visit with Norman Brito MD   [facility]    [address]  [address]  [facility] [address] [phone]              Statement of Approval     Ordered          02/03/17 1342  I have reviewed and agree with all the recommendations and orders detailed in this document.   EFFECTIVE NOW     Approved and electronically signed by:  Celia Chavis MD                                                 INTERAGENCY TRANSFER FORM - NURSING   1/28/2017                       [address] [facility] BANK: [phone]            Attending Provider: Enrique Gama MD     Allergies:  Levaquin, Toro Podhaler, Suprax, Augmentin, Avelox, Cedax, Penicillins, Vantin    Infection:  VRE-Contact Isolation, MRSA-Contact Isolation   Service:  INTERNAL MED    Ht:  1.524 m (5')   Wt:  57.471 kg (126 lb 11.2 oz)   Admission Wt:  58 kg (127 lb 13.9 oz)    BMI:  24.74 kg/m 2   BSA:  1.56 m 2            Advance Directives        Does patient have a scanned Advance Directive/ACP document in EPIC?           Yes        Immunizations     Name Date      Influenza (High Dose) 3 valent vaccine 10/04/16     Influenza (High Dose) 3 valent vaccine 09/24/15     Influenza (High Dose) 3 valent vaccine 10/06/14     Influenza (IIV3) 10/21/13     Influenza (IIV3) 11/20/12     Pneumococcal (PCV 13) 11/17/14     Pneumococcal 23 valent 05/03/12     Tetanus Not Indicated - By Hx 05/03/14      "  ASSESSMENT     Discharge Profile Flowsheet     DISCHARGE NEEDS ASSESSMENT     Resources List  [facility] 07/29/15 1006    Equipment Currently Used at Home  commode;hospital bed;walker, rolling;wheelchair 01/04/17 0924   Existing Resources/Services  [facility] 08/05/14 1504    Transportation Available  van, wheelchair accessible 01/04/17 0917   SKIN      Equipment Used at Home  walker, rolling;grab bar;shower chair 01/22/16 1415   Inspection  Full 02/03/17 1309    FUNCTIONAL LEVEL CURRENT     Skin WDL  ex 02/03/17 1309    Change in Functional Status Since Onset of Current Illness/Injury  no 06/06/16 2137   Skin Color/Characteristics  flushed 02/03/17 1309    GASTROINTESTINAL (ADULT,PEDIATRIC,OB)     Skin Temperature  cool 02/03/17 1309    GI WDL  ex 02/03/17 1309   Skin Moisture  moist 02/03/17 1309    Abdominal Appearance  rounded 02/03/17 1309   Skin Elasticity  slow return to original state 02/03/17 0937    All Quadrants Bowel Sounds  audible and normoactive 02/03/17 1309   Skin Integrity  drain/device(s);bruise(s);skin tear(s) 02/03/17 1309    Last Bowel Movement  02/03/17 02/03/17 1309   Skin areas NOT inspected  Hip, left;Hip, right;Buttock, left;Buttock, right;Sacrum;Coccyx 01/31/17 0852    GI Signs/Symptoms  fecal incontinence;nausea 02/03/17 1309   SAFETY      COMMUNICATION ASSESSMENT     Safety WDL  WDL 02/03/17 1309    Patient's communication style  spoken language (English or Bilingual) 01/28/17 1531   Safety Factors  bed in low position;wheels locked;call light in reach;upper side rails raised x 2;ID band on 02/03/17 1309    FINAL RESOURCES                        Assessment WDL (Within Defined Limits) Definitions           Safety WDL     Effective: 09/28/15    Row Information: <b>WDL Definition:</b> Bed in low position, wheels locked; call light in reach; upper side rails up x 2; ID band on<br> <font color=\"gray\"><i>Item=AS safety wdl>>List=AS safety wdl>>Version=F14</i></font>      Skin WDL     Effective: 09/28/15 " "   Row Information: <b>WDL Definition:</b> Warm; dry; intact; elastic; without discoloration; pressure points without redness<br> <font color=\"gray\"><i>Item=AS skin wdl>>List=AS skin wdl>>Version=F14</i></font>      Vitals     Vital Signs Flowsheet     VITAL SIGNS     PAIN ASSESSMENT/NONVERBAL ICU (ADULT)      Temp  98.3  F (36.8  C) 02/03/17 1601   Presence of Pain  No nonverbal indicators of pain present 02/02/17 0559    Temp src  Oral 02/03/17 1601   ANALGESIA SIDE EFFECTS MONITORING      Resp  14 02/03/17 1552   Side Effects Monitoring: Respiratory Quality  R 02/03/17 1254    Pulse  90 02/01/17 2022   Side Effects Monitoring: Respiratory Depth  N 02/03/17 1254    Heart Rate  93 02/03/17 1552   Side Effects Monitoring: Sedation Level  2 02/03/17 1254    Pulse/Heart Rate Source  Monitor 02/02/17 2008   HEIGHT AND WEIGHT      BP  94/58 mmHg 02/03/17 1552   Height  1.524 m (5') 01/28/17 1540    BP Location  Left arm 02/03/17 1553   Height Method  Estimated 01/28/17 1540    OXYGEN THERAPY     Weight  57.471 kg (126 lb 11.2 oz) (bedscale) 02/03/17 0431    SpO2  96 % 02/03/17 1552   BSA (Calculated - sq m)  1.57 01/28/17 1540    O2 Device  Mechanical Ventilator 02/03/17 1552   BMI (Calculated)  25.02 01/28/17 1540    FiO2 (%)  40 % 01/29/17 1931   POSITIONING      Oxygen Delivery  2 LPM 02/03/17 1552   Body Position  independently positioning;right, side-lying;weight shift assistance provided 02/03/17 1254    Suction Occurrance  1 02/03/17 1442   Head of Bed (HOB)  HOB at 20 degrees 02/03/17 1254    PAIN/COMFORT     Positioning/Transfer Devices  pillows;in use 02/02/17 1218    Patient Currently in Pain  denies 02/03/17 1254   DAILY CARE      Preferred Pain Scale  CAPA (Clinically Aligned Pain Assessment) ([facility], [facility] Adults Only) 02/03/17 1254   Activity Type  activity adjusted per tolerance;bedrest 02/03/17 1254    Pain Location  Abdomen 02/03/17 0931   Activity Level of Assistance  assistance, 2 people " 02/03/17 1254    Pain Orientation  Lower 02/03/17 0921   ECG      Pain Descriptors  Discomfort 02/03/17 0921   ECG Rhythm  Normal sinus rhythm 02/03/17 1254    Pain Management Interventions  aromatherapy utilized 02/03/17 0505   Ectopy  None 02/03/17 1254    Pain Intervention(s)  Medication (See eMAR) 02/03/17 0921   EKG MONITORING      Response to Interventions  Relief 02/03/17 1033   Cardiac Regularity  Regular 01/28/17 1838    CLINICALLY ALIGNED PAIN ASSESSMENT (CAPA) ([facility], [facility] AND [facility] ADULTS ONLY)     Cardiac Rhythm  ST 01/28/17 1838    Comfort  negligible pain 02/03/17 1254   MARTHA COMA SCALE      Change in Pain  about the same 02/03/17 0921   Best Eye Response  4-->(E4) spontaneous 02/03/17 1254    Pain Control  partially effective 02/03/17 0921   Best Motor Response  6-->(M6) obeys commands 02/03/17 1254    Functioning  can do most things, but pain gets in the way of some 02/03/17 0921   Best Verbal Response  4-->(V4) confused 02/03/17 1254    Sleep  awake with occasional pain 02/03/17 0921   Martha Coma Scale Score  14 02/03/17 1254            Patient Lines/Drains/Airways Status    Active LINES/DRAINS/AIRWAYS     Name: Placement date: Placement time: Site: Days: Last dressing change:    Airway - Adult/Peds 6 [place] 04/19/16  1300  6  290     Gastrostomy/Enterostomy Gastrojejunostomy RUQ 1  09/20/16  1510  RUQ  136     Gastrostomy/Enterostomy Gastrojejunostomy            Urethral Catheter Straight-tip 01/23/17  1710  Straight-tip  10     Pressure Injury 10/01/16 Coccyx 10/01/16  1215   125     Pressure Injury 01/28/17 Sacrum Deep tissue pressure injury 01/28/17     6     Wound 10/31/16 Left;Anterior;Right Arm Other (comment) Large skin tear forearm 10/31/16  0800  Arm  95     Wound 01/28/17 Left Arm Skin tear 01/28/17  2259  Arm  5     Wound 01/29/17 Right Arm Skin tear 01/29/17  0145  Arm  5             Patient Lines/Drains/Airways Status    Active PICC/CVC     **None**            Intake/Output  Detail Report     Date Intake         Output   Net    Shift P.O. I.V. NG/GT IV Piggyback Enteral Total Urine Emesis/NG output Total       Emilie 02/02/17 0700 - 02/02/17 1459 -- -- 410 -- 85 495 350 400 750 -255    Noc 02/02/17 1500 - 02/02/17 2359 -- -- 255 -- 255 510 550 275 825 -315    Day 02/03/17 0000 - 02/03/17 0659 0 -- 250 -- 595 845 225 250 475 370    Emilie 02/03/17 0700 - 02/03/17 1459 -- -- 470 -- 170 640 275 300 575 65    Noc 02/03/17 1500 - 02/03/17 2359 -- -- -- -- -- -- 450 250 700 -700      Last Void/BM       Most Recent Value    Urine Occurrence     Stool Occurrence 1 at 02/02/2017 0900      Case Management/Discharge Planning     Case Management/Discharge Planning Flowsheet     REFERRAL INFORMATION     Resources List  [facility] [place] 1006    Arrived From  admitted as an inpatient 01/03/17 0040   Existing Resources/Services  [facility] [place] 08/05/14 1504    LIVING ENVIRONMENT     / CAREGIVER      Lives With  other (see comments) (Group home) 01/31/17 1853   Filed Complexity Screen Score  13 01/30/17 0835    Living Arrangements  group home 01/04/17 0924   ABUSE RISK SCREEN      Provides Primary Care For  no one, unable/limited ability to care for self 10/31/16 0454   QUESTION TO PATIENT:  Has a member of your family or a partner(now or in the past) intimidated, hurt, manipulated, or controlled you in any way?  patient declined to answer or is unable to answer 01/28/17 1544    COPING/STRESS     QUESTION TO PATIENT: Do you feel safe going back to the place where you are living?  patient declined to answer or is unable to answer 01/28/17 1544    Major Change/Loss/Stressor  none 01/31/17 1853   OBSERVATION: Is there reason to believe there has been maltreatment of a vulnerable adult (ie. Physical/Sexual/Emotional abuse, self neglect, lack of adequate food, shelter, medical care, or financial exploitation)?  no 01/28/17 1544    DISCHARGE PLANNING     HOMICIDE RISK      Transportation Available  van, wheelchair accessible  01/04/17 0917   Homicidal Ideation  other (see comments) (SUN) 01/28/17 1544    Equipment Used at Home  walker, rolling;grab bar;shower chair 01/22/16 1415   (R) MENTAL HEALTH SUICIDE RISK      FINAL RESOURCES     Are you depressed or being treated for depression?  Yes 01/31/17 1858    Equipment Currently Used at Home  commode;hospital bed;walker, rolling;wheelchair 01/04/17 0924                       [address] [facility] BANK: [phone]            Medication Administration Report for Mary Wang as of 02/03/17 1617   Legend:    Given Hold Not Given Due Canceled Entry Other Actions    Time Time (Time) Time  Time-Action       Inactive    Active    Linked        Medications 01/28/17 01/29/17 01/30/17 01/31/17 02/01/17 02/02/17 02/03/17    acetaminophen (TYLENOL) solution 650 mg  Dose: 650 mg Freq: EVERY 4 HOURS PRN Route: PER FEEDING   PRN Reasons: mild pain,fever  Start: 01/28/17 2210   Admin Instructions: Maximum acetaminophen dose from all sources= 75 mg/kg/day not to exceed 4 grams/day.       0848 (650 mg)-Given        1549 (650 mg)-Given        1621 (650 mg)-Given       2327 (650 mg)-Given             bisacodyl (DULCOLAX) Suppository 10 mg  Dose: 10 mg Freq: DAILY PRN Route: RE  PRN Reason: constipation  Start: 01/28/17 2210              budesonide (PULMICORT) neb solution 0.5 mg  Dose: 0.5 mg Freq: 2 TIMES DAILY Route: NEBULIZATION  Start: 01/28/17 2215     (0229)-Not Given       0833 (0.5 mg)-Given       2016 (0.5 mg)-Given        0723 (0.5 mg)-Given       1957 (0.5 mg)-Given        0804 (0.5 mg)-Given        0010 (0.5 mg)-Given       0950 (0.5 mg)-Given       1914 (0.5 mg)-Given        0830 (0.5 mg)-Given       2101 (0.5 mg)-Given        0913 (0.5 mg)-Given       [ ] 2000           calcium carbonate (TUMS) chewable tablet 500 mg  Dose: 500 mg Freq: EVERY 2 HOURS PRN Route: PO  PRN Reason: heartburn  Start: 01/28/17 2210      4819 (500 mg)-Given        0600 (500 mg)-Given              clonazePAM  (klonoPIN) suspension 1.5 mg  Dose: 1.5 mg Freq: 4 TIMES DAILY Route: PO  Start: 01/29/17 0800   Admin Instructions: Shake Well.  Medication is located in the [facility] narc drip drawer (might be in the narc wall cabinet if drawer is full)      0804 (1.5 mg)-Given       1259 (1.5 mg)-Given       1722 (1.5 mg)-Given       2233 (1.5 mg)-Given        0848 (1.5 mg)-Given       1302 (1.5 mg)-Given       1622 (1.5 mg)-Given       (2151)-Not Given        0757 (1.5 mg)-Given       1150 (1.5 mg)-Given       1549 (1.5 mg)-Given       2007 (1.5 mg)-Given        0812 (1.5 mg)-Given       1145 (1.5 mg)-Given       1547 (1.5 mg)-Given       2049 (1.5 mg)-Given        0837 (1.5 mg)-Given       1154 (1.5 mg)-Given       1642 (1.5 mg)-Given       1932 (1.5 mg)-Given        0806 (1.5 mg)-Given       1101 (1.5 mg)-Given       1601 (1.5 mg)-Given       [ ] 2000           famotidine (PEPCID) suspension 20 mg  Dose: 20 mg Freq: 2 TIMES DAILY PRN Route: PO  PRN Reason: heartburn  Start: 01/28/17 2210              fexofenadine (ALLEGRA) tablet 180 mg  Dose: 180 mg Freq: DAILY Route: PO  Start: 01/29/17 0800     0804 (180 mg)-Given        0849 (180 mg)-Given        0756 (180 mg)-Given        0805 (180 mg)-Given        0838 (180 mg)-Given        0805 (180 mg)-Given           fiber modular (NUTRISOURCE FIBER) packet 1 packet  Dose: 1 packet Freq: 3 TIMES DAILY Route: PER J TUBE  Start: 01/29/17 0800   Admin Instructions: Mix each packet with 60 mL water before administering. Supplied by nutrition department.      (1215)-Not Given [C]       1721 (1 packet)-Given       2023 (1 packet)-Given        0849 (1 packet)-Given       (1633)-Not Given       (2018)-Not Given        0925 (1 packet)-Given       1418 (1 packet)-Given       2007 (1 packet)-Given        (0954)-Not Given       1412 (1 packet)-Given       2052 (1 packet)-Given        0901 (1 packet)-Given       1421 (1 packet)-Given       2111 (1 packet)-Given        0811 (1 packet)-Given       1434  (1 packet)-Given       [ ] 2000           gabapentin (NEURONTIN) solution 300 mg  Dose: 300 mg Freq: 3 TIMES DAILY Route: PER J TUBE  Start: 01/29/17 0800     0805 (300 mg)-Given       1531 (300 mg)-Given       2029 (300 mg)-Given        0848 (300 mg)-Given       1449 (300 mg)-Given       (2018)-Not Given        0757 (300 mg)-Given       1418 (300 mg)-Given       2007 (300 mg)-Given        0810 (300 mg)-Given       (1412)-Not Given       2048 (300 mg)-Given        0837 (300 mg)-Given       1421 (300 mg)-Given       2111 (300 mg)-Given        0806 (300 mg)-Given       1434 (300 mg)-Given       [ ] 2000           guaiFENesin (ROBITUSSIN) 20 mg/mL solution 200 mg  Dose: 200 mg Freq: 4 TIMES DAILY Route: PO  Start: 01/29/17 0800     0805 (200 mg)-Given       1259 (200 mg)-Given       1721 (200 mg)-Given       2023 (200 mg)-Given        0848 (200 mg)-Given       1302 (200 mg)-Given       1622 (200 mg)-Given       2015 (200 mg)-Given        0757 (200 mg)-Given       1150 (200 mg)-Given       1549 (200 mg)-Given       2007 (200 mg)-Given        0812 (200 mg)-Given       1145 (200 mg)-Given       1547 (200 mg)-Given       2050 (200 mg)-Given        0837 (200 mg)-Given       1154 (200 mg)-Given       1642 (200 mg)-Given       2057-Hold        0807 (200 mg)-Given       1101 (200 mg)-Given       1601 (200 mg)-Given       [ ] 2000           heparin sodium PF injection 5,000 Units  Dose: 5,000 Units Freq: EVERY 8 HOURS Route: SC  Start: 01/28/17 2315   Admin Instructions: High concentration HEParin. Not for line flush or cath care.     2350 (5,000 Units)-Given        0806 (5,000 Units)-Given       1606 (5,000 Units)-Given        0038 (5,000 Units)-Given       0849 (5,000 Units)-Given       (1622)-Not Given       (2339)-Not Given        0757 (5,000 Units)-Given       1549 (5,000 Units)-Given       2353 (5,000 Units)-Given        (0815)-Not Given       1548 (5,000 Units)-Given        0024 (5,000 Units)-Given       0838 (5,000  Units)-Given       1641 (5,000 Units)-Given        0051 (5,000 Units)-Given       0807 (5,000 Units)-Given       1601 (5,000 Units)-Given           HYDROmorphone (DILAUDID) liquid 1 mg  Dose: 1 mg Freq: EVERY 2 HOURS PRN Route: PO  PRN Reason: moderate to severe pain  PRN Comment: air hunger  Start: 01/28/17 2210        0340 (1 mg)-Given        0151 (1 mg)-Given       0355 (1 mg)-Given       0837 (1 mg)-Given       1154 (1 mg)-Given        0554 (1 mg)-Given       0807 (1 mg)-Given           hypromellose-dextran (ARTIFICAL TEARS) ophthalmic solution 1-2 drop  Dose: 1-2 drop Freq: 4 TIMES DAILY PRN Route: Both Eyes  PRN Reason: dry eyes  Start: 01/28/17 2210   Admin Instructions: Formulary lubricant eye drop substituted        1549 (2 drop)-Given              ipratropium (ATROVENT) 0.02 % neb solution 0.5 mg  Dose: 0.5 mg Freq: 4 TIMES DAILY Route: NEBULIZATION  Start: 01/29/17 0800     0833 (0.5 mg)-Given       1205 (0.5 mg)-Given       1540 (0.5 mg)-Given       2015 (0.5 mg)-Given        0723 (0.5 mg)-Given       1127 (0.5 mg)-Given       1537 (0.5 mg)-Given       (1539)-Not Given       1957 (0.5 mg)-Given        0803 (0.5 mg)-Given       1217 (0.5 mg)-Given       1605 (0.5 mg)-Given        0010 (0.5 mg)-Given       0950 (0.5 mg)-Given       1340 (0.5 mg)-Given       1606 (0.5 mg)-Given       1914 (0.5 mg)-Given        0830 (0.5 mg)-Given       1321 (0.5 mg)-Given       1635 (0.5 mg)-Given       2101 (0.5 mg)-Given        0913 (0.5 mg)-Given       1247 (0.5 mg)-Given       [ ] 1600       [ ] 2000           lactobacillus rhamnosus (GG) (CULTURELL) capsule 1 capsule  Dose: 1 capsule Freq: DAILY Route: PER G TUBE  Start: 01/29/17 0800   Admin Instructions: Administer at least 2 hours before or after oral antibiotics. Capsules may be opened.      0805 (1 capsule)-Given        0905 (1 capsule)-Given        0756 (1 capsule)-Given        0806 (1 capsule)-Given        0838 (1 capsule)-Given        0806 (1 capsule)-Given      "      levalbuterol (XOPENEX) neb solution 1.25 mg  Dose: 1 ampule Freq: EVERY 4 HOURS Route: NEBULIZATION  Start: 01/30/17 0016      0016 (1.25 mg)-Given       0437 (1.25 mg)-Given       0720 (1.25 mg)-Given              1129 (1.25 mg)-Given       (1130)-Not Given       1540 (1.25 mg)-Given       1957 (1.25 mg)-Given        0000 (1.25 mg)-Given       0417 (1.25 mg)-Given       0803 (1.25 mg)-Given       1217 (1.25 mg)-Given       1605 (1.25 mg)-Given       2000 (1.25 mg)-Given        0011 (1.25 mg)-Given       (0401)-Not Given       0950 (1.25 mg)-Given       1340 (1.25 mg)-Given       1606 (1.25 mg)-Given       1915 (1.25 mg)-Given               0034 (1.25 mg)-Given       0324 (1.25 mg)-Given              0831 (1.25 mg)-Given       (1319)-Not Given       1636 (1.25 mg)-Given       2101 (1.25 mg)-Given       2342 (1.25 mg)-Given        0417 (1.25 mg)-Given       0913 (1.25 mg)-Given       1247 (1.25 mg)-Given       [ ] 1630       [ ] 2030           lidocaine (LMX4) kit  Freq: EVERY 1 HOUR PRN Route: Top  PRN Reason: pain  PRN Comment: with VAD insertion or accessing implanted port.  Start: 01/28/17 2210   Admin Instructions: Do NOT give if patient has a history of allergy to any local anesthetic or any \"karina\" product.   Apply 30 minutes prior to VAD insertion or port access.  MAX Dose:  2.5 g (  of 5 g tube)               lidocaine (XYLOCAINE) 2 % 15 mL, alum & mag hydroxide-simethicone (MYLANTA ES/MAALOX  ES) 15 mL GI Cocktail  Dose: 30 mL Freq: 3 TIMES DAILY PRN Route: PO  PRN Reason: moderate pain  Start: 01/28/17 2210          0838 (30 mL)-Given           lidocaine 1 % 1 mL  Dose: 1 mL Freq: EVERY 1 HOUR PRN Route: OTHER  PRN Comment: mild pain with VAD insertion or accessing implanted port  Start: 01/28/17 2210   Admin Instructions: Do NOT give if patient has a history of allergy to any local anesthetic or any \"karina\" product. MAX dose 1 mL subcutaneous OR intradermal in divided doses.               LORazepam " (ATIVAN) 2 MG/ML (HIGH CONC) solution 0.5 mg  Dose: 0.5 mg Freq: EVERY 3 HOURS PRN Route: PO  PRN Reason: anxiety  Start: 01/28/17 2210     1130 (0.5 mg)-Given [C]        0224 (0.5 mg)-Given       0510 (0.5 mg)-Given         0032 (0.5 mg)-Given       0319 (0.5 mg)-Given        0013 (0.5 mg)-Given       0517 (0.5 mg)-Given       0836 (0.5 mg)-Given       1154 (0.5 mg)-Given        0422 (0.5 mg)-Given       0806 (0.5 mg)-Given           melatonin liquid 3 mg  Dose: 3 mg Freq: AT BEDTIME Route: PER J TUBE  Start: 01/28/17 2215     0001 (3 mg)-Given       2233 (3 mg)-Given        2341 (3 mg)-Given        2138 (3 mg)-Given        2327 (3 mg)-Given        2251-Hold [C]        [ ] 2200           naloxone (NARCAN) injection 0.1-0.4 mg  Dose: 0.1-0.4 mg Freq: EVERY 2 MIN PRN Route: IV  PRN Reason: opioid reversal  Start: 01/28/17 2210   Admin Instructions: For respiratory rate LESS than or EQUAL to 8.  Partial reversal dose:  0.1 mg titrated q 2 minutes for Analgesia Side Effects Monitoring Sedation Level of 3 (frequently drowsy, arousable, drifts to sleep during conversation).Full reversal dose:  0.4 mg bolus for Analgesia Side Effects Monitoring Sedation Level of 4 (somnolent, minimal or no response to stimulation).               nystatin (MYCOSTATIN) suspension 500,000 Units  Dose: 500,000 Units Freq: 4 TIMES DAILY Route: MT  Start: 02/01/17 2000   Admin Instructions: Shake Well.         2053 (500,000 Units)-Given        (0840)-Not Given       (1154)-Not Given       1642 (500,000 Units)-Given       2057-Hold        (0807)-Not Given       (1100)-Not Given       (1601)-Not Given       [ ] 2000           ondansetron (ZOFRAN-ODT) ODT tab 4 mg  Dose: 4 mg Freq: EVERY 6 HOURS PRN Route: PO  PRN Reason: nausea  Start: 01/28/17 8600   Admin Instructions: This is Step 1 of nausea and vomiting management.  If nausea not resolved in 15 minutes, go to Step 2 prochlorperazine (COMPAZINE). Do not push through foil backing. Peel back  foil and gently remove. Place on tongue immediately. Administration with liquid unnecessary                     Or  ondansetron (ZOFRAN) injection 4 mg  Dose: 4 mg Freq: EVERY 6 HOURS PRN Route: IV  PRN Reasons: nausea,vomiting  Start: 01/28/17 2210   Admin Instructions: This is Step 1 of nausea and vomiting management.  If nausea not resolved in 15 minutes, go to Step 2 prochlorperazine (COMPAZINE).           0431 (4 mg)-Given           polyethylene glycol (MIRALAX/GLYCOLAX) Packet 17 g  Dose: 17 g Freq: 2 TIMES DAILY Route: PER J TUBE  Start: 01/28/17 2215   Admin Instructions: Hold for loose stools  1 Packet = 17 grams. Mixed prescribed dose in 8 ounces of water. Follow with 8 oz. of water.      0004-Hold [C]       0805 (17 g)-Given       2023 (17 g)-Given        0848 (17 g)-Given       (2006)-Not Given        (0826)-Not Given       (1929)-Not Given        (0810)-Not Given       (2051)-Not Given        (0837)-Not Given       2252 (17 g)-Given        (0832)-Not Given       [ ] 2000           potassium chloride (KLOR-CON) Packet 20-40 mEq  Dose: 20-40 mEq Freq: EVERY 2 HOURS PRN Route: ORAL OR FEED  PRN Reason: potassium supplementation  Start: 01/30/17 1616   Admin Instructions: Use if unable to tolerate tablets.  If Serum K+ 3.0-3.3, dose = 60 mEq po total dose (40 mEq x1 followed in 2 hours by 20 mEq x1). Recheck K+ level 4 hours after dose and the next AM.  If Serum K+ 2.5-2.9, dose = 80 mEq po total dose (40 mEq Q2H x2). Recheck K+ level 4 hours after dose and the next AM.  If Serum K+ less than 2.5, See IV order.  Dissolve packet contents in 4-8 ounces of cold water or juice.       1847 (40 mEq)-Given       2248 (20 mEq)-Given               potassium chloride 10 mEq in 100 mL intermittent infusion  Dose: 10 mEq Freq: EVERY 1 HOUR PRN Route: IV  PRN Reason: potassium supplementation  Start: 01/30/17 1616   Admin Instructions: Infuse via PERIPHERAL LINE or CENTRAL LINE. Use for central line replacement if  patient weight less than 65 kg, if patient is on TPN with high potassium content or if unit does not stock 20 mEq bags.   If Serum K+ 3.0-3.3, dose = 10 mEq/hr x4 doses (40 mEq IV total dose). Recheck K+ level 2 hours after dose and the next AM.   If Serum K+ less than 3.0, dose = 10 mEq/hr x6 doses (60 mEq IV total dose). Recheck K+ level 2 hours after dose and the next AM.               potassium chloride 10 mEq in 100 mL intermittent infusion with 10 mg lidocaine  Dose: 10 mEq Freq: EVERY 1 HOUR PRN Route: IV  PRN Reason: potassium supplementation  Start: 01/30/17 1616   Admin Instructions: Infuse via PERIPHERAL LINE. Use potassium with lidocaine for pain with peripheral administration.  If Serum K+ 3.0-3.3, dose = 10 mEq/hr x4 doses (40 mEq IV total dose). Recheck K+ level 2 hours after dose and the next AM.  If Serum K+ less than 3.0, dose = 10 mEq/hr x6 doses (60 mEq IV total dose). Recheck K+ level 2 hours after dose and the next AM.               potassium chloride 20 mEq in 50 mL intermittent infusion  Dose: 20 mEq Freq: EVERY 2 HOURS PRN Route: IV  PRN Reason: potassium supplementation  Start: 01/30/17 1621   Admin Instructions: Infuse via CENTRAL LINE Only. May need EKG if less than 65 kg or on TPN - Max rate is 0.3 mEq/kg/hr for patients not on EKG monitoring.   If Serum K+ 3.0-3.3, dose = 20 mEq/hr x2 doses (40 mEq IV total dose). Recheck K+ level 2 hours after dose and the next AM.  If Serum K+ less than 3.0, dose = 20 mEq/hr x3 doses (60 mEq IV total dose). Recheck K+ level 2 hours after dose and the next AM.               potassium chloride SA (K-DUR/KLOR-CON M) CR tablet 20-40 mEq  Dose: 20-40 mEq Freq: EVERY 2 HOURS PRN Route: PO  PRN Reason: potassium supplementation  Start: 01/30/17 1616   Admin Instructions: Use if able to take PO.   If Serum K+ 3.0-3.3, dose = 60 mEq po total dose (40 mEq x1 followed in 2 hours by 20 mEq x1). Recheck K+ level 4 hours after dose and the next AM.  If Serum K+  2.5-2.9, dose = 80 mEq po total dose (40 mEq Q2H x2). Recheck K+ level 4 hours after dose and the next AM.  If Serum K+ less than 2.5, See IV order.  DO NOT CRUSH.               predniSONE solution 20 mg  Dose: 20 mg Freq: DAILY Route: PO  Start: 01/29/17 0800     0804 (20 mg)-Given        0848 (20 mg)-Given        0757 (20 mg)-Given        0809 (20 mg)-Given        0837 (20 mg)-Given        0805 (20 mg)-Given           QUEtiapine (SEROquel) tablet 50 mg  Dose: 50 mg Freq: 2 TIMES DAILY Route: PO  Start: 01/28/17 2215    2350 (50 mg)-Given        0805 (50 mg)-Given       2022 (50 mg)-Given        0850-Hold       (2018)-Not Given        0756 (50 mg)-Given       2007 (50 mg)-Given        0806 (50 mg)-Given       2048 (50 mg)-Given [C]        0838 (50 mg)-Given       1932 (50 mg)-Given        0807 (50 mg)-Given       [ ] 2000           senna-docusate (SENOKOT-S;PERICOLACE) 8.6-50 MG per tablet 2 tablet  Dose: 2 tablet Freq: 2 TIMES DAILY Route: PER J TUBE  Start: 01/28/17 2215     0004-Hold [C]       (0957)-Not Given       2022-Hold [C]        0849-Hold       (2006)-Not Given        (0826)-Not Given       (1928)-Not Given        (0810)-Not Given       (2048)-Not Given        0838 (2 tablet)-Given       1946-Hold        (0807)-Not Given       [ ] 2000           sertraline (ZOLOFT) 20 MG/ML (HIGH CONC) solution 150 mg  Dose: 150 mg Freq: DAILY Route: PER FEEDING   Start: 01/29/17 0800   Admin Instructions: Must dilute before use; mix with 4 oz of water, ginger ale, OJ, lemonade or lemon/lime soda.      1259 (150 mg)-Given        0849 (150 mg)-Given        0757 (150 mg)-Given        0809 (150 mg)-Given        0836 (150 mg)-Given        0807 (150 mg)-Given           sodium chloride (OCEAN) 0.65 % nasal spray 1 spray  Dose: 1 spray Freq: DAILY PRN Route: BOTH NOSTRIL  PRN Reason: congestion  Start: 01/28/17 2210              sodium chloride (PF) 0.9% PF flush 3 mL  Dose: 3 mL Freq: EVERY 8 HOURS Route: IK  Start: 01/28/17  2215   Admin Instructions: And Q1H PRN, to lock peripheral IV dormant line.      (0005)-Not Given       0621 (3 mL)-Given       (1534)-Not Given [C]       2234 (3 mL)-Given        (0628)-Not Given       1451 (3 mL)-Given       2342 (3 mL)-Given        0545 (3 mL)-Given       1031 (20 mL)-Given [C]       1255 (20 mL)-Given [C]       (1418)-Not Given       2138 (3 mL)-Given        (0738)-Not Given       1413 (3 mL)-Given       2109 (3 mL)-Given               0901 (3 mL)-Given       1422 (3 mL)-Given       (2252)-Not Given [C]        0833 (3 mL)-Given       (1434)-Not Given       [ ] 2215           sodium chloride (PF) 0.9% PF flush 3 mL  Dose: 3 mL Freq: EVERY 1 HOUR PRN Route: IK  PRN Reason: line flush  PRN Comment: for peripheral IV flush post IV meds  Start: 01/28/17 2210     0630 (20 mL)-Given        0915 (20 mL)-Given        0741 (20 mL)-Given [C]       0829 (20 mL)-Given        0538 (30 mL)-Given [C]             sodium chloride 3 % neb solution 3 mL  Dose: 3 mL Freq: EVERY 3 HOURS PRN Route: NEBULIZATION  PRN Reason: wheezing  Start: 01/29/17 0948              sulfamethoxazole-trimethoprim (BACTRIM/SEPTRA) suspension 160 mg  Dose: 20 mL Freq: DAILY Route: PER J TUBE  Indications Comment: prophylaxis  Start: 01/29/17 0800   Admin Instructions: Shake well.      0804 (160 mg)-Given        0849 (160 mg)-Given        0757 (160 mg)-Given        0809 (160 mg)-Given        0836 (160 mg)-Given        0806 (160 mg)-Given          Future Medications  Medications 01/28/17 01/29/17 01/30/17 01/31/17 02/01/17 02/02/17 02/03/17       carbamide peroxide (DEBROX) 6.5 % otic solution 5 drop  Dose: 5 drop Freq: 2 TIMES DAILY Route: RIGHT EAR  Start: 02/03/17 2000          [ ] 2000          Completed Medications  Medications 01/28/17 01/29/17 01/30/17 01/31/17 02/01/17 02/02/17 02/03/17         Dose: 100 mL Freq: ONCE Route: IV  Start: 02/02/17 2100   End: 02/02/17 2054 2054 (100 mL)-New Bag              Dose: 7.5 mL Freq:  ONCE Route: IV  Start: 02/02/17 2100   End: 02/02/17 2054         2054 (5 mL)-Given              Dose: 1 mg Freq: ONCE Route: IV  Start: 02/02/17 1445   End: 02/02/17 1932   Admin Instructions: 20 minutes before MRI  For IV PUSH: Dilute with equal volume of NS.          1932 (1 mg)-Given           Discontinued Medications  Medications 01/28/17 01/29/17 01/30/17 01/31/17 02/01/17 02/02/17 02/03/17         Dose: 2 mL Freq: EVERY 4 HOURS Route: NEBULIZATION  Start: 01/29/17 1200   End: 02/03/17 1108   Admin Instructions: Nebulization tubing with a FILTER is recommended with acetylcysteine nebs.             1205 (2 mL)-Given       1540 (2 mL)-Given       2015 (2 mL)-Given        0016 (2 mL)-Given       0438 (2 mL)-Given       0723 (2 mL)-Given       1130 (2 mL)-Given       1541 (2 mL)-Given       1957 (2 mL)-Given        0001 (2 mL)-Given       0417 (2 mL)-Given       0804 (2 mL)-Given       1217 (2 mL)-Given       1605 (2 mL)-Given       2000 (2 mL)-Given        0011 (2 mL)-Given       (0401)-Not Given       0950 (2 mL)-Given       1340 (2 mL)-Given       1606 (2 mL)-Given       1914 (2 mL)-Given        0035 (2 mL)-Given       0323 (2 mL)-Given       0831 (2 mL)-Given       (1318)-Not Given [C]       1635 (2 mL)-Given       2101 (2 mL)-Given       2342 (2 mL)-Given        0417 (2 mL)-Given       0914 (2 mL)-Given       1108-Med Discontinued         Dose: 4 mg Freq: EVERY 4 HOURS PRN Route: PO  PRN Reason: nausea  Start: 01/28/17 2210   End: 02/02/17 1353         1353-Med Discontinued                 INTERAGENCY TRANSFER FORM - NOTES (H&P, Discharge Summary, Consults, Procedures, Therapies)   1/28/2017                       [address] [facility] BANK: [phone]               History & Physicals      H&P by Viridiana Rockwell PA-C at 1/28/2017  7:51 PM     Author:  Viridiana Rockwell PA-C Service:  Hospitalist Author Type:  Physician Assistant    Filed:  1/28/2017 11:06 PM Note Time:  1/28/2017  7:51 PM Status:  Attested     :  Viridiana Rockwell PA-C (Physician Assistant)      Related Notes: Original Note by Viridiana Rockwell PA-C (Physician Assistant) filed at 1/28/2017  8:13 PM    Cosigner:  Enrique Gama MD at 1/28/2017 11:16 PM        Attestation signed by Enrique Gama MD at 1/28/2017 11:16 PM        Attestation:  10 point ROS is negative except as mentioned in the HPI  PMH, PSH, medications, SH, and FMH were reviewed and confirmed by myself    Labs and VS reviewed    /82 mmHg  Pulse 98  Temp(Src) 97.9  F (36.6  C) (Oral)  Resp 16  Ht 1.524 m (5')  Wt 58 kg (127 lb 13.9 oz)  BMI 24.97 kg/m2  SpO2 97%  Frail, very ill appearing woman in mild distress. Lungs are largely clear but there is very poor air movement. She has pitting edema of the bilateral hands. No edema of the feet or legs    I saw and evaluated this patient with FER Rockwell. I agree with his/her assessment of Mrs Wang. She is a 67 yo woman with chronic hybercarbic hypoxic respiratory failure admitted from her [facility] today after a sudden and severe worsening of her hypoxic failure which has now improved after aggressive suctioning. She has minimal signs of recurrent infection and her CXR is largely unchanged. Planning on hold further abx today as no other signs of infection except for mild leukocytosis. She has mild tachycardia but this is actually better than at prior admissions. Her VBG's were followed in the ED and noted acute on chronic hyercarbic respiratory failure with slow improvement since arrival. Her metabolic acidosis may reflect mild volume depletion vs delayed renal adjustment to prior acidosis. Given PICC in RU, plan for ultrasound evaluation for DVT. Overall, Mrs. Munoz's quality of life is very poor with multiple recurrent admissions for issues related to her chronic respiratory failure. There have been many conversations regarding her goals of care. She clearly feels more comfortable in the  hospital. Given this, a care conference could be considered with family to discuss the idea of hospice services here in the hospital (or a hospice facility) where her shortness of breath and anxiety could be aggressively and appropriately treated.      Justo Gama MD                          [facility] Physical  Department of Internal Medicine    Patient Name: Mary Wang MRN# [id number]   Age: 66 year old YOB: 1950     Date of Admission: 1/28/2017    Home clinic: [facility]   Primary care provider: Norman Brito         Assessment and Plan:   Mary Wang is a 66 year old female with a history of severe COPD s/p trach on home vent 24/7 (3-3.5 LPM), anxiety, hypertension, achalasia s/p GJ tube who presented to the hospital with acute on chronic hypoxic and hypercarbic respiratory failure.    *Multiple admissions in last 2 months, 12/1-12/5 for UTI, 12/25-12/28 for bacteremia, 1/2-1/6 with encephalopathy ultimately attributed to her medications, 01/07-01/09 for acute on chronic respiratory failure likely 2/2 COPD exacerbation, 1/20-1/24 for oversedation/aspiration pneumonitis.    #Acute on chronic respiratory failure, severe COPD s/p trach: On ventilator (settings CMV, 400/14/5/40%). Multiple admissions in 6 weeks, see above. For chronic respiratory failure PTA the patient is maintained on chronic prednisone at 20 mg qday, scheduled ipratropium and xoponex nebs, BID Pulmicort, guaifenesin 200 mg QID, bactrim for PCP prophylaxis, allegra BID. S/P 5 days of meropenem (last dose today) for aspiration pneumonitis on admission from 01/20-01/24. New problem today includes mucous plugging w/ O2 sats dropping to 27%, group home staff deep suctioned for 15 minutes before sats improved, in 90s on admission. VBG w/ hypercarbia a bit worse than her baseline. CXR w/ mildly increased patchy basilar opacities not significant changed from 01/20 xray- atelectasis vs.  Infx. WBC count mildly elevated at 11.1 w/ mildly elevated ANC. Afebrile, normotensive, mildly tachcyardic, sating well on home 3LPM.  -Continue home nebulizers  -Continue home steroids w/ PCP prophylaxis   -VBG in AM  -Trach cares per RT, suctioning per nursing and RT  -Continue anxiolytics and medications for air hunger  -Will not continue antibiotics tonight  -Consider further antibiotics pending clinical course, will trend CBC and CRP for AM    #Achalasia s/p GJ tube   -Continue tube feeds    #Severe anxiety, depression: Likely due to air hunger from COPD. Psychiatry and Palliative care have seen patient in the past. Continues to have significant anxiety and frequent requests for hospital admission per daughter. PTA maintained on seroquel (50 mg BID), sertraline (150 mg qday), scheduled Klonopin (1.5 mg QID) and PRN ativan (0.5 q3 PRN) & dilaudid (1 mg per G q2 hours PRN) for air hunger. Currently endorses stable symptoms although daughter notes intermittent anxious periods.   -Continue home regimen  -Consider palliative, psychiatry consult pending symptoms    #Urinary retention: Chronic indwelling bender. Last changed 1/23 while inpatient.  -Continue bender    #Normocytic anemia: At baseline.     #CAD: On aspirin and statin. Continue.      #Constipation: On senna and miralax PTA.     #Bilateral UE edema, L>R: PICC in RUE. Otherwise does not appear hypervolemic.   -US to r/o DVT  -SubQ heparin for now    CODE: DNR, trached and on vent chronically  FEN: NPO, G tube feeds  DVT: Mechanical  LINES: Right UE PICC  Disposition/Admission Status: >2 midnights for acute on chronic respiratory failure, if stable off antibiotics may go back tomorrow (less likely).    Plan of care was discussed with attending physician, Dr. Gama.     Viridiana Rockwell PA-C  Hospitalist Service           Chief Complaint:   Nothing         HPI:   History is obtained from the patient, Daughter, and medical record.     The patient is non verbal  and sleepy, doesn't answer questions but frequently reaches out for daughters hand.    The patients daughter notes that she was discharged Tuesday with antiobiotcs through today. She had an episode of mucous plugging on Thursday that improved with suctioning. She had a significant amount of anxiety yesterday and requested transfer to the hospital, but she was treated supportively at home. The group home staff noted the patient to be hypoxic in the 20s at the group home with tachycardia. Suctioning was attempted and approximately 15 minutes later a mucous plug was suctioned and the patients sats improved to the 90s again. EMS was called where apparently she desated again, was suctioned, on arrival was in the 90s. The daughter notes increased mucous plugging. Otherwise symptoms are stable. She is intermittently somnolent especially after medications.          Review of Systems:   A 10 point ROS was performed and negative unless otherwise noted in HPI.          Past Medical History:   Reviewed and updated in Epic.   Past Medical History   Diagnosis Date     COPD (chronic obstructive pulmonary disease) (H)      trach/vent dependent      Anxiety      TMJ pain dysfunction syndrome      Tracheostomy in place      Hypertension      GERD (gastroesophageal reflux disease)      Achalasia      Lung nodule      spiculated; followed in pulm clinic      Stress-induced cardiomyopathy      Anxiety and depression      Chronic pain             Past Surgical History:   Reviewed and updated in Epic.   Past Surgical History   Procedure Laterality Date     Tracheostomy N/A 8/26/2015     Procedure: TRACHEOSTOMY;  Surgeon: Milena Thibodeaux MD;  Location: [facility] OR     Bronchoscopy, insert bronchial valve Right 9/2/2015     Procedure: BRONCHOSCOPY, INSERT BRONCHIAL VALVE;  Surgeon: Everardo Beach MD;  Location: [facility]     Esophagoscopy, gastroscopy, duodenoscopy (egd), combined N/A 12/11/2015     Procedure: COMBINED ESOPHAGOSCOPY,  GASTROSCOPY, DUODENOSCOPY (EGD);  Surgeon: Dhruv Lyles MD;  Location: [facility] OR     Esophagoscopy, gastroscopy, duodenoscopy (egd), combined N/A 12/31/2015     Procedure: COMBINED ESOPHAGOSCOPY, GASTROSCOPY, DUODENOSCOPY (EGD);  Surgeon: Brenden Plasencia MD;  Location: [facility] OR     Percutaneous insertion tube jejunostomy N/A 12/31/2015     Procedure: PERCUTANEOUS INSERTION TUBE JEJUNOSTOMY;  Surgeon: Brenden Plasencia MD;  Location: [facility]     Percutaneous insertion tube jejunostomy N/A 1/25/2016     Procedure: PERCUTANEOUS INSERTION TUBE JEJUNOSTOMY;  Surgeon: Carrie Coleman MD;  Location: [facility]     Anesthesia out of or mri N/A 4/18/2016     Procedure: ANESTHESIA OUT OF OR MRI;  Surgeon: GENERIC ANESTHESIA PROVIDER;  Location: [facility]     Endoscopic insertion tube gastrostomy N/A 7/9/2016     Procedure: ENDOSCOPIC INSERTION TUBE GASTROSTOMY;  Surgeon: Brenden Plasencia MD;  Location: [facility] OR     Picc insertion Right 1/9/2017     5fr DL BioFlo PICC, 38cm (1cm external) in the R basilic vein.            Social History:   Reviewed and updated in [employer].   Social History     Social History     Marital Status:      Spouse Name: N/A     Number of Children: N/A     Years of Education: N/A     Occupational History     Not on file.     Social History Main Topics     Smoking status: Former Smoker -- 2.00 packs/day for 40 years     Types: Cigarettes     Start date: 01/01/1964     Quit date: 04/18/1998     Smokeless tobacco: Never Used     Alcohol Use: No     Drug Use: No     Sexual Activity: Not on file     Other Topics Concern     Not on file     Social History Narrative            Family History:   Reviewed and updated in Epic.   Family History   Problem Relation Age of Onset     CANCER Sister             Allergies:      Allergies   Allergen Reactions     Levaquin Other (See Comments)     Delirium     Toro Podhaler [Tobramycin] Other (See Comments)     Severe congestion, SOB      Suprax [Cefixime]       See ceftibuten     Augmentin Nausea     Has tolerated for treatment of UTIs in 2016.     Avelox [Moxifloxacin] Anxiety     Cedax [Ceftibuten] Other (See Comments)     Pain in eyes     Penicillins Itching     Tolerated amoxicillin without issues.     Vantin [Cefpodoxime] Nausea            Medications:     (Not in a hospital admission)          Physical Exam:   Blood pressure 112/63, temperature 98.5  F (36.9  C), temperature source Oral, resp. rate 13, height 1.524 m (5'), weight 58 kg (127 lb 13.9 oz), SpO2 92 %, not currently breastfeeding.  GENERAL: Alert, intermittently lethargic, didn't answer questions.   HEENT: Anicteric sclera. PERRL. Mucous membranes moist and without lesions.   CV: Distant heart sounds, Tachycardic rate, regular rhythm. S1, S2. No murmurs appreciated.   RESPIRATORY: Effort increased on 3 lpm via trach. Lung sounds diffusely diminished with coarse sounds in bases, with no wheezing.   GI: Abdomen soft and non distended, bowel sounds present. No tenderness, rebound, guarding. G tube in place, green bilious drainage in vented G tube bag.   MUSCULOSKELETAL: No joint swelling or tenderness.   NEUROLOGICAL: No focal deficits.   EXTREMITIES: Edema in bilateral UE, L>R, no LE peripheral edema. Intact bilateral pedal pulses.   SKIN: No jaundice. No rashes.   : Avendaño in place.     Lines/Tubes/Drains:   PICC Double Lumen 01/09/17 Right Basilic (Active)   Site Assessment WDL 1/24/2017 12:36 PM   External Cath Length (cm) 1 cm 1/24/2017 12:36 PM   Extremity Circumference (cm) 29 cm 1/9/2017  6:39 PM   Dressing Intervention Chlorhexidine patch;Securing device;New dressing 1/24/2017 12:36 PM   Extravasation? No 1/9/2017  6:39 PM   Dressing Change Due 01/31/17 1/24/2017 12:36 PM   PICC Comment statseal is off 1/22/2017  7:00 AM   Number of days:19            Data:   I personally reviewed the following studies:  CMP, lactic acid, lipase, BNP, trop, glucose, CBC, INR   Unresulted Labs Ordered in the  Past 30 Days of this Admission     No orders found from 11/30/2016 to 1/29/2017.                        Discharge Summaries      Discharge Summaries by Celia Chavis MD at 2/3/2017 11:13 AM     Author:  Celia Chavis MD Service:  Internal Medicine Author Type:  Physician    Filed:  2/3/2017  1:42 PM Note Time:  2/3/2017 11:13 AM Status:  Signed    :  Celia Chavis MD (Physician)           [facility] - Internal Medicine Discharge Summary   Date of Service: 2/3/2017    Mary Wang MRN# [id number]   YOB: 1950 Age: 66 year old     Date of Admission:  1/28/2017  Date of Discharge:  2/3  Admitting Physician:  Enrique Gama MD  Discharge Physician:  Celia Chavis   Discharging Service:  Internal Medicine, [facility]     Primary Provider: Norman Brito         Reason for Admission:   Difficulty breathing           Discharge Diagnosis:   Acute on chronic respiratory Failure  Vent dependent   Dysphagia . Achalasia . G Tube depenedent  Malnutrition on TF  Urinary retention ; on bender catheter   Severe Anxiety and depression   Normocytic Anemia         Procedures & Significant Findings:   MRI brain ; with no findings of infarction or hemorrage         Consultations:   Palliative team          Hospital Course by Problem:      Mary Wang is a 66 year old female with a history of severe COPD s/p trach on home vent 24/7 (3-3.5 LPM), anxiety, hypertension, achalasia s/p GJ tube who presented to the hospital with acute on chronic hypoxic and hypercarbic respiratory failure.     *Multiple admissions in last 2 months, 12/1-12/5 for UTI, 12/25-12/28 for bacteremia, 1/2-1/6 with encephalopathy ultimately attributed to her medications, 01/07-01/09 for acute on chronic respiratory failure likely 2/2 COPD exacerbation, 1/20-1/24 for oversedation/aspiration pneumonitis.    Goals of a care ; greatly appreciate Palliative following patient. Please note 1/31. FCC was held  1/31 and the plan is that Mary will not be admitted to hospital again for respiratory decompensation . Cares being organized . New Polst completed  Spoke with daughter César 2/3 and updated on progress. César would like to have in place regarding respiratory decline in group home so that Mary is not under duress.    #Acute on chronic respiratory failure, severe COPD s/p trach: On ventilator (settings CMV, 400/14/5/40%). Multiple admissions in 6 weeks, see above. For chronic respiratory failure PTA the patient is maintained on chronic prednisone at 20 mg qday, scheduled ipratropium and xoponex nebs, BID Pulmicort, guaifenesin 200 mg QID, bactrim for PCP prophylaxis, allegra BID. Mucous plugging w/ O2 sats dropping to 27%, group home staff deep suctioned for 15 minutes before sats improved, in 90s on admission. XR chest  w/ mildly increased patchy basilar opacities not significant changed from 01/20 xray- atelectasis vs. Infx.  Afebrile, normotensive, mildly tachcyardic, sating  on home 2-3 LPM. CT Angiogram was negative for Pulmonary embolism. She is currently at her baseline oxygen requirements. CT did show some right lung opacity as she is improving without antibiotics , none were initiated , Family was aware and in agreement with this plan     #Achalasia s/p GJ tube   -Continue tube feeds    #Severe anxiety, depression: Likely due to air hunger from COPD. Psychiatry and Palliative care have seen patient in the past. Continued to have significant anxiety and frequent requests for hospital admission per daughter. PTA maintained on seroquel (50 mg BID), sertraline (150 mg qday), scheduled Klonopin (1.5 mg QID) and PRN ativan (0.5 q3 PRN) & dilaudid (1 mg per G q2 hours PRN) for air hunger. Currently endorses stable symptoms-Continued home regimen  -     # Pulmonary nodule: Seen on CT 01/29. 1 cm right lower lobe lung nodule, slightly more prominent than prior      # Urinary retention: Chronic indwelling bender.  Last changed 1/23 while inpatient.  -Continue bender    # Normocytic anemia: At baseline.     # CAD: On aspirin and statin. Continue.      # Constipation: On senna and miralax PTA.     CODE: DNR, trached and on vent chronically. Discussed with Daughter HANNAH Lindsey  2/3  FEN: NPO, G tube feeds  DVT: Heparin Q 8h while hospitalized      On Exam ;     Alert and oriented . No acute distress  Vital signs:  Temp: 98.3  F (36.8  C) Temp src: Oral BP: 91/48 mmHg   Heart Rate: 94 Resp: 16 SpO2: 97 % O2 Device: Mechanical Ventilator Oxygen Delivery: 2 LPM Height: 152.4 cm (5') Weight: 57.471 kg (126 lb 11.2 oz) (bedscale)  Estimated body mass index is 24.74 kg/(m^2) as calculated from the following:    Height as of this encounter: 1.524 m (5').    Weight as of this encounter: 57.471 kg (126 lb 11.2 oz).    Neck ;trach  Chest ; bibasilar fine crepts  CVS; RRR, no murmur /rubs or gallops  GI ; soft abdomen, non tender, BS positive  Neuro ; CN 2 to 12 grossly intact ,knows year , month, date, able to pronounce and say doctors name   Psych ; appropriate mood and effect  Skin ; no rash or purpura.           Pending Results:   Sputum cs         Discharge Medications:     Current Discharge Medication List      CONTINUE these medications which have NOT CHANGED    Details   !! bisacodyl (DULCOLAX) 10 MG Suppository Place 1 suppository (10 mg) rectally daily as needed for constipation  Qty: 25 suppository, Refills: 1    Associated Diagnoses: Constipation due to opioid therapy      order for DME Equipment being ordered: 1) Bender catheter 16F, balloon 10 mL, silicone.  2) Urinary collection bag.  3) Elastic leg strap for Bender catheter.  Please fax to [facility].  Qty: 3 each, Refills: 11      LORazepam (ATIVAN) 2 MG/ML (HIGH CONC) solution Take 0.25 mLs (0.5 mg) by mouth every 3 hours as needed for anxiety  Qty: 30 mL, Refills: 1      clonazePAM (KLONOPIN) 0.1 mg/mL Take 15 mLs (1.5 mg) by mouth 4 times daily  Qty: 1800 mL, Refills: 1       HYDROmorphone (DILAUDID) 1 MG/ML LIQD liquid Take 1 mL (1 mg) by mouth every 2 hours as needed for moderate to severe pain  Qty: 180 mL, Refills: 0      QUEtiapine (SEROQUEL) 50 MG tablet Take 1 tablet (50 mg) by mouth 2 times daily  Qty: 180 tablet, Refills: 3      ipratropium (ATROVENT) 0.02 % neb solution Take 2.5 mLs (0.5 mg) by nebulization 4 times daily and every four hours at night PRN.  Qty: 450 mL, Refills: 0      levalbuterol (XOPENEX) 1.25 MG/3ML neb solution Take 3 mLs (1.25 mg) by nebulization 4 times daily and every four hours at night PRN.  Qty: 540 mL, Refills: 0      lactobacillus rhamnosus, GG, (CULTURELL) capsule 1 capsule by Per G Tube route daily  Qty: 60 capsule, Refills: 0      lidocaine (LIDODERM) 5 % Patch Place 1-2 patches onto the skin every 24 hours Apply to the lower back  Qty: 30 patch, Refills: 0    Associated Diagnoses: Urinary tract infection, site not specified      gabapentin (NEURONTIN) 250 MG/5ML solution 6 mLs (300 mg) by Per J Tube route 3 times daily  Qty: 450 mL, Refills: 0    Associated Diagnoses: Anxiety      polyethylene glycol (MIRALAX/GLYCOLAX) Packet 17 g by Per J Tube route 2 times daily Hold for loose stools  Qty: 100 packet, Refills: 0    Associated Diagnoses: Constipation, unspecified constipation type      senna-docusate (SENOKOT-S;PERICOLACE) 8.6-50 MG per tablet 2 tablets by Per J Tube route 2 times daily  Qty: 100 tablet, Refills: 0    Associated Diagnoses: Constipation, unspecified constipation type      ondansetron (ZOFRAN) 4 MG tablet Take 1 tablet (4 mg) by mouth every 4 hours as needed for nausea  Qty: 18 tablet      famotidine (PEPCID) 40 MG/5ML suspension Take 2.5 mLs (20 mg) by mouth 2 times daily as needed for heartburn  Qty: 75 mL, Refills: 3    Associated Diagnoses: Mild gas use disorder      loperamide (IMODIUM A-D) 2 MG tablet 2-2 mg tablets per J-tube qid prn frequent and/or loose stools. Do not use more than 8 tabs per day.  Qty: 30 tablet,  "Refills: 0    Associated Diagnoses: Frequent stools      polyethylene glycol 0.4%- propylene glycol 0.3% (SYSTANE ULTRA) 0.4-0.3 % SOLN ophthalmic solution Place 1-2 drops into both eyes 4 times daily as needed for dry eyes 0900, 1300, 1700, 2100  Qty: 1 Bottle, Refills: 3    Associated Diagnoses: Dry eyes, bilateral      predniSONE 5 MG/5ML solution Take 20 mLs (20 mg) by mouth daily  Qty: 500 mL, Refills: 0    Comments: Continue prednisone taper until reaches 20 mg daily, then continue 20 mg daily.  Associated Diagnoses: Shortness of breath      Cranberry 500 MG CAPS Take 1 capsule (500 mg) by mouth daily  Qty: 90 capsule      sertraline (ZOLOFT) 20 MG/ML (HIGH CONC) solution 7.5 mLs (150 mg) by Per Feeding Tube route daily  Qty: 105 mL, Refills: 0    Associated Diagnoses: Anxiety      fexofenadine (ALLEGRA) 180 MG tablet Take 1 tablet (180 mg) by mouth daily  Qty: 30 tablet, Refills: 0    Associated Diagnoses: COPD exacerbation (H)      fiber modular (NUTRISOURCE FIBER) packet 1 packet by Per J Tube route 3 times daily  Qty: 42 packet, Refills: 0    Associated Diagnoses: Diarrhea, unspecified type      Nutritional Supplements (JEVITY 1.5 IZZY) LIQD Take 5 Cans by mouth daily Per tube feeding  Qty: 150 Can, Refills: 11    Comments: Height 5'2\"  Weight 120 lbs  Length of need 99 months  Associated Diagnoses: Achalasia      melatonin (MELATONIN) 1 MG/ML LIQD liquid 3 mLs (3 mg) by Per J Tube route At Bedtime  Qty: 300 mL, Refills: 0    Associated Diagnoses: Anxiety; Insomnia due to medical condition      acetaminophen (TYLENOL) 160 MG/5ML oral liquid 20.3 mLs (650 mg) by Per Feeding Tube route every 4 hours as needed for mild pain  Qty: 300 mL, Refills: 0    Associated Diagnoses: Other chronic pain      budesonide (PULMICORT) 0.5 MG/2ML nebulizer solution Take 2 mLs (0.5 mg) by nebulization 2 times daily  Qty: 60 ampule, Refills: 0    Associated Diagnoses: Shortness of breath; Respiratory difficulty      guaiFENesin " (ORGANIDIN) 200 MG TABS Take 1 tablet (200 mg) by mouth 4 times daily  Qty: 115 tablet, Refills: 0    Associated Diagnoses: Shortness of breath      sodium chloride (OCEAN) 0.65 % nasal spray Spray 1 spray into both nostrils daily as needed for congestion  Qty: 1 Bottle, Refills: 0    Associated Diagnoses: Chronic sinusitis, unspecified location      sulfamethoxazole-trimethoprim (BACTRIM,SEPTRA) 200-40 mg/5ml suspension 20 mLs (160 mg) by Per J Tube route daily Dose based on TMP component. 20 mLs = 160 mg  Qty: 560 mL, Refills: 0    Associated Diagnoses: Chronic obstructive pulmonary disease with acute exacerbation (H)      Rectal Cleansers ([employer] CLEANS[employer] ENEMA) ENEM Place 1 enema rectally daily as needed  Qty: 3 Bottle, Refills: 11    Associated Diagnoses: Constipation due to opioid therapy      !! bisacodyl (DULCOLAX) 10 MG Suppository Place 1 suppository (10 mg) rectally daily as needed for constipation  Qty: 28 suppository, Refills: 3    Associated Diagnoses: Constipation due to opioid therapy      calcium carbonate (TUMS) 500 MG chewable tablet Take 1 tablet (500 mg) by mouth every 2 hours as needed for heartburn  Qty: 150 tablet      lidocaine 15 mL, alum & mag hydroxide-simethicone 15 mL GI Cocktail Take 30 mLs by mouth 3 times daily as needed for moderate pain  Qty: 500 mL, Refills: 0    Associated Diagnoses: Gastroesophageal reflux disease without esophagitis       !! - Potential duplicate medications found. Please discuss with provider.      STOP taking these medications       meropenem (MERREM) 500 MG vial Comments:   Reason for Stopping:                    Discharge Instructions and Follow-Up:     Discharge Procedure Orders  Home Care PT Referral for Hospital Discharge   Referral Type: Home Health Therapies & Aides     Home care nursing referral   Referral Type: Home Health Therapies & Aides     Reason for your hospital stay   Order Comments: You were admitted with difficulty breathing      Tracheostomy care   Order Comments: Per routine     Reason for your hospital stay   Order Comments: You were admitted due to difficulty breathing     Follow Up and recommended labs and tests   Order Comments: Complex care MD 1 week     Tubes and drains   Order Comments: Avendaño   trach     Discharge Instructions   Order Comments: See instructions on POLST form     DNR (Do Not Resuscitate)     Oxygen Adult   Order Comments: Trilogy vent     Diet   Order Comments: NPO Time Specified Ice Chips  Adult Formula Drip Feeding: Specified Time Isosource 1.5; Gastrostomy; Goal Rate: 85 ml/hour; mL/hr; From: 8:00 PM; 8:00 AM; Medication - Tube Feeding Flush Frequency: At least 15-30 mL water before and after medication administration   Order Specific Question Answer Comments   Is discharge order? Yes      Diet   Order Comments: Npo except ice chips  Adult Formula Drip Feeding: Specified Time Isosource 1.5; Gastrostomy; Goal Rate: 85 ml/hour; mL/hr; From: 8:00 PM; 8:00 AM; Medication - Tube Feeding Flush Frequency: At least 15-30 mL water before and after medication administration   Order Specific Question Answer Comments   Is discharge order? Yes              Discharge Disposition:   Group home         Condition on Discharge:   Discharge condition: {stable   Code status on discharge: DNR        Date of service: 2/3/2017     60 minutes spent in discharge, including >50% in counseling and coordination of care, medication review and plan of care recommended on follow up. Questions were answered   Spoke with RN, palliative team, CC , daughter César, RT        Celia Chavis  Internal Medicine Hospitalist & Staff Physician  [facility]                                Consult Notes      Consults by Maddie Santiago APRN CNS at 1/31/2017  7:47 AM     Author:  Maddie Santiago APRN CNS Service:  Palliative Author Type:  Clinical Nurse Specialist    Filed:  1/31/2017  5:31 PM Note Time:  1/31/2017   7:47 AM Status:  Signed    :  Maddie Santiago APRN CNS (Clinical Nurse Specialist)       Consult Orders:    1. Palliative Care ADULT: Patient to be seen: Routine within 24 hrs; Call back #: [phone]; Goals of care; Consultant may enter orders: Yes [[id number]] ordered by Steven Sousa MD at 01/30/17 0917                [facility]  Palliative Care Consultation         Mary Wang MRN# [id number]   Age: 66 year old YOB: 1950   Date of Admission: 1/28/2017    Reason for consult: Symptom management  Goals of care  Decisional support  Patient and family support       Requesting physician/service: Steven Sousa MD       Recommendations        Goals of Care  Visit today with Palliative Care Clinical , Madelyn, and family including César- daughter, Uli- son, Dorota- daughter-in-law, and Brent- patients . Patient was able to make her needs known and has the ability in my assessment to understand and appreciate her current medical situation. She clearly states that she no longer would like to come back to the hospital. She understands that this could mean that she has a significant event that it could end her life. We discussed her wishes if she were to have a significant event, such as a mucus plug, that her wish would be to have comfort medications provided and receive interventions such as suctioning to help her feel better, knowing that it could end her life. In my assessment, her symptoms at the moment seem to be much improved and she is very alert and clear in her interactions with myself and her family.   -Ongoing planning and coordination for her going back to the group home with plan for no more hospitalizations and to manage her symptoms at the group home  -Discussed with Chelsie Sanches, the Lead Director of the home care agency and Arley,  with [facility] updates to her plan of care    POLST -form  "was updated and discussed, patient and family members present were able to participate in the completion of the form and all in agreement with the document and that it is what Mary has stated that her wishes are.    LÁZARO Villarreal CNS  Palliative Care Consult Team  Pager: [phone]    120 minutes spent with patient, with >50% counseling and in care coordination.   Face to face- [phone]       Disease Process/es & Symptoms     Acute on chronic respiratory failure, severe COPD s/p trach  Achalasia s/o GJ tube  Severe anxiety and depression  Pulmonary nodule  Urinary retention  Normocytic anemia  CAD  Constipation  Bilateral UE edema       Support/Coping   (We typically ask each of our patients the following questions:)    How do you make sense of this? Not discussed  Are you Gnosticist? Spiritual? Not so much? Not discussed  Are you at peace? Not discussed  What are your concerns, medical and non-medical, that are not being addressed? None  Who are your \"go-to\" people when you need support? Family    Plan for psychosocial/spiritual support/follow-up from palliative team: Will discuss case during palliative interdisciplinary team rounds and involve LICSW and  as needed.       Decision-Making & Goals of Care     Discussion/counseling today about prognosis/goals of care/decisions:   See above  Patient has a completed health care directive available in the chart (Y/N): Yes  Health care agent (only if patient has an available, complete HCD): César Troy  Physician orders for life-sustaining treatment (POLST) form is on file  Code Status: Do not resuscitate   Patient has decision-making capacity (Y/N): Yes    Coordination of Care     Findings & plan of care discussed with: Zak Combs, Dr. Chavis, bedside RN,   Follow-up plan from palliative team: will continue to follow for further care coordination and recommendations for symptom management.  Thank you for involving us in the patient's care.           " Chief Complaint     Goals of care         History of Present Illness     History obtained from: chart review    Mary Wang is a 67 yo woman with chronic hybercarbic hypoxic respiratory failure admitted from her  today after a sudden and severe worsening of her hypoxic failure which  improved after aggressive suctioning. She recently started having her medical management with the complex care clinic and recently had palliative home care consultation. She has had 16 admissions over the last year with more frequent as below. She has historically always wanted to continue to come back to the hospital and is known to the Palliative care team for management of anxiety and air hunger. She now has elevated WBCs and additional evaluation is in progress.    Her hospital admissions include most recently: 12/01/2016-12/05/2016, 12/25/2016-12/28/2016, 1/02/2017-1/06/2017, 1/7/2017-1/10/2017, 1/20/2017-1/24/2017.     Palliative Care team consulted 1/30 for goals of care          Past Medical History:   I have reviewed this patient's past medical history  This patient  has a past medical history of COPD (chronic obstructive pulmonary disease) (H); Anxiety; TMJ pain dysfunction syndrome; Tracheostomy in place; Hypertension; GERD (gastroesophageal reflux disease); Achalasia; Lung nodule; Stress-induced cardiomyopathy; Anxiety and depression; and Chronic pain.           Past Surgical History:   I have reviewed this patient's past surgical history  This patient  has past surgical history that includes Tracheostomy (N/A, 8/26/2015); Bronchoscopy, Insert Bronchial Valve (Right, 9/2/2015); Esophagoscopy, gastroscopy, duodenoscopy (EGD), combined (N/A, 12/11/2015); Esophagoscopy, gastroscopy, duodenoscopy (EGD), combined (N/A, 12/31/2015); Percutaneous insertion tube jejunostomy (N/A, 12/31/2015); Percutaneous insertion tube jejunostomy (N/A, 1/25/2016); Anesthesia out of OR MRI (N/A, 4/18/2016); Endoscopic insertion tube  gastrostomy (N/A, 7/9/2016); and picc insertion (Right, 1/9/2017).            Social/Spiritual History:     Living situation: group home  Family system: daughter César, - Brent, Uli- son, Dorota-   Functional status (needs help with ADLs or IADLs): not discussed  Employment/education: not discussed  Use of community resources: home health care involvement, receives 24/7 care nursing  Activities/interests: not discussed  History of substance use/abuse: Former smoker            Family History:   The patient has no family status information on file.               Allergies:   All allergies reviewed and addressed  This patient is allergic to is allergic to levaquin; sarabjit podhaler; suprax; augmentin; avelox; cedax; penicillins; and vantin.           Medications:   I have reviewed this patient's medication profile and medications during this hospitalization    Medications of Note  Clonazepam 1.5 mg 4 times a day  Gabapentin 300 mg 3 times a day  Guaifenesin 200 mg 4 times a day  Melatonin 3 mg at bedtime  Polyethylene glycol 17 g 2 times a day  Prednisone 60 mg daily  Quetiapine 50 my BID  Senna-docusate 2 tablets 2 times a day  Sertraline 150 mg daily  Acetaminophen 650 mg q4h PRN- x1 yesterday  Bisacodyl suppository 10 mg daily PRN  Calcium carbonate 500 mg q2h PRN- x1 yesterday  Hydromorphone 1 mg q2h PRN  Lorazepam 0.5 mg q3h PRN- x2  Ondansetron 4 mg q4h PRN           Review of Systems:   The comprehensive review of systems is negative other than noted here and in the HPI.    Palliative Symptom Review (0=no symptom/no concern, 1=mild, 2=moderate, 3=severe):      Denies symptoms at this time            Physical Exam:   Vitals were reviewed  Temp: 99.7  F (37.6  C) Temp src: Axillary BP: 107/65 mmHg   Heart Rate: 91 Resp: 28 SpO2: 97 % O2 Device: Mechanical Ventilator Oxygen Delivery: 2 LPM  Constitutional: Awake, alert, cooperative, no apparent distress  Lungs: No increased work of breathing, good air  exchange  Musculoskeletal: generalized weakness  Neurologic: Awake, alert, oriented to name, place and time.  Answers questions appropriately through mouthing words and nodding yes and no consistently.  Neuropsychiatric: calm, normal orientation, memory and insight.           Data Reviewed:     ROUTINE ICU LABS (Last four results)  CMP  Recent Labs  Lab 01/31/17  0305 01/30/17  0917 01/29/17  0638 01/28/17  1605 01/28/17  1604   NA  --  136 139 136 134   POTASSIUM 4.0 3.2* 3.3* 4.1 4.2   CHLORIDE  --  90* 89* 86*  --    CO2  --  40* >45Critical Value called to and read back byMORRIS MIGUEL RN 6B 1/29/17 0715 BY SAMIRA* >45Critical Value called to and read back byMATTIE MEJIAS RN UER 1655 1/28/2017 BY VALENTINE*  --    ANIONGAP  --  6 Not Calculated Not Calculated  --    GLC  --  163* 114* 126* 134*   BUN  --  26 28 26  --    CR  --  0.52 0.50* 0.47*  --    GFRESTIMATED  --  >90Non  GFR Calc >90Non  GFR Calc >90Non  GFR Calc  --    GFRESTBLACK  --  >90African American GFR Calc >90African American GFR Calc >90African American GFR Calc  --    IZZY  --  9.0 8.5 8.6  --    PROTTOTAL  --   --   --  6.3*  --    ALBUMIN  --   --   --  2.1*  --    BILITOTAL  --   --   --  0.2  --    ALKPHOS  --   --   --  91  --    AST  --   --   --  19  --    ALT  --   --   --  22  --      CBC  Recent Labs  Lab 01/30/17  0917 01/29/17  0638 01/28/17  1605 01/28/17  1604   WBC 11.9* 9.0 11.1*  --    RBC 2.98* 2.96* 3.26*  --    HGB 8.7* 8.7* 9.5* 10.2*   HCT 28.5* 28.1* 30.9*  --    MCV 96 95 95  --    MCH 29.2 29.4 29.1  --    MCHC 30.5* 31.0* 30.7*  --    RDW 15.6* 15.6* 15.2*  --     300 279  --      INR  Recent Labs  Lab 01/28/17  1605   INR 0.91     Arterial Blood Gas  Recent Labs  Lab 01/29/17  0638 01/28/17  2221   O2PER 2L 3L                           Progress Notes - Physician (Notes for yesterday and today)      Progress Notes by Celia Chavis MD at 2/2/2017  7:29 AM     Author:   Celia Chavis MD Service:  Internal Medicine Author Type:  Physician    Filed:  2/2/2017 12:25 PM Note Time:  2/2/2017  7:29 AM Status:  Signed    :  Celia Chavis MD (Physician)               Was a bit restless last night   Okay now  Seen with bedside RN  No cp or worsening sob    No fever or chills  On exam ;   Vital signs:  Temp: 97.8  F (36.6  C) Temp src: Axillary BP: 127/58 mmHg Pulse: 90 Heart Rate: 92 Resp: 16 SpO2: 93 % O2 Device: Mechanical Ventilator Oxygen Delivery: 1.5 LPM Height: 152.4 cm (5') Weight: 59.421 kg (131 lb) (bedscale)  Estimated body mass index is 25.58 kg/(m^2) as calculated from the following:    Height as of this encounter: 1.524 m (5').    Weight as of this encounter: 59.421 kg (131 lb).  Neck ; supple , trach  Chest ; reduces bilaterally with bibasilar rales  CVS; s1 and s2 and tachy  GI ; soft abdomen, non tender, BS positive. FT  Ext ; trace edema , no cynosis  Psych ; appropriate mood and effect, anxious  Skin ; no rash or purpura.  Labs;pending    wbc 9.3( 18.7) , lactic acid 0.9. Creatinine 0.55. bcx and ucx 1/31 negative  A/P:  Mary Wang is a 66 year old female with a history of severe COPD s/p trach on home vent 24/7 (3-3.5 LPM), anxiety, hypertension, achalasia s/p GJ tube who presented to the hospital with acute on chronic hypoxic and hypercarbic respiratory failure.     *Multiple admissions in last 2 months, 12/1-12/5 for UTI, 12/25-12/28 for bacteremia, 1/2-1/6 with encephalopathy ultimately attributed to her medications, 01/07-01/09 for acute on chronic respiratory failure likely 2/2 COPD exacerbation, 1/20-1/24 for oversedation/aspiration pneumonitis.    Leucocytosis : resolved   . Likely due to dehydration as almost corrected with NS and cultures negative so far . Sputum growing GNR, probably colonization     Low threshold for starting pip/tazo and vanco if worsening     Goals of a care ; greatly appreciate Palliative following patient. Please  note 1/31. [facility] was held 1/31 and the plan is that Mary will not be admitted to hospital again for respiratory decompensation . Cares being organized . New Polst completed  Spoke with daughter César 2/1 and updated on progress. César would like to have in place regarding respiratory decline in group home so that Mary is not under duress.   Spoke with palliative team 2/1 and updtaed on family wishes . Acre being co-ordinated    #Acute on chronic respiratory failure, severe COPD s/p trach: On ventilator (settings CMV, 400/14/5/40%). Multiple admissions in 6 weeks, see above. For chronic respiratory failure PTA the patient is maintained on chronic prednisone at 20 mg qday, scheduled ipratropium and xoponex nebs, BID Pulmicort, guaifenesin 200 mg QID, bactrim for PCP prophylaxis, allegra BID. Mucous plugging w/ O2 sats dropping to 27%, group home staff deep suctioned for 15 minutes before sats improved, in 90s on admission. XR chest  w/ mildly increased patchy basilar opacities not significant changed from 01/20 xray- atelectasis vs. Infx.  Afebrile, normotensive, mildly tachcyardic, sating  on home 2-3 LPM. CT Angiogram was negative for Pulmonary embolism. She is currently at her baseline oxygen requirements. CT did show some right lung opacity as she is improving without antibiotics will continue to hold antibiotics (low threshold to start)  -Continue home nebulizers  -Continue home steroids w/ PCP prophylaxis    - Hypertonic saline nebs, and NAC nebs    -Trach cares per RT, suctioning per nursing and RT  -Continue anxiolytics and medications for air hunger      #Achalasia s/p GJ tube   -Continue tube feeds    #Severe anxiety, depression: Likely due to air hunger from COPD. Psychiatry and Palliative care have seen patient in the past. Continues to have significant anxiety and frequent requests for hospital admission per daughter. PTA maintained on seroquel (50 mg BID), sertraline (150 mg qday), scheduled Klonopin  "(1.5 mg QID) and PRN ativan (0.5 q3 PRN) & dilaudid (1 mg per G q2 hours PRN) for air hunger. Currently endorses stable symptoms although daughter notes intermittent anxious periods.    -Continue home regimen  -     # Pulmonary nodule: Seen on CT 01/29. 1 cm right lower lobe lung nodule, slightly more prominent than prior  - Recommend short-term CT chest follow-up and possible evaluation in lung nodule clinic    # Urinary retention: Chronic indwelling bender. Last changed 1/23 while inpatient.  -Continue bender    # Normocytic anemia: At baseline.     # CAD: On aspirin and statin. Continue.      # Constipation: On senna and miralax PTA.     # Bilateral UE edema, L>R: PICC in RUE.    US RUE on 01/29 negative for DVT  -SubQ heparin for now    CODE: DNR, trached and on vent chronically. Discussed with Daughter POA on 01/29  FEN: NPO, G tube feeds  DVT: Heparin Q 8h  LINES: Right UE PICC    Disposition/Admission Status: TBD, back to group home when arrangements made . DW RN and CC and palliative team                                                                 Procedure Notes     No notes of this type exist for this encounter.         Progress Notes - Therapies (Notes from 01/31/17 through 02/03/17)      Progress Notes by Mendez Ho RT at 2/2/2017  7:21 PM     Author:  Mendez Ho RT Service:  (none) Author Type:  Respiratory Therapist    Filed:  2/2/2017  7:23 PM Note Time:  2/2/2017  7:21 PM Status:  Signed    :  Mendez Ho RT (Respiratory Therapist)           Pt Bivona trach is MRI compatible.                                                 INTERAGENCY TRANSFER FORM - LAB / IMAGING / EKG / EMG RESULTS   1/28/2017                       [address] [facility]: [phone]            Unresulted Labs (24h ago through future)    Start       Ordered    01/31/17 0530  Basic metabolic panel   AM DRAW,   Routine      01/30/17 0818    Unscheduled  Potassium -  (Potassium Replacement - \"Standard\" - For K levels " less than 3.4 mmol/L - UU,UR,UA,RH,SH,PH,WY )   CONDITIONAL (SPECIFY),   Routine     Comments:  Obtain Potassium Level for these conditions:  *IF no potassium result within 24 hours before initiation of order set, draw potassium level with next lab collect.    *2 HOURS AFTER last IV potassium replacement dose and 4 hours after an oral replacement dose.  *Next morning after potassium dose.     Repeat Potassium Replacement if necessary.    01/30/17 1617         Lab Results - 3 Days (02/03/17 - 01/31/17)      Sputum Culture Aerobic Bacterial [[id number]] (Abnormal)  Resulted: 02/03/17 1237, Result status: Preliminary result    Ordering provider: Celia Chavis MD  01/31/17 0929 Resulting lab: [facility]    Specimen Information    Type Source Collected On   Sputum  01/31/17 1136          Components       Value Reference Range Flag Lab   Specimen Description Sputum Endotracheal   75   Culture Micro --  A 225   Result:         Moderate growth Normal rome  Moderate growth Pseudomonas aeruginosa Meropenem AMAURY in progress 2/3/17  Moderate growth Serratia marcescens     Micro Report Status Pending   225   Result:     Organism: Moderate growth Pseudomonas aeruginosa Meropenem AMAURY in progress 2/3/17   225   Organism: Moderate growth Serratia marcescens   225            Blood gas arterial [[id number]] (Abnormal)  Resulted: 02/03/17 1034, Result status: Final result    Ordering provider: Celia Chavis MD  02/03/17 0736 Resulting lab: [facility]    Specimen Information    Type Source Collected On   Blood  02/03/17 1025          Components       Value Reference Range Flag Lab   pH Arterial 7.40 7.35 - 7.45 pH  51   pCO2 Arterial 70 35 - 45 mm Hg H 51   pO2 Arterial 41 80 - 105 mm Hg L 51   Bicarbonate Arterial 44 21 - 28 mmol/L H 51   Base Excess Art 17.4 mmol/L  51   Comment:  Abnormal Result, Ref range: -9.0 to 1.8   FIO2 2L   51            CBC with  platelets [[id number]] (Abnormal)  Resulted: 02/03/17 0754, Result status: Final result    Ordering provider: Celia Chavis MD  02/03/17 0737 Resulting lab: [facility]    Specimen Information    Type Source Collected On   Blood  02/03/17 0701          Components       Value Reference Range Flag Lab   WBC 8.7 4.0 - 11.0 10e9/L  51   RBC Count 2.70 3.8 - 5.2 10e12/L L 51   Hemoglobin 7.8 11.7 - 15.7 g/dL L 51   Hematocrit 24.9 35.0 - 47.0 % L 51   MCV 92 78 - 100 fl  51   MCH 28.9 26.5 - 33.0 pg  51   MCHC 31.3 31.5 - 36.5 g/dL L 51   RDW 15.3 10.0 - 15.0 % H 51   Platelet Count 341 150 - 450 10e9/L  51            Basic metabolic panel [[id number]] (Abnormal)  Resulted: 02/03/17 0741, Result status: Final result    Ordering provider: Steven Sousa MD  02/02/17 2332 Resulting lab: [facility]    Specimen Information    Type Source Collected On   Blood  02/03/17 0701          Components       Value Reference Range Flag Lab   Sodium 138 133 - 144 mmol/L  51   Potassium 4.0 3.4 - 5.3 mmol/L  51   Chloride 91 94 - 109 mmol/L L 51   Carbon Dioxide 42 20 - 32 mmol/L H 51   Anion Gap 5 3 - 14 mmol/L  51   Glucose 138 70 - 99 mg/dL H 51   Urea Nitrogen 29 7 - 30 mg/dL  51   Creatinine 0.65 0.52 - 1.04 mg/dL  51   GFR Estimate -- >60 mL/min/1.7m2  51   Result:         >90  Non  GFR Calc     GFR Estimate If Black -- >60 mL/min/1.7m2  51   Result:         >90   GFR Calc     Calcium 8.4 8.5 - 10.1 mg/dL L 51   Result:              Blood culture [[id number]]  Resulted: 02/03/17 0355, Result status: Preliminary result    Ordering provider: Celia Chavis MD  01/31/17 0910 Resulting lab: [facility]    Specimen Information    Type Source Collected On   Blood  01/31/17 1050          Components       Value Reference Range Flag Lab   Specimen Description Blood Right Hand   51   Culture Micro No growth  after 3 days   75   Micro Report Status Pending   75            Blood culture [[id number]]  Resulted: 02/03/17 0355, Result status: Preliminary result    Ordering provider: Celia Chavis MD  01/31/17 0926 Resulting lab: [facility]    Specimen Information    Type Source Collected On   Blood  01/31/17 1043          Components       Value Reference Range Flag Lab   Specimen Description Blood PURPLE PORT   75   Culture Micro No growth after 3 days   75   Micro Report Status Pending   75            CBC with platelets differential [[id number]] (Abnormal)  Resulted: 02/02/17 0927, Result status: Final result    Ordering provider: Steven Sousa MD  02/01/17 2330 Resulting lab: [facility]    Specimen Information    Type Source Collected On   Blood  02/02/17 0740          Components       Value Reference Range Flag Lab   WBC 9.3 4.0 - 11.0 10e9/L  51   RBC Count 2.57 3.8 - 5.2 10e12/L L 51   Hemoglobin 7.6 11.7 - 15.7 g/dL L 51   Hematocrit 23.8 35.0 - 47.0 % L 51   MCV 93 78 - 100 fl  51   MCH 29.6 26.5 - 33.0 pg  51   MCHC 31.9 31.5 - 36.5 g/dL  51   RDW 15.3 10.0 - 15.0 % H 51   Platelet Count 309 150 - 450 10e9/L  51   Diff Method Automated Method   51   % Neutrophils 73.7 %  51   % Lymphocytes 13.3 %  51   % Monocytes 10.3 %  51   % Eosinophils 1.3 %  51   % Basophils 0.3 %  51   % Immature Granulocytes 1.1 %  51   Nucleated RBCs 0 0 /100  51   Absolute Neutrophil 6.9 1.6 - 8.3 10e9/L  51   Absolute Lymphocytes 1.2 0.8 - 5.3 10e9/L  51   Absolute Monocytes 1.0 0.0 - 1.3 10e9/L  51   Absolute Eosinophils 0.1 0.0 - 0.7 10e9/L  51   Absolute Basophils 0.0 0.0 - 0.2 10e9/L  51   Abs Immature Granulocytes 0.1 0 - 0.4 10e9/L  51   Absolute Nucleated RBC 0.0   51   Platelet Estimate Confirming automated cell count   51            Basic metabolic panel [[id number]] (Abnormal)  Resulted: 02/02/17 0811, Result status: Final result    Ordering provider: Merry  MD Steven  02/01/17 7782 Resulting lab: [facility]    Specimen Information    Type Source Collected On   Blood  02/02/17 0740          Components       Value Reference Range Flag Lab   Sodium 136 133 - 144 mmol/L  51   Potassium 3.4 3.4 - 5.3 mmol/L  51   Chloride 90 94 - 109 mmol/L L 51   Carbon Dioxide 41 20 - 32 mmol/L H 51   Anion Gap 5 3 - 14 mmol/L  51   Glucose 138 70 - 99 mg/dL H 51   Urea Nitrogen 24 7 - 30 mg/dL  51   Creatinine 0.58 0.52 - 1.04 mg/dL  51   GFR Estimate -- >60 mL/min/1.7m2  51   Result:         >90  Non  GFR Calc     GFR Estimate If Black -- >60 mL/min/1.7m2  51   Result:         >90   GFR Calc     Calcium 8.9 8.5 - 10.1 mg/dL  51   Result:              Blood gas venous [[id number]] (Abnormal)  Resulted: 02/01/17 2034, Result status: Final result    Ordering provider: Jose David De La Cruz APRN CNP  02/01/17 1834 Resulting lab: [facility]    Specimen Information    Type Source Collected On   Blood  02/01/17 2018          Components       Value Reference Range Flag Lab   Ph Venous 7.50 7.32 - 7.43 pH H 51   PCO2 Venous 57 40 - 50 mm Hg H 51   PO2 Venous 49 25 - 47 mm Hg H 51   Bicarbonate Venous 44 21 - 28 mmol/L H 51   Base Excess Venous 18.9 mmol/L  51   Comment:  Abnormal Result, Ref range: -7.7 to 1.9   FIO2 2L   51            Basic metabolic panel [[id number]] (Abnormal)  Resulted: 02/01/17 0623, Result status: Final result    Ordering provider: Steven Sousa MD  01/31/17 2920 Resulting lab: [facility]    Specimen Information    Type Source Collected On   Blood  02/01/17 0543          Components       Value Reference Range Flag Lab   Sodium 136 133 - 144 mmol/L  51   Potassium 3.7 3.4 - 5.3 mmol/L  51   Chloride 90 94 - 109 mmol/L L 51   Carbon Dioxide 41 20 - 32 mmol/L H 51   Anion Gap 5 3 - 14 mmol/L  51   Glucose 159 70 - 99 mg/dL H 51   Urea Nitrogen  21 7 - 30 mg/dL  51   Creatinine 0.55 0.52 - 1.04 mg/dL  51   GFR Estimate -- >60 mL/min/1.7m2  51   Result:         >90  Non  GFR Calc     GFR Estimate If Black -- >60 mL/min/1.7m2  51   Result:         >90   GFR Calc     Calcium 8.8 8.5 - 10.1 mg/dL  51   Result:              CBC with platelets differential [[id number]] (Abnormal)  Resulted: 02/01/17 0601, Result status: Final result    Ordering provider: Steven Sousa MD  01/31/17 2330 Resulting lab: [facility]    Specimen Information    Type Source Collected On   Blood  02/01/17 0543          Components       Value Reference Range Flag Lab   WBC 13.2 4.0 - 11.0 10e9/L H 51   RBC Count 2.57 3.8 - 5.2 10e12/L L 51   Hemoglobin 7.5 11.7 - 15.7 g/dL L 51   Hematocrit 24.3 35.0 - 47.0 % L 51   MCV 95 78 - 100 fl  51   MCH 29.2 26.5 - 33.0 pg  51   MCHC 30.9 31.5 - 36.5 g/dL L 51   RDW 15.5 10.0 - 15.0 % H 51   Platelet Count 288 150 - 450 10e9/L  51   Diff Method Automated Method   51   % Neutrophils 82.5 %  51   % Lymphocytes 6.5 %  51   % Monocytes 9.6 %  51   % Eosinophils 1.0 %  51   % Basophils 0.2 %  51   % Immature Granulocytes 0.2 %  51   Nucleated RBCs 0 0 /100  51   Absolute Neutrophil 10.9 1.6 - 8.3 10e9/L H 51   Absolute Lymphocytes 0.9 0.8 - 5.3 10e9/L  51   Absolute Monocytes 1.3 0.0 - 1.3 10e9/L  51   Absolute Eosinophils 0.1 0.0 - 0.7 10e9/L  51   Absolute Basophils 0.0 0.0 - 0.2 10e9/L  51   Abs Immature Granulocytes 0.0 0 - 0.4 10e9/L  51   Absolute Nucleated RBC 0.0   51            Lactic acid whole blood [[id number]]  Resulted: 02/01/17 0600, Result status: Final result    Ordering provider: Celia Chavis MD  01/31/17 7621 Resulting lab: [facility]    Specimen Information    Type Source Collected On   Blood  02/01/17 0554          Components       Value Reference Range Flag Lab   Lactic Acid 0.9 0.7 - 2.1 mmol/L  51            Potassium  [[id number]]  Resulted: 01/31/17 1329, Result status: Final result    Ordering provider: Enrique Gama MD  01/31/17 0934 Resulting lab: [facility]    Specimen Information    Type Source Collected On   Blood  01/31/17 1302          Components       Value Reference Range Flag Lab   Potassium 3.9 3.4 - 5.3 mmol/L  51            UA with Microscopic reflex to Culture [[id number]] (Abnormal)  Resulted: 01/31/17 1250, Result status: Final result    Ordering provider: Celia Chavis MD  01/31/17 0926 Resulting lab: [facility]    Specimen Information    Type Source Collected On   Urine Urine catheter 01/31/17 1136          Components       Value Reference Range Flag Lab   Color Urine Yellow   51   Appearance Urine Clear   51   Glucose Urine Negative NEG mg/dL  51   Bilirubin Urine Negative NEG   51   Ketones Urine Negative NEG mg/dL  51   Specific Gravity Urine 1.016 1.003 - 1.035   51   Blood Urine Negative NEG   51   pH Urine 8.5 5.0 - 7.0 pH H 51   Protein Albumin Urine 100 NEG mg/dL A 51   Urobilinogen mg/dL Normal 0.0 - 2.0 mg/dL  51   Nitrite Urine Negative NEG   51   Leukocyte Esterase Urine Negative NEG   51   Source Catheterized Urine   51   WBC Urine 3 0 - 2 /HPF H 51   RBC Urine 35 0 - 2 /HPF H 51            Lactic acid level STAT [[id number]] (Abnormal)  Resulted: 01/31/17 0912, Result status: Final result    Ordering provider: Enrique Gama MD  01/31/17 0812 Resulting lab: [facility]    Specimen Information    Type Source Collected On   Blood  01/31/17 0832          Components       Value Reference Range Flag Lab   Lactic Acid 2.6 0.7 - 2.1 mmol/L H 51            Basic metabolic panel [[id number]] (Abnormal)  Resulted: 01/31/17 0836, Result status: Final result    Ordering provider: Steven Sousa MD  01/30/17 2330 Resulting lab: [facility]     Specimen Information    Type Source Collected On   Blood  01/31/17 0747          Components       Value Reference Range Flag Lab   Sodium 135 133 - 144 mmol/L  51   Potassium 5.9 3.4 - 5.3 mmol/L H 51   Comment:  Specimen slightly hemolyzed, potassium may be falsely elevated   Chloride 94 94 - 109 mmol/L  51   Carbon Dioxide 39 20 - 32 mmol/L H 51   Anion Gap 2 3 - 14 mmol/L L 51   Glucose 153 70 - 99 mg/dL H 51   Urea Nitrogen 19 7 - 30 mg/dL  51   Creatinine 0.37 0.52 - 1.04 mg/dL L 51   GFR Estimate -- >60 mL/min/1.7m2  51   Result:         >90  Non  GFR Calc     GFR Estimate If Black -- >60 mL/min/1.7m2  51   Result:         >90   GFR Calc     Calcium 8.7 8.5 - 10.1 mg/dL  51   Result:              CBC with platelets differential [[id number]] (Abnormal)  Resulted: 01/31/17 0810, Result status: Final result    Ordering provider: Steven Sousa MD  01/30/17 3580 Resulting lab: [facility]    Specimen Information    Type Source Collected On   Blood  01/31/17 0747          Components       Value Reference Range Flag Lab   WBC 18.7 4.0 - 11.0 10e9/L H 51   RBC Count 3.07 3.8 - 5.2 10e12/L L 51   Hemoglobin 9.1 11.7 - 15.7 g/dL L 51   Hematocrit 29.5 35.0 - 47.0 % L 51   MCV 96 78 - 100 fl  51   MCH 29.6 26.5 - 33.0 pg  51   MCHC 30.8 31.5 - 36.5 g/dL L 51   RDW 16.0 10.0 - 15.0 % H 51   Platelet Count 326 150 - 450 10e9/L  51   Diff Method Automated Method   51   % Neutrophils 86.2 %  51   % Lymphocytes 3.9 %  51   % Monocytes 9.3 %  51   % Eosinophils 0.1 %  51   % Basophils 0.2 %  51   % Immature Granulocytes 0.3 %  51   Nucleated RBCs 0 0 /100  51   Absolute Neutrophil 16.1 1.6 - 8.3 10e9/L H 51   Absolute Lymphocytes 0.7 0.8 - 5.3 10e9/L L 51   Absolute Monocytes 1.7 0.0 - 1.3 10e9/L H 51   Absolute Eosinophils 0.0 0.0 - 0.7 10e9/L  51   Absolute Basophils 0.0 0.0 - 0.2 10e9/L  51   Abs Immature Granulocytes 0.1 0 - 0.4 10e9/L  51   Absolute  Nucleated RBC 0.0   51            Potassium [[id number]]  Resulted: 01/31/17 0504, Result status: Final result    Ordering provider: Jazzy Garcia RT  01/31/17 0139 Resulting lab: [facility]    Specimen Information    Type Source Collected On   Blood  01/31/17 0305          Components       Value Reference Range Flag Lab   Potassium 4.0 3.4 - 5.3 mmol/L  51            Testing Performed By     Lab - Abbreviation Name Director Address Valid Date Range    51 - Unknown [facility] Unknown [address] [facility] [address] 12/31/14 1010 - Present    75 - Unknown [facility] Unknown [address] [facility] [address] 01/15/15 1019 - Present    225 - Unknown [facility] Unknown [address] [facility] [address] 12/19/14 0954 - Present               Imaging Results - 3 Days (02/03/17 - 02/03/17)      MR Brain for Stroke Cmpl w/o & w Contras [[id number]]  Resulted: 02/03/17 1315, Result status: Final result    Ordering provider: Celia Chavis MD  02/02/17 1301 Resulted by: Jaden Patel MD Hakkola, Jacob M, MD    Performed: 02/02/17 2050 - 02/02/17 2055 Resulting lab: RADIOLOGY RESULTS    Narrative:       MRI brain without and with contrast  MRA of the head without contrast  Neck MRA without and with contrast    Provided History:  alteration in mental status.    Comparison:  Head CT 1/20/2017      Technique:   Brain MRI:  Axial diffusion, FLAIR, T2-weighted, susceptibility, and  coronal T1-weighted images were obtained without intravenous contrast.  Following intravenous gadolinium-based contrast administration, axial  and coronal T1-weighted images were obtained.    Head MRA: 3D time-of-flight MRA of the [place] was performed  without intravenous contrast.  Neck MRA:  Limited non contrast 2DTOF images were obtained of the  mid-cervical region. Following intravenous  gadolinium-based contrast  administration, a contrast enhanced MRA of the neck/cervical vessels  was performed.  Three-dimensional reconstructions of the neck and head MRA were  created, which were reviewed by the radiologist.    Dose: 5 mL Gadavist    Findings:   Brain MRI: Axial diffusion weighted images demonstrate no definite  acute infarct. On the FLAIR images, there is nonspecific mild  periventricular T2-hyperintensity, which are most likely related to  chronic small vessel ischemic disease. There is no definite  intracranial hemorrhage on susceptibility images. Ventricles are  proportionate to the cerebral sulci. Contrast-enhanced images of the  brain demonstrate no abnormal intra- or extra-axial enhancement.  Bilateral cataract surgery. Fluid in the right mastoid air cells.    Head MRA demonstrates no stenosis of the major intracranial arteries.  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 There is a similar but smaller medial inferiorly directed 1.2 mm  outpouching with a 1.2 mm base along the communicating segment of the  left internal carotid artery. The anterior communicating artery is  patent. Regarding the posterior communicating arteries, these are not  well visualized.    Neck MRA demonstrates patent major cervical arteries. Antegrade flow  in the major cervical vasculature.      Impression:       Impression:  1. No evidence of acute infarction or intracranial hemorrhage.  Leukoaraiosis.  2. No abnormal enhancing lesions intracranially.  3. Head MRA demonstrates tiny aneurysms in the communicating segments  of the bilateral internal carotid arteries. No stenosis of the major  intracranial arteries.  4. Neck MRA demonstrates patent major cervical arteries.    I have personally reviewed the examination and initial interpretation  and I agree with the findings.    LILIBETH LOPEZ MD    Specimen  Information    Type Source Collected On                  Testing Performed By     Lab - Abbreviation Name Director Address Valid Date Range    104 - [facility] RADIOLOGY RESULTS Unknown Unknown 02/16/05 1553 - Present            Encounter-Level Documents:     There are no encounter-level documents.      Order-Level Documents:     There are no order-level documents.

## 2017-01-28 NOTE — IP AVS SNAPSHOT
[address] [facility]: [phone]                                              INTERAGENCY TRANSFER FORM - PHYSICIAN ORDERS   2017                    Hospital Admission Date: 2017  MORRIS PYLE   : 1950  Sex: Female        Attending Provider: Enrique Gama MD     Allergies:  Levaquin, Toro Podhaler, Suprax, Augmentin, Avelox, Cedax, Penicillins, Vantin    Infection:  VRE-Contact Isolation, MRSA-Contact Isolation   Service:  INTERNAL MED    Ht:  1.524 m (5')   Wt:  57.471 kg (126 lb 11.2 oz)   Admission Wt:  58 kg (127 lb 13.9 oz)    BMI:  24.74 kg/m 2   BSA:  1.56 m 2            Patient PCP Information     Provider PCP Type    Norman Brito MD General      ED Clinical Impression     Diagnosis Description Comment Added By Time Added    Acute and chronic respiratory failure with hypercapnia (H) [J96.22] Acute and chronic respiratory failure with hypercapnia (H) [J96.22]  Sofya Man MD 2017  5:08 PM      Hospital Problems as of 2/3/2017              Priority Class Noted POA    Acute on chronic respiratory failure with hypoxia (H) Medium  2017 Yes      Non-Hospital Problems as of 2/3/2017              Priority Class Noted    Shortness of breath   8/3/2014    Urinary retention Medium  2015    Air hunger Medium  2015    Achalasia Medium  2015    Delirium Medium  2015    HTN (hypertension) Medium  2015    GERD (gastroesophageal reflux disease) Medium  2015    ACP (advance care planning)   2015    Acute and chronic respiratory failure with hypercapnia (H) Medium  2015    S/P percutaneous endoscopic gastrostomy (PEG) tube placement (H) Medium  2016    COPD (chronic obstructive pulmonary disease) (H) Medium  2016    NSTEMI (non-ST elevated myocardial infarction) (H) Medium  2016    Atypical chest pain Medium  2016    Encounter for gastrojejunal (GJ) tube placement Medium  2016    Constipation due  to opioid therapy Medium  1/26/2017    Generalized anxiety disorder Medium  1/26/2017    Advanced directives, counseling/discussion Medium  1/26/2017      Code Status History     Date Active Date Inactive Code Status Order ID Comments User Context    2/3/2017 11:12 AM  DNR [id number]  Celia Chavis MD Outpatient    1/28/2017 10:10 PM 2/3/2017 11:12 AM DNR [id number]  Viridiana Rockwell PA-C Inpatient    1/23/2017  8:10 PM 1/24/2017  6:24 PM DNR [id number]  Jenni Tellez MD Inpatient    1/23/2017  3:34 PM 1/23/2017  8:10 PM DNR [id number]  Jenni Tellez MD Outpatient    1/21/2017 10:50 AM 1/23/2017  3:34 PM DNR [id number]  Jenni Tellez MD Inpatient    1/20/2017  6:58 AM 1/21/2017 10:50 AM DNR/DNI [id number]  Yadi Loja MD ED    1/7/2017  8:26 PM 1/10/2017  1:42 PM DNR [id number]  Jose David De La Cruz APRN CNP Inpatient    1/6/2017 12:01 PM 1/7/2017  8:26 PM DNR [id number]  Geo Bui MD Outpatient    1/3/2017  5:24 PM 1/6/2017 12:01 PM DNR [id number]  Geo Bui MD Inpatient    1/2/2017 11:28 PM 1/3/2017  5:24 PM Full Code [id number]  Jose David De La Cruz APRN CNP Inpatient    12/28/2016  8:59 AM 1/2/2017 11:28 PM Full Code [id number]  Theresa Wagoner MD Outpatient    12/26/2016  4:49 AM 12/28/2016  8:59 AM Full Code [id number]  Alexi Fernandez MD Inpatient    12/5/2016  2:45 PM 12/26/2016  4:49 AM Full Code [id number]  Francisco Burt MD Outpatient    12/1/2016  6:05 PM 12/5/2016  2:45 PM Full Code [id number]  Viridiana Rockwell PA-C Inpatient    11/27/2016  6:02 PM 12/1/2016  6:05 PM Full Code [id number]  Francisco Burt MD Outpatient    11/13/2016  9:57 PM 11/27/2016  6:02 PM Full Code [id number]  Jose David De La Cruz APRN CNP Inpatient    10/31/2016  1:27 AM 11/11/2016  2:43 PM Full Code [id number]  Jose David De La Cruz APRN CNP Inpatient    10/1/2016 12:45 PM 10/4/2016  4:15 PM Full Code [id number]  Jamila Comer MD Inpatient     9/11/2016  2:46 AM 9/16/2016  3:48 PM Full Code [id number]  Theresa Wagoner MD Inpatient    7/23/2016  7:05 AM 9/11/2016  2:46 AM Full Code [id number]  Doc Barrera MD Outpatient    7/16/2016  2:33 AM 7/23/2016  7:05 AM Full Code [id number]  Moe Cárdenas MD Inpatient    7/10/2016 11:38 AM 7/16/2016  2:33 AM Full Code [id number]  Enrique Gama MD Outpatient    7/8/2016  8:04 PM 7/10/2016 11:38 AM Full Code [id number]  Shantal Dee PA Inpatient    6/6/2016  5:19 PM 6/14/2016  2:32 PM Full Code [id number]  Shantal Dee PA Inpatient    5/21/2016  3:41 PM 5/23/2016  3:59 PM Full Code [id number]  Viridiana Butcher MD ED    4/13/2016  6:13 PM 4/21/2016  1:08 PM Full Code [id number]  Arley Guzman MD Inpatient    2/1/2016  8:43 AM 4/13/2016  6:13 PM Full Code [id number]  Emily Hoover MD Outpatient    1/20/2016  8:10 PM 2/1/2016  8:43 AM Full Code [id number]  Emily Hoover MD Inpatient    1/20/2016  7:17 PM 1/20/2016  8:10 PM Full Code [id number]  Emily Hoover MD ED    1/2/2016  8:32 AM 1/20/2016  7:17 PM Full Code [id number]  Elias Mcdermott MD Outpatient    1/1/2016  1:56 PM 1/2/2016  8:32 AM Full Code [id number]  Almaz Reagan MD Outpatient    12/30/2015  9:45 PM 1/1/2016  1:56 PM Full Code [id number]  Martinez Hamm MD Inpatient    9/24/2015 12:37 PM 12/30/2015  9:45 PM Full Code [id number]  Verner, James R, MD Outpatient    8/27/2015  4:30 AM 9/24/2015 12:37 PM Full Code [id number]  Eric Cosme MD Inpatient    8/13/2015  2:51 PM 8/27/2015  4:30 AM Full Code [id number]  Emilia Kurtz MD Inpatient    8/10/2015  7:50 AM 8/13/2015  2:51 PM DNR/DNI [id number]  Marcia Aleman MD Outpatient    8/7/2015 11:13 AM 8/10/2015  7:50 AM DNR/DNI [id number]  Marcia Aleman MD Inpatient    8/4/2015  2:55 PM 8/7/2015 11:13 AM DNR [id number]  Driss Huggins MD Inpatient    7/31/2015  6:35 PM 8/4/2015  2:55  PM DNR/DNI [id number]  Driss Huggins MD Inpatient    7/26/2015  8:21 PM 7/31/2015  6:35 PM Full Code [id number]  Larry Hong MD Inpatient    6/21/2015 11:33 AM 7/26/2015  8:21 PM Full Code [id number]  Mady Atkinson MD Outpatient    6/17/2015  6:34 AM 6/21/2015 11:33 AM Full Code [id number]  GregorSwetha APRN CNP Inpatient    8/8/2014  7:29 AM 6/17/2015  6:34 AM Full Code [id number]  Ron Kirkland MD Outpatient    8/3/2014  6:08 PM 8/8/2014  7:29 AM Full Code [id number]  Ron Kirkland MD Inpatient    5/4/2014 12:08 PM 8/3/2014  6:08 PM Full Code [id number]  Darlene Del Real MD Outpatient    5/2/2014  9:16 AM 5/4/2014 12:08 PM Full Code [id number]  Darlene Del Real MD Inpatient         Medication Review      START taking        Dose / Directions Comments    carbamide peroxide 6.5 % otic solution   Commonly known as:  DEBROX   Used for:  Impacted cerumen, unspecified laterality        Dose:  5 drop   Place 5 drops into the right ear 2 times daily for 3 days   Quantity:  1.5 mL   Refills:  0          CONTINUE these medications which may have CHANGED, or have new prescriptions. If we are uncertain of the size of tablets/capsules you have at home, strength may be listed as something that might have changed.        Dose / Directions Comments    * order for DME   This may have changed:  Another medication with the same name was added. Make sure you understand how and when to take each.        Equipment being ordered: 1) Avendaño catheter 16F, balloon 10 mL, silicone.  2) Urinary collection bag.  3) Elastic leg strap for Avendaño catheter.  Please fax to [employer].   Quantity:  3 each   Refills:  11        * order for DME   This may have changed:  You were already taking a medication with the same name, and this prescription was added. Make sure you understand how and when to take each.   Used for:  Acute and chronic respiratory failure with hypercapnia (H)        Equipment being  ordered: Methylex foam dressings, alternating pressure pad for mattress and pump.   Quantity:  1 Device   Refills:  0        * Notice:  This list has 2 medication(s) that are the same as other medications prescribed for you. Read the directions carefully, and ask your doctor or other care provider to review them with you.      CONTINUE these medications which have NOT CHANGED        Dose / Directions Comments    acetaminophen 160 MG/5ML solution   Commonly known as:  TYLENOL   Used for:  Other chronic pain        Dose:  650 mg   20.3 mLs (650 mg) by Per Feeding Tube route every 4 hours as needed for mild pain   Quantity:  300 mL   Refills:  0        * bisacodyl 10 MG Suppository   Commonly known as:  DULCOLAX   Used for:  Constipation due to opioid therapy        Dose:  10 mg   Place 1 suppository (10 mg) rectally daily as needed for constipation   Quantity:  28 suppository   Refills:  3        * bisacodyl 10 MG Suppository   Commonly known as:  DULCOLAX   Used for:  Constipation due to opioid therapy        Dose:  10 mg   Place 1 suppository (10 mg) rectally daily as needed for constipation   Quantity:  25 suppository   Refills:  1        budesonide 0.5 MG/2ML neb solution   Commonly known as:  PULMICORT   Used for:  Shortness of breath, Respiratory difficulty        Dose:  0.5 mg   Take 2 mLs (0.5 mg) by nebulization 2 times daily   Quantity:  60 ampule   Refills:  0        calcium carbonate 500 MG chewable tablet   Commonly known as:  TUMS        Dose:  1 chew tab   Take 1 tablet (500 mg) by mouth every 2 hours as needed for heartburn   Quantity:  150 tablet   Refills:  0        clonazePAM 0.1 mg/mL   Commonly known as:  klonoPIN        Dose:  1.5 mg   Take 15 mLs (1.5 mg) by mouth 4 times daily   Quantity:  1800 mL   Refills:  1        Cranberry 500 MG Caps        Dose:  500 mg   Take 1 capsule (500 mg) by mouth daily   Quantity:  90 capsule   Refills:  0        famotidine 40 MG/5ML suspension   Commonly known  "as:  PEPCID   Used for:  Mild gas use disorder        Dose:  20 mg   Take 2.5 mLs (20 mg) by mouth 2 times daily as needed for heartburn   Quantity:  75 mL   Refills:  3        fexofenadine 180 MG tablet   Commonly known as:  ALLEGRA   Used for:  COPD exacerbation (H)        Dose:  180 mg   Take 1 tablet (180 mg) by mouth daily   Quantity:  30 tablet   Refills:  0        fiber modular packet   Used for:  Diarrhea, unspecified type        Dose:  1 packet   1 packet by Per J Tube route 3 times daily   Quantity:  42 packet   Refills:  0        FLEET [employer]URALS CLEANSING ENEMA Enem   Used for:  Constipation due to opioid therapy        Dose:  1 enema   Place 1 enema rectally daily as needed   Quantity:  3 Bottle   Refills:  11        gabapentin 250 MG/5ML solution   Commonly known as:  NEURONTIN   Used for:  Anxiety        Dose:  300 mg   6 mLs (300 mg) by Per J Tube route 3 times daily   Quantity:  450 mL   Refills:  0        guaiFENesin 200 MG Tabs tablet   Commonly known as:  ORGANIDIN   Used for:  Shortness of breath        Dose:  200 mg   Take 1 tablet (200 mg) by mouth 4 times daily   Quantity:  115 tablet   Refills:  0        HYDROmorphone 1 MG/ML Liqd liquid   Commonly known as:  DILAUDID        Dose:  1 mg   Take 1 mL (1 mg) by mouth every 2 hours as needed for moderate to severe pain   Quantity:  180 mL   Refills:  0        ipratropium 0.02 % neb solution   Commonly known as:  ATROVENT        Dose:  0.5 mg   Take 2.5 mLs (0.5 mg) by nebulization 4 times daily and every four hours at night PRN.   Quantity:  450 mL   Refills:  0        JEVITY 1.5 IZZY Liqd   Used for:  Achalasia        Dose:  5 Can   Take 5 Cans by mouth daily Per tube feeding   Quantity:  150 Can   Refills:  11    - Height 5'2\"    - Weight 120 lbs    - Length of need 99 months       lactobacillus rhamnosus (GG) capsule        Dose:  1 capsule   1 capsule by Per G Tube route daily   Quantity:  60 capsule   Refills:  0        levalbuterol 1.25 " MG/3ML neb solution   Commonly known as:  XOPENEX        Dose:  1 ampule   Take 3 mLs (1.25 mg) by nebulization 4 times daily and every four hours at night PRN.   Quantity:  540 mL   Refills:  0        lidocaine 15 mL, alum & mag hydroxide-simethicone 15 mL GI Cocktail   Used for:  Gastroesophageal reflux disease without esophagitis        Dose:  30 mL   Take 30 mLs by mouth 3 times daily as needed for moderate pain   Quantity:  500 mL   Refills:  0        lidocaine 5 % Patch   Commonly known as:  LIDODERM   Used for:  Urinary tract infection, site not specified        Dose:  1-2 patch   Place 1-2 patches onto the skin every 24 hours Apply to the lower back   Quantity:  30 patch   Refills:  0        loperamide 2 MG tablet   Commonly known as:  IMODIUM A-D   Used for:  Frequent stools        2-2 mg tablets per J-tube qid prn frequent and/or loose stools. Do not use more than 8 tabs per day.   Quantity:  30 tablet   Refills:  0        LORazepam 2 MG/ML (HIGH CONC) solution   Commonly known as:  ATIVAN        Dose:  0.5 mg   Take 0.25 mLs (0.5 mg) by mouth every 3 hours as needed for anxiety   Quantity:  30 mL   Refills:  1        melatonin 1 MG/ML Liqd liquid   Used for:  Anxiety, Insomnia due to medical condition        Dose:  3 mg   3 mLs (3 mg) by Per J Tube route At Bedtime   Quantity:  300 mL   Refills:  0        ondansetron 4 MG tablet   Commonly known as:  ZOFRAN        Dose:  4 mg   Take 1 tablet (4 mg) by mouth every 4 hours as needed for nausea   Quantity:  18 tablet   Refills:  0        polyethylene glycol 0.4%- propylene glycol 0.3% 0.4-0.3 % Soln ophthalmic solution   Commonly known as:  SYSTANE ULTRA   Used for:  Dry eyes, bilateral        Dose:  1-2 drop   Place 1-2 drops into both eyes 4 times daily as needed for dry eyes 0900, 1300, 1700, 2100   Quantity:  1 Bottle   Refills:  3        polyethylene glycol Packet   Commonly known as:  MIRALAX/GLYCOLAX   Used for:  Constipation, unspecified constipation  type        Dose:  17 g   17 g by Per J Tube route 2 times daily Hold for loose stools   Quantity:  100 packet   Refills:  0        predniSONE 5 MG/5ML solution   Used for:  Shortness of breath        Dose:  20 mg   Take 20 mLs (20 mg) by mouth daily   Quantity:  500 mL   Refills:  0    Continue prednisone taper until reaches 20 mg daily, then continue 20 mg daily.       QUEtiapine 50 MG tablet   Commonly known as:  SEROQUEL        Dose:  50 mg   Take 1 tablet (50 mg) by mouth 2 times daily   Quantity:  180 tablet   Refills:  3        senna-docusate 8.6-50 MG per tablet   Commonly known as:  SENOKOT-S;PERICOLACE   Used for:  Constipation, unspecified constipation type        Dose:  2 tablet   2 tablets by Per J Tube route 2 times daily   Quantity:  100 tablet   Refills:  0        sertraline 20 MG/ML (HIGH CONC) solution   Commonly known as:  ZOLOFT   Used for:  Anxiety        Dose:  150 mg   7.5 mLs (150 mg) by Per Feeding Tube route daily   Quantity:  105 mL   Refills:  0        sodium chloride 0.65 % nasal spray   Commonly known as:  OCEAN   Used for:  Chronic sinusitis, unspecified location        Dose:  1 spray   Spray 1 spray into both nostrils daily as needed for congestion   Quantity:  1 Bottle   Refills:  0        sulfamethoxazole-trimethoprim suspension   Commonly known as:  BACTRIM/SEPTRA   Used for:  Chronic obstructive pulmonary disease with acute exacerbation (H)        Dose:  20 mL   20 mLs (160 mg) by Per J Tube route daily Dose based on TMP component. 20 mLs = 160 mg   Quantity:  560 mL   Refills:  0        * Notice:  This list has 2 medication(s) that are the same as other medications prescribed for you. Read the directions carefully, and ask your doctor or other care provider to review them with you.      STOP taking     meropenem 500 MG vial   Commonly known as:  MERREM                     Further instructions from your care team       [facility]:     PLEASE CALL  PALLIATIVE ON-CALL PHYSICIAN FOR ACUTE DISTRESS SYMPTOM MANAGEMENT:  PH: [phone]  Pager # [phone]    Summary of Visit     Reason for your hospital stay       You were admitted with difficulty breathing       Reason for your hospital stay       You were admitted due to difficulty breathing             After Care     Diet       NPO Time Specified Ice Chips  Adult Formula Drip Feeding: Specified Time Isosource 1.5; Gastrostomy; Goal Rate: 85 ml/hour; mL/hr; From: 8:00 PM; 8:00 AM; Medication - Tube Feeding Flush Frequency: At least 15-30 mL water before and after medication administration       Diet       Npo except ice chips  Adult Formula Drip Feeding: Specified Time Isosource 1.5; Gastrostomy; Goal Rate: 85 ml/hour; mL/hr; From: 8:00 PM; 8:00 AM; Medication - Tube Feeding Flush Frequency: At least 15-30 mL water before and after medication administration       Discharge Instructions       See instructions on POLST form       Tracheostomy care       Per routine       Tubes and drains       Avendaño   trach             Procedures     Oxygen Adult       Trilogy vent             Referrals     Home Care PT Referral for Hospital Discharge       [facility]  [phone]  Fax [phone]    RESUMPTION OF SERVICES  Palliative services    PT to eval and treat  OT to eval and treat  Your provider has ordered home care - physical therapy. If you have not been contacted within 2 days of your discharge please call the department phone number listed on the top of this document.       Home care nursing referral       [facility]   PH: [phone]    Palliative RN Services    RESUMPTION OF SERVICES    Your provider has ordered home care nursing services. If you have not been contacted within 2 days of your discharge please call the inpatient department phone number at [phone] .             Your next 10 appointments already scheduled     Feb 08, 2017  2:00 PM   Return Visit with Norman Bashir  MD Daryl   [facility] ([facility])    [address]  [address]  [facility] [address]   [phone]            Mar 17, 2017  2:00 PM   Return Visit with Norman Brito MD   [facility] ([facility])    [address]  [address]  [facility] [address]   [phone]              Follow-Up Appointment Instructions     Future Labs/Procedures    Follow Up and recommended labs and tests     Comments:    Complex care MD 1 week      Follow-Up Appointment Instructions     Follow Up and recommended labs and tests       Complex care MD 1 week             Statement of Approval     Ordered          02/03/17 1342  I have reviewed and agree with all the recommendations and orders detailed in this document.   EFFECTIVE NOW     Approved and electronically signed by:  Celia Chavis MD

## 2017-01-28 NOTE — IP AVS SNAPSHOT
[address] [address]    Phone:  [phone]                                       After Visit Summary   1/28/2017    Mary Wang    MRN: [id number]           After Visit Summary Signature Page     I have received my discharge instructions, and my questions have been answered. I have discussed any challenges I see with this plan with the nurse or doctor.    ..........................................................................................................................................  Patient/Patient Representative Signature      ..........................................................................................................................................  Patient Representative Print Name and Relationship to Patient    ..................................................               ................................................  Date                                            Time    ..........................................................................................................................................  Reviewed by Signature/Title    ...................................................              ..............................................  Date                                                            Time

## 2017-01-28 NOTE — IP AVS SNAPSHOT
` `           [address] [facility]: [phone]                                              INTERAGENCY TRANSFER FORM - NURSING   2017                    Hospital Admission Date: 2017  MORRIS PYLE   : 1950  Sex: Female        Attending Provider: Enrique Gama MD     Allergies:  Levaquin, Toro Podhaler, Suprax, Augmentin, Avelox, Cedax, Penicillins, Vantin    Infection:  VRE-Contact Isolation, MRSA-Contact Isolation   Service:  INTERNAL MED    Ht:  1.524 m (5')   Wt:  57.471 kg (126 lb 11.2 oz)   Admission Wt:  58 kg (127 lb 13.9 oz)    BMI:  24.74 kg/m 2   BSA:  1.56 m 2            Patient PCP Information     Provider PCP Type    Norman Brito MD General      Current Code Status     Date Active Code Status Order ID Comments User Context       Prior      Code Status History     Date Active Date Inactive Code Status Order ID Comments User Context    2/3/2017 11:12 AM  DNR [id number]  Celia Chavis MD Outpatient    2017 10:10 PM 2/3/2017 11:12 AM DNR [id number]  Viridiana Rockwell PA-C Inpatient    2017  8:10 PM 2017  6:24 PM DNR [id number]  Jenni Tellez MD Inpatient    2017  3:34 PM 2017  8:10 PM DNR [id number]  Jenni Tellez MD Outpatient    2017 10:50 AM 2017  3:34 PM DNR [id number]  Jenni Tellez MD Inpatient    2017  6:58 AM 2017 10:50 AM DNR/DNI [id number]  Yadi Loja MD ED    2017  8:26 PM 1/10/2017  1:42 PM DNR [id number]  Jose David De La Cruz APRN CNP Inpatient    2017 12:01 PM 2017  8:26 PM DNR [id number]  Geo Bui MD Outpatient    1/3/2017  5:24 PM 2017 12:01 PM DNR [id number]  Geo Bui MD Inpatient    2017 11:28 PM 1/3/2017  5:24 PM Full Code [id number]  Jose David De La Cruz APRN CNP Inpatient    2016  8:59 AM 2017 11:28 PM Full Code [id number]  Theresa Wagoner MD Outpatient    2016  4:49 AM 2016  8:59 AM  Full Code [id number]  Alexi Fernandez MD Inpatient    12/5/2016  2:45 PM 12/26/2016  4:49 AM Full Code [id number]  Francisco Burt MD Outpatient    12/1/2016  6:05 PM 12/5/2016  2:45 PM Full Code [id number]  Viridiana Rockwell PA-CRYSTAL Inpatient    11/27/2016  6:02 PM 12/1/2016  6:05 PM Full Code [id number]  Francisco Burt MD Outpatient    11/13/2016  9:57 PM 11/27/2016  6:02 PM Full Code [id number]  Jose David De La Cruz, APRN CNP Inpatient    10/31/2016  1:27 AM 11/11/2016  2:43 PM Full Code [id number]  Jose David De La Cruz, APRN CNP Inpatient    10/1/2016 12:45 PM 10/4/2016  4:15 PM Full Code [id number]  Jamila Comer MD Inpatient    9/11/2016  2:46 AM 9/16/2016  3:48 PM Full Code [id number]  Theresa Wagoner MD Inpatient    7/23/2016  7:05 AM 9/11/2016  2:46 AM Full Code [id number]  Doc Barrera MD Outpatient    7/16/2016  2:33 AM 7/23/2016  7:05 AM Full Code [id number]  Moe Cárdenas MD Inpatient    7/10/2016 11:38 AM 7/16/2016  2:33 AM Full Code [id number]  Enrique Gama MD Outpatient    7/8/2016  8:04 PM 7/10/2016 11:38 AM Full Code [id number]  Shantal Dee PA Inpatient    6/6/2016  5:19 PM 6/14/2016  2:32 PM Full Code [id number]  Shantal Dee PA Inpatient    5/21/2016  3:41 PM 5/23/2016  3:59 PM Full Code [id number]  Viridiana Butcher MD ED    4/13/2016  6:13 PM 4/21/2016  1:08 PM Full Code [id number]  Arley Guzman MD Inpatient    2/1/2016  8:43 AM 4/13/2016  6:13 PM Full Code [id number]  Emily Hoover MD Outpatient    1/20/2016  8:10 PM 2/1/2016  8:43 AM Full Code [id number]  Emily Hoover MD Inpatient    1/20/2016  7:17 PM 1/20/2016  8:10 PM Full Code [id number]  Emily Hoover MD ED    1/2/2016  8:32 AM 1/20/2016  7:17 PM Full Code [id number]  Elias Mcdermott MD Outpatient    1/1/2016  1:56 PM 1/2/2016  8:32 AM Full Code [id number]  Almaz Reagan MD Outpatient    12/30/2015  9:45 PM 1/1/2016   1:56 PM Full Code [id number]  Martinez Hamm MD Inpatient    9/24/2015 12:37 PM 12/30/2015  9:45 PM Full Code [id number]  Verner, James R, MD Outpatient    8/27/2015  4:30 AM 9/24/2015 12:37 PM Full Code [id number]  Eric Cosme MD Inpatient    8/13/2015  2:51 PM 8/27/2015  4:30 AM Full Code [id number]  Emilia Kurtz MD Inpatient    8/10/2015  7:50 AM 8/13/2015  2:51 PM DNR/DNI [id number]  Marcia Aleman MD Outpatient    8/7/2015 11:13 AM 8/10/2015  7:50 AM DNR/DNI [id number]  Marcia Aleman MD Inpatient    8/4/2015  2:55 PM 8/7/2015 11:13 AM DNR [id number]  Driss Huggins MD Inpatient    7/31/2015  6:35 PM 8/4/2015  2:55 PM DNR/DNI [id number]  Driss Huggins MD Inpatient    7/26/2015  8:21 PM 7/31/2015  6:35 PM Full Code [id number]  Larry Hong MD Inpatient    6/21/2015 11:33 AM 7/26/2015  8:21 PM Full Code [id number]  Mady Atkinson MD Outpatient    6/17/2015  6:34 AM 6/21/2015 11:33 AM Full Code [id number]  Swetha Bundy APRN CNP Inpatient    8/8/2014  7:29 AM 6/17/2015  6:34 AM Full Code [id number]  Ron Kirkland MD Outpatient    8/3/2014  6:08 PM 8/8/2014  7:29 AM Full Code [id number]  Ron Kirkland MD Inpatient    5/4/2014 12:08 PM 8/3/2014  6:08 PM Full Code [id number]  Darlene Del Real MD Outpatient    5/2/2014  9:16 AM 5/4/2014 12:08 PM Full Code [id number]  Darlene Del Real MD Inpatient      Advance Directives        Does patient have a scanned Advance Directive/ACP document in EPIC?           Yes        Hospital Problems as of 2/3/2017              Priority Class Noted POA    Acute on chronic respiratory failure with hypoxia (H) Medium  1/28/2017 Yes      Non-Hospital Problems as of 2/3/2017              Priority Class Noted    Shortness of breath   8/3/2014    Urinary retention Medium  8/11/2015    Air hunger Medium  8/11/2015    Achalasia Medium  8/11/2015    Delirium Medium  8/11/2015    HTN (hypertension) Medium  8/11/2015    GERD  (gastroesophageal reflux disease) Medium  8/11/2015    ACP (advance care planning)   8/19/2015    Acute and chronic respiratory failure with hypercapnia (H) Medium  8/27/2015    S/P percutaneous endoscopic gastrostomy (PEG) tube placement (H) Medium  1/25/2016    COPD (chronic obstructive pulmonary disease) (H) Medium  4/13/2016    NSTEMI (non-ST elevated myocardial infarction) (H) Medium  5/21/2016    Atypical chest pain Medium  6/7/2016    Encounter for gastrojejunal (GJ) tube placement Medium  7/8/2016    Constipation due to opioid therapy Medium  1/26/2017    Generalized anxiety disorder Medium  1/26/2017    Advanced directives, counseling/discussion Medium  1/26/2017      Immunizations     Name Date      Influenza (High Dose) 3 valent vaccine 10/04/16     Influenza (High Dose) 3 valent vaccine 09/24/15     Influenza (High Dose) 3 valent vaccine 10/06/14     Influenza (IIV3) 10/21/13     Influenza (IIV3) 11/20/12     Pneumococcal (PCV 13) 11/17/14     Pneumococcal 23 valent 05/03/12     Tetanus Not Indicated - By Hx 05/03/14          END      ASSESSMENT     Discharge Profile Flowsheet     DISCHARGE NEEDS ASSESSMENT     Resources List  DME 07/29/15 1006    Equipment Currently Used at Home  commode;hospital bed;walker, rolling;wheelchair 01/04/17 0924   Existing Resources/Services  DME 08/05/14 1504    Transportation Available  van, wheelchair accessible 01/04/17 0917   SKIN      Equipment Used at Home  walker, rolling;grab bar;shower chair 01/22/16 1415   Inspection  Full 02/03/17 1309    FUNCTIONAL LEVEL CURRENT     Skin WDL  ex 02/03/17 1309    Change in Functional Status Since Onset of Current Illness/Injury  no 06/06/16 2137   Skin Color/Characteristics  flushed 02/03/17 1309    GASTROINTESTINAL (ADULT,PEDIATRIC,OB)     Skin Temperature  cool 02/03/17 1309    GI WDL  ex 02/03/17 1309   Skin Moisture  moist 02/03/17 1309    Abdominal Appearance  rounded 02/03/17 1309   Skin Elasticity  slow return to original  "state 02/03/17 0937    All Quadrants Bowel Sounds  audible and normoactive 02/03/17 1309   Skin Integrity  drain/device(s);bruise(s);skin tear(s) 02/03/17 1309    Last Bowel Movement  02/03/17 02/03/17 1309   Skin areas NOT inspected  Hip, left;Hip, right;Buttock, left;Buttock, right;Sacrum;Coccyx 01/31/17 0852    GI Signs/Symptoms  fecal incontinence;nausea 02/03/17 1309   SAFETY      COMMUNICATION ASSESSMENT     Safety WDL  WDL 02/03/17 1309    Patient's communication style  spoken language (English or Bilingual) 01/28/17 1531   Safety Factors  bed in low position;wheels locked;call light in reach;upper side rails raised x 2;ID band on 02/03/17 1309    FINAL RESOURCES                        Assessment WDL (Within Defined Limits) Definitions           Safety WDL     Effective: 09/28/15    Row Information: <b>WDL Definition:</b> Bed in low position, wheels locked; call light in reach; upper side rails up x 2; ID band on<br> <font color=\"gray\"><i>Item=AS safety wdl>>List=AS safety wdl>>Version=F14</i></font>      Skin WDL     Effective: 09/28/15    Row Information: <b>WDL Definition:</b> Warm; dry; intact; elastic; without discoloration; pressure points without redness<br> <font color=\"gray\"><i>Item=AS skin wdl>>List=AS skin wdl>>Version=F14</i></font>      Vitals     Vital Signs Flowsheet     VITAL SIGNS     PAIN ASSESSMENT/NONVERBAL ICU (ADULT)      Temp  98.3  F (36.8  C) 02/03/17 1601   Presence of Pain  No nonverbal indicators of pain present 02/02/17 0559    Temp src  Oral 02/03/17 1601   ANALGESIA SIDE EFFECTS MONITORING      Resp  14 02/03/17 1552   Side Effects Monitoring: Respiratory Quality  R 02/03/17 1254    Pulse  90 02/01/17 2022   Side Effects Monitoring: Respiratory Depth  N 02/03/17 1254    Heart Rate  93 02/03/17 1552   Side Effects Monitoring: Sedation Level  2 02/03/17 1254    Pulse/Heart Rate Source  Monitor 02/02/17 2008   HEIGHT AND WEIGHT      BP  94/58 mmHg 02/03/17 1552   Height  1.524 m " (5') 01/28/17 1540    BP Location  Left arm 02/03/17 1553   Height Method  Estimated 01/28/17 1540    OXYGEN THERAPY     Weight  57.471 kg (126 lb 11.2 oz) (bedscale) 02/03/17 0431    SpO2  96 % 02/03/17 1552   BSA (Calculated - sq m)  1.57 01/28/17 1540    O2 Device  Mechanical Ventilator 02/03/17 1552   BMI (Calculated)  25.02 01/28/17 1540    FiO2 (%)  40 % 01/29/17 1931   POSITIONING      Oxygen Delivery  2 LPM 02/03/17 1552   Body Position  independently positioning;right, side-lying;weight shift assistance provided 02/03/17 1254    Suction Occurrance  1 02/03/17 1442   Head of Bed (HOB)  HOB at 20 degrees 02/03/17 1254    PAIN/COMFORT     Positioning/Transfer Devices  pillows;in use 02/02/17 1218    Patient Currently in Pain  denies 02/03/17 1254   DAILY CARE      Preferred Pain Scale  CAPA (Clinically Aligned Pain Assessment) ([facility] Adults Only) 02/03/17 1254   Activity Type  activity adjusted per tolerance;bedrest 02/03/17 1254    Pain Location  Abdomen 02/03/17 0921   Activity Level of Assistance  assistance, 2 people 02/03/17 1254    Pain Orientation  Lower 02/03/17 0921   ECG      Pain Descriptors  Discomfort 02/03/17 0921   ECG Rhythm  Normal sinus rhythm 02/03/17 1254    Pain Management Interventions  aromatherapy utilized 02/03/17 0505   Ectopy  None 02/03/17 1254    Pain Intervention(s)  Medication (See eMAR) 02/03/17 0921   EKG MONITORING      Response to Interventions  Relief 02/03/17 1033   Cardiac Regularity  Regular 01/28/17 1838    CLINICALLY ALIGNED PAIN ASSESSMENT (CAPA) ([facility] ADULTS ONLY)     Cardiac Rhythm  ST 01/28/17 1838    Comfort  negligible pain 02/03/17 1254   MARTHA COMA SCALE      Change in Pain  about the same 02/03/17 0921   Best Eye Response  4-->(E4) spontaneous 02/03/17 1254    Pain Control  partially effective 02/03/17 0921   Best Motor Response  6-->(M6) obeys commands 02/03/17 1254    Functioning  can do most things, but pain gets in  the way of some 02/03/17 0921   Best Verbal Response  4-->(V4) confused 02/03/17 1254    Sleep  awake with occasional pain 02/03/17 0921   Beverly Coma Scale Score  14 02/03/17 1254            Patient Lines/Drains/Airways Status    Active LINES/DRAINS/AIRWAYS     Name: Placement date: Placement time: Site: Days: Last dressing change:    Airway - Adult/Peds 6 [place] 04/19/16  1300  6  290     Gastrostomy/Enterostomy Gastrojejunostomy RUQ 1  09/20/16  1510  RUQ  136     Gastrostomy/Enterostomy Gastrojejunostomy            Urethral Catheter Straight-tip 01/23/17  1710  Straight-tip  10     Pressure Injury 10/01/16 Coccyx 10/01/16  1215   125     Pressure Injury 01/28/17 Sacrum Deep tissue pressure injury 01/28/17     6     Wound 10/31/16 Left;Anterior;Right Arm Other (comment) Large skin tear forearm 10/31/16  0800  Arm  95     Wound 01/28/17 Left Arm Skin tear 01/28/17  2259  Arm  5     Wound 01/29/17 Right Arm Skin tear 01/29/17  0145  Arm  5             Patient Lines/Drains/Airways Status    Active PICC/CVC     **None**            Intake/Output Detail Report     Date Intake         Output   Net    Shift P.O. I.V. NG/GT IV Piggyback Enteral Total Urine Emesis/NG output Total       Emilie 02/02/17 0700 - 02/02/17 1459 -- -- 410 -- 85 495 350 400 750 -255    Noc 02/02/17 1500 - 02/02/17 2359 -- -- 255 -- 255 510 550 275 825 -315    Day 02/03/17 0000 - 02/03/17 0659 0 -- 250 -- 595 845 225 250 475 370    Emilie 02/03/17 0700 - 02/03/17 1459 -- -- 470 -- 170 640 275 300 575 65    Noc 02/03/17 1500 - 02/03/17 2359 -- -- -- -- -- -- 450 250 700 -700      Last Void/BM       Most Recent Value    Urine Occurrence     Stool Occurrence 1 at 02/02/2017 0900      Case Management/Discharge Planning     Case Management/Discharge Planning Flowsheet     REFERRAL INFORMATION     Resources List  [facility] [place] 1006    Arrived From  admitted as an inpatient 01/03/17 0040   Existing Resources/Services  [facility] 08/05/14 1504    LIVING ENVIRONMENT      [facility] CAREGIVER      Lives With  other (see comments) (Group home) 01/31/17 1853   Filed Complexity Screen Score  13 01/30/17 0835    Living Arrangements  group home 01/04/17 0924   ABUSE RISK SCREEN      Provides Primary Care For  no one, unable/limited ability to care for self 10/31/16 0454   QUESTION TO PATIENT:  Has a member of your family or a partner(now or in the past) intimidated, hurt, manipulated, or controlled you in any way?  patient declined to answer or is unable to answer 01/28/17 1544    COPING/STRESS     QUESTION TO PATIENT: Do you feel safe going back to the place where you are living?  patient declined to answer or is unable to answer 01/28/17 1544    Major Change/Loss/Stressor  none 01/31/17 1853   OBSERVATION: Is there reason to believe there has been maltreatment of a vulnerable adult (ie. Physical/Sexual/Emotional abuse, self neglect, lack of adequate food, shelter, medical care, or financial exploitation)?  no 01/28/17 1544    DISCHARGE PLANNING     HOMICIDE RISK      Transportation Available  van, wheelchair accessible 01/04/17 0917   Homicidal Ideation  other (see comments) (SUN) 01/28/17 1544    Equipment Used at Home  walker, rolling;grab bar;shower chair 01/22/16 1415   (R) MENTAL HEALTH SUICIDE RISK      FINAL RESOURCES     Are you depressed or being treated for depression?  Yes 01/31/17 1858    Equipment Currently Used at Home  commode;hospital bed;walker, rolling;wheelchair 01/04/17 0924

## 2017-01-28 NOTE — IP AVS SNAPSHOT
MRN:[id number]                      After Visit Summary   1/28/2017    Mary Wang    MRN: [id number]           Thank you!     Thank you for choosing [facility] for your care. Our goal is always to provide you with excellent care. Hearing back from our patients is one way we can continue to improve our services. Please take a few minutes to complete the written survey that you may receive in the mail after you visit with us. Thank you!        Patient Information     Date Of Birth          1950        About your hospital stay     You were admitted on:  January 28, 2017 You last received care in the:  [facility]    You were discharged on:  February 3, 2017        Reason for your hospital stay       You were admitted with difficulty breathing            Reason for your hospital stay       You were admitted due to difficulty breathing                  Who to Call     For medical emergencies, please call 911.  For non-urgent questions about your medical care, please call your primary care provider or clinic, [phone]          Attending Provider     Provider    Sofya Man MD Millis, Mark R, MD Kaltenborn, Enrique Das MD       Primary Care Provider Office Phone # Fax #    Norman Brito [phone]       [facility] [address] [place] [address] [address] [place] [address]        After Care Instructions     Diet       NPO Time Specified Ice Chips  Adult Formula Drip Feeding: Specified Time Isosource 1.5; Gastrostomy; Goal Rate: 85 ml/hour; mL/hr; From: 8:00 PM; 8:00 AM; Medication - Tube Feeding Flush Frequency: At least 15-30 mL water before and after medication administration            Diet       Npo except ice chips  Adult Formula Drip Feeding: Specified Time Isosource 1.5; Gastrostomy; Goal Rate: 85 ml/hour; mL/hr; From: 8:00 PM; 8:00 AM; Medication - Tube Feeding Flush Frequency: At least 15-30 mL water before and after medication  administration            Discharge Instructions       See instructions on POLST form            Oxygen Adult       Trilogy vent            Tracheostomy care       Per routine            Tubes and drains       Avendaño   trach                  Follow-up Appointments     Follow Up and recommended labs and tests       Complex care MD 1 week                  Your next 10 appointments already scheduled     Feb 08, 2017  2:00 PM   Return Visit with Norman Brito MD   [facility]    [address]  [address]  [facility] [id number] [phone]            Mar 17, 2017  2:00 PM   Return Visit with Norman Brito MD   [facility]    [address]  [address]  [facility] [id number] [phone]              Additional Services     Home Care PT Referral for Hospital Discharge       [facility]  [phone]  Fax [phone]    RESUMPTION OF SERVICES  Palliative services    PT to eval and treat  OT to eval and treat  Your provider has ordered home care - physical therapy. If you have not been contacted within 2 days of your discharge please call the department phone number listed on the top of this document.            Home care nursing referral       [facility]   PH: [phone]    Palliative RN Services    RESUMPTION OF SERVICES    Your provider has ordered home care nursing services. If you have not been contacted within 2 days of your discharge please call the inpatient department phone number at [phone] .                  Further instructions from your care team       [facility] AND GROUP HOME STAFF:     PLEASE CALL PALLIATIVE ON-CALL PHYSICIAN FOR ACUTE DISTRESS SYMPTOM MANAGEMENT:  PH: [phone]  Pager # [phone]    Pending Results     Date and Time Order Name Status Description    1/31/2017 0929 Sputum Culture Aerobic Bacterial Preliminary     1/31/2017  0926 Blood culture Preliminary     1/31/2017 0926 Blood culture Preliminary             Statement of Approval     Ordered          02/03/17 1342  I have reviewed and agree with all the recommendations and orders detailed in this document.   EFFECTIVE NOW     Approved and electronically signed by:  Celia Chavis MD             Admission Information        Provider Department Dept Phone    1/28/2017 Enrique Gama MD [facility] [phone]      Your Vitals Were     Blood Pressure Pulse Temperature    94/58 mmHg 90 98.3  F (36.8  C) (Oral)    Respirations Height Weight    14 1.524 m (5') 57.471 kg (126 lb 11.2 oz)    BMI (Body Mass Index) Pulse Oximetry       24.74 kg/m2 96%       [employer] Information     [facility] gives you secure access to your electronic health record. If you see a primary care provider, you can also send messages to your care team and make appointments. If you have questions, please call your primary care clinic.  If you do not have a primary care provider, please call [phone] and they will assist you.        Care EveryWhere ID     This is your Care EveryWhere ID. This could be used by other organizations to access your [facility] medical records  [id number]           Review of your medicines      START taking        Dose / Directions    carbamide peroxide 6.5 % otic solution   Commonly known as:  DEBROX   Used for:  Impacted cerumen, unspecified laterality        Dose:  5 drop   Place 5 drops into the right ear 2 times daily for 3 days   Quantity:  1.5 mL   Refills:  0         CONTINUE these medicines which may have CHANGED, or have new prescriptions. If we are uncertain of the size of tablets/capsules you have at home, strength may be listed as something that might have changed.        Dose / Directions    * order for DME   This may have changed:  Another medication with the same name was added. Make sure you understand how and when to take each.        Equipment being ordered: 1)  Avendaño catheter 16F, balloon 10 mL, silicone.  2) Urinary collection bag.  3) Elastic leg strap for Avendaño catheter.  Please fax to [employer].   Quantity:  3 each   Refills:  11       * order for DME   This may have changed:  You were already taking a medication with the same name, and this prescription was added. Make sure you understand how and when to take each.   Used for:  Acute and chronic respiratory failure with hypercapnia (H)        Equipment being ordered: Methylex foam dressings, alternating pressure pad for mattress and pump.   Quantity:  1 Device   Refills:  0       * Notice:  This list has 2 medication(s) that are the same as other medications prescribed for you. Read the directions carefully, and ask your doctor or other care provider to review them with you.      CONTINUE these medicines which have NOT CHANGED        Dose / Directions    acetaminophen 160 MG/5ML solution   Commonly known as:  TYLENOL   Used for:  Other chronic pain        Dose:  650 mg   20.3 mLs (650 mg) by Per Feeding Tube route every 4 hours as needed for mild pain   Quantity:  300 mL   Refills:  0       * bisacodyl 10 MG Suppository   Commonly known as:  DULCOLAX   Used for:  Constipation due to opioid therapy        Dose:  10 mg   Place 1 suppository (10 mg) rectally daily as needed for constipation   Quantity:  28 suppository   Refills:  3       * bisacodyl 10 MG Suppository   Commonly known as:  DULCOLAX   Used for:  Constipation due to opioid therapy        Dose:  10 mg   Place 1 suppository (10 mg) rectally daily as needed for constipation   Quantity:  25 suppository   Refills:  1       budesonide 0.5 MG/2ML neb solution   Commonly known as:  PULMICORT   Used for:  Shortness of breath, Respiratory difficulty        Dose:  0.5 mg   Take 2 mLs (0.5 mg) by nebulization 2 times daily   Quantity:  60 ampule   Refills:  0       calcium carbonate 500 MG chewable tablet   Commonly known as:  TUMS        Dose:  1 chew tab   Take 1  tablet (500 mg) by mouth every 2 hours as needed for heartburn   Quantity:  150 tablet   Refills:  0       clonazePAM 0.1 mg/mL   Commonly known as:  klonoPIN        Dose:  1.5 mg   Take 15 mLs (1.5 mg) by mouth 4 times daily   Quantity:  1800 mL   Refills:  1       Cranberry 500 MG Caps        Dose:  500 mg   Take 1 capsule (500 mg) by mouth daily   Quantity:  90 capsule   Refills:  0       famotidine 40 MG/5ML suspension   Commonly known as:  PEPCID   Used for:  Mild gas use disorder        Dose:  20 mg   Take 2.5 mLs (20 mg) by mouth 2 times daily as needed for heartburn   Quantity:  75 mL   Refills:  3       fexofenadine 180 MG tablet   Commonly known as:  ALLEGRA   Used for:  COPD exacerbation (H)        Dose:  180 mg   Take 1 tablet (180 mg) by mouth daily   Quantity:  30 tablet   Refills:  0       fiber modular packet   Used for:  Diarrhea, unspecified type        Dose:  1 packet   1 packet by Per J Tube route 3 times daily   Quantity:  42 packet   Refills:  0       FLEET NATURALS CLEANSING ENEMA Enem   Used for:  Constipation due to opioid therapy        Dose:  1 enema   Place 1 enema rectally daily as needed   Quantity:  3 Bottle   Refills:  11       gabapentin 250 MG/5ML solution   Commonly known as:  NEURONTIN   Used for:  Anxiety        Dose:  300 mg   6 mLs (300 mg) by Per J Tube route 3 times daily   Quantity:  450 mL   Refills:  0       guaiFENesin 200 MG Tabs tablet   Commonly known as:  ORGANIDIN   Used for:  Shortness of breath        Dose:  200 mg   Take 1 tablet (200 mg) by mouth 4 times daily   Quantity:  115 tablet   Refills:  0       HYDROmorphone 1 MG/ML Liqd liquid   Commonly known as:  DILAUDID        Dose:  1 mg   Take 1 mL (1 mg) by mouth every 2 hours as needed for moderate to severe pain   Quantity:  180 mL   Refills:  0       ipratropium 0.02 % neb solution   Commonly known as:  ATROVENT        Dose:  0.5 mg   Take 2.5 mLs (0.5 mg) by nebulization 4 times daily and every four hours at  night PRN.   Quantity:  450 mL   Refills:  0       JEVITY 1.5 IZZY Liqd   Used for:  Achalasia        Dose:  5 Can   Take 5 Cans by mouth daily Per tube feeding   Quantity:  150 Can   Refills:  11       lactobacillus rhamnosus (GG) capsule        Dose:  1 capsule   1 capsule by Per G Tube route daily   Quantity:  60 capsule   Refills:  0       levalbuterol 1.25 MG/3ML neb solution   Commonly known as:  XOPENEX        Dose:  1 ampule   Take 3 mLs (1.25 mg) by nebulization 4 times daily and every four hours at night PRN.   Quantity:  540 mL   Refills:  0       lidocaine 15 mL, alum & mag hydroxide-simethicone 15 mL GI Cocktail   Used for:  Gastroesophageal reflux disease without esophagitis        Dose:  30 mL   Take 30 mLs by mouth 3 times daily as needed for moderate pain   Quantity:  500 mL   Refills:  0       lidocaine 5 % Patch   Commonly known as:  LIDODERM   Used for:  Urinary tract infection, site not specified        Dose:  1-2 patch   Place 1-2 patches onto the skin every 24 hours Apply to the lower back   Quantity:  30 patch   Refills:  0       loperamide 2 MG tablet   Commonly known as:  IMODIUM A-D   Used for:  Frequent stools        2-2 mg tablets per J-tube qid prn frequent and/or loose stools. Do not use more than 8 tabs per day.   Quantity:  30 tablet   Refills:  0       LORazepam 2 MG/ML (HIGH CONC) solution   Commonly known as:  ATIVAN        Dose:  0.5 mg   Take 0.25 mLs (0.5 mg) by mouth every 3 hours as needed for anxiety   Quantity:  30 mL   Refills:  1       melatonin 1 MG/ML Liqd liquid   Used for:  Anxiety, Insomnia due to medical condition        Dose:  3 mg   3 mLs (3 mg) by Per J Tube route At Bedtime   Quantity:  300 mL   Refills:  0       ondansetron 4 MG tablet   Commonly known as:  ZOFRAN        Dose:  4 mg   Take 1 tablet (4 mg) by mouth every 4 hours as needed for nausea   Quantity:  18 tablet   Refills:  0       polyethylene glycol 0.4%- propylene glycol 0.3% 0.4-0.3 % Soln  ophthalmic solution   Commonly known as:  SYSTANE ULTRA   Used for:  Dry eyes, bilateral        Dose:  1-2 drop   Place 1-2 drops into both eyes 4 times daily as needed for dry eyes 0900, 1300, 1700, 2100   Quantity:  1 Bottle   Refills:  3       polyethylene glycol Packet   Commonly known as:  MIRALAX/GLYCOLAX   Used for:  Constipation, unspecified constipation type        Dose:  17 g   17 g by Per J Tube route 2 times daily Hold for loose stools   Quantity:  100 packet   Refills:  0       predniSONE 5 MG/5ML solution   Used for:  Shortness of breath        Dose:  20 mg   Take 20 mLs (20 mg) by mouth daily   Quantity:  500 mL   Refills:  0       QUEtiapine 50 MG tablet   Commonly known as:  SEROQUEL        Dose:  50 mg   Take 1 tablet (50 mg) by mouth 2 times daily   Quantity:  180 tablet   Refills:  3       senna-docusate 8.6-50 MG per tablet   Commonly known as:  SENOKOT-S;PERICOLACE   Used for:  Constipation, unspecified constipation type        Dose:  2 tablet   2 tablets by Per J Tube route 2 times daily   Quantity:  100 tablet   Refills:  0       sertraline 20 MG/ML (HIGH CONC) solution   Commonly known as:  ZOLOFT   Used for:  Anxiety        Dose:  150 mg   7.5 mLs (150 mg) by Per Feeding Tube route daily   Quantity:  105 mL   Refills:  0       sodium chloride 0.65 % nasal spray   Commonly known as:  OCEAN   Used for:  Chronic sinusitis, unspecified location        Dose:  1 spray   Spray 1 spray into both nostrils daily as needed for congestion   Quantity:  1 Bottle   Refills:  0       sulfamethoxazole-trimethoprim suspension   Commonly known as:  BACTRIM/SEPTRA   Used for:  Chronic obstructive pulmonary disease with acute exacerbation (H)        Dose:  20 mL   20 mLs (160 mg) by Per J Tube route daily Dose based on TMP component. 20 mLs = 160 mg   Quantity:  560 mL   Refills:  0       * Notice:  This list has 2 medication(s) that are the same as other medications prescribed for you. Read the directions  carefully, and ask your doctor or other care provider to review them with you.      STOP taking     meropenem 500 MG vial   Commonly known as:  MERREM                Where to get your medicines      These medications were sent to [facility] [place], [place] - [address] [facility]  [address] [facility] [address]     Phone:  [phone]    - carbamide peroxide 6.5 % otic solution      Some of these will need a paper prescription and others can be bought over the counter. Ask your nurse if you have questions.     Bring a paper prescription for each of these medications    - order for DME             Protect others around you: Learn how to safely use, store and throw away your medicines at www.disposemymeds.org.             Medication List: This is a list of all your medications and when to take them. Check marks below indicate your daily home schedule. Keep this list as a reference.      Medications           Morning Afternoon Evening Bedtime As Needed    acetaminophen 160 MG/5ML solution   Commonly known as:  TYLENOL   20.3 mLs (650 mg) by Per Feeding Tube route every 4 hours as needed for mild pain   Last time this was given:  650 mg on 2/1/2017 11:27 PM                                * bisacodyl 10 MG Suppository   Commonly known as:  DULCOLAX   Place 1 suppository (10 mg) rectally daily as needed for constipation                                * bisacodyl 10 MG Suppository   Commonly known as:  DULCOLAX   Place 1 suppository (10 mg) rectally daily as needed for constipation                                budesonide 0.5 MG/2ML neb solution   Commonly known as:  PULMICORT   Take 2 mLs (0.5 mg) by nebulization 2 times daily   Last time this was given:  0.5 mg on 2/3/2017  9:13 AM                                calcium carbonate 500 MG chewable tablet   Commonly known as:  TUMS   Take 1 tablet (500 mg) by mouth every 2 hours as needed for heartburn   Last time this was given:  500 mg on  1/31/2017  6:00 AM                                carbamide peroxide 6.5 % otic solution   Commonly known as:  DEBROX   Place 5 drops into the right ear 2 times daily for 3 days                                clonazePAM 0.1 mg/mL   Commonly known as:  klonoPIN   Take 15 mLs (1.5 mg) by mouth 4 times daily   Last time this was given:  1.5 mg on 2/3/2017  4:01 PM                                Cranberry 500 MG Caps   Take 1 capsule (500 mg) by mouth daily                                famotidine 40 MG/5ML suspension   Commonly known as:  PEPCID   Take 2.5 mLs (20 mg) by mouth 2 times daily as needed for heartburn                                fexofenadine 180 MG tablet   Commonly known as:  ALLEGRA   Take 1 tablet (180 mg) by mouth daily   Last time this was given:  180 mg on 2/3/2017  8:05 AM                                fiber modular packet   1 packet by Per J Tube route 3 times daily   Last time this was given:  1 packet on 2/3/2017  2:34 PM                                FLE NATURALS CLEANSING ENEMA Enem   Place 1 enema rectally daily as needed                                gabapentin 250 MG/5ML solution   Commonly known as:  NEURONTIN   6 mLs (300 mg) by Per J Tube route 3 times daily   Last time this was given:  300 mg on 2/3/2017  2:34 PM                                guaiFENesin 200 MG Tabs tablet   Commonly known as:  ORGANIDIN   Take 1 tablet (200 mg) by mouth 4 times daily                                HYDROmorphone 1 MG/ML Liqd liquid   Commonly known as:  DILAUDID   Take 1 mL (1 mg) by mouth every 2 hours as needed for moderate to severe pain   Last time this was given:  1 mg on 2/3/2017  8:07 AM                                ipratropium 0.02 % neb solution   Commonly known as:  ATROVENT   Take 2.5 mLs (0.5 mg) by nebulization 4 times daily and every four hours at night PRN.   Last time this was given:  0.5 mg on 2/3/2017 12:47 PM                                JEVITY 1.5 IZZY Liqd   Take 5 Cans  by mouth daily Per tube feeding                                lactobacillus rhamnosus (GG) capsule   1 capsule by Per G Tube route daily   Last time this was given:  1 capsule on 2/3/2017  8:06 AM                                levalbuterol 1.25 MG/3ML neb solution   Commonly known as:  XOPENEX   Take 3 mLs (1.25 mg) by nebulization 4 times daily and every four hours at night PRN.   Last time this was given:  1.25 mg on 2/3/2017 12:47 PM                                lidocaine 15 mL, alum & mag hydroxide-simethicone 15 mL GI Cocktail   Take 30 mLs by mouth 3 times daily as needed for moderate pain   Last time this was given:  15 mLs on 2/3/2017  8:38 AM                                lidocaine 5 % Patch   Commonly known as:  LIDODERM   Place 1-2 patches onto the skin every 24 hours Apply to the lower back                                loperamide 2 MG tablet   Commonly known as:  IMODIUM A-D   2-2 mg tablets per J-tube qid prn frequent and/or loose stools. Do not use more than 8 tabs per day.                                LORazepam 2 MG/ML (HIGH CONC) solution   Commonly known as:  ATIVAN   Take 0.25 mLs (0.5 mg) by mouth every 3 hours as needed for anxiety   Last time this was given:  0.5 mg on 2/3/2017  8:06 AM                                melatonin 1 MG/ML Liqd liquid   3 mLs (3 mg) by Per J Tube route At Bedtime   Last time this was given:  3 mg on 2/1/2017 11:27 PM                                ondansetron 4 MG tablet   Commonly known as:  ZOFRAN   Take 1 tablet (4 mg) by mouth every 4 hours as needed for nausea                                * order for DME   Equipment being ordered: 1) Avendaño catheter 16F, balloon 10 mL, silicone.  2) Urinary collection bag.  3) Elastic leg strap for Avendaño catheter.  Please fax to [employer].                                * order for DME   Equipment being ordered: Methylex foam dressings, alternating pressure pad for mattress and pump.                                 polyethylene glycol 0.4%- propylene glycol 0.3% 0.4-0.3 % Soln ophthalmic solution   Commonly known as:  SYSTANE ULTRA   Place 1-2 drops into both eyes 4 times daily as needed for dry eyes 0900, 1300, 1700, 2100                                polyethylene glycol Packet   Commonly known as:  MIRALAX/GLYCOLAX   17 g by Per J Tube route 2 times daily Hold for loose stools   Last time this was given:  17 g on 2/2/2017 10:52 PM                                predniSONE 5 MG/5ML solution   Take 20 mLs (20 mg) by mouth daily   Last time this was given:  20 mg on 2/3/2017  8:05 AM                                QUEtiapine 50 MG tablet   Commonly known as:  SEROQUEL   Take 1 tablet (50 mg) by mouth 2 times daily   Last time this was given:  50 mg on 2/3/2017  8:07 AM                                senna-docusate 8.6-50 MG per tablet   Commonly known as:  SENOKOT-S;PERICOLACE   2 tablets by Per J Tube route 2 times daily   Last time this was given:  2 tablets on 2/2/2017  8:38 AM                                sertraline 20 MG/ML (HIGH CONC) solution   Commonly known as:  ZOLOFT   7.5 mLs (150 mg) by Per Feeding Tube route daily   Last time this was given:  150 mg on 2/3/2017  8:07 AM                                sodium chloride 0.65 % nasal spray   Commonly known as:  OCEAN   Spray 1 spray into both nostrils daily as needed for congestion                                sulfamethoxazole-trimethoprim suspension   Commonly known as:  BACTRIM/SEPTRA   20 mLs (160 mg) by Per J Tube route daily Dose based on TMP component. 20 mLs = 160 mg   Last time this was given:  160 mg on 2/3/2017  8:06 AM                                * Notice:  This list has 4 medication(s) that are the same as other medications prescribed for you. Read the directions carefully, and ask your doctor or other care provider to review them with you.

## 2017-01-28 NOTE — IP AVS SNAPSHOT
UNIT 6B [facility] BANK: [phone]                                              INTERAGENCY TRANSFER FORM - LAB / IMAGING / EKG / EMG RESULTS   2017                    Hospital Admission Date: 2017  MORRIS PYLE   : 1950  Sex: Female        Attending Provider: Enrique Gama MD     Allergies:  Levaquin, Toro Podhaler, Suprax, Augmentin, Avelox, Cedax, Penicillins, Vantin    Infection:  VRE-Contact Isolation, MRSA-Contact Isolation   Service:  INTERNAL MED    Ht:  1.524 m (5')   Wt:  57.471 kg (126 lb 11.2 oz)   Admission Wt:  58 kg (127 lb 13.9 oz)    BMI:  24.74 kg/m 2   BSA:  1.56 m 2            Patient PCP Information     Provider PCP Type    Norman Brito MD General         Lab Results - 3 Days (17 - 17)      Sputum Culture Aerobic Bacterial [[id number]] (Abnormal)  Resulted: 17 1237, Result status: Preliminary result    Ordering provider: Celia Chavis MD  17 0929 Resulting lab: [facility]FECTIOUS DISEASE DI[facility]NOSTIC LABORATORY    Specimen Information    Type Source Collected On   Sputum  17 1136          Components       Value Reference Range Flag Lab   Specimen Description Sputum Endotracheal   75   Culture Micro --  A 225   Result:         Moderate growth Normal rome  Moderate growth Pseudomonas aeruginosa Meropenem AMAURY in progress 2/3/17  Moderate growth Serratia marcescens     Micro Report Status Pending   225   Result:     Organism: Moderate growth Pseudomonas aeruginosa Meropenem AMAURY in progress 2/3/17   225   Organism: Moderate growth Serratia marcescens   225            Blood gas arterial [[id number]] (Abnormal)  Resulted: 17 1034, Result status: Final result    Ordering provider: Celia Chavis MD  17 0736 Resulting lab: [facility]    Specimen Information    Type Source Collected On   Blood  17 1025          Components       Value Reference Range Flag Lab   pH  Arterial 7.40 7.35 - 7.45 pH  51   pCO2 Arterial 70 35 - 45 mm Hg H 51   pO2 Arterial 41 80 - 105 mm Hg L 51   Bicarbonate Arterial 44 21 - 28 mmol/L H 51   Base Excess Art 17.4 mmol/L  51   Comment:  Abnormal Result, Ref range: -9.0 to 1.8   FIO2 2L   51            CBC with platelets [[id number]] (Abnormal)  Resulted: 02/03/17 0754, Result status: Final result    Ordering provider: Celia Chavis MD  02/03/17 0737 Resulting lab: [facility]    Specimen Information    Type Source Collected On   Blood  02/03/17 0701          Components       Value Reference Range Flag Lab   WBC 8.7 4.0 - 11.0 10e9/L  51   RBC Count 2.70 3.8 - 5.2 10e12/L L 51   Hemoglobin 7.8 11.7 - 15.7 g/dL L 51   Hematocrit 24.9 35.0 - 47.0 % L 51   MCV 92 78 - 100 fl  51   MCH 28.9 26.5 - 33.0 pg  51   MCHC 31.3 31.5 - 36.5 g/dL L 51   RDW 15.3 10.0 - 15.0 % H 51   Platelet Count 341 150 - 450 10e9/L  51            Basic metabolic panel [[id number]] (Abnormal)  Resulted: 02/03/17 0741, Result status: Final result    Ordering provider: Steven Sousa MD  02/02/17 2330 Resulting lab: [facility]    Specimen Information    Type Source Collected On   Blood  02/03/17 0701          Components       Value Reference Range Flag Lab   Sodium 138 133 - 144 mmol/L  51   Potassium 4.0 3.4 - 5.3 mmol/L  51   Chloride 91 94 - 109 mmol/L L 51   Carbon Dioxide 42 20 - 32 mmol/L H 51   Anion Gap 5 3 - 14 mmol/L  51   Glucose 138 70 - 99 mg/dL H 51   Urea Nitrogen 29 7 - 30 mg/dL  51   Creatinine 0.65 0.52 - 1.04 mg/dL  51   GFR Estimate -- >60 mL/min/1.7m2  51   Result:         >90  Non  GFR Calc     GFR Estimate If Black -- >60 mL/min/1.7m2  51   Result:         >90   GFR Calc     Calcium 8.4 8.5 - 10.1 mg/dL L 51   Result:              Blood culture [[id number]]  Resulted: 02/03/17 0355, Result status: Preliminary result    Ordering provider: Celia Chavis MD   01/31/17 0926 Resulting lab: [facility]    Specimen Information    Type Source Collected On   Blood  01/31/17 1050          Components       Value Reference Range Flag Lab   Specimen Description Blood Right Hand   51   Culture Micro No growth after 3 days   75   Micro Report Status Pending   75            Blood culture [[id number]]  Resulted: 02/03/17 0355, Result status: Preliminary result    Ordering provider: Celia Chavis MD  01/31/17 0926 Resulting lab: [facility]    Specimen Information    Type Source Collected On   Blood  01/31/17 1043          Components       Value Reference Range Flag Lab   Specimen Description Blood PURPLE PORT   75   Culture Micro No growth after 3 days   75   Micro Report Status Pending   75            CBC with platelets differential [[id number]] (Abnormal)  Resulted: 02/02/17 0927, Result status: Final result    Ordering provider: Steven Sousa MD  02/01/17 2330 Resulting lab: [facility]    Specimen Information    Type Source Collected On   Blood  02/02/17 0740          Components       Value Reference Range Flag Lab   WBC 9.3 4.0 - 11.0 10e9/L  51   RBC Count 2.57 3.8 - 5.2 10e12/L L 51   Hemoglobin 7.6 11.7 - 15.7 g/dL L 51   Hematocrit 23.8 35.0 - 47.0 % L 51   MCV 93 78 - 100 fl  51   MCH 29.6 26.5 - 33.0 pg  51   MCHC 31.9 31.5 - 36.5 g/dL  51   RDW 15.3 10.0 - 15.0 % H 51   Platelet Count 309 150 - 450 10e9/L  51   Diff Method Automated Method   51   % Neutrophils 73.7 %  51   % Lymphocytes 13.3 %  51   % Monocytes 10.3 %  51   % Eosinophils 1.3 %  51   % Basophils 0.3 %  51   % Immature Granulocytes 1.1 %  51   Nucleated RBCs 0 0 /100  51   Absolute Neutrophil 6.9 1.6 - 8.3 10e9/L  51   Absolute Lymphocytes 1.2 0.8 - 5.3 10e9/L  51   Absolute Monocytes 1.0 0.0 - 1.3 10e9/L  51   Absolute Eosinophils 0.1 0.0 - 0.7 10e9/L  51   Absolute Basophils 0.0 0.0 - 0.2 10e9/L  51   Abs  Immature Granulocytes 0.1 0 - 0.4 10e9/L  51   Absolute Nucleated RBC 0.0   51   Platelet Estimate Confirming automated cell count   51            Basic metabolic panel [[id number]] (Abnormal)  Resulted: 02/02/17 0811, Result status: Final result    Ordering provider: Steven Sousa MD  02/01/17 2330 Resulting lab: [facility]    Specimen Information    Type Source Collected On   Blood  02/02/17 0740          Components       Value Reference Range Flag Lab   Sodium 136 133 - 144 mmol/L  51   Potassium 3.4 3.4 - 5.3 mmol/L  51   Chloride 90 94 - 109 mmol/L L 51   Carbon Dioxide 41 20 - 32 mmol/L H 51   Anion Gap 5 3 - 14 mmol/L  51   Glucose 138 70 - 99 mg/dL H 51   Urea Nitrogen 24 7 - 30 mg/dL  51   Creatinine 0.58 0.52 - 1.04 mg/dL  51   GFR Estimate -- >60 mL/min/1.7m2  51   Result:         >90  Non  GFR Calc     GFR Estimate If Black -- >60 mL/min/1.7m2  51   Result:         >90   GFR Calc     Calcium 8.9 8.5 - 10.1 mg/dL  51   Result:              Blood gas venous [[id number]] (Abnormal)  Resulted: 02/01/17 2034, Result status: Final result    Ordering provider: Jose David De La Cruz APRN CNP  02/01/17 1834 Resulting lab: [facility]    Specimen Information    Type Source Collected On   Blood  02/01/17 2018          Components       Value Reference Range Flag Lab   Ph Venous 7.50 7.32 - 7.43 pH H 51   PCO2 Venous 57 40 - 50 mm Hg H 51   PO2 Venous 49 25 - 47 mm Hg H 51   Bicarbonate Venous 44 21 - 28 mmol/L H 51   Base Excess Venous 18.9 mmol/L  51   Comment:  Abnormal Result, Ref range: -7.7 to 1.9   FIO2 2L   51            Basic metabolic panel [[id number]] (Abnormal)  Resulted: 02/01/17 0623, Result status: Final result    Ordering provider: Steven Sousa MD  01/31/17 2330 Resulting lab: [facility]    Specimen Information    Type Source Collected On   Blood  02/01/17 3297           Components       Value Reference Range Flag Lab   Sodium 136 133 - 144 mmol/L  51   Potassium 3.7 3.4 - 5.3 mmol/L  51   Chloride 90 94 - 109 mmol/L L 51   Carbon Dioxide 41 20 - 32 mmol/L H 51   Anion Gap 5 3 - 14 mmol/L  51   Glucose 159 70 - 99 mg/dL H 51   Urea Nitrogen 21 7 - 30 mg/dL  51   Creatinine 0.55 0.52 - 1.04 mg/dL  51   GFR Estimate -- >60 mL/min/1.7m2  51   Result:         >90  Non  GFR Calc     GFR Estimate If Black -- >60 mL/min/1.7m2  51   Result:         >90   GFR Calc     Calcium 8.8 8.5 - 10.1 mg/dL  51   Result:              CBC with platelets differential [[id number]] (Abnormal)  Resulted: 02/01/17 0601, Result status: Final result    Ordering provider: Steven Sousa MD  01/31/17 2504 Resulting lab: [facility]    Specimen Information    Type Source Collected On   Blood  02/01/17 0543          Components       Value Reference Range Flag Lab   WBC 13.2 4.0 - 11.0 10e9/L H 51   RBC Count 2.57 3.8 - 5.2 10e12/L L 51   Hemoglobin 7.5 11.7 - 15.7 g/dL L 51   Hematocrit 24.3 35.0 - 47.0 % L 51   MCV 95 78 - 100 fl  51   MCH 29.2 26.5 - 33.0 pg  51   MCHC 30.9 31.5 - 36.5 g/dL L 51   RDW 15.5 10.0 - 15.0 % H 51   Platelet Count 288 150 - 450 10e9/L  51   Diff Method Automated Method   51   % Neutrophils 82.5 %  51   % Lymphocytes 6.5 %  51   % Monocytes 9.6 %  51   % Eosinophils 1.0 %  51   % Basophils 0.2 %  51   % Immature Granulocytes 0.2 %  51   Nucleated RBCs 0 0 /100  51   Absolute Neutrophil 10.9 1.6 - 8.3 10e9/L H 51   Absolute Lymphocytes 0.9 0.8 - 5.3 10e9/L  51   Absolute Monocytes 1.3 0.0 - 1.3 10e9/L  51   Absolute Eosinophils 0.1 0.0 - 0.7 10e9/L  51   Absolute Basophils 0.0 0.0 - 0.2 10e9/L  51   Abs Immature Granulocytes 0.0 0 - 0.4 10e9/L  51   Absolute Nucleated RBC 0.0   51            Lactic acid whole blood [[id number]]  Resulted: 02/01/17 0600, Result status: Final result    Ordering provider: Celia Chavis MD   01/31/17 2330 Resulting lab: [facility]    Specimen Information    Type Source Collected On   Blood  02/01/17 0543          Components       Value Reference Range Flag Lab   Lactic Acid 0.9 0.7 - 2.1 mmol/L  51            Potassium [[id number]]  Resulted: 01/31/17 1329, Result status: Final result    Ordering provider: Enrique Gama MD  01/31/17 0934 Resulting lab: [facility]    Specimen Information    Type Source Collected On   Blood  01/31/17 1302          Components       Value Reference Range Flag Lab   Potassium 3.9 3.4 - 5.3 mmol/L  51            UA with Microscopic reflex to Culture [[id number]] (Abnormal)  Resulted: 01/31/17 1250, Result status: Final result    Ordering provider: Celia Chavis MD  01/31/17 0927 Resulting lab: [facility]    Specimen Information    Type Source Collected On   Urine Urine catheter 01/31/17 1136          Components       Value Reference Range Flag Lab   Color Urine Yellow   51   Appearance Urine Clear   51   Glucose Urine Negative NEG mg/dL  51   Bilirubin Urine Negative NEG   51   Ketones Urine Negative NEG mg/dL  51   Specific Gravity Urine 1.016 1.003 - 1.035   51   Blood Urine Negative NEG   51   pH Urine 8.5 5.0 - 7.0 pH H 51   Protein Albumin Urine 100 NEG mg/dL A 51   Urobilinogen mg/dL Normal 0.0 - 2.0 mg/dL  51   Nitrite Urine Negative NEG   51   Leukocyte Esterase Urine Negative NEG   51   Source Catheterized Urine   51   WBC Urine 3 0 - 2 /HPF H 51   RBC Urine 35 0 - 2 /HPF H 51            Lactic acid level STAT [[id number]] (Abnormal)  Resulted: 01/31/17 0912, Result status: Final result    Ordering provider: Enrique Gama MD  01/31/17 0812 Resulting lab: [facility]    Specimen Information    Type Source Collected On   Blood  01/31/17 0832          Components       Value Reference Range Flag Lab   Lactic  Acid 2.6 0.7 - 2.1 mmol/L H 51            Basic metabolic panel [[id number]] (Abnormal)  Resulted: 01/31/17 0836, Result status: Final result    Ordering provider: Steven Sousa MD  01/30/17 5628 Resulting lab: [facility]    Specimen Information    Type Source Collected On   Blood  01/31/17 0747          Components       Value Reference Range Flag Lab   Sodium 135 133 - 144 mmol/L  51   Potassium 5.9 3.4 - 5.3 mmol/L H 51   Comment:  Specimen slightly hemolyzed, potassium may be falsely elevated   Chloride 94 94 - 109 mmol/L  51   Carbon Dioxide 39 20 - 32 mmol/L H 51   Anion Gap 2 3 - 14 mmol/L L 51   Glucose 153 70 - 99 mg/dL H 51   Urea Nitrogen 19 7 - 30 mg/dL  51   Creatinine 0.37 0.52 - 1.04 mg/dL L 51   GFR Estimate -- >60 mL/min/1.7m2  51   Result:         >90  Non  GFR Calc     GFR Estimate If Black -- >60 mL/min/1.7m2  51   Result:         >90   GFR Calc     Calcium 8.7 8.5 - 10.1 mg/dL  51   Result:              CBC with platelets differential [[id number]] (Abnormal)  Resulted: 01/31/17 0810, Result status: Final result    Ordering provider: Steven Sousa MD  01/30/17 5800 Resulting lab: [facility]    Specimen Information    Type Source Collected On   Blood  01/31/17 0747          Components       Value Reference Range Flag Lab   WBC 18.7 4.0 - 11.0 10e9/L H 51   RBC Count 3.07 3.8 - 5.2 10e12/L L 51   Hemoglobin 9.1 11.7 - 15.7 g/dL L 51   Hematocrit 29.5 35.0 - 47.0 % L 51   MCV 96 78 - 100 fl  51   MCH 29.6 26.5 - 33.0 pg  51   MCHC 30.8 31.5 - 36.5 g/dL L 51   RDW 16.0 10.0 - 15.0 % H 51   Platelet Count 326 150 - 450 10e9/L  51   Diff Method Automated Method   51   % Neutrophils 86.2 %  51   % Lymphocytes 3.9 %  51   % Monocytes 9.3 %  51   % Eosinophils 0.1 %  51   % Basophils 0.2 %  51   % Immature Granulocytes 0.3 %  51   Nucleated RBCs 0 0 /100  51   Absolute Neutrophil 16.1 1.6 - 8.3 10e9/L H 51  "  Absolute Lymphocytes 0.7 0.8 - 5.3 10e9/L L 51   Absolute Monocytes 1.7 0.0 - 1.3 10e9/L H 51   Absolute Eosinophils 0.0 0.0 - 0.7 10e9/L  51   Absolute Basophils 0.0 0.0 - 0.2 10e9/L  51   Abs Immature Granulocytes 0.1 0 - 0.4 10e9/L  51   Absolute Nucleated RBC 0.0   51            Potassium [[id number]]  Resulted: 01/31/17 0504, Result status: Final result    Ordering provider: Jazzy Garcia, RT  01/31/17 0139 Resulting lab: [facility]    Specimen Information    Type Source Collected On   Blood  01/31/17 0305          Components       Value Reference Range Flag Lab   Potassium 4.0 3.4 - 5.3 mmol/L  51            Testing Performed By     Lab - Abbreviation Name Director Address Valid Date Range    51 - Unknown [facility] Unknown [address] [address] 12/31/14 1010 - Present    75 - Unknown [facility] Unknown [address] [address] 01/15/15 1019 - Present    225 - Unknown [facility] Unknown [address] [address] 12/19/14 0954 - Present            Unresulted Labs (24h ago through future)    Start       Ordered    01/31/17 0530  Basic metabolic panel   AM DRAW,   Routine      01/30/17 0818    Unscheduled  Potassium -  (Potassium Replacement - \"Standard\" - For K levels less than 3.4 mmol/L - UU,UR,UA,RH,SH,PH,WY )   CONDITIONAL (SPECIFY),   Routine     Comments:  Obtain Potassium Level for these conditions:  *IF no potassium result within 24 hours before initiation of order set, draw potassium level with next lab collect.    *2 HOURS AFTER last IV potassium replacement dose and 4 hours after an oral replacement dose.  *Next morning after potassium dose.     Repeat Potassium Replacement if necessary.    01/30/17 1617         Imaging Results - 3 Days (02/03/17 - 02/03/17)      MR Brain for Stroke Cmpl w/o & w Contras [[id number]]  Resulted: " 02/03/17 1315, Result status: Final result    Ordering provider: Celia Chavis MD  02/02/17 1301 Resulted by: Jaden Patel MD Hakkola, Jacob M, MD    Performed: 02/02/17 2050 - 02/02/17 2055 Resulting lab: RADIOLOGY RESULTS    Narrative:       MRI brain without and with contrast  MRA of the head without contrast  Neck MRA without and with contrast    Provided History:  alteration in mental status.    Comparison:  Head CT 1/20/2017      Technique:   Brain MRI:  Axial diffusion, FLAIR, T2-weighted, susceptibility, and  coronal T1-weighted images were obtained without intravenous contrast.  Following intravenous gadolinium-based contrast administration, axial  and coronal T1-weighted images were obtained.    Head MRA: 3D time-of-flight MRA of the [place] was performed  without intravenous contrast.  Neck MRA:  Limited non contrast 2DTOF images were obtained of the  mid-cervical region. Following intravenous gadolinium-based contrast  administration, a contrast enhanced MRA of the neck/cervical vessels  was performed.  Three-dimensional reconstructions of the neck and head MRA were  created, which were reviewed by the radiologist.    Dose: 5 mL Gadavist    Findings:   Brain MRI: Axial diffusion weighted images demonstrate no definite  acute infarct. On the FLAIR images, there is nonspecific mild  periventricular T2-hyperintensity, which are most likely related to  chronic small vessel ischemic disease. There is no definite  intracranial hemorrhage on susceptibility images. Ventricles are  proportionate to the cerebral sulci. Contrast-enhanced images of the  brain demonstrate no abnormal intra- or extra-axial enhancement.  Bilateral cataract surgery. Fluid in the right mastoid air cells.    Head MRA demonstrates no stenosis of the major intracranial arteries.  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  There is a similar but smaller medial inferiorly directed 1.2 mm  outpouching with a 1.2 mm base along the communicating segment of the  left internal carotid artery. The anterior communicating artery is  patent. Regarding the posterior communicating arteries, these are not  well visualized.    Neck MRA demonstrates patent major cervical arteries. Antegrade flow  in the major cervical vasculature.      Impression:       Impression:  1. No evidence of acute infarction or intracranial hemorrhage.  Leukoaraiosis.  2. No abnormal enhancing lesions intracranially.  3. Head MRA demonstrates tiny aneurysms in the communicating segments  of the bilateral internal carotid arteries. No stenosis of the major  intracranial arteries.  4. Neck MRA demonstrates patent major cervical arteries.    I have personally reviewed the examination and initial interpretation  and I agree with the findings.    LILIBETH LOPEZ MD    Specimen Information    Type Source Collected On                  Testing Performed By     Lab - Abbreviation Name Director Address Valid Date Range    104 - Rad Rslts RADIOLOGY RESULTS Unknown Unknown 02/16/05 1553 - Present            Encounter-Level Documents:     There are no encounter-level documents.      Order-Level Documents:     There are no order-level documents.

## 2017-01-28 NOTE — IP AVS SNAPSHOT
` ` Patient Information     Patient Name Sex     Mary Wang ([id number]) Female 1950       Room Bed    [facility] 6224-01      Patient Demographics     Address Phone E-mail Address    [address] [place] [address] [phone] (Home) *Preferred*  [phone] (Work)  [phone] (Mobile) [email or web address]      Patient Ethnicity & Race     Ethnic Group Patient Race    American White      Emergency Contact(s)     Name Relation Home Work Mobile    César Troy Power of  [phone]    No Secondary Contact Other None        Documents on File        Status Date Received Description       Documents for the Patient    Privacy Notice - [place] Received 14     Face Sheet Received 10/19/09     Insurance Card Received 14 bcbs/med    Patient ID Received 14 [place] 2018    Consent for Services - Hospital/Clinic Received 04/10/12     Consent for Services - Hospital/Clinic  04/10/12 GENERAL CONSENT FORM - ENGLISH    Consent to Communicate Received 12     Consent to Communicate  12 AUTHORIZATION TO DISCUSS PROTECTED HEALTH INFORMATION    Consent for Services - [facility] Received 12 CONSENT    [facility] Specified Other Received 16     Consent for Services - Hospital/Clinic Received 13     Consent for Services - Hospital/Clinic  13 CONSENT FOR SERVICE (SIGNED ON 13)    Consent for Services - Hospital/Clinic Received 14     Consent for Services - Hospital/Clinic Received 14 CONSENT FOR SERVICE    External Medication Information Consent Accepted 14     Insurance Card Received 14 Medicare Card    Insurance Card Received 14 Bc[employer] Federal Card    Business/Insurance/Care Coordination/Health Form - Patient  10/03/14 [employer] CRITICAL LIFE SUSTAINING MEDICAL EQUIPMENT FORM    Consent for Services - Hospital/Clinic Received 04/29/15     Power of  Not Received  Statutory Short form Power of      Advance Directives and Living Will Received 08/03/15 Health Care Directive 07/28/15    Advance Directives and Living Will Not Received  Validation of AD 07/28/15    HIM JOSE MARTIN Authorization - File Only  08/13/15 [facility] JOSE MARTIN Authorization - File Only  09/29/15 [facility] AUTHORIZATION FOR RELEASE OF PROTECTED HEALTH INFORMATION    HIM JOSE MARTIN Authorization - File Only  09/29/15 [facility] / [facility] AUTHORIZATION FOR RELEASE OF PROTECTED HEALTH INFORMATION    Consent for Services/Privacy Notice - Hospital/Clinic Received 01/20/16     Consent for Services/Privacy Notice - Hospital/Clinic  01/20/16 CONSENT FOR SERVICE/PRIVACY NOTICE    HIM JOSE MARTIN Authorization  02/04/16     HIM JOSE MARTIN Authorization  02/05/16     Business/Insurance/Care Coordination/Health Form - Patient  04/05/16 JUANI, PRIOR AUTH APPROVAL- LEVALBUTEROL HCL NEBU, 4/1/16    HIM JOSE MARTIN Authorization  04/25/16     HIM JOSE MARTIN Authorization  05/24/16     HIM JOSE MARTIN Authorization  06/20/16     HIM JOSE MARTIN Authorization  07/12/16 [facility]    Medicare Lifetime Days Consent Received 07/18/16     HIM JOSE MARTIN Authorization  07/21/16     HIM JOSE MARTIN Authorization  07/26/16 [facility]    Consent for Services/Privacy Notice - Hospital/Clinic-Esign Received 01/20/17     Medicare Lifetime Days Consent Received 09/13/16     Medicare Lifetime Days Consent Received 10/03/16     Consent for EHR Access Received 10/30/16     Medicare Lifetime Days Consent Received 11/01/16     Advance Directives and Living Will Received 01/09/17 [facility] 01/06/17    Consent for Services/Privacy Notice - Hospital/Clinic Received 01/25/17     Occupational Therapy Certification Sent 02/02/17 Merry Harris       Documents for the Encounter    CMS IM for Patient Signature Received 01/28/17     Photograph   WOC sacrum      Admission Information     Attending Provider Admitting Provider Admission Type Admission Date/Time    Enrique Gama MD Kaltenborn, Zachary  MD [facility] 01/28/17  1532    Discharge Date Hospital Service Auth/Cert Status Service Area     Internal Medicine Incomplete [facility]    Unit Room/Bed Admission Status    UU U6B 6224/6224-01 Admission (Confirmed)            Admission     Complaint    Acute on chronic respiratory failure with hypoxia (H)      Hospital Account     Name Acct ID Class Status Primary Coverage    Mary Wang [id number] Inpatient Open MEDICARE - MEDICARE            Guarantor Account (for Hospital Account #[id number])     Name Relation to Pt Service Area Active? Acct Type    Kathleen, Mary THUAN Self FCS Yes Personal/Family    Address Phone          [address] [place] [address] [phone](H)              Coverage Information (for Hospital Account #[id number])     1. MEDICARE/MEDICARE     F/O Payor/Plan Precert #    MEDICARE/MEDICARE     Subscriber Subscriber #    Mary Wang [id number]    Address Phone    [employer] CLAIM[employer]  [address]  [place] [address] [phone]          2. BCBS/F[employer]PLOYEE PROGRAM     F/O Payor/Plan Precert #    BCBS/FEDERAL EMPLOYEE PROGRAM     Subscriber Subscriber #    Mary Wang [id number]    Address Phone    [address] [address]  [place] [address] [phone]          3. [facility]/STIVEN CARRERO     F/O Payor/Plan Precert #    STIVEN/STIVEN CARRERO     Subscriber Subscriber #    Mary Wang [id number]    Address Phone    [address]  [place] [id number] [phone]

## 2017-01-29 NOTE — PLAN OF CARE
Problem: Goal Outcome Summary  Goal: Goal Outcome Summary  Outcome: Improving  Pt was lethargic for first half of shift; now is somnolent. Pt incontinent of small to medium soft stools, perineal care provided.. Pt suctioned Q2H for small, thick clear mucus. Tube feeding started at goal rate of 85/hr with 125 flushes. Pt calm nand noncombative. Will continue to monior.

## 2017-01-29 NOTE — PROGRESS NOTES
Pt arrived from ED at this time. Drowsy. WOCN consult ordered for possible skin tear vs. Pressure ulcer on coccyx. Daughter (Hort[place]: [phone]) updated.

## 2017-01-29 NOTE — PROVIDER NOTIFICATION
[facility] notified of pt's bicarbonate of 51. No further orders at this time.

## 2017-01-29 NOTE — PROVIDER NOTIFICATION
Provider notified of pt's critical value of elevated pH, bicarb and CO2 but no new change from prior result. No new order now. Will continue to monitor.

## 2017-01-29 NOTE — PROGRESS NOTES
"[facility], [place]   Internal Medicine Daily Note           Interval History/Events     Hand off taken from admitting team  Overnight events reviewed  RN at bedside.   Not requiring multiple suctioning. Color of sputum is whitish.  Patient reports of shortness of breath but much improved after suctioning. Also information obtained from daughter. Reports she did well after discharge from hospital. Issues with mucus plugging requiring suction in the NH  No chest pain.   No light headeness or dizziness  1+ pre tibial edema.            Review of Systems        4 point ROS including Respiratory, CV, GI and , other than that noted above is negative      Medications   I have reviewed current medications  in the \"current medication\" section of Epic.  Relevant changes include:     Physical Exam   General:       Vital signs:    Blood pressure 135/81, pulse 98, temperature 97.7  F (36.5  C), temperature source Oral, resp. rate 18, height 1.524 m (5'), weight 58 kg (127 lb 13.9 oz), SpO2 96 %, not currently breastfeeding.  Estimated body mass index is 24.97 kg/(m^2) as calculated from the following:    Height as of this encounter: 1.524 m (5').    Weight as of this encounter: 58 kg (127 lb 13.9 oz).      Intake/Output Summary (Last 24 hours) at 01/29/17 1348  Last data filed at 01/29/17 1303   Gross per 24 hour   Intake   1445 ml   Output   4050 ml   Net  -2605 ml      HEENT: No icterus, no pallor  Cardiovascular: S1, S2 normal.   Respiratory:  B/L decreased breath sounds at bases. No wheezing  GI/Abdomen: Soft, NT, BS+  Neurology: Alert, awake, and oriented. Moving all extremitiesNo tremors.   Extremities: 1+ pre tibial edema  Skin:      Laboratory and Imaging Studies     I have reviewed  laboratory and imaging studies in the Epic. Pertinent findings are as below:    BMP  Recent Labs  Lab 01/29/17  0638 01/28/17  1605 01/28/17  1604 01/23/17  0615    136 134 140   POTASSIUM 3.3* 4.1 4.2 " 3.7   CHLORIDE 89* 86*  --  95   IZZY 8.5 8.6  --  7.9*   CO2 >45Critical Value called to and read back byMARY MIGUEL RN 6B 1/29/17 0715 BY SAMIRA* >45Critical Value called to and read back byMATTIE MEJIAS RN [facility] 1655 1/28/2017 BY AA*  --  42*   BUN 28 26  --  19   CR 0.50* 0.47*  --  0.53   * 126* 134* 103*     CBC  Recent Labs  Lab 01/29/17  0638 01/28/17  1605  01/23/17  0615   WBC 9.0 11.1*  --  10.0   RBC 2.96* 3.26*  --  2.99*   HGB 8.7* 9.5*  < > 8.9*   HCT 28.1* 30.9*  --  29.4*   MCV 95 95  --  98   MCH 29.4 29.1  --  29.8   MCHC 31.0* 30.7*  --  30.3*   RDW 15.6* 15.2*  --  15.2*    279  --  205   < > = values in this interval not displayed.  INR  Recent Labs  Lab 01/28/17  1605   INR 0.91     LFTs  Recent Labs  Lab 01/28/17  1605 01/23/17  0615   ALKPHOS 91 82   AST 19 14   ALT 22 21   BILITOTAL 0.2 0.2   PROTTOTAL 6.3* 5.6*   ALBUMIN 2.1* 2.1*      PANC  Recent Labs  Lab 01/28/17  1605   LIPASE 56*           Impression/Plan        Mary Wang is a 66 year old female with a history of severe COPD s/p trach on home vent 24/7 (3-3.5 LPM), anxiety, hypertension, achalasia s/p GJ tube who presented to the hospital with acute on chronic hypoxic and hypercarbic respiratory failure.     *Multiple admissions in last 2 months, 12/1-12/5 for UTI, 12/25-12/28 for bacteremia, 1/2-1/6 with encephalopathy ultimately attributed to her medications, 01/07-01/09 for acute on chronic respiratory failure likely 2/2 COPD exacerbation, 1/20-1/24 for oversedation/aspiration pneumonitis.    #Acute on chronic respiratory failure, severe COPD s/p trach: On ventilator (settings CMV, 400/14/5/40%). Multiple admissions in 6 weeks, see above. For chronic respiratory failure PTA the patient is maintained on chronic prednisone at 20 mg qday, scheduled ipratropium and xoponex nebs, BID Pulmicort, guaifenesin 200 mg QID, bactrim for PCP prophylaxis, allegra BID. Mucous plugging w/ O2 sats dropping to 27%, group home  staff deep suctioned for 15 minutes before sats improved, in 90s on admission. XR chest  w/ mildly increased patchy basilar opacities not significant changed from 01/20 xray- atelectasis vs. Infx. WBC count mildly elevated at 11.1 w/ mildly elevated ANC. Afebrile, normotensive, mildly tachcyardic, sating well on home 2-3 LPM.  Not in any distress. Stable oxygen requirements.   - Will obtain CT angiogram for PE  -Continue home nebulizers  -Continue home steroids w/ PCP prophylaxis    -Trach cares per RT, suctioning per nursing and RT  -Continue anxiolytics and medications for air hunger  -Consider further antibiotics pending clinical course    #Achalasia s/p GJ tube   -Continue tube feeds    #Severe anxiety, depression: Likely due to air hunger from COPD. Psychiatry and Palliative care have seen patient in the past. Continues to have significant anxiety and frequent requests for hospital admission per daughter. PTA maintained on seroquel (50 mg BID), sertraline (150 mg qday), scheduled Klonopin (1.5 mg QID) and PRN ativan (0.5 q3 PRN) & dilaudid (1 mg per G q2 hours PRN) for air hunger. Currently endorses stable symptoms although daughter notes intermittent anxious periods.    -Continue home regimen  -Consider palliative, psychiatry consults  - Palliative on Monday for goals of care    #Urinary retention: Chronic indwelling bender. Last changed 1/23 while inpatient.  -Continue bender    #Normocytic anemia: At baseline.     #CAD: On aspirin and statin. Continue.      # Constipation: On senna and miralax PTA.     # Bilateral UE edema, L>R: PICC in RUE. Otherwise does not appear hypervolemic.    -US to r/o DVT  -SubQ heparin for now    CODE: DNR, trached and on vent chronically. Discussed with Daughter POA on 01/29  FEN: NPO, G tube feeds  DVT: Heparin Q 8h  LINES: Right UE PICC  Disposition/Admission Status: >2 midnights for acute on chronic respiratory failure    Pt's care was discussed with bedside RN, patient and   during Care Team Rounds.               Steven Sousa MD  Hospitalist ( Internal medicine)  Pager: [phone]

## 2017-01-29 NOTE — H&P
[facility] History [facility] Physical  Department of Internal Medicine    Patient Name: Mary Wang MRN# [id number]   Age: 66 year old YOB: 1950     Date of Admission: 1/28/2017    Home clinic: [facility]   Primary care provider: Norman Brito         Assessment and Plan:   Mary Wang is a 66 year old female with a history of severe COPD s/p trach on home vent 24/7 (3-3.5 LPM), anxiety, hypertension, achalasia s/p GJ tube who presented to the hospital with acute on chronic hypoxic and hypercarbic respiratory failure.    *Multiple admissions in last 2 months, 12/1-12/5 for UTI, 12/25-12/28 for bacteremia, 1/2-1/6 with encephalopathy ultimately attributed to her medications, 01/07-01/09 for acute on chronic respiratory failure likely 2/2 COPD exacerbation, 1/20-1/24 for oversedation/aspiration pneumonitis.    #Acute on chronic respiratory failure, severe COPD s/p trach: On ventilator (settings CMV, 400/14/5/40%). Multiple admissions in 6 weeks, see above. For chronic respiratory failure PTA the patient is maintained on chronic prednisone at 20 mg qday, scheduled ipratropium and xoponex nebs, BID Pulmicort, guaifenesin 200 mg QID, bactrim for PCP prophylaxis, allegra BID. S/P 5 days of meropenem (last dose today) for aspiration pneumonitis on admission from 01/20-01/24. New problem today includes mucous plugging w/ O2 sats dropping to 27%, group home staff deep suctioned for 15 minutes before sats improved, in 90s on admission. VBG w/ hypercarbia a bit worse than her baseline. CXR w/ mildly increased patchy basilar opacities not significant changed from 01/20 xray- atelectasis vs. Infx. WBC count mildly elevated at 11.1 w/ mildly elevated ANC. Afebrile, normotensive, mildly tachcyardic, sating well on home 3LPM.  -Continue home nebulizers  -Continue home steroids w/ PCP prophylaxis   -VBG in AM  -Trach cares per RT, suctioning per nursing and RT  -Continue anxiolytics and  medications for air hunger  -Will not continue antibiotics tonight  -Consider further antibiotics pending clinical course, will trend CBC and CRP for AM    #Achalasia s/p GJ tube   -Continue tube feeds    #Severe anxiety, depression: Likely due to air hunger from COPD. Psychiatry and Palliative care have seen patient in the past. Continues to have significant anxiety and frequent requests for hospital admission per daughter. PTA maintained on seroquel (50 mg BID), sertraline (150 mg qday), scheduled Klonopin (1.5 mg QID) and PRN ativan (0.5 q3 PRN) & dilaudid (1 mg per G q2 hours PRN) for air hunger. Currently endorses stable symptoms although daughter notes intermittent anxious periods.   -Continue home regimen  -Consider palliative, psychiatry consult pending symptoms    #Urinary retention: Chronic indwelling bender. Last changed 1/23 while inpatient.  -Continue bender    #Normocytic anemia: At baseline.     #CAD: On aspirin and statin. Continue.      #Constipation: On senna and miralax PTA.     #Bilateral UE edema, L>R: PICC in RUE. Otherwise does not appear hypervolemic.   -US to r/o DVT  -SubQ heparin for now    CODE: DNR, trached and on vent chronically  FEN: NPO, G tube feeds  DVT: Mechanical  LINES: Right UE PICC  Disposition/Admission Status: >2 midnights for acute on chronic respiratory failure, if stable off antibiotics may go back tomorrow (less likely).    Plan of care was discussed with attending physician, Dr. Gama.     Viridiana Rockwell PA-C  Hospitalist Service           Chief Complaint:   Nothing         HPI:   History is obtained from the patient, Daughter, and medical record.     The patient is non verbal and sleepy, doesn't answer questions but frequently reaches out for daughters hand.    The patients daughter notes that she was discharged Tuesday with antiobiotcs through today. She had an episode of mucous plugging on Thursday that improved with suctioning. She had a significant amount of  anxiety yesterday and requested transfer to the hospital, but she was treated supportively at home. The group home staff noted the patient to be hypoxic in the 20s at the group home with tachycardia. Suctioning was attempted and approximately 15 minutes later a mucous plug was suctioned and the patients sats improved to the 90s again. EMS was called where apparently she desated again, was suctioned, on arrival was in the 90s. The daughter notes increased mucous plugging. Otherwise symptoms are stable. She is intermittently somnolent especially after medications.          Review of Systems:   A 10 point ROS was performed and negative unless otherwise noted in HPI.          Past Medical History:   Reviewed and updated in Epic.   Past Medical History   Diagnosis Date     COPD (chronic obstructive pulmonary disease) (H)      trach/vent dependent      Anxiety      TMJ pain dysfunction syndrome      Tracheostomy in place      Hypertension      GERD (gastroesophageal reflux disease)      Achalasia      Lung nodule      spiculated; followed in pulm clinic      Stress-induced cardiomyopathy      Anxiety and depression      Chronic pain             Past Surgical History:   Reviewed and updated in Epic.   Past Surgical History   Procedure Laterality Date     Tracheostomy N/A 8/26/2015     Procedure: TRACHEOSTOMY;  Surgeon: Milena Thibodeaux MD;  [facility]: [facility]     Bronchoscopy, insert bronchial valve Right 9/2/2015     Procedure: BRONCHOSCOPY, INSERT BRONCHIAL VALVE;  Surgeon: Everardo Beach MD;  Location:  OR     Esophagoscopy, gastroscopy, duodenoscopy (egd), combined N/A 12/11/2015     Procedure: COMBINED ESOPHAGOSCOPY, GASTROSCOPY, DUODENOSCOPY (EGD);  Surgeon: Dhruv Lyles MD;  Location:  OR     Esophagoscopy, gastroscopy, duodenoscopy (egd), combined N/A 12/31/2015     Procedure: COMBINED ESOPHAGOSCOPY, GASTROSCOPY, DUODENOSCOPY (EGD);  Surgeon: Brenden Plasencia MD;  Location:  OR      Percutaneous insertion tube jejunostomy N/A 12/31/2015     Procedure: PERCUTANEOUS INSERTION TUBE JEJUNOSTOMY;  Surgeon: Brenden Plasencia MD;  [facility]     Percutaneous insertion tube jejunostomy N/A 1/25/2016     Procedure: PERCUTANEOUS INSERTION TUBE JEJUNOSTOMY;  Surgeon: Carrie Coleman MD;  [facility]     Anesthesia out of or mri N/A 4/18/2016     Procedure: ANESTHESIA OUT OF OR MRI;  Surgeon: GENERIC ANESTHESIA PROVIDER;  [facility] OR     Endoscopic insertion tube gastrostomy N/A 7/9/2016     Procedure: ENDOSCOPIC INSERTION TUBE GASTROSTOMY;  Surgeon: Brenden Plasencia MD;  [facility]     Picc insertion Right 1/9/2017     5fr DL BioFlo PICC, 38cm (1cm external) in the R basilic vein.            Social History:   Reviewed and updated in [facility].   Social History     Social History     Marital Status:      Spouse Name: N/A     Number of Children: N/A     Years of Education: N/A     Occupational History     Not on file.     Social History Main Topics     Smoking status: Former Smoker -- 2.00 packs/day for 40 years     Types: Cigarettes     Start date: 01/01/1964     Quit date: 04/18/1998     Smokeless tobacco: Never Used     Alcohol Use: No     Drug Use: No     Sexual Activity: Not on file     Other Topics Concern     Not on file     Social History Narrative            Family History:   Reviewed and updated in Epic.   Family History   Problem Relation Age of Onset     CANCER Sister             Allergies:      Allergies   Allergen Reactions     Levaquin Other (See Comments)     Delirium     Toro Podhaler [Tobramycin] Other (See Comments)     Severe congestion, SOB      Suprax [Cefixime]      See ceftibuten     Augmentin Nausea     Has tolerated for treatment of UTIs in 2016.     Avelox [Moxifloxacin] Anxiety     Cedax [Ceftibuten] Other (See Comments)     Pain in eyes     Penicillins Itching     Tolerated amoxicillin without issues.     Vantin [Cefpodoxime] Nausea             Medications:     (Not in a hospital admission)          Physical Exam:   Blood pressure 112/63, temperature 98.5  F (36.9  C), temperature source Oral, resp. rate 13, height 1.524 m (5'), weight 58 kg (127 lb 13.9 oz), SpO2 92 %, not currently breastfeeding.  GENERAL: Alert, intermittently lethargic, didn't answer questions.   HEENT: Anicteric sclera. PERRL. Mucous membranes moist and without lesions.   CV: Distant heart sounds, Tachycardic rate, regular rhythm. S1, S2. No murmurs appreciated.   RESPIRATORY: Effort increased on 3 lpm via trach. Lung sounds diffusely diminished with coarse sounds in bases, with no wheezing.   GI: Abdomen soft and non distended, bowel sounds present. No tenderness, rebound, guarding. G tube in place, green bilious drainage in vented G tube bag.   MUSCULOSKELETAL: No joint swelling or tenderness.   NEUROLOGICAL: No focal deficits.   EXTREMITIES: Edema in bilateral UE, L>R, no LE peripheral edema. Intact bilateral pedal pulses.   SKIN: No jaundice. No rashes.   : Avendaño in place.     Lines/Tubes/Drains:   PICC Double Lumen 01/09/17 Right Basilic (Active)   Site Assessment WDL 1/24/2017 12:36 PM   External Cath Length (cm) 1 cm 1/24/2017 12:36 PM   Extremity Circumference (cm) 29 cm 1/9/2017  6:39 PM   Dressing Intervention Chlorhexidine patch;Securing device;New dressing 1/24/2017 12:36 PM   Extravasation? No 1/9/2017  6:39 PM   Dressing Change Due 01/31/17 1/24/2017 12:36 PM   PICC Comment statseal is off 1/22/2017  7:00 AM   Number of days:19            Data:   I personally reviewed the following studies:  CMP, lactic acid, lipase, BNP, trop, glucose, CBC, INR   Unresulted Labs Ordered in the Past 30 Days of this Admission     No orders found from 11/30/2016 to 1/29/2017.

## 2017-01-30 NOTE — PROGRESS NOTES
Care Coordinator- Assessment Note     Admission Date/Time:  1/28/2017  Attending MD:  Enrique Gama*     Data  Chart reviewed, discussed with interdisciplinary team.   Patient was admitted for:   1. Acute and chronic respiratory failure with hypercapnia (H)          History: COPD s/p trach on home vent 24/7, anxiety, hypertension, achalasia     Patient currently admitted with:shortness of breath     RNCC Assessment  Concerns with insurance coverage for discharge needs: None.  Current Living Situation: Patient lives in a group home.  Support System: Supportive  Services Involved: group home care.  Transportation: [place] transportation,  [facility] [phone]  Barriers to Discharge: medical plan of care  Coordination of Care and Referrals: Provided patient/family with options for Home Infusion.        Referrals: Provided patient/family with options for Patient was on service with [facility]  Phone # [phone]--Fax # [phone]. If she goes home on IV antibiotics she will return to their services. I spoke with Arley at [facility] ([phone]), they will get back to me regarding ability to for Aylin to return home today.    Plan  Anticipated Discharge Date:  TBD    Anticipated Discharge Plan:  Home to group home with IV antibiotics and tube feeds.      CTS Handoff completed: dion Seymour RN, BSN, PHN, RNCCPH: [phone]  Pager: [phone]  Covering for : Medicine, RNCC

## 2017-01-30 NOTE — PROGRESS NOTES
[facility]  Patient is currently open to home care services with [facility].  The patient is currently receiving RN,PT and OT       services.  [facility]  and team have been notified of patient admission.  [facility] liaison will continue to follow patient during stay.  If appropriate provide orders to resume home care at time of discharge.    Leela Ortega RN  [facility] Liaison

## 2017-01-30 NOTE — PLAN OF CARE
Problem: Goal Outcome Summary  Goal: Goal Outcome Summary  Outcome: No Change  Pt is disoriented to time; slightly hypertensive this AM but has returned to baseline; NSR - sinus tachycardia; afebrile. Bivona 6.0 in place; spare at bedside. Vent with 2L bleed; sats >96%. Diminished bilaterally; active bowel sounds. G-tube to gravity; J-tube clamped with cyclic feeds at 85/hr. C/o back pain relieved with prn tylenol. +2 edema in BLE. BM x2. Plan for palliative care meeting tomorrow with SW and med team. Will continue to monitor.

## 2017-01-30 NOTE — PLAN OF CARE
Problem: Goal Outcome Summary  Goal: Goal Outcome Summary  Outcome: No Change  /72 mmHg  Pulse 98  Temp(Src) 98.3  F (36.8  C) (Oral)  Resp 16  Ht 1.524 m (5')  Wt 58 kg (127 lb 13.9 oz)  BMI 24.97 kg/m2  SpO2 95%    VSS, afebrile sinus rhythm sinus tachycardiac. Chronic vent, on 2L. Suctioned q2-3 hrs with moderate output.  Pt was lethargic majority of shift, when aroused orientated to self and place. Making frequent demands, putting on call light Q5-15 minutes when awake. X2 loose stools, adequate urine output via bender. G tube to gravity. Cycled tube feeds running at 85ml/hr. Continue to monitor and notify care team of any changes.

## 2017-01-30 NOTE — PROGRESS NOTES
Palliative care consult team request received 01/30 - with agreement of team will complete consult 01/31

## 2017-01-30 NOTE — PROGRESS NOTES
"[facility], [place]   Internal Medicine Daily Note           Interval History/Events     Overnight events reviewed.   Complains of shortness of breath  No pain reported  No fever, chills.  Per RN suctioning requirement has been low, and secretion has been whitish.          Review of Systems        4 point ROS including Respiratory, CV, GI and , other than that noted above is negative      Medications   I have reviewed current medications  in the \"current medication\" section of Epic.  Relevant changes include:     Physical Exam   General:       Vital signs:    Blood pressure 130/77, pulse 98, temperature 98.2  F (36.8  C), temperature source Oral, resp. rate 17, height 1.524 m (5'), weight 58 kg (127 lb 13.9 oz), SpO2 99 %, not currently breastfeeding.  Estimated body mass index is 24.97 kg/(m^2) as calculated from the following:    Height as of this encounter: 1.524 m (5').    Weight as of this encounter: 58 kg (127 lb 13.9 oz).      Intake/Output Summary (Last 24 hours) at 01/29/17 1348  Last data filed at 01/29/17 1303   Gross per 24 hour   Intake   1445 ml   Output   4050 ml   Net  -2605 ml      HEENT: No icterus, no pallor  Cardiovascular: S1, S2 normal.   Respiratory:  B/L decreased breath sounds at bases. No wheezing  GI/Abdomen: Soft, NT, BS+  Neurology: Alert, awake, and oriented. Moving all extremitiesNo tremors.   Extremities: 1+ pre tibial edema  Skin:      Laboratory and Imaging Studies     I have reviewed  laboratory and imaging studies in the Epic. Pertinent findings are as below:    BMP    Recent Labs  Lab 01/30/17  0917 01/29/17  0638 01/28/17  1605 01/28/17  1604    139 136 134   POTASSIUM 3.2* 3.3* 4.1 4.2   CHLORIDE 90* 89* 86*  --    IZZY 9.0 8.5 8.6  --    CO2 40* >45Critical Value called to and read back byMORRIS MIGUEL RN 6B 1/29/17 0715 BY SAMIRA* >45Critical Value called to and read back byMATTIE MEJIAS RN UER 1655 1/28/2017 BY AA*  --    BUN 26 28 26  --  "   CR 0.52 0.50* 0.47*  --    * 114* 126* 134*     CBC    Recent Labs  Lab 01/30/17  0917 01/29/17  0638 01/28/17  1605   WBC 11.9* 9.0 11.1*   RBC 2.98* 2.96* 3.26*   HGB 8.7* 8.7* 9.5*   HCT 28.5* 28.1* 30.9*   MCV 96 95 95   MCH 29.2 29.4 29.1   MCHC 30.5* 31.0* 30.7*   RDW 15.6* 15.6* 15.2*    300 279     INR    Recent Labs  Lab 01/28/17  1605   INR 0.91     LFTs    Recent Labs  Lab 01/28/17  1605   ALKPHOS 91   AST 19   ALT 22   BILITOTAL 0.2   PROTTOTAL 6.3*   ALBUMIN 2.1*      PANC    Recent Labs  Lab 01/28/17  1605   LIPASE 56*           Impression/Plan        Mary Wang is a 66 year old female with a history of severe COPD s/p trach on home vent 24/7 (3-3.5 LPM), anxiety, hypertension, achalasia s/p GJ tube who presented to the hospital with acute on chronic hypoxic and hypercarbic respiratory failure.     *Multiple admissions in last 2 months, 12/1-12/5 for UTI, 12/25-12/28 for bacteremia, 1/2-1/6 with encephalopathy ultimately attributed to her medications, 01/07-01/09 for acute on chronic respiratory failure likely 2/2 COPD exacerbation, 1/20-1/24 for oversedation/aspiration pneumonitis.    #Acute on chronic respiratory failure, severe COPD s/p trach: On ventilator (settings CMV, 400/14/5/40%). Multiple admissions in 6 weeks, see above. For chronic respiratory failure PTA the patient is maintained on chronic prednisone at 20 mg qday, scheduled ipratropium and xoponex nebs, BID Pulmicort, guaifenesin 200 mg QID, bactrim for PCP prophylaxis, allegra BID. Mucous plugging w/ O2 sats dropping to 27%, group home staff deep suctioned for 15 minutes before sats improved, in 90s on admission. XR chest  w/ mildly increased patchy basilar opacities not significant changed from 01/20 xray- atelectasis vs. Infx. WBC count mildly elevated at 11.1 w/ mildly elevated ANC. Afebrile, normotensive, mildly tachcyardic, sating well on home 2-3 LPM. CT Angiogram was negative for Pulmonary embolism. She is  currently at her baseline oxygen requirements. CT did show some right lung opacity as she is improving without antibiotics will continue to hold antibiotics (low threshold to start)  -Continue home nebulizers  -Continue home steroids w/ PCP prophylaxis    - Hypertonic saline nebs, and NAC nebs   -Trach cares per RT, suctioning per nursing and RT  -Continue anxiolytics and medications for air hunger  -Consider further antibiotics pending clinical course    #Achalasia s/p GJ tube   -Continue tube feeds    #Severe anxiety, depression: Likely due to air hunger from COPD. Psychiatry and Palliative care have seen patient in the past. Continues to have significant anxiety and frequent requests for hospital admission per daughter. PTA maintained on seroquel (50 mg BID), sertraline (150 mg qday), scheduled Klonopin (1.5 mg QID) and PRN ativan (0.5 q3 PRN) & dilaudid (1 mg per G q2 hours PRN) for air hunger. Currently endorses stable symptoms although daughter notes intermittent anxious periods.    -Continue home regimen  - Will consider psychiatry consult    # Pulmonary nodule: Seen on CT 01/29. 1 cm right lower lobe lung nodule, slightly more prominent than prior  - Recommend short-term CT chest follow-up and possible evaluation in lung nodule clinic    # Urinary retention: Chronic indwelling bender. Last changed 1/23 while inpatient.  -Continue bender    # Normocytic anemia: At baseline.     # CAD: On aspirin and statin. Continue.      # Constipation: On senna and miralax PTA.     # Bilateral UE edema, L>R: PICC in RUE. Otherwise does not appear hypervolemic.    US RUE on 01/29 negative for DVT  -SubQ heparin for now    CODE: DNR, trached and on vent chronically. Discussed with Daughter POA on 01/29  FEN: NPO, G tube feeds  DVT: Heparin Q 8h  LINES: Right UE PICC  Goals of care: Ms. Wang has been repeatedly hospitalized and unable to have any quality of life. Will obtain Palliative care consult, and care conference in mid  week to see if we can focus more on palliative/hospice care.   Disposition/Admission Status: >2 midnights for acute on chronic respiratory failure    Pt's care was discussed with bedside RN, patient and  during Care Team Rounds.               Steven Sousa MD  Hospitalist ( Internal medicine)  Pager: [phone]

## 2017-01-30 NOTE — PROGRESS NOTES
[facility] Nurse Inpatient Adult Pressure INJURY (PI) Wound Assessment     Initial assessment of PU(s) on pt's:   Sacrum and Left upper inner thigh     Data:   Patient History:      per MD note(s):  66 year old female with a history of severe COPD s/p trach on home vent  (3-3.5 LPM), anxiety, hypertension, achalasia s/p GJ tube who presented to the hospital with acute on chronic hypoxic and hypercarbic respiratory failure.     *Multiple admissions in last 2 months, - for UTI, - for bacteremia, - with encephalopathy ultimately attributed to her medications, - for acute on chronic respiratory failure likely 2/2 COPD exacerbation, - for oversedation/aspiration pneumonitis. chronic hybercarbic hypoxic respiratory failure admitted from her  today after a sudden and severe worsening of her hypoxic failure   Here to assess sacrum for pressure injury that was present on admission. Pressure injury has deteriorated since last discharge     Current mattress:  AtmosAir  Current pressure relieving devices:  Pillows (Patient refuses foam heel lift boots    Moisture Management:  Incontinence Protocol, Rectal Pouch and Urinary Catheter    Catheter secured? Yes    Current Diet / Nutrition:           Active Diet Order  NPO Time Specified Ice Chips        Jose F Assessment and sub scores:   Jose F Score  Av.6  Min: 12  Max: 16   Labs:   Recent Labs   Lab Test  17   0917  17   0638  17   1605   16   0331   ALBUMIN   --    --   2.1*   < >   --    HGB  8.7*  8.7*  9.5*   < >  8.4*   INR   --    --   0.91   < >   --    WBC  11.9*  9.0  11.1*   < >  11.8*   A1C   --    --    --    --   5.1   CRP   --   83.0*   --    < >   --     < > = values in this interval not displayed.                                                                                                                          Pressure Injury Assessment  (location #1):   Sacrum       1/30/17 sacral pressure injury    Wound History:   Present on admission, has deteriorated since last discharge    Wound Base: Purple non blanchable erythema ,  non-blanchable erythema and epidermis, dry    Specific Dimensions (length x width x depth, in cm) :   1.5 x 1.7 x 0 cm    Palpation of the wound bed:  Firm over bone    Periwound Skin: intact and exfoliating, pink, blanchable epidermis     Drainage:  none      Pain:  minimal ,     Pressure Injury Assessment  (location #2):   Left inner thigh    Wound History:   Present on admission  Appears to be device related (catheter or diaper    Wound Base: Red non blanchable erythema ,  epidermis, dry    Specific Dimensions (length x width x depth, in cm) :   12 x 0.3 x 0 cm    Palpation of the wound bed:  normal    Periwound Skin: intact,      Drainage:  none  Amount:     Pain:  absent ,              Intervention:     Patient's chart evaluated.      Jose F Interventions:  Current Jose F Interventions and Care Plan reviewed and updated, appropriate at this time.    Wound was assessed.    Wound Care: was done: Removal of existing dressing    Visual inspection    Cleansing with NS solution    Application of clean dressing,    Orders  Written    Supplies  N/A    Discussed plan of care with Nurse           Assessment:     Pressure Injury (PI) located on Sacrum: Suspected DTI    Status: wound  initial assessment and is evolving, Worsening    Symptomatic      Pressure Injury (PI) located on Left Inner Thigh: I    Status: wound  initial assessment, appears Stable    Symptomatic    Both pressure injuries present on admission         Plan:     Nursing to notify the Provider(s) and re-consult the [facility] Nurse if wound(s) deteriorate(s).    Plan of care for wound located on Sacrum: Change dressing every 2 days, Clean with Microklenz, Cover with Mepilex Border Dressing    WO Nurse will return: Friday  Face to face time: 15 minutes

## 2017-01-30 NOTE — PLAN OF CARE
Problem: Goal Outcome Summary  Goal: Goal Outcome Summary  Outcome: Improving  Pt is disoriented to place and situation; more alert this afternoon but was lethargic this AM. BPs stable; NSR 80s at rest - sinus tachycardia low 100s - one teens; afebrile. Bivona 6.0 present; 1.5L - 3L via vent; suctioned q3-4h; white/thick secretions; one mucus plug pulled this AM. Diminished lung sounds bilaterally. Bowel sounds active; G-tube to gravity; bender catheter in place; great UOP. BM x2; soft brown/tan. CT was negative; US negative.  Will continue to monitor.

## 2017-01-31 NOTE — CONSULTS
[facility]  Palliative Care Consultation         Mary Wang MRN# [id number]   Age: 66 year old YOB: 1950   Date of Admission: 1/28/2017    Reason for consult: Symptom management  Goals of care  Decisional support  Patient and family support       Requesting physician/service: Steven Sousa MD       Recommendations        Goals of Care  Visit today with Palliative Care Clinical , Madelyn, and family including César- daughter, Uli- son, Dorota- daughter-in-law, and Brent- patients . Patient was able to make her needs known and has the ability in my assessment to understand and appreciate her current medical situation. She clearly states that she no longer would like to come back to the hospital. She understands that this could mean that she has a significant event that it could end her life. We discussed her wishes if she were to have a significant event, such as a mucus plug, that her wish would be to have comfort medications provided and receive interventions such as suctioning to help her feel better, knowing that it could end her life. In my assessment, her symptoms at the moment seem to be much improved and she is very alert and clear in her interactions with myself and her family.   -Ongoing planning and coordination for her going back to the group home with plan for no more hospitalizations and to manage her symptoms at the group home  -Discussed with Chelsie Sanches, the Lead Director of the home care agency and Arley  with [facility] updates to her plan of care    POLST -form was updated and discussed, patient and family members present were able to participate in the completion of the form and all in agreement with the document and that it is what Mary has stated that her wishes are.    LÁZARO Villarreal CNS  Palliative Care Consult Team  Pager: [phone]    120 minutes spent with patient, with  ">50% counseling and in care coordination.   Face to face- [phone]       Disease Process/es & Symptoms     Acute on chronic respiratory failure, severe COPD s/p trach  Achalasia s/o GJ tube  Severe anxiety and depression  Pulmonary nodule  Urinary retention  Normocytic anemia  CAD  Constipation  Bilateral UE edema       Support/Coping   (We typically ask each of our patients the following questions:)    How do you make sense of this? Not discussed  Are you Mandaeism? Spiritual? Not so much? Not discussed  Are you at peace? Not discussed  What are your concerns, medical and non-medical, that are not being addressed? None  Who are your \"go-to\" people when you need support? Family    Plan for psychosocial/spiritual support/follow-up from palliative team: Will discuss case during palliative interdisciplinary team rounds and involve LICSW and  as needed.       Decision-Making & Goals of Care     Discussion/counseling today about prognosis/goals of care/decisions:   See above  Patient has a completed health care directive available in the chart (Y/N): Yes  Health care agent (only if patient has an available, complete HCD): César Troy  Physician orders for life-sustaining treatment (POLST) form is on file  Code Status: Do not resuscitate   Patient has decision-making capacity (Y/N): Yes    Coordination of Care     Findings & plan of care discussed with: Zak Combs, Dr. Chavis, bedside RN,   Follow-up plan from palliative team: will continue to follow for further care coordination and recommendations for symptom management.  Thank you for involving us in the patient's care.           Chief Complaint     Goals of care         History of Present Illness     History obtained from: chart review    Mary Wang is a 65 yo woman with chronic hybercarbic hypoxic respiratory failure admitted from her  today after a sudden and severe worsening of her hypoxic failure which  improved after aggressive suctioning. She " recently started having her medical management with the complex care clinic and recently had palliative home care consultation. She has had 16 admissions over the last year with more frequent as below. She has historically always wanted to continue to come back to the hospital and is known to the Palliative care team for management of anxiety and air hunger. She now has elevated WBCs and additional evaluation is in progress.    Her hospital admissions include most recently: 12/01/2016-12/05/2016, 12/25/2016-12/28/2016, 1/02/2017-1/06/2017, 1/7/2017-1/10/2017, 1/20/2017-1/24/2017.     Palliative Care team consulted 1/30 for goals of care          Past Medical History:   I have reviewed this patient's past medical history  This patient  has a past medical history of COPD (chronic obstructive pulmonary disease) (H); Anxiety; TMJ pain dysfunction syndrome; Tracheostomy in place; Hypertension; GERD (gastroesophageal reflux disease); Achalasia; Lung nodule; Stress-induced cardiomyopathy; Anxiety and depression; and Chronic pain.           Past Surgical History:   I have reviewed this patient's past surgical history  This patient  has past surgical history that includes Tracheostomy (N/A, 8/26/2015); Bronchoscopy, Insert Bronchial Valve (Right, 9/2/2015); Esophagoscopy, gastroscopy, duodenoscopy (EGD), combined (N/A, 12/11/2015); Esophagoscopy, gastroscopy, duodenoscopy (EGD), combined (N/A, 12/31/2015); Percutaneous insertion tube jejunostomy (N/A, 12/31/2015); Percutaneous insertion tube jejunostomy (N/A, 1/25/2016); Anesthesia out of OR MRI (N/A, 4/18/2016); Endoscopic insertion tube gastrostomy (N/A, 7/9/2016); and picc insertion (Right, 1/9/2017).            Social/Spiritual History:     Living situation: group home  Family system: daughter César, - Brent, Uli- son, Dorota-   Functional status (needs help with ADLs or IADLs): not discussed  Employment/education: not discussed  Use of community resources:  home health care involvement, receives 24/7 care nursing  Activities/interests: not discussed  History of substance use/abuse: Former smoker            Family History:   The patient has no family status information on file.               Allergies:   All allergies reviewed and addressed  This patient is allergic to is allergic to levaquin; sarabjit podhaler; suprax; augmentin; avelox; cedax; penicillins; and vantin.           Medications:   I have reviewed this patient's medication profile and medications during this hospitalization    Medications of Note  Clonazepam 1.5 mg 4 times a day  Gabapentin 300 mg 3 times a day  Guaifenesin 200 mg 4 times a day  Melatonin 3 mg at bedtime  Polyethylene glycol 17 g 2 times a day  Prednisone 60 mg daily  Quetiapine 50 my BID  Senna-docusate 2 tablets 2 times a day  Sertraline 150 mg daily  Acetaminophen 650 mg q4h PRN- x1 yesterday  Bisacodyl suppository 10 mg daily PRN  Calcium carbonate 500 mg q2h PRN- x1 yesterday  Hydromorphone 1 mg q2h PRN  Lorazepam 0.5 mg q3h PRN- x2  Ondansetron 4 mg q4h PRN           Review of Systems:   The comprehensive review of systems is negative other than noted here and in the HPI.    Palliative Symptom Review (0=no symptom/no concern, 1=mild, 2=moderate, 3=severe):      Denies symptoms at this time            Physical Exam:   Vitals were reviewed  Temp: 99.7  F (37.6  C) Temp src: Axillary BP: 107/65 mmHg   Heart Rate: 91 Resp: 28 SpO2: 97 % O2 Device: Mechanical Ventilator Oxygen Delivery: 2 LPM  Constitutional: Awake, alert, cooperative, no apparent distress  Lungs: No increased work of breathing, good air exchange  Musculoskeletal: generalized weakness  Neurologic: Awake, alert, oriented to name, place and time.  Answers questions appropriately through mouthing words and nodding yes and no consistently.  Neuropsychiatric: calm, normal orientation, memory and insight.           Data Reviewed:     ROUTINE ICU LABS (Last four results)  CMP  Recent  Labs  Lab 01/31/17  0305 01/30/17  0917 01/29/17  0638 01/28/17  1605 01/28/17  1604   NA  --  136 139 136 134   POTASSIUM 4.0 3.2* 3.3* 4.1 4.2   CHLORIDE  --  90* 89* 86*  --    CO2  --  40* >45Critical Value called to and read back byMORRIS MIGUEL RN 6B 1/29/17 0715 BY SAMIRA* >45Critical Value called to and read back byMATTIE MEJIAS RN UER 1655 1/28/2017 BY AA*  --    ANIONGAP  --  6 Not Calculated Not Calculated  --    GLC  --  163* 114* 126* 134*   BUN  --  26 28 26  --    CR  --  0.52 0.50* 0.47*  --    GFRESTIMATED  --  >90Non  GFR Calc >90Non  GFR Calc >90Non  GFR Calc  --    GFRESTBLACK  --  >90African American GFR Calc >90African American GFR Calc >90African American GFR Calc  --    IZZY  --  9.0 8.5 8.6  --    PROTTOTAL  --   --   --  6.3*  --    ALBUMIN  --   --   --  2.1*  --    BILITOTAL  --   --   --  0.2  --    ALKPHOS  --   --   --  91  --    AST  --   --   --  19  --    ALT  --   --   --  22  --      CBC  Recent Labs  Lab 01/30/17  0917 01/29/17  0638 01/28/17  1605 01/28/17  1604   WBC 11.9* 9.0 11.1*  --    RBC 2.98* 2.96* 3.26*  --    HGB 8.7* 8.7* 9.5* 10.2*   HCT 28.5* 28.1* 30.9*  --    MCV 96 95 95  --    MCH 29.2 29.4 29.1  --    MCHC 30.5* 31.0* 30.7*  --    RDW 15.6* 15.6* 15.2*  --     300 279  --      INR  Recent Labs  Lab 01/28/17  1605   INR 0.91     Arterial Blood Gas  Recent Labs  Lab 01/29/17  0638 01/28/17  2221   O2PER 2L 3L

## 2017-01-31 NOTE — PROGRESS NOTES
Hand off taken from Dr Sousa   No new issues reported per patient   Continues to be anxious( although better )  Spoke with nurse   On Exam ;     Alert and oriented . anxious  Vital signs:  Temp: 98.4  F (36.9  C) Temp src: Oral BP: (!) 173/98 mmHg   Heart Rate: 108 Resp: 24 SpO2: 95 % O2 Device: Mechanical Ventilator Oxygen Delivery: 2 LPM Height: 152.4 cm (5') Weight: 57.607 kg (127 lb) (bed scale)  Estimated body mass index is 24.8 kg/(m^2) as calculated from the following:    Height as of this encounter: 1.524 m (5').    Weight as of this encounter: 57.607 kg (127 lb).      Neck ; supple , no JVD  Chest ; reduces bilaterally with bibasilar rales  CVS; s1 and s2 and tachy  GI ; soft abdomen, non tender, BS positive. FT  Ext ; trace edema , no cynosis  Psych ; appropriate mood and effect, anxious  Skin ; no rash or purpura.  Labs;  A/P:  Mary Wang is a 66 year old female with a history of severe COPD s/p trach on home vent 24/7 (3-3.5 LPM), anxiety, hypertension, achalasia s/p GJ tube who presented to the hospital with acute on chronic hypoxic and hypercarbic respiratory failure.     *Multiple admissions in last 2 months, 12/1-12/5 for UTI, 12/25-12/28 for bacteremia, 1/2-1/6 with encephalopathy ultimately attributed to her medications, 01/07-01/09 for acute on chronic respiratory failure likely 2/2 COPD exacerbation, 1/20-1/24 for oversedation/aspiration pneumonitis.    Leucocytosis : check ua and uc and bcx and sputum cx. Will hold off abx for now as clinically seems to be doing well ? Stress response .   Low threshold for starting pip/tazo and vanco if worsening     #Acute on chronic respiratory failure, severe COPD s/p trach: On ventilator (settings CMV, 400/14/5/40%). Multiple admissions in 6 weeks, see above. For chronic respiratory failure PTA the patient is maintained on chronic prednisone at 20 mg qday, scheduled ipratropium and xoponex nebs, BID Pulmicort, guaifenesin 200 mg QID, bactrim for PCP  prophylaxis, allegra BID. Mucous plugging w/ O2 sats dropping to 27%, group home staff deep suctioned for 15 minutes before sats improved, in 90s on admission. XR chest  w/ mildly increased patchy basilar opacities not significant changed from 01/20 xray- atelectasis vs. Infx.  Afebrile, normotensive, mildly tachcyardic, sating  on home 2-3 LPM. CT Angiogram was negative for Pulmonary embolism. She is currently at her baseline oxygen requirements. CT did show some right lung opacity as she is improving without antibiotics will continue to hold antibiotics (low threshold to start)  -Continue home nebulizers  -Continue home steroids w/ PCP prophylaxis    - Hypertonic saline nebs, and NAC nebs    -Trach cares per RT, suctioning per nursing and RT  -Continue anxiolytics and medications for air hunger  -Consider further antibiotics pending clinical course    #Achalasia s/p GJ tube   -Continue tube feeds    #Severe anxiety, depression: Likely due to air hunger from COPD. Psychiatry and Palliative care have seen patient in the past. Continues to have significant anxiety and frequent requests for hospital admission per daughter. PTA maintained on seroquel (50 mg BID), sertraline (150 mg qday), scheduled Klonopin (1.5 mg QID) and PRN ativan (0.5 q3 PRN) & dilaudid (1 mg per G q2 hours PRN) for air hunger. Currently endorses stable symptoms although daughter notes intermittent anxious periods.    -Continue home regimen  - palliative to meet daughter César today 1/31     # Pulmonary nodule: Seen on CT 01/29. 1 cm right lower lobe lung nodule, slightly more prominent than prior  - Recommend short-term CT chest follow-up and possible evaluation in lung nodule clinic    # Urinary retention: Chronic indwelling bender. Last changed 1/23 while inpatient.  -Continue bender    # Normocytic anemia: At baseline.     # CAD: On aspirin and statin. Continue.      # Constipation: On senna and miralax PTA.     # Bilateral UE edema, L>R: PICC  in RUE. Otherwise does not appear hypervolemic.    US RUE on 01/29 negative for DVT  -SubQ heparin for now    CODE: DNR, trached and on vent chronically. Discussed with Daughter POA on 01/29  FEN: NPO, G tube feeds  DVT: Heparin Q 8h  LINES: Right UE PICC  Goals of care: Ms. Wang has been repeatedly hospitalized and unable to have any quality of life. Will obtain Palliative care consult, and care conference in mid week to see if we can focus more on palliative/hospice care.   Disposition/Admission Status: >2 midnights for acute on chronic respiratory failure

## 2017-01-31 NOTE — PLAN OF CARE
Problem: Goal Outcome Summary  Goal: Goal Outcome Summary  Outcome: No Change  /65 mmHg  Pulse 98  Temp(Src) 99.7  F (37.6  C) (Axillary)  Resp 28  Ht 1.524 m (5')  Wt 57.607 kg (127 lb)  BMI 24.80 kg/m2  SpO2 97%    Neuro: Alert.  Disoriented to time.  Forgetful.  Slightly agitated with cares.  Cardiac: SR/ST. VSS.             Respiratory: Sating mid-upper 90's on vent with 2-4 L bled in.  Suctioned x 4.    GI/: Adequate urine output. BM X3  Diet/appetite: NPO.  Cylcled TF 85 mL/hr.  G-tube to gravity.  J-tube clamped.    Activity:  Repositioned q 2.    Pain: Denies.  Skin: Dressings on coccyx and arm intact.  Continue to monitor.      R: Continue with POC.  Notify primary team with changes.

## 2017-01-31 NOTE — PLAN OF CARE
Problem: Goal Outcome Summary  Goal: Goal Outcome Summary  Outcome: No Change  /98 mmHg  Pulse 98  Temp(Src) 98.4  F (36.9  C) (Oral)  Resp 24  Ht 1.524 m (5')  Wt 57.607 kg (127 lb)  BMI 24.80 kg/m2  SpO2 95%    Neuro: Alert, forgetful of time.   Cardiac: SR. Elevated BP. VSS.         Respiratory: Sating 94% on 2L vented to trach. Suctioned 4x this shift.   GI/: Adequate urine output. BM X4.  Diet/appetite: Tolerating cycled tube feed, requested medication for stomach following stools due to upset tummy.   Activity:  Assist of 2 for turning.   Pain: Denied Pain.   Skin: Dressings on arm and coccyx intact. Continue to monitor.    R: Continue with POC. Notify primary team with changes.

## 2017-01-31 NOTE — PROGRESS NOTES
"Palliative Care Inpatient Clinical Social Work Assessment    Patient Information:  Mary is a 66 year old woman with acute on chronic respiratory failure and severe COPD status post tracheostomy currently living in a group home with 24 hour support on her ventilator.     Summary of Visit:  I met with Mary and her family today along with Maddie Santiago, Palliative Care NP. Her daughter César, , son Uli and Uli's wife Dorota were also present.    Goals/Decision Making/Advance Care Planning   Preferences:  Mary was able to indicate that if an \"urgent event\" were to happen at her group home, she would not want to be transported to the hospital. Her daughter gave the example of another mucous plug, which was the event that brought her into the hospital this admission. We explained to Mary the options of 1) returning to the hospital as she has been doing now or 2) focusing more on comfort and not returning to the hospital in the event of a mucous plug, knowing that if this were managed at the group home one outcome might be that she could die but be kept comfortable throughout the episode. Today she clearly indicated that she would not want to return to the hospital if possible. Her family was present and all heard this wish expressed. It was made clear that this could mean that she would die at her group home and she calmly expressed understanding.   Concerns:  Edenilson Lindsey concerned about protocol that group home would take if another emergent event, like a mucous plug, were to happen. Maddie Santiago, Palliative NP, planned to speak with the group home directly about who they would call during an emergent event besides 911. Because Mary receives 24 hour care in a group home, she and her family would not want to pursue a hospice plan of care. She would like to remain ventilated without any plans to wean the ventilator. They have already had a hospice consult/discussion and decided that " hospice would not meet her goals. It seems like either calling [facility] (nursing service at her group home) or the Complex Care Team in an emergency are two reasonable options. Maddie Santiago, Palliative NP, plans to follow up with group home.   Documents:  Detailed POLST form will be completed before discharge. This document has been started yet not completed pending detailed discussions with the group home regarding orders and instructions needed in emergent situations of discomfort at the group home.   Decision Making Issues:  Mary was alert, engaged and able to actively communicate her wishes today. Her daughter César, , son Uli and daughter in law Dorota were all present for this conversation and in agreement to follow Mary's wishes.     Comments:  Mary was calm for the duration of the 60 minute visit today. She was breathing comfortably and able to express her wishes. Today was the most alert and calm that I have ever seen Mary. When asked what has changed Mary did not comment. Her daughter César noted that the nurses at the group home have been siting with her, holding her hand and rubbing her hair. She also says that Mary is much more calm when her CO2 levels are under control. Plan for now is to continue hospital cares with no changes to current plan. She would like to feel as good as possible before returning to group home, with the understanding that she will likely not return to the hospital.    Resource Needs     Discharge Planning:  Per Unit/Program  and/or Care Coordinator   Other:      Comments:  Ongoing discussions with the group home regarding how Mary can remain in the group home on the ventilator with a comfort focused plan of care rather than returning to the hospital.    Sources of Information   Patient:  Mary   Family:  , daughter César, son Uli and daughter in law Dorota   Staff:  Maddie Santiago Palliative NP   Chart Review:  Recent  nursing notes   Other:       Intervention (Check all that apply)    X   Assessment of palliative specific issues          Introduction of Palliative clinical social work interventions        Adjustment to illness counseling    X   Advanced care planning    X   Attended/participated in care conference        Behavioral interventions for symptom management        Facilitation of processing of thoughts/feelings        Family communication facilitated        Grief counseling    X   Goals of care discussion/facilitation        Life legacy work        Life review facilitation        Psychoeducation        Re-framing        Resource referral        Other:       Comments:  All present were engaged and receptive during this goals of care discussion. Her daughter César and son Uli were talkative and expressed support to Mary. Her  did not talk during the discussion but indicated agreement and support. Mary communicated by mouthing words and was engaged and alert for the duration of the visit.    Plan:  I will continue assessment/intervention as indicated.       [facility], [facility]  Palliative Care Clinical   Pager [phone]

## 2017-01-31 NOTE — PLAN OF CARE
Problem: Goal Outcome Summary  Goal: Goal Outcome Summary       Has been alert and oriented with intermittent confusion. Vs stable , no acute change noted. Daughters at bedside this evening and they are supportive. Bed alarm on and functioning. Pt able to use call light to let her needs known.

## 2017-02-01 NOTE — PROGRESS NOTES
[facility], [place]   Palliative Care Daily Progress Note          Recommendations, Patient/Family Counseling & Coordination     Goals of Care  Visited with Mary today. She wonders about her getting back home. She continues to not want to come back to the hospital per our previous discussion. She does seem to be worried about her daughter not being able to find where she is because Mary states that her room number has changed. Also Mary attempts to mouth words and I think that she is trying to figure out if something in her throat has changed, she does state that she has pain. Discussed with Blaire Jones, unit  and Alisa Seymour for additional assistance with care coordination. Discussed with RN, Clair who works with Dr. Brito about the update in Louann plan of care. Discussed with Dr. Chavis and will continue to work on a plan of care for Mary to be successful and remain out of the hospital.  -Ongoing support and coordination to move forward with plan for Mary to remain out of the hospital  -Plan to discuss with Dr. Brito ability to pursue plan of care and assess for need of additional support    POLST -in paper chart     Thank you for the opportunity to continue to participate in the care of this patient and family.  Please feel free to contact on-call palliative provider with any emergent needs.  We can be reached via team pager [phone] (answered 8-4:30 Monday-Friday); after-hours answering service ([phone]); Main Palliative Clinic - [facility]: [phone].     LÁZARO Villarreal CNS  Palliative Care Consult Team  Pager: [phone]    25 minutes spent with patient, with >50% counseling and in care coordination.           Assessment      1) Diagnoses & symptoms:        Acute on chronic respiratory failure, severe COPD s/p trach  Achalasia s/o GJ tube  Severe anxiety and depression  Pulmonary nodule  Urinary retention  Normocytic  anemia  CAD  Constipation  Bilateral UE edema     2)  Psychosocial/Spiritual Needs:   Ongoing:Will discuss case during palliative interdisciplinary team rounds and involve LICSW and  as needed.        Is new assessment/intervention required by palliative team?:  No         Interval History:   Returned to somewhat of Mary's baseline, had overnight anxiety and pain requiring medications.           Review of Systems:   Palliative Symptom Review (0=no symptom/no concern, 1=mild, 2=moderate, 3=severe):      Pain: 0      Fatigue: 0      Nausea: 0      Constipation: 0      Diarrhea: 0      Depressive Symptoms: 0      Anxiety: 1      Drowsiness: 0      Poor Appetite: 0      Shortness of Breath: 0      Insomnia: 0                Medications:   I have reviewed this patient's medication profile and medications given in past 24 hours.    Medications of Note  Clonazepam 1.5 mg 4 times a day  Gabapentin 300 mg 3 times a day  Guaifenesin 200 mg 4 times a day  Melatonin 3 mg at bedtime  Polyethylene glycol 17 g 2 times a day  Prednisone 60 mg daily  Quetiapine 50 my BID  Senna-docusate 2 tablets 2 times a day  Sertraline 150 mg daily  Acetaminophen 650 mg q4h PRN  Bisacodyl suppository 10 mg daily PRN  Calcium carbonate 500 mg q2h PRN- x1   Hydromorphone 1 mg q2h PRN- x1  Lorazepam 0.5 mg q3h PRN- x2 today  Ondansetron 4 mg q4h PRN              Physical Exam:   Vitals were reviewed  Temp: 98.2  F (36.8  C) Temp src: Oral BP: 130/73 mmHg   Heart Rate: 96 Resp: 14 SpO2: 95 % O2 Device: Mechanical Ventilator Oxygen Delivery: 2 LPM  Constitutional: Awake, alert, cooperative, no apparent distress  Lungs: No increased work of breathing, on ventilator with good oxygenation  Neurologic: Awake, alert, responds to question appropriately.  Answers questions by mouthing words.  Neuropsychiatric: Normal affect, mood, orientation, memory and insight.             Data Reviewed:   ROUTINE ICU LABS (Last four results)  CMP  Recent  Labs  Lab 02/01/17  0543 01/31/17  1302 01/31/17  0747 01/31/17  0305 01/30/17  0917 01/29/17  0638 01/28/17  1605     --  135  --  136 139 136   POTASSIUM 3.7 3.9 5.9* 4.0 3.2* 3.3* 4.1   CHLORIDE 90*  --  94  --  90* 89* 86*   CO2 41*  --  39*  --  40* >45Critical Value called to and read back byMORRIS MIGUEL RN 6B 1/29/17 0715 BY SAMIRA* >45Critical Value called to and read back byMATTIE MEJIAS RN UER 1655 1/28/2017 BY VALENTINE*   ANIONGAP 5  --  2*  --  6 Not Calculated Not Calculated   *  --  153*  --  163* 114* 126*   BUN 21  --  19  --  26 28 26   CR 0.55  --  0.37*  --  0.52 0.50* 0.47*   GFRESTIMATED >90Non  GFR Calc  --  >90Non  GFR Calc  --  >90Non  GFR Calc >90Non  GFR Calc >90Non  GFR Calc   GFRESTBLACK >90African American GFR Calc  --  >90African American GFR Calc  --  >90African American GFR Calc >90African American GFR Calc >90African American GFR Calc   IZZY 8.8  --  8.7  --  9.0 8.5 8.6   PROTTOTAL  --   --   --   --   --   --  6.3*   ALBUMIN  --   --   --   --   --   --  2.1*   BILITOTAL  --   --   --   --   --   --  0.2   ALKPHOS  --   --   --   --   --   --  91   AST  --   --   --   --   --   --  19   ALT  --   --   --   --   --   --  22     CBC  Recent Labs  Lab 02/01/17  0543 01/31/17  0747 01/30/17  0917 01/29/17  0638   WBC 13.2* 18.7* 11.9* 9.0   RBC 2.57* 3.07* 2.98* 2.96*   HGB 7.5* 9.1* 8.7* 8.7*   HCT 24.3* 29.5* 28.5* 28.1*   MCV 95 96 96 95   MCH 29.2 29.6 29.2 29.4   MCHC 30.9* 30.8* 30.5* 31.0*   RDW 15.5* 16.0* 15.6* 15.6*    326 275 300     INR  Recent Labs  Lab 01/28/17  1605   INR 0.91     Arterial Blood Gas  Recent Labs  Lab 01/29/17  0638 01/28/17  2221   O2PER 2L 3L

## 2017-02-01 NOTE — PROGRESS NOTES
No new issues reported ? Voice low.  Seen with bedside RN  No cp or worsening sob   No fever or chills  On exam ;   Temp: 98.8  F (37.1  C) Temp src: Oral BP: 120/71 mmHg   Heart Rate: 96 Resp: 14 SpO2: 97 % O2 Device: Mechanical Ventilator Oxygen Delivery: 2 LPM      Neck ; supple , no JVD  Chest ; reduces bilaterally with bibasilar rales  CVS; s1 and s2 and tachy  GI ; soft abdomen, non tender, BS positive. FT  Ext ; trace edema , no cynosis  Psych ; appropriate mood and effect, anxious  Skin ; no rash or purpura.  Labs; wbc 13.2( 18.7) , lactic acid 0.9. Creatinine 0.55. bcx and ucx 1/31 negative  A/P:  Mary Wang is a 66 year old female with a history of severe COPD s/p trach on home vent 24/7 (3-3.5 LPM), anxiety, hypertension, achalasia s/p GJ tube who presented to the hospital with acute on chronic hypoxic and hypercarbic respiratory failure.     *Multiple admissions in last 2 months, 12/1-12/5 for UTI, 12/25-12/28 for bacteremia, 1/2-1/6 with encephalopathy ultimately attributed to her medications, 01/07-01/09 for acute on chronic respiratory failure likely 2/2 COPD exacerbation, 1/20-1/24 for oversedation/aspiration pneumonitis.    Leucocytosis : improved today . Likely due to dehydration as almost corrected with NS and cultures negative so far     Low threshold for starting pip/tazo and vanco if worsening     Goals of a care ; greatly appreciate Palliative following patient. Please note 1/31. FCC was held 1/31 and the plan is that Mary will not be admitted to hospital again for respiratory decompensation . Cares being organized . New Polst completed    #Acute on chronic respiratory failure, severe COPD s/p trach: On ventilator (settings CMV, 400/14/5/40%). Multiple admissions in 6 weeks, see above. For chronic respiratory failure PTA the patient is maintained on chronic prednisone at 20 mg qday, scheduled ipratropium and xoponex nebs, BID Pulmicort, guaifenesin 200 mg QID, bactrim for PCP  prophylaxis, allegra BID. Mucous plugging w/ O2 sats dropping to 27%, group home staff deep suctioned for 15 minutes before sats improved, in 90s on admission. XR chest  w/ mildly increased patchy basilar opacities not significant changed from 01/20 xray- atelectasis vs. Infx.  Afebrile, normotensive, mildly tachcyardic, sating  on home 2-3 LPM. CT Angiogram was negative for Pulmonary embolism. She is currently at her baseline oxygen requirements. CT did show some right lung opacity as she is improving without antibiotics will continue to hold antibiotics (low threshold to start)  -Continue home nebulizers  -Continue home steroids w/ PCP prophylaxis    - Hypertonic saline nebs, and NAC nebs    -Trach cares per RT, suctioning per nursing and RT  -Continue anxiolytics and medications for air hunger      #Achalasia s/p GJ tube   -Continue tube feeds    #Severe anxiety, depression: Likely due to air hunger from COPD. Psychiatry and Palliative care have seen patient in the past. Continues to have significant anxiety and frequent requests for hospital admission per daughter. PTA maintained on seroquel (50 mg BID), sertraline (150 mg qday), scheduled Klonopin (1.5 mg QID) and PRN ativan (0.5 q3 PRN) & dilaudid (1 mg per G q2 hours PRN) for air hunger. Currently endorses stable symptoms although daughter notes intermittent anxious periods.    -Continue home regimen  -     # Pulmonary nodule: Seen on CT 01/29. 1 cm right lower lobe lung nodule, slightly more prominent than prior  - Recommend short-term CT chest follow-up and possible evaluation in lung nodule clinic    # Urinary retention: Chronic indwelling bender. Last changed 1/23 while inpatient.  -Continue bender    # Normocytic anemia: At baseline.     # CAD: On aspirin and statin. Continue.      # Constipation: On senna and miralax PTA.     # Bilateral UE edema, L>R: PICC in RUE.    US RUE on 01/29 negative for DVT  -SubQ heparin for now    CODE: DNR, trached and on  vent chronically. Discussed with Daughter POA on 01/29  FEN: NPO, G tube feeds  DVT: Heparin Q 8h  LINES: Right UE PICC    Disposition/Admission Status: TBD, back to group home 1-2 days will connect with daughter Césra today . DANIS RN and CC

## 2017-02-01 NOTE — PLAN OF CARE
Problem: Goal Outcome Summary  Goal: Goal Outcome Summary  Outcome: No Change  Neuro: A&Ox3. Some confusion about the year.    Cardiac: SR. HR . SBP stable. Afebrile.          Respiratory: Pt is trached on 2-3L blended into home vent. Sats>90%. Lung sounds clear and diminished.   GI/: Adequate urine output. Avendaño in place.  Diet/appetite: NPO + TF. Cycled from 8PM to 8AM. G to gravity, J for TFs/Meds.   Activity:  Assist of 2. Repositioned Q2-3 hours.  Pain: Tylenol given x1 for complaints of pain in buttocks. At acceptable level on current regimen.   Skin: Intact, no new deficits noted. Pt repositioned Q2-3 hrs.    Plan for D/C back nursing facility in the next few days. Family at bedside this evening. Will continue to monitor.     R: Continue with POC. Notify primary team with changes.

## 2017-02-01 NOTE — PLAN OF CARE
Problem: Goal Outcome Summary  Goal: Goal Outcome Summary  Outcome: No Change  /63 mmHg  Pulse 98  Temp(Src) 98.6  F (37  C) (Oral)  Resp 18  Ht 1.524 m (5')  Wt 59.421 kg (131 lb)  BMI 25.58 kg/m2  SpO2 97%    Pt restless throughout night. Ativan given x3, once with dilaudid for rest and pain. Trach suctioned x2 with small amounts of thick, white sputum. Lungs sound diminished but clears up a bit with suctioning. Bowel sounds active, 1 small loose BM noted. Tube feed going at 85 ml/hr with Q4H 125 ml flushes. G tube to gravity and J tube receiving tube feeds. Q2H turns and toiletting needs established.

## 2017-02-01 NOTE — TELEPHONE ENCOUNTER
Returned call to Maddie Santiago, palliative care NP. She saw pt during her most recent hospitalization and was part of a care conference where they discussed pt's wishes not to be transported to the hospital any more.    Maddie knows that hospice cannot be involved due to payor issue with patient on a ventilator, and she wants to discuss a plan with Dr. Brito to manage patient's symptoms when she is at the end of her life, (without hospice).    Routing to Dr. Brito. Maddie would like a call back at [phone].     Bianka Wilson RN

## 2017-02-01 NOTE — PROGRESS NOTES
Care Coordinator- Discharge Planning Note     Admission Date/Time:  1/28/2017  Attending MD:  Enrique Gama*     Data  Chart reviewed, discussed with interdisciplinary team.   Patient was admitted for:   1. Acute and chronic respiratory failure with hypercapnia (H)         RNCC Intervention: Per team patient would like to have a do not hospitalize order in place. Call placed to [facility], Arley to discuss (awaiting return call) Also placed a call to [facility] to confirm if a patient isn't registered with hospice and dies at home, police and  will have to investigate.        Plan  Anticipated Discharge Date:  TBD  Anticipated Discharge Plan:  Anticipate home with home care.    CTS Handoff completed: dion Seymour RN, BSN, PHN, RNCCPH: [phone]  Pager: [phone]  Covering for : Medicine RNCC    [id number] with Arley from long-term they will be able to work with Mary's POLST order. He is working on staffing and will call in the morning to let me know when they are staffed to accept Mary back.

## 2017-02-01 NOTE — PROGRESS NOTES
Palliative Care Inpatient Clinical Social Work Visit    Patient Information:  Mary is a 66 year old woman with acute on chronic respiratory failure and severe COPD status post tracheostomy currently living in a group home with 24 hour support on her ventilator.     I met with Mary briefly this afternoon in her room. I also spoke to her daughter César by phone.    Relevant Symptoms/Concerns  - New information since last visit   Physical:  Mary was concerned that something might be wrong with her throat. The medical team is aware. She also stated (by mouthing the words) that she has lost her voice and is no longer able to speak. Her daughter César wonders if she might have a minor brain injury due to the episode last week when her O2 sats went down to 17% when the group home was attempting to clear a mucous plug. César has noted that during this hospital stay Mary goes from being very clear and making sense to not being as clear and somewhat disoriented. Today she also felt that she had moved rooms and that her family would not be able to find her despite reassurance from several staff members that she had not moved rooms.   Psychological/Emotional/Existential:     Family/Social/Caregiver:   Mary wanted to make sure that her daughter, Sissy, is aware of where she is in the hospital. I spoke with daughter César by phone who indicated that all family is aware of Mary's room number. Daughter Sissy is also aware of the plan and in agreement. Daughter Sissy and Mary have been very close throughout the years so this is difficult yet per César, Sissy is coping appropriately and in agreement.   Developmental:      Mental Health:     End of Life:      Cultural/Faith/Spiritual:      Grief/Loss:      Concurrent Stressors:        Comments:       Strengths - New information since last visit    Physical:     Psychological/Emotional/Existential:  Overall Mary was calm today.   Family/Social/Caregiver:   Family continues to be in agreement with plan for discharge. Supportive of Mary and her decision. Daughter César has reassured Mary that she will continue to help other daughter Sissy.   Developmental:      Mental Health:  Bedside nurse reported that Mary has been calm most of the day and her anxiety has been controlled.   End of Life:      Cultural/Worship/Spiritual:      Grief/Loss:         Comments:       Goals/Decision Making/Advance Care Planning - New information since last visit   Preferences:  Mary and her family hope that she can discharge back to her group home with 24 hour care with the plan to not return to the hospital but rather stay at her home and be kept comfortable if a life threatening event were to occur.   Concerns:  Although daughter César is concerned that her mother is disoriented at times and might have a brain injury, she does believe her mother has been clear about her wishes to not return to the hospital. She also says that family is supportive and in agreement with this plan.   Documents:  POLST in process   Decision Making Issues:        Comments:  Medical teams (palliative NP & RN CC) communicating with group home and necessary medical providers to get appropriate plan in place before discharge.    Resource Needs     Discharge Planning:  Per Unit/Program  and/or Care Coordinator   Other:      Comments:       Intervention (Check all that apply)    X   Assessment of palliative specific issues          Introduction of Palliative clinical social work interventions        Adjustment to illness counseling        Advanced care planning        Attended/participated in care conference        Behavioral interventions for symptom management    X   Facilitation of processing of thoughts/feelings    X   Family communication facilitated        Grief counseling        Goals of care discussion/facilitation        Life legacy work        Life review facilitation         Psychoeducation    X   Re-framing        Resource referral            Other     Comments:  Mary was alert, engaged and receptive. She was able to clearly mouth words today. Daughter César was engaged, talkative and receptive by phone.    Plan:  I will continue assessment/intervention as indicated.    [facility], [facility]  Palliative Care Clinical   Pager [phone]

## 2017-02-01 NOTE — PROGRESS NOTES
"CLINICAL NUTRITION SERVICES - ASSESSMENT NOTE     Nutrition Prescription    RECOMMENDATIONS FOR MDs/PROVIDERS TO ORDER:  Continue home TF regimen as ordered. If would like RD to assist in management of tube feedings, place consult \"RD to assess and order TF per MNT protocol\".    Please note: to get fiber ordered with her tube feeding, need to put amount of fiber to send under \"Additional comments\" section in the tube feeding order.    Malnutrition Status:    Criteria not met, but at risk       REASON FOR ASSESSMENT  Mary Wang is a/an 66 year old female assessed by the dietitian for Admission Nutrition Risk Screen for tube feeding or parenteral nutrition (screen placed 1/31 @ 1800).    NUTRITION HISTORY  Pt well known to this service from frequent readmission. Pt is chronically NPO d/t history of achalasia. Has a GJ tube (placed 9/12/16). Gtube is usually to gravity, with Jtube for feeding. Pt received Isosource @ 85 ml/hr x 12 hrs nightly (8PM-8AM) + 124 ml q 4 hr water flushes + 3 packet of fiber daily vis her Jtube. Pt fluctuates between episode of diarrhea and constipation.    CURRENT NUTRITION ORDERS  Diet: NPO    Enteral Nutrition: Isosource 1.5 @ 85 ml/hr x 12 hrs (8pm-8am) + 124 ml q 4 hr water flushes.    Intake/Tolerance: received ave of 880 ml x 3 days since admission, equating to 1320 kcal, 60g protein daily.      LABS  Labs reviewed    MEDICATIONS  Medications reviewed    ANTHROPOMETRICS  Height: 152.4 cm (5' 0\")  Most Recent Weight: 59.421 kg (131 lb) ([facility])    IBW: 45.5 kg  BMI: Overweight BMI 25-29.9  Weight History:   Wt Readings from Last 25 Encounters:   02/01/17 59.421 kg (131 lb)   01/24/17 55.339 kg (122 lb)   01/10/17 60.51 kg (133 lb 6.4 oz)   01/05/17 59 kg (130 lb 1.1 oz)   12/28/16 53.5 kg (117 lb 15.1 oz)   12/22/16 53.978 kg (119 lb)   12/05/16 56.836 kg (125 lb 4.8 oz)   11/28/16 54.023 kg (119 lb 1.6 oz)   11/11/16 62 kg (136 lb 11 oz)   10/04/16 56 kg (123 lb 7.3 oz) "   09/16/16 53.2 kg (117 lb 4.6 oz)   09/06/16 50.349 kg (111 lb)   07/22/16 57 kg (125 lb 10.6 oz)   07/10/16 50 kg (110 lb 3.7 oz)   06/14/16 56.065 kg (123 lb 9.6 oz)   05/31/16 50.349 kg (111 lb)   05/23/16 50.7 kg (111 lb 12.4 oz)   05/11/16 49.442 kg (109 lb)   04/21/16 52 kg (114 lb 10.2 oz)   02/09/16 47.038 kg (103 lb 11.2 oz)   02/01/16 51 kg (112 lb 7 oz)   01/20/16 50.576 kg (111 lb 8 oz)   01/02/16 55.5 kg (122 lb 5.7 oz)   12/11/15 52.3 kg (115 lb 4.8 oz)   12/09/15 52.436 kg (115 lb 9.6 oz)   Variable weight trends in the past several months. Fluctuates between 62 - 54 kgs.     Dosing Weight: 58 kg (lowest weight this admission of 57.607 kg on 1/31/17)    ASSESSED NUTRITION NEEDS  Estimated Energy Needs: 9539-4579 kcals/day (25 - 30 kcals/kg)  Justification: Maintenance  Estimated Protein Needs: 58-70 grams protein/day (1 - 1.2 grams of pro/kg)  Justification: Maintenance of LBM  Estimated Fluid Needs: 1 mL/kcal  Justification: Maintenance    PHYSICAL FINDINGS  Mechanical ventilation; FiO2 @ 40%.  Jose F: 14  Skin dry, ecchymotic on UEs.    Pale and thin LEs.    MALNUTRITION  % Intake: Decreased intake does not meet criteria  % Weight Loss: None noted  Subcutaneous Fat Loss: None observed  Muscle Loss: Carries weight centrally, but noted losses in upper leg (quadricep, hamstring), Patellar region, and Posterior calf.   Fluid Accumulation/Edema: None noted  Malnutrition Diagnosis: Patient does not meet two of the above criteria necessary for diagnosing malnutrition but is at risk for malnutrition    NUTRITION DIAGNOSIS  Predicted inadequate nutrient intake (protein-energy) related to may have interruptions to EN throughout this admission      INTERVENTIONS  Implementation  None at this time.    Goals  Total avg nutritional intake to meet a minimum of 25 kcal/kg and 1 g PRO/kg daily (per dosing wt 58 kg).    Monitoring/Evaluation  Progress toward goals will be monitored and evaluated per  protocol.    Norman Cornelius RD, ROHINI  6B Clinical Dietitian  Pager: [phone]  [facility] [id number]

## 2017-02-01 NOTE — PROGRESS NOTES
Social Work Services Progress Note    Hospital Day: 5  Date of Initial Social Work Evaluation:  NA  Collaborated with:  Medical team, RNCC    Data:  Pt is a 66 year old female being assessed for plan of care post hospitalization.  Per medical team, pt's goals of care are to not return to the hospital if able.  Pt is not medically appropriate for hospice at this time.     Intervention:  SW discussed POC needs with medical team and RNCC.   Pt has a 1:1 nurse at group home per RNCC.  RNCC will assist with developing a plan for the pt to have symptoms managed at home as long as possible.  RNCC will also work with medical team on emergency planning as to meet pt's wishes to not return to the hospital.  Please see RNCC notes for discharge planning    Assessment:    Significant relationship at present time:  Pt has support at her group home.  Family of origin is available for support:  Unknown from this meeting  Other support available:  Pt has 1:1 nurse at group home including homecare services from .  Gaps in support system:  Concern for plan is if there is an MD able to assist with managing emergencies at home.  RNCC and medical team will work with homecare and PCP to determine emergency plan.  Patient is caregiver to:  None    Plan:    Anticipated Disposition:  Return to group home with previous services.    Barriers to d/c plan:  Medical stability    Follow Up:  Please see RNCC notes for discharge planning.  SW will be available as needed.    INES Luna, APSW  6C Unit   Pager: [phone]  Phone: [phone]

## 2017-02-01 NOTE — TELEPHONE ENCOUNTER
AMEE Reyes from [facility] calling to discuss with Dr. Brito a change in the pt's care regarding her goals going forward.     Clair Dasilva MA

## 2017-02-02 NOTE — PLAN OF CARE
"Problem: Goal Outcome Summary  Goal: Goal Outcome Summary  Outcome: No Change  /72 mmHg  Pulse 90  Temp(Src) 98.3  F (36.8  C) (Oral)  Resp 16  Ht 1.524 m (5')  Wt 59.421 kg (131 lb)  BMI 25.58 kg/m2  SpO2 95%    VS: VSS, afebrile  Neuro: Oriented to self. Pt increased confusion today and is restless in other ways than her typical complaints of \"I can't breathe\" and wanting more medications. She is adamant that she we brought to the evening room or restaurant, and thinks I am lying to her about being in the hospital. She also was found by staff trying to climb out of bed. She seems very confused/disoriented. Family is concerned about possible anoxic brain injury. Although this is not a primary concern for pt at this time, family would still like to know whether she has brain injury or not. MRI and VBG's ordered non-urgent.   Cardiac: SR.    Respiratory: Sating 95% on 2L bled into vent. Bivona 6 trach. Trach cares done. Suctioned q2-3 hrs, thin white small amount each time  GI: BMX1, incontinent stool  : Adequate urine output- bender  IV Access: R PICC SL'd  Drains/tubes: G tube, gravity  J tube, clamped (cycled tube feeds from 8pm-8am @85/hr which is goal). Flush ordered 125ml q4h  Diet/appetite: NPO, TF  Activity:  Bedrest, Ax2 to turn. Pt tried to climb out of bed today.  Pain: Back and abdominal pain. Air hunger. PRN dilaudid given  Skin: Bruises and few skin tears, see flowsheet    R: Continue with POC. Plan is to discharge tomorrow and not return to hospital. See previous provider notes and POLST note in paper chart. Notify primary team with changes.            "

## 2017-02-02 NOTE — PROGRESS NOTES
Was a bit restless last night   Okay now  Seen with bedside RN  No cp or worsening sob    No fever or chills  On exam ;   Vital signs:  Temp: 97.8  F (36.6  C) Temp src: Axillary BP: 127/58 mmHg Pulse: 90 Heart Rate: 92 Resp: 16 SpO2: 93 % O2 Device: Mechanical Ventilator Oxygen Delivery: 1.5 LPM Height: 152.4 cm (5') Weight: 59.421 kg (131 lb) (bedscale)  Estimated body mass index is 25.58 kg/(m^2) as calculated from the following:    Height as of this encounter: 1.524 m (5').    Weight as of this encounter: 59.421 kg (131 lb).  Neck ; supple , trach  Chest ; reduces bilaterally with bibasilar rales  CVS; s1 and s2 and tachy  GI ; soft abdomen, non tender, BS positive. FT  Ext ; trace edema , no cynosis  Psych ; appropriate mood and effect, anxious  Skin ; no rash or purpura.  Labs;pending    wbc 9.3( 18.7) , lactic acid 0.9. Creatinine 0.55. bcx and ucx 1/31 negative  A/P:  Mary Wang is a 66 year old female with a history of severe COPD s/p trach on home vent 24/7 (3-3.5 LPM), anxiety, hypertension, achalasia s/p GJ tube who presented to the hospital with acute on chronic hypoxic and hypercarbic respiratory failure.     *Multiple admissions in last 2 months, 12/1-12/5 for UTI, 12/25-12/28 for bacteremia, 1/2-1/6 with encephalopathy ultimately attributed to her medications, 01/07-01/09 for acute on chronic respiratory failure likely 2/2 COPD exacerbation, 1/20-1/24 for oversedation/aspiration pneumonitis.    Leucocytosis : resolved   . Likely due to dehydration as almost corrected with NS and cultures negative so far . Sputum growing GNR, probably colonization     Low threshold for starting pip/tazo and vanco if worsening     Goals of a care ; greatly appreciate Palliative following patient. Please note 1/31. FCC was held 1/31 and the plan is that Mary will not be admitted to hospital again for respiratory decompensation . Cares being organized . New Polst turcios  Spoke with daughter César 2/1 and  updated on progress. César would like to have in place regarding respiratory decline in group home so that Mary is not under duress.   Spoke with palliative team 2/1 and updtaed on family wishes . Dwain being co-ordinated    #Acute on chronic respiratory failure, severe COPD s/p trach: On ventilator (settings CMV, 400/14/5/40%). Multiple admissions in 6 weeks, see above. For chronic respiratory failure PTA the patient is maintained on chronic prednisone at 20 mg qday, scheduled ipratropium and xoponex nebs, BID Pulmicort, guaifenesin 200 mg QID, bactrim for PCP prophylaxis, allegra BID. Mucous plugging w/ O2 sats dropping to 27%, group home staff deep suctioned for 15 minutes before sats improved, in 90s on admission. XR chest  w/ mildly increased patchy basilar opacities not significant changed from 01/20 xray- atelectasis vs. Infx.  Afebrile, normotensive, mildly tachcyardic, sating  on home 2-3 LPM. CT Angiogram was negative for Pulmonary embolism. She is currently at her baseline oxygen requirements. CT did show some right lung opacity as she is improving without antibiotics will continue to hold antibiotics (low threshold to start)  -Continue home nebulizers  -Continue home steroids w/ PCP prophylaxis    - Hypertonic saline nebs, and NAC nebs    -Trach cares per RT, suctioning per nursing and RT  -Continue anxiolytics and medications for air hunger      #Achalasia s/p GJ tube   -Continue tube feeds    #Severe anxiety, depression: Likely due to air hunger from COPD. Psychiatry and Palliative care have seen patient in the past. Continues to have significant anxiety and frequent requests for hospital admission per daughter. PTA maintained on seroquel (50 mg BID), sertraline (150 mg qday), scheduled Klonopin (1.5 mg QID) and PRN ativan (0.5 q3 PRN) & dilaudid (1 mg per G q2 hours PRN) for air hunger. Currently endorses stable symptoms although daughter notes intermittent anxious periods.    -Continue home  regimen  -     # Pulmonary nodule: Seen on CT 01/29. 1 cm right lower lobe lung nodule, slightly more prominent than prior  - Recommend short-term CT chest follow-up and possible evaluation in lung nodule clinic    # Urinary retention: Chronic indwelling bender. Last changed 1/23 while inpatient.  -Continue bender    # Normocytic anemia: At baseline.     # CAD: On aspirin and statin. Continue.      # Constipation: On senna and miralax PTA.     # Bilateral UE edema, L>R: PICC in RUE.    US RUE on 01/29 negative for DVT  -SubQ heparin for now    CODE: DNR, trached and on vent chronically. Discussed with Daughter POA on 01/29  FEN: NPO, G tube feeds  DVT: Heparin Q 8h  LINES: Right UE PICC    Disposition/Admission Status: TBD, back to group home when arrangements made . DANIS RN and CC and palliative team

## 2017-02-02 NOTE — PLAN OF CARE
"Problem: Goal Outcome Summary  Goal: Goal Outcome Summary  Outcome: No Change  S/B: Anxious, trached pt with 1.5-2L of oxygen bled into vent. Suctioned occasionally with small to mod white secretions; 2-4 hours as needed. Pt slept well from 1977-9952 awoke and was frequently using call light from , slept from about 230-430, then has been awake since calling frequently every 30 secs to 5 min. Alert to hospital, self and intermittent situation, knows month occasionally.  Reorients. Rn using time allocation, will return at this time and pt calls 5-10 sec after Rn leaving the room and stares off, not knowing what specifically she needs. Pt also using call light when Rn in room with pt.     Pt has complained intermittently overnight of back pain, stomach discomfort and inability to breath; although oxygen saturations remain mid to high 90's. Does desaturate to 88% when turning, resolved with increasing oxygen. When asked by Rn if she just wants to be asleep most of the time; pt shook head \"yes\".    A: A&Ox3 (see above) VSS. SR. Afebrile. Tolerating TF cycled overnight, occasionally points to stomach; denies nausea or heart burn.  Pt appears to want someone in room at all times. RN stayed in room this am and charted for an hour to help with anxiety and frequent calling. Ativan for anxiety with little help and dilaudid for back pain with some relief. Pt declined hot or cold packs. G tube to gravity and J tube with feedings. Avendaño with adequate uop.     I/O this shift:  In: 900 [NG/GT:305]  Out: 400 [Urine:150; Emesis/NG output:250]    Temp:  [97.8  F (36.6  C)-98.8  F (37.1  C)] 97.8  F (36.6  C)  Pulse:  [90-97] 90  Heart Rate:  [91-99] 92  Resp:  [14-16] 16  BP: (101-131)/(56-73) 127/58 mmHg  SpO2:  [89 %-100 %] 93 %     R: Pt turned every two hours, updated frequently to plan of care and proper use of call light. Pt encouraged to watch news or to try music therapy; pt declined.Continue with POC. Notify primary " team with changes.

## 2017-02-02 NOTE — PROGRESS NOTES
[facility], [place]   Palliative Care Daily Progress Note          Recommendations, Patient/Family Counseling & Coordination     Mary today is more tired today during visit. She is slightly confused during visit and limited engagement. During visit she was focused on getting dressed in her own clothes and was not able to engage in further discussion today. I let her know that we are continuing to work on a plan for her to go home and be taken care of so she will not come back to the hospital, and she nods to me saying this.    Discussed with Dr. Brito plan of care for Mary who agrees with the current plan. Also Discussed with Dr. Reyes who has seen Mary recently and is willing to arrange with the  P[facility]iative Home [facility] to ensure that nursing staff are aware that instead of calling the primary care physician that an on-call individual will be identified to assist with symptom management. Discussed with Blaire Jones current plan and next steps to identify the on-call individual.    POLST - in paper chart     Thank you for the opportunity to continue to participate in the care of this patient and family.  Please feel free to contact on-call palliative provider with any emergent needs.  We can be reached via team pager [phone] (answered 8-4:30 Monday-Friday); after-hours answering service ([phone]); Main Palliative Clinic - [facility] 1C: [phone].       LÁZARO Villarreal CNS  Palliative Care Consult Team  Pager: [phone]    25 minutes spent with patient, with >50% counseling and in care coordination.           Assessment      1) Diagnoses & symptoms:        Acute on chronic respiratory failure, severe COPD s/p trach  Achalasia s/o GJ tube  Severe anxiety and depression  Pulmonary nodule  Urinary retention  Normocytic anemia  CAD  Constipation  Bilateral UE edema      2)  Psychosocial/Spiritual Needs:   Ongoing:Will discuss case during palliative  interdisciplinary team rounds and involve LICSW and  as needed.        Is new assessment/intervention required by palliative team?:  No         Interval History:   Anxiety over night, continues to use ativan and dilaudid, has become more disoriented,            Review of Systems:   Palliative Symptom Review (0=no symptom/no concern, 1=mild, 2=moderate, 3=severe):      Pain: 1      Fatigue: 0      Nausea: 0      Constipation: 0      Diarrhea: 0      Depressive Symptoms: 0      Anxiety: 2      Drowsiness: 1      Poor Appetite: 0      Shortness of Breath: 0      Insomnia: 0            Medications:   I have reviewed this patient's medication profile and medications given in past 24 hours.    Medications of Note  Clonazepam 1.5 mg 4 times a day  Gabapentin 300 mg 3 times a day  Guaifenesin 200 mg 4 times a day  Melatonin 3 mg at bedtime  Polyethylene glycol 17 g 2 times a day  Prednisone 60 mg daily  Quetiapine 50 my BID  Senna-docusate 2 tablets 2 times a day  Sertraline 150 mg daily  Acetaminophen 650 mg q4h PRN  Bisacodyl suppository 10 mg daily PRN  Calcium carbonate 500 mg q2h PRN  Hydromorphone 1 mg q2h PRN- x3  Lorazepam 0.5 mg q3h PRN- x3  Ondansetron 4 mg q4h PRN            Physical Exam:   Vitals were reviewed  Temp: 98.1  F (36.7  C) Temp src: Oral BP: 165/75 mmHg Pulse: 90 Heart Rate: 92 Resp: 16 SpO2: 95 % O2 Device: Mechanical Ventilator Oxygen Delivery: 2 LPM  Constitutional: drowsy, responds to voice, cooperative, no apparent distress  Lungs: No increased work of breathing, wet sounding respirations  Neurologic: Awake, alert, responds to question appropriately.  Answers questions by mouthing words.  Neuropsychiatric: Normal affect, mood, orientation, memory and insight.             Data Reviewed:   ROUTINE ICU LABS (Last four results)  CMP  Recent Labs  Lab 02/02/17  0740 02/01/17  0543 01/31/17  1302 01/31/17  0747  01/30/17  0917  01/28/17  1605    136  --  135  --  136  < > 136    POTASSIUM 3.4 3.7 3.9 5.9*  < > 3.2*  < > 4.1   CHLORIDE 90* 90*  --  94  --  90*  < > 86*   CO2 41* 41*  --  39*  --  40*  < > >45Critical Value called to and read back byMATTIE MEJIAS RN UER [id number] 1/28/2017 BY AA*   ANIONGAP 5 5  --  2*  --  6  < > Not Calculated   * 159*  --  153*  --  163*  < > 126*   BUN 24 21  --  19  --  26  < > 26   CR 0.58 0.55  --  0.37*  --  0.52  < > 0.47*   GFRESTIMATED >90Non  GFR Calc >90Non  GFR Calc  --  >90Non  GFR Calc  --  >90Non  GFR Calc  < > >90Non  GFR Calc   GFRESTBLACK >90African American GFR Calc >90African American GFR Calc  --  >90African American GFR Calc  --  >90African American GFR Calc  < > >90African American GFR Calc   IZZY 8.9 8.8  --  8.7  --  9.0  < > 8.6   PROTTOTAL  --   --   --   --   --   --   --  6.3*   ALBUMIN  --   --   --   --   --   --   --  2.1*   BILITOTAL  --   --   --   --   --   --   --  0.2   ALKPHOS  --   --   --   --   --   --   --  91   AST  --   --   --   --   --   --   --  19   ALT  --   --   --   --   --   --   --  22   < > = values in this interval not displayed.  CBC  Recent Labs  Lab 02/02/17  0740 02/01/17  0543 01/31/17  0747 01/30/17  0917   WBC 9.3 13.2* 18.7* 11.9*   RBC 2.57* 2.57* 3.07* 2.98*   HGB 7.6* 7.5* 9.1* 8.7*   HCT 23.8* 24.3* 29.5* 28.5*   MCV 93 95 96 96   MCH 29.6 29.2 29.6 29.2   MCHC 31.9 30.9* 30.8* 30.5*   RDW 15.3* 15.5* 16.0* 15.6*    288 326 275     INR  Recent Labs  Lab 01/28/17  1605   INR 0.91     Arterial Blood Gas  Recent Labs  Lab 02/01/17 2018 01/29/17  0638 01/28/17  2221   O2PER 2L 2L 3L

## 2017-02-03 NOTE — PLAN OF CARE
Problem: Goal Outcome Summary  Goal: Goal Outcome Summary  Outcome: No Change  Pt alert, disoriented to time and situation. Restless and anxious, frequent requests. VSS. Afebrile. O2 sats stable on mechanical vent with 2L. Bivona #6. Suctioning Q2hrs for small, thin, white secretions. Avendaño intact. Adequate UOP. Mepilex intact on coccyx. NPO. G tube to gravity. J tube with cycled TF at goal of 85mL/hr. PRN dilaudid administered for pain. PRN ativan administered for anxiety. Turn and repo Q2hrs. Plan for possible DC today. Will continue to monitor per POC.

## 2017-02-03 NOTE — PROGRESS NOTES
Care Coordinator- Discharge Planning     Admission Date/Time:  1/28/2017  Attending MD:  Enrique Gama*     Data  Date of initial CC assessment:  Chart reviewed, discussed with interdisciplinary team.   Patient was admitted for:   1. Constipation due to opioid therapy    2. Acute and chronic respiratory failure with hypercapnia (H)           Assessment  Updated that pt has been medically cleared for discharge to her group home today.      Coordination of Care:    I have contacted Arley at [facility] ([phone]) and they will be able to provide staffing for today after 4 pm.  I [facility] transport with pt's home vent for 5 pm today.  I have updated pt's bedside RN, Sidney and she will update pt and her daughter César of the above plan.  I have paged Dr. Chavis and Palliative NP, Maddie of 5 pm transportation.       Plan  Anticipated Discharge Date: 02/03/17    Anticipated Discharge Plan: Return to group home with resumption of home cares, 24 hrs/day.       Valdo Flowers RN, BSN  Medicine Care Coordinator  Pager: [phone]  Phone:  [phone]

## 2017-02-03 NOTE — TELEPHONE ENCOUNTER
I can manage writing prescriptions upon return home.  I spoke with Maddie LUBIN yesterday, so I'm apprised of the plan after discharge.    No additional action required.

## 2017-02-03 NOTE — TELEPHONE ENCOUNTER
Routing back to Clair to please tell Maddie that Dr. Brito is scheduled to see pt on 2/8 and plans to resume prescribing her medications once she is out of the hospital.    Thanks,  Bianka Wilson RN

## 2017-02-03 NOTE — TELEPHONE ENCOUNTER
Emily from [facility] called regarding medication refill - Clonazapam liquid, she said it is a compound and they wanting to know if it could be changed to tablets.     Emily can be reach with questions, [phone].

## 2017-02-03 NOTE — TELEPHONE ENCOUNTER
LÁZARO Reyes called with a POC update for the pt. The pt's goals were changed as follows:    The pt does not want to return to the hospital. There will be an on-call physician for acute symptom management. The on-call physician number is [phone]. The pt's care team will call for acute symptoms.     Pt's POLST was updated.    Eric is wondering if Dr. Brito is still comfortable with filling the pt's refills? We will also need to schedule a hospital f/u within a wk for Dr. Brito to see the pt.    Clair Dasilva MA

## 2017-02-03 NOTE — DISCHARGE INSTRUCTIONS
[facility] AND GROUP HOME STAFF:     PLEASE CALL PALLIATIVE ON-CALL PHYSICIAN FOR ACUTE DISTRESS SYMPTOM MANAGEMENT:  PH: [phone]  Pager # [phone]

## 2017-02-03 NOTE — PLAN OF CARE
Problem: Goal Outcome Summary  Goal: Goal Outcome Summary  Outcome: Improving  BP 91/48 mmHg  Pulse 90  Temp(Src) 98.3  F (36.8  C) (Oral)  Resp 16  Ht 1.524 m (5')  Wt 57.471 kg (126 lb 11.2 oz)  BMI 24.74 kg/m2  SpO2 97%    VS: VSS, afebrile, soft BP but MAP is 70  Neuro: A&Ox1 this morning, A&Ox4 since noon. Speech illogical but clear (mouths words)  Cardiac: SR.    Respiratory: Sating 97% on 2L vent. Bivona 6 trach. Trach cares done at 8am. Suctioned q3 hrs.  GI: No BM this shift  : Adequate urine output via bender  IV Access: PICC line DC'd per MD order prior to discharge. Occlusive tegaderm dressing over.  Drains/tubes: G tube to gravity, J tube clamped (cycled TF, and flush q4h with 125 ml)  Diet/appetite: NPO, TF @ 85ml/hr from 8pm to 8am  Activity:  Ax2 to turn, bedrest  Pain: Occasional back and abdominal pain. Gave IV dilaudid x1 this morning and GI cocktail x1  Skin: Did bed bath today. Few skin tears, bruises, and pressure wound on coccyx, mepilex changed    R:  Plan to DC to TCU at 5pm today. Continue with POC. Notify primary team with changes.

## 2017-02-03 NOTE — PROGRESS NOTES
[facility], [place]   Palliative Care Daily Progress Note          Recommendations, Patient/Family Counseling & Coordination     Goals of Care  Patient was seen today with daughter present in the room. Patient has a completed POLST which continues to be in alignment with Louann known preference to not be re-hospitalized. Plan of care discussed with Primary care team, Dr. Brito, Dr. Reyes, César (patient daughter), Arley with [facility], Lead Director of her group home, and RN Care Coordinator.     Plan is for acute symptom management from the Palliative Care MD on-call and this information has been provided to the home care nurse to activate.    POLST -completed and daughter César preferred to take to [facility].     Thank you for the opportunity to continue to participate in the care of this patient and family.  Please feel free to contact on-call palliative provider with any emergent needs.  We can be reached via team pager [phone] (answered 8-4:30 Monday-Friday); after-hours answering service ([phone]); Main Palliative Clinic - [facility] 1C: [phone].       LÁZARO Villarreal CNS  Palliative Care Consult Team  Pager: [phone]    35 minutes spent with patient, with >50% counseling and in care coordination.           Assessment      1) Diagnoses & symptoms:        Acute on chronic respiratory failure, severe COPD s/p trach  Achalasia s/o GJ tube  Severe anxiety and depression  Pulmonary nodule  Urinary retention  Normocytic anemia  CAD  Constipation  Bilateral UE edema       2)  Psychosocial/Spiritual Needs:   Ongoing:Will discuss case during palliative interdisciplinary team rounds and involve LICSW and  as needed.        Is new assessment/intervention required by palliative team?:  No         Interval History:   Remains on ventilator with chronic tracheostomy, medically has been stable, nearing discharge.           Review of Systems:    Palliative Symptom Review (0=no symptom/no concern, 1=mild, 2=moderate, 3=severe):      ROS negative, Denies symptoms at this time            Medications:   I have reviewed this patient's medication profile and medications given in past 24 hours.    Medications of Note  Clonazepam 1.5 mg 4 times a day  Gabapentin 300 mg 3 times a day  Guaifenesin 200 mg 4 times a day  Melatonin 3 mg at bedtime  Polyethylene glycol 17 g 2 times a day  Prednisone 60 mg daily  Quetiapine 50 my BID  Senna-docusate 2 tablets 2 times a day  Sertraline 150 mg daily  Acetaminophen 650 mg q4h PRN  Bisacodyl suppository 10 mg daily PRN  Calcium carbonate 500 mg q2h PRN  Hydromorphone 1 mg q2h PRN- x4  Lorazepam 0.5 mg q3h PRN- x4  Ondansetron 4 mg q4h PRN- x1             Physical Exam:   Vitals were reviewed  Temp: 98.3  F (36.8  C) Temp src: Oral BP: 91/48 mmHg   Heart Rate: 94 Resp: 16 SpO2: 97 % O2 Device: Mechanical Ventilator Oxygen Delivery: 2 LPM  Constitutional: Awake, alert, cooperative, no apparent distress  Lungs: No increased work of breathing  Neurologic: Awake, alert, mouths words  Neuropsychiatric: Normal affect, difficult to assess orientation, memory and insight.             Data Reviewed:   ROUTINE ICU LABS (Last four results)  CMP  Recent Labs  Lab 02/03/17  0701 02/02/17  0740 02/01/17  0543 01/31/17  1302 01/31/17  0747  01/28/17  1605    136 136  --  135  < > 136   POTASSIUM 4.0 3.4 3.7 3.9 5.9*  < > 4.1   CHLORIDE 91* 90* 90*  --  94  < > 86*   CO2 42* 41* 41*  --  39*  < > >45Critical Value called to and read back byMATTIE MEJIAS RN UER 0786 1/28/2017 BY AA*   ANIONGAP 5 5 5  --  2*  < > Not Calculated   * 138* 159*  --  153*  < > 126*   BUN 29 24 21  --  19  < > 26   CR 0.65 0.58 0.55  --  0.37*  < > 0.47*   GFRESTIMATED >90Non  GFR Calc >90Non  GFR Calc >90Non  GFR Calc  --  >90Non  GFR Calc  < > >90Non  GFR Calc    GFRESTBLACK >90African American GFR Calc >90African American GFR Calc >90African American GFR Calc  --  >90African American GFR Calc  < > >90African American GFR Calc   IZZY 8.4* 8.9 8.8  --  8.7  < > 8.6   PROTTOTAL  --   --   --   --   --   --  6.3*   ALBUMIN  --   --   --   --   --   --  2.1*   BILITOTAL  --   --   --   --   --   --  0.2   ALKPHOS  --   --   --   --   --   --  91   AST  --   --   --   --   --   --  19   ALT  --   --   --   --   --   --  22   < > = values in this interval not displayed.  CBC  Recent Labs  Lab 02/03/17  0701 02/02/17  0740 02/01/17  0543 01/31/17  0747   WBC 8.7 9.3 13.2* 18.7*   RBC 2.70* 2.57* 2.57* 3.07*   HGB 7.8* 7.6* 7.5* 9.1*   HCT 24.9* 23.8* 24.3* 29.5*   MCV 92 93 95 96   MCH 28.9 29.6 29.2 29.6   MCHC 31.3* 31.9 30.9* 30.8*   RDW 15.3* 15.3* 15.5* 16.0*    309 288 326     INR  Recent Labs  Lab 01/28/17  1605   INR 0.91     Arterial Blood Gas  Recent Labs  Lab 02/03/17  1025 02/01/17 2018 01/29/17  0638 01/28/17  2221   PH 7.40  --   --   --    PCO2 70*  --   --   --    PO2 41*  --   --   --    HCO3 44*  --   --   --    O2PER 2L 2L 2L 3L

## 2017-02-03 NOTE — TELEPHONE ENCOUNTER
TELLY Tubbs RN called to request methylex foam dressings, an alternating pressure pad and a pump for the mattress. Writer pended an order to be signed and faxed to [facility].    Clair Dasilva MA

## 2017-02-03 NOTE — TELEPHONE ENCOUNTER
I am unable to enter medication changes until she is discharged from whatever facility she is currently in.  I'll make the change once she's out.

## 2017-02-03 NOTE — PLAN OF CARE
Problem: Goal Outcome Summary  Goal: Goal Outcome Summary  Outcome: No Change  Pt is confused; soft BPs; NSR 80s; afebrile. Bivona 6.0 in place; vent with 2L bleed; sats >96%. Sx x2' small - moderate thick creamy secretions. Diminished lung sounds bilaterally. Active bowel sounds; no BM. G-tube to gravity; J-tube with cyclic TF at goal 85/hr. MRI performed tonight - awaiting results. Daughter at bedside. Will continue to monitor.

## 2017-02-03 NOTE — TELEPHONE ENCOUNTER
Returned Emily's call at [facility]. She said the order for clonazepam liquid was sent to them on 1/26 by Steff Robertson NP.  Per Emily, they have filled the tablet form of this med for pt in the past and she needs to know if that is okay now.    Of note, the pt is currently hospitalized.    Bianka Wilson RN

## 2017-02-03 NOTE — PROGRESS NOTES
[facility] Nurse Inpatient Adult Pressure INJURY (PI) Wound Assessment     Initial assessment of PU(s) on pt's:   Sacrum and Left upper inner thigh     Data:   Patient History:      per MD note(s):  66 year old female with a history of severe COPD s/p trach on home vent  (3-3.5 LPM), anxiety, hypertension, achalasia s/p GJ tube who presented to the hospital with acute on chronic hypoxic and hypercarbic respiratory failure.     *Multiple admissions in last 2 months, - for UTI, - for bacteremia, - with encephalopathy ultimately attributed to her medications, - for acute on chronic respiratory failure likely 2/2 COPD exacerbation, - for oversedation/aspiration pneumonitis. chronic hybercarbic hypoxic respiratory failure admitted from her  today after a sudden and severe worsening of her hypoxic failure   Here to assess sacrum for pressure injury that was present on admission. Pressure injury has deteriorated since last discharge     Current mattress:  [employer]mosAir  Current pressure relieving devices:  Pillows (Patient refuses foam heel lift boots    Moisture Management:  Incontinence Protocol, Rectal Pouch and Urinary Catheter    Catheter secured? Yes    Current Diet / Nutrition:       Active Diet Order  NPO Time Specified Ice Chips  Diet        Jose F Assessment and sub scores:   Jose F Score  Av.6  Min: 12  Max: 16   Labs:   Recent Labs   Lab Test  17   0917  17   0638  17   1605   16   0331   ALBUMIN   --    --   2.1*   < >   --    HGB  8.7*  8.7*  9.5*   < >  8.4*   INR   --    --   0.91   < >   --    WBC  11.9*  9.0  11.1*   < >  11.8*   A1C   --    --    --    --   5.1   CRP   --   83.0*   --    < >   --     < > = values in this interval not displayed.                                                                                                                          Pressure Injury Assessment  (location #1):   Sacrum       1/30/17 sacral pressure injury    Wound History:   Present on admission, has deteriorated since last discharge    Wound Base: Purple non blanchable erythema ,  non-blanchable erythema and epidermis, dry    Specific Dimensions (length x width x depth, in cm) :   1.5 x 1.7 x 0 cm    Palpation of the wound bed:  Firm over bone    Periwound Skin: intact and exfoliating, pink, blanchable epidermis     Drainage:  none      Pain:  minimal ,     Pressure Injury Assessment  (location #2):   Left inner thigh    Wound History:   Present on admission  Appears to be device related (catheter or diaper    Wound Base: Red area now starting to radha easily    Specific Dimensions (length x width x depth, in cm) :   12 x 0.3 x 0 cm    Palpation of the wound bed:  normal    Periwound Skin: intact,      Drainage:  none  Amount:     Pain:  absent ,              Intervention:     Patient's chart evaluated.      Jose F Interventions:  Current Jose F Interventions and Care Plan reviewed and updated, appropriate at this time.    Wound was assessed.    Wound Care: was done: Removal of existing dressing    Visual inspection    Cleansing with NS solution    Application of clean dressing,    Orders  Written    Supplies  N/A    Discussed plan of care with Nurse           Assessment:     Pressure Injury (PI) located on Sacrum: Suspected DTI    Status: wound  initial assessment and is evolving, Worsening    Symptomatic      Pressure Injury (PI) located on Left Inner Thigh: I    Status: wound  initial assessment, appears Stable    Symptomatic    Both pressure injuries present on admission         Plan:     Nursing to notify the Provider(s) and re-consult the [facility] Nurse if wound(s) deteriorate(s).    Plan of care for wound located on Sacrum: Change dressing every 2 days, Clean with Microklenz, Cover with Mepilex Border Dressing    WO Nurse will return: Friday  Face to face time: 10 minutes

## 2017-02-03 NOTE — DISCHARGE SUMMARY
[facility] - Internal Medicine Discharge Summary   Date of Service: 2/3/2017    Mary Wang MRN# [id number]   YOB: 1950 Age: 66 year old     Date of Admission:  1/28/2017  Date of Discharge:  2/3  Admitting Physician:  Enrique Gama MD  Discharge Physician:  Celia Chavis   Discharging Service:  Internal Medicine, [facility]     Primary Provider: Norman Brito         Reason for Admission:   Difficulty breathing           Discharge Diagnosis:   Acute on chronic respiratory Failure  Vent dependent   Dysphagia . Achalasia . G Tube depenedent  Malnutrition on TF  Urinary retention ; on bender catheter   Severe Anxiety and depression   Normocytic Anemia         Procedures & Significant Findings:   MRI brain ; with no findings of infarction or hemorrage         Consultations:   Palliative team          Hospital Course by Problem:      Mary Wang is a 66 year old female with a history of severe COPD s/p trach on home vent 24/7 (3-3.5 LPM), anxiety, hypertension, achalasia s/p GJ tube who presented to the hospital with acute on chronic hypoxic and hypercarbic respiratory failure.     *Multiple admissions in last 2 months, 12/1-12/5 for UTI, 12/25-12/28 for bacteremia, 1/2-1/6 with encephalopathy ultimately attributed to her medications, 01/07-01/09 for acute on chronic respiratory failure likely 2/2 COPD exacerbation, 1/20-1/24 for oversedation/aspiration pneumonitis.    Goals of a care ; greatly appreciate Palliative following patient. Please note 1/31. FCC was held 1/31 and the plan is that Mary will not be admitted to hospital again for respiratory decompensation . Cares being organized . New Polst completed  Spoke with daughter César 2/3 and updated on progress. César would like to have in place regarding respiratory decline in group home so that Mary is not under duress.    #Acute on chronic respiratory failure, severe COPD s/p trach: On ventilator (settings CMV,  400/14/5/40%). Multiple admissions in 6 weeks, see above. For chronic respiratory failure PTA the patient is maintained on chronic prednisone at 20 mg qday, scheduled ipratropium and xoponex nebs, BID Pulmicort, guaifenesin 200 mg QID, bactrim for PCP prophylaxis, allegra BID. Mucous plugging w/ O2 sats dropping to 27%, group home staff deep suctioned for 15 minutes before sats improved, in 90s on admission. XR chest  w/ mildly increased patchy basilar opacities not significant changed from 01/20 xray- atelectasis vs. Infx.  Afebrile, normotensive, mildly tachcyardic, sating  on home 2-3 LPM. CT Angiogram was negative for Pulmonary embolism. She is currently at her baseline oxygen requirements. CT did show some right lung opacity as she is improving without antibiotics , none were initiated , Family was aware and in agreement with this plan     #Achalasia s/p GJ tube   -Continue tube feeds    #Severe anxiety, depression: Likely due to air hunger from COPD. Psychiatry and Palliative care have seen patient in the past. Continued to have significant anxiety and frequent requests for hospital admission per daughter. PTA maintained on seroquel (50 mg BID), sertraline (150 mg qday), scheduled Klonopin (1.5 mg QID) and PRN ativan (0.5 q3 PRN) & dilaudid (1 mg per G q2 hours PRN) for air hunger. Currently endorses stable symptoms-Continued home regimen  -     # Pulmonary nodule: Seen on CT 01/29. 1 cm right lower lobe lung nodule, slightly more prominent than prior      # Urinary retention: Chronic indwelling bender. Last changed 1/23 while inpatient.  -Continue bender    # Normocytic anemia: At baseline.     # CAD: On aspirin and statin. Continue.      # Constipation: On senna and miralax PTA.     CODE: DNR, trached and on vent chronically. Discussed with Daughter HANNAH Lindsey  2/3  FEN: NPO, G tube feeds  DVT: Heparin Q 8h while hospitalized      On Exam ;     Alert and oriented . No acute distress  Vital signs:  Temp: 98.3  F  (36.8  C) Temp src: Oral BP: 91/48 mmHg   Heart Rate: 94 Resp: 16 SpO2: 97 % O2 Device: Mechanical Ventilator Oxygen Delivery: 2 LPM Height: 152.4 cm (5') Weight: 57.471 kg (126 lb 11.2 oz) (bedscale)  Estimated body mass index is 24.74 kg/(m^2) as calculated from the following:    Height as of this encounter: 1.524 m (5').    Weight as of this encounter: 57.471 kg (126 lb 11.2 oz).    Neck ;trach  Chest ; bibasilar fine crepts  CVS; RRR, no murmur /rubs or gallops  GI ; soft abdomen, non tender, BS positive  Neuro ; CN 2 to 12 grossly intact ,knows year , month, date, able to pronounce and say doctors name   Psych ; appropriate mood and effect  Skin ; no rash or purpura.           Pending Results:   Sputum cs         Discharge Medications:     Current Discharge Medication List      CONTINUE these medications which have NOT CHANGED    Details   !! bisacodyl (DULCOLAX) 10 MG Suppository Place 1 suppository (10 mg) rectally daily as needed for constipation  Qty: 25 suppository, Refills: 1    Associated Diagnoses: Constipation due to opioid therapy      order for DME Equipment being ordered: 1) Avendaño catheter 16F, balloon 10 mL, silicone.  2) Urinary collection bag.  3) Elastic leg strap for Avendaño catheter.  Please fax to [employer].  Qty: 3 each, Refills: 11      LORazepam (ATIVAN) 2 MG/ML (HIGH CONC) solution Take 0.25 mLs (0.5 mg) by mouth every 3 hours as needed for anxiety  Qty: 30 mL, Refills: 1      clonazePAM (KLONOPIN) 0.1 mg/mL Take 15 mLs (1.5 mg) by mouth 4 times daily  Qty: 1800 mL, Refills: 1      HYDROmorphone (DILAUDID) 1 MG/ML LIQD liquid Take 1 mL (1 mg) by mouth every 2 hours as needed for moderate to severe pain  Qty: 180 mL, Refills: 0      QUEtiapine (SEROQUEL) 50 MG tablet Take 1 tablet (50 mg) by mouth 2 times daily  Qty: 180 tablet, Refills: 3      ipratropium (ATROVENT) 0.02 % neb solution Take 2.5 mLs (0.5 mg) by nebulization 4 times daily and every four hours at night PRN.  Qty: 450 mL,  Refills: 0      levalbuterol (XOPENEX) 1.25 MG/3ML neb solution Take 3 mLs (1.25 mg) by nebulization 4 times daily and every four hours at night PRN.  Qty: 540 mL, Refills: 0      lactobacillus rhamnosus, GG, (CULTURELL) capsule 1 capsule by Per G Tube route daily  Qty: 60 capsule, Refills: 0      lidocaine (LIDODERM) 5 % Patch Place 1-2 patches onto the skin every 24 hours Apply to the lower back  Qty: 30 patch, Refills: 0    Associated Diagnoses: Urinary tract infection, site not specified      gabapentin (NEURONTIN) 250 MG/5ML solution 6 mLs (300 mg) by Per J Tube route 3 times daily  Qty: 450 mL, Refills: 0    Associated Diagnoses: Anxiety      polyethylene glycol (MIRALAX/GLYCOLAX) Packet 17 g by Per J Tube route 2 times daily Hold for loose stools  Qty: 100 packet, Refills: 0    Associated Diagnoses: Constipation, unspecified constipation type      senna-docusate (SENOKOT-S;PERICOLACE) 8.6-50 MG per tablet 2 tablets by Per J Tube route 2 times daily  Qty: 100 tablet, Refills: 0    Associated Diagnoses: Constipation, unspecified constipation type      ondansetron (ZOFRAN) 4 MG tablet Take 1 tablet (4 mg) by mouth every 4 hours as needed for nausea  Qty: 18 tablet      famotidine (PEPCID) 40 MG/5ML suspension Take 2.5 mLs (20 mg) by mouth 2 times daily as needed for heartburn  Qty: 75 mL, Refills: 3    Associated Diagnoses: Mild gas use disorder      loperamide (IMODIUM A-D) 2 MG tablet 2-2 mg tablets per J-tube qid prn frequent and/or loose stools. Do not use more than 8 tabs per day.  Qty: 30 tablet, Refills: 0    Associated Diagnoses: Frequent stools      polyethylene glycol 0.4%- propylene glycol 0.3% (SYSTANE ULTRA) 0.4-0.3 % SOLN ophthalmic solution Place 1-2 drops into both eyes 4 times daily as needed for dry eyes 0900, 1300, 1700, 2100  Qty: 1 Bottle, Refills: 3    Associated Diagnoses: Dry eyes, bilateral      predniSONE 5 MG/5ML solution Take 20 mLs (20 mg) by mouth daily  Qty: 500 mL, Refills: 0     "Comments: Continue prednisone taper until reaches 20 mg daily, then continue 20 mg daily.  Associated Diagnoses: Shortness of breath      Cranberry 500 MG CAPS Take 1 capsule (500 mg) by mouth daily  Qty: 90 capsule      sertraline (ZOLOFT) 20 MG/ML (HIGH CONC) solution 7.5 mLs (150 mg) by Per Feeding Tube route daily  Qty: 105 mL, Refills: 0    Associated Diagnoses: Anxiety      fexofenadine (ALLEGRA) 180 MG tablet Take 1 tablet (180 mg) by mouth daily  Qty: 30 tablet, Refills: 0    Associated Diagnoses: COPD exacerbation (H)      fiber modular (NUTRISOURCE FIBER) packet 1 packet by Per J Tube route 3 times daily  Qty: 42 packet, Refills: 0    Associated Diagnoses: Diarrhea, unspecified type      Nutritional Supplements (JEVITY 1.5 IZZY) LIQD Take 5 Cans by mouth daily Per tube feeding  Qty: 150 Can, Refills: 11    Comments: Height 5'2\"  Weight 120 lbs  Length of need 99 months  Associated Diagnoses: Achalasia      melatonin (MELATONIN) 1 MG/ML LIQD liquid 3 mLs (3 mg) by Per J Tube route At Bedtime  Qty: 300 mL, Refills: 0    Associated Diagnoses: Anxiety; Insomnia due to medical condition      acetaminophen (TYLENOL) 160 MG/5ML oral liquid 20.3 mLs (650 mg) by Per Feeding Tube route every 4 hours as needed for mild pain  Qty: 300 mL, Refills: 0    Associated Diagnoses: Other chronic pain      budesonide (PULMICORT) 0.5 MG/2ML nebulizer solution Take 2 mLs (0.5 mg) by nebulization 2 times daily  Qty: 60 ampule, Refills: 0    Associated Diagnoses: Shortness of breath; Respiratory difficulty      guaiFENesin (ORGANIDIN) 200 MG TABS Take 1 tablet (200 mg) by mouth 4 times daily  Qty: 115 tablet, Refills: 0    Associated Diagnoses: Shortness of breath      sodium chloride (OCEAN) 0.65 % nasal spray Spray 1 spray into both nostrils daily as needed for congestion  Qty: 1 Bottle, Refills: 0    Associated Diagnoses: Chronic sinusitis, unspecified location      sulfamethoxazole-trimethoprim (BACTRIM,SEPTRA) 200-40 mg/5ml " suspension 20 mLs (160 mg) by Per J Tube route daily Dose based on TMP component. 20 mLs = 160 mg  Qty: 560 mL, Refills: 0    Associated Diagnoses: Chronic obstructive pulmonary disease with acute exacerbation (H)      Rectal Cleansers ([employer] CLEANSING ENEMA) ENEM Place 1 enema rectally daily as needed  Qty: 3 Bottle, Refills: 11    Associated Diagnoses: Constipation due to opioid therapy      !! bisacodyl (DULCOLAX) 10 MG Suppository Place 1 suppository (10 mg) rectally daily as needed for constipation  Qty: 28 suppository, Refills: 3    Associated Diagnoses: Constipation due to opioid therapy      calcium carbonate (TUMS) 500 MG chewable tablet Take 1 tablet (500 mg) by mouth every 2 hours as needed for heartburn  Qty: 150 tablet      lidocaine 15 mL, alum & mag hydroxide-simethicone 15 mL GI Cocktail Take 30 mLs by mouth 3 times daily as needed for moderate pain  Qty: 500 mL, Refills: 0    Associated Diagnoses: Gastroesophageal reflux disease without esophagitis       !! - Potential duplicate medications found. Please discuss with provider.      STOP taking these medications       meropenem (MERREM) 500 MG vial Comments:   Reason for Stopping:                    Discharge Instructions and Follow-Up:     Discharge Procedure Orders  Home Care PT Referral for Hospital Discharge   Referral Type: Home Health Therapies & Aides     Home care nursing referral   Referral Type: Home Health Therapies & Aides     Reason for your hospital stay   Order Comments: You were admitted with difficulty breathing     Tracheostomy care   Order Comments: Per routine     Reason for your hospital stay   Order Comments: You were admitted due to difficulty breathing     Follow Up and recommended labs and tests   Order Comments: Complex care MD 1 week     Tubes and drains   Order Comments: Avendaño   trach     Discharge Instructions   Order Comments: See instructions on POLST form     DNR (Do Not Resuscitate)     Oxygen Adult   Order  Comments: Trilogy vent     Diet   Order Comments: NPO Time Specified Ice Chips  Adult Formula Drip Feeding: Specified Time Isosource 1.5; Gastrostomy; Goal Rate: 85 ml/hour; mL/hr; From: 8:00 PM; 8:00 AM; Medication - Tube Feeding Flush Frequency: At least 15-30 mL water before and after medication administration   Order Specific Question Answer Comments   Is discharge order? Yes      Diet   Order Comments: Npo except ice chips  Adult Formula Drip Feeding: Specified Time Isosource 1.5; Gastrostomy; Goal Rate: 85 ml/hour; mL/hr; From: 8:00 PM; 8:00 AM; Medication - Tube Feeding Flush Frequency: At least 15-30 mL water before and after medication administration   Order Specific Question Answer Comments   Is discharge order? Yes              Discharge Disposition:   Group home         Condition on Discharge:   Discharge condition: {stable   Code status on discharge: DNR        Date of service: 2/3/2017     60 minutes spent in discharge, including >50% in counseling and coordination of care, medication review and plan of care recommended on follow up. Questions were answered   Spoke with RN, palliative team, CC , daughter César, RT        Celia Chavis  Internal Medicine Hospitalist & Staff Physician  [facility]

## 2017-02-03 NOTE — TELEPHONE ENCOUNTER
"Spoke with pharmacist, Payton, and gave her the verbal ok to switch clonazepam to tablet form. Same dose of 1.5 mg 4 x daily.  Informed Payton that pt is currently hospitalized, so she will \"profile\" the script and can deliver it once pt is discharged.    Bianka Wilson RN        "

## 2017-02-04 NOTE — DISCHARGE SUMMARY
DISCHARGE                         2/3/2017    06:15 PM  ----------------------------------------------------------------------------  Discharged to: Group Home  Via: [employer]  Accompanied by: Chuck  Discharge Instructions: diet, activity, medications, follow up appointments, when to call the MD, aftercare instructions.  Prescriptions: To be filled by pt selected pharmacy; medication list reviewed & sent with pt  Follow Up Appointments: arranged; information given  Belongings: All sent with pt  IV: d/c'd  Telemetry: d/c'd  Pt and daughters exhibit understanding of above discharge instructions; all questions answered.    Discharge Paperwork: Copied, and sent home with [employer].

## 2017-02-06 NOTE — PROGRESS NOTES
"[facility]  \"Hello, my name is Karen Martines , and I am calling from the [facility].  I want to check in and see how you are doing, after leaving the hospital.  You may also receive a call from your Care Coordinator (care team), but I want to make sure you don t have any urgent needs.  I have a couple questions to review with you:     Post-Discharge Outreach                                                    Mary Wang is a 66 year old female     Follow-up Appointments      Follow Up and recommended labs and tests        Complex care MD 1 week                       Your next 10 appointments already scheduled      Feb 08, 2017  2:00 PM   [facility] Visit with Norman Brito MD   [facility]      [address]  [address]  [facility] [address] [phone]               Mar 17, 2017  2:00 PM   Return Visit with Norman Brito MD   [facility] ([facility])                Care Team:    Patient Care Team       Relationship Specialty Notifications Start End    Norman Brito MD PCP - General Pulmonary  1/19/17     Phone: [phone] Fax: [phone]          [facility] [address]    [facility] [address]    Kahlil Nuñez MD MD Internal Medicine  7/1/15     Phone: [phone] Fax: [phone]         [address] [facility] [address]    Criselda Long APRN CNP Nurse Practitioner Nurse Practitioner  7/13/15     Phone: [phone] Pager: [phone] Fax: [phone]        [facility] [address] [facility] [address] [facility] [address]    Dhruv Lyles MD MD Gastroenterology  11/10/15     Phone: [phone] Fax: [phone]         [facility] [address] [facility] [address] [facility] [address]    Soledad Santos MD Resident Student in organized health care education/training program  9/14/16     Phone: " [phone] Fax: [phone]         [facility] [address] [facility] [address] [facility] [address]    Radha Mcmillan MD MD Internal Medicine  9/15/16     Phone: [phone] Fax: [phone]         [facility] [address] [facility] [address] [facility] [address]    Kahlil Nuñez MD MD [facility]  10/24/16     Phone: [phone] Fax: [phone]         [address] [facility] [address]    Cecilia Grewal PA Physician Assistant Physician Assistant  12/7/16     Phone: [phone] Fax: [phone]         [address] [facility] [address]            Transition of Care Review                                                      Did you have a surgery or procedure during your hospital visit? No   If yes, do you have any of the following:     Signs of infection:  No:      Pain:  No     Pain Scale (0-10)      Location:     Wound/incision concerns? No    Do you have all of your medications/refills?  Yes    Are you having any side effects or questions about your medication(s)? No    Do you have any new or worsening symptoms?  No    Do you have any future appointments scheduled? Yes- Dr. Norman Brito       Next 5 appointments (look out 90 days)     Feb 08, 2017  2:00 PM   [facility]   [facility]    [address]  [address]  [facility] [address]   [phone]            Mar 17, 2017  2:00 PM   Return Visit with Norman Brito MD   [facility]    [address]  [address]  [facility] [address]   [phone]                      Plan                                                      Thanks for your time.  Your Care Coordinator may follow-up within the next couple days.  In the meantime if you have questions, concerns or problems call your care team.        Karen Martines

## 2017-02-06 NOTE — TELEPHONE ENCOUNTER
Maude, RN from [facility] calling for verbal orders:    SN 1 week 1, 2 week 2, 1 week 7 weeks  OT eval for equipment  PT eval    Clair Dasilva MA

## 2017-02-08 NOTE — MR AVS SNAPSHOT
After Visit Summary   2/8/2017    Mary Wang    MRN: [id number]           Patient Information     Date Of Birth          1950        Visit Information        Provider Department      2/8/2017 2:00 PM Norman Brito MD [facility]        Today's Diagnoses     Acute on chronic respiratory failure with hypoxia (H)    -  1     Generalized anxiety disorder         Air hunger         Centrilobular emphysema (H)         Constipation due to opioid therapy         Advanced directives, counseling/discussion           Care Instructions    1.  We'll continue present medications unchanged for now.  Please call my office ([phone]) if patient has recurrence of the episodes of severe shortness of breath that she has had many times in the past.  One of her stated goals now is to avoid ED or hospital transport.  We can increase her current medications if needed, or could add long-acting opioids (Oxycontin, fentanyl transdermal) if we need longer acting control of shortness of breath.  Her wish to avoid hospitalization is written on the new POLST at the head of her bed, and is also in her Epic chart.    2.  I'll come back to see Lynda on Friday, February 24th, tentatively at 12:15 PM, though we may need to change that time, and will call you if we do.        Follow-ups after your visit        Your next 10 appointments already scheduled     Feb 24, 2017 12:15 PM   Return Visit with Norman Brito MD   [facility]    [address]  [address]  [facility] [id number] [phone]            Mar 17, 2017  2:00 PM   Return Visit with Norman Brito MD   [facility]    [address]  [address]  [facility] [address] [phone]              Who to contact     If you have questions or need follow up information about today's clinic visit or your schedule please contact  [facility] directly at [phone].  Normal or non-critical lab and imaging results will be communicated to you by MyChart, letter or phone within 4 business days after the clinic has received the results. If you do not hear from us within 7 days, please contact the clinic through [employer] or phone. If you have a critical or abnormal lab result, we will notify you by phone as soon as possible.  Submit refill requests through [employer] or call your pharmacy and they will forward the refill request to us. Please allow 3 business days for your refill to be completed.          Additional Information About Your Visit        [employer] Information     [employer] gives you secure access to your electronic health record. If you see a primary care provider, you can also send messages to your care team and make appointments. If you have questions, please call your primary care clinic.  If you do not have a primary care provider, please call [phone] and they will assist you.        Care EveryWhere ID     This is your Care EveryWhere ID. This could be used by other organizations to access your [facility] medical records  [id number]        Your Vitals Were     Pulse Respirations Pulse Oximetry             92 14 99%          Blood Pressure from Last 3 Encounters:   02/08/17 104/64   02/03/17 94/58   01/26/17 132/68    Weight from Last 3 Encounters:   02/03/17 126 lb 11.2 oz (57.471 kg)   01/24/17 122 lb (55.339 kg)   01/10/17 133 lb 6.4 oz (60.51 kg)              Today, you had the following     No orders found for display         Today's Medication Changes          These changes are accurate as of: 2/8/17 11:59 PM.  If you have any questions, ask your nurse or doctor.               Start taking these medicines.        Dose/Directions    Cranberry 125 MG Tabs   Replaces:  Cranberry 500 MG Caps   Started by:  Norman Brito MD        Dose:  1 tablet   1 tablet by Jejunal Tube route daily   Quantity:  90 tablet    Refills:  3         These medicines have changed or have updated prescriptions.        Dose/Directions    HYDROmorphone 1 MG/ML Liqd liquid   Commonly known as:  DILAUDID   This may have changed:  reasons to take this   Used for:  Air hunger   Changed by:  Norman rBito MD        Dose:  1 mg   Take 1 mL (1 mg) by mouth every 2 hours as needed for other (dyspnea)   Quantity:  180 mL   Refills:  0       * QUEtiapine 50 MG tablet   Commonly known as:  SEROQUEL   This may have changed:  Another medication with the same name was added. Make sure you understand how and when to take each.   Changed by:  Norman Brito MD        Dose:  50 mg   Take 1 tablet (50 mg) by mouth 2 times daily   Quantity:  180 tablet   Refills:  3       * QUEtiapine 25 MG tablet   Commonly known as:  SEROquel   This may have changed:  You were already taking a medication with the same name, and this prescription was added. Make sure you understand how and when to take each.   Used for:  Generalized anxiety disorder   Changed by:  Norman Brito MD        Administer one tab per J-tube as needed at night if scheduled HS dose ineffective for treatment of anxiety or sleeplessness.   Quantity:  90 tablet   Refills:  3       * Notice:  This list has 2 medication(s) that are the same as other medications prescribed for you. Read the directions carefully, and ask your doctor or other care provider to review them with you.      Stop taking these medicines if you haven't already. Please contact your care team if you have questions.     Cranberry 500 MG Caps   Replaced by:  Cranberry 125 MG Tabs   Stopped by:  Norman Brito MD           JEVITY 1.5 IZZY Liqd   Stopped by:  Norman Brito MD                Where to get your medicines      These medications were sent to "[facility] [place], [place] - [address]  [address], [facility] [address]     Phone:  [phone]    - Cranberry 125 MG Tabs  -  QUEtiapine 25 MG tablet      Some of these will need a paper prescription and others can be bought over the counter.  Ask your nurse if you have questions.     Bring a paper prescription for each of these medications    - HYDROmorphone 1 MG/ML Liqd liquid             Primary Care Provider Office Phone # Fax #    Norman Brito [phone]        [facility] [address] [place] [address]     [facility] [address]        Thank you!     Thank you for choosing [facility]  for your care. Our goal is always to provide you with excellent care. Hearing back from our patients is one way we can continue to improve our services. Please take a few minutes to complete the written survey that you may receive in the mail after your visit with us. Thank you!             Your Updated Medication List - Protect others around you: Learn how to safely use, store and throw away your medicines at www.disposemymeds.org.          This list is accurate as of: 2/8/17 11:59 PM.  Always use your most recent med list.                   Brand Name Dispense Instructions for use    acetaminophen 160 MG/5ML solution    TYLENOL    300 mL    20.3 mLs (650 mg) by Per Feeding Tube route every 4 hours as needed for mild pain       bisacodyl 10 MG Suppository    DULCOLAX    25 suppository    Place 1 suppository (10 mg) rectally daily as needed for constipation       budesonide 0.5 MG/2ML neb solution    PULMICORT    60 ampule    Take 2 mLs (0.5 mg) by nebulization 2 times daily       calcium carbonate 500 MG chewable tablet    TUMS    150 tablet    Take 1 tablet (500 mg) by mouth every 2 hours as needed for heartburn       clonazePAM 0.1 mg/mL    klonoPIN    1800 mL    Take 15 mLs (1.5 mg) by mouth 4 times daily       Cranberry 125 MG Tabs     90 tablet    1 tablet by Jejunal Tube route daily       famotidine 40 MG/5ML suspension    PEPCID    75 mL    Take 2.5 mLs (20 mg) by mouth 2 times daily as needed for  heartburn       fexofenadine 180 MG tablet    ALLEGRA    30 tablet    Take 1 tablet (180 mg) by mouth daily       fiber modular packet     42 packet    1 packet by Per J Tube route 3 times daily       FLEET [employer]UR[employer] CLEANSING ENEMA Enem     3 Bottle    Place 1 enema rectally daily as needed       gabapentin 250 MG/5ML solution    NEURONTIN    450 mL    6 mLs (300 mg) by Per J Tube route 3 times daily       guaiFENesin 200 MG Tabs tablet    ORGANIDIN    115 tablet    Take 1 tablet (200 mg) by mouth 4 times daily       HYDROmorphone 1 MG/ML Liqd liquid    DILAUDID    180 mL    Take 1 mL (1 mg) by mouth every 2 hours as needed for other (dyspnea)       ipratropium 0.02 % neb solution    ATROVENT    450 mL    Take 2.5 mLs (0.5 mg) by nebulization 4 times daily and every four hours at night PRN.       lactobacillus rhamnosus (GG) capsule     60 capsule    1 capsule by Per G Tube route daily       levalbuterol 1.25 MG/3ML neb solution    XOPENEX    540 mL    Take 3 mLs (1.25 mg) by nebulization 4 times daily and every four hours at night PRN.       lidocaine 15 mL, alum & mag hydroxide-simethicone 15 mL GI Cocktail     500 mL    Take 30 mLs by mouth 3 times daily as needed for moderate pain       lidocaine 5 % Patch    LIDODERM    30 patch    Place 1-2 patches onto the skin every 24 hours Apply to the lower back       loperamide 2 MG tablet    IMODIUM A-D    30 tablet    2-2 mg tablets per J-tube qid prn frequent and/or loose stools. Do not use more than 8 tabs per day.       LORazepam 2 MG/ML (HIGH CONC) solution    ATIVAN    30 mL    Take 0.25 mLs (0.5 mg) by mouth every 3 hours as needed for anxiety       melatonin 1 MG/ML Liqd liquid     300 mL    3 mLs (3 mg) by Per J Tube route At Bedtime       ondansetron 4 MG tablet    ZOFRAN    18 tablet    Take 1 tablet (4 mg) by mouth every 4 hours as needed for nausea       * order for DME     3 each    Equipment being ordered: 1) Avendaño catheter 16F, balloon 10 mL, silicone.   2) Urinary collection bag.  3) Elastic leg strap for Avendaño catheter.  Please fax to "[employer].       * order for DME     1 Device    Equipment being ordered: Methylex foam dressings, alternating pressure pad for mattress and pump.       polyethylene glycol 0.4%- propylene glycol 0.3% 0.4-0.3 % Soln ophthalmic solution    SYSTANE ULTRA    1 Bottle    Place 1-2 drops into both eyes 4 times daily as needed for dry eyes 0900, 1300, 1700, 2100       polyethylene glycol Packet    MIRALAX/GLYCOLAX    100 packet    17 g by Per J Tube route 2 times daily Hold for loose stools       predniSONE 5 MG/5ML solution     500 mL    Take 20 mLs (20 mg) by mouth daily       * QUEtiapine 50 MG tablet    SEROQUEL    180 tablet    Take 1 tablet (50 mg) by mouth 2 times daily       * QUEtiapine 25 MG tablet    SEROquel    90 tablet    Administer one tab per J-tube as needed at night if scheduled HS dose ineffective for treatment of anxiety or sleeplessness.       senna-docusate 8.6-50 MG per tablet    SENOKOT-S;PERICOLACE    100 tablet    2 tablets by Per J Tube route 2 times daily       sertraline 20 MG/ML (HIGH CONC) solution    ZOLOFT    105 mL    7.5 mLs (150 mg) by Per Feeding Tube route daily       sodium chloride 0.65 % nasal spray    OCEAN    1 Bottle    Spray 1 spray into both nostrils daily as needed for congestion       sulfamethoxazole-trimethoprim suspension    BACTRIM/SEPTRA    560 mL    20 mLs (160 mg) by Per J Tube route daily Dose based on TMP component. 20 mLs = 160 mg       * Notice:  This list has 4 medication(s) that are the same as other medications prescribed for you. Read the directions carefully, and ask your doctor or other care provider to review them with you.

## 2017-02-08 NOTE — PROGRESS NOTES
SUBJECTIVE:                                                      Mary Wang is a 66 year old female with medical history which includes the followin.  Chronic obstructive pulmonary disease, end-stage - most recent PFT  showed FEV1 0.35 L (16% predicted) and DLCO/VA 18% predicted, indicative of emphysema.    2.  Chronic ventilatory failure secondary to #1 - on continuous ventilation via trach.    3.  Intractable dyspnea despite mechanical ventilation - seen in Palliative Care consultation by Dr. Radha Reyes 2017; see below.    4.  Achalasia, etiology not described in medical record - esophageal stent placed at some point in the past, removed endoscopically 2015.  Has GJ tube for enteral feeding.  There has been past difficulty placing GJ endoscopically.    She was seen in her group home today for the above, in follow-up of a recent hospitalization, and for the following health issues:              End-stage COPD with chronic ventilatory failure  Has had trach and required continuous mechanical ventilation since early .  Current vent:  CMV mode, Vt 400, rate 14, FiO2 3 L/min O2, and PEEP 5 cm.  Has been steroid-dependent for years, currently 20 mg QD.  Trach is Bivona 6.0 (70 mm).  Nebulized Rx: budesonide BID, levalbuterol Q4H, ipratropium Q4H.  Both bronchodilators available PRN at night also.    Symptoms are currently: Controlled since return home from hospital, without episodes of severe dyspnea.    Current fatigue or dyspnea with ambulation: N/A; is at bedrest and sedentary due to severe dyspnea.    Shortness of breath: Always present when awake, and previously episodically severe even when at rest.    Increased or change in Cough/Sputum: No.  Has infrequent cough.  Requires suction occasionally, sputum is presently at baseline scant, white.    Fever(s): No.    Any ER/UC or hospital admissions since your last visit? Yes - [facility]  - 2/3/17 for acute on chronic dyspnea.  No clear  "etiology identified, no significant change in treatment made.  History    Smoking status       Former Smoker -- 2.00 packs/day for 40 years       Types:  Cigarettes       Start date:  01/01/1964       Quit date:  04/18/1998    Smokeless tobacco       Never Used      No results found for this basename: FEV1, EXH3AUD            Amount of exercise or physical activity: Almost entirely bedbound.  Can still be lifted to chair, but does so infrequently.    Problems taking medications regularly: No; all meds given G port of GJ tube.    Medication side effects: Daughter reports sleepiness to hydromorphone and benzos.    Diet: Isosource 1.5 at 85 mL/hour 12 hours per day, 2000 - 0800, with fiber supplement 1 packet TID.      Intractable dyspnea  Recommendations from Palliative Care consult, Dr. Radha Reyes, 1/18/17:  MS Contin 10 mg BID, hydromorphone 1 mg enterally Q2H PRN, clonazepam 1.5 mg QID (with the recommendation to push some of that scheduled dose toward HS to minimize daytime sedation, and lorazepam 0.5 mg Q3H PRN. During 1/2017 hospital stay, MS Contin was discontinued and not resumed, but remainder of above Rx continues.    Duration: Severe since patient was officially taken off of transplant list in 2016.    Description (location/character/radiation): Severe dyspnea, even at rest, episodically while on mechanical ventilation.  Severe enough at times that patient says she's \"scared\".  Has resulted in ED transport over the past few months.    Intensity:  Often very severe as above.    History (similar episodes/previous evaluation): End-stage COPD on continuous mechanical ventilation, superimposed on chronic anxiety.    Therapies tried and outcome: Even without MS Contin, control of dyspnea since return home from the hospital has been adequate on hydromorphone 1 mg enterally Q2H PRN, clonazepam 1.5 mg QID, and lorazepam 0.5 mg Q3H PRN, without episodes of severe dyspnea such as those which have previously " triggered ED visits.         Anxiety    Multiple medication changes made last two hospital stays.  Current Rx includes sertraline 150 mg QD, clonazepam 1.5 mg QID, lorazepam 0.5 mg Q3H PRN (I checked the  discharge summary; both benzos were included), and quetiapine 50 mg Q12H.    Status since last visit: Anxiety remains better controlled since return home from hospital.    Other associated symptoms: Severe dyspnea, see above.    Complicating factors:  Significant life event: No   Current substance abuse: None  Depression symptoms: No  No flowsheet data found.      GAD7                       Hospital Follow-up Visit:    Hospital: [facility]  Date of Admission: 1/28/17  Date of Discharge: 2/3/17  Reason(s) for Admission: 1. Acute on chronic dyspnea.  2. Chronic ventilatory failure.            Problems taking medications regularly:  None       Medication changes since discharge: None       Problems adhering to non-medication therapy:  None    Summary of hospitalization:  [facility] discharge summary reviewed, portions follow:    Date of Admission:                    1/28/2017  Date of Discharge:                    2/3  Admitting Physician:                  Enrique Gama MD  Discharge Physician:                 Celia Chavis    Discharging Service:                  Internal Medicine, [facility]     Primary Provider: Norman Brito           Reason for Admission:    Difficulty breathing            Discharge Diagnosis:    Acute on chronic respiratory Failure  Vent dependent    Dysphagia . Achalasia . G Tube depenedent  Malnutrition on TF  Urinary retention ; on bender catheter    Severe Anxiety and depression    Normocytic Anemia           Procedures & Significant Findings:    MRI brain ; with no findings of infarction or hemorrage           Consultations:    Palliative team            Hospital Course by Problem:       Mary Wang is a 66 year old female with a history  of severe COPD s/p trach on home vent 24/7 (3-3.5 LPM), anxiety, hypertension, achalasia s/p GJ tube who presented to the hospital with acute on chronic hypoxic and hypercarbic respiratory failure.     *Multiple admissions in last 2 months, 12/1-12/5 for UTI, 12/25-12/28 for bacteremia, 1/2-1/6 with encephalopathy ultimately attributed to her medications, 01/07-01/09 for acute on chronic respiratory failure likely 2/2 COPD exacerbation, 1/20-1/24 for oversedation/aspiration pneumonitis.    Goals of a care ; greatly appreciate Palliative following patient. Please note 1/31. FCC was held 1/31 and the plan is that Mary will not be admitted to hospital again for respiratory decompensation . Cares being organized . New Polst completed  Spoke with daughter César 2/3 and updated on progress. César would like to have in place regarding respiratory decline in group home so that Mary is not under duress.    #Acute on chronic respiratory failure, severe COPD s/p trach: On ventilator (settings CMV, 400/14/5/40%). Multiple admissions in 6 weeks, see above. For chronic respiratory failure PTA the patient is maintained on chronic prednisone at 20 mg qday, scheduled ipratropium and xoponex nebs, BID Pulmicort, guaifenesin 200 mg QID, bactrim for PCP prophylaxis, allegra BID. Mucous plugging w/ O2 sats dropping to 27%, group home staff deep suctioned for 15 minutes before sats improved, in 90s on admission. XR chest  w/ mildly increased patchy basilar opacities not significant changed from 01/20 xray- atelectasis vs. Infx.  Afebrile, normotensive, mildly tachcyardic, sating  on home 2-3 LPM. CT Angiogram was negative for Pulmonary embolism. She is currently at her baseline oxygen requirements. CT did show some right lung opacity as she is improving without antibiotics , none were initiated , Family was aware and in agreement with this plan     #Achalasia s/p GJ tube   -Continue tube feeds    #Severe anxiety, depression: Likely  due to air hunger from COPD. Psychiatry and Palliative care have seen patient in the past. Continued to have significant anxiety and frequent requests for hospital admission per daughter. PTA maintained on seroquel (50 mg BID), sertraline (150 mg qday), scheduled Klonopin (1.5 mg QID) and PRN ativan (0.5 q3 PRN) & dilaudid (1 mg per G q2 hours PRN) for air hunger. Currently endorses stable symptoms-Continued home regimen  -     # Pulmonary nodule: Seen on CT 01/29. 1 cm right lower lobe lung nodule, slightly more prominent than prior      # Urinary retention: Chronic indwelling bender. Last changed 1/23 while inpatient.  -Continue bender    # Normocytic anemia: At baseline.     # CAD: On aspirin and statin. Continue.      # Constipation: On senna and miralax PTA.     CODE: DNR, trached and on vent chronically. Discussed with Daughter HANNAH Lindsey  2/3  FEN: NPO, G tube feeds  DVT: Heparin Q 8h while hospitalized          Diagnostic Tests/Treatments reviewed.  Follow up needed: None.  Other Healthcare Providers Involved in Patient s Care:         None  Update since discharge: Per group home staff, anxiety has not been as severe since return hoe from hospital, and she has had no episodes of severe dyspnea since return home similar to those in the past which have triggered ED visits.    Post Discharge Medication Reconciliation: discharge medications reconciled and changed, per note/orders (see AVS).  Plan of care communicated with patient and staff member.  Daughter, Carina, was not present for visit.     Coding guidelines for this visit:  Type of Medical   Decision Making Face-to-Face Visit       within 7 Days of discharge Face-to-Face Visit        within 14 days of discharge   Moderate Complexity [id number]   High Complexity [id number]            Problem list and histories reviewed & adjusted, as indicated.  Additional history: as documented    BP Readings from Last 3 Encounters:   02/08/17 104/64   02/03/17 94/58    01/26/17 132/68    Wt Readings from Last 3 Encounters:   02/03/17 126 lb 11.2 oz (57.471 kg)   01/24/17 122 lb (55.339 kg)   01/10/17 133 lb 6.4 oz (60.51 kg)                  Labs reviewed in EPIC  Problem list, Medication list, Allergies, and Medical/Social/Surgical histories reviewed in [facility] and updated as appropriate.    ROS:  Obtained from patient and group home staff member.  CONSTITUTIONAL: NEGATIVE  for chills, fever.  EYES: NEGATIVE for eye irritation or discharge.  ENT/MOUTH: NEGATIVE for nasal congestion, sinus pressure, or epistaxis.  RESP: See above.    CV: NEGATIVE for chest pain.  Has mild to minimal lower extremity edema, at baseline.  GI: NEGATIVE for abdominal pain, diarrhea, nausea or vomiting.  GJ tube functioning well.  Prone to constipation, not severe.  : NEGATIVE for reported dysuria.  Has indwelling Avendaño chronically.    MUSCULOSKELETAL: NEGATIVE for focal arthralgia or back pain, no joint swelling.  INTEG: Staff report mild redness of presacrum but no open lesions.  NEURO: NEGATIVE for focal numbness or weakness, syncope, stroke or seizure.  PSYCHIATRIC: See above.    OBJECTIVE:                                                    /64 mmHg  Pulse 92  Resp 14  SpO2 99% on vent with 4 L/min O2 in-line.  There is no weight on file to calculate BMI.    GENERAL: Short in stature woman, lying in bed, in no acute distress.  EYES: Eyes grossly normal to inspection, extraocular movements intact  HENT: Nares patent bilaterally.  Nasal mucosa normal, no discharge.  Dentition partial.  Posterior pharynx clear, no exudate, to limited exam.  NECK: Tracheostomy tube is Bivona 6.0, stoma is clean and mature, no discharge.  No adenopathy noted over the neck or supraclavicular areas.  RESP: No accessory muscle use on full vent support.  Symmetrically very distant breath sounds bilaterally.  Lungs quiet but clear throughout on inspiration.  Expiration very quiet and very prolonged, but no wheeze  heard.  CV: Regular rate and rhythm, distantly heard, non-tachycardic.  Normal S1 S2, no murmur heard, S2 is persistently split.  No lower extremity edema.  ABDOMEN: GJ tube stoma is clean, and tube is secure.  Mildly protuberant, but soft, non-tender, no guarding.  Liver and spleen not palpable, no masses palpable.  Bowel sounds positive.  MS: No bony deformities noted.  No red or inflamed joints.  SKIN: Warm and dry, no rashes.  NEURO: Awake, alert, mouths responses to questions and asks appropriate questions of her own.  Orientation cannot be formally tested as patient is non-conversant, but seems intact.  Cranial nerves II - XII grossly intact.  No gross motor or sensory deficits, though exam limited in this somnolent patient.    : Avendaño catheter noted.  PSYCH: Exam mostly deferred as patient is non-verbal.  More alert than last visit, and seemed overall calm.      ASSESSMENT/PLAN:                                                    (J43.2) Centrilobular emphysema (H)    Comment: End-stage, both numerically and clinically.  Is on continuous mechanical ventilation, and tenuous on that.  Is steroid-dependent with Prednisone 20 mg QD (and on TMP/SMX prophylaxis), and on nebulized budesonide and bronchodilators.  Plan: Continue present Rx, which is maximal.  I can't identify any additions likely to be helpful.    (J96.22) Acute and chronic respiratory failure with hypercapnia (H)  Comment: Vent settings and trach info described above.  Patient has severe, and often intractable dyspnea despite full vent support; see below.  Plan: Continue current vent strategy.    (R09.89) Intractable dyspnea  Comment: Since return home from the hospital 2/3/17, dyspnea has been fairly well-managed on hydromorphone 1 mg Q2H PRN (usually 6 doses per day), clonazepam 1.5 mg QID, and lorazepam 0.5 mg Q3H PRN (up to 6 doses per day).  Palliative Care consult 1/18/17 recommended addition of MS Contin 10 mg Q12H routinely, in hopes that  severe episodes of dyspnea might be reduced/prevented, though MS Contin was stopped during an earlier hospital stay for reasons that aren't clear, and has not been resumed.    Plan: Will continue current Rx for now.  Can consider another trial of MS Contin or transdermal fentanyl for baseline relief of dyspnea if episodically severe breakthrough dyspnea is problematic.    (F41.1) Generalized anxiety disorder  Comment: Longstanding, predates respiratory failure and end-stage COPD.  Dyspnea is the primary trigger of anxiety, and has been a common cause of hospital transport.  Plan: Continue sertraline 150 mg QD, quetiapine 50 mg BID (patient also has a history of periods of delirium, which I guess is why this was started), and both scheduled and PRN benzodiazpines.  Will also need to control dyspnea, as it is the primary trigger for severe anxiety; see above for details.  Monitor response.    (K59.03,  T40.2X5A) Constipation due to opioid therapy  Comment: An episodic problem, occurs despite Senna and polyethylene glycol.    Plan: Continue present Rx, which includes Rectal Cleansers ([employer] CLEANSING ENEMA) ENEMA daiily as needed, bisacodyl (DULCOLAX) 10 MG Suppository daily as needed added last visit.    (Z71.89) Advanced directives, counseling/discussion  Comment: Patient has an established DNR order, but until recently continued to endorse hospital transport for treatment.  During the most recent hospital stay, the patient was seen by the inpatient Palliative Care service.  Following discussions with Palliative Care, patient advised them that she no longer wished to be transported to ED.  Her POLST was updated 2/3/17 to reflect this change.  I was contacted by Palliative Care to advise me of this,and to discuss possible alterations of palliative Rx which could prevent and/or treat episodically severe dyspnea, which will inevitably occur.  Fortunately, since patient's return home from the hospital, her  comfort has been relatively good.  Plan: We will continue present Rx for dyspnea and anxiety outlined above, with the plan to add longer acting opioid Rx in the form of oxycodone ER and/or fentanyl transdermal if additional relief of dyspnea needed.  Will avoid future transport to ED or hospital per patient's request on the 2/3/17 POLST.    FUTURE APPOINTMENTS:       - Follow-up visit scheduled for 2/24/17, time TBA.    Face to face time was 51 minutes, at least 50% of which was spent in counseling patient and staff regarding management of dyspnea, anxiety, and ventilatory failure.      Norman Brito MD  [place] [facility]

## 2017-02-08 NOTE — PROGRESS NOTES
[facility]  February 8, 2017      Behavioral Health Clinician Progress Note    Patient Name: Mary Wang           Service Type: Individual      Service Location:  at the patient's home      Session Start Time: 2:00  Session End Time: 2:50      Session Length: 38 - 52      Attendees: Patient, PCP and group home staff    Visit Activities (Refresh list every visit): NEW and [facility] [employer]    Diagnostic Assessment Date: To be completed   Treatment Plan Review Date: To be completed   See Flowsheets for today's PHQ-9 and BARRETT-7 results  Previous PHQ-9: No flowsheet data found.  Previous BARRETT-7: No flowsheet data found.    DANIELLA LEVEL:  No flowsheet data found.    DATA  Extended Session (60+ minutes): No  Interactive Complexity: Yes, visit entailed Interactive Complexity evidenced by:  -Use of play equipment, physical devices,  or  to overcome barriers to diagnostic or therapeutic interaction with a patient who is not fluent in the same language or who has not developed or lost expressive or receptive language skills to use or understand typical language. Pt has difficulty communicating due to vent status. Only able to mouth words, communication is complex.   Crisis: No    Treatment Objective(s) Addressed in This Session:  Target Behavior(s): disease management/lifestyle changes      Anxiety: will experience a reduction in anxiety, will develop more effective coping skills to manage anxiety symptoms, will develop healthy cognitive patterns and beliefs and will increase ability to function adaptively    Current Stressors / Issues:  Pt is present today for hospital follow up with PCP. She has not yet met this [facility]. Pt is new to [facility] and has a complicated medical history. She is on a ventilator and is end stage COPD. Pt is only able to mouth words but times she does not answer questions. Some information is supported by staff present for today's visit. Pt states  that she does have anxiety that is mostly managed right now with medications: Ativan, Clonazepam, Dilaudid and Seroquel. Her anxiety is also heavily tied to her air hunger. Her chart states that there has been some history in the past of depression. Her daughter, and POA, was due to be present for this appointment but was not able to come.   Full diagnostic assessment was not completed today due to limited time and complicated communication. [facility] will complete the next appointment. Pt at least initially seems to qualify for the following diagnoses per report and chart review history:   1. Generalized anxiety disorder            Progress on Treatment Objective(s) / Homework:  New Objective established this session - CONTEMPLATION (Considering change and yet undecided); Intervened by assessing the negative and positive thinking (ambivalence) about behavior change    Motivational Interviewing    MI Intervention: Expressed Empathy/Understanding, Supported Autonomy, Collaboration, Evocation, Open-ended questions, Reflections: simple and complex, Change talk (evoked) and Reframe     Change Talk Expressed by the Patient: Desire to change Ability to change Reasons to change    Provider Response to Change Talk: E - Evoked more info from patient about behavior change, A - Affirmed patient's thoughts, decisions, or attempts at behavior change, R - Reflected patient's change talk and S - Summarized patient's change talk statements      Care Plan review completed: No    Medication Review:  No changes to current psychiatric medication(s)    Medication Compliance:  Yes    Changes in Health Issues:   Yes: Chronic disease management, Associated Psychological Distress    Chemical Use Review:   Substance Use: Chemical use reviewed, no active concerns identified      Tobacco Use: No current tobacco use.      Assessment: Current Emotional / Mental Status (status of significant symptoms):  Risk status (Self / Other harm or suicidal  ideation)  Patient denies a history of suicidal ideation, suicide attempts, self-injurious behavior, homicidal ideation, homicidal behavior and and other safety concerns  Patient denies current fears or concerns for personal safety.  Patient denies current or recent suicidal ideation or behaviors.  Patient denies current or recent homicidal ideation or behaviors.  Patient denies current or recent self injurious behavior or ideation.  Patient denies other safety concerns.  A safety and risk management plan has not been developed at this time, however patient was encouraged to call [place] [phone] should there be a change in any of these risk factors.    Appearance:   Appropriate  Lying in bed. Pt on ventilator   Eye Contact:   Good   Psychomotor Behavior: Retarded (Slowed)   Attitude:   Cooperative   Orientation:   All  Speech   Rate / Production: Slow    Volume:  Pt only able to mouth words  Mood:    Normal  Affect:    Appropriate   Thought Content:  Clear   Thought Form:  Coherent  Logical  Circumstantial  Insight:    Fair     Diagnoses:  1. Generalized anxiety disorder        Collateral Reports Completed:  Not Applicable    Plan: (Homework, other):  Patient was given information about behavioral services and encouraged to schedule a follow up appointment with the clinic [facility] as needed.  She was also given information about mental health symptoms and treatment options .  CD Recommendations: No indications of CD issues. Corinne Melling, LMFT, [facility]

## 2017-02-08 NOTE — PATIENT INSTRUCTIONS
1.  We'll continue present medications unchanged for now.  Please call my office ([phone]) if patient has recurrence of the episodes of severe shortness of breath that she has had many times in the past.  One of her stated goals now is to avoid ED or hospital transport.  We can increase her current medications if needed, or could add long-acting opioids (Oxycontin, fentanyl transdermal) if we need longer acting control of shortness of breath.  Her wish to avoid hospitalization is written on the new POLST at the head of her bed, and is also in her Epic chart.    2.  I'll come back to see Lynda on Friday, February 24th, tentatively at 12:15 PM, though we may need to change that time, and will call you if we do.

## 2017-02-10 PROBLEM — Z76.89 HEALTH CARE HOME: Status: ACTIVE | Noted: 2017-01-01

## 2017-02-11 NOTE — TELEPHONE ENCOUNTER
"Call Type: Triage Call    Presenting Problem: Multiple discussions with NP, Hilary Vallejo from  [facility]  and Amanda EDGE caring for patient as well as pt's  daughter, César who is also her POA..  Pt with temp of 101., rr of  30 and increased agitation and restlessness.  Hilary states POLST  clearly states that there are to be no further hospitalizations for  respiratory decompensation. Daughter states she would prefer to have  oral treatment of the infection and hold off on increased comfort  medications.  Hilary would treat with oral antibiotics through tube  feed.  Ok to give an additional dose of Zofran today as needed for  increased nausea and \"queasiness.\"   Prescription should be sent to  [facility] [phone].  Await call back from Hilary Vallejo with  prescription information.  Greater than 1 hour of phone time  addressing concerns.  Triage Note:  Guideline Title: No Guideline - Advice Per Reference (Adult)  Recommended Disposition: Call Provider Immediately  Original Inclination: Wanted to speak with a nurse  Override Disposition:  Intended Action: Follow advice given  Physician Contacted: Yes  CALL PROVIDER IMMEDIATELY ?  YES  SEE ED IMMEDIATELY ? NO  ACTIVATE  ? NO  Physician Instructions:  Care Advice:  "

## 2017-02-11 NOTE — TELEPHONE ENCOUNTER
Call Type: Triage Call    Presenting Problem: Received call back from Hilary Vallejo NP who  states she is ordering Amoxicillin 1 gram three times per day x 10  days.  Pill should be crushed and put through feeding tube.  She is  also ordering Clarithromycin 500 mg twice a day x 10 days.  Crush  and give through tube.  She offered to increase dilaudid dose to 1.5  mg but daughter declines at this time. She will also place order for  hospice consult.   Prescriptions will be sent to [facility] per  Hilary.  Called krishna Patel's  home care LPN  at [phone] and  notified her that meds have been ordered from [facility]. Amanda  states understanding of medications and directions.  Triage Note:  Guideline Title: No Guideline - Advice Per Reference (Adult)  Recommended Disposition: Call Provider Immediately  Original Inclination: Wanted to speak with a nurse  Override Disposition:  Intended Action: Follow advice given  Physician Contacted: Yes  CALL PROVIDER IMMEDIATELY ?  YES  SEE ED IMMEDIATELY ? NO  ACTIVATE  ? NO  Physician Instructions:  Care Advice:

## 2017-02-11 NOTE — TELEPHONE ENCOUNTER
Palliative Medicine on-call note:    Received a page from Amanda Mary's group home RN with question re: POLST directions for comfort.     Mary had increased dyspnea/anxiety this morning and has started treatment for a suspected pneumonia through her primary team.  Per chart review, discussions were held with daughter (HCA) re: possible hydromorphone escalation, which was declined at this time.      Amanda asked for guidance re: comfort focused medications given most recent POLST which was reviewed as per chart. Focus is on avoiding hospitalizations, and using comfort focused medications if needed should an acute, potentially life threatening event occurred such as a mucus plug.     I encouraged Amanda to continue to use Mary's current medications for comfort (lorazepam, clonazepam, and hydromorphone) as ordered.  We reviewed that if these medications were not effective enough, the doses can be escalated but can sometimes lead to sedation.  If her current medications and antibiotics were not effective enough for dyspnea and anxiety, I would recommend another conversation with Mary's daughter about potential dose escalation.      If Mary (and her daughter) preferred comfort over wakefulness, I would recommend escalation to hydromorphone 2mg q2h PRN dyspnea as the next step in alleviating her dyspnea/anxiety, and would recommend that medication changes are ordered through the Complex Care team to avoid having multiple providers changing medications simultaneously.     Sylvie Bolton MD  On-call Palliative Medicine physician
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
## 2017-02-11 NOTE — TELEPHONE ENCOUNTER
Patient with past medical history of acute on chronic respiratory failure, ventilator dependent, anxiety among others received call from [facility] RN reporting patient's  LPN reports worsened dyspnea, increased anxiety, respirations at 30, she is febrile 101 and she  is uncomfortable. Patient was last seen by Dr. PHI Brito PCP on 02/08/2016. This is a very complex patient with  having 16 hospital admissions over the past year for respiratory failure 2/2 end stage COPD, most recently discharged on 02/03/2017 with goals of care from discharge note clearly stating :   Please note 1/31. FCC was held 1/31 and the plan is that Mary will not be admitted to hospital again for respiratory decompensation . Cares being organized . New Polst completed. Spoke with daughter César 2/3 and updated on progress. César would like to have in place regarding respiratory decline in group home so that Mary is not under duress.      Per PCP last note 02/08/2017    Even without MS Contin, control of dyspnea since return home from the hospital has been adequate on hydromorphone 1 mg enterally Q2H PRN, clonazepam 1.5 mg QID, and lorazepam 0.5 mg Q3H PRN, without episodes of severe dyspnea such as those which have previously triggered ED visits.   Multiple medication changes made last two hospital stays.  Current Rx includes sertraline 150 mg QD, clonazepam 1.5 mg QID, lorazepam 0.5 mg Q3H PRN (I checked the [facility] discharge summary; both benzos were included), and quetiapine 50 mg Q12H.    Plan: PATIENT likely has pneumonia with respiratory failure supported by fever and increased respiratory rate and air hunger, goals of care clearly state no further hospital admissions. Discussed with daughter with PA and agrees.     For presumed pneumonia start Amoxicillin 1 gTID x 10 days + Clarithromycin 500 mg BID x 10 days. She is maintained on Bactrim DS daily.     For dyspnea- Increase short acting hydromorphone 1.5 mg every 2 hrs PRN. Though optimal  treatment would be initiating LA opioid however given its the weekend and call new opioid script cannot be written.     I also discussed hospice referral with RN given situation, this is a very appropriate referral. Orders placed.

## 2017-02-13 NOTE — TELEPHONE ENCOUNTER
Spoke with Maddie, palliative care NP, and she said the plan for acute symptom management is:  1. Call the Palliative care on-call line - [phone]. Stay on the line to get to an on-call provider.  2. Use palliative team pager - [phone] - only call if can't get through to the number above.    I asked Maddie a couple of times about what the home care nurses can do if the pt is in distress, when the dtr has asked them not to give any escalated doses of comfort meds.   She said the PCP needs to have a conversation about goals of care to see if there has been a change since POLST was completed on 2/3/17.    I did not get any definite answers or plan of what RNs can do to keep pt comfortable.     Amanda said the [facility] need to know if they are supposed to do their mucus plug protocol (BIBS).  She said they need pt's POLST to specify this either way so the RNs are not confused.    Amanda said the on-call palliative provider, Dr. Bolton, was on a conference call with pt's dtr on 2/11 when César said she did not want RNs to escalate pt's comfort med doses.   Per Amanda, the pt has been sleeping through almost all of her shift today, which is very unusual for her.    I will route this note to schedulers to see if we can add an appt with Dr. Brito this week. If so, please arrange with dtr, César.

## 2017-02-13 NOTE — TELEPHONE ENCOUNTER
Maddie called me back and said that she spoke with her medical director to get some guidance and offered to contact pt's daughter to discuss symptom management and issues with POLST.

## 2017-02-13 NOTE — TELEPHONE ENCOUNTER
"Information from multiple involvement from multiple providers noted.  I think the most important message from all of these notes is that the patient is comfortable, and in fact somnolent, so there is no urgency to do anything right now.    I would guess that somnolence is the result of meds plus probable acute on chronic hypercapnea from respiratory failure.  The only \"lab\", as daughter has requested \"labs\", likely to define the patient's status would be an ABG, which cannot be performed in the home setting.  I'm not going to order additional \"labs\" on this patient at end-of-life, in whom our primary stated goal is comfort, who has been on antibiotics nearly continuously for the past two weeks.  If César is displeased by this, she can identify alternative primary care and/or have patient transported to a hospital, though hospital transport also contradicts another of the patient's stated goals to not be hospitalized.      "

## 2017-02-13 NOTE — TELEPHONE ENCOUNTER
"Since patient is \"sleeping now and hard to arouse\", it seems unlikely that César's involvement is resulting in undermedicating patient.  César knows what Palliative Care is.  I discussed this with her in detail.  In my note, I document my discussion with César that medications used for comfort will cause sleepiness, and that our goal of patient comfort trumps her goal of patient alertness.  At the time, she agreed with me.  I'm not sure what changed.    It sounds as though right now, patient's comfort level is good.  I would suggest that group home staff continue their current efforts, and that they remind César that sleepiness is an unavoidable side effect of the medications needed for her comfort.  I can discuss this again with César next visit.  "

## 2017-02-13 NOTE — TELEPHONE ENCOUNTER
Hospice cannot be involved due to pt being on a mechanical ventilator; palliative care is involved.    Spoke with Shanon at [facility] and she said she has discussed the issue with pt's HC RN case manager. Shanon let RN know that hospice is not appropriate for this pt, unless she decides to discontinue her ventilator.    Bianka Wilson, RN

## 2017-02-13 NOTE — TELEPHONE ENCOUNTER
"Amanda said palliative care transferred her to several different nurses, but no one knew what to do with her call. They told her they did not know which provider to direct her call to and she was very frustrated.    Amanda said the pt is still \"unresponsive\" and her last doses of lorazepam, clonazepam and hydromorphone were given at 8:30 this morning. She has held all the other doses today due to pt's status.  Per Amanda, all vitals are WNL and stable.    Bianka Wilson RN            "

## 2017-02-13 NOTE — TELEPHONE ENCOUNTER
"Per MATTIE Patel, the pt has had a couple of severe episode of shortness of breath (possible mucus plug) and anxiety attacks over the weekend. Per Amanda, the pt is sleeping now and is very hard to arouse.  Daughter César won't allow [facility] to give pt any PRN doses of lorazepam or hydormorphone, even when she is in distress. César doesn't want her mom \"sleeping all day\".  Amanda does not think César understands what palliative care is and asked if Dr. Brito could explain it to César.    I advised that palliative care has an on-call number that they are supposed to be using for acute symptoms ([phone]). Amanda said she is aware of this, but stated César insisted she call Dr. Brito instead of palliative care. The home care nurses are having a lot of difficulty because they cannot treat pt's symptoms due to daughter's involvement.    Dtr, César, asked if a provider would see pt, and order an x-ray and labs, because of the antibiotics that were ordered by on-call provider on 2/11. I told Amanda that we can not usually provider urgent care and that this was explained to César when we started seeing her.     Routing to Dr. Brito to please advise.    Bianka Wilson RN          "

## 2017-02-13 NOTE — TELEPHONE ENCOUNTER
César called requesting we have the pt's labs drawn to see what's going on with the pt instead of them sending the pt to the ER. She also requests a UA. César thinks the pt has a UTI, not a lung infection. César's call back number is [phone].

## 2017-02-13 NOTE — TELEPHONE ENCOUNTER
Amanda, caregiver for pt called reporting the pt was having severe panic episodes this past weekend. Amanda reports the pt's respiratory rate was high. The pt became unresponsive at times but her vitals were stable. She had a temp of 101. It was hard to arouse her even with a sternum rub. Amanda reports these symptoms are continuing today as well.    Clair Dasilva MA

## 2017-02-14 NOTE — TELEPHONE ENCOUNTER
I called the pt's home back and spoke to a different nurse who said the pt went to the ED for her GJ-tube issue. RN said that when EMS arrived, pt coughed and the balloon came out.    I asked them to let us know if pt is admitted since Dr. Brito is scheduled to see pt tomorrow.  Routing to Dr. Brito for his information.    Bianka Wilson RN

## 2017-02-14 NOTE — TELEPHONE ENCOUNTER
"Spoke with Emily, the RN with the patient today, who wasn't really aware of the pt's condition yesterday.    Emily said the pt was awake for a couple of hours today, but she seemed \"out of it\".    Emily said the pt got her 8:00 & 10:00 meds and her vitals are stable today.    Per Emily, the pt's GJ tube \"doesn't look right\". She said there was no resistance when giving meds earlier, but when she pulled the dressing off the the tube site she said the balloon looks like it is coming out.    Emily asked if they should hold meds or feedings. She also asked how they can have the tube checked to make sure it's \"in the right place\".    Routing to Dr. Brito to please advise. Also, Dr. Brito is scheduled to see pt tomorrow, along with her dtr César.        "

## 2017-02-14 NOTE — TELEPHONE ENCOUNTER
Ask Emily to push the G-tube in gently in case it has migrated out a bit.  As long as the tube is working, i.e. that liquids and meds can be delivered through it, it is OK to use, even if it looks a little funny.  Note that I am scheduled to see the patient in conjunction with her daughter's visit tomorrow.

## 2017-02-15 PROBLEM — K94.23 MALFUNCTION OF GASTROSTOMY TUBE (H): Status: ACTIVE | Noted: 2017-01-01

## 2017-02-15 NOTE — IP AVS SNAPSHOT
[address] [address]    Phone:  [phone]                                       After Visit Summary   2/15/2017    Mary Wang    MRN: [id number]           After Visit Summary Signature Page     I have received my discharge instructions, and my questions have been answered. I have discussed any challenges I see with this plan with the nurse or doctor.    ..........................................................................................................................................  Patient/Patient Representative Signature      ..........................................................................................................................................  Patient Representative Print Name and Relationship to Patient    ..................................................               ................................................  Date                                            Time    ..........................................................................................................................................  Reviewed by Signature/Title    ...................................................              ..............................................  Date                                                            Time

## 2017-02-15 NOTE — PROCEDURES
Interventional Radiology Brief Post Procedure Note    Procedure:   1. Attempting replacement of gastrojeojenostomy tube  2. Placement of a Gastrostomy tube    Proceduralist: Zhanna Bird MD    Assistant: Maris Craig MD, MD    Time Out: Prior to the start of the procedure and with procedural staff participation, I verbally confirmed the patient s identity using two indicators, relevant allergies, that the procedure was appropriate and matched the consent or emergent situation, and that the correct equipment/implants were available. Immediately prior to starting the procedure I conducted the Time Out with the procedural staff and re-confirmed the patient s name, procedure, and site/side. (The Joint Commission universal protocol was followed.)  Yes    Sedation: None. Local Anesthestic used    Findings:   The existing/old GJ tube was totally out and there was a relatively large wound at the tube puncture site. This was probably because the previous tube has been pulled out with the balloon on.    Under flouroscopy, the stomach was recannulated. Multiple attempts were made to cannulate jejunum under fluoroscopy which was not successful. Decision was made to leave a percutaneous gastrostomy tube. The tube was stitched to the skin with a 2-0 Ethilon suture.     Estimated Blood Loss: None    SPECIMENS: None    Complications: 1. None     Condition: Stable    Plan:   - Admit the patient under hospitalist service (at 6B).  - Consider GI team consultation for conversion of the G tube into the GJ tube endoscopically.    Comments: See dictated procedure note for full details.    Maris Craig MD, MD

## 2017-02-15 NOTE — PROGRESS NOTES
Interventional Radiology Intra-procedural Nursing Note    Patient Name: Mary Wang  Medical Record Number: [id number]  Today's Date: February 15, 2017    Start Time: 1315  End of procedure time: 1800  Procedure: GJ tube replacement attempted-G tube left in place  Staff: Dr. Ernie Craig  Report given to: KRISTAN RN  Other Notes: Pt brought to IR 2 from [facility] waiting room with EMS staff. Pt has home vent. Pt awake and shakes head to questions. Consent obtained from her daughter via telephone. Pt positioned and monitored per protocol. No sedation. Pt tolerated procedure without any noted complications. Unable to place GJ tube, so G tube left in place. Pt admitted to hospital for GJ tube placement. Pt has  at bedside from group home at this time managing vent. 1700: Continue to wait for bed availability. Family at bedside. Pt requesting nebs-RT called. Orders placed for prn nebs from group home. 1730: Nebs given as needed with good relief. Dilaudid 0.5 mg given IV for comfort with good relief. Still waiting for bed availability. Daughter at bedside. Pt sleeping now. Report given to Hoda PHELPS. Pt to go to 4A.     Viri Roman RN

## 2017-02-15 NOTE — IP AVS SNAPSHOT
` `     [facility] [address] [facility]: [phone]            Medication Administration Report for Mary Wang as of 02/17/17 1505   Legend:    Given Hold Not Given Due Canceled Entry Other Actions    Time Time (Time) Time  Time-Action       Inactive    Active    Linked        Medications 02/11/17 02/12/17 02/13/17 02/14/17 02/15/17 02/16/17 02/17/17    acetaminophen (TYLENOL) solution 650 mg  Dose: 650 mg Freq: EVERY 4 HOURS PRN Route: PER FEEDING   PRN Reason: mild pain  Start: 02/15/17 1759   Admin Instructions: Maximum acetaminophen dose from all sources= 75 mg/kg/day not to exceed 4 grams/day.           0916-Auto Hold       0950-Unhold       0951-Auto Hold       1043-Unhold       1206 (650 mg)-Given           amoxicillin (AMOXIL) suspension 1,000 mg  Dose: 1,000 mg Freq: 3 TIMES DAILY Route: PER G TUBE  Start: 02/15/17 2000        2128 (1,000 mg)-Given        0833 (1,000 mg)-Given       (1347)-Not Given [C]       (2013)-Not Given [C]        0752-Hold [C]       0916-Auto Hold       0950-Unhold       0951-Auto Hold       1043-Unhold       1206 (1,000 mg)-Given       [ ] 1600           bisacodyl (DULCOLAX) Suppository 10 mg  Dose: 10 mg Freq: DAILY PRN Route: RE  PRN Reason: constipation  Start: 02/15/17 1759   Admin Instructions: Offer first           0916-Auto Hold       0950-Unhold       0951-Auto Hold       1043-Unhold           budesonide (PULMICORT) neb solution 0.5 mg  Dose: 0.5 mg Freq: 2 TIMES DAILY Route: NEBULIZATION  Start: 02/15/17 2000        2155 (0.5 mg)-Given        0758 (0.5 mg)-Given       2016 (0.5 mg)-Given        0851 (0.5 mg)-Given       0916-Auto Hold       0950-Unhold       0951-Auto Hold       1043-Unhold       [ ] 2000           calcium carbonate (TUMS) chewable tablet 500 mg  Dose: 500 mg Freq: EVERY 2 HOURS PRN Route: PO  PRN Reason: heartburn  Start: 02/15/17 1759          0916-Auto Hold       0950-Unhold       0951-Auto Hold       1043-Unhold           clarithromycin (BIAXIN) tablet  500 mg  Dose: 500 mg Freq: 2 TIMES DAILY Route: PER G TUBE  Indications Comment: Pneumonia  Start: 02/15/17 2000        2128 (500 mg)-Given        0833 (500 mg)-Given       (2014)-Not Given [C]        0752-Hold       0916-Auto Hold       0950-Unhold       0951-Auto Hold       1043-Unhold       1208 (500 mg)-Given       [ ] 2000           clonazePAM (klonoPIN) ODT tab 1.5 mg  Dose: 1.5 mg Freq: 4 TIMES DAILY Route: PO  Start: 02/16/17 2015         (2014)-Not Given [C]        0753-Hold       0916-Auto Hold       0950-Unhold       0951-Auto Hold       1043-Unhold       1207 (1.5 mg)-Given       [ ] 1600       [ ] 2000           flumazenil (ROMAZICON) injection 0.2 mg  Dose: 0.2 mg Freq: EVERY 1 MIN PRN Route: IV  PRN Reason: benzodiazepine reversal  PRN Comment: over sedation  Start: 02/17/17 1126   End: 02/17/17 2325   Admin Instructions: Give over 15 seconds. If inadequate response after 45 seconds, may repeat up to a MAX total dose of 1 mg)           2325-Med Discontinued       gabapentin (NEURONTIN) solution 300 mg  Dose: 300 mg Freq: 3 TIMES DAILY Route: PO/GT  Start: 02/15/17 2000        2128 (300 mg)-Given        0834 (300 mg)-Given       (1347)-Not Given [C]       (2014)-Not Given [C]        0753-Hold       0916-Auto Hold       0950-Unhold       0951-Auto Hold       1043-Unhold       1207 (300 mg)-Given       [ ] 1600           glucose 40 % gel 15-30 g  Dose: 15-30 g Freq: EVERY 15 MIN PRN Route: PO  PRN Reason: low blood sugar  Start: 02/16/17 1617   Admin Instructions: Give 15 g for BG 51 to 69 mg/dL IF patient is conscious and able to swallow. Give 30 g for BG less than or equal to 50 mg/dL IF patient is conscious and able to swallow. Do NOT give glucose gel via enteral tube.  IF patient has enteral tube: give apple juice 120 mL (4 oz or 15 g of CHO) via enteral tube for BG 51 to 69 mg/dL.  Give apple juice 240 mL (8 oz or 30 g of CHO) via enteral tube for BG less than or equal to 50 mg/dL.            0916-Auto Hold       0950-Unhold       0951-Auto Hold       1043-Unhold          Or  dextrose 50 % injection 25-50 mL  Dose: 25-50 mL Freq: EVERY 15 MIN PRN Route: IV  PRN Reason: low blood sugar  Start: 02/16/17 1617   Admin Instructions: Use if have IV access, BG less than 70 mg/dL and meet dose criteria below:  Dose if conscious and alert (or disorientated) and NPO = 25 mL  Dose if unconscious / not alert = 50 mL  Vesicant.           0916-Auto Hold       0950-Unhold       0951-Auto Hold       1043-Unhold          Or  glucagon injection 1 mg  Dose: 1 mg Freq: EVERY 15 MIN PRN Route: SC  PRN Reason: low blood sugar  PRN Comment: May repeat x 1 only  Start: 02/16/17 1617   Admin Instructions: May give SQ or IM. IF BG less than or equal to 50 mg/dL and no IV access.  ONLY use glucagon IF patient has NO IV access AND is UNABLE to swallow.           0916-Auto Hold       0950-Unhold       0951-Auto Hold       1043-Unhold           guaiFENesin (ROBITUSSIN) 20 mg/mL solution 10 mL  Dose: 10 mL Freq: 4 TIMES DAILY Route: PER G TUBE  Start: 02/16/17 0800         1036 (10 mL)-Given       1149 (10 mL)-Given       (1713)-Not Given [C]       (2151)-Not Given [C]        (0753)-Not Given       0916-Auto Hold       0950-Unhold       0951-Auto Hold       1043-Unhold       1208 (10 mL)-Given       [ ] 1800       [ ] 2200           HYDROmorphone (PF) (DILAUDID) injection 0.3-0.5 mg  Dose: 0.3-0.5 mg Freq: EVERY 2 HOURS PRN Route: IV  PRN Reason: moderate to severe pain  Start: 02/16/17 1242   Admin Instructions: Hold for sedation          1827 (0.5 mg)-Given       2015 (0.5 mg)-Given        0040 (0.5 mg)-Given       0324 (0.5 mg)-Given       0806 (0.3 mg)-Given           ipratropium (ATROVENT) 0.02 % neb solution 0.5 mg  Dose: 0.5 mg Freq: 4 TIMES DAILY Route: NEBULIZATION  Start: 02/15/17 2000        2154 (0.5 mg)-Given        0757 (0.5 mg)-Given       1219 (0.5 mg)-Given       1621 (0.5 mg)-Given       2015 (0.5 mg)-Given         0851 (0.5 mg)-Given       0916-Auto Hold       0950-Unhold       0951-Auto Hold       1043-Unhold       1238 (0.5 mg)-Given       [ ] 1600       [ ] 2000           lactated ringers infusion  Rate: 75 mL/hr Freq: CONTINUOUS Route: IV  Last Dose: Stopped (02/17/17 0735)  Start: 02/16/17 1230         1233 ( )-New Bag       1300 ( )-Rate/Dose Verify       1400 ( )-Rate/Dose Verify       1500 ( )-Rate/Dose Verify       1600 ( )-Rate/Dose Verify       1700 ( )-Rate/Dose Verify       1800 ( )-Rate/Dose Verify       1900 ( )-Rate/Dose Verify        0735-Stopped           levalbuterol (XOPENEX) neb solution 1.25 mg  Dose: 1 ampule Freq: 4 TIMES DAILY Route: NEBULIZATION  Start: 02/15/17 2000        2155 (1.25 mg)-Given        0757 (1.25 mg)-Given       1219 (1.25 mg)-Given       1621 (1.25 mg)-Given       2015 (1.25 mg)-Given        0851 (1.25 mg)-Given       0916-Auto Hold       0950-Unhold       0951-Auto Hold       1043-Unhold       1238 (1.25 mg)-Given       [ ] 1600       [ ] 2000           lidocaine (LIDODERM) 5 % Patch 1-2 patch  Dose: 1-2 patch Freq: EVERY 24 HOURS 2000 Route: TD  Start: 02/15/17 2045   Admin Instructions: Apply patch(s) to low back. To prevent lidocaine toxicity, patient should be patch free for 12 hrs daily. Patches may be cut to smaller size prior to removing release liner.  NEVER APPLY HEAT OVER PATCH which will increase absorption and may lead to risk of local anesthetic toxicity.  Do not apply over area where liposomal bupivacaine was injected for 96 hours post injection.         2141 (2 patch)-Given [C]        2031 (1 patch)-Given        0916-Auto Hold       0950-Unhold       0951-Auto Hold       1043-Unhold       [ ] 2000           lidocaine (LIDODERM) Patch in Place  Freq: EVERY 8 HOURS Route: TD  Start: 02/15/17 2045   Admin Instructions: Chart every shift, confirming that patch is still in place on patient (no barcode scan needed). See patch order for dose information.  NEVER APPLY HEAT  "OVER PATCH which will increase absorption and may lead to risk of local anesthetic toxicity. Do not apply over area where liposomal bupivacaine injected for 96 hours.         2141 ( )-Patch in Place        0413 ( )-Patch in Place       1346 ( )-Given       2042 ( )-Patch in Place        0445 ( )-Patch in Place       0916-Auto Hold       0950-Unhold       0951-Auto Hold       1043-Unhold       (1208)-Not Given [C]       [ ] 2045           lidocaine (LIDODERM) patch REMOVAL  Freq: EVERY 24 HOURS 0800 Route: TD  Start: 02/16/17 0800   Admin Instructions: Remove lidocaine Patch.          0835 ( )-Given        0806 ( )-Patch Removed       0916-Auto Hold       0950-Unhold       0951-Auto Hold       1043-Unhold           lidocaine (LMX4) kit  Freq: EVERY 1 HOUR PRN Route: Top  PRN Reason: pain  PRN Comment: with VAD insertion or accessing implanted port.  Start: 02/15/17 1759   Admin Instructions: Do NOT give if patient has a history of allergy to any local anesthetic or any \"karina\" product.  Apply 30 minutes prior to VAD insertion or port access. MAX Dose: 2.5 g (  of 5 g tube).           0916-Auto Hold       0950-Unhold       0951-Auto Hold       1043-Unhold           lidocaine (XYLOCAINE) 2 % 15 mL, alum & mag hydroxide-simethicone (MYLANTA ES/MAALOX  ES) 15 mL GI Cocktail  Dose: 30 mL Freq: 3 TIMES DAILY PRN Route: PO  PRN Reason: moderate pain  Start: 02/15/17 1759          0916-Auto Hold       0950-Unhold       0951-Auto Hold       1043-Unhold           lidocaine 1 % 1 mL  Dose: 1 mL Freq: EVERY 1 HOUR PRN Route: OTHER  PRN Comment: mild pain with VAD insertion or accessing implanted port  Start: 02/15/17 1759   Admin Instructions: Do NOT give if patient has a history of allergy to any local anesthetic or any \"karina\" product. MAX dose 1 mL subcutaneous OR intradermal in divided doses.           0916-Auto Hold       0950-Unhold       0951-Auto Hold       1043-Unhold           LORazepam (ATIVAN) injection 0.25-0.5 " mg  Dose: 0.25-0.5 mg Freq: EVERY 3 HOURS PRN Route: IV  PRN Reasons: anxiety,agitation  PRN Comment: sleep  Start: 02/16/17 1945   Admin Instructions: As patient cant have her clonazepam, tube not working  For IV PUSH: Dilute with equal volume of NS.           0916-Auto Hold       0950-Unhold       0951-Auto Hold       1043-Unhold       1146 (0.25 mg)-Given           magnesium sulfate 4 g in 100 mL sterile water (premade)  Dose: 4 g Freq: EVERY 4 HOURS PRN Route: IV  PRN Reason: magnesium supplementation  Start: 02/17/17 0645   Admin Instructions: For serum Mg++ less than 1.6 mg/dL  Give 4 g and recheck magnesium level 2 hours after dose, and next AM.           0916-Auto Hold       0950-Unhold       0951-Auto Hold       1043-Unhold           May continue current IV fluids if patient has IV fluids infusing.  Freq: CONTINUOUS PRN Route: XX  Start: 02/17/17 1126              naloxone (NARCAN) injection 0.1-0.4 mg  Dose: 0.1-0.4 mg Freq: EVERY 2 MIN PRN Route: IV  PRN Reason: opioid reversal  Start: 02/17/17 1126   End: 02/18/17 1125   Admin Instructions: For apnea or imminent respiratory arrest: give 0.4 mg IV undiluted Q 2 minutes PRN until desired degree of reversal is obtained, stop opioid and notify provider. Continue monitoring until discharge criteria are met for a minimum of 2 hours.  For severe sedation, decrease in respiratory depth, quality or respiratory rate less than 8: give 0.1 mg IV Q 2 minutes x 3 doses, stop opioid and notify provider.  Try to minimize reversal of analgesia especially in end-of-life patients               naloxone (NARCAN) injection 0.1-0.4 mg  Dose: 0.1-0.4 mg Freq: EVERY 2 MIN PRN Route: IV  PRN Reason: opioid reversal  Start: 02/15/17 6325   Admin Instructions: For respiratory rate LESS than or EQUAL to 8.  Partial reversal dose:  0.1 mg titrated q 2 minutes for Analgesia Side Effects Monitoring Sedation Level of 3 (frequently drowsy, arousable, drifts to sleep during  conversation).Full reversal dose:  0.4 mg bolus for Analgesia Side Effects Monitoring Sedation Level of 4 (somnolent, minimal or no response to stimulation).           0916-Auto Hold       0950-Unhold       0951-Auto Hold       1043-Unhold           OLANZapine zydis (zyPREXA) ODT half-tab 2.5 mg  Dose: 2.5 mg Freq: AT BEDTIME PRN Route: PO  PRN Comment: sleep, anxiety  Start: 02/16/17 1933   Admin Instructions: Combined IM and PO doses may significantly increase the risk of orthostatic hypotension at 30 mg per day or higher.  With dry hands, peel back foil backing and gently remove tablet; do not push oral disintegrating tablet through foil backing; administer immediately on tongue and oral disintegrating tablet dissolves in seconds; then swallow with saliva; liquid not required.           0358 (2.5 mg)-Given       0916-Auto Hold       0950-Unhold       0951-Auto Hold       1043-Unhold           OLANZapine zydis (zyPREXA) ODT tab 5 mg  Dose: 5 mg Freq: AT BEDTIME Route: PO  Start: 02/16/17 2015   Admin Instructions: Combined IM and PO doses may significantly increase the risk of orthostatic hypotension at 30 mg per day or higher.  With dry hands, peel back foil backing and gently remove tablet; do not push oral disintegrating tablet through foil backing; administer immediately on tongue and oral disintegrating tablet dissolves in seconds; then swallow with saliva; liquid not required.          2012 (5 mg)-Given        0916-Auto Hold       0950-Unhold       0951-Auto Hold       1043-Unhold       [ ] 2000           ondansetron (ZOFRAN-ODT) ODT tab 4 mg  Dose: 4 mg Freq: EVERY 6 HOURS PRN Route: PO  PRN Reason: nausea  Start: 02/15/17 7611   Admin Instructions: This is Step 1 of nausea and vomiting management.  If nausea not resolved in 15 minutes, go to Step 2 prochlorperazine (COMPAZINE). Do not push through foil backing. Peel back foil and gently remove. Place on tongue immediately. Administration with liquid  unnecessary                  0916-Auto Hold       0950-Unhold       0951-Auto Hold       1043-Unhold          Or  ondansetron (ZOFRAN) injection 4 mg  Dose: 4 mg Freq: EVERY 6 HOURS PRN Route: IV  PRN Reasons: nausea,vomiting  Start: 02/15/17 1759   Admin Instructions: This is Step 1 of nausea and vomiting management.  If nausea not resolved in 15 minutes, go to Step 2 prochlorperazine (COMPAZINE).          1725 (4 mg)-Given        0916-Auto Hold       0950-Unhold       0951-Auto Hold       1043-Unhold           pantoprazole (PROTONIX) 40 mg IV push injection  Dose: 40 mg Freq: EVERY MORNING BEFORE BREAKFAST Route: IV  Start: 02/16/17 1330   Admin Instructions: Reconstitute vial with 10mLs Saline and administer IV Push          1344 (40 mg)-Given        0806 (40 mg)-Given       0916-Auto Hold       0950-Unhold       0951-Auto Hold       1043-Unhold           polyethylene glycol (MIRALAX/GLYCOLAX) Packet 17 g  Dose: 17 g Freq: 2 TIMES DAILY Route: PER J TUBE  Start: 02/15/17 2000   Admin Instructions: 1 Packet = 17 grams. Mixed prescribed dose in 8 ounces of water. Follow with 8 oz. of water.         2129 (17 g)-Given        (0832)-Not Given [C]       (2042)-Not Given [C]        0753-Hold       0916-Auto Hold       0950-Unhold       0951-Auto Hold       1043-Unhold       (1209)-Not Given       [ ] 2000           polyethylene glycol 0.4%- propylene glycol 0.3% (SYSTANE ULTRA) ophthalmic solution 1-2 drop  Dose: 1-2 drop Freq: 4 TIMES DAILY PRN Route: Both Eyes  PRN Reason: dry eyes  Start: 02/15/17 1759          0916-Auto Hold       0950-Unhold       0951-Auto Hold       1043-Unhold           potassium chloride (KLOR-CON) Packet 20-40 mEq  Dose: 20-40 mEq Freq: EVERY 2 HOURS PRN Route: ORAL OR FEED  PRN Reason: potassium supplementation  Start: 02/17/17 0645   Admin Instructions: Use if unable to tolerate tablets.  If Serum K+ 3.0-3.3, dose = 60 mEq po total dose (40 mEq x1 followed in 2 hours by 20 mEq x1). Recheck  K+ level 4 hours after dose and the next AM.  If Serum K+ 2.5-2.9, dose = 80 mEq po total dose (40 mEq Q2H x2). Recheck K+ level 4 hours after dose and the next AM.  If Serum K+ less than 2.5, See IV order.  Dissolve packet contents in 4-8 ounces of cold water or juice.           0916-Auto Hold       0950-Unhold       0951-Auto Hold       1043-Unhold       1207 (40 mEq)-Given           potassium chloride 10 mEq in 100 mL intermittent infusion  Dose: 10 mEq Freq: EVERY 1 HOUR PRN Route: IV  PRN Reason: potassium supplementation  Start: 02/17/17 0645   Admin Instructions: Infuse via PERIPHERAL LINE or CENTRAL LINE. Use for central line replacement if patient weight less than 65 kg, if patient is on TPN with high potassium content or if unit does not stock 20 mEq bags.   If Serum K+ 3.0-3.3, dose = 10 mEq/hr x4 doses (40 mEq IV total dose). Recheck K+ level 2 hours after dose and the next AM.   If Serum K+ less than 3.0, dose = 10 mEq/hr x6 doses (60 mEq IV total dose). Recheck K+ level 2 hours after dose and the next AM.           0916-Auto Hold       0950-Unhold       0951-Auto Hold       1043-Unhold           potassium chloride 10 mEq in 100 mL intermittent infusion with 10 mg lidocaine  Dose: 10 mEq Freq: EVERY 1 HOUR PRN Route: IV  PRN Reason: potassium supplementation  Start: 02/17/17 0645   Admin Instructions: Infuse via PERIPHERAL LINE. Use potassium with lidocaine for pain with peripheral administration.  If Serum K+ 3.0-3.3, dose = 10 mEq/hr x4 doses (40 mEq IV total dose). Recheck K+ level 2 hours after dose and the next AM.  If Serum K+ less than 3.0, dose = 10 mEq/hr x6 doses (60 mEq IV total dose). Recheck K+ level 2 hours after dose and the next AM.           0916-Auto Hold       0950-Unhold       0951-Auto Hold       1043-Unhold           potassium chloride 20 mEq in 50 mL intermittent infusion  Dose: 20 mEq Freq: EVERY 1 HOUR PRN Route: IV  PRN Reason: potassium supplementation  Start: 02/17/17 0645    Admin Instructions: Infuse via CENTRAL LINE Only. May need EKG if less than 65 kg or on TPN - Max rate is 0.3 mEq/kg/hr for patients not on EKG monitoring.   If Serum K+ 3.0-3.3, dose = 20 mEq/hr x2 doses (40 mEq IV total dose). Recheck K+ level 2 hours after dose and the next AM.  If Serum K+ less than 3.0, dose = 20 mEq/hr x3 doses (60 mEq IV total dose). Recheck K+ level 2 hours after dose and the next AM.           0916-Auto Hold       0950-Unhold       0951-Auto Hold       1043-Unhold           potassium chloride SA (K-DUR/KLOR-CON M) CR tablet 20-40 mEq  Dose: 20-40 mEq Freq: EVERY 2 HOURS PRN Route: PO  PRN Reason: potassium supplementation  Start: 02/17/17 0645   Admin Instructions: Use if able to take PO.   If Serum K+ 3.0-3.3, dose = 60 mEq po total dose (40 mEq x1 followed in 2 hours by 20 mEq x1). Recheck K+ level 4 hours after dose and the next AM.  If Serum K+ 2.5-2.9, dose = 80 mEq po total dose (40 mEq Q2H x2). Recheck K+ level 4 hours after dose and the next AM.  If Serum K+ less than 2.5, See IV order.  DO NOT CRUSH.           0916-Auto Hold       0950-Unhold       0951-Auto Hold       1043-Unhold           predniSONE solution 20 mg  Dose: 20 mg Freq: DAILY Route: PO  Start: 02/17/17 1415          [ ] 1415           senna-docusate (SENOKOT-S;PERICOLACE) 8.6-50 MG per tablet 2 tablet  Dose: 2 tablet Freq: 2 TIMES DAILY Route: PER J TUBE  Start: 02/15/17 2000        2129 (2 tablet)-Given        (0832)-Not Given [C]       (2043)-Not Given [C]        0753-Hold       0916-Auto Hold       0950-Unhold       0951-Auto Hold       1043-Unhold       (1209)-Not Given       [ ] 2000           sertraline (ZOLOFT) 20 MG/ML (HIGH CONC) solution 150 mg  Dose: 150 mg Freq: DAILY Route: PER FEEDING   Start: 02/16/17 0800   Admin Instructions: Must dilute before use; mix with 4 oz of water, ginger ale, OJ, lemonade or lemon/lime soda.          0835 (150 mg)-Given        0754-Hold       0916-Auto Hold        0950-Unhold       0951-Auto Hold       1043-Unhold       1206 (150 mg)-Given           sodium chloride (OCEAN) 0.65 % nasal spray 1 spray  Dose: 1 spray Freq: DAILY PRN Route: BOTH NOSTRIL  PRN Reason: congestion  Start: 02/15/17 1759          0916-Auto Hold       0950-Unhold       0951-Auto Hold       1043-Unhold           sodium chloride (PF) 0.9% PF flush 3 mL  Dose: 3 mL Freq: EVERY 1 MIN PRN Route: IV  PRN Reason: line flush  PRN Comment: after medication administration. For peripheral IV flush post IV meds  Start: 02/17/17 1126              sodium chloride (PF) 0.9% PF flush 3 mL  Dose: 3 mL Freq: EVERY 8 HOURS Route: IK  Start: 02/15/17 1800   Admin Instructions: And Q1H PRN, to lock peripheral IV dormant line.         (2049)-Not Given        (0134)-Not Given       1036 (3 mL)-Given       1827 (3 mL)-Given        (0214)-Not Given       0916-Auto Hold       0950-Unhold       0951-Auto Hold       1000-Automatically Held       1043-Unhold       [ ] 1800           sodium chloride (PF) 0.9% PF flush 3 mL  Dose: 3 mL Freq: EVERY 1 HOUR PRN Route: IK  PRN Reason: line flush  Start: 02/15/17 1759   Admin Instructions: for peripheral IV flush post IV meds           0916-Auto Hold       0950-Unhold       0951-Auto Hold       1043-Unhold           sodium phosphate (FLEET ENEMA) 1 enema  Dose: 1 enema Freq: DAILY PRN Route: RE  PRN Comment: constipation  Start: 02/15/17 1759   Admin Instructions: Offer second           0916-Auto Hold       0950-Unhold       0951-Auto Hold       1043-Unhold           sulfamethoxazole-trimethoprim (BACTRIM/SEPTRA) suspension 160 mg  Dose: 20 mL Freq: DAILY Route: PER J TUBE  Indications Comment: PCP prophylaxis  Start: 02/16/17 0800   Admin Instructions: Shake well.          0835 (160 mg)-Given        0754-Hold       0916-Auto Hold       0950-Unhold       0951-Auto Hold       1043-Unhold          Completed Medications  Medications 02/11/17 02/12/17 02/13/17 02/14/17 02/15/17 02/16/17  02/17/17         Dose: 0.3-0.5 mg Freq: ONCE Route: IV  Start: 02/15/17 1730   End: 02/15/17 1733        1733 (0.5 mg)-Given               Dose: 50 mL Freq: ONCE Route: TUBE  Start: 02/15/17 1315   End: 02/15/17 1442   Admin Instructions: Supplied and administered by Radiology.         1442 (40 mL)-Given               Dose: 500 mL Freq: ONCE Route: IV  Start: 02/17/17 0030   End: 02/17/17 0033          0033 (500 mL)-New Bag             Dose: 500 mL Freq: ONCE Route: IV  Start: 02/16/17 0245   End: 02/16/17 0250         0250 (500 mL)-New Bag              Dose: 40 mEq Freq: ONCE Route: PO  Start: 02/17/17 1415   End: 02/17/17 1427   Admin Instructions: Dissolve packet contents in 4-8 ounces of cold water or juice.           1427 (40 mEq)-Given          Discontinued Medications  Medications 02/11/17 02/12/17 02/13/17 02/14/17 02/15/17 02/16/17 02/17/17         Rate: 75 mL/hr Freq: CONTINUOUS Route: IV  Start: 02/15/17 2245   End: 02/16/17 1220   Admin Instructions: Only while NPO         2249 ( )-Rate/Dose Verify        0420 ( )-New Bag       0700 ( )-Rate/Dose Verify       0800 ( )-Rate/Dose Verify       0900 ( )-Rate/Dose Verify       1000 ( )-Rate/Dose Verify       1100 ( )-Rate/Dose Verify       1200 ( )-Rate/Dose Verify       1220-Med Discontinued          Rate: 100 mL/hr Freq: CONTINUOUS Route: IV  Start: 02/15/17 1615   End: 02/15/17 1759   Admin Instructions: IF PATIENT IS RECEIVING RENAL DIALYSIS, RN to reduce rate of 0.9% sodium chloride IV solution to 10 mL /hour (TKO) IV infusion to prevent fluid overload.         1700 ( )-New Bag       1759-Med Discontinued           Dose: 500-1,000 mg Freq: EVERY 6 HOURS PRN Route: PO  PRN Reason: mild pain  Start: 02/15/17 1912   End: 02/15/17 2015   Admin Instructions: Maximum acetaminophen dose from all sources = 75 mg/kg/day not to exceed 4 gram         2015-Med Discontinued           Dose: 1.5 mg Freq: 4 TIMES DAILY Route: PO  Start: 02/16/17 2000   End: 02/16/17  1935 1935-Med Discontinued          Dose: 1.5 mg Freq: 4 TIMES DAILY Route: PO  Start: 02/15/17 1800   End: 02/15/17 1931   Admin Instructions: Gurmeetraysa DamianMerlene         1931-Med Discontinued  (2050)-Not Given               Dose: 1.5 mg Freq: 4 TIMES DAILY Route: PO  Start: 02/15/17 2000   End: 02/16/17 1313        2129 (1.5 mg)-Given        0832 (1.5 mg)-Given       1149 (1.5 mg)-Given       1313-Med Discontinued          Dose: 1 tablet Freq: DAILY Route: GT  Start: 02/15/17 1800   End: 02/15/17 1926        1926-Med Discontinued  (2115)-Not Given               Dose: 20 mg Freq: 2 TIMES DAILY PRN Route: PO  PRN Reason: heartburn  Start: 02/15/17 1759   End: 02/16/17 1315         1315-Med Discontinued          Dose: 180 mg Freq: DAILY Route: PO  Start: 02/16/17 0800   End: 02/15/17 1926        1926-Med Discontinued           Dose: 1 packet Freq: 3 TIMES DAILY Route: PER J TUBE  Start: 02/15/17 2000   End: 02/15/17 1926   Admin Instructions: Mix each packet with 60 mL water before administering. Supplied by nutrition department.         1926-Med Discontinued           Dose: 200 mg Freq: 4 TIMES DAILY Route: PER G TUBE  Start: 02/15/17 2000   End: 02/16/17 0752        (2348)-Not Given [C]        0752-Med Discontinued          Dose: 200 mg Freq: 4 TIMES DAILY Route: PO  Start: 02/15/17 2000   End: 02/15/17 1932        1932-Med Discontinued           Freq: HOLD Route: XX  Start: 02/15/17 1912   End: 02/15/17 2019   Admin Instructions: Metformin (GLUCOPHAGE, GLUMETZA, FORTAMET, RIOMET) and metformin containing medications:alogliptin/metformin (KAZANO), glipizide/metformin (METAGLIP), glyburide/metformin (GLUCOVANCE), rosiglitazone/metformin (AVANDAMET), dapagliflozin/metformin (XIGDUO XR), sitagliptin/metformin (JANUMET, JANUMET XR), linagliptin/metformin (JENTADUETO), repaglinidine/metformin (PRANDIMET), saxagliptin/metformin (KOMBIGLYZE XR), canagliflozin/metformin (INVOKAMET), pioglitazone/metformin (ACTOPLUS MET,  ACTOPLUS MET XR).  If patient was on one of these medications pre-procedure, continue to HOLD for 48 hours post-procedure if patient received IV contrast.         2019-Med Discontinued           Dose: 0.2 mg Freq: EVERY 2 HOURS PRN Route: IV  PRN Reason: moderate to severe pain  PRN Comment: air hunger  Start: 02/15/17 1927   End: 02/16/17 1937 2048 (0.2 mg)-Given        1626 (0.2 mg)-Given       1937-Med Discontinued          Dose: 1 mg Freq: EVERY 2 HOURS PRN Route: PO  PRN Reason: other  PRN Comment: dyspnea  Start: 02/15/17 1759   End: 02/15/17 1928        1928-Med Discontinued           Freq: PRN  Start: 02/17/17 1004   End: 02/17/17 1043          1004 (10 mL)-Given       1043-Med Discontinued         Dose: 0.5 mg Freq: 4 TIMES DAILY Route: NEBULIZATION  Start: 02/15/17 1715   End: 02/15/17 1759        1733 (0.5 mg)-Given       1759-Med Discontinued           Dose: 1 capsule Freq: DAILY Route: PER G TUBE  Start: 02/16/17 0800   End: 02/15/17 1926   Admin Instructions: Administer at least 2 hours before or after oral antibiotics. Capsules may be opened.         1926-Med Discontinued           Dose: 1.25 mg Freq: EVERY 6 HOURS PRN Route: NEBULIZATION  PRN Reason: wheezing  Start: 02/15/17 1705   End: 02/15/17 1759        1733 (1.25 mg)-Given       1759-Med Discontinued           Dose: 0.5 mg Freq: EVERY 3 HOURS PRN Route: PO/GT  PRN Reason: anxiety  Start: 02/15/17 1933   End: 02/16/17 1314         1314-Med Discontinued          Dose: 0.5 mg Freq: EVERY 3 HOURS PRN Route: PO  PRN Reason: anxiety  Start: 02/15/17 1759   End: 02/15/17 1933        1933-Med Discontinued           Dose: 0.25-0.5 mg Freq: EVERY 4 HOURS PRN Route: IV  PRN Reasons: anxiety,agitation  PRN Comment: sleep  Start: 02/16/17 1314   End: 02/16/17 1936   Admin Instructions: As patient cant have her clonazepam, tube not working  For IV PUSH: Dilute with equal volume of NS.          1904 (0.5 mg)-Given       1936-Med Discontinued           Dose: 0.25-0.5 mg Freq: EVERY 6 HOURS PRN Route: IV  PRN Reasons: anxiety,agitation  PRN Comment: sleep  Start: 02/16/17 1311   End: 02/16/17 1314   Admin Instructions: As patient cant have her clonazepam, tube not working  For IV PUSH: Dilute with equal volume of NS.          1314-Med Discontinued          Dose: 3 mg Freq: AT BEDTIME Route: PER J TUBE  Start: 02/15/17 2200   End: 02/15/17 1926        1926-Med Discontinued           Dose: 16 mg Freq: EVERY 24 HOURS Route: IV  Start: 02/16/17 1315   End: 02/17/17 1402   Admin Instructions: Doses >125mg need to be in at least 50 mL IVPB          1340 (16 mg)-Given        0916-Auto Hold       0950-Unhold       0951-Auto Hold       1043-Unhold       1402-Med Discontinued  (1413)-Not Given             Dose: 20 mg Freq: DAILY Route: PO  Start: 02/17/17 1415   End: 02/17/17 1404   Admin Instructions: Take with food, do not crush or split tablets.           1404-Med Discontinued         Dose: 20 mg Freq: DAILY Route: PO/GT  Start: 02/16/17 0800   End: 02/16/17 1313         0834 (20 mg)-Given       1313-Med Discontinued          Dose: 20 mg Freq: DAILY Route: PO  Start: 02/16/17 0800   End: 02/15/17 1933        1933-Med Discontinued           Dose: 25 mg Freq: AT BEDTIME PRN Route: PO  PRN Comment: sleep  Start: 02/15/17 1759   End: 02/16/17 1934         1934-Med Discontinued          Dose: 50 mg Freq: 2 TIMES DAILY Route: PER G TUBE  Start: 02/15/17 2000   End: 02/16/17 1934 2128 (50 mg)-Given        0832 (50 mg)-Given       1934-Med Discontinued     Medications 02/11/17 02/12/17 02/13/17 02/14/17 02/15/17 02/16/17 02/17/17

## 2017-02-15 NOTE — IP AVS SNAPSHOT
MRN:[id number]                      After Visit Summary   2/15/2017    Mary Wang    MRN: [id number]           Thank you!     Thank you for choosing [facility] for your care. Our goal is always to provide you with excellent care. Hearing back from our patients is one way we can continue to improve our services. Please take a few minutes to complete the written survey that you may receive in the mail after you visit with us. Thank you!        Patient Information     Date Of Birth          1950        About your hospital stay     You were admitted on:  February 15, 2017 You last received care in the:  [facility]    You were discharged on:  February 17, 2017        Reason for your hospital stay       Feeding tube pulled out, replaced by GI team 2/17/2017  No complications  No other issue or med change.                  Who to Call     For medical emergencies, please call 911.  For non-urgent questions about your medical care, please call your primary care provider or clinic, [phone]  For questions related to your surgery, please call your surgery clinic        Attending Provider     Provider Specialty    Asha Vásquez MD Vascular and Interventional Radiology    Steffanie Mead DO Internal Medicine    Selvin Wren MD Internal Medicine    Kevin Acevedo MD Pediatrics    [facility], MD [facility] Internal Medicine       Primary Care Provider Office Phone # Fax #    Norman Brito [phone]        [facility] [address] [place], [place] [id number] [address] [address]        After Care Instructions     Activity       Your activity upon discharge: activity as tolerated            Diet       Follow this diet upon discharge: resume prior to admission diet and tube feeding. No changes.            Oxygen Adult       Renew Home Oxygen Order  Renew previous prescription.  Expected treatment length is indefinite (99 months).    [facility]  Oxygen  [address]   [address]  Phone # [phone]    Physician signature: See electronic signature associated with these discharge orders  Date of Order: February 17, 2017            Ventilator       Resume prior to admission vent settings.                  Follow-up Appointments     Adult [facility] Follow-up and recommended labs and tests       Follow up with primary care provider, Norman Brito, within 7 days for hospital follow- up.      Appointments on [place] and/or [facility] (with [facility] or [facility] provider or service). Call [phone] if you haven't heard regarding these appointments within 7 days of discharge.                  Your next 10 appointments already scheduled     Feb 24, 2017 12:15 PM CST   Return Visit with Norman Brito MD   [facility] ([facility])    [address]  [address]  [facility] [address] [phone]            Mar 17, 2017  2:00 PM CDT   Return Visit with Norman Brito MD   [facility] ([facility])    [address]  [address]  [facility] [address] [phone]              Further instructions from your care team       Dcing RN, Bedside RN-- Discharge orders to be faxed to [facility] (fax: [phone])    Pending Results     Date and Time Order Name Status Description    2/15/2017 1200 IR Gastro Jejunostomy Tube Change Preliminary             Statement of Approval     Ordered          02/17/17 1246  I have reviewed and agree with all the recommendations and orders detailed in this document.  EFFECTIVE NOW     Approved and electronically signed by:  Geo Bui MD             Admission Information     Date & Time Provider Department Dept. Phone    2/15/2017 Geo Bui MD [address] [facility] [place] [phone]      Your Vitals Were     Blood Pressure Temperature Respirations Height Weight Pulse Oximetry    139/75 98.7  F (37.1  C)  "(Oral) 16 1.63 m (5' 4.17\") 56 kg (123 lb 7.3 oz) 100%    BMI (Body Mass Index)                   21.08 kg/m2           [employer] Information     [employer] gives you secure access to your electronic health record. If you see a primary care provider, you can also send messages to your care team and make appointments. If you have questions, please call your primary care clinic.  If you do not have a primary care provider, please call [phone] and they will assist you.        Care EveryWhere ID     This is your Care EveryWhere ID. This could be used by other organizations to access your [facility] medical records  [id number]           Review of your medicines      CONTINUE these medicines which have NOT CHANGED        Dose / Directions    acetaminophen 160 MG/5ML solution   Commonly known as:  TYLENOL   Used for:  Other chronic pain        Dose:  650 mg   20.3 mLs (650 mg) by Per Feeding Tube route every 4 hours as needed for mild pain   Quantity:  300 mL   Refills:  0       amoxicillin 500 MG tablet   Commonly known as:  AMOXIL   Used for:  Upper respiratory tract infection, unspecified type        Dose:  1000 mg   Take 2 tablets (1,000 mg) by mouth 3 times daily   Quantity:  30 tablet   Refills:  0       bisacodyl 10 MG Suppository   Commonly known as:  DULCOLAX   Used for:  Constipation due to opioid therapy        Dose:  10 mg   Place 1 suppository (10 mg) rectally daily as needed for constipation   Quantity:  25 suppository   Refills:  1       budesonide 0.5 MG/2ML neb solution   Commonly known as:  PULMICORT   Used for:  Shortness of breath, Respiratory difficulty        Dose:  0.5 mg   Take 2 mLs (0.5 mg) by nebulization 2 times daily   Quantity:  60 ampule   Refills:  0       calcium carbonate 500 MG chewable tablet   Commonly known as:  TUMS        Dose:  1 chew tab   Take 1 tablet (500 mg) by mouth every 2 hours as needed for heartburn   Quantity:  150 tablet   Refills:  0       clarithromycin 500 MG tablet "   Commonly known as:  BIAXIN   Used for:  Upper respiratory tract infection, unspecified type        Dose:  500 mg   Take 1 tablet (500 mg) by mouth 2 times daily   Quantity:  20 tablet   Refills:  0       clonazePAM 0.1 mg/mL   Commonly known as:  klonoPIN        Dose:  1.5 mg   Take 15 mLs (1.5 mg) by mouth 4 times daily   Quantity:  1800 mL   Refills:  1       Cranberry 125 MG Tabs        Dose:  1 tablet   1 tablet by Jejunal Tube route daily   Quantity:  90 tablet   Refills:  3       famotidine 40 MG/5ML suspension   Commonly known as:  PEPCID   Used for:  Mild gas use disorder        Dose:  20 mg   Take 2.5 mLs (20 mg) by mouth 2 times daily as needed for heartburn   Quantity:  75 mL   Refills:  3       fexofenadine 180 MG tablet   Commonly known as:  ALLEGRA   Used for:  COPD exacerbation (H)        Dose:  180 mg   Take 1 tablet (180 mg) by mouth daily   Quantity:  30 tablet   Refills:  0       fiber modular packet   Used for:  Diarrhea, unspecified type        Dose:  1 packet   1 packet by Per J Tube route 3 times daily   Quantity:  42 packet   Refills:  0       FLEET NATURALS CLEANSING ENEMA Enem   Used for:  Constipation due to opioid therapy        Dose:  1 enema   Place 1 enema rectally daily as needed   Quantity:  3 Bottle   Refills:  11       gabapentin 250 MG/5ML solution   Commonly known as:  NEURONTIN   Used for:  Anxiety        Dose:  300 mg   6 mLs (300 mg) by Per J Tube route 3 times daily   Quantity:  450 mL   Refills:  0       guaiFENesin 200 MG Tabs tablet   Commonly known as:  ORGANIDIN   Used for:  Shortness of breath        Dose:  200 mg   Take 1 tablet (200 mg) by mouth 4 times daily   Quantity:  115 tablet   Refills:  0       HYDROmorphone 1 MG/ML Liqd liquid   Commonly known as:  DILAUDID   Used for:  Air hunger        Dose:  1 mg   Take 1 mL (1 mg) by mouth every 2 hours as needed for other (dyspnea)   Quantity:  180 mL   Refills:  0       ipratropium 0.02 % neb solution   Commonly known  as:  ATROVENT        Dose:  0.5 mg   Take 2.5 mLs (0.5 mg) by nebulization 4 times daily and every four hours at night PRN.   Quantity:  450 mL   Refills:  0       lactobacillus rhamnosus (GG) capsule        Dose:  1 capsule   1 capsule by Per G Tube route daily   Quantity:  60 capsule   Refills:  0       levalbuterol 1.25 MG/3ML neb solution   Commonly known as:  XOPENEX        Dose:  1 ampule   Take 3 mLs (1.25 mg) by nebulization 4 times daily and every four hours at night PRN.   Quantity:  540 mL   Refills:  0       lidocaine 15 mL, alum & mag hydroxide-simethicone 15 mL GI Cocktail   Used for:  Gastroesophageal reflux disease without esophagitis        Dose:  30 mL   Take 30 mLs by mouth 3 times daily as needed for moderate pain   Quantity:  500 mL   Refills:  0       lidocaine 5 % Patch   Commonly known as:  LIDODERM   Used for:  Urinary tract infection, site not specified        Dose:  1-2 patch   Place 1-2 patches onto the skin every 24 hours Apply to the lower back   Quantity:  30 patch   Refills:  0       loperamide 2 MG tablet   Commonly known as:  IMODIUM A-D   Used for:  Frequent stools        2-2 mg tablets per J-tube qid prn frequent and/or loose stools. Do not use more than 8 tabs per day.   Quantity:  30 tablet   Refills:  0       LORazepam 2 MG/ML (HIGH CONC) solution   Commonly known as:  ATIVAN        Dose:  0.5 mg   Take 0.25 mLs (0.5 mg) by mouth every 3 hours as needed for anxiety   Quantity:  30 mL   Refills:  1       melatonin 1 MG/ML Liqd liquid   Used for:  Anxiety, Insomnia due to medical condition        Dose:  3 mg   3 mLs (3 mg) by Per J Tube route At Bedtime   Quantity:  300 mL   Refills:  0       ondansetron 4 MG tablet   Commonly known as:  ZOFRAN        Dose:  4 mg   Take 1 tablet (4 mg) by mouth every 4 hours as needed for nausea   Quantity:  18 tablet   Refills:  0       * order for DME        Equipment being ordered: 1) Avendaño catheter 16F, balloon 10 mL, silicone.  2) Urinary  collection bag.  3) Elastic leg strap for Avendaño catheter.  Please fax to [employer].   Quantity:  3 each   Refills:  11       * order for DME   Used for:  Acute and chronic respiratory failure with hypercapnia (H)        Equipment being ordered: Methylex foam dressings, alternating pressure pad for mattress and pump.   Quantity:  1 Device   Refills:  0       polyethylene glycol 0.4%- propylene glycol 0.3% 0.4-0.3 % Soln ophthalmic solution   Commonly known as:  SYSTANE ULTRA   Used for:  Dry eyes, bilateral        Dose:  1-2 drop   Place 1-2 drops into both eyes 4 times daily as needed for dry eyes 0900, 1300, 1700, 2100   Quantity:  1 Bottle   Refills:  3       polyethylene glycol Packet   Commonly known as:  MIRALAX/GLYCOLAX   Used for:  Constipation, unspecified constipation type        Dose:  17 g   17 g by Per J Tube route 2 times daily Hold for loose stools   Quantity:  100 packet   Refills:  0       predniSONE 5 MG/5ML solution   Used for:  Shortness of breath        Dose:  20 mg   Take 20 mLs (20 mg) by mouth daily   Quantity:  500 mL   Refills:  0       * QUEtiapine 50 MG tablet   Commonly known as:  SEROQUEL        Dose:  50 mg   Take 1 tablet (50 mg) by mouth 2 times daily   Quantity:  180 tablet   Refills:  3       * QUEtiapine 25 MG tablet   Commonly known as:  SEROquel   Used for:  Generalized anxiety disorder        Administer one tab per J-tube as needed at night if scheduled HS dose ineffective for treatment of anxiety or sleeplessness.   Quantity:  90 tablet   Refills:  3       senna-docusate 8.6-50 MG per tablet   Commonly known as:  SENOKOT-S;PERICOLACE   Used for:  Constipation, unspecified constipation type        Dose:  2 tablet   2 tablets by Per J Tube route 2 times daily   Quantity:  100 tablet   Refills:  0       sertraline 20 MG/ML (HIGH CONC) solution   Commonly known as:  ZOLOFT   Used for:  Anxiety        Dose:  150 mg   7.5 mLs (150 mg) by Per Feeding Tube route daily   Quantity:   105 mL   Refills:  0       sodium chloride 0.65 % nasal spray   Commonly known as:  OCEAN   Used for:  Chronic sinusitis, unspecified location        Dose:  1 spray   Spray 1 spray into both nostrils daily as needed for congestion   Quantity:  1 Bottle   Refills:  0       sulfamethoxazole-trimethoprim suspension   Commonly known as:  BACTRIM/SEPTRA   Used for:  Chronic obstructive pulmonary disease with acute exacerbation (H)        Dose:  20 mL   20 mLs (160 mg) by Per J Tube route daily Dose based on TMP component. 20 mLs = 160 mg   Quantity:  560 mL   Refills:  0       * Notice:  This list has 4 medication(s) that are the same as other medications prescribed for you. Read the directions carefully, and ask your doctor or other care provider to review them with you.             Protect others around you: Learn how to safely use, store and throw away your medicines at www.disposemymeds.org.             Medication List: This is a list of all your medications and when to take them. Check marks below indicate your daily home schedule. Keep this list as a reference.      Medications           Morning Afternoon Evening Bedtime As Needed    acetaminophen 160 MG/5ML solution   Commonly known as:  TYLENOL   20.3 mLs (650 mg) by Per Feeding Tube route every 4 hours as needed for mild pain   Last time this was given:  650 mg on 2/17/2017  3:38 PM                                amoxicillin 500 MG tablet   Commonly known as:  AMOXIL   Take 2 tablets (1,000 mg) by mouth 3 times daily                                bisacodyl 10 MG Suppository   Commonly known as:  DULCOLAX   Place 1 suppository (10 mg) rectally daily as needed for constipation                                budesonide 0.5 MG/2ML neb solution   Commonly known as:  PULMICORT   Take 2 mLs (0.5 mg) by nebulization 2 times daily   Last time this was given:  0.5 mg on 2/17/2017  8:51 AM                                calcium carbonate 500 MG chewable tablet   Commonly  known as:  TUMS   Take 1 tablet (500 mg) by mouth every 2 hours as needed for heartburn                                clarithromycin 500 MG tablet   Commonly known as:  BIAXIN   Take 1 tablet (500 mg) by mouth 2 times daily   Last time this was given:  500 mg on 2/17/2017 12:08 PM                                clonazePAM 0.1 mg/mL   Commonly known as:  klonoPIN   Take 15 mLs (1.5 mg) by mouth 4 times daily                                Cranberry 125 MG Tabs   1 tablet by Jejunal Tube route daily                                famotidine 40 MG/5ML suspension   Commonly known as:  PEPCID   Take 2.5 mLs (20 mg) by mouth 2 times daily as needed for heartburn                                fexofenadine 180 MG tablet   Commonly known as:  ALLEGRA   Take 1 tablet (180 mg) by mouth daily                                fiber modular packet   1 packet by Per J Tube route 3 times daily                                FLEET NATURALS CLEANSING ENEMA Enem   Place 1 enema rectally daily as needed                                gabapentin 250 MG/5ML solution   Commonly known as:  NEURONTIN   6 mLs (300 mg) by Per J Tube route 3 times daily   Last time this was given:  300 mg on 2/17/2017  3:39 PM                                guaiFENesin 200 MG Tabs tablet   Commonly known as:  ORGANIDIN   Take 1 tablet (200 mg) by mouth 4 times daily                                HYDROmorphone 1 MG/ML Liqd liquid   Commonly known as:  DILAUDID   Take 1 mL (1 mg) by mouth every 2 hours as needed for other (dyspnea)                                ipratropium 0.02 % neb solution   Commonly known as:  ATROVENT   Take 2.5 mLs (0.5 mg) by nebulization 4 times daily and every four hours at night PRN.   Last time this was given:  0.5 mg on 2/17/2017 12:38 PM                                lactobacillus rhamnosus (GG) capsule   1 capsule by Per G Tube route daily                                levalbuterol 1.25 MG/3ML neb solution   Commonly known as:   XOPENEX   Take 3 mLs (1.25 mg) by nebulization 4 times daily and every four hours at night PRN.   Last time this was given:  1.25 mg on 2/17/2017 12:38 PM                                lidocaine 15 mL, alum & mag hydroxide-simethicone 15 mL GI Cocktail   Take 30 mLs by mouth 3 times daily as needed for moderate pain                                lidocaine 5 % Patch   Commonly known as:  LIDODERM   Place 1-2 patches onto the skin every 24 hours Apply to the lower back   Last time this was given:  1 patch on 2/16/2017  8:31 PM                                loperamide 2 MG tablet   Commonly known as:  IMODIUM A-D   2-2 mg tablets per J-tube qid prn frequent and/or loose stools. Do not use more than 8 tabs per day.                                LORazepam 2 MG/ML (HIGH CONC) solution   Commonly known as:  ATIVAN   Take 0.25 mLs (0.5 mg) by mouth every 3 hours as needed for anxiety                                melatonin 1 MG/ML Liqd liquid   3 mLs (3 mg) by Per J Tube route At Bedtime                                ondansetron 4 MG tablet   Commonly known as:  ZOFRAN   Take 1 tablet (4 mg) by mouth every 4 hours as needed for nausea                                * order for DME   Equipment being ordered: 1) Avendaño catheter 16F, balloon 10 mL, silicone.  2) Urinary collection bag.  3) Elastic leg strap for Avendaño catheter.  Please fax to [employer].                                * order for DME   Equipment being ordered: Methylex foam dressings, alternating pressure pad for mattress and pump.                                polyethylene glycol 0.4%- propylene glycol 0.3% 0.4-0.3 % Soln ophthalmic solution   Commonly known as:  SYSTANE ULTRA   Place 1-2 drops into both eyes 4 times daily as needed for dry eyes 0900, 1300, 1700, 2100                                polyethylene glycol Packet   Commonly known as:  MIRALAX/GLYCOLAX   17 g by Per J Tube route 2 times daily Hold for loose stools   Last time this was given:   17 g on 2/15/2017  9:29 PM                                predniSONE 5 MG/5ML solution   Take 20 mLs (20 mg) by mouth daily   Last time this was given:  20 mg on 2/17/2017  3:38 PM                                * QUEtiapine 50 MG tablet   Commonly known as:  SEROQUEL   Take 1 tablet (50 mg) by mouth 2 times daily   Last time this was given:  50 mg on 2/16/2017  8:32 AM                                * QUEtiapine 25 MG tablet   Commonly known as:  SEROquel   Administer one tab per J-tube as needed at night if scheduled HS dose ineffective for treatment of anxiety or sleeplessness.   Last time this was given:  50 mg on 2/16/2017  8:32 AM                                senna-docusate 8.6-50 MG per tablet   Commonly known as:  SENOKOT-S;PERICOLACE   2 tablets by Per J Tube route 2 times daily   Last time this was given:  2 tablets on 2/15/2017  9:29 PM                                sertraline 20 MG/ML (HIGH CONC) solution   Commonly known as:  ZOLOFT   7.5 mLs (150 mg) by Per Feeding Tube route daily   Last time this was given:  150 mg on 2/17/2017 12:06 PM                                sodium chloride 0.65 % nasal spray   Commonly known as:  OCEAN   Spray 1 spray into both nostrils daily as needed for congestion                                sulfamethoxazole-trimethoprim suspension   Commonly known as:  BACTRIM/SEPTRA   20 mLs (160 mg) by Per J Tube route daily Dose based on TMP component. 20 mLs = 160 mg   Last time this was given:  160 mg on 2/16/2017  8:35 AM                                * Notice:  This list has 4 medication(s) that are the same as other medications prescribed for you. Read the directions carefully, and ask your doctor or other care provider to review them with you.

## 2017-02-16 NOTE — PROGRESS NOTES
CLINICAL NUTRITION SERVICES - ASSESSMENT NOTE     Nutrition Prescription    RECOMMENDATIONS FOR MDs/PROVIDERS TO ORDER:  Consult Nutrition for TF (assess/order) or order TF per recs below based on home regimen.    Malnutrition Status:    Patient does not meet two of the above criteria necessary for diagnosing malnutrition.    Recommendations already ordered by Registered Dietitian (RD):  None at this time.     Future/Additional Recommendations:  If/when new Jtube in place and approved for use, order to resume pt's home regimen:  --Isosource 1.5 @ 85 ml/hr x 12 hrs/day (if desire to begin with slower adv, would restart @ 25 ml/hr and adv by 20 ml q 4 hrs back to goal 85 ml/hr and run x 12 hrs 20:00-12:00)  --H2O flushes 125 ml q 4 hrs  --Nutrisource Fiber 1 pkt TID.       REASON FOR ASSESSMENT  Mary Wang is a/an 67 year old female assessed by the dietitian for Admission Nutrition Risk Screen for tube feeding or parenteral nutrition    NUTRITION HISTORY  Pt known to this [facility] given chronic hx of TF via GJ tube (placed on 9/12/16) with frequent readmission.  Pt lives in group home/chronically vented (palliative involved and following as pt was not to be re-admitted for resp decompensation).  Pt is chronically NPO d/t history of achalasia.  Pt received Isosource @ 85 ml/hr x 12 hrs nightly (8PM-8AM) = 1020 ml/day = 1530 kcals, 69 g PRO, 15 g Fiber and 775 ml H2O; additionally receives 125 ml q 4 hr water flushes + 3 packet of fiber daily (per review of orders last adm, prescribed Nutrisource Fiber 1 pkt TID) via Jtube. Previously described pt's bowel habits as fluctuating between episode of diarrhea and constipation.    CURRENT NUTRITION ORDERS  -Diet: NPO (noted chronic vent/NPO)    -Enteral access: noted pt was admitted today for replacement of GJ tube which was unsuccessful yesterday (only placed Gtube) and still needs Jtube extension placed.    LABS  Labs reviewed    MEDICATIONS  Medications  "reviewed    ANTHROPOMETRICS  Height: 0 cm (Data Unavailable) -   Ht Readings from Last 2 Encounters:   01/28/17 1.524 m (5')   01/21/17 1.63 m (5' 4.17\")      Most Recent Weight: 54.7 kg (120 lb 9.5 oz)    IBW: 45.5 kg  BMI: 20.6 kg/m2 vs 23.6 kg/m2 (depending which height used above) = both Normal BMI  Weight History: Variable weight trends in the past several months (but appears stable if compre this adm wt compared to 1/24/2017 wt)  Wt Readings from Last 10 Encounters:   02/15/17 54.7 kg (120 lb 9.5 oz)   02/03/17 57.5 kg (126 lb 11.2 oz)   01/24/17 55.3 kg (122 lb)   01/10/17 60.5 kg (133 lb 6.4 oz)   01/05/17 59 kg (130 lb 1.1 oz)   12/28/16 53.5 kg (117 lb 15.1 oz)   12/22/16 54 kg (119 lb)   12/05/16 56.8 kg (125 lb 4.8 oz)   11/28/16 54 kg (119 lb 1.6 oz)   11/11/16 62 kg (136 lb 11 oz)      Dosing Weight: 55 kg (actual)    ASSESSED NUTRITION NEEDS  Estimated Energy Needs: 8631-0956 kcals/day (25 - 30 kcals/kg)  Justification: Home TF regimen needs for Maintenance  Estimated Protein Needs: 66-83 grams protein/day (1.2 - 1.5 grams of pro/kg)  Justification: Home TF regimen needs for Maintenance  Estimated Fluid Needs: (1 mL/kcal)   Justification: Maintenance    PHYSICAL FINDINGS  See malnutrition section below.    MALNUTRITION  % Intake: Unable to assess  % Weight Loss: None noted  Subcutaneous Fat Loss: None observed  Muscle Loss: None observed  Fluid Accumulation/Edema: None noted  Malnutrition Diagnosis: Patient does not meet two of the above criteria necessary for diagnosing malnutrition.    NUTRITION DIAGNOSIS  Predicted inadequate nutrient intake (protein-energy) r/t previously receiving home TF regimen but awaiting ability to replace Jtube access to resume home TF therapy.    INTERVENTIONS  Implementation  None at this time.    Goals  Total avg nutritional intake to meet a minimum of 25 kcal/kg and 1.2 g PRO/kg daily (per dosing wt 55 kg).    Monitoring/Evaluation  Progress toward goals will be " monitored and evaluated per protocol.     Emily Presley ,RD, LD, CNSC (pgr [phone])

## 2017-02-16 NOTE — PROGRESS NOTES
[facility], [place]   Hospitalist Daily Progress Note    17                                                   Date of Admission:2/15/2017  ___________________________________  INTERVAL HISTORY (24 Hrs)/SUBJECTIVE:   Last 24 hr events, care team notes reviewed.     HPI reviewed.   Known to me from prior hospitalization.   Leaking around GJ tube.   A/w GJ tube replacement.   No other concern.     ROS: 4 point ROS neg other than the symptoms noted above in the interval history.    OBJECTIVE :   VITALS:    Temp:  [97.5  F (36.4  C)-99  F (37.2  C)] 99  F (37.2  C)  Heart Rate:  [] 104  Resp:  [14-16] 14  BP: ()/() 153/78  SpO2:  [89 %-100 %] 89 %     Temp (24hrs), Av.5  F (36.9  C), Min:97.5  F (36.4  C), Max:99  F (37.2  C)    Wt Readings from Last 5 Encounters:   02/15/17 54.7 kg (120 lb 9.5 oz)   17 57.5 kg (126 lb 11.2 oz)   17 55.3 kg (122 lb)   01/10/17 60.5 kg (133 lb 6.4 oz)   17 59 kg (130 lb 1.1 oz)        Intake/Output Summary (Last 24 hours) at 17 1242  Last data filed at 17 1200   Gross per 24 hour   Intake             1990 ml   Output             1055 ml   Net              935 ml       PHYSICAL EXAM:  General: alert, interactive, NAD. Trach. vent  HEENT: AT/NC, PERRLA, Moist MM  Neck: s/p trach   Respi/Chest: BL mild wheeze. Diminished through out.   CVS/Heart: S1S2 regular,   Gi/Abd: Soft, non tender, non distended, BS + G tube leaking.   MSK/Ext: Distal pulses 2+.     Neuro: unchanged.      Medications:   I have reviewed this patient's current medications.    Data:   All laboratory and imaging data in the past 24 hours reviewed:    LABS:  CMPNo lab results found in last 7 days.  CBCNo lab results found in last 7 days.  INRNo lab results found in last 7 days.  Unresulted Labs Ordered in the Past 30 Days of this Admission     No orders found for last 61 day(s).          Recent Results (from the past 24 hour(s))   IR  Gastro Jejunostomy Tube Change    Narrative    Procedures: 2/15/2017  1. Attempted fluoroscopy guided replacement of the gastrojejunostomy  tube.  2. Fluoroscopy guided placement of percutaneous gastrostomy tube using  the existing tract.    Clinical indication: This is a 66 year old female with a history of  severe COPD s/p trach on home vent 24/7 (3-3.5 LPM), achalasia s/p GJ  tube who presented to the hospital after accidental dislodgment of the  gastrojejunostomy tube during transfer a nursing home.          IMPRESSION:     1. Unsuccessful attempt to replace existing gastrojejunostomy tube. A  gastrostomy tube was left in place and the retention disc was stitched  to the skin.  2. Given the patient has achalasia and is dependent on  gastrojejunostomy tube for tube feeding and medication, decision was  made to admit the patient in the hospital.    PLAN:  - Admit the patient under hospitalist service (at ).  - Consider GI team consultation for possibility of conversion of the G  tube into the GJ tube endoscopically.           ASSESSMENT & PLAN :    Mary Wang is a 66 year old female with a history of severe COPD s/p trach on home vent 24/7 (3-3.5 LPM), anxiety, hypertension, achalasia s/p GJ tube admitted this time for GJ tube issues, after unsuccessful attempt to replace by IR 02/15.         #GJ tube accidental removal, Achalasia: Accidentally removed on transfer at group home last evening. Transferred to [facility] from [facility]. IR attempted to replace today but unable to pass pylorus. Needs GJ for feeding and meds as patient has achalasia and is chronic aspiration risk. In regards to achalasia, see Dr. Aaron Thomas note form 12/2015. Patient has had stent placement and botox injections of LES in 2015. Nutrition is obtained from J portion of FT.    -GI consulted, appreciate input. D/w GI. Likely PEG-J replacement tomorrow    - keep her on ivf RL, change essential meds to iv including hydromorphone prn for  pain, Lorazepam prn for anxiety. Iv MP.  - Patient not septic or toxic, on abx for presumed PNA, so would not change to iv unless e/o active infection or sepsis.   - No meds or feeding from G tube.   - Nutrition consult and resumption of her home TF post tube placement. : (home TF: isosource 1.5, goal rate of 85 ml/hour from 1896-3904 w/ 15-30 ml of NS flush before and after meds/feeding)     #Chronic respiratory failure, severe COPD s/p trach, Likely PNA: On ventilator (settings CMV, 400/14/5/40%). Multiple admissions in 6 weeks, see above. For chronic respiratory failure PTA the patient is maintained on chronic prednisone at 20 mg qday, scheduled ipratropium and xoponex nebs, BID Pulmicort, guaifenesin 200 mg QID, bactrim for PCP prophylaxis, allegra BID. Recently started on clarithromycin and amoxicillin for presumed PNA 02/03. Afebrile, normotensive, mildly tachcyardic, sating well on home 3LPM and on home vent settings.  -Continue home nebulizers  -Continue home steroids w/ PCP prophylaxis (changed to IV MP for now)  -Trach cares per RT, suctioning per nursing and RT  -Continue anxiolytics and medications for air hunger  -Continue PTA antibiotics, will not escalate or do further work up unless febrile, clinically appears chronically ill but well to this writer (have met on many occassions)      #Severe anxiety, depression: Likely due to air hunger from COPD. Psychiatry and Palliative care have seen patient in the past. Continues to have significant anxiety and frequent requests for hospital admission per daughter. PTA maintained on seroquel (50 mg BID), sertraline (150 mg qday), scheduled Klonopin (1.5 mg QID) and PRN ativan (0.5 q3 PRN) & dilaudid (1 mg per G q2 hours PRN) for air hunger. Currently endorses stable symptoms although daughter notes intermittent anxious periods.   -Continue home regimen      #Urinary retention: Chronic indwelling bender. Last changed 1/23 while inpatient.  -Continue  "[place]      #Normocytic anemia: At baseline.       #CAD: On aspirin and statin. Continue when J replaced.       #Constipation: On senna and miralax PTA, continue when J replaced.      *Reviewed last DC summary by Dr. Chavis, \"Goals of a care ; greatly appreciate Palliative following patient. Please note 1/31. FCC was held 1/31 and the plan is that Mary will not be admitted to hospital again for respiratory decompensation . Cares being organized . New Polst completed  Agreed this time for admission for GJ tube issues.      CODE: DNR, trached and vented  FEN: NPO, IVF hydration w/ D5 NS at 50 cc/hour  DVT: Mechanical, SCDs  LINES: PIV  Disposition: pending GJ tube placement. Tf to 6B. Home Vent.      Plan discussed with patient, bedside RN and [facility] during Care Team Rounds.  Called her daughter César and updated.       Geo Bui MD   Hospitalist (Internal Medicine)  Pager: [phone].             "

## 2017-02-16 NOTE — PLAN OF CARE
Problem: Goal Outcome Summary  Goal: Goal Outcome Summary  Outcome: No Change  S; Pt arrived from IR 2017. Daughter and caregiver with. Pt very anxious. Caregiver and daughter felt needed hydromorphone for air hunger. Rested more quietly after. Order obtained ok use [employer] for meds. Given those available. Slept well. Low SBP, MAP 65. MD aware no new orders as long as MAP 65. 0230 MD paged for low urine output. Improved post fluid bolus.  B: S/p unsuccessful g-j placement. Vent dependent. 6B overflow pt.  A: May have been down volume with TF off 24 hrs.  R: Monitor VS/UO. Air hunger/anxiety meds as needed. To endoscopy today to re-attempt placement.

## 2017-02-16 NOTE — PROGRESS NOTES
Spoke with 4A charge nurse at 1843 patient bed has not been cleaned at this time. Pt had BM x1. Cleaned. Daughter at bedside. No s/s acute distress noted.    1918-Called ICU, bed currently being cleaned.     1937-Room not clean at this time    2000-SBAR report called to MATTIE Hubbard. Pt transported to ICU with daughter. No s/s acute distress handoff given at bedside.

## 2017-02-16 NOTE — ANESTHESIA PREPROCEDURE EVALUATION
"                    Anesthesia Plan      History & Physical Review  History and physical reviewed and following examination; no interval change.    ASA Status:  4 .        Plan for General with Inhalation induction. Maintenance will be Balanced.      Procedure:   COMBINED ESOPHAGOSCOPY, GASTROSCOPY, DUODENOSCOPY (EGD) (N/A Esophagus) Upper Endoscopy        Anesthesia type: General    Pre-op diagnosis: Malnutrition     Location: [facility] / [facility]    Surgeon: Brenden Plasencia MD    Read attached H&P . Briefly -    Pt is a 66 year old female with a history of severe COPD s/p trach on home vent 24/7 (3-3.5 LPM), anxiety, hypertension, achalasia s/p GJ tube who presented to the hospital with acute on chronic hypoxic and hypercarbic respiratory failure.      Multiple admissions in last 2 months, 12/1-12/5 for UTI, 12/25-12/28 for bacteremia, 1/2-1/6 with encephalopathy ultimately attributed to her medications, 01/07-01/09 for acute on chronic respiratory failure likely 2/2 COPD exacerbation, 1/20-1/24 for oversedation/aspiration pneumonitis, 1/28-2/03 for acute on chronic respiratory failure.      GJ tube accidental removal, Achalasia: Accidentally removed on transfer at group home. IR attempted to replace, but unable to pass pylorus.    Chronic respiratory failure, severe COPD s/p trach, Likely PNA: On ventilator (settings CMV, 400/14/5/40%). Multiple admissions in 6 weeks      Severe anxiety, depression: Likely due to air hunger from COPD. Psychiatry and Palliative care have seen patient in the past.    Plan: GA via trach.      Postoperative Care      Consents  Anesthetic plan, risks, benefits and alternatives discussed with:  Patient..        Mary Wang [[id number]]  Female - 67 year old - [birth year]  COMBINED ESOPHAGOSCOPY, GASTROSCOPY, DUODENOSCOPY (EGD) (N/A Esophagus)       Preprocedure Vitals      BP:   153/78   Pulse:      Resp:      SpO2:   89   Temp:      Height:   1.63 m (5' 4.17\")  (02/16/17) "   Weight:   54.7 kg (120 lb 9.5 oz)  (02/15/17)   BMI:      IBW:   55.1 kg (121 lb 7.5 oz)   Last edited 02/16/17 1400 by NS          Allergies      LEVAQUIN, CECILY PODHALER [TOBRAMYCIN], SUPRAX [CEFIXIME], AUGMENTIN, AVELOX [MOXIFLOXACIN], CEDAX [CEFTIBUTEN], PENICILLINS, VANTIN [CEFPODOXIME]       Prescription Medications         Last Taken Last Updated     amoxicillin (AMOXIL) 500 MG tablet          clarithromycin (BIAXIN) 500 MG tablet          Cranberry 125 MG TABS          QUEtiapine (SEROQUEL) 25 MG tablet          HYDROmorphone (DILAUDID) 1 MG/ML LIQD liquid          bisacodyl (DULCOLAX) 10 MG Suppository          order for DME          carbamide peroxide (DEBROX) 6.5 % otic solution          Rectal Cleansers (FLEET [employer]UR[employer] CLEANSING ENEMA) ENEM    More than a month 01/28/17 1759     order for DME          LORazepam (ATIVAN) 2 MG/ML (HIGH CONC) solution    1/28/2017 01/28/17 1759     clonazePAM (KLONOPIN) 0.1 mg/mL    1/28/2017 01/28/17 1759     QUEtiapine (SEROQUEL) 50 MG tablet    1/28/2017 01/28/17 1759     ipratropium (ATROVENT) 0.02 % neb solution    1/28/2017 01/28/17 1759     levalbuterol (XOPENEX) 1.25 MG/3ML neb solution    1/28/2017 01/28/17 1759     lactobacillus rhamnosus, GG, (CULTURELL) capsule    1/28/2017 01/28/17 1759     lidocaine (LIDODERM) 5 % Patch    Past Month 01/28/17 1759     gabapentin (NEURONTIN) 250 MG/5ML solution    1/28/2017 01/28/17 1759     polyethylene glycol (MIRALAX/GLYCOLAX) Packet    1/28/2017 01/28/17 1759     senna-docusate (SENOKOT-S;PERICOLACE) 8.6-50 MG per tablet    1/28/2017 01/28/17 1759     calcium carbonate (TUMS) 500 MG chewable tablet    More than a month 01/28/17 1759     ondansetron (ZOFRAN) 4 MG tablet    1/15/2017 01/28/17 1759     famotidine (PEPCID) 40 MG/5ML suspension    Past Month 01/28/17 1759     loperamide (IMODIUM A-D) 2 MG tablet    Past Month 01/28/17 1759     polyethylene glycol 0.4%- propylene glycol 0.3% (SYSTANE ULTRA) 0.4-0.3 % SOLN  ophthalmic solution    1/28/2017 01/28/17 1759     predniSONE 5 MG/5ML solution    1/28/2017 01/28/17 1759     sertraline (ZOLOFT) 20 MG/ML (HIGH CONC) solution    1/28/2017 01/28/17 1759     fexofenadine (ALLEGRA) 180 MG tablet    1/28/2017 01/28/17 1759     fiber modular (NUTRISOURCE FIBER) packet    1/28/2017 01/28/17 1759     melatonin (MELATONIN) 1 MG/ML LIQD liquid    1/27/2017 01/28/17 1759     acetaminophen (TYLENOL) 160 MG/5ML oral liquid    Past Week 01/28/17 1759     budesonide (PULMICORT) 0.5 MG/2ML nebulizer solution    1/28/2017 01/28/17 1759     guaiFENesin (ORGANIDIN) 200 MG TABS    1/28/2017 01/28/17 1759     sodium chloride (OCEAN) 0.65 % nasal spray    Past Month 01/28/17 1759     sulfamethoxazole-trimethoprim (BACTRIM,SEPTRA) 200-40 mg/5ml suspension    1/28/2017 01/28/17 1812     lidocaine 15 mL, alum & mag hydroxide-simethicone 15 mL GI Cocktail    More than a month 01/28/17 1759       Hematology Labs        INR    WBC        0.91 01/28/17 1605 8.7 02/03/17 0701       9.3 02/02/17 0740       13.2  02/01/17 0543       18.7  01/31/17 0747       11.9  01/30/17 0917       9.0 01/29/17 0638       11.1  01/28/17 1605       10.0 01/23/17 0615       11.8  01/22/17 0558       10.7 01/21/17 0642       26.1  01/20/17 0300       19.5  01/19/17 0900       Electrolyte Labs        K+    Na+    MG    PHOS        4.0 02/03/17 0701 138 02/03/17 0701 2.1 01/23/17 0615 2.0  01/23/17 0615     3.4 02/02/17 0740 136 02/02/17 0740 2.0 01/22/17 0558 2.6 01/22/17 0558     3.7 02/01/17 0543 136 02/01/17 0543 2.0 01/21/17 0642 2.3  01/21/17 0642     3.9 01/31/17 1302 135 01/31/17 0747         5.9  01/31/17 0747 136 01/30/17 0917         4.0 01/31/17 0305 139 01/29/17 0638         3.2  01/30/17 0917 136 01/28/17 1605         3.3  01/29/17 0638 134 01/28/17 1604         4.1 01/28/17 1605 140 01/23/17 0615         4.2 01/28/17 1604 139 01/22/17 0558         3.7 01/23/17 0615 142 01/21/17 0642         4.0 01/22/17 1637 138  17 0300         3.3  17 0558           3.4 17 0642           4.3 17 0300             Other Labs        ULYSSES    ALT    AST        0.2 17 1605 22 17 1605 19 17 1605     0.2 17 0615 21 17 0615 14 17 0615     0.4 17 0642 22 17 0642 23 17 0642     0.4 17 0300 26 17 0300 22 17 0300         19 17 0900        Substance History      Smoking Status: Former Smoker - 80 pack years     Quit Smokin98     Smokeless Tobacco Status: Never Used     Alcohol use: No     Drug use: No       Facility Administered Medications      guaiFENesin (ROBITUSSIN) 20 mg/mL solution 10 mL    lactated ringers infusion        HYDROmorphone (PF) (DILAUDID) injection 0.3-0.5 mg    methylPREDNISolone sodium succinate (solu-MEDROL) injection 16 mg        LORazepam (ATIVAN) injection 0.25-0.5 mg    pantoprazole (PROTONIX) 40 mg IV push injection        acetaminophen (TYLENOL) solution 650 mg    bisacodyl (DULCOLAX) Suppository 10 mg        budesonide (PULMICORT) neb solution 0.5 mg    calcium carbonate (TUMS) chewable tablet 500 mg        gabapentin (NEURONTIN) solution 300 mg    ipratropium (ATROVENT) 0.02 % neb solution 0.5 mg        levalbuterol (XOPENEX) neb solution 1.25 mg    lidocaine (LIDODERM) 5 % Patch 1-2 patch        lidocaine (XYLOCAINE) 2 % 15 mL, alum & mag hydroxide-simethicone (MYLANTA ES/MAALOX ES) 15 mL GI Cocktail    polyethylene glycol (MIRALAX/GLYCOLAX) Packet 17 g        polyethylene glycol 0.4%- propylene glycol 0.3% (SYSTANE ULTRA) ophthalmic solution 1-2 drop    QUEtiapine (SEROquel) tablet 50 mg        QUEtiapine (SEROquel) tablet 25 mg    sulfamethoxazole-trimethoprim (BACTRIM/SEPTRA) suspension 160 mg        sodium chloride (OCEAN) 0.65 % nasal spray 1 spray    sertraline (ZOLOFT) 20 MG/ML (HIGH CONC) solution 150 mg        senna-docusate (SENOKOT-S;PERICOLACE) 8.6-50 MG per tablet 2 tablet    sodium phosphate (FLEET  "ENEMA) 1 enema        naloxone (NARCAN) injection 0.1-0.4 mg    lidocaine 1 % 1 mL        lidocaine (LMX4) kit    sodium chloride (PF) 0.9% PF flush 3 mL        sodium chloride (PF) 0.9% PF flush 3 mL    ondansetron (ZOFRAN-ODT) ODT tab 4 mg   Linked Group 1: \"Or\" Linked Group Details        ondansetron (ZOFRAN) injection 4 mg   Linked Group 1: \"Or\" Linked Group Details    HYDROmorphone (DILAUDID) injection 0.2 mg        amoxicillin (AMOXIL) suspension 1,000 mg    clarithromycin (BIAXIN) tablet 500 mg        lidocaine (LIDODERM) patch REMOVAL    lidocaine (LIDODERM) Patch in Place          Anesthesia Family History      CANCER     Sister       Problem List      Urinary retention Air hunger     Achalasia Delirium     HTN (hypertension) GERD (gastroesophageal reflux disease)     ACP (advance care planning) S/P percutaneous endoscopic gastrostomy (PEG) tube placement (H)     COPD (chronic obstructive pulmonary disease) (H) NSTEMI (non-ST elevated myocardial infarction) (H)     Atypical chest pain Encounter for gastrojejunal (GJ) tube placement     Constipation due to opioid therapy Generalized anxiety disorder     Advanced directives, counseling/discussion Acute on chronic respiratory failure with hypoxia (H)     Health Care Home Malfunction of gastrostomy tube (H)       Medical History        COPD (chronic obstructive pulmonary disease) (H) Anxiety     TMJ pain dysfunction syndrome Tracheostomy in place     Hypertension GERD (gastroesophageal reflux disease)     Achalasia Lung nodule     Stress-induced cardiomyopathy Anxiety and depression     Chronic pain        Surgical History      TRACHEOSTOMY BRONCHOSCOPY, INSERT BRONCHIAL VALVE     ESOPHAGOSCOPY, GASTROSCOPY, DUODENOSCOPY (EGD), COMBINED ESOPHAGOSCOPY, GASTROSCOPY, DUODENOSCOPY (EGD), COMBINED     REMOVE AND REPLACE BREAST IMPLANT PROSTHESIS REMOVE AND REPLACE BREAST IMPLANT PROSTHESIS     ANESTHESIA OUT OF OR MRI ENDOSCOPIC INSERTION TUBE GASTROSTOMY     PICC " INSERTION        Obstetric History      The patient has not been asked about pregnancy.            Viridiana Rockwell PA-C Physician Assistant Cosign Needed Hospitalist H&P   Date of Service: 2/15/2017  7:06 PM Note Created: 2/15/2017  7:06 PM      Expand All Collapse All    []Hide copied text    [facility] Medicine History and Physical  Department of Internal Medicine     Patient Name: Mary Wang MRN# [id number]   Age: 66 year old YOB: 1950      Date of Admission: 2/15/2017     Home clinic: [facility]  Primary care provider: Norman Brito     Assessment and Plan:   Mary Wang is a 66 year old female with a history of severe COPD s/p trach on home vent 24/7 (3-3.5 LPM), anxiety, hypertension, achalasia s/p GJ tube who presented to the hospital with acute on chronic hypoxic and hypercarbic respiratory failure.    *Multiple admissions in last 2 months, 12/1-12/5 for UTI, 12/25-12/28 for bacteremia, 1/2-1/6 with encephalopathy ultimately attributed to her medications, 01/07-01/09 for acute on chronic respiratory failure likely 2/2 COPD exacerbation, 1/20-1/24 for oversedation/aspiration pneumonitis, 1/28-2/03 for acute on chronic respiratory failure.      #GJ tube accidental removal, Achalasia: Accidentally removed on transfer at group home last evening. Transferred to [facility] from [facility]. IR attempted to replace today but unable to pass pylorus. Needs GJ for feeding and meds as patient has achalasia and is chronic aspiration risk. In regards to achalasia, see Dr. Aaron Thomas note form 12/2015. Patient has had stent placement and botox injections of LES in 2015. Nutrition is obtained from J portion of FT.  -GI consulted, discussed with Dr. Mares and Zachariah Marrufo team is aware of admission  -Will convert meds to IV, give essential meds per G tube if unable to convert  -When tube replaced will need meds converted back to J form  -When tube replaced may restart home tuve feeds  "of isosource 1.5, goal rate of 85 ml/hour from 5180-7478 w/ 15-30 ml of NS flush before and after meds/feeding     #Chronic respiratory failure, severe COPD s/p trach, Likely PNA: On ventilator (settings CMV, 400/14/5/40%). Multiple admissions in 6 weeks, see above. For chronic respiratory failure PTA the patient is maintained on chronic prednisone at 20 mg qday, scheduled ipratropium and xoponex nebs, BID Pulmicort, guaifenesin 200 mg QID, bactrim for PCP prophylaxis, allegra BID. Recently started on clarithromycin and amoxicillin for presumed PNA 02/03. Afebrile, normotensive, mildly tachcyardic, sating well on home 3LPM and on home vent settings.  -Continue home nebulizers  -Continue home steroids w/ PCP prophylaxis   -Trach cares per RT, suctioning per nursing and RT  -Continue anxiolytics and medications for air hunger  -Continue PTA antibiotics, will not escalate or do further work up unless febrile, clinically appears chronically ill but well to this writer (have met on many occassions)      #Severe anxiety, depression: Likely due to air hunger from COPD. Psychiatry and Palliative care have seen patient in the past. Continues to have significant anxiety and frequent requests for hospital admission per daughter. PTA maintained on seroquel (50 mg BID), sertraline (150 mg qday), scheduled Klonopin (1.5 mg QID) and PRN ativan (0.5 q3 PRN) & dilaudid (1 mg per G q2 hours PRN) for air hunger. Currently endorses stable symptoms although daughter notes intermittent anxious periods.   -Continue home regimen  -Consider palliative, psychiatry consult pending symptoms      #Urinary retention: Chronic indwelling bender. Last changed 1/23 while inpatient.  -Continue bender      #Normocytic anemia: At baseline.       #CAD: On aspirin and statin. Continue when J replaced.       #Constipation: On senna and miralax PTA, continue when J replaced.      *Reviewed last DC summary by Dr. Chavis, \"Goals of a care ; greatly appreciate " "Palliative following patient. Please note 1/31. FCC was held 1/31 and the plan is that Mary will not be admitted to hospital again for respiratory decompensation . Cares being organized . New Polst completed  Spoke with daughter César 2/3 and updated on progress. César would like to have in place regarding respiratory decline in group home so that Mary is not under duress\". IR and I both discussed options with daughter who chose to have the patient admitted for GJ tube placement so the patient could get continued nutrition. Not ready to pursue hospice at this point. Need to have further discussions if GI is unable to place GJ endoscopically.      CODE: DNR, trached and vented  FEN: NPO, IVF hydration w/ D5 NS at 50 cc/hour  DVT: Mechanical, SCDs  LINES: PIV  Disposition/Admission Status: <2 midnights for GJ tube placement by GI.     Plan of care was discussed with attending physician, Dr. Wren.      Viridiana Rockwell PA-C  Hospitalist Service        Chief Complaint:   GJ tube out     HPI:   History is obtained from the patient, daughter, group home nurse and medical record.      The patient and her daughter note that her tube was pulled out somewhat while transferring yesterday. While transferring onto [facility] to go to [facility] the tube completely fell out. They went to [facility] last night who attempted to transfer the patient but due to bed situation kept her overnight. She was transferred here this morning for continued care and replacement. IR unable to advance J portion past pylorus. She notes she hasn't ate in 3 days.      Reviewed case with her daughter who notes they would like tube replaced and are not pursuing hospice at this time (not ready to remove ventilator).      Review of Systems:   A 10 point ROS was performed and negative unless otherwise noted in HPI.      Past Medical History:   Reviewed and updated in Epic.    Past Medical History          Past Medical History   Diagnosis Date     " Achalasia       Anxiety       Anxiety and depression       Chronic pain       COPD (chronic obstructive pulmonary disease) (H)         trach/vent dependent      GERD (gastroesophageal reflux disease)       Hypertension       Lung nodule         spiculated; followed in pulm clinic      Stress-induced cardiomyopathy       TMJ pain dysfunction syndrome       Tracheostomy in place              Past Surgical History:   Reviewed and updated in [facility].    Past Surgical History           Past Surgical History   Procedure Laterality Date     Tracheostomy N/A 8/26/2015       Procedure: TRACHEOSTOMY; Surgeon: Milena Thibodeaux MD; [facility]     Bronchoscopy, insert bronchial valve Right 9/2/2015       Procedure: BRONCHOSCOPY, INSERT BRONCHIAL VALVE; Surgeon: Everardo Becah MD; [facility]     Esophagoscopy, gastroscopy, duodenoscopy (egd), combined N/A 12/11/2015       Procedure: COMBINED ESOPHAGOSCOPY, GASTROSCOPY, DUODENOSCOPY (EGD); Surgeon: Dhruv Lyles MD; [facility]     Esophagoscopy, gastroscopy, duodenoscopy (egd), combined N/A 12/31/2015       Procedure: COMBINED ESOPHAGOSCOPY, GASTROSCOPY, DUODENOSCOPY (EGD); Surgeon: Brenden Plasencia MD; [facility]     Percutaneous insertion tube jejunostomy N/A 12/31/2015       Procedure: PERCUTANEOUS INSERTION TUBE JEJUNOSTOMY; Surgeon: Brenden Plasencia MD; [facility]     Percutaneous insertion tube jejunostomy N/A 1/25/2016       Procedure: PERCUTANEOUS INSERTION TUBE JEJUNOSTOMY; Surgeon: Carrie Coleman MD; [facility]     Anesthesia out of or mri N/A 4/18/2016       Procedure: ANESTHESIA OUT OF OR MRI; Surgeon: GENERIC ANESTHESIA PROVIDER; Location: [facility]     Endoscopic insertion tube gastrostomy N/A 7/9/2016       Procedure: ENDOSCOPIC INSERTION TUBE GASTROSTOMY; Surgeon: Brenden Plasencia MD; Location[facility]     Picc insertion Right 1/9/2017       5fr DL BioFlo PICC, 38cm (1cm external) in the R basilic  vein.            Social History:   Reviewed and updated in [facility].    Social History    Social History            Social History     Marital status:        Spouse name: N/A     Number of children: N/A     Years of education: N/A          Occupational History     Not on file.            Social History Main Topics     Smoking status: Former Smoker       Packs/day: 2.00       Years: 40.00       Types: Cigarettes       Start date: 1/1/1964       Quit date: 4/18/1998     Smokeless tobacco: Never Used     Alcohol use No     Drug use: No     Sexual activity: Not on file           Other Topics Concern     Not on file      Social History Narrative            Family History:   Reviewed and updated in Epic.    Family History          Family History   Problem Relation Age of Onset     CANCER Sister              Allergies:             Allergies   Allergen Reactions     Levaquin Other (See Comments)       Delirium     Toro Podhaler [Tobramycin] Other (See Comments)       Severe congestion, SOB      Suprax [Cefixime]         See ceftibuten     Augmentin Nausea       Has tolerated for treatment of UTIs in 2016.     Avelox [Moxifloxacin] Anxiety     Cedax [Ceftibuten] Other (See Comments)       Pain in eyes     Penicillins Itching       Tolerated amoxicillin without issues.     Vantin [Cefpodoxime] Nausea         Medications:       Prescriptions Prior to Admission            Prescriptions Prior to Admission   Medication Sig Dispense Refill Last Dose     amoxicillin (AMOXIL) 500 MG tablet Take 2 tablets (1,000 mg) by mouth 3 times daily 30 tablet 0       clarithromycin (BIAXIN) 500 MG tablet Take 1 tablet (500 mg) by mouth 2 times daily 20 tablet 0       Cranberry 125 MG TABS 1 tablet by Jejunal Tube route daily 90 tablet 3       QUEtiapine (SEROQUEL) 25 MG tablet Administer one tab per J-tube as needed at night if scheduled HS dose ineffective for treatment of anxiety or sleeplessness. 90 tablet 3       HYDROmorphone (DILAUDID)  1 MG/ML LIQD liquid Take 1 mL (1 mg) by mouth every 2 hours as needed for other (dyspnea) 180 mL 0       bisacodyl (DULCOLAX) 10 MG Suppository Place 1 suppository (10 mg) rectally daily as needed for constipation 25 suppository 1       order for DME Equipment being ordered: Methylex foam dressings, alternating pressure pad for mattress and pump. 1 Device 0       Rectal Cleansers (FLEET NATURALS CLEANSING ENEMA) ENEM Place 1 enema rectally daily as needed 3 Bottle 11 More than a month at Unknown time     order for DME Equipment being ordered: 1) Avendaño catheter 16F, balloon 10 mL, silicone. 2) Urinary collection bag. 3) Elastic leg strap for Avendaño catheter. Please fax to [employer]. 3 each 11       LORazepam (ATIVAN) 2 MG/ML (HIGH CONC) solution Take 0.25 mLs (0.5 mg) by mouth every 3 hours as needed for anxiety 30 mL 1 1/28/2017 at 0930     clonazePAM (KLONOPIN) 0.1 mg/mL Take 15 mLs (1.5 mg) by mouth 4 times daily 1800 mL 1 1/28/2017 at 1430     QUEtiapine (SEROQUEL) 50 MG tablet Take 1 tablet (50 mg) by mouth 2 times daily 180 tablet 3 1/28/2017 at am     ipratropium (ATROVENT) 0.02 % neb solution Take 2.5 mLs (0.5 mg) by nebulization 4 times daily and every four hours at night PRN. 450 mL 0 1/28/2017 at 1300     levalbuterol (XOPENEX) 1.25 MG/3ML neb solution Take 3 mLs (1.25 mg) by nebulization 4 times daily and every four hours at night PRN. 540 mL 0 1/28/2017 at 1300     lactobacillus rhamnosus, GG, (CULTURELL) capsule 1 capsule by Per G Tube route daily 60 capsule 0 1/28/2017 at 1300     lidocaine (LIDODERM) 5 % Patch Place 1-2 patches onto the skin every 24 hours Apply to the lower back 30 patch 0 Past Month at Unknown time     gabapentin (NEURONTIN) 250 MG/5ML solution 6 mLs (300 mg) by Per J Tube route 3 times daily 450 mL 0 1/28/2017 at 0830     polyethylene glycol (MIRALAX/GLYCOLAX) Packet 17 g by Per J Tube route 2 times daily Hold for loose stools 100 packet 0 1/28/2017 at 0830     [facility]  (SENOKOT-S;PERICOLACE) 8.6-50 MG per tablet 2 tablets by Per J Tube route 2 times daily 100 tablet 0 1/28/2017 at 0830     calcium carbonate (TUMS) 500 MG chewable tablet Take 1 tablet (500 mg) by mouth every 2 hours as needed for heartburn 150 tablet   More than a month at Unknown time     ondansetron (ZOFRAN) 4 MG tablet Take 1 tablet (4 mg) by mouth every 4 hours as needed for nausea 18 tablet   1/15/2017     famotidine (PEPCID) 40 MG/5ML suspension Take 2.5 mLs (20 mg) by mouth 2 times daily as needed for heartburn 75 mL 3 Past Month at Unknown time     loperamide (IMODIUM A-D) 2 MG tablet 2-2 mg tablets per J-tube qid prn frequent and/or loose stools. Do not use more than 8 tabs per day. 30 tablet 0 Past Month at Unknown time     polyethylene glycol 0.4%- propylene glycol 0.3% (SYSTANE ULTRA) 0.4-0.3 % SOLN ophthalmic solution Place 1-2 drops into both eyes 4 times daily as needed for dry eyes 0900, 1300, 1700, 2100 1 Bottle 3 1/28/2017 at 0900     predniSONE 5 MG/5ML solution Take 20 mLs (20 mg) by mouth daily 500 mL 0 1/28/2017 at am     sertraline (ZOLOFT) 20 MG/ML (HIGH CONC) solution 7.5 mLs (150 mg) by Per Feeding Tube route daily 105 mL 0 1/28/2017 at 0830     fexofenadine (ALLEGRA) 180 MG tablet Take 1 tablet (180 mg) by mouth daily 30 tablet 0 1/28/2017 at am     fiber modular (NUTRISOURCE FIBER) packet 1 packet by Per J Tube route 3 times daily 42 packet 0 1/28/2017 at am     melatonin (MELATONIN) 1 MG/ML LIQD liquid 3 mLs (3 mg) by Per J Tube route At Bedtime 300 mL 0 1/27/2017 at pm     acetaminophen (TYLENOL) 160 MG/5ML oral liquid 20.3 mLs (650 mg) by Per Feeding Tube route every 4 hours as needed for mild pain 300 mL 0 Past Week at Unknown time     budesonide (PULMICORT) 0.5 MG/2ML nebulizer solution Take 2 mLs (0.5 mg) by nebulization 2 times daily 60 ampule 0 1/28/2017 at 1000     guaiFENesin (ORGANIDIN) 200 MG TABS Take 1 tablet (200 mg) by mouth 4 times daily 115 tablet 0 1/28/2017 at 0830      sodium chloride (OCEAN) 0.65 % nasal spray Spray 1 spray into both nostrils daily as needed for congestion 1 Bottle 0 Past Month at Unknown time     sulfamethoxazole-trimethoprim (BACTRIM,SEPTRA) 200-40 mg/5ml suspension 20 mLs (160 mg) by Per J Tube route daily Dose based on TMP component. 20 mLs = 160 mg 560 mL 0 1/28/2017 at 0500     lidocaine 15 mL, alum & mag hydroxide-simethicone 15 mL GI Cocktail Take 30 mLs by mouth 3 times daily as needed for moderate pain 500 mL 0 More than a month at Unknown time            Physical Exam:   Blood pressure 132/64, resp. rate 16, SpO2 96 %, not currently breastfeeding.  GENERAL: Alert. Sitting upright in bed, awake.   HEENT: Anicteric sclera. Mucous membranes moist and without lesions.   CV: RRR. S1, S2. No murmurs appreciated.   RESPIRATORY: Effort normal on 3 LPM, trached. Lungs with diffusely course breath sounds in bases, with no wheezing, rales.   GI: Abdomen soft and non distended, bowel sounds present. G tube in place. No tenderness, rebound, guarding.   : Avendaño in place, clear straw colored urine present.   MUSCULOSKELETAL: No joint swelling or tenderness. Moves all extremities.   NEUROLOGICAL: No focal deficits.   EXTREMITIES: No peripheral edema. Intact bilateral pedal pulses.   SKIN: No jaundice. No rashes.      Lines/Tubes/Drains:        Peripheral IV 02/15/17 Right;Anterior Lower forearm (Active)   Site Assessment WDL 2/15/2017 4:00 PM   Line Status Infusing 2/15/2017 4:09 PM   Phlebitis Scale 0-->no symptoms 2/15/2017 4:00 PM   Infiltration Scale 0 2/15/2017 4:00 PM   Extravasation? No 2/15/2017 4:00 PM   Dressing Intervention New dressing  2/15/2017 4:00 PM   Number of days:0         Data:   I personally reviewed the following studies:  None to review from this admission      Unresulted Labs Ordered in the Past 30 Days of this Admission      No orders found from 12/17/2016 to 2/16/2017.

## 2017-02-16 NOTE — CONSULTS
"  GASTROENTEROLOGY CONSULTATION      Date of Admission:  2/15/2017  Reason for Admission:  PEG tube fell out  Date of Consult  2/16/2017   Requesting Physician:  Selvin Wren MD         Reason for Consultation:   \"Achalasia, aspiration, needs replacement of GJ tube (currently has PEG in place)\"           History of Present Illness:   Patient seen and examined at 0830. History is obtained from the electronic medical record (patient unable to relay information due to inability to speak with tracheostomy).    Mary Wang is a 67 year old female with history of COPD on chronic ventilator support, achalasia s/p botox/AXIOS now chronic G-J dependence who was admitted 2/15 because PEG-J was pulled out.    PEG-J INDICATION: Achalasia (s/p multiple botox injection, Axios across GE junction)    BMI: 23.55  Previous Surgeries: Surgical placement of PEG initially August 2015. New gastrostomy created January 2016 this time with J extension. She has existing PEG-J which was inadvertently pulled out yesterday. IR attempted to replace yesterday but could not cannulate jejunum under fluoroscopy and decision was made to leave PEG and tube was stitched to skin.      The patient does not currently have evidence of ascites, intra-abdominal infection, abdominal wall infection or sepsis and is hemodynamically stable.       Previous Procedures:  EGD with PEG-J replacement 7/8/2016                Review of Systems:   Unable to obtain         Physical Exam:   Temp: 97.5  F (36.4  C) Temp src: Axillary BP: 97/60   Heart Rate: 82 Resp: 14 SpO2: 100 % O2 Device: Mechanical Ventilator Oxygen Delivery: 3 LPM  Wt:   Wt Readings from Last 2 Encounters:   02/15/17 54.7 kg (120 lb 9.5 oz)   02/03/17 57.5 kg (126 lb 11.2 oz)        General: Chronically ill female in NAD.  Trached/vented  Oropharynx is clear, moist and w/o exudate or lesions. Poor dentition.  Abdomen: Soft, non-tender, non-distended. No rebound or peritoneal signs. Existing " PEG present with liquid brown leakage around tube. No erythema, purulent drainage or induration.             Data:       LAB WORK: None this admission           ASSESSMENT AND RECOMMENDATIONS:   Assessment:  67 year old female with a history of COPD on chronic ventilator support, achalasia s/p botox and Axios across GE junction, now with chronic G-J dependence who was admitted 2/15 because PEG-J was pulled out. IR attempted to replace PEG-J but could not advance jejunal extension so PEG alone with placed. GI consulted for replacement of GJ tube.       Recommendations:  Plan for PEG-J replacement tomorrow with Dr. Plasencia  Obtain CBC, INR, BMP  NPO at midnight (including tube feeds)  Discussed with primary team      Thank you for involving us in this patient's care. Please do not hesitate to contact the GI service with any questions or concerns.     Pt seen and care plan discussed with Dr. Carroll Del Castillo, GI staff physician.    Eneida Brambila PA-C  Therapeutic Endoscopy/Pancreaticobiliary [facility]  [facility]  Pager *[phone]  =======================================================================

## 2017-02-16 NOTE — PROGRESS NOTES
[facility]      OUTPATIENT PHYSICAL THERAPY EVALUATION  PLAN OF TREATMENT FOR OUTPATIENT REHABILITATION  (COMPLETE FOR INITIAL CLAIMS ONLY)  Patient's Last Name, First Name, M.I.  YOB: 1950  Mary Wang                        Provider's Name  [facility] Medical Record No.  [id number]                               Onset Date:  01/02/17   Start of Care Date:  01/04/17      Type:     _X_PT   ___OT   ___SLP Medical Diagnosis:  leukocytosis                        PT Diagnosis:  impaired functional mobility   Visits from SOC:  1   _________________________________________________________________________________  Plan of Treatment/Functional Goals    Planned Interventions:  ,    balance training, bed mobility training, strengthening, stretching, transfer training,       Goals: See Physical Therapy Goals on Care Plan in [facility] electronic health record.    Therapy Frequency: 3 times/week  Predicted Duration of Therapy Intervention: 1/06/17  _________________________________________________________________________________    I CERTIFY THE NEED FOR THESE SERVICES FURNISHED UNDER        THIS PLAN OF TREATMENT AND WHILE UNDER MY CARE     (Physician co-signature of this document indicates review and certification of the therapy plan).                Certification date from: 01/04/17, Certification date to: 01/06/17    Referring Physician: Jose David De La Cruz CNP            Initial Assessment        See Physical Therapy evaluation dated 01/04/17 in Epic electronic health record.

## 2017-02-16 NOTE — H&P
[facility] History [facility] Physical  Department of Internal Medicine    Patient Name: Mary Wang MRN# [id number]   Age: 66 year old YOB: 1950     Date of Admission: 2/15/2017    Home clinic: [facility]  Primary care provider: Norman Brito         Assessment and Plan:   Mary Wang is a 66 year old female with a history of severe COPD s/p trach on home vent 24/7 (3-3.5 LPM), anxiety, hypertension, achalasia s/p GJ tube who presented to the hospital with acute on chronic hypoxic and hypercarbic respiratory failure.    *Multiple admissions in last 2 months, 12/1-12/5 for UTI, 12/25-12/28 for bacteremia, 1/2-1/6 with encephalopathy ultimately attributed to her medications, 01/07-01/09 for acute on chronic respiratory failure likely 2/2 COPD exacerbation, 1/20-1/24 for oversedation/aspiration pneumonitis, 1/28-2/03 for acute on chronic respiratory failure.     #GJ tube accidental removal, Achalasia: Accidentally removed on transfer at group [place] last evening. Transferred to [facility] from [facility]. IR attempted to replace today but unable to pass pylorus. Needs GJ for feeding and meds as patient has achalasia and is chronic aspiration risk. In regards to achalasia, see Dr. Aaron Thomas note form 12/2015. Patient has had stent placement and botox injections of LES in 2015. Nutrition is obtained from J portion of FT.  -GI consulted, discussed with Dr. Mares and Zachariah Marrufo team is aware of admission  -Will convert meds to IV, give essential meds per G tube if unable to convert  -When tube replaced will need meds converted back to J form  -When tube replaced may restart home tuve feeds of isosource 1.5, goal rate of 85 ml/hour from 9187-4030 w/ 15-30 ml of NS flush before and after meds/feeding    #Chronic respiratory failure, severe COPD s/p trach, Likely PNA: On ventilator (settings CMV, 400/14/5/40%). Multiple admissions in 6 weeks, see above. For chronic respiratory failure  "PTA the patient is maintained on chronic prednisone at 20 mg qday, scheduled ipratropium and xoponex nebs, BID Pulmicort, guaifenesin 200 mg QID, bactrim for PCP prophylaxis, allegra BID. Recently started on clarithromycin and amoxicillin for presumed PNA 02/03. Afebrile, normotensive, mildly tachcyardic, sating well on home 3LPM and on home vent settings.  -Continue home nebulizers  -Continue home steroids w/ PCP prophylaxis   -Trach cares per RT, suctioning per nursing and RT  -Continue anxiolytics and medications for air hunger  -Continue PTA antibiotics, will not escalate or do further work up unless febrile, clinically appears chronically ill but well to this writer (have met on many occassions)     #Severe anxiety, depression: Likely due to air hunger from COPD. Psychiatry and Palliative care have seen patient in the past. Continues to have significant anxiety and frequent requests for hospital admission per daughter. PTA maintained on seroquel (50 mg BID), sertraline (150 mg qday), scheduled Klonopin (1.5 mg QID) and PRN ativan (0.5 q3 PRN) & dilaudid (1 mg per G q2 hours PRN) for air hunger. Currently endorses stable symptoms although daughter notes intermittent anxious periods.   -Continue home regimen  -Consider palliative, psychiatry consult pending symptoms     #Urinary retention: Chronic indwelling bender. Last changed 1/23 while inpatient.  -Continue bender     #Normocytic anemia: At baseline.      #CAD: On aspirin and statin. Continue when J replaced.       #Constipation: On senna and miralax PTA, continue when J replaced.     *Reviewed last DC summary by Dr. Chavis, \"Goals of a care ; greatly appreciate Palliative following patient. Please note 1/31. FCC was held 1/31 and the plan is that Mary will not be admitted to hospital again for respiratory decompensation . Cares being organized . New Polst completed  Spoke with daughter César 2/3 and updated on progress. César would like to have in place " "regarding respiratory decline in group home so that Mary is not under duress\". IR and I both discussed options with daughter who chose to have the patient admitted for GJ tube placement so the patient could get continued nutrition. Not ready to pursue hospice at this point. Need to have further discussions if GI is unable to place GJ endoscopically.     CODE: DNR, trached and vented  FEN: NPO, IVF hydration w/ D5 NS at 50 cc/hour  DVT: Mechanical, SCDs  LINES: PIV  Disposition/Admission Status: <2 midnights for GJ tube placement by GI.     Plan of care was discussed with attending physician, Dr. Wren.     Viridiana Rockwell PA-C  Hospitalist Service      Patient seen with the ELGIN today. Agree with plan as outlined with the following additions/exceptions    Vitals, imaging and labs reviewed for today.  Selvin Wren MD  [facility] Medicine Attending  [phone]            Chief Complaint:   GJ tube out         HPI:   History is obtained from the patient, daughter, group home nurse and medical record.     The patient and her daughter note that her tube was pulled out somewhat while transferring yesterday. While transferring onto [facility] to go to [facility] the tube completely fell out. They went to [facility] last night who attempted to transfer the patient but due to bed situation kept her overnight. She was transferred here this morning for continued care and replacement. IR unable to advance J portion past pylorus. She notes she hasn't ate in 3 days.     Reviewed case with her daughter who notes they would like tube replaced and are not pursuing hospice at this time (not ready to remove ventilator).          Review of Systems:   A 10 point ROS was performed and negative unless otherwise noted in HPI.          Past Medical History:   Reviewed and updated in Epic.   Past Medical History   Diagnosis Date     Achalasia      Anxiety      Anxiety and depression      Chronic pain      COPD (chronic obstructive pulmonary " disease) (H)      trach/vent dependent      GERD (gastroesophageal reflux disease)      Hypertension      Lung nodule      spiculated; followed in pulm clinic      Stress-induced cardiomyopathy      TMJ pain dysfunction syndrome      Tracheostomy in place             Past Surgical History:   Reviewed and updated in Epic.   Past Surgical History   Procedure Laterality Date     Tracheostomy N/A 8/26/2015     Procedure: TRACHEOSTOMY;  Surgeon: Milena Thibodeaux MD;  Location: [facility] OR     Bronchoscopy, insert bronchial valve Right 9/2/2015     Procedure: BRONCHOSCOPY, INSERT BRONCHIAL VALVE;  Surgeon: Everardo Beach MD;  Location: [facility] OR     Esophagoscopy, gastroscopy, duodenoscopy (egd), combined N/A 12/11/2015     Procedure: COMBINED ESOPHAGOSCOPY, GASTROSCOPY, DUODENOSCOPY (EGD);  Surgeon: Dhruv Lyles MD;  Location: [facility] OR     Esophagoscopy, gastroscopy, duodenoscopy (egd), combined N/A 12/31/2015     Procedure: COMBINED ESOPHAGOSCOPY, GASTROSCOPY, DUODENOSCOPY (EGD);  Surgeon: Brenden Plasencia MD;  Location: [facility] OR     Percutaneous insertion tube jejunostomy N/A 12/31/2015     Procedure: PERCUTANEOUS INSERTION TUBE JEJUNOSTOMY;  Surgeon: Brenden Plasencia MD;  Location: [facility] OR     Percutaneous insertion tube jejunostomy N/A 1/25/2016     Procedure: PERCUTANEOUS INSERTION TUBE JEJUNOSTOMY;  Surgeon: Carrie Coleman MD;  Location: [facility] OR     Anesthesia out of or mri N/A 4/18/2016     Procedure: ANESTHESIA OUT OF OR MRI;  Surgeon: GENERIC ANESTHESIA PROVIDER;  Location: [facility] OR     Endoscopic insertion tube gastrostomy N/A 7/9/2016     Procedure: ENDOSCOPIC INSERTION TUBE GASTROSTOMY;  Surgeon: Brenden Plasencia MD;  Location: [facility] OR     Picc insertion Right 1/9/2017     5fr DL BioFlo PICC, 38cm (1cm external) in the R basilic vein.            Social History:   Reviewed and updated in [facility].   Social History     Social History     Marital status:      Spouse name: N/A     Number  of children: N/A     Years of education: N/A     Occupational History     Not on file.     Social History Main Topics     Smoking status: Former Smoker     Packs/day: 2.00     Years: 40.00     Types: Cigarettes     Start date: 1/1/1964     Quit date: 4/18/1998     Smokeless tobacco: Never Used     Alcohol use No     Drug use: No     Sexual activity: Not on file     Other Topics Concern     Not on file     Social History Narrative            Family History:   Reviewed and updated in Epic.   Family History   Problem Relation Age of Onset     CANCER Sister             Allergies:      Allergies   Allergen Reactions     Levaquin Other (See Comments)     Delirium     Toro Podhaler [Tobramycin] Other (See Comments)     Severe congestion, SOB      Suprax [Cefixime]      See ceftibuten     Augmentin Nausea     Has tolerated for treatment of UTIs in 2016.     Avelox [Moxifloxacin] Anxiety     Cedax [Ceftibuten] Other (See Comments)     Pain in eyes     Penicillins Itching     Tolerated amoxicillin without issues.     Vantin [Cefpodoxime] Nausea            Medications:     Prescriptions Prior to Admission   Medication Sig Dispense Refill Last Dose     amoxicillin (AMOXIL) 500 MG tablet Take 2 tablets (1,000 mg) by mouth 3 times daily 30 tablet 0      clarithromycin (BIAXIN) 500 MG tablet Take 1 tablet (500 mg) by mouth 2 times daily 20 tablet 0      Cranberry 125 MG TABS 1 tablet by Jejunal Tube route daily 90 tablet 3      QUEtiapine (SEROQUEL) 25 MG tablet Administer one tab per J-tube as needed at night if scheduled HS dose ineffective for treatment of anxiety or sleeplessness. 90 tablet 3      HYDROmorphone (DILAUDID) 1 MG/ML LIQD liquid Take 1 mL (1 mg) by mouth every 2 hours as needed for other (dyspnea) 180 mL 0      bisacodyl (DULCOLAX) 10 MG Suppository Place 1 suppository (10 mg) rectally daily as needed for constipation 25 suppository 1      order for DME Equipment being ordered: Methylex foam dressings, alternating  pressure pad for mattress and pump. 1 Device 0      Rectal Cleansers (FLEET [employer]UR[employer] CLEANSING ENEMA) ENEM Place 1 enema rectally daily as needed 3 Bottle 11 More than a month at Unknown time     order for DME Equipment being ordered: 1) Avendaño catheter 16F, balloon 10 mL, silicone.  2) Urinary collection bag.  3) Elastic leg strap for Avendaño catheter.  Please fax to [facility] 3 each 11      LORazepam (ATIVAN) 2 MG/ML (HIGH CONC) solution Take 0.25 mLs (0.5 mg) by mouth every 3 hours as needed for anxiety 30 mL 1 1/28/2017 at 0930     clonazePAM (KLONOPIN) 0.1 mg/mL Take 15 mLs (1.5 mg) by mouth 4 times daily 1800 mL 1 1/28/2017 at 1430     QUEtiapine (SEROQUEL) 50 MG tablet Take 1 tablet (50 mg) by mouth 2 times daily 180 tablet 3 1/28/2017 at am     ipratropium (ATROVENT) 0.02 % neb solution Take 2.5 mLs (0.5 mg) by nebulization 4 times daily and every four hours at night PRN. 450 mL 0 1/28/2017 at 1300     levalbuterol (XOPENEX) 1.25 MG/3ML neb solution Take 3 mLs (1.25 mg) by nebulization 4 times daily and every four hours at night PRN. 540 mL 0 1/28/2017 at 1300     lactobacillus rhamnosus, GG, (CULTURELL) capsule 1 capsule by Per G Tube route daily 60 capsule 0 1/28/2017 at 1300     lidocaine (LIDODERM) 5 % Patch Place 1-2 patches onto the skin every 24 hours Apply to the lower back 30 patch 0 Past Month at Unknown time     gabapentin (NEURONTIN) 250 MG/5ML solution 6 mLs (300 mg) by Per J Tube route 3 times daily 450 mL 0 1/28/2017 at 0830     polyethylene glycol (MIRALAX/GLYCOLAX) Packet 17 g by Per J Tube route 2 times daily Hold for loose stools 100 packet 0 1/28/2017 at 0830     senna-docusate (SENOKOT-S;PERICOLACE) 8.6-50 MG per tablet 2 tablets by Per J Tube route 2 times daily 100 tablet 0 1/28/2017 at 0830     calcium carbonate (TUMS) 500 MG chewable tablet Take 1 tablet (500 mg) by mouth every 2 hours as needed for heartburn 150 tablet  More than a month at Unknown time     ondansetron (ZOFRAN) 4  MG tablet Take 1 tablet (4 mg) by mouth every 4 hours as needed for nausea 18 tablet  1/15/2017     famotidine (PEPCID) 40 MG/5ML suspension Take 2.5 mLs (20 mg) by mouth 2 times daily as needed for heartburn 75 mL 3 Past Month at Unknown time     loperamide (IMODIUM A-D) 2 MG tablet 2-2 mg tablets per J-tube qid prn frequent and/or loose stools. Do not use more than 8 tabs per day. 30 tablet 0 Past Month at Unknown time     polyethylene glycol 0.4%- propylene glycol 0.3% (SYSTANE ULTRA) 0.4-0.3 % SOLN ophthalmic solution Place 1-2 drops into both eyes 4 times daily as needed for dry eyes 0900, 1300, 1700, 2100 1 Bottle 3 1/28/2017 at 0900     predniSONE 5 MG/5ML solution Take 20 mLs (20 mg) by mouth daily 500 mL 0 1/28/2017 at am     sertraline (ZOLOFT) 20 MG/ML (HIGH CONC) solution 7.5 mLs (150 mg) by Per Feeding Tube route daily 105 mL 0 1/28/2017 at 0830     fexofenadine (ALLEGRA) 180 MG tablet Take 1 tablet (180 mg) by mouth daily 30 tablet 0 1/28/2017 at am     fiber modular (NUTRISOURCE FIBER) packet 1 packet by Per J Tube route 3 times daily 42 packet 0 1/28/2017 at am     melatonin (MELATONIN) 1 MG/ML LIQD liquid 3 mLs (3 mg) by Per J Tube route At Bedtime 300 mL 0 1/27/2017 at pm     acetaminophen (TYLENOL) 160 MG/5ML oral liquid 20.3 mLs (650 mg) by Per Feeding Tube route every 4 hours as needed for mild pain 300 mL 0 Past Week at Unknown time     budesonide (PULMICORT) 0.5 MG/2ML nebulizer solution Take 2 mLs (0.5 mg) by nebulization 2 times daily 60 ampule 0 1/28/2017 at 1000     guaiFENesin (ORGANIDIN) 200 MG TABS Take 1 tablet (200 mg) by mouth 4 times daily 115 tablet 0 1/28/2017 at 0830     sodium chloride (OCEAN) 0.65 % nasal spray Spray 1 spray into both nostrils daily as needed for congestion 1 Bottle 0 Past Month at Unknown time     sulfamethoxazole-trimethoprim (BACTRIM,SEPTRA) 200-40 mg/5ml suspension 20 mLs (160 mg) by Per J Tube route daily Dose based on TMP component. 20 mLs = 160 mg 560 mL  0 1/28/2017 at 0500     lidocaine 15 mL, alum & mag hydroxide-simethicone 15 mL GI Cocktail Take 30 mLs by mouth 3 times daily as needed for moderate pain 500 mL 0 More than a month at Unknown time             Physical Exam:   Blood pressure 132/64, resp. rate 16, SpO2 96 %, not currently breastfeeding.  GENERAL: Alert. Sitting upright in bed, awake.   HEENT: Anicteric sclera. Mucous membranes moist and without lesions.   CV: RRR. S1, S2. No murmurs appreciated.   RESPIRATORY: Effort normal on 3 LPM, trached. Lungs with diffusely course breath sounds in bases, with no wheezing, rales.   GI: Abdomen soft and non distended, bowel sounds present. G tube in place. No tenderness, rebound, guarding.   : Avendaño in place, clear straw colored urine present.   MUSCULOSKELETAL: No joint swelling or tenderness. Moves all extremities.   NEUROLOGICAL: No focal deficits.   EXTREMITIES: No peripheral edema. Intact bilateral pedal pulses.   SKIN: No jaundice. No rashes.     Lines/Tubes/Drains:   Peripheral IV 02/15/17 Right;Anterior Lower forearm (Active)   Site Assessment WDL 2/15/2017  4:00 PM   Line Status Infusing 2/15/2017  4:09 PM   Phlebitis Scale 0-->no symptoms 2/15/2017  4:00 PM   Infiltration Scale 0 2/15/2017  4:00 PM   Extravasation? No 2/15/2017  4:00 PM   Dressing Intervention New dressing  2/15/2017  4:00 PM   Number of days:0            Data:   I personally reviewed the following studies:  None to review from this admission  Unresulted Labs Ordered in the Past 30 Days of this Admission     No orders found from 12/17/2016 to 2/16/2017.

## 2017-02-17 PROBLEM — T85.598A FEEDING TUBE DYSFUNCTION: Status: ACTIVE | Noted: 2017-01-01

## 2017-02-17 PROBLEM — D64.9 ANEMIA: Status: ACTIVE | Noted: 2017-01-01

## 2017-02-17 NOTE — PROGRESS NOTES
Care Coordinator Progress Note     Admission Date/Time:  2/15/2017  Attending MD:  Kevin Acevedo*     Data  Chart reviewed, discussed with interdisciplinary team.   Patient was admitted for: Feeding tube dysfunction, initial encounter.    Concerns with insurance coverage for discharge needs: None.  Current Living Situation: Patient lives in a group home. With 24 hours nursing  Support System: Supportive and Involved- daughter is POA  Services Involved: Skilled nursing 24 hour care in group home  Transportation: Ambulance- [facility], stretcher ride home, home vent  Barriers to Discharge: chronically ill- pending staffing in place for dc.     Coordination of Care and Referrals: Provided patient/family with options for transport and coordination of dc to .        Assessment  Per MD team, pt is medically stable for discharge today after replacement of feeding tube and change to GJ. Spoke with Arley pt's  with [facility] (phone: [phone]), who stated that he will contact staffing to verify timing of dc for nursing availability. RNCC called [facility] for dc transportation time . RNCC called pt's daughter, who is in agreement with dc back today to . Discharge orders to be faxed to [facility] (fax: [phone]) by bedside RN at time of pt dc.        Plan  Pending call from Arley regarding dc time.  Dc ride set up for stretcher to  pt has her home vent here- [facility] [phone] @ 4 pm this evening-if staffing cannot be secured time will need to be adjusted.   Anticipated Discharge Date:  2/17/2017   Anticipated Discharge Plan:  Home with 24 hour RN care at [facility]     KHANH Pierre, BSN    [facility]    Medicine Group  [address] [place] [id number]    [email or web address]  [email or web address]    Office: [phone] Pager: [phone]       2/17/2017 11:48 AM Arley returned call, stating  4 pm dc ride could be set up, staffing will be in place for this pt.  Updated pts daughter and paged care team with dc ride time.  Bedside rn to fax avs at dc to [facility]- see instructions on AVS .  Obs letter given to pt by bedside RN.

## 2017-02-17 NOTE — PLAN OF CARE
Problem: Goal Outcome Summary  Goal: Goal Outcome Summary  Outcome: No Change   Diagnostic tests and consults completed and resulted: No, Endo consult morning  - Vital signs normal or at patient baseline:Yes  - Tolerating oral intake to maintain hydration: No pt NPO.   - Adequate pain control on oral analgesia: No-unable take po.  - Tolerating oral antibiotics or has plans for home infusion setup: No NPO.   - Infection is improving: Yes  - Return to baseline functional status: Yes   - Dyspnea improved and oxygen saturations greater than 88% on room air or prior home oxygen levels: Yes on prior levels.   - Safe disposition plan has been identified: Yes to previous group home.

## 2017-02-17 NOTE — ANESTHESIA POSTPROCEDURE EVALUATION
Patient: Mary Wang    Procedure(s):  Upper Endoscopy with Feeding Tube Exchange - Wound Class: II-Clean Contaminated    Diagnosis:Malnutrition   Diagnosis Additional Information: No value filed.    Anesthesia Type:  General    Note:  Anesthesia Post Evaluation    Patient location during evaluation: PACU  Patient participation: Unable to participate in evaluation secondary to underlying medical condition  Level of consciousness: responsive to verbal stimuli  Pain management: adequate  Airway patency: patent  Cardiovascular status: acceptable  Respiratory status: acceptable and ventilator  Hydration status: acceptable  PONV: none     Anesthetic complications: None          Last vitals:  Vitals:    02/17/17 0750 02/17/17 0800 02/17/17 0900   BP: 156/78 153/80 139/75   Resp: 14 16    Temp: 37.1  C (98.7  F)     SpO2:  97% 100%         Electronically Signed By: Aristides Ferris MD  February 17, 2017  10:35 AM

## 2017-02-17 NOTE — PROGRESS NOTES
"GASTROENTEROLOGY PROGRESS NOTE    ASSESSMENT:  67 year old female with a history of COPD on chronic ventilator support, achalasia s/p botox and [employer] across GE junction, now with chronic G-J dependence who was admitted 2/15 because PEG-J was pulled out. IR attempted to replace PEG-J but could not advance jejunal extension so PEG alone with placed. GI consulted for replacement of GJ tube. 2/17/2017 PEGJ replaced without complication.  Bumper at 3.5 cm.      RECOMMENDATIONS:  -okay to use both G and J portions of tube immediately.    -no further GI follow up needed   -GI will sign off at this time, please call if questions.      The patient was discussed and plan agreed upon with GI staff.    Kevin Bernstein  [facility]  Gastroenterology Fellow  _______________________________________________________________  S: no acute events overnight.      O:  Blood pressure 121/64, temperature 98.7  F (37.1  C), temperature source Oral, resp. rate 14, height 1.63 m (5' 4.17\"), weight 56 kg (123 lb 7.3 oz), SpO2 100 %, not currently breastfeeding.    General: Chronically ill female in NAD. Trached/vented  Oropharynx is clear, moist and w/o exudate or lesions. Poor dentition.  Abdomen: Soft, non-tender, non-distended. No rebound or peritoneal signs. Existing PEG present with liquid green leakage around tube. No erythema, purulent drainage or induration.      LABS:  BMP  Recent Labs  Lab 02/17/17 0321      POTASSIUM 3.1*   CHLORIDE 96   IZZY 9.4   CO2 39*   BUN 30   CR 0.64   *     CBC  Recent Labs  Lab 02/17/17 0321   WBC 10.6   RBC 2.43*   HGB 6.9*   HCT 22.6*   MCV 93   MCH 28.4   MCHC 30.5*   RDW 16.0*        INR  Recent Labs  Lab 02/17/17 0321   INR 1.15*     LFTsNo lab results found in last 7 days.   PANCNo lab results found in last 7 days.        "

## 2017-02-17 NOTE — CONSULTS
Brief Social Work Note:     Request for SW consult received for d/c planning.   Anticipate patient will return to her group home once medically stable; RNCC to make arrangements for this.    SW will remain available should further needs arise.   Please page with any concerns; thank you.     UMU Ortiz, [facility]  ICU   Pager: [phone]  Phone: [phone]

## 2017-02-17 NOTE — UTILIZATION REVIEW
"  Admission Status; Secondary Review Determination         Under the authority of the Utilization Management Committee, the utilization review process indicated a secondary review on the above patient.  The review outcome is based on review of the medical records, discussions with staff, and applying clinical experience noted on the date of the review.          (x) Observation Status Appropriate - This patient does not meet hospital inpatient criteria and is placed in observation status. If this patient's primary payer is Medicare and was admitted as an inpatient, Condition Code 44 should be used and patient status changed to \"observation\".     RATIONALE FOR DETERMINATION   67-year-old woman with chronic respiratory failure on chronic event. Patient was kept on observation after her feeding tube was accidentally removed. Her procedure was delayed until today. Earlier this morning there was a change of status from observation to inpatient. Attending physician states that the patient would like to be going home after her intervention radiology procedure for feeding tube placement and was not sure what the order was changed earlier this morning. The severity of illness, intensity of service provided, expected LOS and risk for adverse outcome make the care appropriate for further observation; however, doesn't meet criteria for hospital inpatient admission. Dr Hall notified of this determination.    This document was produced using voice recognition software.      The information on this document is developed by the utilization review team in order for the business office to ensure compliance.  This only denotes the appropriateness of proper admission status and does not reflect the quality of care rendered.         The definitions of Inpatient Status and Observation Status used in making the determination above are those provided in the CMS Coverage Manual, Chapter 1 and Chapter 6, section 70.4.      Sincerely, "     SHEA FRIEND MD    System Medical Director  Utilization Management  [facility].

## 2017-02-17 NOTE — ANESTHESIA CARE TRANSFER NOTE
Patient: Mary Wang    Procedure(s):  Upper Endoscopy with Feeding Tube Exchange - Wound Class: II-Clean Contaminated    Diagnosis: Malnutrition   Diagnosis Additional Information: No value filed.    Anesthesia Type:   General     Note:  Airway :Tracheostomy  Patient transferred to:ICU        Vitals: (Last set prior to Anesthesia Care Transfer)    CRNA VITALS  2/17/2017 0944 - 2/17/2017 1029      2/17/2017             Resp Rate (set): 14                Electronically Signed By: Floresita Huerta CRNA, LÁZARO RUIZ  February 17, 2017  10:29 AM

## 2017-02-17 NOTE — PLAN OF CARE
Problem: Goal Outcome Summary  Goal: Goal Outcome Summary  Outcome: No Change   Diagnostic tests and consults completed and resulted: No, Endo consult morning  - Vital signs normal or at patient baseline: No MAP <62.  - Tolerating oral intake to maintain hydration: No pt NPO.   - Adequate pain control on oral analgesia: No-unable take po.  - Tolerating oral antibiotics or has plans for home infusion setup: No NPO.   - Infection is improving: Yes  - Return to baseline functional status: Yes   - Dyspnea improved and oxygen saturations greater than 88% on room air or prior home oxygen levels: Yes on prior levels.   - Safe disposition plan has been identified: Yes to previous group home.

## 2017-02-17 NOTE — PLAN OF CARE
Pt has slept majority of day. Interacts with family when they visit. Requests water swabs etc. NSR, tachy to 105 at times, BP stable. Chronic Avendaño. Chronic trach on home vent settings with creamy yellow deep secretions. G tube leaking, no longer giving meds by tube per MD order. Endoscopy to try to replace with G/J tube tomorrow. Goal is to have J tube placed prior to discharge.

## 2017-02-17 NOTE — DISCHARGE SUMMARY
Discharge Summary    Mary Wang MRN# [id number]   YOB: 1950 Age: 67 year old     Date of Admission:  2/15/2017  Date of Discharge:  2/17/2017  Admitting Physician:  Geo Bui MD  Discharge Physician:  Geo Bui MD   Discharging Service:  Internal Medicine     Primary Provider: Norman Brito           Discharge Diagnosis:        Feeding tube dysfunction, initial encounter  S/p GJ tube placement by GI  Anemia likely related to chronic disease s/p 1 U PRBC.     Chronic respiratory failure 2/2 COPD on Chronic Vent.         Procedures:     Procedure:           Upper GI endoscopy   Indications:         Place PEG-J because patient is unable to eat   Providers:           Brenden Plasencia MD, Moy Pang RN   Patient Profile:     Ms Wang is a 68yo comorbid woman requiring a GJ tube                        for nutrititon. With evidence of malposition, an attempt                        at IR replacement was not techincally possible and a                        gastrostomy tube was placed. She now proceeds to upper                        endoscopy for replacement.   Referring MD:        Kevin Bernstein MD   Medicines:           General Anesthesia, Antibiotics as scheduled   Complications:       No immediate complications.            Consultations:   Consultation during this admission received from gastroenterology               Brief History of Illness:   For details please refer to H and P dated 2/15/2017  Briefly,     The patient and her daughter note that her tube was pulled out somewhat while transferring yesterday. While transferring onto [facility] to go to [facility] the tube completely fell out. They went to [facility] last night who attempted to transfer the patient but due to bed situation kept her overnight. She was transferred here this morning for continued care and replacement. IR unable to advance J portion past pylorus. She notes she hasn't ate in 3 days.  "     Reviewed case with her daughter who notes they would like tube replaced and are not pursuing hospice at this time (not ready to remove ventilator).           Hospital Course:     Patient admitted to observation status.   PIV placed. Provided iv fluids and essential meds including prednisone, narcotic pain medications, lorazepam from iv while NPO  GI consulted. 2017 underwent GJ tube placement with out complications (see above)  D/w Nutrition. Ok to resume prior to admission tube feeding. No changes made  Hgb 6.8. S/p 1 U PRBC> repeat 8.5. No active bleeding.   Continued on her chronic home vent.  No new or acute issue.  Vitals remained stable throughout.   D.w Ms César YOUNG, patient daughter.        Discharge Day Exam:    Interval/Subjective:   Doing well  No new concern.       Blood pressure 139/75, temperature 98.7  F (37.1  C), temperature source Oral, resp. rate 16, height 1.63 m (5' 4.17\"), weight 56 kg (123 lb 7.3 oz), SpO2 100 %, not currently breastfeeding.    Wt Readings from Last 5 Encounters:   17 56 kg (123 lb 7.3 oz)   17 57.5 kg (126 lb 11.2 oz)   17 55.3 kg (122 lb)   01/10/17 60.5 kg (133 lb 6.4 oz)   17 59 kg (130 lb 1.1 oz)       Temp (24hrs), Av.5  F (36.9  C), Min:97.3  F (36.3  C), Max:98.8  F (37.1  C)    General: alert, interactive, NAD. Trach. vent  HEENT: AT/NC, PERRLA, Moist MM  Neck: s/p trach   Respi/Chest: BL mild wheeze. Diminished through out.   CVS/Heart: S1S2 regular,   Gi/Abd: Soft, non tender, non distended, BS . GJ tube fell off.   MSK/Ext: Distal pulses 2+.   Neuro: unchanged.             Significant Results Obtained During Hospitalization:   All relevant data  Reviewed.      Lab Results   Component Value Date    WBC 10.6 2017    HGB 8.5 (L) 2017    HCT 22.6 (L) 2017     2017     2017    POTASSIUM 3.1 (L) 2017    CHLORIDE 96 2017    CO2 39 (H) 2017    BUN 30 2017    CR 0.64 " 02/17/2017     (H) 02/17/2017    SED 30 01/19/2017    DD 0.6 (H) 06/17/2015    NTBNPI 248 01/28/2017    NTBNP 142 (H) 07/27/2015    TROPONIN 0.02 07/15/2016    TROPI  01/28/2017     <0.015  The 99th percentile for upper reference range is 0.045 ug/L.  Troponin values in   the range of 0.045 - 0.120 ug/L may be associated with risks of adverse   clinical events.      AST 19 01/28/2017    ALT 22 01/28/2017    ALKPHOS 91 01/28/2017    BILITOTAL 0.2 01/28/2017    BLANCA <10 (L) 07/26/2015    INR 1.15 (H) 02/17/2017      Recent Results (from the past 48 hour(s))   IR Gastro Jejunostomy Tube Change    Narrative    Procedures: 2/15/2017  1. Attempted fluoroscopy guided replacement of the gastrojejunostomy  tube.  2. Fluoroscopy guided placement of percutaneous gastrostomy tube using  the existing tract.    Clinical indication: This is a 66 year old female with a history of  severe COPD s/p trach on home vent 24/7 (3-3.5 LPM), achalasia s/p GJ  tube who presented to the hospital after accidental dislodgment of the  gastrojejunostomy tube during transfer a nursing home.    Comparison studies: CT 11/5/2016        Impression    IMPRESSION:     1. Unsuccessful attempt to replace existing gastrojejunostomy tube. A  gastrostomy tube was left in place and the retention disc was stitched  to the skin.  2. Given the patient has achalasia and is dependent on  gastrojejunostomy tube for tube feeding and medication, decision was  made to admit the patient in the hospital.    PLAN:  - Admit the patient under hospitalist service (at 6B).  - Consider GI team consultation for possibility of conversion of the G  tube into the GJ tube endoscopically.     XR Surgery ALEXANDRA Fluoro L/T 5 Min w Stills    Narrative    This exam was marked as non-reportable because it will not be read by a   radiologist or a [place] non-radiologist provider.                               Pending Results:     Unresulted Labs Ordered in the Past 30 Days of this  "Admission     No orders found from 12/17/2016 to 2/16/2017.               Condition on Discharge:   Discharge condition: Stable   Discharge vitals: Blood pressure 139/75, temperature 98.7  F (37.1  C), temperature source Oral, resp. rate 16, height 1.63 m (5' 4.17\"), weight 56 kg (123 lb 7.3 oz), SpO2 100 %, not currently breastfeeding.                  Discharge Medications:     Current Discharge Medication List      CONTINUE these medications which have NOT CHANGED    Details   amoxicillin (AMOXIL) 500 MG tablet Take 2 tablets (1,000 mg) by mouth 3 times daily  Qty: 30 tablet, Refills: 0    Associated Diagnoses: Upper respiratory tract infection, unspecified type      clarithromycin (BIAXIN) 500 MG tablet Take 1 tablet (500 mg) by mouth 2 times daily  Qty: 20 tablet, Refills: 0    Associated Diagnoses: Upper respiratory tract infection, unspecified type      Cranberry 125 MG TABS 1 tablet by Jejunal Tube route daily  Qty: 90 tablet, Refills: 3      !! QUEtiapine (SEROQUEL) 25 MG tablet Administer one tab per J-tube as needed at night if scheduled HS dose ineffective for treatment of anxiety or sleeplessness.  Qty: 90 tablet, Refills: 3    Associated Diagnoses: Generalized anxiety disorder      HYDROmorphone (DILAUDID) 1 MG/ML LIQD liquid Take 1 mL (1 mg) by mouth every 2 hours as needed for other (dyspnea)  Qty: 180 mL, Refills: 0    Associated Diagnoses: Air hunger      bisacodyl (DULCOLAX) 10 MG Suppository Place 1 suppository (10 mg) rectally daily as needed for constipation  Qty: 25 suppository, Refills: 1    Associated Diagnoses: Constipation due to opioid therapy      !! order for DME Equipment being ordered: Methylex foam dressings, alternating pressure pad for mattress and pump.  Qty: 1 Device, Refills: 0    Associated Diagnoses: Acute and chronic respiratory failure with hypercapnia (H)      Rectal Cleansers ([employer] CLEANSING ENEMA) ENEM Place 1 enema rectally daily as needed  Qty: 3 Bottle, " Refills: 11    Associated Diagnoses: Constipation due to opioid therapy      !! order for DME Equipment being ordered: 1) Avendaño catheter 16F, balloon 10 mL, silicone.  2) Urinary collection bag.  3) Elastic leg strap for Avendaño catheter.  Please fax to [employer].  Qty: 3 each, Refills: 11      LORazepam (ATIVAN) 2 MG/ML (HIGH CONC) solution Take 0.25 mLs (0.5 mg) by mouth every 3 hours as needed for anxiety  Qty: 30 mL, Refills: 1      clonazePAM (KLONOPIN) 0.1 mg/mL Take 15 mLs (1.5 mg) by mouth 4 times daily  Qty: 1800 mL, Refills: 1      !! QUEtiapine (SEROQUEL) 50 MG tablet Take 1 tablet (50 mg) by mouth 2 times daily  Qty: 180 tablet, Refills: 3      ipratropium (ATROVENT) 0.02 % neb solution Take 2.5 mLs (0.5 mg) by nebulization 4 times daily and every four hours at night PRN.  Qty: 450 mL, Refills: 0      levalbuterol (XOPENEX) 1.25 MG/3ML neb solution Take 3 mLs (1.25 mg) by nebulization 4 times daily and every four hours at night PRN.  Qty: 540 mL, Refills: 0      lactobacillus rhamnosus, GG, (CULTURELL) capsule 1 capsule by Per G Tube route daily  Qty: 60 capsule, Refills: 0      lidocaine (LIDODERM) 5 % Patch Place 1-2 patches onto the skin every 24 hours Apply to the lower back  Qty: 30 patch, Refills: 0    Associated Diagnoses: Urinary tract infection, site not specified      gabapentin (NEURONTIN) 250 MG/5ML solution 6 mLs (300 mg) by Per J Tube route 3 times daily  Qty: 450 mL, Refills: 0    Associated Diagnoses: Anxiety      polyethylene glycol (MIRALAX/GLYCOLAX) Packet 17 g by Per J Tube route 2 times daily Hold for loose stools  Qty: 100 packet, Refills: 0    Associated Diagnoses: Constipation, unspecified constipation type      senna-docusate (SENOKOT-S;PERICOLACE) 8.6-50 MG per tablet 2 tablets by Per J Tube route 2 times daily  Qty: 100 tablet, Refills: 0    Associated Diagnoses: Constipation, unspecified constipation type      calcium carbonate (TUMS) 500 MG chewable tablet Take 1 tablet (500  mg) by mouth every 2 hours as needed for heartburn  Qty: 150 tablet      ondansetron (ZOFRAN) 4 MG tablet Take 1 tablet (4 mg) by mouth every 4 hours as needed for nausea  Qty: 18 tablet      famotidine (PEPCID) 40 MG/5ML suspension Take 2.5 mLs (20 mg) by mouth 2 times daily as needed for heartburn  Qty: 75 mL, Refills: 3    Associated Diagnoses: Mild gas use disorder      loperamide (IMODIUM A-D) 2 MG tablet 2-2 mg tablets per J-tube qid prn frequent and/or loose stools. Do not use more than 8 tabs per day.  Qty: 30 tablet, Refills: 0    Associated Diagnoses: Frequent stools      polyethylene glycol 0.4%- propylene glycol 0.3% (SYSTANE ULTRA) 0.4-0.3 % SOLN ophthalmic solution Place 1-2 drops into both eyes 4 times daily as needed for dry eyes 0900, 1300, 1700, 2100  Qty: 1 Bottle, Refills: 3    Associated Diagnoses: Dry eyes, bilateral      predniSONE 5 MG/5ML solution Take 20 mLs (20 mg) by mouth daily  Qty: 500 mL, Refills: 0    Comments: Continue prednisone taper until reaches 20 mg daily, then continue 20 mg daily.  Associated Diagnoses: Shortness of breath      sertraline (ZOLOFT) 20 MG/ML (HIGH CONC) solution 7.5 mLs (150 mg) by Per Feeding Tube route daily  Qty: 105 mL, Refills: 0    Associated Diagnoses: Anxiety      fexofenadine (ALLEGRA) 180 MG tablet Take 1 tablet (180 mg) by mouth daily  Qty: 30 tablet, Refills: 0    Associated Diagnoses: COPD exacerbation (H)      fiber modular (NUTRISOURCE FIBER) packet 1 packet by Per J Tube route 3 times daily  Qty: 42 packet, Refills: 0    Associated Diagnoses: Diarrhea, unspecified type      melatonin (MELATONIN) 1 MG/ML LIQD liquid 3 mLs (3 mg) by Per J Tube route At Bedtime  Qty: 300 mL, Refills: 0    Associated Diagnoses: Anxiety; Insomnia due to medical condition      acetaminophen (TYLENOL) 160 MG/5ML oral liquid 20.3 mLs (650 mg) by Per Feeding Tube route every 4 hours as needed for mild pain  Qty: 300 mL, Refills: 0    Associated Diagnoses: Other chronic  pain      budesonide (PULMICORT) 0.5 MG/2ML nebulizer solution Take 2 mLs (0.5 mg) by nebulization 2 times daily  Qty: 60 ampule, Refills: 0    Associated Diagnoses: Shortness of breath; Respiratory difficulty      guaiFENesin (ORGANIDIN) 200 MG TABS Take 1 tablet (200 mg) by mouth 4 times daily  Qty: 115 tablet, Refills: 0    Associated Diagnoses: Shortness of breath      sodium chloride (OCEAN) 0.65 % nasal spray Spray 1 spray into both nostrils daily as needed for congestion  Qty: 1 Bottle, Refills: 0    Associated Diagnoses: Chronic sinusitis, unspecified location      sulfamethoxazole-trimethoprim (BACTRIM,SEPTRA) 200-40 mg/5ml suspension 20 mLs (160 mg) by Per J Tube route daily Dose based on TMP component. 20 mLs = 160 mg  Qty: 560 mL, Refills: 0    Associated Diagnoses: Chronic obstructive pulmonary disease with acute exacerbation (H)      lidocaine 15 mL, alum & mag hydroxide-simethicone 15 mL GI Cocktail Take 30 mLs by mouth 3 times daily as needed for moderate pain  Qty: 500 mL, Refills: 0    Associated Diagnoses: Gastroesophageal reflux disease without esophagitis       !! - Potential duplicate medications found. Please discuss with provider.                 Discharge Disposition:   Discharged to home             Discharge Instructions:     Discharge Procedure Orders  Reason for your hospital stay   Order Comments: Feeding tube pulled out, replaced by GI team 2/17/2017  No complications  No other issue or med change.     Adult [facility] Follow-up and recommended labs and tests   Order Comments: Follow up with primary care provider, Norman Brito, within 7 days for hospital follow- up.      Appointments on [place] and/or [facility] (with [facility] or [facility] provider or service). Call [phone] if you haven't heard regarding these appointments within 7 days of discharge.     Activity   Order Comments: Your activity upon discharge: activity as tolerated   Order Specific Question Answer Comments   Is  discharge order? Yes      Oxygen Adult   Order Comments: Renew Home Oxygen Order  Renew previous prescription.  Expected treatment length is indefinite (99 months).    [facility]  [address]   [address]  Phone # [phone]    Physician signature: See electronic signature associated with these discharge orders  Date of Order: February 17, 2017     Ventilator   Order Comments: Resume prior to admission vent settings.     Diet   Order Comments: Follow this diet upon discharge: resume prior to admission diet and tube feeding. No changes.   Order Specific Question Answer Comments   Is discharge order? Yes             Thank you for the opportunity to participate in the care of your patient.  Please do not hesitate to contact our service with questions or concerns.    Total time spent was greater than 30 minutes; Greater than 50% of the time was in counseling and care coordination. Care coordination included discussion of medication changes, lifestyle changes and reviewing instructions to the patient.     D/W RN, SW, CC.   D/w VAN Bui MD   Hospitalist (Internal Medicine)  Pager: [phone].     DOS  2/17/2017

## 2017-02-17 NOTE — OP NOTE
Upper GI Endoscopy 02/17/2017  9:21 AM [address] [place] [address] [phone]     Endoscopy Department   _______________________________________________________________________________   Patient Name: Mary Wang         Procedure Date: 2/17/2017 9:21 AM   MRN: [id number]                       Account Number: [id number]   YOB: 1950              Admit Type: Inpatient   Age: 67                               Room: OR   Gender: Female                        Note Status: Finalized   Attending MD: Brenden Plasencia MD   Pause for the Cause: pause for cause    completed   Total Sedation Time:                     _______________________________________________________________________________       Procedure:           Upper GI endoscopy   Indications:         Place PEG-J because patient is unable to eat   Providers:           Brenden Plasencia MD, Moy Pang RN   Patient Profile:     Ms Wang is a 68yo comorbid woman requiring a GJ tube                        for nutrititon. With evidence of malposition, an attempt                        at IR replacement was not techincally possible and a                        gastrostomy tube was placed. She now proceeds to upper                        endoscopy for replacement.   Referring MD:        Kevin Bernstein MD   Medicines:           General Anesthesia, Antibiotics as scheduled   Complications:       No immediate complications.   _______________________________________________________________________________   Procedure:           Pre-Anesthesia Assessment:                        - Prior to the procedure, a History and Physical was                        performed, and patient medications and allergies were                        reviewed. The patient is unable to give consent                        secondary to the patient being legally incompetent to                        consent. The risks  and benefits of the procedure and the                        sedation options and risks were discussed with the                        patient's daughter. All questions were answered and                        informed consent was obtained. Patient identification                        and proposed procedure were verified by the physician in                        the pre-procedure area. Mental Status Examination: alert                        but confused. Airway Examination: Mallampati Class II                        (the uvula but not tonsillar pillars visualized).                        Respiratory Examination: clear to auscultation. CV                        Examination: normal. ASA Grade Assessment: III - A                        patient with severe systemic disease. After reviewing                        the risks and benefits, the patient was deemed in                        satisfactory condition to undergo the procedure. The                        anesthesia plan was to use general anesthesia.                        Immediately prior to administration of medications, the                        patient was re-assessed for adequacy to receive                        sedatives. The heart rate, respiratory rate, oxygen                        saturations, blood pressure, adequacy of pulmonary                        ventilation, and response to care were monitored                        throughout the procedure. The physical status of the                        patient was re-assessed after the procedure. After                        obtaining informed consent, the endoscope was passed                        under direct vision. Throughout the procedure, the                        patient's blood pressure, pulse, and oxygen saturations                        were monitored continuously. The Endoscope was                        introduced through the gastrostomy, and advanced to the                        second  "part of duodenum. The upper GI endoscopy was                        accomplished without difficulty. The patient tolerated                        the procedure well.                                                                                    Findings:        Fluoroscopy was used for adjunct imaging throughout.  film        demonstrated a gastrostomy tube with inflated bumper. Abdominal exam        demonstrated leakage around the existing 22g tube in part due to the        size of tract. The tract however appeared without infection and mature.        The existing tube was removed following balloon deflation. A pediatric        gastroscope was then passed across the tract and into the duodenal        sweep. An 0.035\" Glidewire was then curled in the fourth portion and the        endoscope removed. Over the wire a 22g 45cm one piece GJ was then        positioned with the tip in the fourth portion or the proximal jejunum        and there was no evidence of gastric curl. Contrast was injected        demonstrated loops of jejunum. The external bumper was pulled snug at        3.5cm and then sutured to the skin. The tube was capped, and the tube        site was cleaned and dressed.                                                                                     Impression:          - Uncomplicated exchange of existing gastrostomy tube                        for a one piece 22g GJ through the existing mature tract   Recommendation:      - The patient may return to the ICU when appropriate                        - GJ tube may be used immediately for medications,                        decompression and feeds as previous                        - No interval endoscopy planned                        - The findings and recommendations were discussed with                        the patient's family                                                                                       electronically signed by S. " Amateau

## 2017-02-17 NOTE — PLAN OF CARE
Problem: Individualization  Goal: Patient Preferences  List all observation goals to be met before discharge home   - Diagnostic tests and consults completed and resulted   - Vital signs normal or at patient baseline   - Tolerating oral intake to maintain hydration   - Adequate pain control on oral analgesia   - Tolerating oral antibiotics or has plans for home infusion setup   - Infection is improving   - Return to baseline functional status   - Dyspnea improved and oxygen saturations greater than 88% on room air or prior home oxygen levels   - Safe disposition plan has been identified   Outcome: No Change  Observation goals not met. Waiting for endoscopy to replace G-J. Gtube is leaking at site and not retaining meds. All g-tube meds being held tonight. Pt more anxious without them and tachycardic. Pain meds IV, and zyprexa instead of usual meds.

## 2017-02-17 NOTE — PROGRESS NOTES
CLINICAL NUTRITION SERVICES - progress toward nutrition POC. See 2/16 note for full assessment.    -consulted to start TF. Pt leaving today, no changes need to home regimen. Discussed w/ MD and will not order TF d/t pt discharge.     RD will continue to follow.    Rosa Montalvo RD, LD  Pager: [phone]

## 2017-02-20 NOTE — PROGRESS NOTES
Client is new enrollee to [facility] effective 2/1/17 with [employer] health plan. Client transferred from [facility]. CM reviewed Epic and noted client hospitalized 2/15-2/17 for re-insertion of her feeding tube. Client lives in a group home and is on a vent. CM contacted clients dtr, César who is her POA, to set up initial Health Risk Assessment visit.  César declined initial home visit stating that client is set up with 24hr nursing through [facility] in one of their group homes.  She moved to the group home from [facility] in Oct 2016.  César reports they have been followed by Palliative Care and client does not want to be re-hospitalized if possible.  She states they are not going to do hospice as the vent would need to be removed and client would then not qualify for the group home/24hr nursing care. Refusal of visit POC completed.  CMS instructed to enter refusal of visit into MMIS and mail refusal of visit letter to client.     Follow-Up Plan:  CM to f/u with client with a 6 month telephone assessment.  Contact information shared with client and family, encouraged client to call with any questions or concerns.    LUZ Bateman   [facility]   [phone]

## 2017-02-20 NOTE — PROGRESS NOTES
"Per CM, mailed client a \"Refusal of Home Visit\" letter.  MMIS entry.      Josefa Brito  Case Management Specialist  [facility]   [phone]       "

## 2017-02-20 NOTE — TELEPHONE ENCOUNTER
Pending Prescriptions:                       Disp   Refills    guaiFENesin (ORGANIDIN) 200 MG TABS [id number]*0            Sig: Take 1 tablet (200 mg) by mouth 4 times daily    sulfamethoxazole-trimethoprim (BACTRIM/SE*560 mL 0            Si mLs (160 mg) by Per J Tube route daily Dose           based on TMP component. 20 mLs = 160 mg

## 2017-02-24 PROBLEM — L89.150 DECUBITUS ULCER OF SACRAL REGION, UNSTAGEABLE (H): Status: ACTIVE | Noted: 2017-01-01

## 2017-02-24 NOTE — Clinical Note
Ivania has been seeing this patient, including the day before we saw her.  Please put her initials on the Ops board so I can ask Steff what she wants to do about follow-up.

## 2017-02-24 NOTE — PROGRESS NOTES
[facility]  February 24, 2017      Behavioral Health Clinician Progress Note    Patient Name: Mary Wang           Service Type: Individual      Service Location:  at the patient's home      Session Start Time: 12:05  Session End Time: 12:45      Session Length: 38 - 52      Attendees: Patient, PCP and group home staff    Visit Activities (Refresh list every visit): [facility]    Diagnostic Assessment Date: Completed by Dr Warren as inpatient on 11/8/16   Treatment Plan Review Date: To be completed next visit  See Flowsheets for today's PHQ-9 and BARRETT-7 results  Previous PHQ-9: No flowsheet data found.  Previous BARRETT-7: No flowsheet data found.    DANIELLA LEVEL:  No flowsheet data found.    DATA  Extended Session (60+ minutes): No  Interactive Complexity: Yes, visit entailed Interactive Complexity evidenced by:  -Use of play equipment, physical devices,  or  to overcome barriers to diagnostic or therapeutic interaction with a patient who is not fluent in the same language or who has not developed or lost expressive or receptive language skills to use or understand typical language. Pt has difficulty communicating due to vent status. Only able to mouth words, communication is complex.   Crisis: No    Treatment Objective(s) Addressed in This Session:  Target Behavior(s): disease management/lifestyle changes      Anxiety: will experience a reduction in anxiety, will develop more effective coping skills to manage anxiety symptoms, will develop healthy cognitive patterns and beliefs and will increase ability to function adaptively    Current Stressors / Issues:  Pt is present today for follow up with PCP. Her family, son and daughter-in-law, also come for portion of visit. Pt continues to have breathing issues and anxiety related to air hunger. She takes all of her PRNs to the maximum allowed amount. Her nurse states that staff report that she still does not seem  comfortable or well managed. PCP suggests using a longer-acting opioid medication and discusses with patient and family today. They are in agreement. It does seem clear that she feels anxiety and air hunger frequently throughout the day.   During the visit there was some confusion as to who is providing primary care for patient. Staff and family report that Ivania was in the home as recently as yesterday providing services. Will need to clarify with Ivania and daughter who is providing primary care as two providers cannot both be providing services.        Progress on Treatment Objective(s) / Homework:  New Objective established this session - CONTEMPLATION (Considering change and yet undecided); Intervened by assessing the negative and positive thinking (ambivalence) about behavior change    Motivational Interviewing    MI Intervention: Expressed Empathy/Understanding, Supported Autonomy, Collaboration, Evocation, Open-ended questions, Reflections: simple and complex, Change talk (evoked) and Reframe     Change Talk Expressed by the Patient: Desire to change Ability to change Reasons to change    Provider Response to Change Talk: E - Evoked more info from patient about behavior change, A - Affirmed patient's thoughts, decisions, or attempts at behavior change, R - Reflected patient's change talk and S - Summarized patient's change talk statements      Care Plan review completed: No    Medication Review:  No changes to current psychiatric medication(s)    Medication Compliance:  Yes    Changes in Health Issues:   Yes: Chronic disease management, Associated Psychological Distress    Chemical Use Review:   Substance Use: Chemical use reviewed, no active concerns identified      Tobacco Use: No current tobacco use.      Assessment: Current Emotional / Mental Status (status of significant symptoms):  Risk status (Self / Other harm or suicidal ideation)  Patient denies a history of suicidal ideation, suicide  attempts, self-injurious behavior, homicidal ideation, homicidal behavior and and other safety concerns  Patient denies current fears or concerns for personal safety.  Patient denies current or recent suicidal ideation or behaviors.  Patient denies current or recent homicidal ideation or behaviors.  Patient denies current or recent self injurious behavior or ideation.  Patient denies other safety concerns.  A safety and risk management plan has not been developed at this time, however patient was encouraged to call [place] [phone] should there be a change in any of these risk factors.    Appearance:   Appropriate  Lying in bed. Pt on ventilator   Eye Contact:   Good   Psychomotor Behavior: Retarded (Slowed)   Attitude:   Cooperative   Orientation:   All  Speech   Rate / Production: Slow    Volume:  Pt only able to mouth words  Mood:    Normal  Affect:    Appropriate   Thought Content:  Clear   Thought Form:  Coherent  Logical  Circumstantial  Insight:    Fair     Diagnoses:  1. Generalized anxiety disorder        Collateral Reports Completed:  Not Applicable    Plan: (Homework, other):  Patient was given information about behavioral services and encouraged to schedule a follow up appointment with the clinic [facility] as needed.  She was also given information about mental health symptoms and treatment options .  CD Recommendations: No indications of CD issues. Corinne Melling, LMFT, [facility]

## 2017-02-24 NOTE — MR AVS SNAPSHOT
After Visit Summary   2/24/2017    Mary Wang    MRN: [id number]           Patient Information     Date Of Birth          1950        Visit Information        Provider Department      2/24/2017 12:15 PM Norman Brito MD [facility]        Today's Diagnoses     Acute on chronic respiratory failure with hypoxia (H)    -  1    Centrilobular emphysema (H)        Air hunger        Generalized anxiety disorder        Constipation due to opioid therapy        Advanced directives, counseling/discussion          Care Instructions    1.  Today we added a longer acting opioid medication, MS Contin, in hopes that it will give us an undercurrent of relief from your episodically severe shortness of breath.  The MS Contin dose is 10 mg every 12 hours.  On top of that, we can continue using the hydromorphone 1 mg every 2 hours as needed, though I think you'll need less once MS Contin is started.    2.  We made no other medication changes today.    3.  Since the [facility] has been coming to see you at your home, we will probably stop coming routinely, so as not to duplicate services which could add confusion.  We'll let your group home know whether we have a future appointment to see you.        Follow-ups after your visit        Your next 10 appointments already scheduled     Mar 17, 2017  2:00 PM CDT   [facility] Visit with Norman Brito MD   [facility]    [address] [place] [address]  [address]  [facility] [id number] [phone]              Who to contact     If you have questions or need follow up information about today's clinic visit or your schedule please contact [facility] directly at [phone].  Normal or non-critical lab and imaging results will be communicated to you by MyChart, letter or phone within 4 business days after the clinic has received the results. If you do not hear from us within 7 days,  please contact the clinic through [employer] or phone. If you have a critical or abnormal lab result, we will notify you by phone as soon as possible.  Submit refill requests through [employer] or call your pharmacy and they will forward the refill request to us. Please allow 3 business days for your refill to be completed.          Additional Information About Your Visit        [employer] Information     [employer] gives you secure access to your electronic health record. If you see a primary care provider, you can also send messages to your care team and make appointments. If you have questions, please call your primary care clinic.  If you do not have a primary care provider, please call [phone] and they will assist you.        Care EveryWhere ID     This is your Care EveryWhere ID. This could be used by other organizations to access your [facility] medical records  [id number]        Your Vitals Were     Pulse Respirations Pulse Oximetry             88 16 96%          Blood Pressure from Last 3 Encounters:   02/24/17 132/76   02/17/17 139/75   02/08/17 104/64    Weight from Last 3 Encounters:   02/17/17 123 lb 7.3 oz (56 kg)   02/03/17 126 lb 11.2 oz (57.5 kg)   01/24/17 122 lb (55.3 kg)              Today, you had the following     No orders found for display         Today's Medication Changes          These changes are accurate as of: 2/24/17 11:59 PM.  If you have any questions, ask your nurse or doctor.               Start taking these medicines.        Dose/Directions    menthol-zinc oxide 0.44-20.625 % Oint ointment   Commonly known as:  CALMOSEPTINE   Started by:  Norman Brito MD        Apply topically 2 times daily as needed for skin protection   Quantity:  113 g   Refills:  3       morphine sulfate 10 MG 24 hr capsule   Commonly known as:  ROSIE   Used for:  Air hunger   Started by:  Norman Brito MD        Give contents of one capsule through the gastrostomy tube every 12 hours.   Quantity:  60  capsule   Refills:  0            Where to get your medicines      These medications were sent to [facility] [place], [place] - [address]  [address], [facility] [address]     Phone:  [phone]     menthol-zinc oxide 0.44-20.625 % Oint ointment         Some of these will need a paper prescription and others can be bought over the counter.  Ask your nurse if you have questions.     Bring a paper prescription for each of these medications     morphine sulfate 10 MG 24 hr capsule                Primary Care Provider Office Phone # Fax #    Norman Brito [phone]        [facility] [address]     [facility] [address]        Thank you!     Thank you for choosing [facility]  for your care. Our goal is always to provide you with excellent care. Hearing back from our patients is one way we can continue to improve our services. Please take a few minutes to complete the written survey that you may receive in the mail after your visit with us. Thank you!             Your Updated Medication List - Protect others around you: Learn how to safely use, store and throw away your medicines at www.disposemymeds.org.          This list is accurate as of: 2/24/17 11:59 PM.  Always use your most recent med list.                   Brand Name Dispense Instructions for use    acetaminophen 160 MG/5ML solution    TYLENOL    300 mL    20.3 mLs (650 mg) by Per Feeding Tube route every 4 hours as needed for mild pain       bisacodyl 10 MG Suppository    DULCOLAX    25 suppository    Place 1 suppository (10 mg) rectally daily as needed for constipation       budesonide 0.5 MG/2ML neb solution    PULMICORT    60 ampule    Take 2 mLs (0.5 mg) by nebulization 2 times daily       calcium carbonate 500 MG chewable tablet    TUMS    150 tablet    Take 1 tablet (500 mg) by mouth every 2 hours as needed for heartburn       clonazePAM 0.1 mg/mL    klonoPIN     1800 mL    Take 15 mLs (1.5 mg) by mouth 4 times daily       Cranberry 125 MG Tabs     90 tablet    1 tablet by Jejunal Tube route daily       famotidine 40 MG/5ML suspension    PEPCID    75 mL    Take 2.5 mLs (20 mg) by mouth 2 times daily as needed for heartburn       fexofenadine 180 MG tablet    ALLEGRA    30 tablet    Take 1 tablet (180 mg) by mouth daily       fiber modular packet     42 packet    1 packet by Per J Tube route 3 times daily       FLEET NATURALS CLEANSING ENEMA Enem     3 Bottle    Place 1 enema rectally daily as needed       guaiFENesin 200 MG Tabs tablet    ORGANIDIN    120 tablet    Take 1 tablet (200 mg) by mouth 4 times daily       HYDROmorphone 1 MG/ML Liqd liquid    DILAUDID    180 mL    Take 1 mL (1 mg) by mouth every 2 hours as needed for other (dyspnea)       ipratropium 0.02 % neb solution    ATROVENT    450 mL    Take 2.5 mLs (0.5 mg) by nebulization 4 times daily and every four hours at night PRN.       lactobacillus rhamnosus (GG) capsule     60 capsule    1 capsule by Per G Tube route daily       levalbuterol 1.25 MG/3ML neb solution    XOPENEX    540 mL    Take 3 mLs (1.25 mg) by nebulization 4 times daily and every four hours at night PRN.       lidocaine 15 mL, alum & mag hydroxide-simethicone 15 mL GI Cocktail     500 mL    Take 30 mLs by mouth 3 times daily as needed for moderate pain       lidocaine 5 % Patch    LIDODERM    30 patch    Place 1-2 patches onto the skin every 24 hours Apply to the lower back       loperamide 2 MG tablet    IMODIUM A-D    30 tablet    2-2 mg tablets per J-tube qid prn frequent and/or loose stools. Do not use more than 8 tabs per day.       LORazepam 2 MG/ML (HIGH CONC) solution    ATIVAN    30 mL    Take 0.25 mLs (0.5 mg) by mouth every 3 hours as needed for anxiety       melatonin 1 MG/ML Liqd liquid     300 mL    3 mLs (3 mg) by Per J Tube route At Bedtime       menthol-zinc oxide 0.44-20.625 % Oint ointment    CALMOSEPTINE    113 g     Apply topically 2 times daily as needed for skin protection       morphine sulfate 10 MG 24 hr capsule    ROSIE    60 capsule    Give contents of one capsule through the gastrostomy tube every 12 hours.       ondansetron 4 MG tablet    ZOFRAN    18 tablet    Take 1 tablet (4 mg) by mouth every 4 hours as needed for nausea       * order for DME     3 each    Equipment being ordered: 1) Avendaño catheter 16F, balloon 10 mL, silicone.  2) Urinary collection bag.  3) Elastic leg strap for Avendaño catheter.  Please fax to [employer].       * order for DME     1 Device    Equipment being ordered: Methylex foam dressings, alternating pressure pad for mattress and pump.       polyethylene glycol 0.4%- propylene glycol 0.3% 0.4-0.3 % Soln ophthalmic solution    SYSTANE ULTRA    1 Bottle    Place 1-2 drops into both eyes 4 times daily as needed for dry eyes 0900, 1300, 1700, 2100       polyethylene glycol Packet    MIRALAX/GLYCOLAX    100 packet    17 g by Per J Tube route 2 times daily Hold for loose stools       predniSONE 5 MG/5ML solution     500 mL    Take 20 mLs (20 mg) by mouth daily       * QUEtiapine 50 MG tablet    SEROQUEL    180 tablet    Take 1 tablet (50 mg) by mouth 2 times daily       * QUEtiapine 25 MG tablet    SEROquel    90 tablet    Administer one tab per J-tube as needed at night if scheduled HS dose ineffective for treatment of anxiety or sleeplessness.       senna-docusate 8.6-50 MG per tablet    SENOKOT-S;PERICOLACE    100 tablet    2 tablets by Per J Tube route 2 times daily       sertraline 20 MG/ML (HIGH CONC) solution    ZOLOFT    105 mL    7.5 mLs (150 mg) by Per Feeding Tube route daily       sodium chloride 0.65 % nasal spray    OCEAN    1 Bottle    Spray 1 spray into both nostrils daily as needed for congestion       * Notice:  This list has 4 medication(s) that are the same as other medications prescribed for you. Read the directions carefully, and ask your doctor or other care provider to  review them with you.

## 2017-02-24 NOTE — MR AVS SNAPSHOT
After Visit Summary   2/24/2017    Mary Wang    MRN: [id number]           Patient Information     Date Of Birth          1950        Visit Information        Provider Department      2/24/2017 12:15 PM Melling, Corinne E, LMFT [facility]        Today's Diagnoses     Generalized anxiety disorder    -  1       Follow-ups after your visit        Your next 10 appointments already scheduled     Mar 17, 2017  2:00 PM CDT   Return Visit with Norman Brito MD   [facility]    [address] [place] [address]  [address]  [facility] [id number] [phone]              Who to contact     If you have questions or need follow up information about today's clinic visit or your schedule please contact [facility] directly at [phone].  Normal or non-critical lab and imaging results will be communicated to you by MyChart, letter or phone within 4 business days after the clinic has received the results. If you do not hear from us within 7 days, please contact the clinic through MyChart or phone. If you have a critical or abnormal lab result, we will notify you by phone as soon as possible.  Submit refill requests through [employer] or call your pharmacy and they will forward the refill request to us. Please allow 3 business days for your refill to be completed.          Additional Information About Your Visit        MyChart Information     [employer] gives you secure access to your electronic health record. If you see a primary care provider, you can also send messages to your care team and make appointments. If you have questions, please call your primary care clinic.  If you do not have a primary care provider, please call [phone] and they will assist you.        Care EveryWhere ID     This is your Care EveryWhere ID. This could be used by other organizations to access your [place] medical records  [id number]          Blood Pressure from Last 3 Encounters:   02/24/17 132/76   02/17/17 139/75   02/08/17 104/64    Weight from Last 3 Encounters:   02/17/17 123 lb 7.3 oz (56 kg)   02/03/17 126 lb 11.2 oz (57.5 kg)   01/24/17 122 lb (55.3 kg)              We Performed the Following     C PSYCHOTHERAPY COMPLEX INTERACTIVE          Today's Medication Changes          These changes are accurate as of: 2/24/17  3:32 PM.  If you have any questions, ask your nurse or doctor.               Start taking these medicines.        Dose/Directions    menthol-zinc oxide 0.44-20.625 % Oint ointment   Commonly known as:  CALMOSEPTINE   Used for:  Decubitus ulcer of sacral region, unstageable (H)   Started by:  Norman Brito MD        Apply topically 2 times daily as needed for skin protection   Quantity:  113 g   Refills:  3       morphine sulfate 10 MG 24 hr capsule   Commonly known as:  ROSIE   Used for:  Air hunger   Started by:  Norman Brito MD        Give contents of one capsule through the gastrostomy tube every 12 hours.   Quantity:  60 capsule   Refills:  0            Where to get your medicines      These medications were sent to [facility] [address]  [address], [facility] [address]     Phone:  [phone]     menthol-zinc oxide 0.44-20.625 % Oint ointment         Some of these will need a paper prescription and others can be bought over the counter.  Ask your nurse if you have questions.     Bring a paper prescription for each of these medications     morphine sulfate 10 MG 24 hr capsule                Primary Care Provider Office Phone # Fax #    Norman Brito [phone]        [facility] [address] [facility] [address]     [facility] [address]        Thank you!     Thank you for choosing [facility]  for your care. Our goal is always to provide you with excellent care. Hearing back from our patients is one way we can continue to  improve our services. Please take a few minutes to complete the written survey that you may receive in the mail after your visit with us. Thank you!             Your Updated Medication List - Protect others around you: Learn how to safely use, store and throw away your medicines at www.disposemymeds.org.          This list is accurate as of: 2/24/17  3:32 PM.  Always use your most recent med list.                   Brand Name Dispense Instructions for use    acetaminophen 160 MG/5ML solution    TYLENOL    300 mL    20.3 mLs (650 mg) by Per Feeding Tube route every 4 hours as needed for mild pain       bisacodyl 10 MG Suppository    DULCOLAX    25 suppository    Place 1 suppository (10 mg) rectally daily as needed for constipation       budesonide 0.5 MG/2ML neb solution    PULMICORT    60 ampule    Take 2 mLs (0.5 mg) by nebulization 2 times daily       calcium carbonate 500 MG chewable tablet    TUMS    150 tablet    Take 1 tablet (500 mg) by mouth every 2 hours as needed for heartburn       clonazePAM 0.1 mg/mL    klonoPIN    1800 mL    Take 15 mLs (1.5 mg) by mouth 4 times daily       Cranberry 125 MG Tabs     90 tablet    1 tablet by Jejunal Tube route daily       famotidine 40 MG/5ML suspension    PEPCID    75 mL    Take 2.5 mLs (20 mg) by mouth 2 times daily as needed for heartburn       fexofenadine 180 MG tablet    ALLEGRA    30 tablet    Take 1 tablet (180 mg) by mouth daily       fiber modular packet     42 packet    1 packet by Per J Tube route 3 times daily       FLEET NATURALS CLEANSING ENEMA Enem     3 Bottle    Place 1 enema rectally daily as needed       gabapentin 250 MG/5ML solution    NEURONTIN    450 mL    6 mLs (300 mg) by Per J Tube route 3 times daily       guaiFENesin 200 MG Tabs tablet    ORGANIDIN    120 tablet    Take 1 tablet (200 mg) by mouth 4 times daily       HYDROmorphone 1 MG/ML Liqd liquid    DILAUDID    180 mL    Take 1 mL (1 mg) by mouth every 2 hours as needed for other  (dyspnea)       ipratropium 0.02 % neb solution    ATROVENT    450 mL    Take 2.5 mLs (0.5 mg) by nebulization 4 times daily and every four hours at night PRN.       lactobacillus rhamnosus (GG) capsule     60 capsule    1 capsule by Per G Tube route daily       levalbuterol 1.25 MG/3ML neb solution    XOPENEX    540 mL    Take 3 mLs (1.25 mg) by nebulization 4 times daily and every four hours at night PRN.       lidocaine 15 mL, alum & mag hydroxide-simethicone 15 mL GI Cocktail     500 mL    Take 30 mLs by mouth 3 times daily as needed for moderate pain       lidocaine 5 % Patch    LIDODERM    30 patch    Place 1-2 patches onto the skin every 24 hours Apply to the lower back       loperamide 2 MG tablet    IMODIUM A-D    30 tablet    2-2 mg tablets per J-tube qid prn frequent and/or loose stools. Do not use more than 8 tabs per day.       LORazepam 2 MG/ML (HIGH CONC) solution    ATIVAN    30 mL    Take 0.25 mLs (0.5 mg) by mouth every 3 hours as needed for anxiety       melatonin 1 MG/ML Liqd liquid     300 mL    3 mLs (3 mg) by Per J Tube route At Bedtime       menthol-zinc oxide 0.44-20.625 % Oint ointment    CALMOSEPTINE    113 g    Apply topically 2 times daily as needed for skin protection       morphine sulfate 10 MG 24 hr capsule    ROSIE    60 capsule    Give contents of one capsule through the gastrostomy tube every 12 hours.       ondansetron 4 MG tablet    ZOFRAN    18 tablet    Take 1 tablet (4 mg) by mouth every 4 hours as needed for nausea       * order for DME     3 each    Equipment being ordered: 1) Avendaño catheter 16F, balloon 10 mL, silicone.  2) Urinary collection bag.  3) Elastic leg strap for Avendaño catheter.  Please fax to [employer].       * order for DME     1 Device    Equipment being ordered: Methylex foam dressings, alternating pressure pad for mattress and pump.       polyethylene glycol 0.4%- propylene glycol 0.3% 0.4-0.3 % Soln ophthalmic solution    SYSTANE ULTRA    1 Bottle     Place 1-2 drops into both eyes 4 times daily as needed for dry eyes 0900, 1300, 1700, 2100       polyethylene glycol Packet    MIRALAX/GLYCOLAX    100 packet    17 g by Per J Tube route 2 times daily Hold for loose stools       predniSONE 5 MG/5ML solution     500 mL    Take 20 mLs (20 mg) by mouth daily       * QUEtiapine 50 MG tablet    SEROQUEL    180 tablet    Take 1 tablet (50 mg) by mouth 2 times daily       * QUEtiapine 25 MG tablet    SEROquel    90 tablet    Administer one tab per J-tube as needed at night if scheduled HS dose ineffective for treatment of anxiety or sleeplessness.       senna-docusate 8.6-50 MG per tablet    SENOKOT-S;PERICOLACE    100 tablet    2 tablets by Per J Tube route 2 times daily       sertraline 20 MG/ML (HIGH CONC) solution    ZOLOFT    105 mL    7.5 mLs (150 mg) by Per Feeding Tube route daily       sodium chloride 0.65 % nasal spray    OCEAN    1 Bottle    Spray 1 spray into both nostrils daily as needed for congestion       * Notice:  This list has 4 medication(s) that are the same as other medications prescribed for you. Read the directions carefully, and ask your doctor or other care provider to review them with you.

## 2017-02-24 NOTE — PROGRESS NOTES
SUBJECTIVE:                                                    Mary Wang is a 66 year old female with medical history which includes the followin.  Chronic obstructive pulmonary disease, end-stage - most recent PFT  showed FEV1 0.35 L (16% predicted) and DLCO/VA 18% predicted, indicative of emphysema.     2.  Chronic ventilatory failure secondary to #1 - on continuous ventilation via trach.     3.  Intractable dyspnea despite mechanical ventilation - seen in Palliative Care consultation by Dr. Radha Reyes 2017; see below.     4.  Achalasia, etiology not described in medical record - esophageal stent placed at some point in the past, removed endoscopically 2015.  Has GJ tube for enteral feeding.  There has been past difficulty placing GJ endoscopically.     She was seen in her group home today for the above, in follow-up of a recent hospitalization, and for the following health issues:               End-stage COPD with chronic ventilatory failure  Has had trach and required continuous mechanical ventilation since early .  Current vent:  CMV mode, Vt 400, rate 14, FiO2 3 L/min O2, and PEEP 5 cm.  Has been steroid-dependent for years, currently 20 mg QD.  Trach is Bivona 6.0 (70 mm).  Nebulized Rx: budesonide BID, levalbuterol Q4H, ipratropium Q4H. Both bronchodilators available PRN at night also.    Symptoms are currently: Continues to have episodes of severe dyspnea while on mechanical ventilation.    Current fatigue or dyspnea with ambulation: N/A; is at bedrest and sedentary due to severe dyspnea.    Shortness of breath: Always present when awake, and previously episodically severe even when at rest.    Increased or change in Cough/Sputum: No.  Has infrequent cough.  Requests suction frequently, sputum is presently at baseline scant, white.    Fever(s): No.    Any ER/UC or hospital admissions since your last visit? Yes - hospitalized due to G-tube dysfunction approx 17, no  "changes in respiratory Rx made.       History    Smoking status       Former Smoker -- 2.00 packs/day for 40 years       Types:  Cigarettes       Start date:  01/01/1964       Quit date:  04/18/1998    Smokeless tobacco       Never Used       No results found for this basename: FEV1, PRO3IZQ             Amount of exercise or physical activity: Almost entirely bedbound.  Can still be lifted to chair, but does so infrequently.    Problems taking medications regularly: No; all meds given G port of GJ tube.    Medication side effects: Daughter reports sleepiness to hydromorphone and benzos.    Diet: Isosource 1.5 at 85 mL/hour 12 hours per day, 2000 - 0800, with fiber supplement 1 packet TID.      Intractable dyspnea  Recommendations from Palliative Care consult, Dr. Radha Reyes, 1/18/17:  MS Contin 10 mg BID, hydromorphone 1 mg enterally Q2H PRN, clonazepam 1.5 mg QID (with the recommendation to push some of that scheduled dose toward HS to minimize daytime sedation, and lorazepam 0.5 mg Q3H PRN. During 1/2017 hospital stay, MS Contin was discontinued and has not been resumed, but remainder of above Rx continues.    Duration: Severe since patient was officially taken off of transplant list in 2016.    Description (location/character/radiation): Severe dyspnea, even at rest, episodically while on mechanical ventilation.  Severe enough at times that patient says she's \"scared\".  Has resulted in ED transport over the past few months.  Remains periodically severe despite hydromorphone Q2H routinely.    Intensity:  Often very severe as above.    History (similar episodes/previous evaluation): End-stage COPD on continuous mechanical ventilation, superimposed on chronic anxiety.    Therapies tried and outcome: Even without MS Contin, control of dyspnea since return home from the hospital has been adequate on hydromorphone 1 mg enterally Q2H PRN, clonazepam 1.5 mg QID, and lorazepam 0.5 mg Q3H PRN, without episodes of " severe dyspnea such as those which have previously triggered ED visits.          Anxiety    Multiple medication changes made last two hospital stays.  Current Rx includes sertraline 150 mg QD, clonazepam 1.5 mg QID, lorazepam 0.5 mg Q3H PRN (I checked the [facility] discharge summary; both benzos were included), and quetiapine 50 mg Q12H.    Status since last visit: Anxiety remains better controlled since return home from hospital.    Other associated symptoms: Severe dyspnea, see above.    Complicating factors:  Significant life event: No   Current substance abuse: None  Depression symptoms: No  No flowsheet data found.       GAD7        Problem list and histories reviewed & adjusted, as indicated.  Additional history: as documented    BP Readings from Last 3 Encounters:   02/24/17 132/76   02/17/17 139/75   02/08/17 104/64    Wt Readings from Last 3 Encounters:   02/17/17 123 lb 7.3 oz (56 kg)   02/03/17 126 lb 11.2 oz (57.5 kg)   01/24/17 122 lb (55.3 kg)                  Labs reviewed in EPIC  Problem list, Medication list, Allergies, and Medical/Social/Surgical histories reviewed in [facility] and updated as appropriate.    ROS:  Obtained from patient and group home nursing supervisor.  CONSTITUTIONAL: NEGATIVE  for chills, fever.  EYES: NEGATIVE for eye irritation or discharge.  ENT/MOUTH: NEGATIVE for nasal congestion, sinus pressure, or epistaxis.  RESP: See above.    CV: NEGATIVE for chest pain.  Has mild to minimal lower extremity edema, at baseline.  GI: NEGATIVE for abdominal pain, diarrhea, nausea or vomiting.  New GJ tube functioning well.  Prone to constipation, not severe.  : NEGATIVE for reported dysuria.  Has indwelling Avendaño chronically.    MUSCULOSKELETAL: NEGATIVE for focal arthralgia or back pain, no joint swelling.  INTEG: Staff report mild redness of presacrum but no open lesions.  NEURO: NEGATIVE for focal numbness or weakness, syncope, stroke or seizure.  PSYCHIATRIC: See above.    OBJECTIVE:                                                     /76  Pulse 88  Resp 16  SpO2 96% on vent and O2 2 L/min in-line.  There is no height or weight on file to calculate BMI.    GENERAL: Short in stature woman, lying in bed, in no acute distress.  EYES: Eyes grossly normal to inspection, extraocular movements intact  HENT: Nares patent bilaterally.  Nasal mucosa normal, no discharge.  Dentition partial.    NECK: Tracheostomy tube is Bivona 6.0, stoma is clean and mature, no discharge.  RESP: No accessory muscle use on full vent support.  Symmetrically very distant breath sounds bilaterally.  Lungs quiet but clear throughout on inspiration.  Expiration very quiet and very prolonged, but no wheeze heard.  CV: Regular rate and rhythm, distantly heard, non-tachycardic.  Normal S1 S2, no murmur heard, S2 is persistently split.  No lower extremity edema.  ABDOMEN: GJ tube stoma is clean, and tube is secure, one suture remains in place.  Mildly protuberant, but soft, non-tender, no guarding.  Liver and spleen not palpable, no masses palpable.  Bowel sounds positive.  MS: No bony deformities noted.  No red or inflamed joints.  SKIN: Warm and dry, no rashes.  NEURO: Awake, alert, mouths responses to questions and asks appropriate questions of her own.  Orientation cannot be formally tested as patient is non-conversant, but seems intact.  Cranial nerves II - XII grossly intact.  No gross motor or sensory deficits, though exam limited in this somnolent patient.    : Avendaño catheter noted.  PSYCH: Exam mostly deferred as patient is non-verbal.  More alert than last visit, and seemed overall calm.        ASSESSMENT/PLAN:                                                    (R09.89) Intractable dyspnea  Comment: Dyspnea remains episodically severe despite the use of hydromorphone 1 mg Q2H PRN, which is now being given Q2H routinely, as well as clonazepam 1.5 mg QID, and lorazepam 0.5 mg Q3H PRN (up to 6 doses per day).  Palliative Care  consult 1/18/17 recommended addition of MS Contin 10 mg Q12H routinely, in hopes that severe episodes of dyspnea might be reduced/prevented, though MS Contin was subsequently stopped, partly due to daughter's concern that patient is episodically somnolent.    Plan: Discussed management of dyspnea with patient, as well as with her son and daughter-in-law who came in midway through the visit.  I explained again that somnolence is an unavoidable consequence of opioid use, and that to relieve patient's episodically severe dyspnea, we're going to need to expect a certain degree of somnolence.  Patient nods understanding and agreement, and son verbalizes agreement, to expansion of opioid therapy with the use of MS Contin (Kedia capsules, which can be opened and delivered through G tube) Q12H.  Will continue hydromorphone PRN as ordered, as well as both benzodiazepines.  Monitor patient comfort over time.    (F41.1) Generalized anxiety disorder  Comment: Longstanding, predates respiratory failure and end-stage COPD.  Dyspnea is the primary trigger of anxiety, and has been a common cause of hospital transport.  Plan: Continue sertraline 150 mg QD, quetiapine 50 mg BID (patient also has a history of periods of delirium, which I guess is why this was started), and both scheduled and PRN benzodiazpines.  Will also need to control dyspnea, as it is the primary trigger for severe anxiety; see above for details.  Monitor response.    (J43.2) Centrilobular emphysema (H)    Comment: End-stage, both numerically and clinically.  Is on continuous mechanical ventilation, and tenuous on that.  Is steroid-dependent with Prednisone 20 mg QD (and on TMP/SMX prophylaxis), and on nebulized budesonide and bronchodilators.  Plan: Continue present Rx, which is maximal. I can't identify any additions likely to be helpful.     (J96.22) Acute and chronic respiratory failure with hypercapnia (H)  Comment: Vent settings and trach info described  above.  Patient has severe, and often intractable dyspnea despite full vent support; see below.  Plan: Continue current vent strategy.     (K59.03,  T40.2X5A) Constipation due to opioid therapy  Comment: An episodic problem, occurs despite Senna and polyethylene glycol.    Plan: Continue present Rx, which includes Rectal Cleansers ([employer] CLEANS[employer] ENEMA) ENEMA daiily as needed, bisacodyl (DULCOLAX) 10 MG Suppository daily as needed added last visit.     (Z71.89) Advanced directives, counseling/discussion  Comment: Patient has an established DNR order, and on 2/3/17 changed her POLST to reflect her desire not to be transported to the hospital.  She cannot be enrolled into Hospice while on mechanical ventilation.  Plan: Will avoid future transport to ED or hospital per patient's request on the 2/3/17 POLST, and prioritize management of dyspnea and anxiety as outlined above.    FUTURE APPOINTMENTS:       - Follow-up visit has been scheduled for Friday, 3/17/17 at 1400.  However, I was informed by nursing supervisor today that a practitioner from [facility] has been seeing the patient in her group home, and writing orders.  In fact, most recent [facility] visit was yesterday, 2/23/17.  I'll discuss follow-up plans with my Dyad Lead, but to avoid duplication of services and confusion regarding therapy, I will likely sign off and defer management to [facility].  Will make sure we inform group home staff regarding this decision.    Face to face time was 41 minutes, at least 50% of which was spent in counseling patient, family, and nursing supervisor regarding management of dyspnea, anxiety, and end-stage lung disease.      Norman Brito MD  [facility]

## 2017-02-24 NOTE — Clinical Note
Please see my latest note. We need to discuss this patient. When we were there the staff and family said that she is also being seen by [facility] and had even been seen the day before we were there. They are seeing other patients in the group home. We will need to clarify with family and [facility] what our role is with the patient.

## 2017-02-27 NOTE — TELEPHONE ENCOUNTER
Telephone call to group home, spoke with MATTIE Tubbs. Verbal order per MD message provided to RN. He states his understanding and will fax over order for physical signature by MD.     Lea Chopra RN  [facility]

## 2017-02-27 NOTE — PATIENT INSTRUCTIONS
1.  Today we added a longer acting opioid medication, MS Contin, in hopes that it will give us an undercurrent of relief from your episodically severe shortness of breath.  The MS Contin dose is 10 mg every 12 hours.  On top of that, we can continue using the hydromorphone 1 mg every 2 hours as needed, though I think you'll need less once MS Contin is started.    2.  We made no other medication changes today.    3.  Since the [facility] has been coming to see you at your home, we will probably stop coming routinely, so as not to duplicate services which could add confusion.  We'll let your group home know whether we have a future appointment to see you.

## 2017-02-27 NOTE — TELEPHONE ENCOUNTER
"Telephone call returned to , spoke with MATTIE Tubbs.     He reports the following:     -Patient having increased anxiety with agitation  -Stating, \"I feel like I can't breath\"   -Nurse has irrigated and suctioned patient, minimal secretions   -Patient taking full ventilator breaths  -Patient had one episode of desaturation to 74% this morning which nurse reports correlated to patient's high anxiety symptoms    O2 Sats currently: Low 90's  BP: 129/74  HR: 109    Ativan at 0830  Klonopin at 0830  Dilaudid at 0720 and 0930  Due for next dose of Morphine now        nurse reports continued anxiety and agitation of patient, following PRN medication administration this morning.       Routing to provider for review.    Lea Chopra RN  [facility]        "

## 2017-02-27 NOTE — TELEPHONE ENCOUNTER
Arley from pt's [facility] calling stating pt is c/o air hunger. Arley stated he believe the pt is suffering from COPD, the pt is agitated and miserable. Arley is requesting to have the pt's morphine dose increased.    Clair Dasilva MA

## 2017-02-27 NOTE — TELEPHONE ENCOUNTER
Noted.  Please ask RN to increase MS Contin to 10 mg per G-tube Q8H from present frequency of Q12H.    Thanks.

## 2017-02-28 NOTE — TELEPHONE ENCOUNTER
Information noted.  There is no need to put another stitch in the G-tube flange, and it OK to use the G-tube for feeding and medication even if some leakage around the stoma is noted.

## 2017-02-28 NOTE — TELEPHONE ENCOUNTER
Received call from Jeanette at patient's home.  She is questioning if patient's G tube should be stitched back in place.      Currently VSS, afebrile.  She has no drainage form the stoma area and contents are draining in her gastric bag.  Jeanette reports patient to be at baseline.    Instructed for her to contact the GI service that performed the procedure.  Verbalized understanding.    Routing to provider as TERRY.    Jessika Bhagat RN

## 2017-02-28 NOTE — TELEPHONE ENCOUNTER
"Call Type: Triage Call    Presenting Problem: The \"answering service center\" is calling and  wants to know if there is a different number for the home care nurse  lesley due to the one that was left, no one is answering. Triager  unable to locate any new number or the FNA nurse that called in the  triage.  Triage Note:  Guideline Title: No Guideline - Advice Per Reference (Adult)  Recommended Disposition: Call Local Agency within 24 Hours  Original Inclination: Wanted to speak with a nurse  Override Disposition:  Intended Action: Follow advice given  Physician Contacted: No  CALL LOCAL AGENCY WITHIN 24 HOURS ?  YES  SEE ED IMMEDIATELY ? NO  CALL POISON CENTER IMMEDIATELY ? NO  CALL LOCAL AGENCY IMMEDIATELY ? NO  SEE DENTIST WITHIN 4 HOURS ? NO  SEE DENTIST WITHIN 24 HOURS ? NO  ACTIVATE  ? NO  SEE PROVIDER WITHIN 4 HOURS ? NO  SEE PROVIDER WITHIN 24 HOURS ? NO  CALL PROVIDER WITHIN 24 HOURS ? NO  CALL PROVIDER IMMEDIATELY ? NO  Physician Instructions:  Care Advice:  "

## 2017-02-28 NOTE — TELEPHONE ENCOUNTER
Clinic Action Needed:No  Reason for Call: Olga, home care nurse calling tonight to report issues with j tube and stoma area.  It has been leaking this evening.  J tube flushed, is patent and has dressing.  They were advised earlier to turn off feeding.  Olga is wondering if night time meds be held.  Paged on call provider for [facility] to speak to home care nurse directly at [phone].  AMEE Song is on call, page sent via page  at 12:19 am.     Routed to: Not routed.    Vida Melchor, RN  [facility]

## 2017-03-06 NOTE — TELEPHONE ENCOUNTER
"Telephone call returned to Garret nurse at [facility]. She reports the following:     -two wounds of coccyx which first appeared around the end of February   -first wound is 1\" by 0.5\" (white and starting to tunnel)  -second wound is right above it (no measurements available), not as deep, reddened.   -skin surrounding wounds are normal in color, blanchable  -nurse reports patient does state it is painful  -currently using barrier cream with mepilex     nurse requesting visit from a [facility] and/or a [facility] wound care nurse. Will need a new referral as patient is not open to [facility] at this time.     Routing to Dr. Brito.        Lea Chopra RN        "

## 2017-03-06 NOTE — TELEPHONE ENCOUNTER
Garret had called regarding pt. Stated that she has a wound and they would like to see if they could have a wound nurse come out from [facility] said it is getting worse and they do not have any wound supplies.     FWD to RN

## 2017-03-07 NOTE — TELEPHONE ENCOUNTER
Home care order entered.  Prognosis for improvement/recovery from decubitus wounds is poor due to bed bound status, poor nutritional status, and maintenance Prednisone therapy, even if specialty wound care begun.

## 2017-03-07 NOTE — TELEPHONE ENCOUNTER
Telephone call to  NurseJeanette. Informed her of provider message and that home care nurse will be out within 48 hours.  will call back with any further questions or concerns.     Lea Chopra RN

## 2017-03-07 NOTE — TELEPHONE ENCOUNTER
Received call from MATTIE Newsome [facility] who is requesting a delay in SOC due to staffing issues.    Gave verbal ok to delay SOC per RN protocol.    Jessika Bhagat RN

## 2017-03-08 NOTE — TELEPHONE ENCOUNTER
Received call from MTATIE Sanchez [facility] nursing manager who was requesting further information.  Reviewed information regarding request from  request for homecare to assist with managing wound.  Laura stated previous episode they had with patient, the  nurses were almost territorial and it was under [facility] nurse's understanding that the wound was a stage 1.  Homecare had ordered a special mattress for the patient but at this time, still unsure this has arrived.    Laura agreed that they would be able to work with patient, even though patient has nurses working with her from the .  They will go out to make the assessment and have continued f/u weekly to make sure the wound improves.  They can also assist with f/u on the mattress.    Jessika Bhagat RN

## 2017-03-08 NOTE — TELEPHONE ENCOUNTER
Received call from MATTIE Mcclain [facility] with questions regarding homecare referral.  She states they recently discharged patient from  and they were only involved with PICC line.  Patient lives in  and [facility] nurse on duty had been performing wound care.  She did not see the wound during her episode with patient.    Gave her information that  nurse was requesting homecare referral so WOC could be involved.  Appears that wound may be worsening.  Recommended that Sarahi speak with her supervisor to see if going out for an assessment would be appropriate, especially if  is having difficulty maintaining proper wound care.    States she will follow through with speaking to her supervisor.    Jessika Bhagat RN

## 2017-03-09 NOTE — TELEPHONE ENCOUNTER
FW: Rx Auth Request    Work In Received: Yesterday       Lorena Araiza RN Schwartz, Lauren, RN       Caller: Unspecified (3 days ago, 10:43 AM)                     RX FOR Norman Brito MD  NO RN MED RF ADDRESS  THANKS           Message received for refill of the following medication:     Cranberry Fruit 405 MG CAPS  TAKE 405MG (1 CAPSULE) BY MOUTH EVERY DAY      Lea Chopra RN  [facility]

## 2017-03-10 NOTE — TELEPHONE ENCOUNTER
Sarahi from [facility] called regarding pt. Requesting orders for wound care 3w9.     Sarahi can be called with questions

## 2017-03-10 NOTE — TELEPHONE ENCOUNTER
Caregiver (Stephanie) states pt needs refills on her prednisone, Pulmicort nebs and Miralax as listed above. [facility].Miralax and prednisoneRx are in EHR as Historical so cannot be refilled. Will need provider to enter as prescriptions.    Rx for Pulmicort nebs sent to pharmacy 11/28/16 -  Rx states good for 1 year but pharmacy won't fill it because it did not have a specific number of refills listed. Please send as Rx w/ number of refills.  Lisa Zepeda RN/FNA    Routed to [facility].

## 2017-03-13 NOTE — TELEPHONE ENCOUNTER
TC to  and spoke with Chelsie his  supervisor, explained that we can not duplicate services and the patient will need to decide between [facility] or the [facility] team.  Chelsie informs me that Mary would like to continue with the [facility] team and she will inform [facility].  Next visit by our team is 3/17 by Norman Robertson [facility]  MEDICAL LEAD

## 2017-03-15 NOTE — TELEPHONE ENCOUNTER
Radha called from [facility] requesting orders. Requesting wound care 3w8, cleaning with wound cleanser, iodoflex applied to base of wound and cover with foam dressing.     Radha can be reached with questions.

## 2017-03-15 NOTE — TELEPHONE ENCOUNTER
"Call placed to [facility] [employer] to check on status of the mattress pad and pump. [employer] rep stated she cannot find the status of the order. Rep transferred writer to the rep that \"was assigned to the order for further info.\" VM left.  "

## 2017-03-15 NOTE — TELEPHONE ENCOUNTER
Spoke with MATTIE Garcia [facility] and gave verbal ok on order requested per RN protocol.    Radha was also questioning the progress of getting the specialty mattress.  Will route to MA pool to f/u.  Radha requests for MA to contact her regarding mattress once information on progress is noted.    Routing to [facility] to complete.    Jessika Bhagat RN

## 2017-03-16 NOTE — TELEPHONE ENCOUNTER
Clinic Action Needed:Yes, follow up to make sure these medications have been ordered:    Reason for Call: MINESH Brown [facility] called to check on status of refills request for Gabapentin, Generic Miralax and the Pulmicort nebs.  Advised caller that the Gabapentin had been sent on March 10th to pharmacy and they should have received that, the Miralax and Pulmicort nebs have been reordered, but not signed.  Stephanie reports speaking to the pharmacy and they are saying they haven't received a refill on the Gabapentin.  [facility] and spoke to pharmacy tech.  He was able to locate Gabapentin Rx and sent it through and it will be filled an delivered.  Paged on call provider for [facility] to speak to Stephanie at [phone] ([facility]) regarding the Miralax and Pulmicort nebs to obtain a signed order tonight if possible.  Veronica Huerta NP is on call, page sent via [employer]  at 4:55 pm.     Routed to: [facility]    Vida Melchor, RN  [facility]

## 2017-03-17 NOTE — PROGRESS NOTES
SUBJECTIVE:                                                    Mary Wang is a 66 year old female with medical history which includes the followin.  Chronic obstructive pulmonary disease, end-stage - most recent PFT  showed FEV1 0.35 L (16% predicted) and DLCO/VA 18% predicted, indicative of emphysema.      2.  Chronic ventilatory failure secondary to #1 - on continuous ventilation via trach.      3.  Intractable dyspnea despite mechanical ventilation - seen in Palliative Care consultation by Dr. Radha Reyes 2017; see below.      4.  Achalasia, etiology not described in medical record - esophageal stent placed at some point in the past, removed endoscopically 2015.  Has GJ tube for enteral feeding.  There has been past difficulty placing GJ endoscopically.      She was seen in her group home today for the above, in follow-up of a recent hospitalization, and for the following health issues:               End-stage COPD with chronic ventilatory failure  Has had trach and required continuous mechanical ventilation since early .  Current vent:  CMV mode, Vt 400, rate 14, FiO2 3 L/min O2, and PEEP 5 cm.  Has been steroid-dependent for years, currently 20 mg QD.  Trach is Bivona 6.0 (70 mm).  Nebulized Rx: budesonide BID, levalbuterol Q4H, ipratropium Q4H. Both bronchodilators available PRN at night also.    Symptoms are currently: Continues to have episodes of severe dyspnea while on mechanical ventilation, including this morning.    Current fatigue or dyspnea with ambulation: N/A; is at bedrest and sedentary due to severe dyspnea.    Shortness of breath: Always present when awake, and previously episodically severe even when at rest.    Increased or change in Cough/Sputum: No.  Has infrequent cough.  Requests suction frequently, sputum is presently at baseline scant, white.    Fever(s): No.    Any ER/UC or hospital admissions since your last visit? No.          History    Smoking  "status       Former Smoker -- 2.00 packs/day for 40 years       Types:  Cigarettes       Start date:  01/01/1964       Quit date:  04/18/1998    Smokeless tobacco       Never Used        No results found for this basename: FEV1, HTC3NDG              Amount of exercise or physical activity: Almost entirely bedbound.  Can still be lifted to chair, but does so infrequently.    Problems taking medications regularly: No; all meds given G port of GJ tube.    Medication side effects: Daughter reports sleepiness to hydromorphone and benzos.    Diet: Isosource 1.5 at 85 mL/hour 12 hours per day, 2000 - 0800, with fiber supplement 1 packet TID.      Intractable dyspnea  Current palliative Rx:  MS Contin 10 mg Q8H, hydromorphone 1 mg Q2H PRN which she receives fairly routinely, scheduled clonazepam 1.5 mg QID, and lorazepam 0.5 mg Q3H PRN.    Duration: Severe since patient was officially taken off of transplant list in 2016.    Description (location/character/radiation): Severe dyspnea, even at rest, episodically while on mechanical ventilation.  Severe enough at times that patient says she's \"scared\".  Has resulted in ED transport over the past few months. However, per RN and daughter, symptom control is significantly better since the addition of MS Contin 2/2017.    Intensity:  Often very severe as above.    History (similar episodes/previous evaluation): End-stage COPD on continuous mechanical ventilation, superimposed on chronic anxiety.    Therapies tried and outcome: See summary above of palliative Rx, which has improved baseline comfort, and reducerd episodically severe dyspnea.           Anxiety    Current Rx includes sertraline 150 mg QD, clonazepam 1.5 mg QID, lorazepam 0.5 mg Q3H PRN, and quetiapine 50 mg Q12H.    Status since last visit: Anxiety remains better controlled, though patient still prone to episodes of breakthrough panic.    Other associated symptoms: Severe dyspnea, see above.    Complicating " factors:  Significant life event: No   Current substance abuse: None  Depression symptoms: No  No flowsheet data found.        GAD7        Problem list and histories reviewed & adjusted, as indicated.  Additional history: as documented    BP Readings from Last 3 Encounters:   03/17/17 98/58   02/24/17 132/76   02/17/17 139/75    Wt Readings from Last 3 Encounters:   02/17/17 123 lb 7.3 oz (56 kg)   02/03/17 126 lb 11.2 oz (57.5 kg)   01/24/17 122 lb (55.3 kg)                  Labs reviewed in EPIC    Reviewed and updated as needed this visit by clinical staff       Reviewed and updated as needed this visit by Provider       ROS:  Obtained from patient's daughter and group home nurse.  CONSTITUTIONAL: NEGATIVE  for chills, fever.  RN rpeorts Tmax 99.1 yesterday that concerned her, but nothing higher than that, and T 98.3 this morning.  EYES: NEGATIVE for eye irritation or discharge.  ENT/MOUTH: NEGATIVE for nasal congestion, sinus pressure, or epistaxis.  RESP: See above.    CV: NEGATIVE for chest pain.  Has mild to minimal lower extremity edema, at baseline.  GI: NEGATIVE for abdominal pain, diarrhea, nausea or vomiting.  GJ tube functioning well.  Prone to constipation, not severe.  : NEGATIVE for reported dysuria.  Has indwelling Avendaño chronically.    MUSCULOSKELETAL: NEGATIVE for focal arthralgia or back pain, no joint swelling.  INTEG: [facility] is treating area of skin breakdown, but dimensions and staging not currently available.  NEURO: NEGATIVE for syncope, stroke or seizure.  Periods of somnolence are increasing in frequency and duration.  PSYCHIATRIC: See above.    OBJECTIVE:                                                    BP 98/58  Pulse 81  Resp 16  SpO2 98% on vent with in-line O2 at 3 L/min.  There is no height or weight on file to calculate BMI.    GENERAL: Short in stature woman, lying in bed, somnolent, aroused minimally during my exam.  EYES: Eyes grossly normal to inspection, extraocular  movements intact  HENT: Nares patent bilaterally.  Nasal mucosa normal, no discharge.     NECK: Tracheostomy tube is Bivona 6.0, stoma is clean and mature, no discharge.  RESP: No accessory muscle use on full vent support.  Symmetrically very distant breath sounds bilaterally.  Lungs quiet but clear throughout on inspiration.  Expiration very quiet and very prolonged, but no wheeze heard.  CV: Regular rate and rhythm, distantly heard, non-tachycardic.  Normal S1 S2, no murmur heard, S2 is persistently split.  No lower extremity edema.  ABDOMEN: GJ tube stoma is clean, and tube is secure, one suture remains in place.  Mildly protuberant, but soft, non-tender, no guarding.  Liver and spleen not palpable, no masses palpable.  Bowel sounds positive.  MS: No bony deformities noted.  No red or inflamed joints.  SKIN: Warm and dry, no rashes.  Multiple ecchymoses consistent with steroid dermopathy.  NEURO: Somnolent throughout visit, daughter was able to get her to arouse and to nod to a couple of questions.  Orientation cannot be formally tested as patient is non-conversant.  Cranial nerves II - XII grossly intact.  No gross motor or sensory deficits, though exam limited in this somnolent patient.    : Avendaño catheter noted.  PSYCH: Exam mostly deferred as patient is non-verbal and somnolent.        ASSESSMENT/PLAN:                                                    (R09.89) Intractable dyspnea  Comment: Episodic dyspnea is improved, but persists on occasion, on current Rx of MS Contin 10 mg Q8H, hydromorphone 1 mg Q2H PRN, which is now being given Q2H nearly routinely, as well as clonazepam 1.5 mg QID, and lorazepam 0.5 mg Q3H PRN (up to 6 doses per day).     Plan: Will continue current Rx; daughter is satisfied with current symptom control.     (F41.1) Generalized anxiety disorder  Comment: Longstanding, predates respiratory failure and end-stage COPD.  Dyspnea is the primary trigger of anxiety, and has been a common  cause of hospital transport.  Plan: Continue sertraline 150 mg QD, quetiapine 50 mg BID (patient also has a history of periods of delirium, which I guess is why this was started), and both scheduled and PRN benzodiazpines.  Will also need to control dyspnea, as it is the primary trigger for severe anxiety; see above for details.  Monitor response.     (J43.2) Centrilobular emphysema (H)    Comment: End-stage, both numerically and clinically.  Is on continuous mechanical ventilation, and tenuous on that.  Is steroid-dependent with Prednisone 20 mg QD (and on TMP/SMX prophylaxis), and on nebulized budesonide and bronchodilators.  Plan: Continue present Rx, which is maximal. I can't identify any additions likely to be helpful.      (J96.22) Acute and chronic respiratory failure with hypercapnia (H)  Comment: Vent settings and trach info described above.  Patient has severe, and often intractable dyspnea despite full vent support; see below.  Plan: Continue current vent strategy.      (K59.03,  T40.2X5A) Constipation due to opioid therapy  Comment: An episodic problem, occurs despite Senna and polyethylene glycol.    Plan: Continue present Rx, which includes Rectal Cleansers (FLEET [employer]UR[employer] CLEANSING ENEMA) ENEMA daiily as needed, bisacodyl (DULCOLAX) 10 MG Suppository daily as needed added last visit.      (Z71.89) Advanced directives, counseling/discussion  Comment: Patient has an established DNR order, and on 2/3/17 changed her POLST to reflect her desire not to be transported to the hospital. She cannot be enrolled into Hospice while on mechanical ventilation.  Plan: Will avoid future transport to ED or hospital per patient's request on the 2/3/17 POLST, and prioritize management of dyspnea and anxiety as outlined above.    FUTURE APPOINTMENTS:       - Follow-up visit scheduled for 4/21/17 at 1400.      Face to face time was 35 minutes, at least 50% of which was spent in counseling patient's daughter and group home  RN regarding symptom management, and reviewing plan for care.      Norman Brito MD  [facility]

## 2017-03-17 NOTE — PATIENT INSTRUCTIONS
1.  It looks like our symptom control strategy is working OK.  Please let me know if any changes are necessary.    2.  After I left, we got a call from [facility], informing us that liquid Prednisone is unavailable nationwide.  I sent in a prescription for Prednisone 20 mg tablets, which will have to be crushed and given via G-tube once daily.    3.  Once the supply of Culturelle runs out, please start Kyodophilus capsules, 2 per G-tube once daily.    4.  I'll be back to see you on Friday, April 21st at 2 PM, or sooner if needed.

## 2017-03-17 NOTE — MR AVS SNAPSHOT
After Visit Summary   3/17/2017    Mary Wang    MRN: [id number]           Patient Information     Date Of Birth          1950        Visit Information        Provider Department      3/17/2017 2:00 PM Norman Brito MD [facility]        Today's Diagnoses     Acute on chronic respiratory failure with hypoxia (H)    -  1    Air hunger        Centrilobular emphysema (H)        Constipation due to opioid therapy        Generalized anxiety disorder        Advanced directives, counseling/discussion          Care Instructions    1.  It looks like our symptom control strategy is working OK.  Please let me know if any changes are necessary.    2.  After I left, we got a call from [facility], informing us that liquid Prednisone is unavailable nationwide.  I sent in a prescription for Prednisone 20 mg tablets, which will have to be crushed and given via G-tube once daily.    3.  Once the supply of Culturelle runs out, please start Kyodophilus capsules, 2 per G-tube once daily.    4.  I'll be back to see you on Friday, April 21st at 2 PM, or sooner if needed.        Follow-ups after your visit        Your next 10 appointments already scheduled     Apr 21, 2017  2:00 PM CDT   [facility]    [address]  [address]  [facility] [address] [phone]            May 19, 2017  2:00 PM CDT   [facility]    [address]  [address]  [facility] [address] [phone]            Jun 16, 2017  2:00 PM CDT   [facility]    [address]  [address]  [facility] [id number] [phone]              Who to contact     If you have questions or need follow up information about today's clinic visit or  your schedule please contact [facility] directly at [phone].  Normal or non-critical lab and imaging results will be communicated to you by MyChart, letter or phone within 4 business days after the clinic has received the results. If you do not hear from us within 7 days, please contact the clinic through [facility] or phone. If you have a critical or abnormal lab result, we will notify you by phone as soon as possible.  Submit refill requests through [facility] or call your pharmacy and they will forward the refill request to us. Please allow 3 business days for your refill to be completed.          Additional Information About Your Visit        [email or web address] Information     [facility] gives you secure access to your electronic health record. If you see a primary care provider, you can also send messages to your care team and make appointments. If you have questions, please call your primary care clinic.  If you do not have a primary care provider, please call [phone] and they will assist you.        Care EveryWhere ID     This is your Care EveryWhere ID. This could be used by other organizations to access your [facility] medical records  [id number]        Your Vitals Were     Pulse Respirations Pulse Oximetry             81 16 98%          Blood Pressure from Last 3 Encounters:   03/22/17 111/58   03/17/17 98/58   02/24/17 132/76    Weight from Last 3 Encounters:   03/22/17 139 lb 1.8 oz (63.1 kg)   02/17/17 123 lb 7.3 oz (56 kg)   02/03/17 126 lb 11.2 oz (57.5 kg)              Today, you had the following     No orders found for display         Today's Medication Changes          These changes are accurate as of: 3/17/17 11:59 PM.  If you have any questions, ask your nurse or doctor.               Start taking these medicines.        Dose/Directions    predniSONE 20 MG tablet   Commonly known as:  DELTASONE   Used for:  Centrilobular emphysema (H)   Started by:  Norman Brito MD        Dose:   20 mg   1 tablet (20 mg) by Per G Tube route daily   Quantity:  90 tablet   Refills:  3         These medicines have changed or have updated prescriptions.        Dose/Directions    Cranberry 125 MG Tabs   This may have changed:  Another medication with the same name was removed. Continue taking this medication, and follow the directions you see here.   Changed by:  Norman Brito MD        Dose:  1 tablet   1 tablet by Jejunal Tube route daily   Quantity:  90 tablet   Refills:  3            Where to get your medicines      These medications were sent to [facility] - [place], [place] - [address]  [address] [facility] [address], [facility] [address]     Phone:  [phone]     predniSONE 20 MG tablet         Some of these will need a paper prescription and others can be bought over the counter.  Ask your nurse if you have questions.     Bring a paper prescription for each of these medications     morphine sulfate 10 MG 24 hr capsule                Primary Care Provider Office Phone # Fax #    Norman Brito [phone]        [facility] [address] [place] [address]     [facility] [address]        Thank you!     Thank you for choosing [facility]  for your care. Our goal is always to provide you with excellent care. Hearing back from our patients is one way we can continue to improve our services. Please take a few minutes to complete the written survey that you may receive in the mail after your visit with us. Thank you!             Your Updated Medication List - Protect others around you: Learn how to safely use, store and throw away your medicines at www.disposemymeds.org.          This list is accurate as of: 3/17/17 11:59 PM.  Always use your most recent med list.                   Brand Name Dispense Instructions for use    acetaminophen 160 MG/5ML solution    TYLENOL    300 mL    20.3 mLs (650 mg) by Per Feeding Tube route every 4 hours as needed for  mild pain       bisacodyl 10 MG Suppository    DULCOLAX    25 suppository    Place 1 suppository (10 mg) rectally daily as needed for constipation       budesonide 0.5 MG/2ML neb solution    PULMICORT    60 ampule    Take 2 mLs (0.5 mg) by nebulization 2 times daily       calcium carbonate 500 MG chewable tablet    TUMS    150 tablet    Take 1 tablet (500 mg) by mouth every 2 hours as needed for heartburn       * clonazePAM 0.1 mg/mL    klonoPIN    1800 mL    Take 15 mLs (1.5 mg) by mouth 4 times daily       * clonazePAM 0.5 MG tablet    klonoPIN    360 tablet    TAKE 1.5MG (3 TABLETS) BY MOUTH FOUR TIMES A DAY FOR ANXIETY.       Cranberry 125 MG Tabs     90 tablet    1 tablet by Jejunal Tube route daily       famotidine 40 MG/5ML suspension    PEPCID    75 mL    Take 2.5 mLs (20 mg) by mouth 2 times daily as needed for heartburn       fexofenadine 180 MG tablet    ALLEGRA    30 tablet    Take 1 tablet (180 mg) by mouth daily       fiber modular packet     42 packet    1 packet by Per J Tube route 3 times daily       FLEET NATURALS CLEANSING ENEMA Enem     3 Bottle    Place 1 enema rectally daily as needed       gabapentin 250 MG/5ML solution    NEURONTIN    450 mL    TAKE 300MG (6ML) VIA J-TUBE 3 TIMES A DAY....       guaiFENesin 200 MG Tabs tablet    ORGANIDIN    120 tablet    Take 1 tablet (200 mg) by mouth 4 times daily       HYDROmorphone 1 MG/ML Liqd liquid    DILAUDID    180 mL    Take 1 mL (1 mg) by mouth every 2 hours as needed for other (dyspnea)       ipratropium 0.02 % neb solution    ATROVENT    450 mL    Take 2.5 mLs (0.5 mg) by nebulization 4 times daily and every four hours at night PRN.       lactobacillus rhamnosus (GG) capsule     60 capsule    1 capsule by Per G Tube route daily       levalbuterol 1.25 MG/3ML neb solution    XOPENEX    540 mL    Take 3 mLs (1.25 mg) by nebulization 4 times daily and every four hours at night PRN.       lidocaine 15 mL, alum & mag hydroxide-simethicone 15 mL GI  Cocktail     500 mL    Take 30 mLs by mouth 3 times daily as needed for moderate pain       lidocaine 5 % Patch    LIDODERM    30 patch    Place 1-2 patches onto the skin every 24 hours Apply to the lower back       loperamide 2 MG tablet    IMODIUM A-D    30 tablet    2-2 mg tablets per J-tube qid prn frequent and/or loose stools. Do not use more than 8 tabs per day.       LORazepam 2 MG/ML (HIGH CONC) solution    ATIVAN    30 mL    Take 0.25 mLs (0.5 mg) by mouth every 3 hours as needed for anxiety       melatonin 1 MG/ML Liqd liquid     300 mL    3 mLs (3 mg) by Per J Tube route At Bedtime       menthol-zinc oxide 0.44-20.625 % Oint ointment    CALMOSEPTINE    113 g    Apply topically 2 times daily as needed for skin protection       morphine sulfate 10 MG 24 hr capsule    ROSIE    90 capsule    Give contents of one capsule through the gastrostomy tube every 8 hours.       ondansetron 4 MG tablet    ZOFRAN    18 tablet    Take 1 tablet (4 mg) by mouth every 4 hours as needed for nausea       * order for DME     3 each    Equipment being ordered: 1) Avendaño catheter 16F, balloon 10 mL, silicone.  2) Urinary collection bag.  3) Elastic leg strap for Avendaño catheter.  Please fax to [employer].       * order for DME     1 Device    Equipment being ordered: Methylex foam dressings, alternating pressure pad for mattress and pump.       polyethylene glycol 0.4%- propylene glycol 0.3% 0.4-0.3 % Soln ophthalmic solution    SYSTANE ULTRA    1 Bottle    Place 1-2 drops into both eyes 4 times daily as needed for dry eyes 0900, 1300, 1700, 2100       polyethylene glycol Packet    MIRALAX/GLYCOLAX    100 packet    17 g by Per J Tube route 2 times daily Hold for loose stools       predniSONE 20 MG tablet    DELTASONE    90 tablet    1 tablet (20 mg) by Per G Tube route daily       * QUEtiapine 50 MG tablet    SEROQUEL    180 tablet    Take 1 tablet (50 mg) by mouth 2 times daily       * QUEtiapine 25 MG tablet    SEROquel    90  tablet    Administer one tab per J-tube as needed at night if scheduled HS dose ineffective for treatment of anxiety or sleeplessness.       senna-docusate 8.6-50 MG per tablet    SENOKOT-S;PERICOLACE    100 tablet    2 tablets by Per J Tube route 2 times daily       sertraline 20 MG/ML (HIGH CONC) solution    ZOLOFT    105 mL    7.5 mLs (150 mg) by Per Feeding Tube route daily       sodium chloride 0.65 % nasal spray    OCEAN    1 Bottle    Spray 1 spray into both nostrils daily as needed for congestion       * Notice:  This list has 6 medication(s) that are the same as other medications prescribed for you. Read the directions carefully, and ask your doctor or other care provider to review them with you.

## 2017-03-17 NOTE — TELEPHONE ENCOUNTER
Routing below note to Dr. Brito regarding backorder of Prednisone and the need for an alternative medication.

## 2017-03-17 NOTE — TELEPHONE ENCOUNTER
Camilo cqalled from [facility] regarding the pt refill they had received yesterday. He said the liquid prednisone is on a nation wide backorder and they are not sure who long it will be until the get supplies. They are needing a new order for something else.     Camilo can be contacted with questions.

## 2017-03-20 NOTE — LETTER
Transition Communication Hand-off for Care Transitions to Next Level of Care Provider    Name: Mary Wang  MRN #: [id number]  Primary Care Provider: Norman Brito     Primary Clinic:  [facility] [address] [place] [id number]     [facility] [id number]     Reason for Hospitalization:  Hypercarbia [R06.89]  Hyponatremia [E87.1]  Centrilobular emphysema (H) [J43.2]  Acute on chronic respiratory failure with hypoxia (H) [J96.21]  Admit Date/Time: 3/20/2017  7:24 PM  Discharge Date: 4/5/17  Payor Source: Payor: MEDICARE / Plan: MEDICARE / Product Type: Medicare /            Reason for Communication Hand-off Referral: Fragility  Multiple providers/specialties    [facility] with resumption of previous services       Concern for non-adherence with plan of care:   Y/N n  Discharge Needs Assessment:  Needs       Most Recent Value    Home Infusion Provider [facility] [phone], Fax: [phone]          Already enrolled in Tele-monitoring program and name of program:    Follow-up specialty is recommended: Yes    Follow-up plan:  Future Appointments  Date Time Provider Department Center   4/12/2017 2:00 PM Norman Brito MD [facility]   4/28/2017 12:15 PM Norman Brito MD [facility]   5/19/2017 2:00 PM Norman Brito MD [facility]   6/16/2017 2:00 PM Norman Brito MD [facility]       Any outstanding tests or procedures:    Procedures     Future Labs/Procedures    Oxygen Adult     Comments:    Renew Home Oxygen Order  Renew previous prescription.  Expected treatment length is indefinite (99 months).    [facility]     Attending Provider: Dr. Kevin Acevedo  Physician signature: See electronic signature associated with these discharge orders  Date of Order: March 23, 2017    Ventilator     Comments:    Resume home Vent settings    Ventilator     Comments:    Resume home ventilatory settings          Referrals     Future Labs/Procedures    Home  care nursing referral     Comments:    [facility]   [phone]  Fax [phone]    24 hour care with [facility]   Ph: [phone]  Fax: [phone]    RN skilled nursing visit. RN to assess vital signs and weight, respiratory and cardiac status, pain level and activity tolerance, hydration, nutrition and bowel status and home safety.  RN to teach medication management.  Wound cares    Your provider has ordered home care nursing services. If you have not been contacted within 2 days of your discharge please call the inpatient department phone number at [phone] .            [place] Recommendations:      Catrina Campbell    AVS/Discharge Summary is the source of truth; this is a helpful guide for improved communication of patient story

## 2017-03-20 NOTE — IP AVS SNAPSHOT
MRN:[id number]                      After Visit Summary   3/20/2017    Mary Wang    MRN: [id number]           Thank you!     Thank you for choosing [facility] for your care. Our goal is always to provide you with excellent care. Hearing back from our patients is one way we can continue to improve our services. Please take a few minutes to complete the written survey that you may receive in the mail after you visit with us. Thank you!        Patient Information     Date Of Birth          1950        Designated Caregiver       Most Recent Value    Caregiver    Will someone help with your care after discharge? yes    Name of designated caregiver César Troy    Phone number of caregiver [phone]    Caregiver address [address] [place] [address]      About your hospital stay     You were admitted on:  March 21, 2017 You last received care in the:  [address] [facility]    You were discharged on:  April 5, 2017        Reason for your hospital stay       Pneumonia                  Who to Call     For medical emergencies, please call 911.  For non-urgent questions about your medical care, please call your primary care provider or clinic, [phone]          Attending Provider     Provider Specialty    Soledad Olivas MD Emergency Medicine    Anayeli, Lucía Medeiros MD General Surgery    [facility], Kevin Austin MD Pediatrics    [facility], Elda Brooks MD Internal Medicine    [facility] Arley Kong MD Internal Medicine       Primary Care Provider Office Phone # Fax #    Norman Brito [phone]       [facility] [address]     [facility] [address]        After Care Instructions     Activity       Your activity upon discharge: activity as tolerated and ambulate in house            Diet       Follow this diet upon discharge: Orders Placed This Encounter      Adult Formula Drip Feeding: Continuous Isosource  1.5; Jejunostomy; Goal Rate: 50; mL/hr; Medication - Tube Feeding Flush Frequency: At least 15-30 mL water before and after medication administration and with tube clogging; Amout to Send (Nutrition...            Discharge Instructions       You have a prescription for an antibiotic, levofloxacin, to take for 7 days. You should also take prednisone 40 mg for 3 more days, then resume your home dose. Follow up with PCP in a week.            Oxygen Adult       Renew Home Oxygen Order  Renew previous prescription.  Expected treatment length is indefinite (99 months).    [facility]     Attending Provider: Dr. Kevin Acevedo  Physician signature: See electronic signature associated with these discharge orders  Date of Order: March 23, 2017            Ventilator       Resume home Vent settings            Ventilator       Resume home ventilatory settings                  Follow-up Appointments     Adult [facility] Follow-up and recommended labs and tests       Follow up with primary care provider, Norman Brito, within 7 days for hospital follow- up.  No follow up labs or test are needed.                  Your next 10 appointments already scheduled     Apr 12, 2017  2:00 PM CDT   Return Visit with Norman Brito MD   [facility] ([facility])    [address]  [address]  [facility] [address]   [phone]            Apr 28, 2017 12:15 PM CDT   [facility]   [facility]    [address]  [address]  [facility] [address]   [phone]            May 19, 2017  2:00 PM CDT   [facility] with [facility] MD   [facility]    [address]  [address]  [facility] [address]   [phone]            Jun 16, 2017  2:00 PM CDT   [facility] Visit with [facility] MD   [facility]     [address]  [address]  [facility] [address] [phone]              Additional Services     Home care nursing referral       [facility]  Ph [phone]  Fax [phone]    24 hour care with [facility]   Ph: [phone]  Fax: [phone]    RN skilled nursing visit. RN to assess vital signs and weight, respiratory and cardiac status, pain level and activity tolerance, hydration, nutrition and bowel status and home safety.  RN to teach medication management.  Wound cares    Your provider has ordered home care nursing services. If you have not been contacted within 2 days of your discharge please call the inpatient department phone number at [phone] .    OK to pull PICC once treatment complete            Home infusion referral       Your provider has referred you to: [facility]: [facility] [place] [phone]   [email or web address]    Local Address (if different from home address): N/A    Anticipated Length of Therapy: per md order    Home Infusion Pharmacist to adjust therapy based on labs and clinical assessments: Yes    Labs:  May draw labs from Venous Catheter: Yes  Home Infusion Pharmacist to order labs based on therapy type and clinical assessments: Yes  Call/Fax Lab Results to: pcp    Agency Staff to assess nursing needs for Infusion Therapy.    Access Device Management:  IV Access Type: PICC  Flush with Heparin and Normal Saline IVP PRN and routine site care (per agency protocol) to maintain access device? Yes                  Pending Results     Date and Time Order Name Status Description    4/2/2017 1755 Sputum Culture Aerobic Bacterial Preliminary             Statement of Approval     Ordered          04/05/17 1101  I have reviewed and agree with all the recommendations and orders detailed in this document.  EFFECTIVE NOW     Approved and electronically signed by:  Roxann Monterroso MD             Admission Information      "Date & Time Provider Department Dept. Phone    3/20/2017 Arley Crespo MD [address] [facility] [phone]      Your Vitals Were     Blood Pressure Pulse Temperature Respirations Height Weight    103/61 (BP Location: Left arm) 122 97.7  F (36.5  C) (Axillary) 20 1.626 m (5' 4\") 60.5 kg (133 lb 6.1 oz)    Pulse Oximetry BMI (Body Mass Index)                96% 22.89 kg/m2          "1,2,3 Listo" Information     "1,2,3 Listo" gives you secure access to your electronic health record. If you see a primary care provider, you can also send messages to your care team and make appointments. If you have questions, please call your primary care clinic.  If you do not have a primary care provider, please call [phone] and they will assist you.        Care EveryWhere ID     This is your Care EveryWhere ID. This could be used by other organizations to access your [facility] medical records  [id number]           Review of your medicines      START taking        Dose / Directions    ceFEPIme 2 G vial   Commonly known as:  MAXIPIME   Used for:  HCAP (healthcare-associated pneumonia)        Dose:  2 g   Inject 2 g into the vein every 8 hours for 7 days   Quantity:  420 mL   Refills:  0       multivitamins with minerals Liqd liquid   Used for:  Vitamin deficiency        Dose:  15 mL   15 mLs by Per Feeding Tube route daily   Quantity:  450 mL   Refills:  0       vitamin A-D & C drops 1500-400-35 drops   Used for:  Vitamin deficiency        Dose:  7 mL   Start taking on:  4/6/2017   7 mLs by Per J Tube route daily   Quantity:  50 mL   Refills:  0       zinc sulfate (20 mg [place]. Zn/mL) 88 mg/mL Soln solution   Used for:  Vitamin deficiency        Dose:  220 mg   Start taking on:  4/9/2017   2.5 mLs (220 mg) by Per J Tube route daily   Quantity:  25 mL   Refills:  0         CONTINUE these medicines which may have CHANGED, or have new prescriptions. If we are uncertain of the size of tablets/capsules you have at home, strength may be " listed as something that might have changed.        Dose / Directions    clonazePAM 0.1 mg/mL   Commonly known as:  klonoPIN   This may have changed:  Another medication with the same name was removed. Continue taking this medication, and follow the directions you see here.        Dose:  1.5 mg   Take 15 mLs (1.5 mg) by mouth 4 times daily   Quantity:  1800 mL   Refills:  1       * predniSONE 20 MG tablet   Commonly known as:  DELTASONE   This may have changed:  additional instructions   Used for:  Centrilobular emphysema (H)        Dose:  20 mg   1 tablet (20 mg) by Per G Tube route daily To be resumed after 3 day burst   Quantity:  90 tablet   Refills:  3       * predniSONE 20 MG tablet   Commonly known as:  DELTASONE   This may have changed:  You were already taking a medication with the same name, and this prescription was added. Make sure you understand how and when to take each.   Used for:  Chronic bronchitis, unspecified chronic bronchitis type (H)        Dose:  40 mg   2 tablets (40 mg) by Per G Tube route daily   Quantity:  6 tablet   Refills:  0       * Notice:  This list has 2 medication(s) that are the same as other medications prescribed for you. Read the directions carefully, and ask your doctor or other care provider to review them with you.      CONTINUE these medicines which have NOT CHANGED        Dose / Directions    acetaminophen 32 mg/mL solution   Commonly known as:  TYLENOL   Used for:  Other chronic pain        Dose:  650 mg   20.3 mLs (650 mg) by Per Feeding Tube route every 4 hours as needed for mild pain   Quantity:  300 mL   Refills:  0       bisacodyl 10 MG Suppository   Commonly known as:  DULCOLAX   Used for:  Constipation due to opioid therapy        Dose:  10 mg   Place 1 suppository (10 mg) rectally daily as needed for constipation   Quantity:  25 suppository   Refills:  1       budesonide 0.5 MG/2ML neb solution   Commonly known as:  PULMICORT   Used for:  Shortness of breath,  Respiratory difficulty        Dose:  0.5 mg   Take 2 mLs (0.5 mg) by nebulization 2 times daily   Quantity:  60 ampule   Refills:  0       calcium carbonate 500 MG chewable tablet   Commonly known as:  TUMS        Dose:  1 chew tab   Take 1 tablet (500 mg) by mouth every 2 hours as needed for heartburn   Quantity:  150 tablet   Refills:  0       Cranberry 125 MG Tabs        Dose:  1 tablet   1 tablet by Jejunal Tube route daily   Quantity:  90 tablet   Refills:  3       famotidine 40 MG/5ML suspension   Commonly known as:  PEPCID   Used for:  Mild gas use disorder        Dose:  20 mg   Take 2.5 mLs (20 mg) by mouth 2 times daily as needed for heartburn   Quantity:  75 mL   Refills:  3       fexofenadine 180 MG tablet   Commonly known as:  ALLEGRA   Used for:  COPD exacerbation (H)        Dose:  180 mg   Take 1 tablet (180 mg) by mouth daily   Quantity:  30 tablet   Refills:  0       fiber modular packet   Used for:  Diarrhea, unspecified type        Dose:  1 packet   1 packet by Per J Tube route 3 times daily   Quantity:  42 packet   Refills:  0       FLEET NATURALS CLEANSING ENEMA Enem   Used for:  Constipation due to opioid therapy        Dose:  1 enema   Place 1 enema rectally daily as needed   Quantity:  3 Bottle   Refills:  11       gabapentin 250 MG/5ML solution   Commonly known as:  NEURONTIN   Used for:  Anxiety        TAKE 300MG (6ML) VIA J-TUBE 3 TIMES A DAY....   Quantity:  450 mL   Refills:  3       guaiFENesin 200 MG Tabs tablet   Commonly known as:  ORGANIDIN   Used for:  Shortness of breath        Dose:  200 mg   Take 1 tablet (200 mg) by mouth 4 times daily   Quantity:  120 tablet   Refills:  3       HYDROmorphone 1 MG/ML Liqd liquid   Commonly known as:  DILAUDID   Used for:  Air hunger        Dose:  1 mg   Take 1 mL (1 mg) by mouth every 2 hours as needed for other (dyspnea)   Quantity:  180 mL   Refills:  0       ipratropium 0.02 % neb solution   Commonly known as:  ATROVENT        Dose:  0.5 mg    Take 2.5 mLs (0.5 mg) by nebulization 4 times daily and every four hours at night PRN.   Quantity:  450 mL   Refills:  0       lactobacillus rhamnosus (GG) capsule        Dose:  1 capsule   1 capsule by Per G Tube route daily   Quantity:  60 capsule   Refills:  0       levalbuterol 1.25 MG/3ML neb solution   Commonly known as:  XOPENEX        Dose:  1 ampule   Take 3 mLs (1.25 mg) by nebulization 4 times daily and every four hours at night PRN.   Quantity:  540 mL   Refills:  0       lidocaine 15 mL, alum & mag hydroxide-simethicone 15 mL GI Cocktail   Used for:  Gastroesophageal reflux disease without esophagitis        Dose:  30 mL   Take 30 mLs by mouth 3 times daily as needed for moderate pain   Quantity:  500 mL   Refills:  0       lidocaine 5 % Patch   Commonly known as:  LIDODERM   Used for:  Urinary tract infection, site not specified        Dose:  1-2 patch   Place 1-2 patches onto the skin every 24 hours Apply to the lower back   Quantity:  30 patch   Refills:  0       loperamide 2 MG tablet   Commonly known as:  IMODIUM A-D   Used for:  Frequent stools        2-2 mg tablets per J-tube qid prn frequent and/or loose stools. Do not use more than 8 tabs per day.   Quantity:  30 tablet   Refills:  0       LORazepam 2 MG/ML (HIGH CONC) solution   Commonly known as:  ATIVAN        Dose:  0.5 mg   Take 0.25 mLs (0.5 mg) by mouth every 3 hours as needed for anxiety   Quantity:  30 mL   Refills:  1       melatonin 1 MG/ML Liqd liquid   Used for:  Anxiety, Insomnia due to medical condition        Dose:  3 mg   3 mLs (3 mg) by Per J Tube route At Bedtime   Quantity:  300 mL   Refills:  0       menthol-zinc oxide 0.44-20.625 % Oint ointment   Commonly known as:  CALMOSEPTINE        Apply topically 2 times daily as needed for skin protection   Quantity:  113 g   Refills:  3       morphine sulfate 10 MG 24 hr capsule   Commonly known as:  ROSIE   Used for:  Air hunger        Give contents of one capsule through the  gastrostomy tube every 8 hours.   Quantity:  90 capsule   Refills:  0       ondansetron 4 MG tablet   Commonly known as:  ZOFRAN        Dose:  4 mg   Take 1 tablet (4 mg) by mouth every 4 hours as needed for nausea   Quantity:  18 tablet   Refills:  0       * order for DME        Equipment being ordered: 1) Avendaño catheter 16F, balloon 10 mL, silicone.  2) Urinary collection bag.  3) Elastic leg strap for Avendaño catheter.  Please fax to [employer].   Quantity:  3 each   Refills:  11       * order for DME   Used for:  Acute and chronic respiratory failure with hypercapnia (H)        Equipment being ordered: Methylex foam dressings, alternating pressure pad for mattress and pump.   Quantity:  1 Device   Refills:  0       polyethylene glycol 0.4%- propylene glycol 0.3% 0.4-0.3 % Soln ophthalmic solution   Commonly known as:  SYSTANE ULTRA   Used for:  Dry eyes, bilateral        Dose:  1-2 drop   Place 1-2 drops into both eyes 4 times daily as needed for dry eyes 0900, 1300, 1700, 2100   Quantity:  1 Bottle   Refills:  3       polyethylene glycol Packet   Commonly known as:  MIRALAX/GLYCOLAX   Used for:  Constipation, unspecified constipation type        Dose:  17 g   17 g by Per J Tube route 2 times daily Hold for loose stools   Quantity:  100 packet   Refills:  0       * QUEtiapine 50 MG tablet   Commonly known as:  SEROQUEL        Dose:  50 mg   Take 1 tablet (50 mg) by mouth 2 times daily   Quantity:  180 tablet   Refills:  3       * QUEtiapine 25 MG tablet   Commonly known as:  SEROquel   Used for:  Generalized anxiety disorder        Administer one tab per J-tube as needed at night if scheduled HS dose ineffective for treatment of anxiety or sleeplessness.   Quantity:  90 tablet   Refills:  3       senna-docusate 8.6-50 MG per tablet   Commonly known as:  SENOKOT-S;PERICOLACE   Used for:  Constipation, unspecified constipation type        Dose:  2 tablet   2 tablets by Per J Tube route 2 times daily   Quantity:   100 tablet   Refills:  0       sertraline 20 MG/ML (HIGH CONC) solution   Commonly known as:  ZOLOFT   Used for:  Anxiety        Dose:  150 mg   7.5 mLs (150 mg) by Per Feeding Tube route daily   Quantity:  105 mL   Refills:  0       sodium chloride 0.65 % nasal spray   Commonly known as:  OCEAN   Used for:  Chronic sinusitis, unspecified location        Dose:  1 spray   Spray 1 spray into both nostrils daily as needed for congestion   Quantity:  1 Bottle   Refills:  0       * Notice:  This list has 4 medication(s) that are the same as other medications prescribed for you. Read the directions carefully, and ask your doctor or other care provider to review them with you.      STOP taking     albuterol (2.5 MG/3ML) 0.083% neb solution                Where to get your medicines      These medications were sent to [facility] [address] [facility]  [address] [facility] [address], [facility] [address]     Phone:  [phone]     ceFEPIme 2 G vial    multivitamins with minerals Liqd liquid    predniSONE 20 MG tablet    predniSONE 20 MG tablet    vitamin A-D & C drops 1500-400-35 drops    zinc sulfate (20 mg [place]. Zn/mL) 88 mg/mL Soln solution                Protect others around you: Learn how to safely use, store and throw away your medicines at www.disposemymeds.org.             Medication List: This is a list of all your medications and when to take them. Check marks below indicate your daily home schedule. Keep this list as a reference.      Medications           Morning Afternoon Evening Bedtime As Needed    acetaminophen 32 mg/mL solution   Commonly known as:  TYLENOL   20.3 mLs (650 mg) by Per Feeding Tube route every 4 hours as needed for mild pain   Last time this was given:  325 mg on 4/5/2017 11:26 AM                                bisacodyl 10 MG Suppository   Commonly known as:  DULCOLAX   Place 1 suppository (10 mg) rectally daily as needed for constipation                                 budesonide 0.5 MG/2ML neb solution   Commonly known as:  PULMICORT   Take 2 mLs (0.5 mg) by nebulization 2 times daily   Last time this was given:  0.5 mg on 4/5/2017  8:32 AM                                calcium carbonate 500 MG chewable tablet   Commonly known as:  TUMS   Take 1 tablet (500 mg) by mouth every 2 hours as needed for heartburn                                ceFEPIme 2 G vial   Commonly known as:  MAXIPIME   Inject 2 g into the vein every 8 hours for 7 days                                clonazePAM 0.1 mg/mL   Commonly known as:  klonoPIN   Take 15 mLs (1.5 mg) by mouth 4 times daily   Last time this was given:  1.5 mg on 4/5/2017 11:26 AM                                Cranberry 125 MG Tabs   1 tablet by Jejunal Tube route daily                                famotidine 40 MG/5ML suspension   Commonly known as:  PEPCID   Take 2.5 mLs (20 mg) by mouth 2 times daily as needed for heartburn   Last time this was given:  20 mg on 3/30/2017  9:00 AM                                fexofenadine 180 MG tablet   Commonly known as:  ALLEGRA   Take 1 tablet (180 mg) by mouth daily   Last time this was given:  180 mg on 4/5/2017  8:04 AM                                fiber modular packet   1 packet by Per J Tube route 3 times daily   Last time this was given:  1 packet on 4/5/2017  8:08 AM                                FLEET NATURALS CLEANSING ENEMA Enem   Place 1 enema rectally daily as needed                                gabapentin 250 MG/5ML solution   Commonly known as:  NEURONTIN   TAKE 300MG (6ML) VIA J-TUBE 3 TIMES A DAY....   Last time this was given:  300 mg on 4/5/2017  8:05 AM                                guaiFENesin 200 MG Tabs tablet   Commonly known as:  ORGANIDIN   Take 1 tablet (200 mg) by mouth 4 times daily                                HYDROmorphone 1 MG/ML Liqd liquid   Commonly known as:  DILAUDID   Take 1 mL (1 mg) by mouth every 2 hours as needed for other (dyspnea)   Last time  this was given:  1 mg on 4/5/2017 12:47 PM                                ipratropium 0.02 % neb solution   Commonly known as:  ATROVENT   Take 2.5 mLs (0.5 mg) by nebulization 4 times daily and every four hours at night PRN.   Last time this was given:  0.5 mg on 4/5/2017 12:32 PM                                lactobacillus rhamnosus (GG) capsule   1 capsule by Per G Tube route daily   Last time this was given:  1 capsule on 4/5/2017  8:04 AM                                levalbuterol 1.25 MG/3ML neb solution   Commonly known as:  XOPENEX   Take 3 mLs (1.25 mg) by nebulization 4 times daily and every four hours at night PRN.   Last time this was given:  1.25 mg on 4/5/2017 12:32 PM                                lidocaine 15 mL, alum & mag hydroxide-simethicone 15 mL GI Cocktail   Take 30 mLs by mouth 3 times daily as needed for moderate pain                                lidocaine 5 % Patch   Commonly known as:  LIDODERM   Place 1-2 patches onto the skin every 24 hours Apply to the lower back   Last time this was given:  2 patches on 3/30/2017  9:33 AM                                loperamide 2 MG tablet   Commonly known as:  IMODIUM A-D   2-2 mg tablets per J-tube qid prn frequent and/or loose stools. Do not use more than 8 tabs per day.                                LORazepam 2 MG/ML (HIGH CONC) solution   Commonly known as:  ATIVAN   Take 0.25 mLs (0.5 mg) by mouth every 3 hours as needed for anxiety   Last time this was given:  0.5 mg on 4/5/2017  1:11 PM                                melatonin 1 MG/ML Liqd liquid   3 mLs (3 mg) by Per J Tube route At Bedtime   Last time this was given:  3 mg on 4/4/2017 10:12 PM                                menthol-zinc oxide 0.44-20.625 % Oint ointment   Commonly known as:  CALMOSEPTINE   Apply topically 2 times daily as needed for skin protection                                morphine sulfate 10 MG 24 hr capsule   Commonly known as:  ROSIE   Give contents of one  capsule through the gastrostomy tube every 8 hours.                                multivitamins with minerals Liqd liquid   15 mLs by Per Feeding Tube route daily   Last time this was given:  15 mLs on 4/5/2017  8:00 AM                                ondansetron 4 MG tablet   Commonly known as:  ZOFRAN   Take 1 tablet (4 mg) by mouth every 4 hours as needed for nausea                                * order for DME   Equipment being ordered: 1) Avendaño catheter 16F, balloon 10 mL, silicone.  2) Urinary collection bag.  3) Elastic leg strap for Avendaño catheter.  Please fax to [employer].                                * order for DME   Equipment being ordered: Methylex foam dressings, alternating pressure pad for mattress and pump.                                polyethylene glycol 0.4%- propylene glycol 0.3% 0.4-0.3 % Soln ophthalmic solution   Commonly known as:  SYSTANE ULTRA   Place 1-2 drops into both eyes 4 times daily as needed for dry eyes 0900, 1300, 1700, 2100   Last time this was given:  2 drops on 4/1/2017  4:00 PM                                polyethylene glycol Packet   Commonly known as:  MIRALAX/GLYCOLAX   17 g by Per J Tube route 2 times daily Hold for loose stools   Last time this was given:  17 g on 4/5/2017  8:06 AM                                * predniSONE 20 MG tablet   Commonly known as:  DELTASONE   1 tablet (20 mg) by Per G Tube route daily To be resumed after 3 day burst   Last time this was given:  40 mg on 4/5/2017  8:04 AM                                * predniSONE 20 MG tablet   Commonly known as:  DELTASONE   2 tablets (40 mg) by Per G Tube route daily   Last time this was given:  40 mg on 4/5/2017  8:04 AM                                * QUEtiapine 50 MG tablet   Commonly known as:  SEROQUEL   Take 1 tablet (50 mg) by mouth 2 times daily   Last time this was given:  25 mg on 4/5/2017  8:04 AM                                * QUEtiapine 25 MG tablet   Commonly known as:  SEROquel    Administer one tab per J-tube as needed at night if scheduled HS dose ineffective for treatment of anxiety or sleeplessness.   Last time this was given:  25 mg on 4/5/2017  8:04 AM                                senna-docusate 8.6-50 MG per tablet   Commonly known as:  SENOKOT-S;PERICOLACE   2 tablets by Per J Tube route 2 times daily   Last time this was given:  2 tablets on 4/3/2017  8:06 AM                                sertraline 20 MG/ML (HIGH CONC) solution   Commonly known as:  ZOLOFT   7.5 mLs (150 mg) by Per Feeding Tube route daily   Last time this was given:  150 mg on 4/5/2017  8:00 AM                                sodium chloride 0.65 % nasal spray   Commonly known as:  OCEAN   Spray 1 spray into both nostrils daily as needed for congestion                                vitamin A-D & C drops 1500-400-35 drops   7 mLs by Per J Tube route daily   Start taking on:  4/6/2017   Last time this was given:  13 mLs on 4/5/2017  9:29 AM                                zinc sulfate (20 mg [place]. Zn/mL) 88 mg/mL Soln solution   2.5 mLs (220 mg) by Per J Tube route daily   Start taking on:  4/9/2017   Last time this was given:  220 mg on 4/5/2017  7:59 AM                                * Notice:  This list has 6 medication(s) that are the same as other medications prescribed for you. Read the directions carefully, and ask your doctor or other care provider to review them with you.

## 2017-03-20 NOTE — IP AVS SNAPSHOT
"    [address] [facility]: [phone]                                              INTERAGENCY TRANSFER FORM - PHYSICIAN ORDERS   3/20/2017                    Hospital Admission Date: 3/20/2017  MORRIS PYEL   : 1950  Sex: Female        Attending Provider: Arley Crespo MD     Allergies:  Levaquin, Toro Podhaler [Tobramycin], Suprax [Cefixime], Augmentin, Avelox [Moxifloxacin], Cedax [Ceftibuten], Penicillins, Vantin [Cefpodoxime]    Infection:  VRE-Contact Isolation, MRSA-Contact Isolation   Service:  INTERNAL MED    Ht:  1.626 m (5' 4\")   Wt:  60.5 kg (133 lb 6.1 oz)   Admission Wt:  57.7 kg (127 lb 3.3 oz)    BMI:  22.89 kg/m 2   BSA:  1.65 m 2            Patient PCP Information     Provider PCP Type    Norman Brito MD General      ED Clinical Impression     Diagnosis Description Comment Added By Time Added    Hyponatremia [E87.1] Hyponatremia [E87.1]  Soledad Olivas MD 3/21/2017 12:45 AM    Hypercarbia [R06.89] Hypercarbia [R06.89]  Soledad Olivas MD 3/21/2017 12:45 AM    Acute on chronic respiratory failure with hypoxia (H) [J96.21] Acute on chronic respiratory failure with hypoxia (H) [J96.21]  Soledad Olivas MD 3/21/2017 12:46 AM    Centrilobular emphysema (H) [J43.2] Centrilobular emphysema (H) [J43.2]  Soledad Olivas MD 3/21/2017 12:46 AM      Hospital Problems as of 2017              Priority Class Noted POA    Urinary tract infection Medium  3/21/2017 Yes      Non-Hospital Problems as of 2017              Priority Class Noted    Urinary retention Medium  2015    Air hunger Medium  2015    Achalasia Medium  2015    Delirium Medium  2015    HTN (hypertension) Medium  2015    GERD (gastroesophageal reflux disease) Medium  2015    ACP (advance care planning)   2015    S/P percutaneous endoscopic gastrostomy (PEG) tube placement (H) Medium  2016    COPD (chronic obstructive pulmonary disease) (H) Medium "  4/13/2016    NSTEMI (non-ST elevated myocardial infarction) (H) Medium  5/21/2016    Atypical chest pain Medium  6/7/2016    Encounter for gastrojejunal (GJ) tube placement Medium  7/8/2016    Constipation due to opioid therapy Medium  1/26/2017    Generalized anxiety disorder Medium  1/26/2017    Advanced directives, counseling/discussion Medium  1/26/2017    Acute on chronic respiratory failure with hypoxia (H) Medium  1/28/2017    Health Care Home   2/10/2017    Malfunction of gastrostomy tube (H) Medium  2/15/2017    Anemia Medium  2/17/2017    Feeding tube dysfunction Medium  2/17/2017    Decubitus ulcer of sacral region, unstageable (H) Medium  2/24/2017      Code Status History     Date Active Date Inactive Code Status Order ID Comments User Context    4/5/2017 11:00 AM  DNR [id number]  Roxann Monterroso MD Outpatient    3/21/2017  4:18 PM 4/5/2017 11:00 AM DNR [id number]  Alexi Castro MD Inpatient    3/21/2017  5:49 AM 3/21/2017  4:18 PM Full Code [id number]  Kole Nolan MD Inpatient    3/21/2017  5:47 AM 3/21/2017  5:49 AM DNR [id number]  Kole Nolan MD Inpatient    2/15/2017  5:59 PM 2/17/2017  7:03 PM DNR [id number]  Viridiana Rockwell PA-C Inpatient    2/3/2017 11:12 AM 2/15/2017  5:59 PM DNR [id number]  Celia Chavis MD Outpatient    1/28/2017 10:10 PM 2/3/2017 11:12 AM DNR [id number]  Viridiana Rockwell PA-C Inpatient    1/23/2017  8:10 PM 1/24/2017  6:24 PM DNR [id number]  Jenni Tellez MD Inpatient    1/23/2017  3:34 PM 1/23/2017  8:10 PM DNR [id number]  Jenni Tellez MD Outpatient    1/21/2017 10:50 AM 1/23/2017  3:34 PM DNR [id number]  KarJenni Lam MD Inpatient    1/20/2017  6:58 AM 1/21/2017 10:50 AM DNR/DNI [id number]  Yadi Loja MD ED    1/7/2017  8:26 PM 1/10/2017  1:42 PM DNR [id number]  Jose David De La Cruz, APRN CNP Inpatient    1/6/2017 12:01 PM 1/7/2017  8:26 PM DNR [id number]  Geo palmer MD Outpatient     1/3/2017  5:24 PM 1/6/2017 12:01 PM DNR [id number]  Geo Bui MD Inpatient    1/2/2017 11:28 PM 1/3/2017  5:24 PM Full Code [id number]  DorothyJose David APRN CNP Inpatient    12/28/2016  8:59 AM 1/2/2017 11:28 PM Full Code [id number]  Theresa Wagoner MD Outpatient    12/26/2016  4:49 AM 12/28/2016  8:59 AM Full Code [id number]  Alexi Fernandez MD Inpatient    12/5/2016  2:45 PM 12/26/2016  4:49 AM Full Code [id number]  Francisco Burt MD Outpatient    12/1/2016  6:05 PM 12/5/2016  2:45 PM Full Code [id number]  Viridiana Rockwell PA-C Inpatient    11/27/2016  6:02 PM 12/1/2016  6:05 PM Full Code [id number]  Francisco Burt MD Outpatient    11/13/2016  9:57 PM 11/27/2016  6:02 PM Full Code [id number]  DorothyJose David APRRICHIE CNP Inpatient    10/31/2016  1:27 AM 11/11/2016  2:43 PM Full Code [id number]  Jose David De La Cruz APRN CNP Inpatient    10/1/2016 12:45 PM 10/4/2016  4:15 PM Full Code [id number]  Jamila Comer MD Inpatient    9/11/2016  2:46 AM 9/16/2016  3:48 PM Full Code [id number]  Theresa Wagoner MD Inpatient    7/23/2016  7:05 AM 9/11/2016  2:46 AM Full Code [id number]  Doc Barrera MD Outpatient    7/16/2016  2:33 AM 7/23/2016  7:05 AM Full Code [id number]  Moe Cárdenas MD Inpatient    7/10/2016 11:38 AM 7/16/2016  2:33 AM Full Code [id number]  Enrique Gama MD Outpatient    7/8/2016  8:04 PM 7/10/2016 11:38 AM Full Code [id number]  Shantal Dee PA Inpatient    6/6/2016  5:19 PM 6/14/2016  2:32 PM Full Code [id number]  Shantal Dee PA Inpatient    5/21/2016  3:41 PM 5/23/2016  3:59 PM Full Code [id number]  Viridiana Butcher MD ED    4/13/2016  6:13 PM 4/21/2016  1:08 PM Full Code [id number]  Arley Guzman MD Inpatient    2/1/2016  8:43 AM 4/13/2016  6:13 PM Full Code [id number]  Emily Hoover MD Outpatient    1/20/2016  8:10 PM 2/1/2016  8:43 AM Full Code [id number]  Emily Hoover MD  Inpatient    1/20/2016  7:17 PM 1/20/2016  8:10 PM Full Code [id number]  Emily Hoover MD ED    1/2/2016  8:32 AM 1/20/2016  7:17 PM Full Code [id number]  Elias Mcdermott MD Outpatient    1/1/2016  1:56 PM 1/2/2016  8:32 AM Full Code [id number]  Almaz Reagan MD Outpatient    12/30/2015  9:45 PM 1/1/2016  1:56 PM Full Code [id number]  Martinez Hamm MD Inpatient    9/24/2015 12:37 PM 12/30/2015  9:45 PM Full Code [id number]  Verner, James R, MD Outpatient    8/27/2015  4:30 AM 9/24/2015 12:37 PM Full Code [id number]  Eric Cosme MD Inpatient    8/13/2015  2:51 PM 8/27/2015  4:30 AM Full Code [id number]  Emilia Kurtz MD Inpatient    8/10/2015  7:50 AM 8/13/2015  2:51 PM DNR/DNI [id number]  Marcia Aleman MD Outpatient    8/7/2015 11:13 AM 8/10/2015  7:50 AM DNR/DNI [id number]  Marcia Aleman MD Inpatient    8/4/2015  2:55 PM 8/7/2015 11:13 AM DNR [id number]  Driss Huggins MD Inpatient    7/31/2015  6:35 PM 8/4/2015  2:55 PM DNR/DNI [id number]  Driss Huggins MD Inpatient    7/26/2015  8:21 PM 7/31/2015  6:35 PM Full Code [id number]  Larry Hong MD Inpatient    6/21/2015 11:33 AM 7/26/2015  8:21 PM Full Code [id number]  Mady Atkinson MD Outpatient    6/17/2015  6:34 AM 6/21/2015 11:33 AM Full Code [id number]  Swetha Bundy APRN CNP Inpatient    8/8/2014  7:29 AM 6/17/2015  6:34 AM Full Code [id number]  Ron Kirkland MD Outpatient    8/3/2014  6:08 PM 8/8/2014  7:29 AM Full Code [id number]  Ron Kirkland MD Inpatient    5/4/2014 12:08 PM 8/3/2014  6:08 PM Full Code [id number]  Darlene Del Real MD Outpatient    5/2/2014  9:16 AM 5/4/2014 12:08 PM Full Code [id number]  Darlene Del Real MD Inpatient         Medication Review      START taking        Dose / Directions Comments    ceFEPIme 2 G vial   Commonly known as:  MAXIPIME   Used for:  HCAP (healthcare-associated pneumonia)        Dose:  2 g   Inject 2 g into the vein every 8  hours for 7 days   Quantity:  420 mL   Refills:  0        multivitamins with minerals Liqd liquid   Used for:  Vitamin deficiency        Dose:  15 mL   15 mLs by Per Feeding Tube route daily   Quantity:  450 mL   Refills:  0        vitamin A-D & C drops 1500-400-35 drops   Used for:  Vitamin deficiency        Dose:  7 mL   Start taking on:  4/6/2017   7 mLs by Per J Tube route daily   Quantity:  50 mL   Refills:  0        zinc sulfate (20 mg [place]. Zn/mL) 88 mg/mL Soln solution   Used for:  Vitamin deficiency        Dose:  220 mg   Start taking on:  4/9/2017   2.5 mLs (220 mg) by Per J Tube route daily   Quantity:  25 mL   Refills:  0          CONTINUE these medications which may have CHANGED, or have new prescriptions. If we are uncertain of the size of tablets/capsules you have at home, strength may be listed as something that might have changed.        Dose / Directions Comments    clonazePAM 0.1 mg/mL   Commonly known as:  klonoPIN   This may have changed:  Another medication with the same name was removed. Continue taking this medication, and follow the directions you see here.        Dose:  1.5 mg   Take 15 mLs (1.5 mg) by mouth 4 times daily   Quantity:  1800 mL   Refills:  1        * predniSONE 20 MG tablet   Commonly known as:  DELTASONE   This may have changed:  additional instructions   Used for:  Centrilobular emphysema (H)        Dose:  20 mg   1 tablet (20 mg) by Per G Tube route daily To be resumed after 3 day burst   Quantity:  90 tablet   Refills:  3        * predniSONE 20 MG tablet   Commonly known as:  DELTASONE   This may have changed:  You were already taking a medication with the same name, and this prescription was added. Make sure you understand how and when to take each.   Used for:  Chronic bronchitis, unspecified chronic bronchitis type (H)        Dose:  40 mg   2 tablets (40 mg) by Per G Tube route daily   Quantity:  6 tablet   Refills:  0        * Notice:  This list has 2 medication(s)  that are the same as other medications prescribed for you. Read the directions carefully, and ask your doctor or other care provider to review them with you.      CONTINUE these medications which have NOT CHANGED        Dose / Directions Comments    acetaminophen 32 mg/mL solution   Commonly known as:  TYLENOL   Used for:  Other chronic pain        Dose:  650 mg   20.3 mLs (650 mg) by Per Feeding Tube route every 4 hours as needed for mild pain   Quantity:  300 mL   Refills:  0        bisacodyl 10 MG Suppository   Commonly known as:  DULCOLAX   Used for:  Constipation due to opioid therapy        Dose:  10 mg   Place 1 suppository (10 mg) rectally daily as needed for constipation   Quantity:  25 suppository   Refills:  1        budesonide 0.5 MG/2ML neb solution   Commonly known as:  PULMICORT   Used for:  Shortness of breath, Respiratory difficulty        Dose:  0.5 mg   Take 2 mLs (0.5 mg) by nebulization 2 times daily   Quantity:  60 ampule   Refills:  0        calcium carbonate 500 MG chewable tablet   Commonly known as:  TUMS        Dose:  1 chew tab   Take 1 tablet (500 mg) by mouth every 2 hours as needed for heartburn   Quantity:  150 tablet   Refills:  0        Cranberry 125 MG Tabs        Dose:  1 tablet   1 tablet by Jejunal Tube route daily   Quantity:  90 tablet   Refills:  3        famotidine 40 MG/5ML suspension   Commonly known as:  PEPCID   Used for:  Mild gas use disorder        Dose:  20 mg   Take 2.5 mLs (20 mg) by mouth 2 times daily as needed for heartburn   Quantity:  75 mL   Refills:  3        fexofenadine 180 MG tablet   Commonly known as:  ALLEGRA   Used for:  COPD exacerbation (H)        Dose:  180 mg   Take 1 tablet (180 mg) by mouth daily   Quantity:  30 tablet   Refills:  0        fiber modular packet   Used for:  Diarrhea, unspecified type        Dose:  1 packet   1 packet by Per J Tube route 3 times daily   Quantity:  42 packet   Refills:  0        FLEET NATURALS CLEANSING ENEMA Enem    Used for:  Constipation due to opioid therapy        Dose:  1 enema   Place 1 enema rectally daily as needed   Quantity:  3 Bottle   Refills:  11        gabapentin 250 MG/5ML solution   Commonly known as:  NEURONTIN   Used for:  Anxiety        TAKE 300MG (6ML) VIA J-TUBE 3 TIMES A DAY....   Quantity:  450 mL   Refills:  3        guaiFENesin 200 MG Tabs tablet   Commonly known as:  ORGANIDIN   Used for:  Shortness of breath        Dose:  200 mg   Take 1 tablet (200 mg) by mouth 4 times daily   Quantity:  120 tablet   Refills:  3        HYDROmorphone 1 MG/ML Liqd liquid   Commonly known as:  DILAUDID   Used for:  Air hunger        Dose:  1 mg   Take 1 mL (1 mg) by mouth every 2 hours as needed for other (dyspnea)   Quantity:  180 mL   Refills:  0        ipratropium 0.02 % neb solution   Commonly known as:  ATROVENT        Dose:  0.5 mg   Take 2.5 mLs (0.5 mg) by nebulization 4 times daily and every four hours at night PRN.   Quantity:  450 mL   Refills:  0        lactobacillus rhamnosus (GG) capsule        Dose:  1 capsule   1 capsule by Per G Tube route daily   Quantity:  60 capsule   Refills:  0        levalbuterol 1.25 MG/3ML neb solution   Commonly known as:  XOPENEX        Dose:  1 ampule   Take 3 mLs (1.25 mg) by nebulization 4 times daily and every four hours at night PRN.   Quantity:  540 mL   Refills:  0        lidocaine 15 mL, alum & mag hydroxide-simethicone 15 mL GI Cocktail   Used for:  Gastroesophageal reflux disease without esophagitis        Dose:  30 mL   Take 30 mLs by mouth 3 times daily as needed for moderate pain   Quantity:  500 mL   Refills:  0        lidocaine 5 % Patch   Commonly known as:  LIDODERM   Used for:  Urinary tract infection, site not specified        Dose:  1-2 patch   Place 1-2 patches onto the skin every 24 hours Apply to the lower back   Quantity:  30 patch   Refills:  0        loperamide 2 MG tablet   Commonly known as:  IMODIUM A-D   Used for:  Frequent stools        2-2 mg  tablets per J-tube qid prn frequent and/or loose stools. Do not use more than 8 tabs per day.   Quantity:  30 tablet   Refills:  0        LORazepam 2 MG/ML (HIGH CONC) solution   Commonly known as:  ATIVAN        Dose:  0.5 mg   Take 0.25 mLs (0.5 mg) by mouth every 3 hours as needed for anxiety   Quantity:  30 mL   Refills:  1        melatonin 1 MG/ML Liqd liquid   Used for:  Anxiety, Insomnia due to medical condition        Dose:  3 mg   3 mLs (3 mg) by Per J Tube route At Bedtime   Quantity:  300 mL   Refills:  0        menthol-zinc oxide 0.44-20.625 % Oint ointment   Commonly known as:  CALMOSEPTINE        Apply topically 2 times daily as needed for skin protection   Quantity:  113 g   Refills:  3        morphine sulfate 10 MG 24 hr capsule   Commonly known as:  ROSIE   Used for:  Air hunger        Give contents of one capsule through the gastrostomy tube every 8 hours.   Quantity:  90 capsule   Refills:  0        ondansetron 4 MG tablet   Commonly known as:  ZOFRAN        Dose:  4 mg   Take 1 tablet (4 mg) by mouth every 4 hours as needed for nausea   Quantity:  18 tablet   Refills:  0        * order for DME        Equipment being ordered: 1) Avendaño catheter 16F, balloon 10 mL, silicone.  2) Urinary collection bag.  3) Elastic leg strap for Avendaño catheter.  Please fax to [email or web address].   Quantity:  3 each   Refills:  11        * order for DME   Used for:  Acute and chronic respiratory failure with hypercapnia (H)        Equipment being ordered: Methylex foam dressings, alternating pressure pad for mattress and pump.   Quantity:  1 Device   Refills:  0        polyethylene glycol 0.4%- propylene glycol 0.3% 0.4-0.3 % Soln ophthalmic solution   Commonly known as:  SYSTANE ULTRA   Used for:  Dry eyes, bilateral        Dose:  1-2 drop   Place 1-2 drops into both eyes 4 times daily as needed for dry eyes 0900, 1300, 1700, 2100   Quantity:  1 Bottle   Refills:  3        polyethylene glycol Packet   Commonly known as:   MIRALAX/GLYCOLAX   Used for:  Constipation, unspecified constipation type        Dose:  17 g   17 g by Per J Tube route 2 times daily Hold for loose stools   Quantity:  100 packet   Refills:  0        * QUEtiapine 50 MG tablet   Commonly known as:  SEROQUEL        Dose:  50 mg   Take 1 tablet (50 mg) by mouth 2 times daily   Quantity:  180 tablet   Refills:  3        * QUEtiapine 25 MG tablet   Commonly known as:  SEROquel   Used for:  Generalized anxiety disorder        Administer one tab per J-tube as needed at night if scheduled HS dose ineffective for treatment of anxiety or sleeplessness.   Quantity:  90 tablet   Refills:  3        senna-docusate 8.6-50 MG per tablet   Commonly known as:  SENOKOT-S;PERICOLACE   Used for:  Constipation, unspecified constipation type        Dose:  2 tablet   2 tablets by Per J Tube route 2 times daily   Quantity:  100 tablet   Refills:  0        sertraline 20 MG/ML (HIGH CONC) solution   Commonly known as:  ZOLOFT   Used for:  Anxiety        Dose:  150 mg   7.5 mLs (150 mg) by Per Feeding Tube route daily   Quantity:  105 mL   Refills:  0        sodium chloride 0.65 % nasal spray   Commonly known as:  OCEAN   Used for:  Chronic sinusitis, unspecified location        Dose:  1 spray   Spray 1 spray into both nostrils daily as needed for congestion   Quantity:  1 Bottle   Refills:  0        * Notice:  This list has 4 medication(s) that are the same as other medications prescribed for you. Read the directions carefully, and ask your doctor or other care provider to review them with you.      STOP taking     albuterol (2.5 MG/3ML) 0.083% neb solution                   Summary of Visit     Reason for your hospital stay       Pneumonia             After Care     Activity       Your activity upon discharge: activity as tolerated and ambulate in house       Diet       Follow this diet upon discharge: Orders Placed This Encounter      Adult Formula Drip Feeding: Continuous Isosource 1.5;  Jejunostomy; Goal Rate: 50; mL/hr; Medication - Tube Feeding Flush Frequency: At least 15-30 mL water before and after medication administration and with tube clogging; Amout to Send (Nutrition...       Discharge Instructions       You have a prescription for an antibiotic, levofloxacin, to take for 7 days. You should also take prednisone 40 mg for 3 more days, then resume your home dose. Follow up with PCP in a week.             Procedures     Oxygen Adult       Renew Home Oxygen Order  Renew previous prescription.  Expected treatment length is indefinite (99 months).    [facility]     Attending Provider: Dr. Kevin Acevedo  Physician signature: See electronic signature associated with these discharge orders  Date of Order: March 23, 2017       Ventilator       Resume home Vent settings       Ventilator       Resume home ventilatory settings             Referrals     Home care nursing referral       [facility]  Ph [phone]  Fax [phone]    24 hour care with [facility]   Ph: [phone]  Fax: [phone]    RN skilled nursing visit. RN to assess vital signs and weight, respiratory and cardiac status, pain level and activity tolerance, hydration, nutrition and bowel status and home safety.  RN to teach medication management.  Wound cares    Your provider has ordered home care nursing services. If you have not been contacted within 2 days of your discharge please call the inpatient department phone number at [phone] .       Home infusion referral       Your provider has referred you to: [facility]: [facility] [phone]   [email or web address]    Local Address (if different from home address): N/A    Anticipated Length of Therapy: per md order    Home Infusion Pharmacist to adjust therapy based on labs and clinical assessments: Yes    Labs:  May draw labs from Venous Catheter: Yes  Home Infusion Pharmacist to order labs  based on therapy type and clinical assessments: Yes  Call/Fax Lab Results to: pcp    Agency Staff to assess nursing needs for Infusion Therapy.    Access Device Management:  IV Access Type: PICC  Flush with Heparin and Normal Saline IVP PRN and routine site care (per agency protocol) to maintain access device? Yes              MD face to face encounter       Documentation of Face to Face and Certification for Home Health Services     I certify that patient: Mary Wang is under my care and that I, or a nurse practitioner or physician's assistant working with me, had a face-to-face encounter that meets the physician face-to-face encounter requirements with this patient on: 3/27/2017.    This encounter with the patient was in whole, or in part, for the following medical condition, which is the primary reason for home health care: Pseudomonas (B965), COPD (J449), Respiratory vent dependent (), Tracheostomy (L930), and stage 3 pressure ulcer.    I certify that, based on my findings, the following services are medically necessary home health services: Group 2 mattress     My clinical findings support the need for the above services because: Group one overlay mattress failed. Medically needed for stage 3 pressure ulcer to Sacrococcygeal region.     Further, I certify that my clinical findings support that this patient is homebound (i.e. absences from home require considerable and taxing effort and are for medical reasons or [employer] services or infrequently or of short duration when for other reasons) because: Requires assistance of another person or specialized equipment to access medical services because patient: Requires supervision of another for safe transfer...    Based on the above findings. I certify that this patient is confined to the home and needs intermittent skilled nursing care, physical therapy and/or speech therapy.  The patient is under my care, and I have initiated the establishment of the  plan of care.  This patient will be followed by a physician who will periodically review the plan of care.  Physician/Provider to provide follow up care: Norman Brito    Attending hospital physician [facility]Dr. Kevin Acevedo  Physician Signature: See electronic signature associated with these discharge orders.  Date: 3/27/2017                  Your next 10 appointments already scheduled     Apr 12, 2017  2:00 PM CDT   [facility] Visit with Norman Brito MD   [facility]    [address]  [address]  [facility] [address]   [phone]            Apr 28, 2017 12:15 PM CDT   Return Visit with Norman Brito MD   [facility] ([facility]    [address]  [address]  [facility] [address]   [phone]            May 19, 2017  2:00 PM CDT   Return Visit with Norman Brito MD   [facility] ([facility])    [address]  [address]  [facility] [address]   [phone]            Jun 16, 2017  2:00 PM CDT   Return Visit with Norman Brito MD   [facility] ([facility])    [address]  [address]  [facility] [address]   [phone]              Follow-Up Appointment Instructions     Future Labs/Procedures    Adult [facility] Follow-up and recommended labs and tests     Comments:    Follow up with primary care provider, Norman Brito, within 7 days for hospital follow- up.  No follow up labs or test are needed.      Follow-Up Appointment Instructions     Adult [facility] Follow-up and recommended labs and tests       Follow up with primary care provider, Norman Brito, within 7 days for hospital follow- up.  No follow up labs or test are needed.             Statement of Approval     Ordered          04/05/17 1101  I have reviewed and agree with all the recommendations and orders  detailed in this document.  EFFECTIVE NOW     Approved and electronically signed by:  Roxann Monterroso MD

## 2017-03-20 NOTE — IP AVS SNAPSHOT
Mary Wang #[id number] (CSN: [id number])  (67 year old F)  (Adm: 17)     [id number]               [address] [facility] BANK: [phone]            Patient Demographics     Patient Name Sex          Age SSN Address Phone    Mary Wang Female 1950 (67 year old) [id number] [facility] [address] [phone] (Home) *Preferred*  [phone] (Work)      Emergency Contact(s)     Name Relation Home Work Mobile    César Troy Power of  [phone]    Uli Wang Son   [phone]      Admission Information     Attending Provider Admitting Provider Admission Type Admission Date/Time    Arley Crespo MD Hoskuldsson, Lucía Medeiros MD Emergency 17  1925    Discharge Date Hospital Service Auth/Cert Status Service Area     Internal Medicine [facility]    Unit Room/Bed Admission Status       UU [id number] Admission (Confirmed)       Admission     Complaint    Urinary tract infection      Hospital Account     Name Acct ID Class Status Primary Coverage    Mary Wang [id number] Inpatient Open MEDICARE - MEDICARE            Guarantor Account (for Hospital Account #[id number])     Name Relation to Pt Service Area Active? Acct Type    Mary Wang Self FCS Yes Personal/Family    Address Phone          [address] [place] [address] [phone](H)              Coverage Information (for Hospital Account #[id number])     1. MEDICARE/MEDICARE     F/O Payor/Plan Precert #    MEDICARE/MEDICARE     Subscriber Subscriber #    Mary Wang [id number]    Address Phone    [employer] CLAIMS  [address]  [place] [address] [phone]          2. [employer]BS/F[employer] EMPLOYEE PROGRAM     F/O Payor/Plan Precert #    BCBS/FED[employer] EMPLOYEE PROGRAM     Subscriber Subscriber #    Mary Wang [id number]    Address Phone    [address] [address]  [place], [place] [id number] [phone]        "   3. STIVEN/STIVEN CARRERO     F/O Payor/Plan Precert #    STIVEN/PRABHAKARDEJAN MA     Subscriber Subscriber #    Mary Wang [id number]    Address Phone    [address]  [place] [address] [phone]                                                      INTERAGENCY TRANSFER FORM - PHYSICIAN ORDERS   3/20/2017                       [address] [facility] BANK: [phone]            Attending Provider: Arley Crespo MD     Allergies:  Levaquin, Toro Podhaler [Tobramycin], Suprax [Cefixime], Augmentin, Avelox [Moxifloxacin], Cedax [Ceftibuten], Penicillins, Vantin [Cefpodoxime]    Infection:  VRE-Contact Isolation, MRSA-Contact Isolation   Service:  INTERNAL MED    Ht:  1.626 m (5' 4\")   Wt:  60.5 kg (133 lb 6.1 oz)   Admission Wt:  57.7 kg (127 lb 3.3 oz)    BMI:  22.89 kg/m 2   BSA:  1.65 m 2            ED Clinical Impression     Diagnosis Description Comment Added By Time Added    Hyponatremia [E87.1] Hyponatremia [E87.1]  Soledad Olivas MD 3/21/2017 12:45 AM    Hypercarbia [R06.89] Hypercarbia [R06.89]  Soledad Olivas MD 3/21/2017 12:45 AM    Acute on chronic respiratory failure with hypoxia (H) [J96.21] Acute on chronic respiratory failure with hypoxia (H) [J96.21]  Soledad Olivas MD 3/21/2017 12:46 AM    Centrilobular emphysema (H) [J43.2] Centrilobular emphysema (H) [J43.2]  Soledad Olivas MD 3/21/2017 12:46 AM      Hospital Problems as of 4/5/2017              Priority Class Noted POA    Urinary tract infection Medium  3/21/2017 Yes      Non-Hospital Problems as of 4/5/2017              Priority Class Noted    Urinary retention Medium  8/11/2015    Air hunger Medium  8/11/2015    Achalasia Medium  8/11/2015    Delirium Medium  8/11/2015    HTN (hypertension) Medium  8/11/2015    GERD (gastroesophageal reflux disease) Medium  8/11/2015    ACP (advance care planning)   8/19/2015    S/P percutaneous endoscopic gastrostomy (PEG) tube placement (H) Medium  1/25/2016    COPD " (chronic obstructive pulmonary disease) (H) Medium  4/13/2016    NSTEMI (non-ST elevated myocardial infarction) (H) Medium  5/21/2016    Atypical chest pain Medium  6/7/2016    Encounter for gastrojejunal (GJ) tube placement Medium  7/8/2016    Constipation due to opioid therapy Medium  1/26/2017    Generalized anxiety disorder Medium  1/26/2017    Advanced directives, counseling/discussion Medium  1/26/2017    Acute on chronic respiratory failure with hypoxia (H) Medium  1/28/2017    Health Care Home   2/10/2017    Malfunction of gastrostomy tube (H) Medium  2/15/2017    Anemia Medium  2/17/2017    Feeding tube dysfunction Medium  2/17/2017    Decubitus ulcer of sacral region, unstageable (H) Medium  2/24/2017      Code Status History     Date Active Date Inactive Code Status Order ID Comments User Context    4/5/2017 11:00 AM  DNR [id number]  Roxann Monterroso MD Outpatient    3/21/2017  4:18 PM 4/5/2017 11:00 AM DNR [id number]  Alexi Castro MD Inpatient    3/21/2017  5:49 AM 3/21/2017  4:18 PM Full Code [id number]  Kole Nolan MD Inpatient    3/21/2017  5:47 AM 3/21/2017  5:49 AM DNR [id number]  Kole Nolan MD Inpatient    2/15/2017  5:59 PM 2/17/2017  7:03 PM DNR [id number]  Viridiana Rockwell PA-C Inpatient    2/3/2017 11:12 AM 2/15/2017  5:59 PM DNR [id number]  Celia Chavis MD Outpatient    1/28/2017 10:10 PM 2/3/2017 11:12 AM DNR [id number]  Viridiana Rockwell PA-C Inpatient    1/23/2017  8:10 PM 1/24/2017  6:24 PM DNR [id number]  Jenni Tellez MD Inpatient    1/23/2017  3:34 PM 1/23/2017  8:10 PM DNR [id number]  Jenni Tellez MD Outpatient    1/21/2017 10:50 AM 1/23/2017  3:34 PM DNR [id number]  Jenni Tellez MD Inpatient    1/20/2017  6:58 AM 1/21/2017 10:50 AM DNR/DNI [id number]  Yadi Loja MD ED    1/7/2017  8:26 PM 1/10/2017  1:42 PM DNR [id number]  Jose David De La Cruz APRN CNP Inpatient    1/6/2017 12:01 PM 1/7/2017  8:26  PM DNR [id number]  Geo Bui MD Outpatient    1/3/2017  5:24 PM 1/6/2017 12:01 PM DNR [id number]  Geo Bui MD Inpatient    1/2/2017 11:28 PM 1/3/2017  5:24 PM Full Code [id number]  oJse David De La Cruz APRN CNP Inpatient    12/28/2016  8:59 AM 1/2/2017 11:28 PM Full Code [id number]  Theresa Wagoner MD Outpatient    12/26/2016  4:49 AM 12/28/2016  8:59 AM Full Code [id number]  Alexi Fernandez MD Inpatient    12/5/2016  2:45 PM 12/26/2016  4:49 AM Full Code [id number]  Francisco Burt MD Outpatient    12/1/2016  6:05 PM 12/5/2016  2:45 PM Full Code [id number]  Viridiana Rockwell PA-C Inpatient    11/27/2016  6:02 PM 12/1/2016  6:05 PM Full Code [id number]  Francisco Burt MD Outpatient    11/13/2016  9:57 PM 11/27/2016  6:02 PM Full Code [id number]  Jose David De La Cruz APRN CNP Inpatient    10/31/2016  1:27 AM 11/11/2016  2:43 PM Full Code [id number]  Jose David De La Cruz APRN CNP Inpatient    10/1/2016 12:45 PM 10/4/2016  4:15 PM Full Code [id number]  Jamila Comer MD Inpatient    9/11/2016  2:46 AM 9/16/2016  3:48 PM Full Code [id number]  Theresa Wagoner MD Inpatient    7/23/2016  7:05 AM 9/11/2016  2:46 AM Full Code [id number]  Doc Barrera MD Outpatient    7/16/2016  2:33 AM 7/23/2016  7:05 AM Full Code [id number]  Moe Cárdenas MD Inpatient    7/10/2016 11:38 AM 7/16/2016  2:33 AM Full Code [id number]  Enrique Gama MD Outpatient    7/8/2016  8:04 PM 7/10/2016 11:38 AM Full Code [id number]  Shantal Dee PA Inpatient    6/6/2016  5:19 PM 6/14/2016  2:32 PM Full Code [id number]  Shantal Dee PA Inpatient    5/21/2016  3:41 PM 5/23/2016  3:59 PM Full Code [id number]  Viridiana Butcher MD ED    4/13/2016  6:13 PM 4/21/2016  1:08 PM Full Code [id number]  Arley Guzman MD Inpatient    2/1/2016  8:43 AM 4/13/2016  6:13 PM Full Code [id number]  Emily Hoover MD Outpatient    1/20/2016  8:10 PM 2/1/2016  8:43 AM Full  Code [id number]  Emily Hoover MD Inpatient    1/20/2016  7:17 PM 1/20/2016  8:10 PM Full Code [id number]  Emily Hoover MD ED    1/2/2016  8:32 AM 1/20/2016  7:17 PM Full Code [id number]  Elias Mcdermott MD Outpatient    1/1/2016  1:56 PM 1/2/2016  8:32 AM Full Code [id number]  Almaz Reagan MD Outpatient    12/30/2015  9:45 PM 1/1/2016  1:56 PM Full Code [id number]  Martinez Hamm MD Inpatient    9/24/2015 12:37 PM 12/30/2015  9:45 PM Full Code [id number]  Verner, James R, MD Outpatient    8/27/2015  4:30 AM 9/24/2015 12:37 PM Full Code [id number]  rEic Cosme MD Inpatient    8/13/2015  2:51 PM 8/27/2015  4:30 AM Full Code [id number]  Emilia Kurtz MD Inpatient    8/10/2015  7:50 AM 8/13/2015  2:51 PM DNR/DNI [id number]  Marcia Aleman MD Outpatient    8/7/2015 11:13 AM 8/10/2015  7:50 AM DNR/DNI [id number]  Marcia Aleman MD Inpatient    8/4/2015  2:55 PM 8/7/2015 11:13 AM DNR [id number]  Driss Huggins MD Inpatient    7/31/2015  6:35 PM 8/4/2015  2:55 PM DNR/DNI [id number]  Driss Huggins MD Inpatient    7/26/2015  8:21 PM 7/31/2015  6:35 PM Full Code [id number]  Larry Hong MD Inpatient    6/21/2015 11:33 AM 7/26/2015  8:21 PM Full Code [id number]  Mady Atkinson MD Outpatient    6/17/2015  6:34 AM 6/21/2015 11:33 AM Full Code [id number]  Swteha Bundy APRN CNP Inpatient    8/8/2014  7:29 AM 6/17/2015  6:34 AM Full Code [id number]  Ron Kirkland MD Outpatient    8/3/2014  6:08 PM 8/8/2014  7:29 AM Full Code [id number]  Ron Kirkland MD Inpatient    5/4/2014 12:08 PM 8/3/2014  6:08 PM Full Code [id number]  Darlene Del Real MD Outpatient    5/2/2014  9:16 AM 5/4/2014 12:08 PM Full Code [id number]  Darlene Del Real MD Inpatient      Current Code Status     Date Active Code Status Order ID Comments User Context       Prior      Summary of Visit     Reason for your hospital stay       Pneumonia                 Medication Review      START taking        Dose / Directions Comments    ceFEPIme 2 G vial   Commonly known as:  MAXIPIME   Used for:  HCAP (healthcare-associated pneumonia)        Dose:  2 g   Inject 2 g into the vein every 8 hours for 7 days   Quantity:  420 mL   Refills:  0        multivitamins with minerals Liqd liquid   Used for:  Vitamin deficiency        Dose:  15 mL   15 mLs by Per Feeding Tube route daily   Quantity:  450 mL   Refills:  0        vitamin A-D & C drops 1500-400-35 drops   Used for:  Vitamin deficiency        Dose:  7 mL   Start taking on:  4/6/2017   7 mLs by Per J Tube route daily   Quantity:  50 mL   Refills:  0        zinc sulfate (20 mg [place]. Zn/mL) 88 mg/mL Soln solution   Used for:  Vitamin deficiency        Dose:  220 mg   Start taking on:  4/9/2017   2.5 mLs (220 mg) by Per J Tube route daily   Quantity:  25 mL   Refills:  0          CONTINUE these medications which may have CHANGED, or have new prescriptions. If we are uncertain of the size of tablets/capsules you have at home, strength may be listed as something that might have changed.        Dose / Directions Comments    clonazePAM 0.1 mg/mL   Commonly known as:  klonoPIN   This may have changed:  Another medication with the same name was removed. Continue taking this medication, and follow the directions you see here.        Dose:  1.5 mg   Take 15 mLs (1.5 mg) by mouth 4 times daily   Quantity:  1800 mL   Refills:  1        * predniSONE 20 MG tablet   Commonly known as:  DELTASONE   This may have changed:  additional instructions   Used for:  Centrilobular emphysema (H)        Dose:  20 mg   1 tablet (20 mg) by Per G Tube route daily To be resumed after 3 day burst   Quantity:  90 tablet   Refills:  3        * predniSONE 20 MG tablet   Commonly known as:  DELTASONE   This may have changed:  You were already taking a medication with the same name, and this prescription was added. Make sure you understand how and when to take  each.   Used for:  Chronic bronchitis, unspecified chronic bronchitis type (H)        Dose:  40 mg   2 tablets (40 mg) by Per G Tube route daily   Quantity:  6 tablet   Refills:  0        * Notice:  This list has 2 medication(s) that are the same as other medications prescribed for you. Read the directions carefully, and ask your doctor or other care provider to review them with you.      CONTINUE these medications which have NOT CHANGED        Dose / Directions Comments    acetaminophen 32 mg/mL solution   Commonly known as:  TYLENOL   Used for:  Other chronic pain        Dose:  650 mg   20.3 mLs (650 mg) by Per Feeding Tube route every 4 hours as needed for mild pain   Quantity:  300 mL   Refills:  0        bisacodyl 10 MG Suppository   Commonly known as:  DULCOLAX   Used for:  Constipation due to opioid therapy        Dose:  10 mg   Place 1 suppository (10 mg) rectally daily as needed for constipation   Quantity:  25 suppository   Refills:  1        budesonide 0.5 MG/2ML neb solution   Commonly known as:  PULMICORT   Used for:  Shortness of breath, Respiratory difficulty        Dose:  0.5 mg   Take 2 mLs (0.5 mg) by nebulization 2 times daily   Quantity:  60 ampule   Refills:  0        calcium carbonate 500 MG chewable tablet   Commonly known as:  TUMS        Dose:  1 chew tab   Take 1 tablet (500 mg) by mouth every 2 hours as needed for heartburn   Quantity:  150 tablet   Refills:  0        Cranberry 125 MG Tabs        Dose:  1 tablet   1 tablet by Jejunal Tube route daily   Quantity:  90 tablet   Refills:  3        famotidine 40 MG/5ML suspension   Commonly known as:  PEPCID   Used for:  Mild gas use disorder        Dose:  20 mg   Take 2.5 mLs (20 mg) by mouth 2 times daily as needed for heartburn   Quantity:  75 mL   Refills:  3        fexofenadine 180 MG tablet   Commonly known as:  ALLEGRA   Used for:  COPD exacerbation (H)        Dose:  180 mg   Take 1 tablet (180 mg) by mouth daily   Quantity:  30 tablet    Refills:  0        fiber modular packet   Used for:  Diarrhea, unspecified type        Dose:  1 packet   1 packet by Per J Tube route 3 times daily   Quantity:  42 packet   Refills:  0        FLEET NATURALS CLEANSING ENEMA Enem   Used for:  Constipation due to opioid therapy        Dose:  1 enema   Place 1 enema rectally daily as needed   Quantity:  3 Bottle   Refills:  11        gabapentin 250 MG/5ML solution   Commonly known as:  NEURONTIN   Used for:  Anxiety        TAKE 300MG (6ML) VIA J-TUBE 3 TIMES A DAY....   Quantity:  450 mL   Refills:  3        guaiFENesin 200 MG Tabs tablet   Commonly known as:  ORGANIDIN   Used for:  Shortness of breath        Dose:  200 mg   Take 1 tablet (200 mg) by mouth 4 times daily   Quantity:  120 tablet   Refills:  3        HYDROmorphone 1 MG/ML Liqd liquid   Commonly known as:  DILAUDID   Used for:  Air hunger        Dose:  1 mg   Take 1 mL (1 mg) by mouth every 2 hours as needed for other (dyspnea)   Quantity:  180 mL   Refills:  0        ipratropium 0.02 % neb solution   Commonly known as:  ATROVENT        Dose:  0.5 mg   Take 2.5 mLs (0.5 mg) by nebulization 4 times daily and every four hours at night PRN.   Quantity:  450 mL   Refills:  0        lactobacillus rhamnosus (GG) capsule        Dose:  1 capsule   1 capsule by Per G Tube route daily   Quantity:  60 capsule   Refills:  0        levalbuterol 1.25 MG/3ML neb solution   Commonly known as:  XOPENEX        Dose:  1 ampule   Take 3 mLs (1.25 mg) by nebulization 4 times daily and every four hours at night PRN.   Quantity:  540 mL   Refills:  0        lidocaine 15 mL, alum & mag hydroxide-simethicone 15 mL GI Cocktail   Used for:  Gastroesophageal reflux disease without esophagitis        Dose:  30 mL   Take 30 mLs by mouth 3 times daily as needed for moderate pain   Quantity:  500 mL   Refills:  0        lidocaine 5 % Patch   Commonly known as:  LIDODERM   Used for:  Urinary tract infection, site not specified         Dose:  1-2 patch   Place 1-2 patches onto the skin every 24 hours Apply to the lower back   Quantity:  30 patch   Refills:  0        loperamide 2 MG tablet   Commonly known as:  IMODIUM A-D   Used for:  Frequent stools        2-2 mg tablets per J-tube qid prn frequent and/or loose stools. Do not use more than 8 tabs per day.   Quantity:  30 tablet   Refills:  0        LORazepam 2 MG/ML (HIGH CONC) solution   Commonly known as:  ATIVAN        Dose:  0.5 mg   Take 0.25 mLs (0.5 mg) by mouth every 3 hours as needed for anxiety   Quantity:  30 mL   Refills:  1        melatonin 1 MG/ML Liqd liquid   Used for:  Anxiety, Insomnia due to medical condition        Dose:  3 mg   3 mLs (3 mg) by Per J Tube route At Bedtime   Quantity:  300 mL   Refills:  0        menthol-zinc oxide 0.44-20.625 % Oint ointment   Commonly known as:  CALMOSEPTINE        Apply topically 2 times daily as needed for skin protection   Quantity:  113 g   Refills:  3        morphine sulfate 10 MG 24 hr capsule   Commonly known as:  ROSIE   Used for:  Air hunger        Give contents of one capsule through the gastrostomy tube every 8 hours.   Quantity:  90 capsule   Refills:  0        ondansetron 4 MG tablet   Commonly known as:  ZOFRAN        Dose:  4 mg   Take 1 tablet (4 mg) by mouth every 4 hours as needed for nausea   Quantity:  18 tablet   Refills:  0        * order for DME        Equipment being ordered: 1) Avendaño catheter 16F, balloon 10 mL, silicone.  2) Urinary collection bag.  3) Elastic leg strap for Avendaño catheter.  Please fax to [employer].   Quantity:  3 each   Refills:  11        * order for DME   Used for:  Acute and chronic respiratory failure with hypercapnia (H)        Equipment being ordered: Methylex foam dressings, alternating pressure pad for mattress and pump.   Quantity:  1 Device   Refills:  0        polyethylene glycol 0.4%- propylene glycol 0.3% 0.4-0.3 % Soln ophthalmic solution   Commonly known as:  SYSTANE ULTRA   Used  for:  Dry eyes, bilateral        Dose:  1-2 drop   Place 1-2 drops into both eyes 4 times daily as needed for dry eyes 0900, 1300, 1700, 2100   Quantity:  1 Bottle   Refills:  3        polyethylene glycol Packet   Commonly known as:  MIRALAX/GLYCOLAX   Used for:  Constipation, unspecified constipation type        Dose:  17 g   17 g by Per J Tube route 2 times daily Hold for loose stools   Quantity:  100 packet   Refills:  0        * QUEtiapine 50 MG tablet   Commonly known as:  SEROQUEL        Dose:  50 mg   Take 1 tablet (50 mg) by mouth 2 times daily   Quantity:  180 tablet   Refills:  3        * QUEtiapine 25 MG tablet   Commonly known as:  SEROquel   Used for:  Generalized anxiety disorder        Administer one tab per J-tube as needed at night if scheduled HS dose ineffective for treatment of anxiety or sleeplessness.   Quantity:  90 tablet   Refills:  3        senna-docusate 8.6-50 MG per tablet   Commonly known as:  SENOKOT-S;PERICOLACE   Used for:  Constipation, unspecified constipation type        Dose:  2 tablet   2 tablets by Per J Tube route 2 times daily   Quantity:  100 tablet   Refills:  0        sertraline 20 MG/ML (HIGH CONC) solution   Commonly known as:  ZOLOFT   Used for:  Anxiety        Dose:  150 mg   7.5 mLs (150 mg) by Per Feeding Tube route daily   Quantity:  105 mL   Refills:  0        sodium chloride 0.65 % nasal spray   Commonly known as:  OCEAN   Used for:  Chronic sinusitis, unspecified location        Dose:  1 spray   Spray 1 spray into both nostrils daily as needed for congestion   Quantity:  1 Bottle   Refills:  0        * Notice:  This list has 4 medication(s) that are the same as other medications prescribed for you. Read the directions carefully, and ask your doctor or other care provider to review them with you.      STOP taking     albuterol (2.5 MG/3ML) 0.083% neb solution                   After Care     Activity       Your activity upon discharge: activity as tolerated and  ambulate in house       Diet       Follow this diet upon discharge: Orders Placed This Encounter      Adult Formula Drip Feeding: Continuous Isosource 1.5; Jejunostomy; Goal Rate: 50; mL/hr; Medication - Tube Feeding Flush Frequency: At least 15-30 mL water before and after medication administration and with tube clogging; Amout to Send (Nutrition...       Discharge Instructions       You have a prescription for an antibiotic, levofloxacin, to take for 7 days. You should also take prednisone 40 mg for 3 more days, then resume your home dose. Follow up with PCP in a week.             Procedures     Oxygen Adult       Renew Home Oxygen Order  Renew previous prescription.  Expected treatment length is indefinite (99 months).    [facility]     Attending Provider: Dr. Kevin Acevedo  Physician signature: See electronic signature associated with these discharge orders  Date of Order: March 23, 2017       Ventilator       Resume home Vent settings       Ventilator       Resume home ventilatory settings             Referrals     Home care nursing referral       [facility]  Ph [phone]  Fax [phone]    24 hour care with [facility]   Ph: [phone]  Fax: [phone]    RN skilled nursing visit. RN to assess vital signs and weight, respiratory and cardiac status, pain level and activity tolerance, hydration, nutrition and bowel status and home safety.  RN to teach medication management.  Wound cares    Your provider has ordered home care nursing services. If you have not been contacted within 2 days of your discharge please call the inpatient department phone number at [phone] .    OK to pull PICC once treatment complete       Home infusion referral       Your provider has referred you to: [facility] [place] [phone]   [email or web address]    Local Address (if different from home address): N/A    Anticipated Length of  Therapy: per md order    Home Infusion Pharmacist to adjust therapy based on labs and clinical assessments: Yes    Labs:  May draw labs from Venous Catheter: Yes  Home Infusion Pharmacist to order labs based on therapy type and clinical assessments: Yes  Call/Fax Lab Results to: pcp    Agency Staff to assess nursing needs for Infusion Therapy.    Access Device Management:  IV Access Type: PICC  Flush with Heparin and Normal Saline IVP PRN and routine site care (per agency protocol) to maintain access device? Yes             Follow-Up Appointment Instructions     Adult [facility] Follow-up and recommended labs and tests       Follow up with primary care provider, Norman Brito, within 7 days for hospital follow- up.  No follow up labs or test are needed.             Your next 10 appointments already scheduled     Apr 12, 2017  2:00 PM CDT   Return Visit with Norman Brito MD   [facility]    [address]  [address]  [facility] [address]   [phone]            Apr 28, 2017 12:15 PM CDT   Return Visit with Norman Brito MD   [facility]    [address]  [address]  [facility] [address]   [phone]            May 19, 2017  2:00 PM CDT   Return Visit with Norman Brito MD   [facility]    [address]  [address]  [facility] [address]   [phone]            Jun 16, 2017  2:00 PM CDT   Return Visit with Norman Brito MD   [facility]    [address]  [address]  [facility] [address]   [phone]               MD face to face encounter       Documentation of Face to Face and Certification for Home Health Services     I certify that patient: Mary Wang is under my care and that I, or a nurse practitioner or physician's assistant working with me, had a  face-to-face encounter that meets the physician face-to-face encounter requirements with this patient on: 3/27/2017.    This encounter with the patient was in whole, or in part, for the following medical condition, which is the primary reason for home health care: Pseudomonas (B965), COPD (J449), Respiratory vent dependent (), Tracheostomy (L930), and stage 3 pressure ulcer.    I certify that, based on my findings, the following services are medically necessary home health services: Group 2 mattress     My clinical findings support the need for the above services because: Group one overlay mattress failed. Medically needed for stage 3 pressure ulcer to Sacrococcygeal region.     Further, I certify that my clinical findings support that this patient is homebound (i.e. absences from home require considerable and taxing effort and are for medical reasons or Congregational services or infrequently or of short duration when for other reasons) because: Requires assistance of another person or specialized equipment to access medical services because patient: Requires supervision of another for safe transfer...    Based on the above findings. I certify that this patient is confined to the home and needs intermittent skilled nursing care, physical therapy and/or speech therapy.  The patient is under my care, and I have initiated the establishment of the plan of care.  This patient will be followed by a physician who will periodically review the plan of care.  Physician/Provider to provide follow up care: Norman Brito    Attending [facility] physician (the Medicare certified [place] provider):Dr. Kevin Acevedo  Physician Signature: See electronic signature associated with these discharge orders.  Date: 3/27/2017                  Statement of Approval     Ordered          04/05/17 1101  I have reviewed and agree with all the recommendations and orders detailed in this document.  EFFECTIVE NOW     Approved and electronically  "signed by:  Roxann Monterroso MD                                                 INTERAGENCY TRANSFER FORM - NURSING   3/20/2017                       [address] [facility] BANK: [phone]            Attending Provider: Arley Crespo MD     Allergies:  Levaquin, Toro Podhaler [Tobramycin], Suprax [Cefixime], Augmentin, Avelox [Moxifloxacin], Cedax [Ceftibuten], Penicillins, Vantin [Cefpodoxime]    Infection:  VRE-Contact Isolation, MRSA-Contact Isolation   Service:  INTERNAL MED    Ht:  1.626 m (5' 4\")   Wt:  60.5 kg (133 lb 6.1 oz)   Admission Wt:  57.7 kg (127 lb 3.3 oz)    BMI:  22.89 kg/m 2   BSA:  1.65 m 2            Advance Directives        Does patient have a scanned Advance Directive/ACP document in EPIC?           Yes        Immunizations     Name Date      Influenza (High Dose) 3 valent vaccine 10/04/16     Influenza (High Dose) 3 valent vaccine 09/24/15     Influenza (High Dose) 3 valent vaccine 10/06/14     Influenza (IIV3) 10/21/13     Influenza (IIV3) 11/20/12     Pneumococcal (PCV 13) 11/17/14     Pneumococcal 23 valent 05/03/12     Tetanus Not Indicated - By Hx 05/03/14       ASSESSMENT     Discharge Profile Flowsheet     EXPECTED DISCHARGE     Other Resources  Group Home 02/17/17 1129    Expected Discharge Date  -- (Group Home) 03/30/17 0817   Home Infusion Provider  [facility] [phone], Fax: [phone] 04/04/17 1255    DISCHARGE NEEDS ASSESSMENT     Existing Resources/Services  DME 08/05/14 1504    Equipment Currently Used at Home  commode;hospital bed;walker, rolling;wheelchair 01/04/17 0924   SKIN      Transportation Available  van, wheelchair accessible 01/04/17 0917   Inspection  Full 04/05/17 1223    Equipment Used at Home  walker, rolling;grab bar;shower chair 01/22/16 1415   Skin areas NOT inspected  -- 04/04/17 2248    FUNCTIONAL LEVEL CURRENT     Skin WDL  ex 04/05/17 1223    Change in Functional Status Since Onset of Current Illness/Injury  no 06/06/16 2137   Skin " "Color/Characteristics  bruised (ecchymotic);redness blanchable;dusky 04/05/17 1223    GASTROINTESTINAL (ADULT,PEDIATRIC,OB)     Skin Temperature  warm 04/05/17 1223    GI WDL  ex 04/05/17 1223   Skin Moisture  dry 04/05/17 1223    Abdominal Appearance  rounded 04/05/17 1229   Skin Elasticity  quick return to original state 03/30/17 1307    All Quadrants Bowel Sounds  audible and active in all quadrants 04/05/17 0525   Skin Integrity  drain/device(s);pressure ulcer(s);wound(s) 04/05/17 1223    Last Bowel Movement  04/05/17 04/05/17 1229   SAFETY      GI Signs/Symptoms  abdominal discomfort 04/05/17 1223   Safety WDL  WDL 04/05/17 1223    COMMUNICATION ASSESSMENT     Aspiration Risk Screen  tube feeding;mechanical ventilation 04/04/17 0108    Patient's communication style  spoken language (English or Bilingual) 03/20/17 1914   Airway Safety Measures  alarms for all equipment/monitors on and audible;suction equipment;manual resuscitator 04/05/17 0542    FINAL RESOURCES     Safety Equipment  oxygen flowmeter;manual resusciator with appropriate mask;suction regulator;suction equipment;spare trach at bedside, same size 03/26/17 2115    Resources List  Home Infusion 04/04/17 1255   Safety Factors  bed in low position 04/05/17 1223                 Assessment WDL (Within Defined Limits) Definitions           Safety WDL     Effective: 09/28/15    Row Information: <b>WDL Definition:</b> Bed in low position, wheels locked; call light in reach; upper side rails up x 2; ID band on<br> <font color=\"gray\"><i>Item=AS safety wdl>>List=AS safety wdl>>Version=F14</i></font>      Skin WDL     Effective: 09/28/15    Row Information: <b>WDL Definition:</b> Warm; dry; intact; elastic; without discoloration; pressure points without redness<br> <font color=\"gray\"><i>Item=AS skin wdl>>List=AS skin wdl>>Version=F14</i></font>      Vitals     Vital Signs Flowsheet     VITAL SIGNS     Pain Management Interventions  Relaxation 03/30/17 1141    " "Temp  97.7  F (36.5  C) 04/05/17 1125   Response to Interventions  Increase in pain 03/30/17 1141    Temp src  Axillary 04/05/17 1125   CRITICAL-CARE PAIN OBSERVATION TOOL (CPOT)      Resp  20 04/05/17 1125   Facial Expression  1 03/22/17 0637    Pulse  122 04/03/17 2017   Body Movements  1 03/22/17 0637    Heart Rate  110 04/05/17 1125   Compliance w/ventilator (intubated patients)  0 03/22/17 0637    Pulse/Heart Rate Source  Monitor 04/05/17 0033   Vocalization (extubated patients)  0 03/22/17 0637    BP  103/61 04/05/17 1202   Muscle Tension  1 03/22/17 0637    BP Location  Left arm 04/05/17 1202   Total  3 03/22/17 0637    OXYGEN THERAPY     ANALGESIA SIDE EFFECTS MONITORING      SpO2  96 % 04/05/17 1125   Side Effects Monitoring: Respiratory Quality  R 04/04/17 1618    O2 Device  Mechanical Ventilator 04/05/17 1125   Side Effects Monitoring: Respiratory Depth  N 04/04/17 1618    FiO2 (%)  60 % 04/03/17 2017   Side Effects Monitoring: Sedation Level  2 04/04/17 1618    Oxygen Delivery  3 LPM 04/05/17 1125   HEIGHT AND WEIGHT      Suction Occurrance  1 03/30/17 1956   Height  1.626 m (5' 4\") 03/21/17 0633    PAIN/COMFORT     Weight  60.5 kg (133 lb 6.1 oz) (bedscale) 04/05/17 0539    Patient Currently in Pain  sleeping: patient not able to self report 04/05/17 1220   BSA (Calculated - sq m)  1.61 03/21/17 0633    Preferred Pain Scale  CAPA (Clinically Aligned Pain Assessment) ([facility], [facility] and [facility] Adults Only) 04/05/17 0854   BMI (Calculated)  21.88 03/21/17 0633    Patient's Stated Pain Goal  Unable to Assess 03/29/17 0400   POSITIONING      Pain Location  Abdomen 04/05/17 0854   Body Position  right, side-lying 04/05/17 1223    Pain Orientation  Lower 04/03/17 2225   Head of Bed (HOB)  HOB at 30 degrees 04/05/17 1223    Pain Descriptors  Aching 04/04/17 1608   Chair  Recline and up in chair 03/29/17 0005    Pain Management Interventions  analgesia administered 04/05/17 0854   Positioning/Transfer Devices  " pillows 04/05/17 0128    Pain Intervention(s)  Medication (See eMAR) 04/05/17 0854   DAILY CARE      Response to Interventions  Decrease in pain 04/04/17 2236   Activity Type  activity adjusted per tolerance;bedrest 04/05/17 1229    CLINICALLY ALIGNED PAIN ASSESSMENT (CAPA) ([facility], [facility] AND [facility] ADULTS ONLY)     Activity Level of Assistance  assistance, 2 people 04/05/17 1229    Comfort  tolerable with discomfort 04/05/17 0854   ECG      Change in Pain  about the same 04/05/17 0854   ECG Rhythm  Sinus tachycardia 04/05/17 1125    Pain Control  partially effective 04/05/17 0854   Lead Monitored  Lead II;V 1 03/26/17 1955    Functioning  pain keeps me from doing most of what I need to do 04/05/17 0854   Ectopy  None 04/04/17 0405    Sleep  awake with occasional pain 04/05/17 0854   Ectopy Frequency  Rare 03/29/17 2354    PAIN ASSESSMENT/NONVERBAL ICU (ADULT)     POINT OF CARE TESTING      Presence of Pain  No nonverbal indicators of pain present 04/04/17 0405   Puncture Site  fingertip 04/05/17 0753    Assessment Indicator  PRN assessment 04/04/17 0405   Bedside Glucose (mg/dl )   106 mg/dl 04/05/17 0753    Nonverbal Indicators of Pain  Withdrawn 04/04/17 0405                 Patient Lines/Drains/Airways Status    Active LINES/DRAINS/AIRWAYS     Name: Placement date: Placement time: Site: Days: Last dressing change:    Airway - Adult/Peds 6 [place] 04/19/16   1300   6   351     Gastrostomy/Enterostomy PEG-jejunostomy LUQ 1 22fr 02/17/17   1005   LUQ   47     Urethral Catheter 03/21/17   1210      15     Pressure Injury 01/28/17 Sacrum Unstagable 01/28/17       67     Wound 10/31/16 Left;Anterior;Right Arm Other (comment) Large skin tear forearm 10/31/16   0800   Arm   156     Wound 01/28/17 Left Arm Skin tear 01/28/17   2259   Arm   66     Wound 01/29/17 Right Arm Skin tear 01/29/17   0145   Arm   66     Wound 03/21/17 Anterior;Right;Upper Arm Skin tear near elbow 03/21/17   1740   Arm   14              Patient Lines/Drains/Airways Status    Active PICC/CVC     Name: Placement date: Placement time: Site: Days: Additional Info Last dressing change:    PICC Double Lumen 03/24/17 Right Basilic 03/24/17   1758   Basilic   11 External Cath Length (cm): 3 cm   03/31/17 1116 (122.01 hrs)         Size (Fr): 5 Fr            Orientation: Right            Extremity Circumference (cm): 29 cm            Catheter Brand: Bard            Dressing Intervention: Chlorhexidine patch;Transparent;Securing device;New dressing            Description: Non - valved (open ended);Power PICC            Total Catheter Length (cm) Trimmed: 42 cm            Site Prep: Chlorhexidine            Local Anesthetic: Injectable            Inserted by: Marleen Juarez RN [facility]            Insertion attempts with ultrasound: 1            Patient Tolerance: Tolerated well            Placement Verification: Xray;Blood Return            Difficulty with threading line: No            Tip location: SVC            Full barrier precautions done: Yes            Consent Signed: Yes            Time Out performed: Yes            Lot #: [id number]            Use for : Antibiotics. 4C MATTIE Winter notified picc ready to use.               Intake/Output Detail Report     Date Intake             Output     Net    Shift P.O. I.V. NG/GT IV Piggyback Colloid Enteral Blood Components Total Urine Emesis/NG output Stool Total       Day 04/04/17 0000 - 04/04/17 0659 0 120 210 -- -- 350 -- 680 200 425 -- 625 55    Emilie 04/04/17 0700 - 04/04/17 1459 -- 20 180 -- -- 400 -- 600 400 275 -- 675 -75    Noc 04/04/17 1500 - 04/04/17 2359 -- -- 390 -- -- 450 -- 840 525 375 -- 900 -60    Day 04/05/17 0000 - 04/05/17 0659 -- 50 200 -- -- 350 -- 600 475 350 -- 825 -225    Emilie 04/05/17 0700 - 04/05/17 1459 -- -- 420 -- -- 300 -- 720 475 175 -- 650 70      Last Void/BM       Most Recent Value    Urine Occurrence 1 [large incontinence around bender] at 04/01/2017 1825    Stool Occurrence 1 at  04/05/2017 1000      Case Management/Discharge Planning     Case Management/Discharge Planning Flowsheet     REFERRAL INFORMATION     Equipment Currently Used at Home  commode;hospital bed;walker, rolling;wheelchair 01/04/17 0924    Arrived From  admitted as an inpatient 01/03/17 0040   Resources List  Home Infusion 04/04/17 1255    LIVING ENVIRONMENT     Other Resources  Group Home 02/17/17 1129    Lives With  facility resident 03/22/17 1128   Home Infusion Provider  [facility] [phone], Fax: [phone] 04/04/17 1255    Living Arrangements  group home 01/04/17 0924   Existing Resources/Services  DME 08/05/14 1504    Provides Primary Care For  no one, unable/limited ability to care for self 10/31/16 0454   / CAREGIVER      COPING/STRESS     Filed Complexity Screen Score  15 03/21/17 1359    Major Change/Loss/Stressor  illness 03/22/17 1128   ABUSE RISK SCREEN      EXPECTED DISCHARGE     QUESTION TO PATIENT:  Has a member of your family or a partner(now or in the past) intimidated, hurt, manipulated, or controlled you in any way?  no 03/20/17 1922    Expected Discharge Date  -- (Group Home) 03/30/17 0817   QUESTION TO PATIENT: Do you feel safe going back to the place where you are living?  yes 03/20/17 1922    DISCHARGE PLANNING     (R) MENTAL HEALTH SUICIDE RISK      Transportation Available  van, wheelchair accessible 01/04/17 0917   Are you depressed or being treated for depression?  Yes 03/22/17 1128    Equipment Used at Home  walker, rolling;grab bar;shower chair 01/22/16 1415   HOMICIDE RISK      FINAL RESOURCES     Homicidal Ideation  no 03/20/17 1922                  [address] [facility] [facility]: [phone]            Medication Administration Report for Mary Wang as of 04/05/17 1316   Legend:    Given Hold Not Given Due Canceled Entry Other Actions    Time Time (Time) Time  Time-Action       Inactive    Active    Linked        Medications 03/30/17 03/31/17 04/01/17 04/02/17  04/03/17 04/04/17 04/05/17    acetaminophen (TYLENOL) solution 325 mg  Dose: 325 mg Freq: EVERY 4 HOURS Route: PO  Start: 03/29/17 1615   Admin Instructions: Maximum acetaminophen dose from all sources= 75 mg/kg/day not to exceed 4 grams/day.     0414 (325 mg)-Given       0901 (325 mg)-Given       1217 (325 mg)-Given       1652 (325 mg)-Given       1914 (325 mg)-Given        0122 (325 mg)-Given       0428 (325 mg)-Given       0824 (325 mg)-Given       1214 (325 mg)-Given       1627 (325 mg)-Given       2047 (325 mg)-Given       2321 (325 mg)-Given        0424 (325 mg)-Given       0812 (325 mg)-Given       1239 (325 mg)-Given       1558 (325 mg)-Given       2101 (325 mg)-Given        0039 (325 mg)-Given       0442 (325 mg)-Given       0819 (325 mg)-Given       1220 (325 mg)-Given       1632 (325 mg)-Given       2028 (325 mg)-Given        0046 (325 mg)-Given       0421 (325 mg)-Given       0810 (325 mg)-Given       1227 (325 mg)-Given       1544 (325 mg)-Given       2050 (325 mg)-Given        0044 (325 mg)-Given       0419 (325 mg)-Given       0810 (325 mg)-Given       1129 (325 mg)-Given       1550 (325 mg)-Given       1933 (325 mg)-Given        0056 (325 mg)-Given       0349 (325 mg)-Given       0800 (325 mg)-Given       1126 (325 mg)-Given       [ ] 1600       [ ] 2000           acetaminophen (TYLENOL) solution 650 mg  Dose: 650 mg Freq: EVERY 4 HOURS PRN Route: PER FEEDING   PRN Reason: mild pain  Start: 03/21/17 0546   Admin Instructions: Maximum acetaminophen dose from all sources= 75 mg/kg/day not to exceed 4 grams/day.               1745 (650 mg)-Given [C]              albuterol neb solution 2.5 mg  Dose: 1 vial Freq: EVERY 6 HOURS PRN Route: NEBULIZATION  PRN Reasons: shortness of breath / dyspnea,wheezing  Start: 03/21/17 0546              bisacodyl (DULCOLAX) Suppository 10 mg  Dose: 10 mg Freq: DAILY PRN Route: RE  PRN Reason: constipation  Start: 03/21/17 0546              budesonide (PULMICORT) neb  solution 0.5 mg  Dose: 0.5 mg Freq: 2 TIMES DAILY Route: NEBULIZATION  Start: 03/21/17 0800    0917 (0.5 mg)-Given       2028 (0.5 mg)-Given        0839 (0.5 mg)-Given       2117 (0.5 mg)-Given        0847 (0.5 mg)-Given       1959 (0.5 mg)-Given        0824 (0.5 mg)-Given       2117 (0.5 mg)-Given        0830 (0.5 mg)-Given       2155 (0.5 mg)-Given        0846 (0.5 mg)-Given       2035 (0.5 mg)-Given        0832 (0.5 mg)-Given       [ ] 2000           calcium carbonate (TUMS) chewable tablet 500 mg  Dose: 500 mg Freq: EVERY 2 HOURS PRN Route: PO  PRN Reason: heartburn  Start: 03/21/17 0546              clonazePAM (klonoPIN) suspension 1.5 mg  Dose: 1.5 mg Freq: 4 TIMES DAILY Route: PO  Start: 03/21/17 0800   Admin Instructions: Shake Well.     0857 (1.5 mg)-Given       1216 (1.5 mg)-Given       1652 (1.5 mg)-Given       2017 (1.5 mg)-Given        0825 (1.5 mg)-Given       1214 (1.5 mg)-Given       1628 (1.5 mg)-Given       2047 (1.5 mg)-Given        0811 (1.5 mg)-Given       1239 (1.5 mg)-Given       1558 (1.5 mg)-Given       2101 (1.5 mg)-Given        0819 (1.5 mg)-Given       1220 (1.5 mg)-Given       1632 (1.5 mg)-Given       2028 (1.5 mg)-Given        0810 (1.5 mg)-Given       1237 (1.5 mg)-Given       1544 (1.5 mg)-Given       2110 (1.5 mg)-Given        0820 (1.5 mg)-Given       1253 (1.5 mg)-Given       (1600)-Not Given       1933 (1.5 mg)-Given        0759 (1.5 mg)-Given       1126 (1.5 mg)-Given       [ ] 1600       [ ] 2000           enoxaparin (LOVENOX) injection 40 mg  Dose: 40 mg Freq: EVERY 24 HOURS Route: SC  Start: 03/21/17 0800   Admin Instructions: HOLD if platelet count falls below 50% of baseline or less than 100,000/ L and notify provider.     0901 (40 mg)-Given        0825 (40 mg)-Given        0812 (40 mg)-Given        0819 (40 mg)-Given        0817 (40 mg)-Given        0810 (40 mg)-Given        0807 (40 mg)-Given           fexofenadine (ALLEGRA) tablet 180 mg  Dose: 180 mg Freq: DAILY Route:  PO  Start: 03/21/17 0800    0900 (180 mg)-Given        0823 (180 mg)-Given        0811 (180 mg)-Given        0818 (180 mg)-Given        0806 (180 mg)-Given        0808 (180 mg)-Given        0804 (180 mg)-Given           gabapentin (NEURONTIN) solution 300 mg  Dose: 300 mg Freq: EVERY 8 HOURS SCHEDULED Route: ORAL OR GJ T  Start: 03/21/17 0700    0900 (300 mg)-Given       1653 (300 mg)-Given        0121 (300 mg)-Given       0824 (300 mg)-Given       1628 (300 mg)-Given       2321 (300 mg)-Given        0812 (300 mg)-Given       1558 (300 mg)-Given        0039 (300 mg)-Given       0819 (300 mg)-Given       1632 (300 mg)-Given        0046 (300 mg)-Given       0819 (300 mg)-Given       1544 (300 mg)-Given        0044 (300 mg)-Given       0810 (300 mg)-Given       1549 (300 mg)-Given        0056 (300 mg)-Given       0805 (300 mg)-Given       [ ] 1600           glucose 40 % gel 15-30 g  Dose: 15-30 g Freq: EVERY 15 MIN PRN Route: PO  PRN Reason: low blood sugar  Start: 03/21/17 0546   Admin Instructions: Give 15 g for BG 51 to 69 mg/dL IF patient is conscious and able to swallow. Give 30 g for BG less than or equal to 50 mg/dL IF patient is conscious and able to swallow. Do NOT give glucose gel via enteral tube.  IF patient has enteral tube: give apple juice 120 mL (4 oz or 15 g of CHO) via enteral tube for BG 51 to 69 mg/dL.  Give apple juice 240 mL (8 oz or 30 g of CHO) via enteral tube for BG less than or equal to 50 mg/dL.              Or  dextrose 50 % injection 25-50 mL  Dose: 25-50 mL Freq: EVERY 15 MIN PRN Route: IV  PRN Reason: low blood sugar  Start: 03/21/17 0546   Admin Instructions: Use if have IV access, BG less than 70 mg/dL and meet dose criteria below:  Dose if conscious and alert (or disorientated) and NPO = 25 mL  Dose if unconscious / not alert = 50 mL  Vesicant.              Or  glucagon injection 1 mg  Dose: 1 mg Freq: EVERY 15 MIN PRN Route: SC  PRN Reason: low blood sugar  PRN Comment: May repeat x  1 only  Start: 03/21/17 0546   Admin Instructions: May give SQ or IM. IF BG less than or equal to 50 mg/dL and no IV access.  ONLY use glucagon IF patient has NO IV access AND is UNABLE to swallow.               guaiFENesin (ROBITUSSIN) 20 mg/mL solution 10 mL  Dose: 10 mL Freq: 4 TIMES DAILY Route: PO  Start: 03/21/17 0800    0901 (10 mL)-Given       1216 (10 mL)-Given       1652 (10 mL)-Given       1914 (10 mL)-Given        0824 (10 mL)-Given       1214 (10 mL)-Given       1627 (10 mL)-Given       2047 (10 mL)-Given        0812 (10 mL)-Given       1239 (10 mL)-Given       1558 (10 mL)-Given       2101 (10 mL)-Given        0819 (10 mL)-Given       1220 (10 mL)-Given       1632 (10 mL)-Given       2028 (10 mL)-Given        0810 (10 mL)-Given       1226 (10 mL)-Given       1544 (10 mL)-Given       2049 (10 mL)-Given        0810 (10 mL)-Given       1130 (10 mL)-Given       1550 (10 mL)-Given       1932 (10 mL)-Given        0800 (10 mL)-Given       1126 (10 mL)-Given       [ ] 1600       [ ] 2000           heparin lock flush 10 UNIT/ML injection 5-10 mL  Dose: 5-10 mL Freq: EVERY 1 HOUR PRN Route: IK  PRN Reason: other  PRN Comment: to lock each CVC - Open Ended (Tunneled and Non-Tunneled) dormant lumen.  Start: 03/24/17 1757   Admin Instructions: MAX: 5 mL per lumen.      0605 (5 mL)-Given        1543 (5 mL)-Given        0538 (5 mL)-Given         0549 (5 mL)-Given        0107 (5 mL)-Given           heparin lock flush 10 UNIT/ML injection 5-10 mL  Dose: 5-10 mL Freq: EVERY 24 HOURS Route: IK  Start: 03/24/17 1800   Admin Instructions: To lock each CVC - Open Ended (Tunneled and Non-Tunneled) dormant lumen. Check PRN heparin flush order to see when last dose of PRN heparin was given before administering.  MAX: 5 mL per lumen..     1653 (5 mL)-Given               1753 (5 mL)-Given        1826 (5 mL)-Given        1231 (5 mL)-Given       1745 (5 mL)-Given [C]        1729 (5 mL)-Given        1234 (5 mL)-Given       1933 (5  mL)-Given        [ ] 1800           HYDROmorphone (DILAUDID) liquid 1 mg  Dose: 1 mg Freq: EVERY 2 HOURS PRN Route: PO  PRN Reason: other  PRN Comment: dyspnea  Start: 03/21/17 0546    0908 (1 mg)-Given        1833 (1 mg)-Given        1054 (1 mg)-Given        1745 (1 mg)-Given [C]       2028 (1 mg)-Given        0046 (1 mg)-Given       0421 (1 mg)-Given       1227 (1 mg)-Given       1809 (1 mg)-Given       2050 (1 mg)-Given         0135 (1 mg)-Given       0349 (1 mg)-Given       0556 (1 mg)-Given       1247 (1 mg)-Given           insulin aspart (NovoLOG) inj (RAPID ACTING)  Dose: 1-4 Units Freq: EVERY 4 HOURS Route: SC  Start: 03/21/17 0630   Admin Instructions: Correction Scale - LOW INSULIN RESISTANCE DOSING     Do Not give Correction Insulin if BG less than 140.  For  - 239 give 1 unit.  For  - 339 give 2 units.  For BG  340 - 439  give 3 units  For BG greater than or equal to 440 give 4 units  Check blood glucose Q4H and administer based on blood glucose.   Notify provider if glucose greater than or equal to 350 mg/dL after administration of correction dose.  If given at mealtime, must be administered 5 min before meal or immediately after.     (0414)-Not Given       0932 (1 Units)-Given       (1208)-Not Given       1656 (1 Units)-Given       (1935)-Not Given        (0121)-Not Given       (0428)-Not Given       (0747)-Not Given       1214 (1 Units)-Given       (1628)-Not Given       (2117)-Not Given [C]       (2323)-Not Given [C]        (0424)-Not Given [C]       (0813)-Not Given       (1345)-Not Given [C]       (1548)-Not Given       (2100)-Not Given [C]        (0038)-Not Given [C]       (0547)-Not Given [C]       0818 (1 Units)-Given       1220 (1 Units)-Given       (1632)-Not Given       (2029)-Not Given [C]        (0053)-Not Given [C]       (0342)-Not Given [C]       (0805)-Not Given       1231 (1 Units)-Given [C]       1553 (1 Units)-Given [C]       (2057)-Not Given        (0059)-Not Given        (0420)-Not Given       (0843)-Not Given       (1130)-Not Given       (1600)-Not Given       (1933)-Not Given        (0100)-Not Given       (0354)-Not Given [C]       (0807)-Not Given [C]       (1127)-Not Given [C]       [ ] 1600       [ ] 2000           ipratropium (ATROVENT) 0.02 % neb solution 0.5 mg  Dose: 0.5 mg Freq: EVERY 4 HOURS Route: NEBULIZATION  Start: 04/03/17 1640        1600 (0.5 mg)-Given [C]       2026 (0.5 mg)-Given       2356 (0.5 mg)-Given        0346 (0.5 mg)-Given       0846 (0.5 mg)-Given       1304 (0.5 mg)-Given       1656 (0.5 mg)-Given       2035 (0.5 mg)-Given        0002 (0.5 mg)-Given       0311 (0.5 mg)-Given       0832 (0.5 mg)-Given       1232 (0.5 mg)-Given       [ ] 1600       [ ] 2000           lactobacillus rhamnosus (GG) (CULTURELL) capsule 1 capsule  Dose: 1 capsule Freq: DAILY Route: PER G TUBE  Start: 03/21/17 0800   Admin Instructions: Administer at least 2 hours before or after oral antibiotics. Capsules may be opened.     0901 (1 capsule)-Given        0824 (1 capsule)-Given        0812 (1 capsule)-Given        0819 (1 capsule)-Given        0807 (1 capsule)-Given        0810 (1 capsule)-Given        0804 (1 capsule)-Given           levalbuterol (XOPENEX) neb solution 1.25 mg  Dose: 1 ampule Freq: EVERY 4 HOURS Route: NEBULIZATION  Start: 04/03/17 1640        1600 (1.25 mg)-Given [C]       2025 (1.25 mg)-Given       2356 (1.25 mg)-Given        0346 (1.25 mg)-Given       0846 (1.25 mg)-Given       1304 (1.25 mg)-Given       1656 (1.25 mg)-Given       2035 (1.25 mg)-Given        0002 (1.25 mg)-Given       0310 (1.25 mg)-Given              0832 (1.25 mg)-Given       1232 (1.25 mg)-Given       [ ] 1645       [ ] 2045           levofloxacin (LEVAQUIN) tablet 750 mg  Dose: 750 mg Freq: DAILY Route: PO  Indications of Use: VENTILATOR-ASSOCIATED PNEUMONIA  Start: 04/05/17 1200   Admin Instructions: Administer at least 2 hrs before or 4 hrs after aluminum, calcium, iron, zinc or  magnesium containing products.   Hold tube feeds for 1 hour before & 2 hours after levofloxacin administration           (1158)-Not Given [C]           lidocaine (LIDODERM) 5 % Patch 1-2 patch  Dose: 1-2 patch Freq: EVERY 24 HOURS Route: TD  Start: 03/21/17 0800   Admin Instructions: Apply patch(s) to low back. To prevent lidocaine toxicity, patient should be patch free for 12 hrs daily. Patches may be cut to smaller size prior to removing release liner.  NEVER APPLY HEAT OVER PATCH which will increase absorption and may lead to risk of local anesthetic toxicity.  Do not apply over area where liposomal bupivacaine was injected for 96 hours post injection.     0933 (2 patch)-Given        (0823)-Not Given [C]        (0813)-Not Given        (0820)-Not Given        (0817)-Not Given        (0813)-Not Given        (0807)-Not Given           lidocaine (LIDODERM) Patch in Place  Freq: EVERY 8 HOURS Route: TD  Start: 03/21/17 0800   Admin Instructions: Chart every shift, confirming that patch is still in place on patient (no barcode scan needed). See patch order for dose information.  NEVER APPLY HEAT OVER PATCH which will increase absorption and may lead to risk of local anesthetic toxicity. Do not apply over area where liposomal bupivacaine injected for 96 hours.     0933 ( )-Given       1750 ( )-Patch in Place        0050 ( )-Negative [C]                                                                                                                            [ ] 1600           lidocaine (LIDODERM) patch REMOVAL  Freq: EVERY 24 HOURS 2000 Route: TD  Start: 03/21/17 1900   Admin Instructions: Remove lidocaine Patch.     1918 ( )-Patch Removed                                                [ ] 2000           lidocaine (LMX4) kit  Freq: ONCE PRN Route: Top  PRN Reason: moderate pain  PRN Comment: for local anesthetic during PICC insertion  Start: 03/24/17 1337   Admin Instructions: Apply to PICC Insertion Site. Apply 30  minutes prior to procedure. MAX Dose: 2.5 gm  (1/2 of 5 gm tube)                 lidocaine (XYLOCAINE) 2 % 15 mL, alum & mag hydroxide-simethicone (MYLANTA ES/MAALOX  ES) 15 mL GI Cocktail  Dose: 30 mL Freq: 3 TIMES DAILY PRN Route: PO  PRN Reason: moderate pain  Start: 03/21/17 0546              LORazepam (ATIVAN) 2 MG/ML (HIGH CONC) solution 0.5 mg  Dose: 0.5 mg Freq: EVERY 3 HOURS PRN Route: PO  PRN Reason: anxiety  Start: 03/21/17 0546      1537 (0.5 mg)-Given        0232 (0.5 mg)-Given       1309 (0.5 mg)-Given        1727 (0.5 mg)-Given        0045 (0.5 mg)-Given        0055 (0.5 mg)-Given       0409 (0.5 mg)-Given       1311 (0.5 mg)-Given           magnesium sulfate 2 g in NS intermittent infusion ([employer]ar[employer] or [facility]V Cmpd)  Dose: 2 g Freq: DAILY PRN Route: IV  PRN Reason: magnesium supplementation  Start: 03/21/17 0546   Admin Instructions: For Serum Mg++ 1.6 - 2 mg/dL  Give 2 g and recheck magnesium level next AM.               magnesium sulfate 4 g in 100 mL sterile water (premade)  Dose: 4 g Freq: EVERY 4 HOURS PRN Route: IV  PRN Reason: magnesium supplementation  Start: 03/21/17 0546   Admin Instructions: For serum Mg++ less than 1.6 mg/dL  Give 4 g and recheck magnesium level 2 hours after dose, and next AM.               melatonin liquid 3 mg  Dose: 3 mg Freq: AT BEDTIME Route: PER J TUBE  Start: 03/21/17 2200    2228 (3 mg)-Given        2244 (3 mg)-Given        2102 (3 mg)-Given        2149 (3 mg)-Given        2110 (3 mg)-Given        2212 (3 mg)-Given        [ ] 2200           menthol-zinc oxide (CALMOSEPTINE) 0.44-20.625 % ointment  Freq: 2 TIMES DAILY PRN Route: Top  PRN Reason: skin protection  Start: 03/21/17 0546   Admin Instructions: Apply to skin               methylnaltrexone (RELISTOR) injection 8 mg  Dose: 8 mg Freq: EVERY OTHER DAY Route: SC  Start: 03/30/17 1445    1913 (8 mg)-Given         0825 (8 mg)-Given         0820 (8 mg)-Given         0907 (8 mg)-Given           morphine solution 10  mg  Dose: 10 mg Freq: EVERY 8 HOURS Route: ORAL OR FEED  Start: 03/23/17 1400    0539 (10 mg)-Given       1432 (10 mg)-Given       2227 (10 mg)-Given        0629 (10 mg)-Given       1353 (10 mg)-Given       2244 (10 mg)-Given        0603 (10 mg)-Given       1446 (10 mg)-Given       2101 (10 mg)-Given        0615 (10 mg)-Given       1309 (10 mg)-Given       2149 (10 mg)-Given        0646 (10 mg)-Given       1436 (10 mg)-Given       2110 (10 mg)-Given        0419 (10 mg)-Given       1543 (10 mg)-Given       2212 (10 mg)-Given        0759 (10 mg)-Given       [ ] 1500       [ ] 2300           multivitamins with minerals (CERTAVITE/CEROVITE) liquid 15 mL  Dose: 15 mL Freq: DAILY Route: PER FEEDING   Start: 03/21/17 0945    0901 (15 mL)-Given        0824 (15 mL)-Given        0812 (15 mL)-Given        0819 (15 mL)-Given        0810 (15 mL)-Given        0810 (15 mL)-Given        0800 (15 mL)-Given           naloxone (NARCAN) injection 0.1-0.4 mg  Dose: 0.1-0.4 mg Freq: EVERY 2 MIN PRN Route: IV  PRN Reason: opioid reversal  Start: 03/21/17 0546   Admin Instructions: For respiratory rate LESS than or EQUAL to 8.  Partial reversal dose:  0.1 mg titrated q 2 minutes for Analgesia Side Effects Monitoring Sedation Level of 3 (frequently drowsy, arousable, drifts to sleep during conversation).Full reversal dose:  0.4 mg bolus for Analgesia Side Effects Monitoring Sedation Level of 4 (somnolent, minimal or no response to stimulation).               omeprazole (priLOSEC) suspension 20 mg  Dose: 20 mg Freq: 2 TIMES DAILY Route: PO  Start: 03/30/17 2000   Admin Instructions: Shake well.     1915 (20 mg)-Given        0825 (20 mg)-Given       2048 (20 mg)-Given        0812 (20 mg)-Given       2101 (20 mg)-Given        0819 (20 mg)-Given       2028 (20 mg)-Given        0812 (20 mg)-Given       2050 (20 mg)-Given        0810 (20 mg)-Given       1934 (20 mg)-Given        0800 (20 mg)-Given       [ ] 2000           ondansetron (ZOFRAN-ODT)  ODT tab 4 mg  Dose: 4 mg Freq: EVERY 6 HOURS PRN Route: PO  PRN Reason: nausea  Start: 03/21/17 0546   Admin Instructions: This is Step 1 of nausea and vomiting management.  If nausea not resolved in 15 minutes, go to Step 2 prochlorperazine (COMPAZINE). Do not push through foil backing. Peel back foil and gently remove. Place on tongue immediately. Administration with liquid unnecessary                                          Or  ondansetron (ZOFRAN) injection 4 mg  Dose: 4 mg Freq: EVERY 6 HOURS PRN Route: IV  PRN Reasons: nausea,vomiting  Start: 03/21/17 0546   Admin Instructions: This is Step 1 of nausea and vomiting management.  If nausea not resolved in 15 minutes, go to Step 2 prochlorperazine (COMPAZINE).  Irritant.      1833 (4 mg)-Given        0435 (4 mg)-Given       1023 (4 mg)-Given          0808 (4 mg)-Given            oxyCODONE (ROXICODONE) IR tablet 5 mg  Dose: 5 mg Freq: EVERY 4 HOURS PRN Route: PO  PRN Reason: moderate to severe pain  Start: 04/01/17 1400   Admin Instructions: While awake               polyethylene glycol (MIRALAX/GLYCOLAX) Packet 17 g  Dose: 17 g Freq: 2 TIMES DAILY Route: PER J TUBE  Start: 03/21/17 0800   Admin Instructions: 1 Packet = 17 grams. Mixed prescribed dose in 8 ounces of water. Follow with 8 oz. of water.     0902 (17 g)-Given       1915 (17 g)-Given        (0803)-Not Given [C]       2048 (17 g)-Given        (0813)-Not Given [C]       (2102)-Not Given [C]        (0754)-Not Given [C]       (2029)-Not Given [C]        (0814)-Not Given       (2055)-Not Given        (0810)-Not Given       (1936)-Not Given        0806 (17 g)-Given       [ ] 2000           polyethylene glycol 0.4%- propylene glycol 0.3% (SYSTANE ULTRA) ophthalmic solution 1-2 drop  Dose: 1-2 drop Freq: 4 TIMES DAILY PRN Route: Both Eyes  PRN Reason: dry eyes  Start: 03/21/17 0546      1600 (2 drop)-Given                      potassium chloride (KLOR-CON) Packet 20-40 mEq  Dose: 20-40 mEq Freq: EVERY 2 HOURS  PRN Route: ORAL OR FEED  PRN Reason: potassium supplementation  Start: 03/21/17 0546   Admin Instructions: Use if unable to tolerate tablets.    If Serum K+ 3.4-4.0, dose = 20 mEq x1. Recheck K+ level the next AM.  If Serum K+ 3.0-3.3, dose = 60 mEq po total dose (40 mEq x 1 followed in 2 hours by 20 mEq X1). Recheck K+ level 4 hours after dose and the next AM.  If Serum K+ 2.5-2.9, dose = 80 mEq po total dose (40 mEq Q2H x2). Recheck K+ level 4 hours after dose and the next AM.  If Serum K+ less than 2.5, See IV order.  Dissolve packet contents in 4-8 ounces of cold water or juice.     1915 (20 mEq)-Given             0907 (20 mEq)-Given           potassium chloride 10 mEq in 100 mL intermittent infusion  Dose: 10 mEq Freq: EVERY 1 HOUR PRN Route: IV  PRN Reason: potassium supplementation  Start: 03/21/17 0546   Admin Instructions: Infuse via PERIPHERAL LINE or CENTRAL LINE. Use for central line replacement if patient weight less than 65 kg, if patient is on TPN with high potassium content or if unit does not stock 20 mEq bags.  If Serum K+ 3.4-4.0, dose = 10 mEq/hr x2 doses. Recheck K+ level the next AM.  If Serum K+ 3.0-3.3, dose = 10 mEq/hr x4 doses (40 mEq IV total dose). Recheck K+ level 2 hours after dose and the next AM.  If Serum K+ less than 3.0, dose = 10 mEq/hr x6 doses (60 mEq IV total dose). Recheck K+ level 2 hours after dose and the next AM.               potassium chloride 10 mEq in 100 mL intermittent infusion with 10 mg lidocaine  Dose: 10 mEq Freq: EVERY 1 HOUR PRN Route: IV  PRN Reason: potassium supplementation  Start: 03/21/17 0546   Admin Instructions: Infuse via PERIPHERAL LINE. Use potassium with lidocaine for pain with peripheral administration.  If Serum K+ 3.4-4.0, dose = 10 mEq/hr x2 doses. Recheck K+ level the next AM.  If Serum K+ 3.0-3.3, dose = 10 mEq/hr x4 doses (40 mEq IV total dose). Recheck K+ level 2 hours after dose and the next AM.  If Serum K+ less than 3.0, dose = 10 mEq/hr  x6 doses (60 mEq IV total dose). Recheck K+ level 2 hours after dose and the next AM.               potassium chloride 20 mEq in 50 mL intermittent infusion  Dose: 20 mEq Freq: EVERY 1 HOUR PRN Route: IV  PRN Reason: potassium supplementation  Last Dose: 20 mEq (04/02/17 1002)  Start: 03/21/17 0546   Admin Instructions: Infuse via CENTRAL LINE Only.  May need EKG if less than 65 kg or on TPN - Max rate is 0.3 mEq/kg/hr for patients not on EKG monitoring.    If Serum K+ 3.4-4.0, dose = 20 mEq/hr x1 doses. Recheck K+ level the next AM.  If Serum K+ 3.0-3.3, dose = 20 mEq/hr x2 doses (40 mEq IV total dose).  Recheck K+ level 2 hours after dose and the next AM.  If Serum K+ less than 3.0, dose = 20 mEq/hr x3 doses (60 mEq IV total dose). Recheck K+ level 2 hours after dose and the next AM.      1641 (20 mEq)-New Bag         1002 (20 mEq)-New Bag              potassium chloride SA (K-DUR/KLOR-CON M) CR tablet 20-40 mEq  Dose: 20-40 mEq Freq: EVERY 2 HOURS PRN Route: PO  PRN Reason: potassium supplementation  Start: 03/21/17 0546   Admin Instructions: Use if able to take PO.   If Serum K+ 3.4-4.0, dose = 20 mEq x1. Recheck K+ level the next AM.  If Serum K+ 3.0-3.3, dose = 60 mEq po total dose (40 mEq x1 followed in 2 hours by 20 mEq x1). Recheck K+ level 4 hours after dose and the next AM.  If Serum K+ 2.5-2.9, dose = 80 mEq po total dose (40 mEq Q2H x2). Recheck K+ level 4 hours after dose and the next AM.  If Serum K+ less than 2.5, See IV order.  DO NOT CRUSH.               predniSONE (DELTASONE) tablet 40 mg  Dose: 40 mg Freq: DAILY Route: PER G TUBE  Start: 04/04/17 0800   End: 04/09/17 0759         0808 (40 mg)-Given        0804 (40 mg)-Given           protein modular (BENEPROTEIN) packet 1 packet  Dose: 1 packet Freq: 2 TIMES DAILY Route: PER J TUBE  Start: 04/04/17 2000   Admin Instructions: Mix each packet with 60 mL water before administering. Supplied by nutrition department.          1934 (1 packet)-Given         0808 (1 packet)-Given       [ ] 2000           QUEtiapine (SEROquel) tablet 25 mg  Dose: 25 mg Freq: 2 TIMES DAILY Route: PO  Start: 03/26/17 2000    0900 (25 mg)-Given       2018 (25 mg)-Given        0823 (25 mg)-Given       2049 (25 mg)-Given        0812 (25 mg)-Given       2101 (25 mg)-Given        0818 (25 mg)-Given       2028 (25 mg)-Given        0807 (25 mg)-Given       2050 (25 mg)-Given        0808 (25 mg)-Given       1933 (25 mg)-Given        0804 (25 mg)-Given       [ ] 2000           QUEtiapine (SEROquel) tablet 25 mg  Dose: 25 mg Freq: AT BEDTIME PRN Route: PER FEEDING   PRN Comment: insomnia  Start: 03/21/17 0546              senna-docusate (SENOKOT-S;PERICOLACE) 8.6-50 MG per tablet 2 tablet  Dose: 2 tablet Freq: 2 TIMES DAILY Route: PER J TUBE  Start: 03/21/17 0800    0901 (2 tablet)-Given       1915 (2 tablet)-Given        (0805)-Not Given [C]       (2050)-Not Given [C]        0811 (2 tablet)-Given       2101 (2 tablet)-Given        (0754)-Not Given [C]       (2029)-Not Given [C]        0806 (2 tablet)-Given       (2055)-Not Given        (0812)-Not Given       (1936)-Not Given        (0807)-Not Given       [ ] 2000           sertraline (ZOLOFT) 20 MG/ML (HIGH CONC) solution 150 mg  Dose: 150 mg Freq: DAILY Route: PER FEEDING   Start: 03/21/17 0800   Admin Instructions: Must dilute before use; mix with 4 oz of water, ginger ale, OJ, lemonade or lemon/lime soda.     0859 (150 mg)-Given        0824 (150 mg)-Given        0812 (150 mg)-Given        0819 (150 mg)-Given        0813 (150 mg)-Given        0810 (150 mg)-Given        0800 (150 mg)-Given           sodium chloride (OCEAN) 0.65 % nasal spray 1 spray  Dose: 1 spray Freq: DAILY PRN Route: BOTH NOSTRIL  PRN Reason: congestion  Start: 03/21/17 0546              sodium chloride (PF) 0.9% PF flush 10-20 mL  Dose: 10-20 mL Freq: EVERY 1 HOUR PRN Route: IK  PRN Reasons: line flush,post meds or blood draw  Start: 03/24/17 3297   Admin Instructions: to  flush CVC - Open Ended (Tunneled and Non-Tunneled).   10 mL post IV meds; 20 mL post blood draw.     0529 (30 mL)-Given [C]        0600 (20 mL)-Given        0617 (20 mL)-Given       1221 (20 mL)-Given       1538 (20 mL)-Given        0537 (20 mL)-Given       0949 (20 mL)-Given [C]       1227 (20 mL)-Given        1102 (20 mL)-Given        0544 (20 mL)-Given       1230 (20 mL)-Given        0107 (10 mL)-Given [C]       0647 (20 mL)-Given           sodium phosphate (FLEET ENEMA) 1 enema  Dose: 1 enema Freq: DAILY PRN Route: RE  PRN Reason: constipation  Start: 03/30/17 1446   Admin Instructions: For children greater than or equal to 12 years     1920 (1 enema)-Given                 sodium phosphate 10 mmol in D5W intermittent infusion  Dose: 10 mmol Freq: DAILY PRN Route: IV  PRN Reason: phosphorous supplementation  Start: 03/21/17 0546   Admin Instructions: For serum phosphorus level 2.5-2.7  Do not infuse Phosphorus in the same line as TPN.   Give 10 mmol and recheck phosphorus level next AM.               sodium phosphate 15 mmol in D5W intermittent infusion  Dose: 15 mmol Freq: DAILY PRN Route: IV  PRN Reason: phosphorous supplementation  Last Dose: 15 mmol (03/23/17 2213)  Start: 03/21/17 0546   Admin Instructions: For serum phosphorus level 2.0-2.4  Do not infuse Phosphorus in the same line as TPN.   Give 15 mmol and recheck phosphorus level next AM.               sodium phosphate 20 mmol in D5W intermittent infusion  Dose: 20 mmol Freq: EVERY 6 HOURS PRN Route: IV  PRN Reason: phosphorous supplementation  PRN Comment: serum phosphorus level 1.1-1.9  Start: 03/21/17 0546   Admin Instructions: For serum phosphorus level 1.1-1.9  Do not infuse Phosphorus in the same line as TPN.   Give 20 mmol and recheck phosphorus level 2 hours after last dose and next AM.               sodium phosphate 25 mmol in D5W intermittent infusion  Dose: 25 mmol Freq: EVERY 8 HOURS PRN Route: IV  PRN Reason: phosphorous  supplementation  Start: 03/21/17 0546   Admin Instructions: For serum phosphorus level less than 1.1  Do not infuse Phosphorus in the same line as TPN.   Give 25 mmol and recheck phosphorus level 2 hours after last dose and next AM.               zinc sulfate (20 mg [place]. Zn/mL) solution 220 mg  Dose: 220 mg Freq: DAILY Route: PER J TUBE  Start: 03/29/17 0800   End: 04/08/17 0759    0900 (220 mg)-Given        0824 (220 mg)-Given        0812 (220 mg)-Given        0819 (220 mg)-Given        0812 (220 mg)-Given        0810 (220 mg)-Given        0759 (220 mg)-Given          Future Medications  Medications 03/30/17 03/31/17 04/01/17 04/02/17 04/03/17 04/04/17 04/05/17       fiber modular (NUTRISOURCE FIBER) packet 1 packet  Dose: 1 packet Freq: DAILY Route: PER J TUBE  Start: 04/06/17 0800   Admin Instructions: Mix each packet with 60 mL water before administering. Supplied by nutrition department.               vitamin A-D & C drops (TRI-VITAMIN) drops SOLN 7 mL  Dose: 7 mL Freq: DAILY Route: PER J TUBE  Start: 04/06/17 0800   End: 04/15/17 0759              zinc sulfate (20 mg [place]. Zn/mL) solution 220 mg  Dose: 220 mg Freq: DAILY Route: PER J TUBE  Start: 04/09/17 0800   End: 04/19/17 0759             Completed Medications  Medications 03/30/17 03/31/17 04/01/17 04/02/17 04/03/17 04/04/17 04/05/17         Dose: 2-5 mL Freq: ONCE PRN Route: IK  PRN Reason: line flush  PRN Comment: for locking each dormant lumen with line placement  Start: 03/24/17 1337   End: 04/03/17 1102   Admin Instructions: May repeat x 1         1102 (5 mL)-Given               Dose: 20 mg Freq: ONCE Route: PO  Start: 04/03/17 1430   End: 04/03/17 1436        1436 (20 mg)-Given            Discontinued Medications  Medications 03/30/17 03/31/17 04/01/17 04/02/17 04/03/17 04/04/17 04/05/17         Dose: 100 mg Freq: 2 TIMES DAILY Route: PO  Indications of Use: VENTILATOR-ASSOCIATED PNEUMONIA  Indications Comment: COPD  Start: 04/02/17 2000   End:  04/02/17 1643       1643-Med Discontinued            Dose: 1 packet Freq: 3 TIMES DAILY Route: PER J TUBE  Start: 03/21/17 0800   End: 04/05/17 1014   Admin Instructions: Mix each packet with 60 mL water before administering. Supplied by nutrition department.     0903 (1 packet)-Given       1654 (1 packet)-Given       2229 (1 packet)-Given        0835 (1 packet)-Given       1628 (1 packet)-Given       2245 (1 packet)-Given        0812 (1 packet)-Given       1548 (1 packet)-Given       2102 (1 packet)-Given        0820 (1 packet)-Given       1632 (1 packet)-Given       2150 (1 packet)-Given        0815 (1 packet)-Given       1549 (1 packet)-Given       2110 (1 packet)-Given        0813 (1 packet)-Given       1550 (1 packet)-Given       2212 (1 packet)-Given        0808 (1 packet)-Given       1014-Med Discontinued         Dose: 0.5 mg Freq: 4 TIMES DAILY Route: NEBULIZATION  Start: 03/21/17 0800   End: 04/03/17 1418    0914 (0.5 mg)-Given       1314 (0.5 mg)-Given       1635 (0.5 mg)-Given       2028 (0.5 mg)-Given        0839 (0.5 mg)-Given       1140 (0.5 mg)-Given       1547 (0.5 mg)-Given       2117 (0.5 mg)-Given        0847 (0.5 mg)-Given       1218 (0.5 mg)-Given       1645 (0.5 mg)-Given       1959 (0.5 mg)-Given        0824 (0.5 mg)-Given       1222 (0.5 mg)-Given       1600 (0.5 mg)-Given       2117 (0.5 mg)-Given        0830 (0.5 mg)-Given       1240 (0.5 mg)-Given       1418-Med Discontinued           Dose: 1 ampule Freq: 4 TIMES DAILY Route: NEBULIZATION  Start: 03/21/17 0800   End: 04/03/17 1418    0914 (1.25 mg)-Given       1314 (1.25 mg)-Given       1635 (1.25 mg)-Given       2028 (1.25 mg)-Given        0839 (1.25 mg)-Given       1140 (1.25 mg)-Given       1547 (1.25 mg)-Given       (2118)-Not Given [C]        0847 (1.25 mg)-Given       1218 (1.25 mg)-Given       1645 (1.25 mg)-Given       1959 (1.25 mg)-Given        0824 (1.25 mg)-Given       1222 (1.25 mg)-Given       1600 (1.25 mg)-Given       2117  (1.25 mg)-Given        0830 (1.25 mg)-Given       1240 (1.25 mg)-Given       1418-Med Discontinued           Dose: 1 g Freq: EVERY 8 HOURS Route: IV  Indications of Use: HEALTHCARE-ASSOCIATED PNEUMONIA  Last Dose: 1 g (04/04/17 0102)  Start: 04/02/17 1800   End: 04/05/17 1035       1845 (1 g)-New Bag [C]        0235 (1 g)-New Bag       0945 (1 g)-New Bag       1727 (1 g)-New Bag        0102 (1 g)-New Bag       0840 (1 g)-New Bag       1823 (1 g)-New Bag        0128 (1 g)-New Bag       0928 (1 g)-New Bag       1035-Med Discontinued         Dose: 20 mg Freq: DAILY Route: PER G TUBE  Start: 03/21/17 0800   End: 04/03/17 1418    0900 (20 mg)-Given        0823 (20 mg)-Given        0811 (20 mg)-Given        0818 (20 mg)-Given        0806 (20 mg)-Given       1418-Med Discontinued           Dose: 13 mL Freq: DAILY Route: PO  Start: 04/04/17 1445   End: 04/05/17 0207         1824 (13 mL)-Given        0207-Med Discontinued         Dose: 13 mL Freq: DAILY Route: PO  Start: 04/05/17 0800   End: 04/05/17 0936          0929 (13 mL)-Given [C]       0936-Med Discontinued         Dose: 220 mg Freq: DAILY Route: PER J TUBE  Start: 04/09/17 0800   End: 04/05/17 0936          0936-Med Discontinued         Dose: 220 mg Freq: 3 TIMES DAILY Route: PO  Start: 04/09/17 0800   End: 04/04/17 1432         1432-Med Discontinued     Medications 03/30/17 03/31/17 04/01/17 04/02/17 04/03/17 04/04/17 04/05/17               INTERAGENCY TRANSFER FORM - NOTES (H&P, Discharge Summary, Consults, Procedures, Therapies)   3/20/2017                       [facility] [address] [facility]: [phone]               History & Physicals      H&P by Kole Nolan MD at 3/21/2017  1:28 AM     Author:  Kole Nolan MD Service:  General Medicine Author Type:  Resident    Filed:  3/21/2017  5:58 AM Date of Service:  3/21/2017  1:28 AM Note Created:  3/21/2017  1:27 AM    Status:  Attested :  Kole Nolan MD (Resident)    Cosigner:   Lucía Guadalupe MD at 3/21/2017  9:47 PM        Attestation signed by Lucía Guadalupe MD at 3/21/2017  9:47 PM        Staff Summary  3/20/2017        Patient is critically ill by my examination on the date of service (DOS) listed above and requires continued ICU monitoring and cares.  The patient is being seen in the [facility].      I have discussed patient cares and treatment plan with the ICU team as reflected in the residents note by Dr. Nolan dated 3/21/2017.  All pertinent labs, imaging studies, physical exam and medications have been reviewed by myself with the SICU team.     Evaluation and management time exclusive of procedures was 15 minutes critical care time in the management and care of this patient including:  examination with the SICU team, discussion of the patient's condition with other physicians and members of the care team, reviewing all data related to the patient, and time utilizing the EMR for documentation of this patient's care.  All data, assessments and plans were discussed with [facility] team.    Lucía Guadalupe MD  [phone] (pager)                                 [facility] History and Physical    Mary Wang MRN# [id number]   Age: 67 year old YOB: 1950     Date of Admission:  3/20/2017            Assessment and Plan:   Assessment:   Mary Wang is a 67 year old female with a past medical history significant for severe COPD (Last FEV1 0.35) s/p tracheostomy on home ventilator and GJ tube placement, anxiety, hypertension, chronic indwelling bender catheter, and achalasia who presented with[JS1.1] malaise and bender catheter pain, found to have severe metabolic derangements and a leukocytosis concerning for sepsis[JS1.2].       Plan:   NEURO:  Generalized anxiety disorder  Air hunger   - Continue home clonazepam, morphine, quetiapine, sertraline, hydromorphone PRN, and lorazepam PRN    PULM:[JS1.1]  Acute on chronic  hypoxic and hypercarbic respiratory failure[JS1.3]   Severe COPD s/p tracheostomy, home vent-dependent   Home vent settings:[JS1.1] CMV RR 14  PEEP 5 FiO2 40%. Concern for possible aspiration vs HCAP given RLL infiltrate on exam. Afebrile.   - RR increased to 16 by ED provider, no other changes made to home settings[JS1.3]    - Obtain blood gas upon arrival to ICU to assess vent changes   - Antimicrobials as below   - Continue home prednisone, nebs[JS1.4]     CV:  H/o CAD, NSTEMI[JS1.1]  - Not on any cardiac meds currently[JS1.4]     Sinus tachycardia[JS1.1]  Likely due to infection vs discomfort with bender catheter. Will continue to monitor.[JS1.4]     ID:  Sepsis (leukocytosis, tachycardia) secondary to UTI and/or HCAP   Chronic indwelling bender in place with urinary symptoms.[JS1.1] Sediment present in bender bag.[JS1.4] UA with moderate LE, negative nitrites. CT A/P with bladder wall thickening c/w cystitis. CXR with RLL infiltrate concerning for pneumonia. Normal lactate.[JS1.1] Leukocytosis (18.6) with neutrophil predominance on admission.[JS1.4]    - Meropenem and vancomycin given ED for HCAP[JS1.1]/UTI[JS1.4] coverage[JS1.1]   -[JS1.2] Sputum cultures, blood cultures, urine cultures  - Add on pro-calcitonin        RENAL/ELECTROLYTES:  Hypovolemic hyponatremia  On admission 120 (down from 142 one month prior). Suspect hypovolemia given[JS1.1] probable systemic infection. Will monitor after fluid resuscitation. Goal to correct 10-12 mEq per day given unclear chronicity. S/p 1L NS bolus in ED.   -[JS1.4] Trend Na[JS1.5]  - NS @ 125 cc/hr[JS1.2]    Hypokalemia  - On protocol[JS1.4]    Chronic r[JS1.2]espiratory acidosis w/ renal compensation  Metabolic alkalosis   Overall patient is alkalemic probably due to a metabolic alkalosis in the setting of volume contraction. She otherwise has a chronic respiratory acidosis with renal compensation. Will monitor acid-base status after vent changes and volume  "resuscitation.     GI/Nutrition:  H/o achalasia and GERD  S/p GJ tube  - Resume home TF's, nutrition consult   - Continue home H2 blocker, GI cocktail PRN    Constipation, likely opioid-induced  - Continue home bowel regimen     HEME/ONC:  Leukocytosis with neutrophilia   Likely due to infection(s) as above. Will monitor.     Chronic normocytic anemia   - Will monitor periodically, no signs of bleeding     ENDO:  Steroid-induced hypergylcemia   - LDSSI, hypoglycemia protocol    MSK/RHEUM:[JS1.4]   Stage III Decubitus Ulcer  No purulent drainage or surrounding erythema suggestive of superimposed infection.   - WOC consult[JS1.6]    Lines: PIV, tracheostomy, bender[JS1.4], GJ tube[JS1.3]    Prophylaxis: DVT - Enoxaparin, Ulcer - H2 blocker   Code:[JS1.1] Full[JS1.3] - per discussion with patient in emergency dept[JS1.4]  Dispo: Lives in[JS1.1] group[JS1.3] home[JS1.1] and[JS1.7] had been[JS1.1] essentially on comfort cares with a POLST in place stating that she did not wish to be hospitalized. She was adamant about being evaluated today, however, and therefore will be admitted to the ICU for[JS1.7] possible[JS1.3] sepsis and severe metabolic derangements.[JS1.7]    Patient discussed with Dr. Guadalupe.     Kt Nolan MD  PGY-2 Internal Medicine  (p) [phone]             Chief Complaint:   \"Not feeling well\" and \"urinary pain\"     History is obtained from the patient and           History of Present Illness:   This patient is a 67 year old  female with a significant past medical history of severe COPD (Last FEV1 0.35) s/p tracheostomy on home ventilator and GJ tube placement, anxiety, hypertension, chronic indwelling bender catheter, and achalasia who presented for pain at her bender catheter site and malaise.[JS1.1]     Per her home care nurse, she has been \"feeling off\" all day. She did not experience any fevers, chills, sweats, increased secretions, diarrhea, nausea, vomiting, chest pain, " palpitations, or worsening dyspnea.  She also has been having some discomfort with her bender catheter that was recently exchanged at home. No blood was noticed in the bender bag.     Upon evaluation in the ED, the patient was resting comfortably in bed. She was difficult to obtain a history from because of the trach but also because she would fall asleep when being asked questions.[JS1.4]     Of note, the patient has had seven admissions since 12/2016 and has been[JS1.1] living at a group[JS1.4] ramesh[JS1.1]e[JS1.4].[JS1.1] She has filled out a POLST which was reviewed in the ED and does state that she wishes not to be re-hospitalized, however per discussion with the ED provider and the home care nurse, she was adamant today that she be seen in the hospital and is ok with all aggressive measures.[JS1.4]          Past Medical History:[JS1.1]     Past Medical History   Diagnosis Date     Achalasia      Anxiety      Anxiety and depression      Chronic pain      COPD (chronic obstructive pulmonary disease) (H)      trach/vent dependent      GERD (gastroesophageal reflux disease)      Hypertension      Lung nodule      spiculated; followed in pulm clinic      Stress-induced cardiomyopathy      TMJ pain dysfunction syndrome      Tracheostomy in place[JS1.8]              Past Surgical History:[JS1.1]     Past Surgical History   Procedure Laterality Date     Tracheostomy N/A 8/26/2015     Procedure: TRACHEOSTOMY;  Surgeon: Milena Thibodeaux MD;  Location: [facility]     Bronchoscopy, insert bronchial valve Right 9/2/2015     Procedure: BRONCHOSCOPY, INSERT BRONCHIAL VALVE;  Surgeon: Everardo Beach MD;  Location: [facility]     Esophagoscopy, gastroscopy, duodenoscopy (egd), combined N/A 12/11/2015     Procedure: COMBINED ESOPHAGOSCOPY, GASTROSCOPY, DUODENOSCOPY (EGD);  Surgeon: Dhruv Lyles MD;  Location: [facility]     Esophagoscopy, gastroscopy, duodenoscopy (egd), combined N/A 12/31/2015     Procedure: COMBINED  ESOPHAGOSCOPY, GASTROSCOPY, DUODENOSCOPY (EGD);  Surgeon: Brenden Plasencia MD;  Location: [facility]     Percutaneous insertion tube jejunostomy N/A 12/31/2015     Procedure: PERCUTANEOUS INSERTION TUBE JEJUNOSTOMY;  Surgeon: Brenden Plasencia MD;  Location: [facility]     Percutaneous insertion tube jejunostomy N/A 1/25/2016     Procedure: PERCUTANEOUS INSERTION TUBE JEJUNOSTOMY;  Surgeon: Carrie Coleman MD;  Location: [facility]     Anesthesia out of or mri N/A 4/18/2016     Procedure: ANESTHESIA OUT OF OR MRI;  Surgeon: GENERIC ANESTHESIA PROVIDER;  Location: [facility]     Endoscopic insertion tube gastrostomy N/A 7/9/2016     Procedure: ENDOSCOPIC INSERTION TUBE GASTROSTOMY;  Surgeon: Brenden Plasencia MD;  Location: [facility]     Picc insertion Right 1/9/2017     5fr DL BioFlo PICC, 38cm (1cm external) in the R basilic vein.     Esophagoscopy, gastroscopy, duodenoscopy (egd), combined N/A 2/17/2017     Procedure: COMBINED ESOPHAGOSCOPY, GASTROSCOPY, DUODENOSCOPY (EGD);  Surgeon: Brenden Plasencia MD;  Location: [facility][JS1.8]             Social History:[JS1.1]     Social History   Substance Use Topics     Smoking status: Former Smoker     Packs/day: 2.00     Years: 40.00     Types: Cigarettes     Start date: 1/1/1964     Quit date: 4/18/1998     Smokeless tobacco: Never Used     Alcohol use No[JS1.8]             Family History:[JS1.1]     Family History   Problem Relation Age of Onset     CANCER Sister[JS1.8]             Allergies:[JS1.1]     Allergies   Allergen Reactions     Levaquin Other (See Comments)     Delirium     Toro Podhaler [Tobramycin] Other (See Comments)     Severe congestion, SOB      Suprax [Cefixime]      See ceftibuten     Augmentin Nausea     Has tolerated for treatment of UTIs in 2016.     Avelox [Moxifloxacin] Anxiety     Cedax [Ceftibuten] Other (See Comments)     Pain in eyes     Penicillins Itching     Tolerated amoxicillin without issues.     Vantin [Cefpodoxime] Nausea[JS1.9]              Medications:[JS1.1]      Prior to Admission medications    Medication Sig Last Dose Taking? Auth Provider   albuterol (2.5 MG/3ML) 0.083% neb solution Take 1 vial by nebulization every 6 hours as needed for shortness of breath / dyspnea or wheezing 3/20/2017 at 1630 Yes Reported, Patient   gabapentin (NEURONTIN) 250 MG/5ML solution TAKE 300MG (6ML) VIA J-TUBE 3 TIMES A DAY.... 3/20/2017 at 1630 Yes Norman Brito MD   HYDROmorphone (DILAUDID) 1 MG/ML LIQD liquid Take 1 mL (1 mg) by mouth every 2 hours as needed for other (dyspnea) 3/20/2017 at 1630 Yes Norman Brito MD   levalbuterol (XOPENEX) 1.25 MG/3ML neb solution Take 3 mLs (1.25 mg) by nebulization 4 times daily and every four hours at night PRN. 3/20/2017 at 1630 Yes Norman Brito MD   morphine sulfate (ROSIE) 10 MG 24 hr capsule Give contents of one capsule through the gastrostomy tube every 8 hours.   Norman Brito MD   predniSONE (DELTASONE) 20 MG tablet 1 tablet (20 mg) by Per G Tube route daily   Norman Brito MD   polyethylene glycol (MIRALAX/GLYCOLAX) Packet 17 g by Per J Tube route 2 times daily Hold for loose stools   Veronica Huerta APRN CNP   budesonide (PULMICORT) 0.5 MG/2ML neb solution Take 2 mLs (0.5 mg) by nebulization 2 times daily   Veronica Huerta APRN CNP   clonazePAM (KLONOPIN) 0.5 MG tablet TAKE 1.5MG (3 TABLETS) BY MOUTH FOUR TIMES A DAY FOR ANXIETY.   Norman Brito MD   menthol-zinc oxide (CALMOSEPTINE) 0.44-20.625 % OINT ointment Apply topically 2 times daily as needed for skin protection   Norman Brito MD   guaiFENesin (ORGANIDIN) 200 MG TABS tablet Take 1 tablet (200 mg) by mouth 4 times daily   Norman Brito MD   Cranberry 125 MG TABS 1 tablet by Jejunal Tube route daily   Norman Brito MD   QUEtiapine (SEROQUEL) 25 MG tablet Administer one tab per J-tube as needed at night if scheduled HS dose ineffective for treatment of anxiety or sleeplessness.    Norman Brito MD   bisacodyl (DULCOLAX) 10 MG Suppository Place 1 suppository (10 mg) rectally daily as needed for constipation   Celia Chavis MD   order for DME Equipment being ordered: Methylex foam dressings, alternating pressure pad for mattress and pump.   Norman Brito MD   Rectal Cleansers ([employer] CLEANSING ENEMA) ENEM Place 1 enema rectally daily as needed   Norman Brito MD   order for DME Equipment being ordered: 1) Avendaño catheter 16F, balloon 10 mL, silicone.  2) Urinary collection bag.  3) Elastic leg strap for Avendaño catheter.  Please fax to [facility].   Norman Brito MD   LORazepam (ATIVAN) 2 MG/ML (HIGH CONC) solution Take 0.25 mLs (0.5 mg) by mouth every 3 hours as needed for anxiety   Norman Brito MD   clonazePAM (KLONOPIN) 0.1 mg/mL Take 15 mLs (1.5 mg) by mouth 4 times daily   Norman Brito MD   QUEtiapine (SEROQUEL) 50 MG tablet Take 1 tablet (50 mg) by mouth 2 times daily   Norman Brito MD   ipratropium (ATROVENT) 0.02 % neb solution Take 2.5 mLs (0.5 mg) by nebulization 4 times daily and every four hours at night PRN.   Norman Brito MD   lactobacillus rhamnosus, GG, (CULTURELL) capsule 1 capsule by Per G Tube route daily   Norman Brito MD   lidocaine (LIDODERM) 5 % Patch Place 1-2 patches onto the skin every 24 hours Apply to the lower back   Jenni Tellez MD   senna-docusate (SENOKOT-S;PERICOLACE) 8.6-50 MG per tablet 2 tablets by Per J Tube route 2 times daily      calcium carbonate (TUMS) 500 MG chewable tablet Take 1 tablet (500 mg) by mouth every 2 hours as needed for heartburn      ondansetron (ZOFRAN) 4 MG tablet Take 1 tablet (4 mg) by mouth every 4 hours as needed for nausea      famotidine (PEPCID) 40 MG/5ML suspension Take 2.5 mLs (20 mg) by mouth 2 times daily as needed for heartburn   Theresa Robertson APRN CNP   loperamide (IMODIUM A-D) 2 MG tablet 2-2 mg tablets per  J-tube qid prn frequent and/or loose stools. Do not use more than 8 tabs per day.   Theresa Robertson APRN CNP   polyethylene glycol 0.4%- propylene glycol 0.3% (SYSTANE ULTRA) 0.4-0.3 % SOLN ophthalmic solution Place 1-2 drops into both eyes 4 times daily as needed for dry eyes 0900, 1300, 1700, 2100   Theresa Robertson APRN CNP   sertraline (ZOLOFT) 20 MG/ML (HIGH CONC) solution 7.5 mLs (150 mg) by Per Feeding Tube route daily   Geo Bui MD   fexofenadine (ALLEGRA) 180 MG tablet Take 1 tablet (180 mg) by mouth daily   Geo Bui MD   fiber modular (NUTRISOURCE FIBER) packet 1 packet by Per J Tube route 3 times daily   Geo Bui MD   melatonin (MELATONIN) 1 MG/ML LIQD liquid 3 mLs (3 mg) by Per J Tube route At Bedtime   Francisco Burt MD   acetaminophen (TYLENOL) 160 MG/5ML oral liquid 20.3 mLs (650 mg) by Per Feeding Tube route every 4 hours as needed for mild pain   Kiesha Martinez MD   sodium chloride (OCEAN) 0.65 % nasal spray Spray 1 spray into both nostrils daily as needed for congestion   Kiesha Martinez MD   lidocaine 15 mL, alum & mag hydroxide-simethicone 15 mL GI Cocktail Take 30 mLs by mouth 3 times daily as needed for moderate pain   Kiesha Martinez MD[JS1.9]              Review of Systems:   The Review of Systems is negative other than noted in the HPI          Physical Exam:[JS1.1]     Temp:  [98.1  F (36.7  C)] 98.1  F (36.7  C)  Pulse:  [100] 100  Heart Rate:  [82-98] 98  Resp:  [15-21] 15  BP: (100-140)/(46-99) 118/46  SpO2:  [93 %-100 %] 98 %[JS1.10]     Constitutional:[JS1.1]   No acute distress, resting comfortably in bed[JS1.4]    Eyes:[JS1.1]   Non-icteric, PERRL[JS1.4]   ENT:[JS1.1]   Trach site appears CDI[JS1.4]   Neck:[JS1.1]   Supple, symmetrical, trachea midline, no adenopathy, thyroid symmetric, not enlarged and no tenderness, skin normal[JS1.4]   Hematologic / Lymphatic:[JS1.1]   Scattered ecchymoses on forearms[JS1.4]     Back:[JS1.1]   No tenderness to palpation[JS1.4]   Lungs:[JS1.1]   Inspiratory crackles heard at right base, no wheezes[JS1.4]    Cardiovascular:[JS1.1]   RRR, normal S1 S2, no m/r/g[JS1.4]   Abdomen:[JS1.1]   GJ site appears CDI, hypoactive BS, soft, NT/ND, no HSM[JS1.4]     Genitounirinary:[JS1.1]   Avendaño in place[JS1.4]    Musculoskeletal:[JS1.1]   There is no redness, warmth, or swelling of the joints.[JS1.4]    Neurologic:[JS1.1]   Awake, alert, sleepy, moving all extremities, CN II-XII intact[JS1.4]    Neuropsychiatric:[JS1.1]   Normal affect[JS1.4]   Skin:[JS1.1]   No acute rashes noted[JS1.4]              Data:[JS1.1]     Results for orders placed or performed during the hospital encounter of 03/20/17 (from the past 24 hour(s))   CBC with platelets differential   Result Value Ref Range    WBC 18.6 (H) 4.0 - 11.0 10e9/L    RBC Count 3.56 (L) 3.8 - 5.2 10e12/L    Hemoglobin 10.4 (L) 11.7 - 15.7 g/dL    Hematocrit 32.4 (L) 35.0 - 47.0 %    MCV 91 78 - 100 fl    MCH 29.2 26.5 - 33.0 pg    MCHC 32.1 31.5 - 36.5 g/dL    RDW 18.6 (H) 10.0 - 15.0 %    Platelet Count 341 150 - 450 10e9/L    Diff Method Automated Method     % Neutrophils 84.8 %    % Lymphocytes 6.7 %    % Monocytes 7.9 %    % Eosinophils 0.0 %    % Basophils 0.2 %    % Immature Granulocytes 0.4 %    Nucleated RBCs 0 0 /100    Absolute Neutrophil 15.8 (H) 1.6 - 8.3 10e9/L    Absolute Lymphocytes 1.3 0.8 - 5.3 10e9/L    Absolute Monocytes 1.5 (H) 0.0 - 1.3 10e9/L    Absolute Eosinophils 0.0 0.0 - 0.7 10e9/L    Absolute Basophils 0.0 0.0 - 0.2 10e9/L    Abs Immature Granulocytes 0.1 0 - 0.4 10e9/L    Absolute Nucleated RBC 0.0    Basic metabolic panel   Result Value Ref Range    Sodium 120 (L) 133 - 144 mmol/L    Potassium 3.3 (L) 3.4 - 5.3 mmol/L    Chloride 64 (L) 94 - 109 mmol/L    Carbon Dioxide (HH) 20 - 32 mmol/L     >45  Critical Value called to and read back by  ACE GOODEN RN IN ER AT 2111 ON 03/20/17 BY KS      Anion Gap Not Calculated 3 -  14 mmol/L    Glucose 106 (H) 70 - 99 mg/dL    Urea Nitrogen 47 (H) 7 - 30 mg/dL    Creatinine 0.82 0.52 - 1.04 mg/dL    GFR Estimate 69 >60 mL/min/1.7m2    GFR Estimate If Black 84 >60 mL/min/1.7m2    Calcium 9.1 8.5 - 10.1 mg/dL   Hepatic panel   Result Value Ref Range    Bilirubin Direct <0.1 0.0 - 0.2 mg/dL    Bilirubin Total 0.4 0.2 - 1.3 mg/dL    Albumin 3.1 (L) 3.4 - 5.0 g/dL    Protein Total 7.2 6.8 - 8.8 g/dL    Alkaline Phosphatase 104 40 - 150 U/L    ALT 22 0 - 50 U/L    AST 22 0 - 45 U/L   Lipase   Result Value Ref Range    Lipase 102 73 - 393 U/L   UA with Microscopic   Result Value Ref Range    Color Urine Yellow     Appearance Urine Clear     Glucose Urine Negative NEG mg/dL    Bilirubin Urine Negative NEG    Ketones Urine Negative NEG mg/dL    Specific Gravity Urine 1.013 1.003 - 1.035    Blood Urine Moderate (A) NEG    pH Urine 8.5 (H) 5.0 - 7.0 pH    Protein Albumin Urine Negative NEG mg/dL    Urobilinogen mg/dL Normal 0.0 - 2.0 mg/dL    Nitrite Urine Negative NEG    Leukocyte Esterase Urine Moderate (A) NEG    Source Catheterized Urine     WBC Urine 5 (H) 0 - 2 /HPF    RBC Urine 4 (H) 0 - 2 /HPF   Basic metabolic panel   Result Value Ref Range    Sodium 120 (L) 133 - 144 mmol/L    Potassium 3.4 3.4 - 5.3 mmol/L    Chloride 64 (L) 94 - 109 mmol/L    Carbon Dioxide (HH) 20 - 32 mmol/L     >45  Critical Value called to and read back by  DEAN MIGUEL RN IN ER AT 2236 ON 03/20/17 BY KS      Anion Gap Not Calculated 3 - 14 mmol/L    Glucose 109 (H) 70 - 99 mg/dL    Urea Nitrogen 50 (H) 7 - 30 mg/dL    Creatinine 0.83 0.52 - 1.04 mg/dL    GFR Estimate 69 >60 mL/min/1.7m2    GFR Estimate If Black 83 >60 mL/min/1.7m2    Calcium 9.4 8.5 - 10.1 mg/dL   Blood gas venous   Result Value Ref Range    Ph Venous 7.50 (H) 7.32 - 7.43 pH    PCO2 Venous 75 (H) 40 - 50 mm Hg    PO2 Venous 65 (H) 25 - 47 mm Hg    Bicarbonate Venous 58 (HH) 21 - 28 mmol/L    FIO2 40    Blood Culture ONE site   Result Value Ref Range     Specimen Description Blood Left Arm     Culture Micro Pending     Micro Report Status Pending    CT Abdomen Pelvis w Contrast    Narrative    EXAMINATION: CT ABDOMEN PELVIS W CONTRAST, 3/20/2017 10:44 PM    TECHNIQUE:  Helical CT images from the lung bases through the  symphysis pubis were obtained with IV contrast.     CONTRAST DOSE: 76 cc of isovue 370    COMPARISON: CT 12/27/2016, 9/10/2016    HISTORY: abd distension (minimally communicative), leukocytosis,  achalasia, status post GJ tube, indwelling Avendaño catheter    FINDINGS:  Lines and tubes: Gastrojejunostomy tube tip in the proximal jejunum.    Abdomen and pelvis: The liver, gallbladder, spleen, and right adrenal  are unremarkable. Fatty replacement of pancreas. Stable 1 cm left  adrenal low-attenuation nodule. 5 mm right interpolar nonobstructive  renal calculus. Unremarkable left kidney. No hydronephrosis on either  side.    Moderate amount of stool in the rectum, sigmoid and descending colon.  Sigmoid colon diverticulosis without CT evidence of diverticulitis.  Gaseous distention of the transverse colon measuring up to 5.6 cm. The  appendix is unremarkable. Mild nonspecific wall thickening of the  small bowel around the GJ tube. No ascites, free air or pneumatosis.    No pelvic lymphadenopathy.    Urinary bladder Avendaño catheter. Thickened appearance of the urinary  bladder wall. Small foci of bladder air.    Small fat-containing ventral hernia.    Lung bases: Increased right lung base pulmonary opacity since  12/27/2016. Severe centrilobular emphysema. No pleural effusion.    Bones and soft tissues: No acute osseous lesions. Stable multiple  compression deformities throughout the lumbar spine. Stable grade 1  anterolisthesis of L5 on S1. Facet degenerative changes.      Impression    IMPRESSION:   1. Constipation with a moderate amount of stool in the rectum and left  colon. Gaseous distention of the transverse colon. No obstruction.  2. Mild nonspecific  wall thickening of the small bowel around the GJ  tube.  3. Thickened appearance of the urinary bladder wall, which can be  suggestive of cystitis.  4. Increased right lung base pulmonary opacity since 12/27/2016.  Differentials include aspiration or infection.  5. Stable 5 mm right interpolar nonobstructive renal calculus.  6. Small fat-containing ventral hernia.   Lactic acid   Result Value Ref Range    Lactic Acid 1.6 0.7 - 2.1 mmol/L   Chest  XR, 1 view portable    Narrative    PRELIMINARY REPORT - The following report is a preliminary  interpretation.  1.  Increased right lower lobe pulmonary opacities since 11/8/2016,  which can be suggestive of infection or aspiration.  2.  Chronic obstructive pulmonary disease.     *Note: Due to a large number of results and/or encounters for the requested time period, some results have not been displayed. A complete set of results can be found in Results Review.[JS1.9]     Kole Nolan MD[JS1.11]         Revision History        User Key Date/Time User Provider Type Action    > JS1.6 3/21/2017  5:58 AM Kole Nolan MD Resident Sign     JS1.5 3/21/2017  5:51 AM Kole Noaln MD Resident Sign     JS1.2 3/21/2017  4:26 AM Kole Nolan MD Resident      JS1.10 3/21/2017  3:52 AM Kole Nolan MD Resident      JS1.4 3/21/2017  3:22 AM Kole Nolan MD Resident      JS1.3 3/21/2017  3:03 AM Kole Nolan MD Resident      JS1.7 3/21/2017  2:38 AM Kole Nolan MD Resident      JS1.9 3/21/2017  1:39 AM Kole Nolan MD Resident      JS1.8 3/21/2017  1:38 AM Kole Nolan MD Resident      JS1.11 3/21/2017  1:30 AM Kole Nolan MD Resident      JS1.1 3/21/2017  1:27 AM Kole Nolan MD Resident                   Discharge Summaries     No notes of this type exist for this encounter.         Consult Notes      Consults by Moy Rockwell MD at 3/25/2017  2:02 AM      Author:  Moy Rockwell MD Service:  Surgery Author Type:  Resident    Filed:  3/25/2017  4:56 AM Date of Service:  3/25/2017  2:02 AM Note Created:  3/25/2017  2:02 AM    Status:  Attested :  Moy Rockwell MD (Resident)    Cosigner:  Lucía Guadalupe MD at 3/25/2017 11:11 AM         Consult Orders:    1. Surgery General Adult IP Consult: Patient to be seen: ASAP within 4 hrs; Call back #: [phone]; possible perforated viscus on Abdominal film; Consultant may enter orders: Yes [[id number]] ordered by Doc Lu MD at 03/25/17 0129           Attestation signed by Lucía Guadalupe MD at 3/25/2017 11:11 AM        Patient seen and evaluated.  Imaging reviewed.  Initial impression from imaging of pneumoperitoneum not supported by follow-up imaging and clinical exam.  Surgery will sign off.                               GENERAL[CR1.1] SURGERY HISTORY AND PHYSICAL    ASSESSMENT/PLAN: Mary Wang is a[AS1.1] 67 year old female with a history of COPD s/p trach on home vent, GJ-tube dependence currently admitted for sepsis likely urinary source now with concern for pneumoperitoneum on supine AXR. She is hemodynamically stable without signs of peritonitis[CR1.1] but does report some abdominal pain[AS1.2].[CR1.1]     -- Lateral decubitus film to further evaluate free air, if equivocal then STAT CT abdomen/pelvis with contrast to evaluate for free air or source of perforation[AS1.2]  --[AS1.1] NPO, hold tube feeds[CR1.1]  -- Repeat labs now  -- Serial abdominal exams  -- Please call with changes in vitals or increasing abdominal pain[AS1.2]    Discussed with [AS1.1] Chan and Dr. Guadalupe[AS1.2].[AS1.1]    Shameka Momin, MS4[CR1.1]    Moy Rockwell MD  PGY-[AS1.1]2[AS1.2], General Surgery  Pager: [phone]    - - - - - - - - - -    CHIEF COMPLAINT:[AS1.1] Concern for pneumoperitoneum on AXR[CR1.1]    HISTORY OF PRESENT ILLNESS: Mary Wang is[AS1.1] 67 year old  female with a history of COPD s/p trach on home vent, GJ-tube[CR1.1] feed[AS1.2] dependence currently admitted for sepsis[CR1.1] of a[AS1.2] likely urinary source now with concern for pneumoperitoneum on supine AXR. Per nursing, the GJ-tube was clamped around 8pm. When the nurse returned there was bilious fluid on the patient's abdomen which appeared to originate from the GJ-tube site. AXR was performed for concern of GJ-tube placement[CR1.1] with preliminary read concerning for s[AS1.2]ubdiaphragmatic lucency in the LUQ with suspicion for pneumoperitoneum and pneumatosis. The patient is nonverbal, but[CR1.1] does[AS1.2] signal that she has abdominal pain.[CR1.1] She is inconsistent in her responses.[AS1.2]      REVIEW OF SYSTEMS:[AS1.1] Unable to obtain due to patient condition.[AS1.2]     PAST MEDICAL HISTORY:   Past Medical History:   Diagnosis Date     Achalasia      Anxiety      Anxiety and depression      Chronic pain      COPD (chronic obstructive pulmonary disease) (H)     trach/vent dependent      GERD (gastroesophageal reflux disease)      Hypertension      Lung nodule     spiculated; followed in pulm clinic      Stress-induced cardiomyopathy      TMJ pain dysfunction syndrome      Tracheostomy in place        SURGICAL HISTORY:   Past Surgical History:   Procedure Laterality Date     ANESTHESIA OUT OF OR MRI N/A 4/18/2016    Procedure: ANESTHESIA OUT OF OR MRI;  Surgeon: GENERIC ANESTHESIA PROVIDER;  [facility]: [facility]     BRONCHOSCOPY, INSERT BRONCHIAL VALVE Right 9/2/2015    Procedure: BRONCHOSCOPY, INSERT BRONCHIAL VALVE;  Surgeon: Everardo Beach MD;  [facility]     ENDOSCOPIC INSERTION TUBE GASTROSTOMY N/A 7/9/2016    Procedure: ENDOSCOPIC INSERTION TUBE GASTROSTOMY;  Surgeon: Brenden Plasencia MD;  [facility]     ESOPHAGOSCOPY, GASTROSCOPY, DUODENOSCOPY (EGD), COMBINED N/A 12/11/2015    Procedure: COMBINED ESOPHAGOSCOPY, GASTROSCOPY, DUODENOSCOPY (EGD);  Surgeon: Dhruv Lyles  MD CAS;  [facility]     ESOPHAGOSCOPY, GASTROSCOPY, DUODENOSCOPY (EGD), COMBINED N/A 12/31/2015    Procedure: COMBINED ESOPHAGOSCOPY, GASTROSCOPY, DUODENOSCOPY (EGD);  Surgeon: Brenden Plasencia MD;  [facility]     ESOPHAGOSCOPY, GASTROSCOPY, DUODENOSCOPY (EGD), COMBINED N/A 2/17/2017    Procedure: COMBINED ESOPHAGOSCOPY, GASTROSCOPY, DUODENOSCOPY (EGD);  Surgeon: Brenden Plasencia MD;  [facility]     PICC INSERTION Right 1/9/2017    5fr DL BioFlo PICC, 38cm (1cm external) in the R basilic vein.     REMOVE AND REPLACE BREAST IMPLANT PROSTHESIS N/A 12/31/2015    Procedure: PERCUTANEOUS INSERTION TUBE JEJUNOSTOMY;  Surgeon: Brenden Plasencia MD;  [facility]     REMOVE AND REPLACE BREAST IMPLANT PROSTHESIS N/A 1/25/2016    Procedure: PERCUTANEOUS INSERTION TUBE JEJUNOSTOMY;  Surgeon: Carrie Coleman MD;  [facility]     TRACHEOSTOMY N/A 8/26/2015    Procedure: TRACHEOSTOMY;  Surgeon: Milena Thibodeaux MD;  [facility]       SOCIAL HISTORY: Tob -[AS1.1] former smoker[CR1.1]. Etoh -[AS1.1] none[CR1.1]    FAMILY HISTORY: No bleeding/clotting disorders nor problems with anesthesia.     ALLERGIES:      Allergies   Allergen Reactions     Levaquin Other (See Comments)     Delirium     Toro Podhaler [Tobramycin] Other (See Comments)     Severe congestion, SOB      Suprax [Cefixime]      See ceftibuten     Augmentin Nausea     Has tolerated for treatment of UTIs in 2016.     Avelox [Moxifloxacin] Anxiety     Cedax [Ceftibuten] Other (See Comments)     Pain in eyes     Penicillins Itching     Tolerated amoxicillin without issues.     Vantin [Cefpodoxime] Nausea       MEDICATIONS:    No current facility-administered medications on file prior to encounter.   Current Outpatient Prescriptions on File Prior to Encounter:  gabapentin (NEURONTIN) 250 MG/5ML solution TAKE 300MG (6ML) VIA J-TUBE 3 TIMES A DAY....   HYDROmorphone (DILAUDID) 1 MG/ML LIQD liquid Take 1 mL (1 mg) by mouth  every 2 hours as needed for other (dyspnea)   levalbuterol (XOPENEX) 1.25 MG/3ML neb solution Take 3 mLs (1.25 mg) by nebulization 4 times daily and every four hours at night PRN.   morphine sulfate (ROSIE) 10 MG 24 hr capsule Give contents of one capsule through the gastrostomy tube every 8 hours.   predniSONE (DELTASONE) 20 MG tablet 1 tablet (20 mg) by Per G Tube route daily   polyethylene glycol (MIRALAX/GLYCOLAX) Packet 17 g by Per J Tube route 2 times daily Hold for loose stools   budesonide (PULMICORT) 0.5 MG/2ML neb solution Take 2 mLs (0.5 mg) by nebulization 2 times daily   clonazePAM (KLONOPIN) 0.5 MG tablet TAKE 1.5MG (3 TABLETS) BY MOUTH FOUR TIMES A DAY FOR ANXIETY.   menthol-zinc oxide (CALMOSEPTINE) 0.44-20.625 % OINT ointment Apply topically 2 times daily as needed for skin protection   guaiFENesin (ORGANIDIN) 200 MG TABS tablet Take 1 tablet (200 mg) by mouth 4 times daily   Cranberry 125 MG TABS 1 tablet by Jejunal Tube route daily   QUEtiapine (SEROQUEL) 25 MG tablet Administer one tab per J-tube as needed at night if scheduled HS dose ineffective for treatment of anxiety or sleeplessness.   bisacodyl (DULCOLAX) 10 MG Suppository Place 1 suppository (10 mg) rectally daily as needed for constipation   order for DME Equipment being ordered: Methylex foam dressings, alternating pressure pad for mattress and pump.   Rectal Cleansers (FLEET [employer]UR[employer] CLEANSING ENEMA) ENEM Place 1 enema rectally daily as needed   order for DME Equipment being ordered: 1) Avendaño catheter 16F, balloon 10 mL, silicone.  2) Urinary collection bag.  3) Elastic leg strap for Avendaño catheter.  Please fax to [facility].   LORazepam (ATIVAN) 2 MG/ML (HIGH CONC) solution Take 0.25 mLs (0.5 mg) by mouth every 3 hours as needed for anxiety   clonazePAM (KLONOPIN) 0.1 mg/mL Take 15 mLs (1.5 mg) by mouth 4 times daily   QUEtiapine (SEROQUEL) 50 MG tablet Take 1 tablet (50 mg) by mouth 2 times daily   ipratropium (ATROVENT) 0.02 %  "neb solution Take 2.5 mLs (0.5 mg) by nebulization 4 times daily and every four hours at night PRN.   lactobacillus rhamnosus, GG, (CULTURELL) capsule 1 capsule by Per G Tube route daily   lidocaine (LIDODERM) 5 % Patch Place 1-2 patches onto the skin every 24 hours Apply to the lower back   senna-docusate (SENOKOT-S;PERICOLACE) 8.6-50 MG per tablet 2 tablets by Per J Tube route 2 times daily   calcium carbonate (TUMS) 500 MG chewable tablet Take 1 tablet (500 mg) by mouth every 2 hours as needed for heartburn   ondansetron (ZOFRAN) 4 MG tablet Take 1 tablet (4 mg) by mouth every 4 hours as needed for nausea   famotidine (PEPCID) 40 MG/5ML suspension Take 2.5 mLs (20 mg) by mouth 2 times daily as needed for heartburn   loperamide (IMODIUM A-D) 2 MG tablet 2-2 mg tablets per J-tube qid prn frequent and/or loose stools. Do not use more than 8 tabs per day.   polyethylene glycol 0.4%- propylene glycol 0.3% (SYSTANE ULTRA) 0.4-0.3 % SOLN ophthalmic solution Place 1-2 drops into both eyes 4 times daily as needed for dry eyes 0900, 1300, 1700, 2100   sertraline (ZOLOFT) 20 MG/ML (HIGH CONC) solution 7.5 mLs (150 mg) by Per Feeding Tube route daily   fexofenadine (ALLEGRA) 180 MG tablet Take 1 tablet (180 mg) by mouth daily   fiber modular (NUTRISOURCE FIBER) packet 1 packet by Per J Tube route 3 times daily   melatonin (MELATONIN) 1 MG/ML LIQD liquid 3 mLs (3 mg) by Per J Tube route At Bedtime   acetaminophen (TYLENOL) 160 MG/5ML oral liquid 20.3 mLs (650 mg) by Per Feeding Tube route every 4 hours as needed for mild pain   sodium chloride (OCEAN) 0.65 % nasal spray Spray 1 spray into both nostrils daily as needed for congestion   lidocaine 15 mL, alum & mag hydroxide-simethicone 15 mL GI Cocktail Take 30 mLs by mouth 3 times daily as needed for moderate pain       PHYSICAL EXAM:   /63  Pulse 100  Temp 97.7  F (36.5  C) (Axillary)  Resp 16  Ht 1.626 m (5' 4\")  Wt 63.1 kg (139 lb 1.8 oz)  SpO2 100%  BMI 23.88 " kg/m2  General:[AS1.1] Awake, a[CR1.1]lert, in no acute distress.  HEENT: Normocephalic, atraumatic.[AS1.1] Tracheostomy[CR1.1]  Chest wall: Symmetric thorax.    Respiratory:[AS1.1] On mechanical ventilation via tracheostomy[CR1.1].[AS1.1] Non-labored breathing.[AS1.2]    Cardiovascular:[AS1.1] Tachycardic, r[CR1.1]egular rhythm.   Gastrointestinal: Abdomen soft, non-distended,[AS1.1] mild[AS1.2] tenderness to palpation in the LUQ and epigastric region. No rebound tenderness or tenderness to percussion. GJ tube site without exudate[CR1.1] or erythema[AS1.2].[CR1.1]   Extremities: Moving all four extremities. No limb deformities. No pedal edema. Peripheral pulses palpable in all distal extremities.   Skin: No rashes or lesions appreciated.    LAB DATA: Reviewed.[AS1.1]   Lactate 0.9  WBC 14.3[AS1.2]r --> 10.7[AS1.3]  Hgb 7.4  Plt 249  INR 1.06[AS1.2]    IMAGING:   XR abdomen 3/24/2017:  PRELIMINARY REPORT - The following report is a preliminary  interpretation.   1. Subdiaphragmatic lucency in the left upper quadrant worrisome for  pneumoperitoneum.   2. Additional linear lucency in the left mid abdomen, suspicious for  pneumatosis.[AS1.1]       Revision History        User Key Date/Time User Provider Type Action    > AS1.3 3/25/2017  4:56 AM Moy Rockwell MD Resident Sign     AS1.2 3/25/2017  3:41 AM Moy Rockwell MD Resident Share     [N/A] 3/25/2017  2:33 AM Shameka Momin Medical Student Share     [N/A] 3/25/2017  2:30 AM Shameka Momin Medical Student Share     AS1.1 3/25/2017  2:10 AM Moy Rockwell MD Resident Share     CR1.1 3/25/2017  2:10 AM Shameka Momin Medical Student             Consults by Chelsie Poe MD at 3/22/2017 10:05 AM     Author:  Chelsie Poe MD Service:  Palliative Author Type:  Physician    Filed:  3/22/2017  5:07 PM Date of Service:  3/22/2017 10:05 AM Note Created:  3/22/2017 10:01 AM    Status:  Signed :  Chelsie Poe  MD Quintin (Physician)         [facility]  Palliative Care Consultation         Mary Wang MRN# [id number]   Age: 67 year old YOB: 1950   Date of Admission: 3/20/2017    Reason for consult:[EJ1.1] Goals of care[EJ1.2]       Requesting physician/service:[EJ1.1] Dr. Castro, MICU[EJ1.2]       Recommendations[EJ1.1]        symptom recs:  - would continue home medications as prescribed  - would switch her back to scheduled Ligia (morphine capsules-- ok to sprinkle the pellets in 10 mL water and flush into g-tube). This will keep it long acting rather than using the short acting morphine liquid solution that is currently ordered.    Goals:     Per history and discussion with daughter César, it sounds like patient has been saying she does want re-hospitalization for acute issues which contradicts what she had said last during last admission when her last POLST form was completed. Certainly if her goals have changed then we will need to update POLST to avoid future confusion. Agree with plan to re-do POLST to clarify patients goals at this point. I was unfortunately unable to have this conversation with patient today as she was too drowsy to participate. Her daughter and healthcare agent César completed the POLST form for the patient but I did not sign it because I did not have the discussion with patient today. I can continue to follow and address goals as able.[EJ1.2]           POLST[EJ1.1] --re-done by MICU team. Will continue goals discussions to ensure her goals match the POLST form prior to discharge[EJ1.2]     Disease Process/es & Symptoms[EJ1.1]     Sepsis, UTI, possible PNA  Chronic respiratory failure on vent  Chronic anxiety  Chronic air hunger  Chronic sacral pressure ulcer and associated pain  AMS/drowsiness[EJ1.2]     Support/Coping   (We typically ask each of our patients the following questions:)[EJ1.1]    Unable to assess today[EJ1.2]    Decision-Making  & Goals of Care     Discussion/counseling today about prognosis/goals of care/decisions:[EJ1.1]   See above[EJ1.2]  Patient has a completed health care directive available in the chart (Y/N):[EJ1.1] yes[EJ1.2]  Health care agent (only if patient has an available, complete HCD):[EJ1.1] daughter César  Physician orders for life-sustaining treatment (POLST) form is completed[EJ1.2]  Code Status:[EJ1.1] Do not resuscitate[EJ1.2]   Patient has decision-making capacity (Y/N):[EJ1.1] limited during my visit, fluctuating due to AMS in setting of sepsis[EJ1.2]    Coordination of Care     Findings & plan of care discussed with:[EJ1.1] MICU team[EJ1.2]  Follow-up plan from palliative team:[EJ1.1] will follow, expect 1-2 times per week, more if needed[EJ1.2]  Thank you for involving us in the patient's care.[EJ1.1]     Patient seen and evaluated with Dr. Castro, MICU   Agree with assessment and recommendations.    Total time spent was 45 minutes,  >50% of time was spent counseling and/or coordination of care regarding goals, symptoms, support.    Mariel Poe  Palliative Care   Pager [phone][EJ1.2]            Chief Complaint[EJ1.1]     Consult for goals of care[EJ1.2]         History of Present Illness     History obtained from:[EJ1.1] chart, discussion with medical team.[EJ1.2]      Pt is a 69 yo female with hx of severe COPD s/p tracheostomy on home vent, also with GJ tube feedings, chronic indwelling bender, chronic anxiety, lives in a vent group home, admitted with malaise and pain around bender catheter and lab evidence for sepsis and dehydration. Being treated for infection and sepsis, thought to be either from UTI and/or HCAP.[EJ1.1]     On admission there was confusion about her goals of care because her most recent POLST completed here before her last discharge indicated her wish was to not be re-hospitalized and to be treated in comfort care approach (was not on hospice because she wanted to continue ventilator  "support). However per MICU team and patient's daughter, patient herself has been requesting to go in to hospital and once here patient has been requesting all resuscitative cares. We were consulted to clarify goals.     Patient was very lethargic at time of my visit and unable to participate. Her daughter was present for a short time (she had to leave to drive home) but daughter expressed concern that patient never really wanted the no re-hospitalization plan;she feels pt was confused during time of last admission when they re-did that POLSt and feels it really doesn't represent her wishes.     Pt was drowsy. She was able to nod \"yes\" that she was ok with hospitalization. She also nodded yes to pain and yes to pain in sacrum where her pressure ulcer is, but nodded no to question of wanting more medications. Otherwise pt was falling asleep and interview was limited.[EJ1.2]           Past Medical History:[EJ1.1]   Past Medical History:   Diagnosis Date     Achalasia      Anxiety      Anxiety and depression      Chronic pain      COPD (chronic obstructive pulmonary disease) (H)     trach/vent dependent      GERD (gastroesophageal reflux disease)      Hypertension      Lung nodule     spiculated; followed in pulm clinic      Stress-induced cardiomyopathy      TMJ pain dysfunction syndrome      Tracheostomy in place[EJ1.3]               Past Surgical History:[EJ1.1]   Past Surgical History:   Procedure Laterality Date     ANESTHESIA OUT OF OR MRI N/A 4/18/2016    Procedure: ANESTHESIA OUT OF OR MRI;  Surgeon: GENERIC ANESTHESIA PROVIDER;  [facility]     BRONCHOSCOPY, INSERT BRONCHIAL VALVE Right 9/2/2015    Procedure: BRONCHOSCOPY, INSERT BRONCHIAL VALVE;  Surgeon: Everardo Beach MD;  [facility]     ENDOSCOPIC INSERTION TUBE GASTROSTOMY N/A 7/9/2016    Procedure: ENDOSCOPIC INSERTION TUBE GASTROSTOMY;  Surgeon: Brenden Plasencia MD;  [facility]     ESOPHAGOSCOPY, GASTROSCOPY, DUODENOSCOPY " (EGD), COMBINED N/A 12/11/2015    Procedure: COMBINED ESOPHAGOSCOPY, GASTROSCOPY, DUODENOSCOPY (EGD);  Surgeon: Dhruv Lyles MD;  [facility]     ESOPHAGOSCOPY, GASTROSCOPY, DUODENOSCOPY (EGD), COMBINED N/A 12/31/2015    Procedure: COMBINED ESOPHAGOSCOPY, GASTROSCOPY, DUODENOSCOPY (EGD);  Surgeon: Brenden Plasencia MD;  [facility]     ESOPHAGOSCOPY, GASTROSCOPY, DUODENOSCOPY (EGD), COMBINED N/A 2/17/2017    Procedure: COMBINED ESOPHAGOSCOPY, GASTROSCOPY, DUODENOSCOPY (EGD);  Surgeon: Brenden Plasencia MD;  [facility]     PICC INSERTION Right 1/9/2017    5fr DL BioFlo PICC, 38cm (1cm external) in the R basilic vein.     REMOVE AND REPLACE BREAST IMPLANT PROSTHESIS N/A 12/31/2015    Procedure: PERCUTANEOUS INSERTION TUBE JEJUNOSTOMY;  Surgeon: Brenden Plasencia MD;  [facility]     REMOVE AND REPLACE BREAST IMPLANT PROSTHESIS N/A 1/25/2016    Procedure: PERCUTANEOUS INSERTION TUBE JEJUNOSTOMY;  Surgeon: Carrie Coleman MD;  [facility]     TRACHEOSTOMY N/A 8/26/2015    Procedure: TRACHEOSTOMY;  Surgeon: Milena Thibodeaux MD;  Location[facility][EJ1.3]               Social/Spiritual History:     Living situation:[EJ1.1] lives in group home for vent dependent patients[EJ1.2]  Family system:[EJ1.1] daughter César, son yvonne,  savana.[EJ1.2]  Functional status (needs help with ADLs or IADLs):[EJ1.1] vent dependent,[EJ1.2]   Employment/education:   Use of community resources:[EJ1.1] home health care involvement and 24/7 nursing care in vent group home[EJ1.2]   Activities/interests:[EJ1.1] not discussed[EJ1.2]  History of substance use/abuse:[EJ1.1] former smoker[EJ1.2]            Family History:[EJ1.1]   Family History   Problem Relation Age of Onset     CANCER Sister[EJ1.4]      Family history reviewed and updated in EPIC and not discussed[EJ1.2]           Allergies:[EJ1.1]   Allergies   Allergen Reactions     Ant Sorensen (See Comments)     Anderson Engel  Podhaler [Tobramycin] Other (See Comments)     Severe congestion, SOB      Suprax [Cefixime]      See ceftibuten     Augmentin Nausea     Has tolerated for treatment of UTIs in 2016.     Avelox [Moxifloxacin] Anxiety     Cedax [Ceftibuten] Other (See Comments)     Pain in eyes     Penicillins Itching     Tolerated amoxicillin without issues.     Vantin [Cefpodoxime] Nausea[EJ1.4]              Medications:   I have reviewed this patient's medication profile and medications during this hospitalization[EJ1.1]    Clonazepam 1.5mg QID  Gabapentin 300mg q 8  lidoderm patch  Morphine  Solution 10mg q 8 hours (this is being given in place of her PTA Ligia)  Melatonin 3mg qhs  miralax BID  Quetiapine 50mg BID  Senna -docusate 2 BID  Sertraline 150mg daily[EJ1.2]           Review of Systems:   The comprehensive review of systems is negative other than noted here and in the HPI.    Palliative Symptom Review (0=no symptom/no concern, 1=mild, 2=moderate, 3=severe):[EJ1.1]    ROS very limited by AMS/drowsiness[EJ1.2]        Pain:[EJ1.1] 2-- buttocks/pressure ulcer[EJ1.2]      Fatigue:[EJ1.1] 3[EJ1.2]      Nausea:[EJ1.1] 0[EJ1.2]      Constipation:       Diarrhea:       Depressive Symptoms:       Anxiety:       Drowsiness:[EJ1.1] 3[EJ1.2]      Poor Appetite:       Shortness of Breath:       Insomnia:       Other:       Overall (0 good/no concerns, 3 very poor):[EJ1.1]              Physical Exam:   Vitals were reviewed[EJ1.2]  Temp: 99.1  F (37.3  C) Temp src: Tympanic BP: 145/68   Heart Rate: 75 Resp: 12 SpO2: 100 % O2 Device: Mechanical Ventilator[EJ1.5]    Gen: lying in bed. Appears lethargic but resting comfortably. Chronically ill with trach on vent  HEENT: NCAT. Conjunctiva clear. Sclera anicteric. Trach on vent.  CV: RRR , Peripheral perfusion intact.   Resp: unlaboted work of breathing on vent  Abd: soft, some mild tenderness to palpation, mildly distended, BS present and normoactive.   Msk: no gross deformity, +  sarcopenia  Skin:  no jaundice  Ext: warm, well perfused.   Neuro: face symmetric. EOM, vision, hearing grossly intact. nonvocal due to trach on vent--nods and mouths some words.  Mental status/Psych: lethargic and drowsy, opens eyes and nods ye/no to some questions but quickly falls asleep[EJ1.2]           Data Reviewed:[EJ1.1]     ROUTINE ICU LABS (Last four results)  CMP[EJ1.2]  Recent Labs  Lab 03/22/17  0416 03/21/17  2244 03/21/17  2037 03/21/17  1538 03/21/17  1255 03/21/17  0958 03/21/17  0322 03/20/17  2159 03/20/17 2000    135  --  133  --  130* 125* 120* 120*   POTASSIUM 3.6 4.1 3.5 3.2* 3.7  --  2.8* 3.4 3.3*   CHLORIDE  --  92*  --   --   --   --  71* 64* 64*   CO2  --  37*  --   --   --   --  >45Critical Value called to and read back by MATTIE PRINCE AT [facility], [id number] AT 0412 BY JS.* >45Critical Value called to and read back byDEAN MIGUEL RN IN ER AT 2236 ON 03/20/17 BY KS* >45Critical Value called to and read back byACE GOODEN RN IN ER AT 2111 ON 03/20/17 BY KS*   ANIONGAP  --  6  --   --   --   --  Not Calculated Not Calculated Not Calculated   GLC  --  89  --   --   --   --  96 109* 106*   BUN  --  23  --   --   --   --  41* 50* 47*   CR 0.54 0.53  --   --  0.80  --  0.76 0.83 0.82   GFRESTIMATED >90Non  GFR Calc >90Non  GFR Calc  --   --  72  --  75 69 69   GFRESTBLACK >90African American GFR Calc >90African American GFR Calc  --   --  87  --  >90African American GFR Calc 83 84   IZZY  --  7.9*  --   --   --   --  8.3* 9.4 9.1   MAG 2.2 2.3 2.3  --  2.4*  --   --   --   --    PHOS 2.4* 2.8 2.9  --  1.9*  --   --   --   --    PROTTOTAL  --   --   --   --   --   --   --   --  7.2   ALBUMIN  --   --   --   --   --   --   --   --  3.1*   BILITOTAL  --   --   --   --   --   --   --   --  0.4   ALKPHOS  --   --   --   --   --   --   --   --  104   AST  --   --   --   --   --   --   --   --  22   ALT  --   --   --   --   --   --   --   --  22[EJ1.5]      CBC[EJ1.2]  Recent Labs  Lab 03/21/17  2244 03/21/17  0322 03/20/17 2000   WBC 11.1*  --  18.6*   RBC 2.48*  --  3.56*   HGB 7.4*  --  10.4*   HCT 23.0*  --  32.4*   MCV 93  --  91   MCH 29.8  --  29.2   MCHC 32.2  --  32.1   RDW 19.7*  --  18.6*    324 341[EJ1.5]     INR[EJ1.2]No lab results found in last 7 days.[EJ1.5]  Arterial Blood Gas[EJ1.2]  Recent Labs  Lab 03/20/17  2159   O2PER 40[EJ1.5]                Revision History        User Key Date/Time User Provider Type Action    > EJ1.3 3/22/2017  5:07 PM Chelsie Poe MD Physician Sign     EJ1.4 3/22/2017  4:44 PM Chelsie Poe MD Physician      EJ1.5 3/22/2017  4:33 PM Chelsie Poe MD Physician      EJ1.2 3/22/2017  4:32 PM Chelsie Poe MD Physician      EJ1.1 3/22/2017 10:01 AM Chelsie Poe MD Physician                      Progress Notes - Physician (Notes for yesterday and today)      Progress Notes by Arley Crespo MD at 4/4/2017  6:00 PM     Author:  Arlye Crespo MD Service:  General Medicine Author Type:  Physician    Filed:  4/4/2017  7:06 PM Date of Service:  4/4/2017  6:00 PM Note Created:  4/4/2017  6:00 PM    Status:  Signed :  Arley Crespo MD (Physician)                 [facility] Progress Note   Date of Service:[AL1.1] 4/4/2017[AL1.2]      Patient: Mary Wang  MRN: [id number]  Admission Date: 3/20/2017  Hospital Day # 14    Assessment & Plan:   Mary Wang is a 67 year old female with a history of severe COPD s/p tracheostomy on home ventilator, GJ-tube dependence, anxiety, HTN, chronic indwelling bender, and achalasia who presented with malaise and bender catheter pain, admitted to the ICU on 3/21/17 for sepsis from likely urinary source with severe metabolic derangements resolving with antibiotics.  Now with worsening respiratory failure concerned for HCAP vs COPD exacaertion vs increased rentention.         # Chronic Hypoxic, Hypercarbic Respiratory Failure  # Severe COPD s/p Tracheostomy with Ventilator Dependence  Admission CXR with increased RLL opacities but resolved on recheck and cultures most likely represent colonization..  - Home vent settings: CMV 14/400/5/45%. On home vent since AM of 3/23, respiratory distress with increased procal on 4/2 concerning for HCAP vs worsening COPD. Responding to steroid burst and started on broad spectrum abx. Gram stain growing gram negative rods.   - Continue home nebulizers  - Palliative team involved, appreciate assistance --. Ongoing discussion with family about future goals.   - complete 7d course meropenem end date 4/9   - Continue steroid burst 40 mg day 2   - Scheduled nebs        # Abdominal Pain Likely 2/2 Gastroparesis versus Gastric Outlet Obstruction - No acute etiology on CT scan on 3/23.  Suspect due to gastroparesis versus gastric outlet obstruction. GI assisting with PEG-J adjustment as well.  Constipation likely contributory.   - TF changed to continuous by J-port  - TF to Impact Peptide (fiber-free, high PRO, and fish oil given wound and CRP of 22.0) @ new goal 50 ml/hr (1200 ml/day), per nutrition recs  - G-tube vented with aspiration daily  - GI consulted  - Fleets enema PRN  - Cont bowel regimen, BID miralax via J tube  - Scheduled acetaminophen Q4H and Oxycodone PRN  - H pylori antigen pending; treat for 14 days with triple therapy if positive  - change H2 blocker to PPI for 6-8 weeks for empiric treatment of gastritis/gastric ulceration from G tube balloon pressure  - Short trial of methylnaltrexone  - cont venting G tube BID    # Sepsis secondary to Proteus from Urinary Source - Mary presented with a leukocytosis, hypotension, and tachypnea concerning for sepsis.  Initial evaluation raised concern for urinary versus HCAP source due to dirty UA and new infiltrate on CXR.  Cultures were obtained however CXR infiltrate resolved rapidly which is  less consistent with HCAP.  Initial care in ICU with IVF resuscitation, once stabilized was transferred to the floor.  Given changes with CXR, urine was felt to be most consistent source of infection and grew Proteus, susceptible to amp/sulbactam.  Mary was narrowed to amp/sulbactam on 3/26 from [facility].        # Acute on Chronic Normocytic Anemia: Stable  Likely hemoconcentrated on admission, was close to baseline without signs of bleeding after fluid replacement. Also compounded by frequent blood draws and poor reticulocyte response. Responded well to 1 Unit of pRBC on 3/26  - CBC daily     # Hypovolemic Hyponatremia and Electrolyte Abnormalities: Resolved  - D/C IVF, monitor for s/sx of hypovolemia  - Electrolyte protocol for hypokalemia      # Steroid-Induced Hyperglycemia - Monitoring closely, well controlled at present.  - Low dose SSI  - Hypoglycemia Protocol  - q4h BG checks on TF      # Sacrococcygeal Ulcer:  One stage III pressure ulcer on her sacrococcygeal area.  No improvement reported for several months, present on admission. No signs of acute infection.  - WOC consulted to assist with management  - Regular assessment needed by nursing and MD  - Appropriate turning and postioning program  - Appropriate wound care and management of moisture and incontinence  - Patient will need group 2 mattress as group 1 has failed previously     ===== Chronic Medical Problems =====  # CAD - History of NSTEMI, no meds currently  # Generalized Anxiety Disorder and Air Hunger -  Continue home clonazepam, morphine, quetiapine, sertraline, hydromorphone PRN and lorazepam PRN  # Achalasia and GERD with GJ-Tube dependence - Home TF per nutrition, discontinued H2 blocker and start PPI with GI cocktail PRN   # FEN: TF per regimen above,  Nutrition consulted  # PPx: Enoxaparin and H2 blocker switched to PPI on 03/30  # Code Status: DNR  # Dispo: Inpatient admission for IV antibiotics pending culture results, anticipate  "discharge in 1-2 days.     Patient was seen and discussed with Dr. Crespo who agreed with the above.[AL1.1]    Clementine Sams[AL1.3] MD  [facility] PGY1  [phone]  ___________________________________________________________________    Subjective & Interval Hx:  No acute events overnight. Discussed with family, César, daughter this morning in regards to returning to the group home to complete antibiotic course.     ROS: The remainder of a 10 point ROS is negative unless otherwise noted above.    Medications: Reviewed in EPIC. List below for reference    Physical Exam:[AL1.1]    Blood pressure 119/74, pulse 122, temperature 98  F (36.7  C), temperature source Axillary, resp. rate 19, height 1.626 m (5' 4\"), weight 60.5 kg (133 lb 6.1 oz), SpO2 95 %, not currently breastfeeding.[AL1.4]    Gen: NAD, pleasant, non toxic   Resp: Bibasilar crackles with expiratory wheezes, no rhonchi, tracheostomy and vent dependent.  CV: RRR, no m/r/g  Abd: GJ with TF in J port, soft abdomen slight tenderness to palpation, no rebound or guarding.  Back: Unstageable pressure ulcer over the sacrococcygeal area  Extrem: Warm and well perfused, no LE edema  Neuro: Asleep but easily awoken, but difficult communication     Lines/Tubes:   Peripheral IV 03/24/17 Left Upper arm (Active)   Site Assessment [facility] 3/27/2017  4:00 AM   Line Status Saline locked 3/27/2017  4:00 AM   Phlebitis Scale 0-->no symptoms 3/27/2017  4:00 AM   Infiltration Scale 0 3/27/2017  4:00 AM   Extravasation? No 3/27/2017  4:00 AM   Number of days:3       PICC Double Lumen 03/24/17 Right Basilic (Active)   Site Assessment [facility] 3/27/2017  4:00 AM   External Cath Length (cm) 3 cm 3/20/2017 12:00 AM   Extremity Circumference (cm) 29 cm 3/20/2017 12:00 AM   Dressing Intervention Chlorhexidine patch;Transparent;Securing device;New dressing 3/20/2017 12:00 AM   Dressing Change Due 04/01/17 3/26/2017 10:00 PM   Number of days:3     Labs & Studies of Note:   Labs and images from the " past 24 hours reviewed and notable for sputum growing gram negative non fermenting rods.   Medications list for Reference   Current Facility-Administered Medications   Medication     protein modular (BENEPROTEIN) packet 1 packet     vitamin A-D & C drops (TRI-VI-SOL) drops SOLN 13 mL     [START ON 4/9/2017] zinc sulfate (20 mg [place]. Zn/mL) solution 220 mg     predniSONE (DELTASONE) tablet 40 mg     ipratropium (ATROVENT) 0.02 % neb solution 0.5 mg     levalbuterol (XOPENEX) neb solution 1.25 mg     meropenem (MERREM) 1 g vial to attach to  mL bag     oxyCODONE (ROXICODONE) IR tablet 5 mg     methylnaltrexone (RELISTOR) injection 8 mg     omeprazole (priLOSEC) suspension 20 mg     sodium phosphate (FLEET ENEMA) 1 enema     acetaminophen (TYLENOL) solution 325 mg     zinc sulfate (20 mg [place]. Zn/mL) solution 220 mg     QUEtiapine (SEROquel) tablet 25 mg     lidocaine (LMX4) kit     sodium chloride (PF) 0.9% PF flush 10-20 mL     heparin lock flush 10 UNIT/ML injection 5-10 mL     heparin lock flush 10 UNIT/ML injection 5-10 mL     morphine solution 10 mg     acetaminophen (TYLENOL) solution 650 mg     albuterol neb solution 2.5 mg     bisacodyl (DULCOLAX) Suppository 10 mg     budesonide (PULMICORT) neb solution 0.5 mg     calcium carbonate (TUMS) chewable tablet 500 mg     clonazePAM (klonoPIN) suspension 1.5 mg     fexofenadine (ALLEGRA) tablet 180 mg     fiber modular (NUTRISOURCE FIBER) packet 1 packet     gabapentin (NEURONTIN) solution 300 mg     HYDROmorphone (DILAUDID) liquid 1 mg     lactobacillus rhamnosus (GG) (CULTURELL) capsule 1 capsule     lidocaine (LIDODERM) 5 % Patch 1-2 patch     lidocaine (XYLOCAINE) 2 % 15 mL, alum & mag hydroxide-simethicone (MYLANTA ES/MAALOX  ES) 15 mL GI Cocktail     LORazepam (ATIVAN) 2 MG/ML (HIGH CONC) solution 0.5 mg     melatonin liquid 3 mg     menthol-zinc oxide (CALMOSEPTINE) 0.44-20.625 % ointment     polyethylene glycol (MIRALAX/GLYCOLAX) Packet 17 g      polyethylene glycol 0.4%- propylene glycol 0.3% (SYSTANE ULTRA) ophthalmic solution 1-2 drop     QUEtiapine (SEROquel) tablet 25 mg     senna-docusate (SENOKOT-S;PERICOLACE) 8.6-50 MG per tablet 2 tablet     sertraline (ZOLOFT) 20 MG/ML (HIGH CONC) solution 150 mg     sodium chloride (OCEAN) 0.65 % nasal spray 1 spray     naloxone (NARCAN) injection 0.1-0.4 mg     enoxaparin (LOVENOX) injection 40 mg     ondansetron (ZOFRAN-ODT) ODT tab 4 mg    Or     ondansetron (ZOFRAN) injection 4 mg     potassium chloride SA (K-DUR/KLOR-CON M) CR tablet 20-40 mEq     potassium chloride (KLOR-CON) Packet 20-40 mEq     potassium chloride 10 mEq in 100 mL intermittent infusion     potassium chloride 10 mEq in 100 mL intermittent infusion with 10 mg lidocaine     potassium chloride 20 mEq in 50 mL intermittent infusion     magnesium sulfate 2 g in NS intermittent infusion (PharMEDium or FV Cmpd)     magnesium sulfate 4 g in 100 mL sterile water (premade)     sodium phosphate 10 mmol in D5W intermittent infusion     sodium phosphate 15 mmol in D5W intermittent infusion     sodium phosphate 20 mmol in D5W intermittent infusion     sodium phosphate 25 mmol in D5W intermittent infusion     glucose 40 % gel 15-30 g    Or     dextrose 50 % injection 25-50 mL    Or     glucagon injection 1 mg     insulin aspart (NovoLOG) inj (RAPID ACTING)     lidocaine (LIDODERM) patch REMOVAL     lidocaine (LIDODERM) Patch in Place     guaiFENesin (ROBITUSSIN) 20 mg/mL solution 10 mL     multivitamins with minerals (CERTAVITE/CEROVITE) liquid 15 mL[AL1.1]     Internal Medicine Staff Addendum  Date of Service: 4/4/2017  I have seen and examined Ms Wang, reviewed the data and discussed the plan of care with the care team on rounds.  I agree with the above documentation with the additions/changes to the ROS, HPI, Exam or data (including my edits in italics):    I discussed pt's care with bedside RN, case management/social work today.  I personally  reviewed, labs, medications and past 24 hr notes.  Assessment/Plan/Diagnoses: plan/dx as above, which contains my edits and reflects our joint medical decision-making.     Arley Crespo MD PhD  Internal Medicine Hospitalist & Staff Physician   of Internal Medicine   [facility]  Pager: [phone][MU1.1]     Revision History        User Key Date/Time User Provider Type Action    > MU1.1 4/4/2017  7:06 PM Arley Crespo MD Physician Sign     AL1.2 4/4/2017  6:07 PM Clementine Sams MD Resident Sign     AL1.3 4/4/2017  6:02 PM Clementine Sams MD Resident      AL1.4 4/4/2017  6:01 PM Clementine Sams MD Resident      AL1.1 4/4/2017  6:00 PM Clementine Sams MD Resident             Progress Notes by Elda Davenport MD at 4/4/2017  1:37 AM     Author:  Elda Davenport MD Service:  General Medicine Author Type:  Physician    Filed:  4/4/2017  6:56 AM Date of Service:  4/4/2017  1:37 AM Note Created:  4/4/2017  1:37 AM    Status:  Signed :  Elda Davenport MD (Physician)                 Internal Medicine [facility] Progress Note   Date of Service:[ME1.1] 4/3[HT1.1]/2017    Patient: Mary Wang  MRN: [id number]  Admission Date: 3/20/2017  Hospital Day # 14    Assessment & Plan:   Mary Wang is a 67 year old female with a history of severe COPD s/p tracheostomy on home ventilator, GJ-tube dependence, anxiety, HTN, chronic indwelling bender, and achalasia who presented with malaise and bender catheter pain, admitted to the ICU on 3/21/17 for sepsis from likely urinary source with severe metabolic derangements resolving with antibiotics.  Now with worsening respiratory failure concerned for HCAP vs COPD exacerbation.        # Chronic Hypoxic, Hypercarbic Respiratory Failure  # Severe COPD s/p Tracheostomy with Ventilator Dependence  Admission CXR with increased RLL opacities but resolved on recheck and  cultures most likely represent colonization..  - Home vent settings: CMV 14/400/5/45%. On home vent since AM of 3/23, respiratory distress with increased procal on 4/2 concerning for HCAP vs worsening COPD.  - Continue home prednisone and nebs  - Palliative team involved, appreciate assistance  - Procal, CRP elevated  - Cont Meropenum  - Start Steroid    - Scheduled nebs  - Chest Xray PRN  - Sputum culture  - VBG trend      # Abdominal Pain Likely 2/2 Gastroparesis versus Gastric Outlet Obstruction - No acute etiology on CT scan on 3/23.  Suspect due to gastroparesis versus gastric outlet obstruction. GI assisting with PEG-J adjustment as well.  Constipation likely contributory.   - TF changed to continuous by J-port  - TF to Impact Peptide (fiber-free, high PRO, and fish oil given wound and CRP of 22.0) @ new goal 50 ml/hr (1200 ml/day), per nutrition recs  - G-tube vented with aspiration daily  - GI consulted  - Fleets enema PRN  - Cont bowel regimen, BID miralax via J tube  - Scheduled acetaminophen Q4H and Oxycodone PRN  - H pylori antigen pending; treat for 14 days with triple therapy if positive  - change H2 blocker to PPI for 6-8 weeks for empiric treatment of gastritis/gastric ulceration from G tube balloon pressure  - Short trial of methylnaltrexone  - cont venting G tube BID    # Sepsis secondary to Proteus from Urinary Source - Mary presented with a leukocytosis, hypotension, and tachypnea concerning for sepsis.  Initial evaluation raised concern for urinary versus HCAP source due to dirty UA and new infiltrate on CXR.  Cultures were obtained however CXR infiltrate resolved rapidly which is less consistent with HCAP.  Initial care in ICU with IVF resuscitation, once stabilized was transferred to the floor.  Given changes with CXR, urine was felt to be most consistent source of infection and grew Proteus, susceptible to amp/sulbactam.  Mary was narrowed to amp/sulbactam on 3/26 from Meropenem.  Of  note, Mary did grow Pseudomonas on her sputum sample but this was most consistent with colonization and not pathologic infection.  - Discont ampicillin/sulbactam 3/31  - Vancomycin discontinued 3/22, Meropenem discontinued 3/26  - Blood cultures, urine cultures, and sputum cultures, completed    # Acute on Chronic Normocytic Anemia: Stable  Likely hemoconcentrated on admission, was close to baseline without signs of bleeding after fluid replacement. Also compounded by frequent blood draws and poor reticulocyte response. Responded well to 1 Unit of pRBC on 3/26  - CBC daily     # Hypovolemic Hyponatremia and Electrolyte Abnormalities: Resolved  - D/C IVF, monitor for s/sx of hypovolemia  - Electrolyte protocol for hypokalemia      # Steroid-Induced Hyperglycemia - Monitoring closely, well controlled at present.  - Low dose SSI  - Hypoglycemia Protocol  - q4h BG checks on TF      # Sacrococcygeal Ulcer:  One stage III pressure ulcer on her sacrococcygeal area.  No improvement reported for several months, present on admission. No signs of acute infection.  - WOC consulted to assist with management  - Regular assessment needed by nursing and MD  - Appropriate turning and postioning program  - Appropriate wound care and management of moisture and incontinence  - Patient will need group 2 mattress as group 1 has failed previously     ===== Chronic Medical Problems =====  # CAD - History of NSTEMI, no meds currently  # Generalized Anxiety Disorder and Air Hunger -  Continue home clonazepam, morphine, quetiapine, sertraline, hydromorphone PRN and lorazepam PRN  # Achalasia and GERD with GJ-Tube dependence - Home TF per nutrition, discontinued H2 blocker and start PPI with GI cocktail PRN   # FEN: TF per regimen above,  Nutrition consulted  # PPx: Enoxaparin and H2 blocker switched to PPI on 03/30  # Code Status: DNR  # Dispo: Inpatient admission for IV antibiotics pending culture results, anticipate discharge in 1-2  "days.     Patient was seen and discussed with Dr. Hansen who agreed with the above.    Celestino Mo MD, A  Internal Medicine PGY-1  [facility]  Pager: [phone]  ___________________________________________________________________    Subjective & Interval Hx:  Patient continues to have respiratory distress overnight, but was able wean down from 10L FIO2 yesterday. Continued need for deep suctioning. Otherwise unchanged, continues to reports abdominal pain.     ROS: The remainder of a 10 point ROS is negative unless otherwise noted above.    Medications: Reviewed in EPIC. List below for reference    Physical Exam:    Blood pressure 110/66, pulse 122, temperature 97.9  F (36.6  C), temperature source Oral, resp. rate 16, height 1.626 m (5' 4\"), weight 60.5 kg (133 lb 6.1 oz), SpO2 98 %, not currently breastfeeding.    Gen: NAD, improved Resp effort since yesterday  Resp: Bibasilar crackles with expiratory wheezes, no rhonchi, tracheostomy and vent dependent.  CV: RRR, no m/r/g  Abd: GJ with TF in J port, soft abdomen slight tenderness to palpation, no rebound or guarding.  Back: Unstageable pressure ulcer over the sacrococcygeal area  Extrem: Warm and well perfused, no LE edema  Neuro: Asleep but easily awoken, but difficult communication     Lines/Tubes:   Peripheral IV 03/24/17 Left Upper arm (Active)   Site Assessment [facility] 3/27/2017  4:00 AM   Line Status Saline locked 3/27/2017  4:00 AM   Phlebitis Scale 0-->no symptoms 3/27/2017  4:00 AM   Infiltration Scale 0 3/27/2017  4:00 AM   Extravasation? No 3/27/2017  4:00 AM   Number of days:3       PICC Double Lumen 03/24/17 Right Basilic (Active)   Site Assessment [facility] 3/27/2017  4:00 AM   External Cath Length (cm) 3 cm 3/20/2017 12:00 AM   Extremity Circumference (cm) 29 cm 3/20/2017 12:00 AM   Dressing Intervention Chlorhexidine patch;Transparent;Securing device;New dressing 3/20/2017 12:00 AM   Dressing Change Due 04/01/17 3/26/2017 " 10:00 PM   Number of days:3     Labs & Studies of Note:   Labs and images from the past 24 hours reviewed and notable for increased WBC and increased CO2 on VBG.  Medications list for Reference   Current Facility-Administered Medications   Medication     predniSONE (DELTASONE) tablet 40 mg     ipratropium (ATROVENT) 0.02 % neb solution 0.5 mg     levalbuterol (XOPENEX) neb solution 1.25 mg     meropenem (MERREM) 1 g vial to attach to  mL bag     oxyCODONE (ROXICODONE) IR tablet 5 mg     methylnaltrexone (RELISTOR) injection 8 mg     omeprazole (priLOSEC) suspension 20 mg     sodium phosphate (FLEET ENEMA) 1 enema     acetaminophen (TYLENOL) solution 325 mg     zinc sulfate (20 mg [place]. Zn/mL) solution 220 mg     QUEtiapine (SEROquel) tablet 25 mg     lidocaine (LMX4) kit     sodium chloride (PF) 0.9% PF flush 10-20 mL     heparin lock flush 10 UNIT/ML injection 5-10 mL     heparin lock flush 10 UNIT/ML injection 5-10 mL     morphine solution 10 mg     acetaminophen (TYLENOL) solution 650 mg     albuterol neb solution 2.5 mg     bisacodyl (DULCOLAX) Suppository 10 mg     budesonide (PULMICORT) neb solution 0.5 mg     calcium carbonate (TUMS) chewable tablet 500 mg     clonazePAM (klonoPIN) suspension 1.5 mg     fexofenadine (ALLEGRA) tablet 180 mg     fiber modular (NUTRISOURCE FIBER) packet 1 packet     gabapentin (NEURONTIN) solution 300 mg     HYDROmorphone (DILAUDID) liquid 1 mg     lactobacillus rhamnosus (GG) (CULTURELL) capsule 1 capsule     lidocaine (LIDODERM) 5 % Patch 1-2 patch     lidocaine (XYLOCAINE) 2 % 15 mL, alum & mag hydroxide-simethicone (MYLANTA ES/MAALOX  ES) 15 mL GI Cocktail     LORazepam (ATIVAN) 2 MG/ML (HIGH CONC) solution 0.5 mg     melatonin liquid 3 mg     menthol-zinc oxide (CALMOSEPTINE) 0.44-20.625 % ointment     polyethylene glycol (MIRALAX/GLYCOLAX) Packet 17 g     polyethylene glycol 0.4%- propylene glycol 0.3% (SYSTANE ULTRA) ophthalmic solution 1-2 drop     QUEtiapine  (SEROquel) tablet 25 mg     senna-docusate (SENOKOT-S;PERICOLACE) 8.6-50 MG per tablet 2 tablet     sertraline (ZOLOFT) 20 MG/ML (HIGH CONC) solution 150 mg     sodium chloride (OCEAN) 0.65 % nasal spray 1 spray     naloxone (NARCAN) injection 0.1-0.4 mg     enoxaparin (LOVENOX) injection 40 mg     ondansetron (ZOFRAN-ODT) ODT tab 4 mg    Or     ondansetron (ZOFRAN) injection 4 mg     potassium chloride SA (K-DUR/KLOR-CON M) CR tablet 20-40 mEq     potassium chloride (KLOR-CON) Packet 20-40 mEq     potassium chloride 10 mEq in 100 mL intermittent infusion     potassium chloride 10 mEq in 100 mL intermittent infusion with 10 mg lidocaine     potassium chloride 20 mEq in 50 mL intermittent infusion     magnesium sulfate 2 g in NS intermittent infusion (PharMEDium or FV Cmpd)     magnesium sulfate 4 g in 100 mL sterile water (premade)     sodium phosphate 10 mmol in D5W intermittent infusion     sodium phosphate 15 mmol in D5W intermittent infusion     sodium phosphate 20 mmol in D5W intermittent infusion     sodium phosphate 25 mmol in D5W intermittent infusion     glucose 40 % gel 15-30 g    Or     dextrose 50 % injection 25-50 mL    Or     glucagon injection 1 mg     insulin aspart (NovoLOG) inj (RAPID ACTING)     lidocaine (LIDODERM) patch REMOVAL     lidocaine (LIDODERM) Patch in Place     guaiFENesin (ROBITUSSIN) 20 mg/mL solution 10 mL     multivitamins with minerals (CERTAVITE/CEROVITE) liquid 15 mL[ME1.1]     Pt was seen and examined with Dr. Mo on 4/3/17.  I confirmed abdominal, cardiac, and pulmonary exam findings; reviewed labs, vitals, imaging.  I agree with the detailed A/P as documented above, including treatment of COPD exacerbation with steroids, nebs, antibiotics.      Elda Spangler MD[HT1.1]     Revision History        User Key Date/Time User Provider Type Action    > HT1.1 4/4/2017  6:56 AM Elda Davenport MD Physician Sign     ME1.1 4/4/2017  1:58 AM Chely  "MD Celestino Resident Sign                  Procedure Notes     No notes of this type exist for this encounter.      Progress Notes - Therapies (Notes from 04/02/17 through 04/05/17)     No notes of this type exist for this encounter.                                          INTERAGENCY TRANSFER FORM - LAB / IMAGING / EKG / EMG RESULTS   3/20/2017                       [address] [facility]: [phone]            Unresulted Labs (24h ago through future)    Start       Ordered    04/05/17 0530  Basic metabolic panel  AM DRAW,   Routine      04/04/17 1944 04/05/17 0530  CBC with platelets  AM DRAW,   Routine     Comments:  Last Lab Result: Hemoglobin (g/dL)       Date                     Value                 04/04/2017               9.7 (L)          ----------    04/04/17 1944 03/30/17 0000  Creatinine  (Pharmacological Prophylaxis - enoxaparin (LOVENOX) *Use only if creatinine clearance is greater than 30 mL/min)  EVERY THREE DAYS,   Routine     Comments:  Last Lab Result: Creatinine (mg/dL)       Date                     Value                 03/26/2017               0.36 (L)         ----------    03/27/17 0100    03/24/17 0600  Platelet count  (Pharmacological Prophylaxis - enoxaparin (LOVENOX) *Use only if creatinine clearance is greater than 30 mL/min)  EVERY THREE DAYS,   Routine     Comments:  Repeat every 3 days while on VTE prophylaxis.  Notify provider and hold enoxaparin if platelet count falls by 50% of baseline. If no result is listed, this lab has not been done the past 365 days. LATEST LAB RESULT: Platelet Count (10e9/L)       Date                     Value                 03/20/2017               341              ----------    03/21/17 0546    Unscheduled  Potassium  (Potassium Replacement - \"High\" - Replacement for all levels less than 4.1 mmol/L - UU,UR,UA,RH,SH,PH,WY )  CONDITIONAL (SPECIFY),   Routine     Comments:  Obtain Potassium Level for these conditions:  *IF no potassium result " "within 24 hrs before initiation of order set, draw potassium level with next lab collect.    *2 HOURS AFTER last IV potassium replacement dose and 4 hours after an oral replacement dose when potassium replacement given for level less than 3.4.  *Next morning after potassium dose.     Repeat Potassium Replacement if necessary.    03/21/17 0546    Unscheduled  Magnesium  (Magnesium Replacement - Adult - \"High\" - Replacement for all levels less than or equal to 2 mg/dL)  CONDITIONAL (SPECIFY),   Routine     Comments:  Obtain Magnesium Level for these conditions:  *IF no magnesium result within 24 hrs before initiation of order set, draw magnesium level with next lab collect.    *2 HOURS AFTER last magnesium replacement dose when magnesium replacement given for level less than 1.6  *Next morning after magnesium dose.     Repeat Magnesium Replacement if necessary.    03/21/17 0546    Unscheduled  Phosphorus  (or    SODIUM Phosphate - \"High\" - Replacement for all levels less than 2.8 mg/dL)  CONDITIONAL (SPECIFY),   Routine     Comments:  Obtain Phosphorus Level for these conditions:  *IF no phosphorus result within 24 hrs before initiation of order set, draw phosphorus level with next lab collect.    *2 HOURS AFTER last phosphorus replacement dose when phosphorus replacement given for level less than 2.0  *Next morning after phosphorus dose.     Repeat Phosphorus Replacement if necessary.    03/21/17 0546         Lab Results - 3 Days      Sputum Culture Aerobic Bacterial [[id number]] (Abnormal)  Resulted: 04/05/17 1240, Result status: Preliminary result    Ordering provider: Lucía Guadalupe MD  04/02/17 1755 Resulting lab: INFECTIOUS DISEASE [facility]NO[facility]    Specimen Information    Type Source Collected On     04/02/17 1752          Components       Value Reference Range Flag Lab   Specimen Description Sputum   75   Special Requests Endotracheal   75   Culture Micro --  A 225   Result:         Light " growth Normal rome  Moderate growth Serratia marcescens  Moderate growth Pseudomonas aeruginosa  Moderate growth Achromobacter xylosoxidans/denitrificans Susceptibility testing   in progress     Micro Report Status Pending   225   Result:     Organism: Moderate growth Pseudomonas aeruginosa   225   Organism: Moderate growth Serratia marcescens   225            Glucose by meter [[id number]] (Abnormal)  Resulted: 04/05/17 1125, Result status: Final result    Ordering provider: Lucía Guadalupe MD  04/05/17 1123 Resulting lab: POINT OF CARE TEST, GLUCOSE    Specimen Information    Type Source Collected On     04/05/17 1123          Components       Value Reference Range Flag Lab   Glucose 134 70 - 99 mg/dL H 170            Glucose by meter [[id number]] (Abnormal)  Resulted: 04/05/17 0756, Result status: Final result    Ordering provider: Lucía Guadalupe MD  04/05/17 0750 Resulting lab: POINT OF CARE TEST, GLUCOSE    Specimen Information    Type Source Collected On     04/05/17 0750          Components       Value Reference Range Flag Lab   Glucose 106 70 - 99 mg/dL H 170            Basic metabolic panel [[id number]] (Abnormal)  Resulted: 04/05/17 0728, Result status: Final result    Ordering provider: Roxann Monterroso MD  04/04/17 2330 Resulting lab: [facility]    Specimen Information    Type Source Collected On   Blood  04/05/17 0650          Components       Value Reference Range Flag Lab   Sodium 137 133 - 144 mmol/L  51   Potassium 4.0 3.4 - 5.3 mmol/L  51   Chloride 95 94 - 109 mmol/L  51   Carbon Dioxide 36 20 - 32 mmol/L H 51   Anion Gap 6 3 - 14 mmol/L  51   Glucose 101 70 - 99 mg/dL H 51   Urea Nitrogen 38 7 - 30 mg/dL H 51   Creatinine 0.40 0.52 - 1.04 mg/dL L 51   GFR Estimate -- >60 mL/min/1.7m2  51   Result:         >90  Non  GFR Calc     GFR Estimate If Black -- >60 mL/min/1.7m2  51   Result:         >90   GFR Calc      Calcium 9.4 8.5 - 10.1 mg/dL  51   Result:              CBC with platelets [[id number]] (Abnormal)  Resulted: 04/05/17 0702, Result status: Final result    Ordering provider: Roxann Monterroso MD  04/04/17 2330 Resulting lab: [facility]    Specimen Information    Type Source Collected On   Blood  04/05/17 0650          Components       Value Reference Range Flag Lab   WBC 11.6 4.0 - 11.0 10e9/L H 51   RBC Count 3.03 3.8 - 5.2 10e12/L L 51   Hemoglobin 9.2 11.7 - 15.7 g/dL L 51   Hematocrit 29.8 35.0 - 47.0 % L 51   MCV 98 78 - 100 fl  51   MCH 30.4 26.5 - 33.0 pg  51   MCHC 30.9 31.5 - 36.5 g/dL L 51   RDW 18.5 10.0 - 15.0 % H 51   Platelet Count 265 150 - 450 10e9/L  51            Glucose by meter [[id number]] (Abnormal)  Resulted: 04/05/17 0355, Result status: Final result    Ordering provider: Lucía Guadalupe MD  04/05/17 0353 Resulting lab: POINT OF CARE TEST, GLUCOSE    Specimen Information    Type Source Collected On     04/05/17 0353          Components       Value Reference Range Flag Lab   Glucose 110 70 - 99 mg/dL H 170            Lactic acid level STAT [[id number]]  Resulted: 04/05/17 0143, Result status: Final result    Ordering provider: Kevin Acevedo MD  04/05/17 0045 Resulting lab: [facility]    Specimen Information    Type Source Collected On   Blood  04/05/17 0112          Components       Value Reference Range Flag Lab   Lactic Acid 1.9 0.7 - 2.1 mmol/L  51            Glucose by meter [[id number]] (Abnormal)  Resulted: 04/05/17 0105, Result status: Final result    Ordering provider: Lucía Guadalupe MD  04/05/17 0059 Resulting lab: POINT OF CARE TEST, GLUCOSE    Specimen Information    Type Source Collected On     04/05/17 0059          Components       Value Reference Range Flag Lab   Glucose 105 70 - 99 mg/dL H 170            Glucose by meter [[id number]]  Resulted: 04/05/17 0036, Result status:  Final result    Ordering provider: Lucía Guadalupe MD  04/05/17 0033 Resulting lab: POINT OF CARE TEST, GLUCOSE    Specimen Information    Type Source Collected On     04/05/17 0033          Components       Value Reference Range Flag Lab   Glucose 91 70 - 99 mg/dL  170            Glucose by meter [[id number]] (Abnormal)  Resulted: 04/04/17 1935, Result status: Final result    Ordering provider: Lucía Guadalupe MD  04/04/17 1933 Resulting lab: POINT OF CARE TEST, GLUCOSE    Specimen Information    Type Source Collected On     04/04/17 1933          Components       Value Reference Range Flag Lab   Glucose 104 70 - 99 mg/dL H 170            Glucose by meter [[id number]] (Abnormal)  Resulted: 04/04/17 1606, Result status: Final result    Ordering provider: Lucía Guadalupe MD  04/04/17 1600 Resulting lab: POINT OF CARE TEST, GLUCOSE    Specimen Information    Type Source Collected On     04/04/17 1600          Components       Value Reference Range Flag Lab   Glucose 115 70 - 99 mg/dL H 170            Glucose by meter [[id number]] (Abnormal)  Resulted: 04/04/17 1131, Result status: Final result    Ordering provider: Lucía Guadalupe MD  04/04/17 1128 Resulting lab: POINT OF CARE TEST, GLUCOSE    Specimen Information    Type Source Collected On     04/04/17 1128          Components       Value Reference Range Flag Lab   Glucose 130 70 - 99 mg/dL H 170            Glucose by meter [[id number]] (Abnormal)  Resulted: 04/04/17 0826, Result status: Final result    Ordering provider: Lucía Guadalupe MD  04/04/17 0821 Resulting lab: POINT OF CARE TEST, GLUCOSE    Specimen Information    Type Source Collected On     04/04/17 0821          Components       Value Reference Range Flag Lab   Glucose 117 70 - 99 mg/dL H 170            Basic metabolic panel [[id number]] (Abnormal)  Resulted: 04/04/17 0632, Result status: Final result    Ordering provider: Roxann Monterroso MD   04/03/17 2330 Resulting lab: [facility]    Specimen Information    Type Source Collected On   Blood  04/04/17 0550          Components       Value Reference Range Flag Lab   Sodium 137 133 - 144 mmol/L  51   Potassium 4.1 3.4 - 5.3 mmol/L  51   Chloride 95 94 - 109 mmol/L  51   Carbon Dioxide 35 20 - 32 mmol/L H 51   Anion Gap 6 3 - 14 mmol/L  51   Glucose 102 70 - 99 mg/dL H 51   Urea Nitrogen 41 7 - 30 mg/dL H 51   Creatinine 0.40 0.52 - 1.04 mg/dL L 51   GFR Estimate -- >60 mL/min/1.7m2  51   Result:         >90  Non  GFR Calc     GFR Estimate If Black -- >60 mL/min/1.7m2  51   Result:         >90   GFR Calc     Calcium 9.2 8.5 - 10.1 mg/dL  51   Result:              CBC with platelets [[id number]] (Abnormal)  Resulted: 04/04/17 0612, Result status: Final result    Ordering provider: Roxann Monterroso MD  04/03/17 2330 Resulting lab: [facility]    Specimen Information    Type Source Collected On   Blood  04/04/17 0550          Components       Value Reference Range Flag Lab   WBC 13.5 4.0 - 11.0 10e9/L H 51   RBC Count 3.19 3.8 - 5.2 10e12/L L 51   Hemoglobin 9.7 11.7 - 15.7 g/dL L 51   Hematocrit 31.3 35.0 - 47.0 % L 51   MCV 98 78 - 100 fl  51   MCH 30.4 26.5 - 33.0 pg  51   MCHC 31.0 31.5 - 36.5 g/dL L 51   RDW 18.2 10.0 - 15.0 % H 51   Platelet Count 319 150 - 450 10e9/L  51            Blood gas venous [[id number]] (Abnormal)  Resulted: 04/04/17 0601, Result status: Final result    Ordering provider: Roxann Monterroso MD  04/03/17 2330 Resulting lab: [facility]    Specimen Information    Type Source Collected On   Blood  04/04/17 0550          Components       Value Reference Range Flag Lab   Ph Venous 7.40 7.32 - 7.43 pH  51   PCO2 Venous 65 40 - 50 mm Hg H 51   PO2 Venous 44 25 - 47 mm Hg  51   Bicarbonate Venous 40 21 - 28 mmol/L H 51   Base Excess Venous 13.8 mmol/L  51   Comment:   Abnormal Result, Ref range: -7.7 to 1.9   FIO2 21.0   51            Glucose by meter [[id number]]  Resulted: 04/04/17 0420, Result status: Final result    Ordering provider: Lucía Guadalupe MD  04/04/17 0416 Resulting lab: POINT OF CARE TEST, GLUCOSE    Specimen Information    Type Source Collected On     04/04/17 0416          Components       Value Reference Range Flag Lab   Glucose 95 70 - 99 mg/dL  170            Glucose by meter [[id number]] (Abnormal)  Resulted: 04/04/17 0055, Result status: Final result    Ordering provider: Lucía Guadalupe MD  04/04/17 0051 Resulting lab: POINT OF CARE TEST, GLUCOSE    Specimen Information    Type Source Collected On     04/04/17 0051          Components       Value Reference Range Flag Lab   Glucose 116 70 - 99 mg/dL H 170            Blood gas venous [[id number]] (Abnormal)  Resulted: 04/03/17 2045, Result status: Final result    Ordering provider: Roxann Monterroso MD  04/03/17 1418 Resulting lab: [facility]    Specimen Information    Type Source Collected On   Blood  04/03/17 2021          Components       Value Reference Range Flag Lab   Ph Venous 7.37 7.32 - 7.43 pH  51   PCO2 Venous 67 40 - 50 mm Hg H 51   PO2 Venous 39 25 - 47 mm Hg  51   Bicarbonate Venous 39 21 - 28 mmol/L H 51   Base Excess Venous 12.1 mmol/L  51   Comment:  Abnormal Result, Ref range: -7.7 to 1.9   FIO2 3L   51            Glucose by meter [[id number]] (Abnormal)  Resulted: 04/03/17 2020, Result status: Final result    Ordering provider: Lucía Gudaalupe MD  04/03/17 2011 Resulting lab: POINT OF CARE TEST, GLUCOSE    Specimen Information    Type Source Collected On     04/03/17 2011          Components       Value Reference Range Flag Lab   Glucose 129 70 - 99 mg/dL H 170            Glucose by meter [[id number]] (Abnormal)  Resulted: 04/03/17 1556, Result status: Final result    Ordering provider: Lucía Guadalupe MD   04/03/17 1553 Resulting lab: POINT OF CARE TEST, GLUCOSE    Specimen Information    Type Source Collected On     04/03/17 1553          Components       Value Reference Range Flag Lab   Glucose 161 70 - 99 mg/dL H 170            Lactic acid level STAT [[id number]]  Resulted: 04/03/17 1324, Result status: Final result    Ordering provider: Elda Davenport MD  04/03/17 1225 Resulting lab: [facility]    Specimen Information    Type Source Collected On   Blood  04/03/17 1310          Components       Value Reference Range Flag Lab   Lactic Acid 0.9 0.7 - 2.1 mmol/L  51            Basic metabolic panel [[id number]] (Abnormal)  Resulted: 04/03/17 1230, Result status: Final result    Ordering provider: Celestino Mo MD  04/03/17 1004 Resulting lab: [facility]    Specimen Information    Type Source Collected On   Blood  04/03/17 1101          Components       Value Reference Range Flag Lab   Sodium 135 133 - 144 mmol/L  51   Potassium 4.1 3.4 - 5.3 mmol/L  51   Chloride 94 94 - 109 mmol/L  51   Carbon Dioxide 36 20 - 32 mmol/L H 51   Anion Gap 6 3 - 14 mmol/L  51   Glucose 127 70 - 99 mg/dL H 51   Urea Nitrogen 30 7 - 30 mg/dL  51   Creatinine 0.39 0.52 - 1.04 mg/dL L 51   GFR Estimate -- >60 mL/min/1.7m2  51   Result:         >90  Non  GFR Calc     GFR Estimate If Black -- >60 mL/min/1.7m2  51   Result:         >90   GFR Calc     Calcium 9.0 8.5 - 10.1 mg/dL  51   Result:              Glucose by meter [[id number]] (Abnormal)  Resulted: 04/03/17 1206, Result status: Final result    Ordering provider: Lucía Guadalupe MD  04/03/17 1203 Resulting lab: POINT OF CARE TEST, GLUCOSE    Specimen Information    Type Source Collected On     04/03/17 1203          Components       Value Reference Range Flag Lab   Glucose 140 70 - 99 mg/dL H 170            CBC with platelets [[id number]] (Abnormal)  Resulted:  04/03/17 1156, Result status: Final result    Ordering provider: Celestino Mo MD  04/03/17 1004 Resulting lab: [facility]    Specimen Information    Type Source Collected On   Blood  04/03/17 1101          Components       Value Reference Range Flag Lab   WBC 16.6 4.0 - 11.0 10e9/L H 51   RBC Count 3.33 3.8 - 5.2 10e12/L L 51   Hemoglobin 9.9 11.7 - 15.7 g/dL L 51   Hematocrit 32.7 35.0 - 47.0 % L 51   MCV 98 78 - 100 fl  51   MCH 29.7 26.5 - 33.0 pg  51   MCHC 30.3 31.5 - 36.5 g/dL L 51   RDW 18.3 10.0 - 15.0 % H 51   Platelet Count 334 150 - 450 10e9/L  51            Blood gas venous [[id number]] (Abnormal)  Resulted: 04/03/17 1139, Result status: Final result    Ordering provider: Celestino Mo MD  04/03/17 1004 Resulting lab: [facility]    Specimen Information    Type Source Collected On   Blood  04/03/17 1101          Components       Value Reference Range Flag Lab   Ph Venous 7.31 7.32 - 7.43 pH L 51   PCO2 Venous 74 40 - 50 mm Hg H 51   PO2 Venous 44 25 - 47 mm Hg  51   Bicarbonate Venous 37 21 - 28 mmol/L H 51   Base Excess Venous 10.0 mmol/L  51   Comment:  Abnormal Result, Ref range: -7.7 to 1.9   FIO2 3L   51            Glucose by meter [[id number]] (Abnormal)  Resulted: 04/03/17 0811, Result status: Final result    Ordering provider: Lucía Guadalupe MD  04/03/17 0805 Resulting lab: POINT OF CARE TEST, GLUCOSE    Specimen Information    Type Source Collected On     04/03/17 0805          Components       Value Reference Range Flag Lab   Glucose 113 70 - 99 mg/dL H 170            Glucose by meter [[id number]]  Resulted: 04/03/17 0655, Result status: Final result    Ordering provider: Lucía Guadalupe MD  04/03/17 0650 Resulting lab: POINT OF CARE TEST, GLUCOSE    Specimen Information    Type Source Collected On     04/03/17 0650          Components       Value Reference Range Flag Lab   Glucose 91 70 - 99  mg/dL  170            Glucose by meter [[id number]]  Resulted: 04/03/17 0345, Result status: Final result    Ordering provider: Lucía Guadalupe MD  04/03/17 0341 Resulting lab: POINT OF CARE TEST, GLUCOSE    Specimen Information    Type Source Collected On     04/03/17 0341          Components       Value Reference Range Flag Lab   Glucose 93 70 - 99 mg/dL  170            Glucose by meter [[id number]] (Abnormal)  Resulted: 04/03/17 0055, Result status: Final result    Ordering provider: Lucía Guadalupe MD  04/03/17 0052 Resulting lab: POINT OF CARE TEST, GLUCOSE    Specimen Information    Type Source Collected On     04/03/17 0052          Components       Value Reference Range Flag Lab   Glucose 139 70 - 99 mg/dL H 170            Glucose by meter [[id number]]  Resulted: 04/02/17 2030, Result status: Final result    Ordering provider: Lucía Guadalupe MD  04/02/17 2026 Resulting lab: POINT OF CARE TEST, GLUCOSE    Specimen Information    Type Source Collected On     04/02/17 2026          Components       Value Reference Range Flag Lab   Glucose 93 70 - 99 mg/dL  170            Gram stain [[id number]] (Abnormal)  Resulted: 04/02/17 1943, Result status: Final result    Ordering provider: Lucía Guadalupe MD  04/02/17 1755 Resulting lab: [facility]    Specimen Information    Type Source Collected On     04/02/17 1755          Components       Value Reference Range Flag Lab   Specimen Description Sputum   75   Gram Stain --  A 226   Result:         >25 PMNs/low power field  Mixed gram positive and gram negative bacteria present. Predominance of Gram   negative rods     Micro Report Status FINAL 04/02/2017   226   Result:              Glucose by meter [[id number]] (Abnormal)  Resulted: 04/02/17 1941, Result status: Final result    Ordering provider: Lucía Guadalupe MD  04/02/17 1935 Resulting lab: POINT OF CARE TEST, GLUCOSE    Specimen Information     Type Source Collected On     04/02/17 1935          Components       Value Reference Range Flag Lab   Glucose 117 70 - 99 mg/dL H 170            Procalcitonin [[id number]] (Abnormal)  Resulted: 04/02/17 1621, Result status: Final result    Ordering provider: Celestino Mo MD  04/02/17 0541 Resulting lab: [facility]    Specimen Information    Type Source Collected On     04/02/17 0541          Components       Value Reference Range Flag Lab   Procalcitonin 12.00 ng/ml HH 51   Comment:         >/= 10.00 ng/ml   Very high likelihood of severe sepsis or septic shock.   Recommendation: Strongly recommend initiating or continuing antibiotics.   Evaluate culture results and clinical features to target antibacterial   therapy.   Obtain blood cultures and other relevant cultures if not done.Repeat PCT in 2   days to guide antibiotic de-escalation. Consider de-escalating antibiotics   when   PCT concentration is <80% of peak or abs PCT <1.   Critical Value called to and read back by   MATTIE ESTRADA  ON 6B ON 1621 BY IF              Glucose by meter [[id number]] (Abnormal)  Resulted: 04/02/17 1616, Result status: Final result    Ordering provider: Lucía Guadalupe MD  04/02/17 1547 Resulting lab: POINT OF CARE TEST, GLUCOSE    Specimen Information    Type Source Collected On     04/02/17 1547          Components       Value Reference Range Flag Lab   Glucose 127 70 - 99 mg/dL H 170            Lactic acid level STAT [[id number]]  Resulted: 04/02/17 1253, Result status: Final result    Ordering provider: Kevin Acevedo MD  04/02/17 1214 Resulting lab: [facility]    Specimen Information    Type Source Collected On   Blood  04/02/17 1232          Components       Value Reference Range Flag Lab   Lactic Acid 1.8 0.7 - 2.1 mmol/L  51            CRP inflammation [[id number]] (Abnormal)  Resulted: 04/02/17 1226, Result status: Final  result    Ordering provider: Celestino Mo MD  04/02/17 0541 Resulting lab: [facility]    Specimen Information    Type Source Collected On     04/02/17 0541          Components       Value Reference Range Flag Lab   CRP Inflammation 10.0 0.0 - 8.0 mg/L H 51            Glucose by meter [[id number]] (Abnormal)  Resulted: 04/02/17 1216, Result status: Final result    Ordering provider: Lucía Guadalupe MD  04/02/17 1211 Resulting lab: POINT OF CARE TEST, GLUCOSE    Specimen Information    Type Source Collected On     04/02/17 1211          Components       Value Reference Range Flag Lab   Glucose 160 70 - 99 mg/dL H 170            Blood gas venous [[id number]] (Abnormal)  Resulted: 04/02/17 1026, Result status: Final result    Ordering provider: Elda Davenport MD  04/02/17 0933 Resulting lab: [facility]    Specimen Information    Type Source Collected On   Blood  04/02/17 0952          Components       Value Reference Range Flag Lab   Ph Venous 7.35 7.32 - 7.43 pH  51   PCO2 Venous 73 40 - 50 mm Hg H 51   PO2 Venous 39 25 - 47 mm Hg  51   Bicarbonate Venous 40 21 - 28 mmol/L H 51   Base Excess Venous 12.5 mmol/L  51   Comment:  Abnormal Result, Ref range: -7.7 to 1.9   FIO2 8L   51            Glucose by meter [[id number]] (Abnormal)  Resulted: 04/02/17 0811, Result status: Final result    Ordering provider: Lucía Guadalupe MD  04/02/17 0807 Resulting lab: POINT OF CARE TEST, GLUCOSE    Specimen Information    Type Source Collected On     04/02/17 0807          Components       Value Reference Range Flag Lab   Glucose 143 70 - 99 mg/dL H 170            Magnesium [[id number]]  Resulted: 04/02/17 0743, Result status: Final result    Ordering provider: Celestino Mo MD  04/02/17 0541 Resulting lab: [facility]    Specimen Information    Type Source Collected On     04/02/17 0541           Components       Value Reference Range Flag Lab   Magnesium 2.2 1.6 - 2.3 mg/dL  51            Phosphorus [[id number]]  Resulted: 04/02/17 0743, Result status: Final result    Ordering provider: Celestino Mo MD  04/02/17 0541 Resulting lab: [facility]    Specimen Information    Type Source Collected On     04/02/17 0541          Components       Value Reference Range Flag Lab   Phosphorus 2.9 2.5 - 4.5 mg/dL  51            Basic metabolic panel [[id number]] (Abnormal)  Resulted: 04/02/17 0632, Result status: Final result    Ordering provider: Celestino Mo MD  04/01/17 2330 Resulting lab: [facility]    Specimen Information    Type Source Collected On   Blood  04/02/17 0541          Components       Value Reference Range Flag Lab   Sodium 137 133 - 144 mmol/L  51   Potassium 3.7 3.4 - 5.3 mmol/L  51   Chloride 94 94 - 109 mmol/L  51   Carbon Dioxide 38 20 - 32 mmol/L H 51   Anion Gap 5 3 - 14 mmol/L  51   Glucose 96 70 - 99 mg/dL  51   Urea Nitrogen 26 7 - 30 mg/dL  51   Creatinine 0.41 0.52 - 1.04 mg/dL L 51   GFR Estimate -- >60 mL/min/1.7m2  51   Result:         >90  Non  GFR Calc     GFR Estimate If Black -- >60 mL/min/1.7m2  51   Result:         >90   GFR Calc     Calcium 9.1 8.5 - 10.1 mg/dL  51   Result:              CBC with platelets differential [[id number]] (Abnormal)  Resulted: 04/02/17 0611, Result status: Final result    Ordering provider: Celestino Mo MD  04/01/17 2330 Resulting lab: [facility]    Specimen Information    Type Source Collected On   Blood  04/02/17 0541          Components       Value Reference Range Flag Lab   WBC 14.5 4.0 - 11.0 10e9/L H 51   RBC Count 3.05 3.8 - 5.2 10e12/L L 51   Hemoglobin 8.9 11.7 - 15.7 g/dL L 51   Hematocrit 30.1 35.0 - 47.0 % L 51   MCV 99 78 - 100 fl  51   MCH 29.2 26.5 - 33.0 pg  51   MCHC 29.6 31.5 -  36.5 g/dL L 51   RDW 17.8 10.0 - 15.0 % H 51   Platelet Count 299 150 - 450 10e9/L  51   Diff Method Automated Method   51   % Neutrophils 73.7 %  51   % Lymphocytes 11.7 %  51   % Monocytes 7.3 %  51   % Eosinophils 6.7 %  51   % Basophils 0.3 %  51   % Immature Granulocytes 0.3 %  51   Nucleated RBCs 0 0 /100  51   Absolute Neutrophil 10.7 1.6 - 8.3 10e9/L H 51   Absolute Lymphocytes 1.7 0.8 - 5.3 10e9/L  51   Absolute Monocytes 1.1 0.0 - 1.3 10e9/L  51   Absolute Eosinophils 1.0 0.0 - 0.7 10e9/L H 51   Absolute Basophils 0.1 0.0 - 0.2 10e9/L  51   Abs Immature Granulocytes 0.1 0 - 0.4 10e9/L  51   Absolute Nucleated RBC 0.0   51            Glucose by meter [[id number]] (Abnormal)  Resulted: 04/02/17 0550, Result status: Final result    Ordering provider: Lucía Guadalupe MD  04/02/17 0545 Resulting lab: POINT OF CARE TEST, GLUCOSE    Specimen Information    Type Source Collected On     04/02/17 0545          Components       Value Reference Range Flag Lab   Glucose 101 70 - 99 mg/dL H 170            Testing Performed By     Lab - Abbreviation Name Director Address Valid Date Range    51 - Unknown [facility] Unknown [address] [facility] [address] 12/31/14 1010 - Present    75 - Unknown [facility] Unknown [address] [facility] [address] 01/15/15 1019 - Present    170 - Unknown POINT OF CARE TEST, GLUCOSE Unknown Unknown 10/31/11 1114 - Present    225 - Unknown [facility] Unknown [address] [facility] [address] 12/19/14 0954 - Present    226 - Unknown [facility] Unknown [address] [facility] [address] 12/19/14 0955 - Present               Imaging Results - 3 Days      US Upper Extremity Venous Duplex Right Portable [[id number]]  Resulted: 04/04/17 0837, Result status: Final result    Ordering provider: Celestino Mo MD  04/03/17 1450 Resulted by: Kareem York  MD Law Egan Luke Arthur, MD    Performed: 04/03/17 1456 - 04/03/17 1609 Resulting lab: RADIOLOGY RESULTS    Narrative:       EXAMINATION: DOPPLER VENOUS ULTRASOUND OF THE RIGHT UPPER EXTREMITY,  4/3/2017 4:09 PM     COMPARISON: 1/29/2017    HISTORY: Right upper extremity swelling    TECHNIQUE:  Gray-scale evaluation with compression, spectral flow and  color Doppler assessment of the deep venous system of the right upper  extremity.    FINDINGS:  Normal blood flow and waveforms are demonstrated in the right internal  jugular, subclavian, and axillary veins. There is normal  compressibility of the brachial, basilic and cephalic veins. The  distal internal jugular and innominate veins are not well seen due to  overlying bandaging. PICC line in the right basilic vein.       Impression:       IMPRESSION: No evidence of right upper extremity deep venous  thrombosis.    I have personally reviewed the examination and initial interpretation  and I agree with the findings.    LO JAIN MD      XR Chest Port 1 View [[id number]]  Resulted: 04/02/17 1429, Result status: Final result    Ordering provider: Elda Davenport MD  04/02/17 0933 Resulted by: Edwina Diana MD Evelsizer, Andrew    Performed: 04/02/17 1015 - 04/02/17 1110 Resulting lab: RADIOLOGY RESULTS    Narrative:       Exam:  XR CHEST PORT 1 VW, 4/2/2017 2:07 PM    History: Hypoxemia    Comparison:  Chest radiograph from 3/28/2017.    Findings:  Single AP view of the chest. Right upper extremity PICC tip  projects over the low SVC. The cardiac silhouette is within normal  limits. Pulmonary vasculature is indistinct. No pleural effusion or  pneumothorax. Stable appearance of diffuse reticular and scattered  nodular opacities bilaterally, previously mostly calcified. Bilateral  hyperinflation and flattening of the diaphragms consistent with the  patient's clinical history of emphysema. Chronic blunting of the  costophrenic angles.       Impression:       Impression: No significant change since 3/28/2017.  Emphysema with  stable appearance of scattered nodular and reticular opacities.  Chronic blunting of the costophrenic angles.    I have personally reviewed the examination and initial interpretation  and I agree with the findings.    BROOKS GIBBONS MD      Testing Performed By     Lab - Abbreviation Name Director Address Valid Date Range    104 - Rad Rslts RADIOLOGY RESULTS Unknown Unknown 02/16/05 1553 - Present            Encounter-Level Documents:     There are no encounter-level documents.      Order-Level Documents:     There are no order-level documents.

## 2017-03-20 NOTE — IP AVS SNAPSHOT
` ` Patient Information     Patient Name Sex Mary Saravia ([id number]) Female 1950       Room Bed    6226 6226-01      Patient Demographics     Address Phone E-mail Address    [facility] [address] [phone] (Home) *Preferred*  [phone] (Work) [email or web address]      Patient Ethnicity & Race     Ethnic Group Patient Race    American White      Emergency Contact(s)     Name Relation Home Work Mobile    César Troy Power of  [phone]    Uli Griffin   [phone]      Documents on File        Status Date Received Description       Documents for the Patient    Privacy Notice - [place] Received 14     Face Sheet Received () 10/19/09     Insurance Card Received () 14 bcbs/med    Patient ID Received 14 [place] ex 2018    Consent for Services - Hospital/Clinic Received () 04/10/12     Consent for Services - Hospital/Clinic  () 04/10/12 GENERAL CONSENT FORM - ENGLISH    Consent to Communicate Received () 12     Consent to Communicate  () 12 AUTHORIZATION TO DISCUSS PROTECTED HEALTH INFORMATION    Consent for Services - [facility] Received 12 CONSENT    [facility] Specified Other Received 16     Consent for Services - Hospital/Clinic Received () 13     Consent for Services - Hospital/Clinic  () 13 CONSENT FOR SERVICE (SIGNED ON 13)    Consent for Services - Hospital/Clinic Received () 14     Consent for Services - Hospital/Clinic Received () 14 CONSENT FOR SERVICE    External Medication Information Consent Accepted 14     Insurance Card Received 14 Medicare Card    Insurance Card Received 14 Bcbs Federal Card    Business/Insurance/Care Coordination/Health Form - Patient  10/03/14 [employer] SUSTAINING MEDICAL EQUIPMENT FORM    Consent for Services - Hospital/Clinic Received  () 04/29/15     Power of  Not Received  Statutory Short form Power of     Advance Directives and Living Will Received 08/03/15 Health Care Directive 07/28/15    Advance Directives and Living Will Not Received  Validation of AD 07/28/15    HIM JOSE MARTIN Authorization - File Only  08/13/15 [facility] JOSE MARTIN Authorization - File Only  09/29/15 [facility] AUTHORIZATION FOR RELEASE OF PROTECTED HEALTH INFORMATION    HIM JOSE MARTIN Authorization - File Only  09/29/15 [facility] AUTHORIZATION FOR RELEASE OF PROTECTED HEALTH INFORMATION    Consent for Services/Privacy Notice - Hospital/Clinic Received () 16     Consent for Services/Privacy Notice - Hospital/Clinic  () 16 CONSENT FOR SERVICE/PRIVACY NOTICE    HIM JOSE MARTIN Authorization  16     HIM JOSE MARTIN Authorization  16     Business/Insurance/Care Coordination/Health Form - Patient  16 JUANI, PRIOR AUTH APPROVAL- LEVALBUTEROL HCL NEBU, 16    HIM JOSE MARTIN Authorization  16     HIM JOSE MARTIN Authorization  16     HIM JOSE MARTIN Authorization  16     HIM JOSE MARTIN Authorization  16 [facility]    Medicare Lifetime Days Consent Received 16     HIM JOSE MARTIN Authorization  16     HIM JOSE MARTIN Authorization  16 [facility]    Consent for Services/Privacy Notice - Hospital/Clinic-[facility] Received 17     Medicare Lifetime Days Consent Received 16     Medicare Lifetime Days Consent Received 10/03/16     Consent for EHR Access Received 10/30/16     Medicare Lifetime Days Consent Received 16     Advance Directives and Living Will Received 17 POLST 17    Consent for Services/Privacy Notice - Hospital/Clinic Received 17     Occupational Therapy Certification Sent 17 Merry Harris    Advance Directives and Living Will Received 17 POLST 17    Physical Therapy Certification Sent 17    Business/Insurance/Care  Coordination/Health Form - Patient  03/09/17 RELIABLE MEDICAL SUPPLY STATEMENT OF ORDERING PHYSICIAN, 2/28/17    Advance Directives and Living Will Received 03/23/17 POLST 03/22/17    HIM JOSE MARTIN Authorization  03/31/17 BCBS    HIM JOSE MARTIN Authorization  03/31/17 BCBS    HIM JOSE MARTIN Authorization  03/31/17 BCBS    External Medication Information Consent  (Deleted)      Consent for EHR Access  (Deleted) 03/13/13 Copied from existing Consent for services - C/HOD collected on 04/10/2012       Documents for the Encounter    CMS IM for Patient Signature Received 03/20/17     Photograph   Sacrococcygeal    Photograph   WOC 3/28/17 sacrum      Admission Information     Attending Provider Admitting Provider Admission Type Admission Date/Time    Arley Crespo MD [facility], Lucía Medeiros MD Emergency 03/20/17 1924    Discharge Date Hospital Service Auth/Cert Status Service Area     Internal Medicine Incomplete [facility]    Unit Room/Bed Admission Status       UU U6B 6226/6226-01 Admission (Confirmed)       Admission     Complaint    Urinary tract infection      Hospital Account     Name Acct ID Class Status Primary Coverage    Mary Wang [id number] Inpatient Open MEDICARE - MEDICARE            Guarantor Account (for Hospital Account #[id number])     Name Relation to Pt Service Area Active? Acct Type    Mary Wang Self FCS Yes Personal/Family    Address Phone          [address] [place] [address] [phone](H)              Coverage Information (for Hospital Account #[id number])     1. MEDICARE/MEDICARE     F/O Payor/Plan Precert #    MEDICARE/MEDICARE     Subscriber Subscriber #    Mary Wang [id number]    Address Phone    [employer]  [address]  [place] [address] [phone]          2. BCBS/[employer] EMPLOYEE PROGRAM     F/O Payor/Plan Precert #    BCBS/F[employer]ER[employer] EMPLOYEE PROGRAM     Subscriber Subscriber #    Mary Wang [id number]    Address Phone     [address] [address]  [place], [place] [id number] [phone]          3. STIVEN/STIVEN CARRERO     F/O Payor/Plan Precert #    STIVEN/STIVEN CARRERO     Subscriber Subscriber #    Mary Wang [id number]    Address Phone    [address]  [place] [address] [phone]

## 2017-03-20 NOTE — IP AVS SNAPSHOT
[address] [address]    Phone:  [phone]                                       After Visit Summary   3/20/2017    Mary Wang    MRN: [id number]           After Visit Summary Signature Page     I have received my discharge instructions, and my questions have been answered. I have discussed any challenges I see with this plan with the nurse or doctor.    ..........................................................................................................................................  Patient/Patient Representative Signature      ..........................................................................................................................................  Patient Representative Print Name and Relationship to Patient    ..................................................               ................................................  Date                                            Time    ..........................................................................................................................................  Reviewed by Signature/Title    ...................................................              ..............................................  Date                                                            Time

## 2017-03-21 PROBLEM — N39.0 URINARY TRACT INFECTION: Status: ACTIVE | Noted: 2017-01-01

## 2017-03-21 NOTE — PHARMACY
"The following home medications were NOT continued on inpatient admission per \"Discontinuation of nonessential home medications during hospitalization\" policy: cranberry tablets.    If a therapeutic holiday is deemed inappropriate per the prescriber, please notify the pharmacist regarding the medication order.    The pharmacist is available to answer any questions and/or concerns the patient may have regarding discontinuation of non-essential medications.    Please ensure that these medications are restarted as needed upon discharge via the medication reconciliation discharge process and included on the discharge medication reconciliation report.    Thank you,  Soledad Harp, PharmD  March 21, 2017          "

## 2017-03-21 NOTE — H&P
[facility] History and Physical    Mary Wang MRN# [id number]   Age: 67 year old YOB: 1950     Date of Admission:  3/20/2017            Assessment and Plan:   Assessment:   Mary Wang is a 67 year old female with a past medical history significant for severe COPD (Last FEV1 0.35) s/p tracheostomy on home ventilator and GJ tube placement, anxiety, hypertension, chronic indwelling bender catheter, and achalasia who presented with malaise and bender catheter pain, found to have severe metabolic derangements and a leukocytosis concerning for sepsis.       Plan:   NEURO:  Generalized anxiety disorder  Air hunger   - Continue home clonazepam, morphine, quetiapine, sertraline, hydromorphone PRN, and lorazepam PRN    PULM:  Acute on chronic hypoxic and hypercarbic respiratory failure   Severe COPD s/p tracheostomy, home vent-dependent   Home vent settings: CMV RR 14  PEEP 5 FiO2 40%. Concern for possible aspiration vs HCAP given RLL infiltrate on exam. Afebrile.   - RR increased to 16 by ED provider, no other changes made to home settings    - Obtain blood gas upon arrival to ICU to assess vent changes   - Antimicrobials as below   - Continue home prednisone, nebs     CV:  H/o CAD, NSTEMI  - Not on any cardiac meds currently     Sinus tachycardia  Likely due to infection vs discomfort with bender catheter. Will continue to monitor.     ID:  Sepsis (leukocytosis, tachycardia) secondary to UTI and/or HCAP   Chronic indwelling bender in place with urinary symptoms. Sediment present in bender bag. UA with moderate LE, negative nitrites. CT A/P with bladder wall thickening c/w cystitis. CXR with RLL infiltrate concerning for pneumonia. Normal lactate. Leukocytosis (18.6) with neutrophil predominance on admission.    - Meropenem and vancomycin given ED for HCAP/UTI coverage   - Sputum cultures, blood cultures, urine cultures  - Add on pro-calcitonin        RENAL/ELECTROLYTES:  Hypovolemic  "hyponatremia  On admission 120 (down from 142 one month prior). Suspect hypovolemia given probable systemic infection. Will monitor after fluid resuscitation. Goal to correct 10-12 mEq per day given unclear chronicity. S/p 1L NS bolus in ED.   - Trend Na  - NS @ 125 cc/hr    Hypokalemia  - On protocol    Chronic respiratory acidosis w/ renal compensation  Metabolic alkalosis   Overall patient is alkalemic probably due to a metabolic alkalosis in the setting of volume contraction. She otherwise has a chronic respiratory acidosis with renal compensation. Will monitor acid-base status after vent changes and volume resuscitation.     GI/Nutrition:  H/o achalasia and GERD  S/p GJ tube  - Resume home TF's, nutrition consult   - Continue home H2 blocker, GI cocktail PRN    Constipation, likely opioid-induced  - Continue home bowel regimen     HEME/ONC:  Leukocytosis with neutrophilia   Likely due to infection(s) as above. Will monitor.     Chronic normocytic anemia   - Will monitor periodically, no signs of bleeding     ENDO:  Steroid-induced hypergylcemia   - LDSSI, hypoglycemia protocol    MSK/RHEUM:   Stage III Decubitus Ulcer  No purulent drainage or surrounding erythema suggestive of superimposed infection.   - WOC consult    Lines: PIV, tracheostomy, bender, GJ tube    Prophylaxis: DVT - Enoxaparin, Ulcer - H2 blocker   Code: Full - per discussion with patient in emergency dept  Dispo: Lives in group home and had been essentially on comfort cares with a POLST in place stating that she did not wish to be hospitalized. She was adamant about being evaluated today, however, and therefore will be admitted to the ICU for possible sepsis and severe metabolic derangements.    Patient discussed with Dr. Guadalupe.     Kt Nolan MD  PGY-2 Internal Medicine  (p) [phone]             Chief Complaint:   \"Not feeling well\" and \"urinary pain\"     History is obtained from the patient and           History of Present " "Illness:   This patient is a 67 year old  female with a significant past medical history of severe COPD (Last FEV1 0.35) s/p tracheostomy on home ventilator and GJ tube placement, anxiety, hypertension, chronic indwelling bender catheter, and achalasia who presented for pain at her bender catheter site and malaise.     Per her home care nurse, she has been \"feeling off\" all day. She did not experience any fevers, chills, sweats, increased secretions, diarrhea, nausea, vomiting, chest pain, palpitations, or worsening dyspnea.  She also has been having some discomfort with her bender catheter that was recently exchanged at home. No blood was noticed in the bender bag.     Upon evaluation in the ED, the patient was resting comfortably in bed. She was difficult to obtain a history from because of the trach but also because she would fall asleep when being asked questions.     Of note, the patient has had seven admissions since 12/2016 and has been living at a group home. She has filled out a POLST which was reviewed in the ED and does state that she wishes not to be re-hospitalized, however per discussion with the ED provider and the home care nurse, she was adamant today that she be seen in the hospital and is ok with all aggressive measures.          Past Medical History:     Past Medical History   Diagnosis Date     Achalasia      Anxiety      Anxiety and depression      Chronic pain      COPD (chronic obstructive pulmonary disease) (H)      trach/vent dependent      GERD (gastroesophageal reflux disease)      Hypertension      Lung nodule      spiculated; followed in pulm clinic      Stress-induced cardiomyopathy      TMJ pain dysfunction syndrome      Tracheostomy in place              Past Surgical History:     Past Surgical History   Procedure Laterality Date     Tracheostomy N/A 8/26/2015     Procedure: TRACHEOSTOMY;  Surgeon: Milena Thibodeaux MD;  Location: [facility] OR     Bronchoscopy, insert bronchial " valve Right 9/2/2015     Procedure: BRONCHOSCOPY, INSERT BRONCHIAL VALVE;  Surgeon: Everardo Beach MD;  Location: [facility] OR     Esophagoscopy, gastroscopy, duodenoscopy (egd), combined N/A 12/11/2015     Procedure: COMBINED ESOPHAGOSCOPY, GASTROSCOPY, DUODENOSCOPY (EGD);  Surgeon: Dhruv Lyles MD;  Location: [facility] OR     Esophagoscopy, gastroscopy, duodenoscopy (egd), combined N/A 12/31/2015     Procedure: COMBINED ESOPHAGOSCOPY, GASTROSCOPY, DUODENOSCOPY (EGD);  Surgeon: Brenden Plasencia MD;  Location: [facility] OR     Percutaneous insertion tube jejunostomy N/A 12/31/2015     Procedure: PERCUTANEOUS INSERTION TUBE JEJUNOSTOMY;  Surgeon: Brenden Plasencia MD;  Location: [facility] OR     Percutaneous insertion tube jejunostomy N/A 1/25/2016     Procedure: PERCUTANEOUS INSERTION TUBE JEJUNOSTOMY;  Surgeon: Carrie Coleman MD;  Location: [facility] OR     Anesthesia out of or mri N/A 4/18/2016     Procedure: ANESTHESIA OUT OF OR MRI;  Surgeon: GENERIC ANESTHESIA PROVIDER;  Location: [facility]     Endoscopic insertion tube gastrostomy N/A 7/9/2016     Procedure: ENDOSCOPIC INSERTION TUBE GASTROSTOMY;  Surgeon: Brenden Plasencia MD;  Location: [facility] OR     Picc insertion Right 1/9/2017     5fr DL BioFlo PICC, 38cm (1cm external) in the R basilic vein.     Esophagoscopy, gastroscopy, duodenoscopy (egd), combined N/A 2/17/2017     Procedure: COMBINED ESOPHAGOSCOPY, GASTROSCOPY, DUODENOSCOPY (EGD);  Surgeon: Brenden Plasencia MD;  Location: [facility] OR             Social History:     Social History   Substance Use Topics     Smoking status: Former Smoker     Packs/day: 2.00     Years: 40.00     Types: Cigarettes     Start date: 1/1/1964     Quit date: 4/18/1998     Smokeless tobacco: Never Used     Alcohol use No             Family History:     Family History   Problem Relation Age of Onset     CANCER Sister             Allergies:     Allergies   Allergen Reactions     Levaquin Other (See Comments)     Delirium     Toro  Podhaler [Tobramycin] Other (See Comments)     Severe congestion, SOB      Suprax [Cefixime]      See ceftibuten     Augmentin Nausea     Has tolerated for treatment of UTIs in 2016.     Avelox [Moxifloxacin] Anxiety     Cedax [Ceftibuten] Other (See Comments)     Pain in eyes     Penicillins Itching     Tolerated amoxicillin without issues.     Vantin [Cefpodoxime] Nausea             Medications:      Prior to Admission medications    Medication Sig Last Dose Taking? Auth Provider   albuterol (2.5 MG/3ML) 0.083% neb solution Take 1 vial by nebulization every 6 hours as needed for shortness of breath / dyspnea or wheezing 3/20/2017 at 1630 Yes Reported, Patient   gabapentin (NEURONTIN) 250 MG/5ML solution TAKE 300MG (6ML) VIA J-TUBE 3 TIMES A DAY.... 3/20/2017 at 1630 Yes Norman Brito MD   HYDROmorphone (DILAUDID) 1 MG/ML LIQD liquid Take 1 mL (1 mg) by mouth every 2 hours as needed for other (dyspnea) 3/20/2017 at 1630 Yes Norman Brito MD   levalbuterol (XOPENEX) 1.25 MG/3ML neb solution Take 3 mLs (1.25 mg) by nebulization 4 times daily and every four hours at night PRN. 3/20/2017 at 1630 Yes Norman Brito MD   morphine sulfate (ROSIE) 10 MG 24 hr capsule Give contents of one capsule through the gastrostomy tube every 8 hours.   Norman Brito MD   predniSONE (DELTASONE) 20 MG tablet 1 tablet (20 mg) by Per G Tube route daily   Norman Brito MD   polyethylene glycol (MIRALAX/GLYCOLAX) Packet 17 g by Per J Tube route 2 times daily Hold for loose stools   Veronica Huerta APRN CNP   budesonide (PULMICORT) 0.5 MG/2ML neb solution Take 2 mLs (0.5 mg) by nebulization 2 times daily   Veronica Huerta APRN CNP   clonazePAM (KLONOPIN) 0.5 MG tablet TAKE 1.5MG (3 TABLETS) BY MOUTH FOUR TIMES A DAY FOR ANXIETY.   Norman Brito MD   menthol-zinc oxide (CALMOSEPTINE) 0.44-20.625 % OINT ointment Apply topically 2 times daily as needed for skin protection   Norman Brito,  MD   guaiFENesin (ORGANIDIN) 200 MG TABS tablet Take 1 tablet (200 mg) by mouth 4 times daily   Norman Brito MD   Cranberry 125 MG TABS 1 tablet by Jejunal Tube route daily   Norman Brito MD   QUEtiapine (SEROQUEL) 25 MG tablet Administer one tab per J-tube as needed at night if scheduled HS dose ineffective for treatment of anxiety or sleeplessness.   Norman Brito MD   bisacodyl (DULCOLAX) 10 MG Suppository Place 1 suppository (10 mg) rectally daily as needed for constipation   Celia Chavis MD   order for DME Equipment being ordered: Methylex foam dressings, alternating pressure pad for mattress and pump.   Norman Brito MD   Rectal Cleansers ([employer] CLEANSING ENEMA) ENEM Place 1 enema rectally daily as needed   Norman Brito MD   order for DME Equipment being ordered: 1) Avendaño catheter 16F, balloon 10 mL, silicone.  2) Urinary collection bag.  3) Elastic leg strap for Avendaño catheter.  Please fax to [employer].   Norman Brito MD   LORazepam (ATIVAN) 2 MG/ML (HIGH CONC) solution Take 0.25 mLs (0.5 mg) by mouth every 3 hours as needed for anxiety   Norman Brito MD   clonazePAM (KLONOPIN) 0.1 mg/mL Take 15 mLs (1.5 mg) by mouth 4 times daily   Norman Brito MD   QUEtiapine (SEROQUEL) 50 MG tablet Take 1 tablet (50 mg) by mouth 2 times daily   Norman Brito MD   ipratropium (ATROVENT) 0.02 % neb solution Take 2.5 mLs (0.5 mg) by nebulization 4 times daily and every four hours at night PRN.   Norman Brito MD   lactobacillus rhamnosus, GG, (CULTURELL) capsule 1 capsule by Per G Tube route daily   Norman Brito MD   lidocaine (LIDODERM) 5 % Patch Place 1-2 patches onto the skin every 24 hours Apply to the lower back   Jenni Tellez MD   senna-docusate (SENOKOT-S;PERICOLACE) 8.6-50 MG per tablet 2 tablets by Per J Tube route 2 times daily      calcium carbonate (TUMS) 500 MG chewable tablet Take 1 tablet (500  mg) by mouth every 2 hours as needed for heartburn      ondansetron (ZOFRAN) 4 MG tablet Take 1 tablet (4 mg) by mouth every 4 hours as needed for nausea      famotidine (PEPCID) 40 MG/5ML suspension Take 2.5 mLs (20 mg) by mouth 2 times daily as needed for heartburn   Theresa Robertson APRN CNP   loperamide (IMODIUM A-D) 2 MG tablet 2-2 mg tablets per J-tube qid prn frequent and/or loose stools. Do not use more than 8 tabs per day.   Theresa Robertson APRN CNP   polyethylene glycol 0.4%- propylene glycol 0.3% (SYSTANE ULTRA) 0.4-0.3 % SOLN ophthalmic solution Place 1-2 drops into both eyes 4 times daily as needed for dry eyes 0900, 1300, 1700, 2100   Theresa Robertson APRN CNP   sertraline (ZOLOFT) 20 MG/ML (HIGH CONC) solution 7.5 mLs (150 mg) by Per Feeding Tube route daily   Geo Bui MD   fexofenadine (ALLEGRA) 180 MG tablet Take 1 tablet (180 mg) by mouth daily   Geo Bui MD   fiber modular (NUTRISOURCE FIBER) packet 1 packet by Per J Tube route 3 times daily   Geo Bui MD   melatonin (MELATONIN) 1 MG/ML LIQD liquid 3 mLs (3 mg) by Per J Tube route At Bedtime   Francisco Burt MD   acetaminophen (TYLENOL) 160 MG/5ML oral liquid 20.3 mLs (650 mg) by Per Feeding Tube route every 4 hours as needed for mild pain   Kiesha Martinez MD   sodium chloride (OCEAN) 0.65 % nasal spray Spray 1 spray into both nostrils daily as needed for congestion   Kiesha Martinez MD   lidocaine 15 mL, alum & mag hydroxide-simethicone 15 mL GI Cocktail Take 30 mLs by mouth 3 times daily as needed for moderate pain   Kiesha Martinez MD              Review of Systems:   The Review of Systems is negative other than noted in the HPI          Physical Exam:     Temp:  [98.1  F (36.7  C)] 98.1  F (36.7  C)  Pulse:  [100] 100  Heart Rate:  [82-98] 98  Resp:  [15-21] 15  BP: (100-140)/(46-99) 118/46  SpO2:  [93 %-100 %] 98 %     Constitutional:   No acute distress, resting  comfortably in bed    Eyes:   Non-icteric, PERRL   ENT:   Trach site appears CDI   Neck:   Supple, symmetrical, trachea midline, no adenopathy, thyroid symmetric, not enlarged and no tenderness, skin normal   Hematologic / Lymphatic:   Scattered ecchymoses on forearms    Back:   No tenderness to palpation   Lungs:   Inspiratory crackles heard at right base, no wheezes    Cardiovascular:   RRR, normal S1 S2, no m/r/g   Abdomen:   GJ site appears CDI, hypoactive BS, soft, NT/ND, no HSM     Genitounirinary:   Avendaño in place    Musculoskeletal:   There is no redness, warmth, or swelling of the joints.    Neurologic:   Awake, alert, sleepy, moving all extremities, CN II-XII intact    Neuropsychiatric:   Normal affect   Skin:   No acute rashes noted              Data:     Results for orders placed or performed during the hospital encounter of 03/20/17 (from the past 24 hour(s))   CBC with platelets differential   Result Value Ref Range    WBC 18.6 (H) 4.0 - 11.0 10e9/L    RBC Count 3.56 (L) 3.8 - 5.2 10e12/L    Hemoglobin 10.4 (L) 11.7 - 15.7 g/dL    Hematocrit 32.4 (L) 35.0 - 47.0 %    MCV 91 78 - 100 fl    MCH 29.2 26.5 - 33.0 pg    MCHC 32.1 31.5 - 36.5 g/dL    RDW 18.6 (H) 10.0 - 15.0 %    Platelet Count 341 150 - 450 10e9/L    Diff Method Automated Method     % Neutrophils 84.8 %    % Lymphocytes 6.7 %    % Monocytes 7.9 %    % Eosinophils 0.0 %    % Basophils 0.2 %    % Immature Granulocytes 0.4 %    Nucleated RBCs 0 0 /100    Absolute Neutrophil 15.8 (H) 1.6 - 8.3 10e9/L    Absolute Lymphocytes 1.3 0.8 - 5.3 10e9/L    Absolute Monocytes 1.5 (H) 0.0 - 1.3 10e9/L    Absolute Eosinophils 0.0 0.0 - 0.7 10e9/L    Absolute Basophils 0.0 0.0 - 0.2 10e9/L    Abs Immature Granulocytes 0.1 0 - 0.4 10e9/L    Absolute Nucleated RBC 0.0    Basic metabolic panel   Result Value Ref Range    Sodium 120 (L) 133 - 144 mmol/L    Potassium 3.3 (L) 3.4 - 5.3 mmol/L    Chloride 64 (L) 94 - 109 mmol/L    Carbon Dioxide (HH) 20 - 32  mmol/L     >45  Critical Value called to and read back by  ACE GOODEN RN IN ER AT 2111 ON 03/20/17 BY KS      Anion Gap Not Calculated 3 - 14 mmol/L    Glucose 106 (H) 70 - 99 mg/dL    Urea Nitrogen 47 (H) 7 - 30 mg/dL    Creatinine 0.82 0.52 - 1.04 mg/dL    GFR Estimate 69 >60 mL/min/1.7m2    GFR Estimate If Black 84 >60 mL/min/1.7m2    Calcium 9.1 8.5 - 10.1 mg/dL   Hepatic panel   Result Value Ref Range    Bilirubin Direct <0.1 0.0 - 0.2 mg/dL    Bilirubin Total 0.4 0.2 - 1.3 mg/dL    Albumin 3.1 (L) 3.4 - 5.0 g/dL    Protein Total 7.2 6.8 - 8.8 g/dL    Alkaline Phosphatase 104 40 - 150 U/L    ALT 22 0 - 50 U/L    AST 22 0 - 45 U/L   Lipase   Result Value Ref Range    Lipase 102 73 - 393 U/L   UA with Microscopic   Result Value Ref Range    Color Urine Yellow     Appearance Urine Clear     Glucose Urine Negative NEG mg/dL    Bilirubin Urine Negative NEG    Ketones Urine Negative NEG mg/dL    Specific Gravity Urine 1.013 1.003 - 1.035    Blood Urine Moderate (A) NEG    pH Urine 8.5 (H) 5.0 - 7.0 pH    Protein Albumin Urine Negative NEG mg/dL    Urobilinogen mg/dL Normal 0.0 - 2.0 mg/dL    Nitrite Urine Negative NEG    Leukocyte Esterase Urine Moderate (A) NEG    Source Catheterized Urine     WBC Urine 5 (H) 0 - 2 /HPF    RBC Urine 4 (H) 0 - 2 /HPF   Basic metabolic panel   Result Value Ref Range    Sodium 120 (L) 133 - 144 mmol/L    Potassium 3.4 3.4 - 5.3 mmol/L    Chloride 64 (L) 94 - 109 mmol/L    Carbon Dioxide (HH) 20 - 32 mmol/L     >45  Critical Value called to and read back by  DEAN MIGUEL RN IN ER AT 2236 ON 03/20/17 BY KS      Anion Gap Not Calculated 3 - 14 mmol/L    Glucose 109 (H) 70 - 99 mg/dL    Urea Nitrogen 50 (H) 7 - 30 mg/dL    Creatinine 0.83 0.52 - 1.04 mg/dL    GFR Estimate 69 >60 mL/min/1.7m2    GFR Estimate If Black 83 >60 mL/min/1.7m2    Calcium 9.4 8.5 - 10.1 mg/dL   Blood gas venous   Result Value Ref Range    Ph Venous 7.50 (H) 7.32 - 7.43 pH    PCO2 Venous 75 (H) 40 - 50 mm Hg     PO2 Venous 65 (H) 25 - 47 mm Hg    Bicarbonate Venous 58 (HH) 21 - 28 mmol/L    FIO2 40    Blood Culture ONE site   Result Value Ref Range    Specimen Description Blood Left Arm     Culture Micro Pending     Micro Report Status Pending    CT Abdomen Pelvis w Contrast    Narrative    EXAMINATION: CT ABDOMEN PELVIS W CONTRAST, 3/20/2017 10:44 PM    TECHNIQUE:  Helical CT images from the lung bases through the  symphysis pubis were obtained with IV contrast.     CONTRAST DOSE: 76 cc of isovue 370    COMPARISON: CT 12/27/2016, 9/10/2016    HISTORY: abd distension (minimally communicative), leukocytosis,  achalasia, status post GJ tube, indwelling Avendaño catheter    FINDINGS:  Lines and tubes: Gastrojejunostomy tube tip in the proximal jejunum.    Abdomen and pelvis: The liver, gallbladder, spleen, and right adrenal  are unremarkable. Fatty replacement of pancreas. Stable 1 cm left  adrenal low-attenuation nodule. 5 mm right interpolar nonobstructive  renal calculus. Unremarkable left kidney. No hydronephrosis on either  side.    Moderate amount of stool in the rectum, sigmoid and descending colon.  Sigmoid colon diverticulosis without CT evidence of diverticulitis.  Gaseous distention of the transverse colon measuring up to 5.6 cm. The  appendix is unremarkable. Mild nonspecific wall thickening of the  small bowel around the GJ tube. No ascites, free air or pneumatosis.    No pelvic lymphadenopathy.    Urinary bladder Avendaño catheter. Thickened appearance of the urinary  bladder wall. Small foci of bladder air.    Small fat-containing ventral hernia.    Lung bases: Increased right lung base pulmonary opacity since  12/27/2016. Severe centrilobular emphysema. No pleural effusion.    Bones and soft tissues: No acute osseous lesions. Stable multiple  compression deformities throughout the lumbar spine. Stable grade 1  anterolisthesis of L5 on S1. Facet degenerative changes.      Impression    IMPRESSION:   1. Constipation  with a moderate amount of stool in the rectum and left  colon. Gaseous distention of the transverse colon. No obstruction.  2. Mild nonspecific wall thickening of the small bowel around the GJ  tube.  3. Thickened appearance of the urinary bladder wall, which can be  suggestive of cystitis.  4. Increased right lung base pulmonary opacity since 12/27/2016.  Differentials include aspiration or infection.  5. Stable 5 mm right interpolar nonobstructive renal calculus.  6. Small fat-containing ventral hernia.   Lactic acid   Result Value Ref Range    Lactic Acid 1.6 0.7 - 2.1 mmol/L   Chest  XR, 1 view portable    Narrative    PRELIMINARY REPORT - The following report is a preliminary  interpretation.  1.  Increased right lower lobe pulmonary opacities since 11/8/2016,  which can be suggestive of infection or aspiration.  2.  Chronic obstructive pulmonary disease.     *Note: Due to a large number of results and/or encounters for the requested time period, some results have not been displayed. A complete set of results can be found in Results Review.     Kole Nolan MD

## 2017-03-21 NOTE — PHARMACY
[facility], [place], [place]   Antimicrobial Management Team (AMT) Note              To: MICU  Unit: 4C  Allergies   Allergen Reactions     Levaquin Other (See Comments)     Delirium     Toro Podhaler [Tobramycin] Other (See Comments)     Severe congestion, SOB      Suprax [Cefixime]      See ceftibuten     Augmentin Nausea     Has tolerated for treatment of UTIs in 2016.     Avelox [Moxifloxacin] Anxiety     Cedax [Ceftibuten] Other (See Comments)     Pain in eyes     Penicillins Itching     Tolerated amoxicillin without issues.     Vantin [Cefpodoxime] Nausea       Brief Summary: Mary Wang is a 67 year old female presented 3/20/17 for malaise and bender catheter pain, found to have severe metabolic derangements and leukocytosis concerning for sepsis.  HPI:  She has a history of severe COPD s/p trach on home ventilator and GJ tube, chronic bender, and achalasia. She takes 20mg of prednisone daily, office visits report Bactrim ppx. Lives in group home. She has a sacral pressure injury since 1/28/17 (currently not purulent and no erythema).    Interval History  Upon admission, her home nurse denies any fevers or anything else out of the other than the patient s complaint of urinary discomfort. Patient is back to home vent settings today. Complains of catheter discomfort still today.    Assessment:   1. Acute on chronic hypoxic hypercarbic respiratory failure vs. HCAP/ Aspiration pneumonia: Patient is back on home vent settings with no issues from a respiratory standpoint except possible infectious changes seen on CT/ Chest Xray. Patient also had leukocytosis upon admission. Patient has had multiple negative MRSA nares, most recently 2/15/17 and has not grown bacteria that would require vancomycin from the sputum (only G- organisms). Most recently, patient grew pseudomonas resistant to meropenem. In the past few months, patient has grown pseudomonas from trach that has had variable  susceptibilities, so a culture is indicated for accurate treatment.  2. Possible UTI: The urinary analysis is not convincing of infection, but patient continues to endorse pain. UA was done before antibiotics were started, and equal RBC and WBC were found, no nitrates and moderate leukocyte esterase likely associated with chronic bender catheter.  Patient is likely colonized with pseudomonas in the urine due to pseudomonas in urine culture multiple times in the past year.   Recommendations:  1. Discontinue vancomycin.  2. If team is concerned for HCAP, obtain trach culture and treat according to culture. If empiric coverage is needed until cultures are resulted, recommend levofloxacin 750mg daily based on the most recent (1/31/17) susceptibility data.  3. Recommend urology consult if ongoing concern for bladder pain, ensure catheter positions and consider changing catheter. Though low suspicion, if team suspects UTI is causing her issues, continue meropenem for a 7 day course (through 3/28) de- escalate from meropenem if able pending urine culture.    Discussed with ID Staff - Dr. Billings, Leela Alejandro, PharmD  Michaelle Alejandra, [facility] Resident    Current Anti-infective Orders:  Anti-infectives (Future)    Start     Dose/Rate Route Frequency Ordered Stop    03/21/17 0900  meropenem (MERREM) 1 g vial to attach to  mL bag      1 g  over 30 Minutes Intravenous EVERY 8 HOURS 03/21/17 0546            Vitals and other clinical features  Vitals       03/19 0700  -  03/20 0659 03/20 0700  -  03/21 0659 03/21 0700  -  03/21 1253   Most Recent    Temp ( F)   98.1 -  99    99.2 -  99.5     99.2 (37.3)    Pulse     100       100    Heart Rate   82 -  98    77 -  87     78    Resp   15 -  21    12 -  18     12    BP   100/74 -  (!) 140/99    (!)64/43 -  134/71     (!) 78/50    SpO2 (%)   93 -  100    93 -  100     97        Temperature curve:      Labs  Estimated Creatinine Clearance: 64.5 mL/min (based on Cr of 0.76).  Recent Labs    Lab Test  03/21/17 0322 03/20/17   2159  03/20/17 2000 02/17/17   0321  02/03/17   0701  02/02/17   0740   CR  0.76  0.83  0.82  0.64  0.65  0.58       Recent Labs   Lab Test  03/21/17 0322  03/20/17 2000 02/17/17   1315  02/17/17   0321  02/03/17   0701  02/02/17   0740  02/01/17   0543  01/31/17   0747  01/30/17   0917   01/28/17   1605   WBC   --   18.6*   --   10.6  8.7  9.3  13.2*  18.7*  11.9*   < >  11.1*   ANEU   --   15.8*   --    --    --   6.9  10.9*  16.1*  9.8*   --   10.5*   ALYM   --   1.3   --    --    --   1.2  0.9  0.7*  1.2   --   0.3*   ANTHONY   --   1.5*   --    --    --   1.0  1.3  1.7*  0.6   --   0.3   AEOS   --   0.0   --    --    --   0.1  0.1  0.0  0.2   --   0.0   HGB   --   10.4*  8.5*  6.9*  7.8*  7.6*  7.5*  9.1*  8.7*   < >  9.5*   HCT   --   32.4*   --   22.6*  24.9*  23.8*  24.3*  29.5*  28.5*   < >  30.9*   MCV   --   91   --   93  92  93  95  96  96   < >  95   PLT  324  341   --   306  341  309  288  326  275   < >  279    < > = values in this interval not displayed.       Recent Labs   Lab Test  03/20/17 2000 01/28/17   1605  01/23/17   0615  01/21/17   0642  01/20/17   0300  01/19/17   0900   01/02/17   1335   BILITOTAL  0.4  0.2  0.2  0.4  0.4   --    --   0.2   ALKPHOS  104  91  82  79  113   --    --   164*   ALBUMIN  3.1*  2.1*  2.1*  2.0*  2.7*   --    --   2.7*   AST  22  19  14  23  22  19   < >  17   ALT  22 22 21 22 26   --    --   39    < > = values in this interval not displayed.       Recent Labs   Lab Test  03/21/17   0322  03/20/17   2327  02/01/17   0543   01/29/17   0638   01/23/17   0615  01/21/17   0642   01/19/17   0900  01/13/17   1300  01/09/17   1339   01/02/17   1335  12/26/16   1426   10/31/16   1250   08/03/14   1419   04/29/14   0940  04/22/14   1130   LACT   --   1.6  0.9   < >   --    < >   --    --    < >   --    --    --    < >   --    --    < >   --    < >   --    < >   --    --    CRP   --    --    --    --   83.0*   --   45.0*   140.0*   --   21.1*   --   4.0   --   18.0*  19.0*   --   33.0*   < >  <2.9   --   6.3  10.9*   SED   --    --    --    --    --    --    --    --    --   30  14   --    --    --    --    --    --    --   8   --   9  9   PCAL  0.20   --    --    --   0.07   --    --    --    < >   --    --    --    < >  <0.05  <0.05 ng/ml  Normal  Recommendation: Very low risk of bacterial infection.   Discourage antibiotics unless strong clinical suspicion for serious infection.     --    < >   --    < >  <0.05  <0.05     --    --    --     < > = values in this interval not displayed.     Recent Labs   Lab Test  01/23/17   1006   VANCOMYCIN  21.8       Culture results with specimen source  Culture Micro   Date Value Ref Range Status   03/20/2017 No growth after 8 hours  Final   01/31/2017 (A)  Final    Moderate growth Normal rome  Moderate growth Pseudomonas aeruginosa  Moderate growth Serratia marcescens     01/31/2017 No growth  Final   01/31/2017 No growth  Final   01/21/2017 No growth  Final   01/21/2017 No growth  Final    Specimen Description   Date Value Ref Range Status   03/20/2017 Blood Left Arm  Final   02/15/2017 Nares  Final   01/31/2017 Sputum Endotracheal  Final   01/31/2017 Blood Right Hand  Final   01/31/2017 Blood PURPLE PORT  Final   01/21/2017 Blood Left Arm  Final        Urine Studies     Recent Labs   Lab Test  03/20/17   2005  01/31/17   1136  01/20/17   0549  01/07/17   1502  01/02/17   1703   URINEPH  8.5*  8.5*  8.0*  8.0*  7.5*   NITRITE  Negative  Negative  Negative  Negative  Negative   LEUKEST  Moderate*  Negative  Negative  Moderate*  Trace*   WBCU  5*  3*  4*  51*  6*       CSF Testing  No lab results found.    Respiratory Virus Testing    Respiratory Virus Source   Date Value Ref Range Status   01/02/2017 Nasopharyngeal  Final     Influenza A   Date Value Ref Range Status   01/02/2017 Negative NEG Final     Influenza A, H1   Date Value Ref Range Status   01/02/2017 Negative NEG Final     Influenza  A, H3   Date Value Ref Range Status   01/02/2017 Negative NEG Final     Influenza A 2009 H1N1   Date Value Ref Range Status   01/02/2017 Negative NEG Final     Influenza B   Date Value Ref Range Status   01/02/2017 Negative NEG Final     Respiratory Syncytial Virus A   Date Value Ref Range Status   01/02/2017 Negative NEG Final     Respiratory Syncytial Virus B   Date Value Ref Range Status   01/02/2017 Negative NEG Final     Parainfluenza Virus 1   Date Value Ref Range Status   01/02/2017 Negative NEG Final     Parainfluenza Virus 2   Date Value Ref Range Status   01/02/2017 Negative NEG Final     Parainfluenza Virus 3   Date Value Ref Range Status   01/02/2017 Negative NEG Final     Human Metapneumovirus   Date Value Ref Range Status   01/02/2017 Negative NEG Final     Human Rhinovirus   Date Value Ref Range Status   01/02/2017 Negative NEG Final     Adenovirus Species B/E   Date Value Ref Range Status   01/02/2017 Negative NEG Final     Adenovirus Species C   Date Value Ref Range Status   01/02/2017 Negative NEG Final     Last check of C difficile  C Diff Toxin B PCR   Date Value Ref Range Status   01/02/2017  NEG Final    Negative  Negative: Clostridium difficile target DNA sequences NOT detected, presumed   negative for Clostridium difficile toxin B or the number of bacteria present   may be below the limit of detection for the test.   FDA approved assay performed using [employer] GeneXpert real-time PCR.   A negative result does not exclude actual disease due to Clostridium difficile   and may be due to improper collection, handling and storage of the specimen or   the number of organisms in the specimen is below the detection limit of the   assay.         Imaging:  Chest  Xr, 1 View Portable    Result Date: 3/21/2017  EXAM: XR CHEST PORT 1 VW  3/20/2017 11:34 PM  HISTORY: leukocytosis, chronically vented COMPARISON: Chest radiograph 11/18/2016, CT 1/29/2017 FINDINGS: Single AP view of the chest. Just medial.  Interval removal of the right PICC tip. Emphysematous lungs. Redemonstration of bilateral nodularity. Increased right lower lobe pulmonary opacities since 11/8/2016. The cardiomediastinal silhouette is within normal limits. No significant pleural effusion. No pneumothorax.     IMPRESSION: 1.  Increased right lower lobe pulmonary opacities since 11/8/2016, which can be suggestive of infection or aspiration. 2.  Chronic obstructive pulmonary disease. 3.   Diffuse pulmonary microlithiasis, granular densities which have been increasing since 2016. Differential for fine granular densities includes *  silicosis *  healed varicella pneumonia  *  pulmonary stannosis: deposition of tin dust *  pulmonary baritosis: deposition of barium dust *  talcosis I have personally reviewed the examination and initial interpretation and I agree with the findings. BLAIR DODD MD    Ct Abdomen Pelvis W Contrast    Result Date: 3/21/2017  EXAMINATION: CT ABDOMEN PELVIS W CONTRAST, 3/20/2017 10:44 PM TECHNIQUE:  Helical CT images from the lung bases through the symphysis pubis were obtained with IV contrast. CONTRAST DOSE: 76 cc of isovue 370 COMPARISON: CT 12/27/2016, 9/10/2016 HISTORY: abd distension (minimally communicative), leukocytosis, achalasia, status post GJ tube, indwelling Avendaño catheter FINDINGS: Lines and tubes: Gastrojejunostomy tube tip in the proximal jejunum. Abdomen and pelvis: The liver, gallbladder, spleen, and right adrenal are unremarkable. Fatty replacement of pancreas. Stable 1 cm left adrenal low-attenuation nodule. 5 mm right interpolar nonobstructive renal calculus. Unremarkable left kidney. No hydronephrosis on either side. Moderate amount of stool in the rectum, sigmoid and descending colon. Sigmoid colon diverticulosis without CT evidence of diverticulitis. Gaseous distention of the transverse colon measuring up to 5.6 cm. The appendix is unremarkable. Mild nonspecific wall thickening of the small bowel  around the GJ tube. No ascites, free air or pneumatosis. No pelvic lymphadenopathy. Urinary bladder Avendaño catheter. Decompressed urinary bladder. Urachal remanent. Small foci of bladder air. Small fat-containing ventral hernia. Lung bases: Increased right lung base pulmonary opacity since 12/27/2016. Severe centrilobular emphysema. No pleural effusion. Bones and soft tissues: No acute osseous lesions. Stable multiple compression deformities throughout the lumbar spine. Bilateral pars interarticulars defects at L5-S1. Stable grade 1 anterolisthesis of L5 on S1. Facet degenerative changes.     IMPRESSION: 1. Constipation with moderate amount of stool in the rectum and left colon. Gaseous distention of the transverse colon. No obstruction. 2. Mild nonspecific wall thickening of the small bowel around the GJ tube. 3. Increased right lung base pulmonary opacity since 12/27/2016, likely pneumonia. 4. Stable 5 mm right interpolar nonobstructive renal calculus. 6. Small fat-containing ventral hernia. I have personally reviewed the examination and initial interpretation and I agree with the findings. BLAIR DODD MD

## 2017-03-21 NOTE — ED NOTES
Her nursing home nurse reports she is on palliative care. She has a POLST in her binder. Since 2/317 she has been on palliative care

## 2017-03-21 NOTE — PROGRESS NOTES
Care Coordinator Progress Note     Admission Date/Time:  3/20/2017  Attending MD:  Lucía Guadalupe*     Data  Chart reviewed, discussed with interdisciplinary team.   Patient was admitted for:    Hyponatremia  Hypercarbia  Acute on chronic respiratory failure with hypoxia (H)  Centrilobular emphysema (H).    Concerns with insurance coverage for discharge needs: None.  Current Living Situation: Patient lives in a foster home.  Support System: Supportive  Services Involved: Home Care; DME  Transportation: Ambulance  Barriers to Discharge: chronically ill    Coordination of Care:   Spoke with Arley pt's  with [facility] (phone: [phone]), to confirm that pt resides at adult foster care with 24 hr nursing care and on home vent.  Pt does have Complex Care Team, Dr. Brito caring for her at her home.  Arley updated me that pt's recent wishes were to no longer be hospitalized.  However, pt requested to be hospitalized on this admission and was not able to be re-directed.  Arley also updated me that ambulance drivers reported complications with pt's home portable vent ([facility]) and requested that [facility] evaluate vent for correct functioning prior to discharge.  I have contacted [facility] and was updated that they will have a representative come to the hospital and evaluate pt's home vent today.  I have updated pt's bedside RN and charge RN of [facility] visit today.  RNCC to follow for discharge planning once pt is stable for return home.             Plan  Anticipated Discharge Date:  TBD    Anticipated Discharge Plan:  Return to Adult Foster Care with 24 hr nursing and home vent.        Valdo Flowers, RN, BSN  ICU Care Coordinator  Pager: [phone]  Phone:  [phone]

## 2017-03-21 NOTE — PROGRESS NOTES
[facility] Nurse Inpatient Adult Pressure INJURY (PI) Wound Assessment     Initial assessment of PU(s) on pt's:   Sacrococcygeal area     Data:   Patient History:      per MD note(s):  Mary Wang is a 67 year old female with a past medical history significant for severe COPD (Last FEV1 0.35) s/p tracheostomy on home ventilator and GJ tube placement, anxiety, hypertension, chronic indwelling bender catheter, and achalasia who presented with malaise and bender catheter pain, found to have severe metabolic derangements and a leukocytosis concerning for sepsis.      Current mattress:  AtmosAir  Current pressure relieving devices:  Foam dressing and Pillows    Moisture Management:  Incontinence Protocol    Catheter secured? Yes    Current Diet / Nutrition:       None        Jose F Assessment and sub scores:   Jose F Score  Av  Min: 13  Max: 13   Labs:   Recent Labs   Lab Test  17   0321   17   0638   16   0331   ALBUMIN  3.1*   --    --    --    --    < >   --    HGB  10.4*   < >  6.9*   < >  8.7*   < >  8.4*   INR   --    --   1.15*   --    --    < >   --    WBC  18.6*   --   10.6   < >  9.0   < >  11.8*   A1C   --    --    --    --    --    --   5.1   CRP   --    --    --    --   83.0*   < >   --     < > = values in this interval not displayed.                                                                                                                          Pressure Injury Assessment  (location #1):   Sacrococcygeal area    Wound History:   Present on admission        Wound Base: 70% dark black moist nonviable tissue, 30% mixture of pink and scattered yellow slough tissue    Specific Dimensions (length x width x depth, in cm) :   5.5x1.6x2.4cm    Tunneling:  N/A    Undermining: N/A    Palpation of the wound bed:  boggy and fluctuance    Slough appearance:  adherent and moist    Eschar appearance:  none    Periwound Skin: exfoliating, dry peeling  epithelium    Color: red blanchable erythema    Temperature  normal     Drainage:  Amount: small,  Color: serous       Odor: none    Pain:  Unable to assess, intubated         Intervention:     Patient's chart evaluated.      Jose F Interventions:  Current Jose F Interventions and Care Plan reviewed and updated, appropriate at this time.    Wound was assessed.    Wound Care: was done: ,    Orders  Written    Supplies  Ordered: iodosorb gel    Discussed plan of care with Nurse           Assessment:     Pressure Injury (PI) located on Sacrococcygeal area : Unstageable    Status: wound  initial assessment, Markedly worsening    Present on admission         Plan:     Nursing to notify the Provider(s) and re-consult the WOC Nurse if wound(s) deteriorate(s).  Plan of care for wound located on Sacrococcygeal area every other day:   Cleanse the wound with microklenz moisten gauze pat dry  Apply a layer of iodosorb gel (#[id number]) at the base of the wound  Cover with Sacral Mepilex.    WOC Nurse will return: weekly  Face to face time: 20 mins

## 2017-03-21 NOTE — ED PROVIDER NOTES
History     Chief Complaint   Patient presents with     Urinary Pain     HPI  Mary Wang is a 67 year old female with a history of severe COPD status-post trach (on home vent 24/7, 3-3.5 LPM), anxiety, hypertension, achalasia, status-post GJ tube and indwelling Avendaño catheter who presents with catheter concerns. The patient's home nurse provides the majority of the history. The patient has been complaining of pain around her indwelling Avendaño, which was last changed on 3/15/17 (5 days ago) for the first time at home. The patient also complains of subjective fever, though her temperature has been normal at home. She denies shortness of breath, vomiting or diarrhea. She complains of some abdominal pain and distention. She denies any increase in drainage from the G-tube and denies any decreased urine output from the Avendaño.     Of note, the patient has had multiple recent admissions: 12/1/16-12/5/16 for UTI, 12/25/16-12/28/16 for bacteremia, 1/2/17-1/6/17 with encephalopathy, ultimately attributed to her medications, 1/7/17-1/9/17 for acute on chronic respiratory failure, likely secondary to COPD exacerbation, 1/20/17-1/24/17 for oversedation/aspiration pneumonitis, 1/28/17-2/03/17 for acute on chronic respiratory failure and 2/15/17-2/17/17 for G-tube dysfunction.     Past Medical History   Diagnosis Date     Achalasia      Anxiety      Anxiety and depression      Chronic pain      COPD (chronic obstructive pulmonary disease) (H)      trach/vent dependent      GERD (gastroesophageal reflux disease)      Hypertension      Lung nodule      spiculated; followed in pulm clinic      Stress-induced cardiomyopathy      TMJ pain dysfunction syndrome      Tracheostomy in place        Past Surgical History   Procedure Laterality Date     Tracheostomy N/A 8/26/2015     Procedure: TRACHEOSTOMY;  Surgeon: Milena Thibodeaux MD;  Location: [facility]     Bronchoscopy, insert bronchial valve Right 9/2/2015     Procedure:  BRONCHOSCOPY, INSERT BRONCHIAL VALVE;  Surgeon: Everardo Beach MD;  Location: [facility]     Esophagoscopy, gastroscopy, duodenoscopy (egd), combined N/A 12/11/2015     Procedure: COMBINED ESOPHAGOSCOPY, GASTROSCOPY, DUODENOSCOPY (EGD);  Surgeon: Dhruv Lyles MD;  Location: [facility]     Esophagoscopy, gastroscopy, duodenoscopy (egd), combined N/A 12/31/2015     Procedure: COMBINED ESOPHAGOSCOPY, GASTROSCOPY, DUODENOSCOPY (EGD);  Surgeon: Brenden Plasencia MD;  Location: [facility]     Percutaneous insertion tube jejunostomy N/A 12/31/2015     Procedure: PERCUTANEOUS INSERTION TUBE JEJUNOSTOMY;  Surgeon: Brenden Plasencia MD;  Location: [facility]     Percutaneous insertion tube jejunostomy N/A 1/25/2016     Procedure: PERCUTANEOUS INSERTION TUBE JEJUNOSTOMY;  Surgeon: Carrie Coleman MD;  Location: [facility]     Anesthesia out of or mri N/A 4/18/2016     Procedure: ANESTHESIA OUT OF OR MRI;  Surgeon: GENERIC ANESTHESIA PROVIDER;  Location: [facility]     Endoscopic insertion tube gastrostomy N/A 7/9/2016     Procedure: ENDOSCOPIC INSERTION TUBE GASTROSTOMY;  Surgeon: Brenden Plasencia MD;  Location: [facility]     Picc insertion Right 1/9/2017     5fr DL BioFlo PICC, 38cm (1cm external) in the R basilic vein.     Esophagoscopy, gastroscopy, duodenoscopy (egd), combined N/A 2/17/2017     Procedure: COMBINED ESOPHAGOSCOPY, GASTROSCOPY, DUODENOSCOPY (EGD);  Surgeon: Brenden Plasencia MD;  Location: [facility]       Family History   Problem Relation Age of Onset     CANCER Sister        Social History   Substance Use Topics     Smoking status: Former Smoker     Packs/day: 2.00     Years: 40.00     Types: Cigarettes     Start date: 1/1/1964     Quit date: 4/18/1998     Smokeless tobacco: Never Used     Alcohol use No     Current Facility-Administered Medications   Medication     vancomycin (VANCOCIN) 1,250 mg in NaCl 0.9 % 250 mL intermittent infusion     0.9% sodium chloride BOLUS     Current Outpatient  Prescriptions   Medication     albuterol (2.5 MG/3ML) 0.083% neb solution     gabapentin (NEURONTIN) 250 MG/5ML solution     HYDROmorphone (DILAUDID) 1 MG/ML LIQD liquid     levalbuterol (XOPENEX) 1.25 MG/3ML neb solution     morphine sulfate (ROSIE) 10 MG 24 hr capsule     predniSONE (DELTASONE) 20 MG tablet     polyethylene glycol (MIRALAX/GLYCOLAX) Packet     budesonide (PULMICORT) 0.5 MG/2ML neb solution     clonazePAM (KLONOPIN) 0.5 MG tablet     menthol-zinc oxide (CALMOSEPTINE) 0.44-20.625 % OINT ointment     guaiFENesin (ORGANIDIN) 200 MG TABS tablet     Cranberry 125 MG TABS     QUEtiapine (SEROQUEL) 25 MG tablet     bisacodyl (DULCOLAX) 10 MG Suppository     order for DME     Rectal Cleansers ([employer]) ENEM     order for DME     LORazepam (ATIVAN) 2 MG/ML (HIGH CONC) solution     clonazePAM (KLONOPIN) 0.1 mg/mL     QUEtiapine (SEROQUEL) 50 MG tablet     ipratropium (ATROVENT) 0.02 % neb solution     lactobacillus rhamnosus, GG, (CULTURELL) capsule     lidocaine (LIDODERM) 5 % Patch     senna-docusate (SENOKOT-S;PERICOLACE) 8.6-50 MG per tablet     calcium carbonate (TUMS) 500 MG chewable tablet     ondansetron (ZOFRAN) 4 MG tablet     famotidine (PEPCID) 40 MG/5ML suspension     loperamide (IMODIUM A-D) 2 MG tablet     polyethylene glycol 0.4%- propylene glycol 0.3% (SYSTANE ULTRA) 0.4-0.3 % SOLN ophthalmic solution     sertraline (ZOLOFT) 20 MG/ML (HIGH CONC) solution     fexofenadine (ALLEGRA) 180 MG tablet     fiber modular (NUTRISOURCE FIBER) packet     melatonin (MELATONIN) 1 MG/ML LIQD liquid     acetaminophen (TYLENOL) 160 MG/5ML oral liquid     sodium chloride (OCEAN) 0.65 % nasal spray     lidocaine 15 mL, alum & mag hydroxide-simethicone 15 mL GI Cocktail        Allergies   Allergen Reactions     Levaquin Other (See Comments)     Delirium     Toro Podhaler [Tobramycin] Other (See Comments)     Severe congestion, SOB      Suprax [Cefixime]      See ceftibuten     Augmentin  Nausea     Has tolerated for treatment of UTIs in 2016.     Avelox [Moxifloxacin] Anxiety     Cedax [Ceftibuten] Other (See Comments)     Pain in eyes     Penicillins Itching     Tolerated amoxicillin without issues.     Vantin [Cefpodoxime] Nausea      I have reviewed the Medications, Allergies, Past Medical and Surgical History, and Social History in the Epic system.    Review of Systems   Constitutional: Positive for fever (subjective).   Respiratory: Negative for shortness of breath.    Gastrointestinal: Positive for abdominal distention and abdominal pain. Negative for diarrhea and vomiting.   Genitourinary: Positive for dysuria (pain around Avendaño).   All other systems reviewed and are negative.      Physical Exam   BP: (!) 136/94  Pulse: 100  Temp: 98.1  F (36.7  C)  SpO2: 97 %  Physical Exam   Constitutional: No distress.   HENT:   Head: Atraumatic.   Mouth/Throat: Oropharynx is clear and moist. No oropharyngeal exudate.   Eyes: Pupils are equal, round, and reactive to light. No scleral icterus.   Neck:   trached   Cardiovascular: Normal heart sounds and intact distal pulses.    Pulmonary/Chest: Breath sounds normal. No respiratory distress.   vented   Abdominal: Soft. She exhibits distension. There is no tenderness.   Musculoskeletal: She exhibits no edema or tenderness.   Skin: Skin is warm. No rash noted. She is not diaphoretic.       ED Course     ED Course     Procedures     7:32 PM  The patient was seen and examined by Dr. Olivas in Room 21.               Critical Care time:  none               Labs Ordered and Resulted from Time of ED Arrival Up to the Time of Departure from the ED   CBC WITH PLATELETS DIFFERENTIAL - Abnormal; Notable for the following:        Result Value    WBC 18.6 (*)     RBC Count 3.56 (*)     Hemoglobin 10.4 (*)     Hematocrit 32.4 (*)     RDW 18.6 (*)     Absolute Neutrophil 15.8 (*)     Absolute Monocytes 1.5 (*)     All other components within normal limits   BASIC METABOLIC  PANEL - Abnormal; Notable for the following:     Sodium 120 (*)     Potassium 3.3 (*)     Chloride 64 (*)     Carbon Dioxide   (*)     Value: >45  Critical Value called to and read back by  ACE GOODEN RN IN ER AT 2111 ON 03/20/17 BY [place]      Glucose 106 (*)     Urea Nitrogen 47 (*)     All other components within normal limits   ROUTINE UA WITH MICROSCOPIC - Abnormal; Notable for the following:     Blood Urine Moderate (*)     pH Urine 8.5 (*)     Leukocyte Esterase Urine Moderate (*)     WBC Urine 5 (*)     RBC Urine 4 (*)     All other components within normal limits   BASIC METABOLIC PANEL - Abnormal; Notable for the following:     Sodium 120 (*)     Chloride 64 (*)     Carbon Dioxide   (*)     Value: >45  Critical Value called to and read back by  DEAN MIGUEL RN IN ER AT 2236 ON 03/20/17 BY [place]      Glucose 109 (*)     Urea Nitrogen 50 (*)     All other components within normal limits   BLOOD GAS VENOUS - Abnormal; Notable for the following:     Ph Venous 7.50 (*)     PCO2 Venous 75 (*)     PO2 Venous 65 (*)     Bicarbonate Venous 58 (*)     All other components within normal limits   HEPATIC PANEL - Abnormal; Notable for the following:     Albumin 3.1 (*)     All other components within normal limits   LIPASE   LACTIC ACID WHOLE BLOOD   FRACTIONAL EXCRETION OF SODIUM   BASIC METABOLIC PANEL   BLOOD CULTURE   URINE CULTURE AEROBIC BACTERIAL       Assessments & Plan (with Medical Decision Making)   Per chart review, I see that the patient has been on essentially comfort cares, with recent clinic note mentioning that the patient no longer wished to be hospitalized or even transported to the hospital.  Her clinic doctor mentioned that she had instructed her care providers to not send her to the hospital.  However, the patient s nurse here, notes that the patient had asked repeatedly to come to the hospital today.  I did attempt to have discussion with her regarding her wishes, whether she wanted evaluation  here in the emergency department above and beyond urinalysis, or whether she wanted to stop at the urinalysis and focus on comfort.  She indicated to me that she wanted  everything  checked.  Blood was checked and is abnormal here today.  Notably, her sodium is low at 120, chloride is 64.  Bicarb is greater than 45.  Blood gas was ordered ([facility]), and pH is 7.5, PCO2 75, PO2 65, bicarb is 58.  Urinalysis showed 5 WBCs with moderate LE.  White count was quite elevated at 18.6.  Her home nurse denies any fevers or anything else out of the other than the patient s complaint of urinary discomfort.  Patient was quite distended in her abdomen.  I again discussed with her if she wanted to proceed with further evaluation she indicated that she did.  CT of abdomen was done which showed mild nonspecific wall thickening of the small bowel around the GJ tube, as well as a thickened appearance of the urinary bladder which may represent cystitis.  Additionally, it was noted that she had increased right lung base pulmonary opacity since 12/27 which may represent aspiration or infection.  Of note, this was the read from the Radiology resident, though the external overnight Radiology service has read her CT as,  no definite acute abnormality noted.     IV fluid was ordered.  The cause of her hyponatremia and hypochloremia is not entirely clear at this point.  I did attempt to have a lengthy discussion with her regarding her goals of care.  She did indicate she wanted to stay in the hospital.  Given her high PCO2, her vent settings were adjusted with, increasing her respiratory rate.  Given this, she was placed in the ICU for further cares given that her vent settings are not stable at this point.  I did start antibiotics for possible pneumonia, this would likely cover any potential UTI as well.      I have reviewed the nursing notes.    I have reviewed the findings, diagnosis, plan and need for follow up with the patient.  This part  of the document was transcribed by Janet Kaminski, Medical Scribe.   Current Discharge Medication List          Final diagnoses:   Hyponatremia   Hypercarbia   Acute on chronic respiratory failure with hypoxia (H)   Centrilobular emphysema (H)     I, Gary Norris, am serving as a trained medical scribe to document services personally performed by Soledad Olivas MD, based on the provider's statements to me.   ISoledad MD, was physically present and have reviewed and verified the accuracy of this note documented by Gary Norris.     3/20/2017   [facility], [place], EMERGENCY DEPARTMENT     Soledad Olivas MD  03/21/17 0204

## 2017-03-21 NOTE — PROGRESS NOTES
CLINICAL NUTRITION SERVICES - ASSESSMENT NOTE     Nutrition Prescription    RECOMMENDATIONS FOR MDs/PROVIDERS TO ORDER:  -Closely monitor electrolytes and replace as needed using HIGH replacement protocol  -Free water flush adjustment per MD discretion pending sodium and fluid status    Malnutrition Status:    Patient does not meet two of the above criteria necessary for diagnosing malnutrition    Recommendations already ordered by Registered Dietitian (RD):  -If K/Mg are WNL and Phos >1.9, start home TF regimen via J-tube: Isosource @ 85 ml/hr x 12 hrs nightly (8PM-8AM) = 1020 ml/day = 1530 kcals (26 kcal/kg), 69 g PRO (1.2 g/kg), 15 g Fiber and 775 ml H2O per dosing weight 58 kg.  -60 mL water flush before and after each cycling feed for tube patency  -15 mL liquid MVI for micronutrient needs  -1 packet NS fiber TID to provide an additional 9 g fiber    Future/Additional Recommendations:  -EN tolerance (lab abnormalities upon admit)  -St 3 decubitus ulcer noted, look for WOC RN assessment for need to start additional micros     REASON FOR ASSESSMENT  Mary Wang is a/an 67 year old female assessed by the dietitian for Provider Order - Registered Dietitian to Assess and Order TF per Medical Nutrition Therapy Protocol    NUTRITION HISTORY  Pt known to this writer. COPD pt with trach on home vent, GJ tube chronically NPO r/t achalasia. Home TF regimen is the following: Isosource @ 85 ml/hr x 12 hrs nightly (8PM-8AM) = 1020 ml/day = 1530 kcals, 69 g PRO, 15 g Fiber and 775 ml H2O; additionally receives 125 ml q 4 hr water flushes + 3 packet of fiber daily (per review of orders last adm, prescribed Nutrisource Fiber 1 pkt TID) via Jtube.    Pt admitted r/t pain around bender catheter. St 3 decubitus ulcer noted in H&P, look for WOC RN assessment    CURRENT NUTRITION ORDERS  Diet: TF via J-tube: Isosource @ 85 ml/hr x 12 hrs nightly (8PM-8AM) = 1020 ml/day = 1530 kcals, 69 g PRO, 15 g Fiber and 775 ml H2O;  "additionally receives 125 ml q 4 hr water flushes + 3 packet of fiber daily (per review of orders last adm, prescribed Nutrisource Fiber 1 pkt TID) via [employer].  Intake/Tolerance: Unable to assess as pt has just been admitted    LABS  3/21:  Na = 130 (L)  K = 2.8 (L)    MEDICATIONS  Medications reviewed    ANTHROPOMETRICS  Height: 162.6 cm (5' 4\")  Most Recent Weight: 57.7 kg (127 lb 3.3 oz)    IBW: 54.5 kg  BMI: 22; Normal BMI  Weight History:   Wt Readings from Last 10 Encounters:   03/21/17 57.7 kg (127 lb 3.3 oz)   02/17/17 56 kg (123 lb 7.3 oz)   02/03/17 57.5 kg (126 lb 11.2 oz)   01/24/17 55.3 kg (122 lb)   01/10/17 60.5 kg (133 lb 6.4 oz)   01/05/17 59 kg (130 lb 1.1 oz)   12/28/16 53.5 kg (117 lb 15.1 oz)   12/22/16 54 kg (119 lb)   12/05/16 56.8 kg (125 lb 4.8 oz)   11/28/16 54 kg (119 lb 1.6 oz)   No recent weight loss noted, rather weight gain observed  Dosing Weight: 58 kg (actual based on lowest admit wt of 57.7 kg on 3/21, 106% of IBW 54.5 kg)    ASSESSED NUTRITION NEEDS  Estimated Energy Needs: 9269-9743 kcals/day (25 - 30 kcals/kg)  Justification: Maintenance  Estimated Protein Needs: 70-87 grams protein/day (1.2 - 1.5 grams of pro/kg)  Justification: Increased needs  Estimated Fluid Needs:  (1 mL/kcal)   Justification: Per provider pending fluid status    PHYSICAL FINDINGS  See malnutrition section below.  Poor skin turgor     MALNUTRITION  % Intake: No decreased intake noted  % Weight Loss: None noted  Subcutaneous Fat Loss: None observed  Muscle Loss: Patellar region and Posterior calf:  Mild  Fluid Accumulation/Edema: None noted  Malnutrition Diagnosis: Patient does not meet two of the above criteria necessary for diagnosing malnutrition    NUTRITION DIAGNOSIS  Predicted inadequate nutrient intake (protein/energy) related to potential for interruptions to EN during hospital stay      INTERVENTIONS  Implementation  Nutrition Education: Unable to complete due to pt sleeping during visit   Enteral " Nutrition - Initiate  Feeding tube flush  Multivitamin/mineral supplement therapy     Goals  Total avg nutritional intake to meet a minimum of 25 kcal/kg and 1.2 g PRO/kg daily (per dosing wt 58 kg).     Monitoring/Evaluation  Progress toward goals will be monitored and evaluated per protocol.    Brigida Villasenor RDN, LD  Pgr: [phone]

## 2017-03-21 NOTE — PLAN OF CARE
Problem: Infection, Risk/Actual (Adult)  Goal: Identify Related Risk Factors and Signs and Symptoms  Related risk factors and signs and symptoms are identified upon initiation of Human Response Clinical Practice Guideline (CPG)   Outcome: No Change  Afebrile. Sinus rhythm in 70s. BP low, received 1L NS bolus, 1L 5% albumin, maintenance NS @ 125/hr.

## 2017-03-21 NOTE — ED NOTES
"Patient arrived from her nursing home with complaints of \"not feeling well\". Her nurse reports her oxygen saturations were  last night. The patient is nonverbal and uses her lips to talk due to a trach. RT was paged immediately and hooked her up to the ventilator  "

## 2017-03-21 NOTE — PHARMACY-VANCOMYCIN DOSING SERVICE
Pharmacy Vancomycin Initial Note  Date of Service 2017  Patient's  1950  67 year old, female    Indication: [facility] Pneumonia    Current estimated CrCl = Estimated Creatinine Clearance: 57.3 mL/min (based on Cr of 0.83).    Creatinine for last 3 days  3/20/2017:  9:59 PM Creatinine 0.82 mg/dL;  9:59 PM Creatinine 0.83 mg/dL    Recent Vancomycin Level(s) for last 3 days  No results found for requested labs within last 72 hours.      Vancomycin IV Administrations (past 72 hours)      No vancomycin orders with administrations in past 72 hours.                Nephrotoxins and other renal medications (Future)    Start     Dose/Rate Route Frequency Ordered Stop    17 0130  vancomycin (VANCOCIN) 1,250 mg in NaCl 0.9 % 250 mL intermittent infusion      1,250 mg Intravenous EVERY 12 HOURS 17 0102            Contrast Orders - past 72 hours (72h ago through future)    Start     Dose/Rate Route Frequency Ordered Stop    17 220  iopamidol (ISOVUE-370) solution 76 mL      76 mL Intravenous ONCE 17                Plan:  1.  Start vancomycin  1250 mg IV q12h.   2.  Goal Trough Level: 15-20 mg/L   3.  Pharmacy will check trough levels as appropriate in 1-3 Days.    4. Serum creatinine levels will be ordered daily for the first week of therapy and at least twice weekly for subsequent weeks.    5. [place] method utilized to dose vancomycin therapy: Method 1    [facility]

## 2017-03-21 NOTE — PLAN OF CARE
Problem: Goal Outcome Summary  Goal: Goal Outcome Summary  Outcome: No Change  Took over care of patient at 1430. Afebrile. HR stable. BP low, MD notified, administered 1L NS bolus, then 1L 5% albumin with improvement in BP. Remains on 40% PEEP 5. Moderate secretions, sputum sample sent to lab. Avendaño patent, adequate urine output. G-tube to gravity drainage, J-tube clamped. Potassium and phos replaced. Family present in room this afternoon. POA updated by MD. Continuing with plan of care and close monitoring. Handoff report to be given to oncoming RN.

## 2017-03-21 NOTE — PLAN OF CARE
Pt admitted to  from ER. Arrived on cart, hooked up to monitor. Skin assessment noted sacrum wound , MD at bedside to see. Pt has bender, GJ tube, and IV in place in infusing. Pt is resting comfortable on vent settings. Pt home vent at bedside. See assessment and charting for further details.   Continue plan of care.

## 2017-03-22 NOTE — CONSULTS
[facility]  Palliative Care Consultation         Mary Wang MRN# [id number]   Age: 67 year old YOB: 1950   Date of Admission: 3/20/2017    Reason for consult: Goals of care       Requesting physician/service: Dr. Castro, MICU       Recommendations        symptom recs:  - would continue home medications as prescribed  - would switch her back to scheduled Ligia (morphine capsules-- ok to sprinkle the pellets in 10 mL water and flush into g-tube). This will keep it long acting rather than using the short acting morphine liquid solution that is currently ordered.    Goals:     Per history and discussion with daughter César, it sounds like patient has been saying she does want re-hospitalization for acute issues which contradicts what she had said last during last admission when her last POLST form was completed. Certainly if her goals have changed then we will need to update POLST to avoid future confusion. Agree with plan to re-do POLST to clarify patients goals at this point. I was unfortunately unable to have this conversation with patient today as she was too drowsy to participate. Her daughter and healthcare agent César completed the POLST form for the patient but I did not sign it because I did not have the discussion with patient today. I can continue to follow and address goals as able.           POLST --re-done by MICU team. Will continue goals discussions to ensure her goals match the POLST form prior to discharge     Disease Process/es & Symptoms     Sepsis, UTI, possible PNA  Chronic respiratory failure on vent  Chronic anxiety  Chronic air hunger  Chronic sacral pressure ulcer and associated pain  AMS/drowsiness     Support/Coping   (We typically ask each of our patients the following questions:)    Unable to assess today    Decision-Making & Goals of Care     Discussion/counseling today about prognosis/goals of care/decisions:   See above  Patient has  a completed health care directive available in the chart (Y/N): yes  Health care agent (only if patient has an available, complete HCD): daughter César  Physician orders for life-sustaining treatment (POLST) form is completed  Code Status: Do not resuscitate   Patient has decision-making capacity (Y/N): limited during my visit, fluctuating due to AMS in setting of sepsis    Coordination of Care     Findings & plan of care discussed with: MICU team  Follow-up plan from palliative team: will follow, expect 1-2 times per week, more if needed  Thank you for involving us in the patient's care.     Patient seen and evaluated with Dr. Castro, MICU   Agree with assessment and recommendations.    Total time spent was 45 minutes,  >50% of time was spent counseling and/or coordination of care regarding goals, symptoms, support.    Mariel Poe  Palliative Care   Pager [phone]            Chief Complaint     Consult for goals of care         History of Present Illness     History obtained from: chart, discussion with medical team.      Pt is a 67 yo female with hx of severe COPD s/p tracheostomy on home vent, also with GJ tube feedings, chronic indwelling bender, chronic anxiety, lives in a vent group home, admitted with malaise and pain around bender catheter and lab evidence for sepsis and dehydration. Being treated for infection and sepsis, thought to be either from UTI and/or HCAP.     On admission there was confusion about her goals of care because her most recent POLST completed here before her last discharge indicated her wish was to not be re-hospitalized and to be treated in comfort care approach (was not on hospice because she wanted to continue ventilator support). However per MICU team and patient's daughter, patient herself has been requesting to go in to hospital and once here patient has been requesting all resuscitative cares. We were consulted to clarify goals.     Patient was very lethargic at time of my  "visit and unable to participate. Her daughter was present for a short time (she had to leave to drive home) but daughter expressed concern that patient never really wanted the no re-hospitalization plan;she feels pt was confused during time of last admission when they re-did that POLSt and feels it really doesn't represent her wishes.     Pt was drowsy. She was able to nod \"yes\" that she was ok with hospitalization. She also nodded yes to pain and yes to pain in sacrum where her pressure ulcer is, but nodded no to question of wanting more medications. Otherwise pt was falling asleep and interview was limited.           Past Medical History:   Past Medical History:   Diagnosis Date     Achalasia      Anxiety      Anxiety and depression      Chronic pain      COPD (chronic obstructive pulmonary disease) (H)     trach/vent dependent      GERD (gastroesophageal reflux disease)      Hypertension      Lung nodule     spiculated; followed in pulm clinic      Stress-induced cardiomyopathy      TMJ pain dysfunction syndrome      Tracheostomy in place               Past Surgical History:   Past Surgical History:   Procedure Laterality Date     ANESTHESIA OUT OF OR MRI N/A 4/18/2016    Procedure: ANESTHESIA OUT OF OR MRI;  Surgeon: GENERIC ANESTHESIA PROVIDER;  Location: [facility] OR     BRONCHOSCOPY, INSERT BRONCHIAL VALVE Right 9/2/2015    Procedure: BRONCHOSCOPY, INSERT BRONCHIAL VALVE;  Surgeon: Everardo Beach MD;  Location: [facility] OR     ENDOSCOPIC INSERTION TUBE GASTROSTOMY N/A 7/9/2016    Procedure: ENDOSCOPIC INSERTION TUBE GASTROSTOMY;  Surgeon: Brenden Plasencia MD;  Location: [facility]     ESOPHAGOSCOPY, GASTROSCOPY, DUODENOSCOPY (EGD), COMBINED N/A 12/11/2015    Procedure: COMBINED ESOPHAGOSCOPY, GASTROSCOPY, DUODENOSCOPY (EGD);  Surgeon: Dhruv Lyles MD;  Location: [facility] OR     ESOPHAGOSCOPY, GASTROSCOPY, DUODENOSCOPY (EGD), COMBINED N/A 12/31/2015    Procedure: COMBINED ESOPHAGOSCOPY, GASTROSCOPY, " DUODENOSCOPY (EGD);  Surgeon: Brenden Plasencia MD;  Location: [facility]     ESOPHAGOSCOPY, GASTROSCOPY, DUODENOSCOPY (EGD), COMBINED N/A 2/17/2017    Procedure: COMBINED ESOPHAGOSCOPY, GASTROSCOPY, DUODENOSCOPY (EGD);  Surgeon: Brenden Plasencia MD;  Location: [facility] OR     PICC INSERTION Right 1/9/2017    5fr DL BioFlo PICC, 38cm (1cm external) in the R basilic vein.     REMOVE AND REPLACE BREAST IMPLANT PROSTHESIS N/A 12/31/2015    Procedure: PERCUTANEOUS INSERTION TUBE JEJUNOSTOMY;  Surgeon: Brenden Plasencia MD;  Location: [facility] OR     REMOVE AND REPLACE BREAST IMPLANT PROSTHESIS N/A 1/25/2016    Procedure: PERCUTANEOUS INSERTION TUBE JEJUNOSTOMY;  Surgeon: Carrie Coleman MD;  Location: [facility]     TRACHEOSTOMY N/A 8/26/2015    Procedure: TRACHEOSTOMY;  Surgeon: Milena Thibodeaux MD;  Location: [facility] OR               Social/Spiritual History:     Living situation: lives in group home for vent dependent patients  Family system: daughter César, son yvonne,  svaana.  Functional status (needs help with ADLs or IADLs): vent dependent,   Employment/education:   Use of community resources: home health care involvement and 24/7 nursing care in vent group home   Activities/interests: not discussed  History of substance use/abuse: former smoker            Family History:   Family History   Problem Relation Age of Onset     CANCER Sister      Family history reviewed and updated in EPIC and not discussed           Allergies:   Allergies   Allergen Reactions     Levaquin Other (See Comments)     Delirium     Toro Podhaler [Tobramycin] Other (See Comments)     Severe congestion, SOB      Suprax [Cefixime]      See ceftibuten     Augmentin Nausea     Has tolerated for treatment of UTIs in 2016.     Avelox [Moxifloxacin] Anxiety     Cedax [Ceftibuten] Other (See Comments)     Pain in eyes     Penicillins Itching     Tolerated amoxicillin without issues.     Vantin [Cefpodoxime] Nausea              Medications:    I have reviewed this patient's medication profile and medications during this hospitalization    Clonazepam 1.5mg QID  Gabapentin 300mg q 8  lidoderm patch  Morphine  Solution 10mg q 8 hours (this is being given in place of her PTA Ligia)  Melatonin 3mg qhs  miralax BID  Quetiapine 50mg BID  Senna -docusate 2 BID  Sertraline 150mg daily           Review of Systems:   The comprehensive review of systems is negative other than noted here and in the HPI.    Palliative Symptom Review (0=no symptom/no concern, 1=mild, 2=moderate, 3=severe):    ROS very limited by AMS/drowsiness        Pain: 2-- buttocks/pressure ulcer      Fatigue: 3      Nausea: 0      Constipation:       Diarrhea:       Depressive Symptoms:       Anxiety:       Drowsiness: 3      Poor Appetite:       Shortness of Breath:       Insomnia:       Other:       Overall (0 good/no concerns, 3 very poor):              Physical Exam:   Vitals were reviewed  Temp: 99.1  F (37.3  C) Temp src: Tympanic BP: 145/68   Heart Rate: 75 Resp: 12 SpO2: 100 % O2 Device: Mechanical Ventilator    Gen: lying in bed. Appears lethargic but resting comfortably. Chronically ill with trach on vent  HEENT: NCAT. Conjunctiva clear. Sclera anicteric. Trach on vent.  CV: RRR , Peripheral perfusion intact.   Resp: unlaboted work of breathing on vent  Abd: soft, some mild tenderness to palpation, mildly distended, BS present and normoactive.   Msk: no gross deformity, + sarcopenia  Skin:  no jaundice  Ext: warm, well perfused.   Neuro: face symmetric. EOM, vision, hearing grossly intact. nonvocal due to trach on vent--nods and mouths some words.  Mental status/Psych: lethargic and drowsy, opens eyes and nods ye/no to some questions but quickly falls asleep           Data Reviewed:     ROUTINE ICU LABS (Last four results)  CMP  Recent Labs  Lab 03/22/17  0416 03/21/17  2244 03/21/17  2037 03/21/17  1538 03/21/17  1255 03/21/17  0958 03/21/17  0322 03/20/17  2159 03/20/17  2000   NA  137 135  --  133  --  130* 125* 120* 120*   POTASSIUM 3.6 4.1 3.5 3.2* 3.7  --  2.8* 3.4 3.3*   CHLORIDE  --  92*  --   --   --   --  71* 64* 64*   CO2  --  37*  --   --   --   --  >45Critical Value called to and read back by MATTIE PRINCE AT [facility], [id number] AT 0412 BY JS.* >45Critical Value called to and read back byDEAN MIGUEL RN IN ER AT 2236 ON 03/20/17 BY KS* >45Critical Value called to and read back byACE GOODEN RN IN ER AT 2111 ON 03/20/17 BY KS*   ANIONGAP  --  6  --   --   --   --  Not Calculated Not Calculated Not Calculated   GLC  --  89  --   --   --   --  96 109* 106*   BUN  --  23  --   --   --   --  41* 50* 47*   CR 0.54 0.53  --   --  0.80  --  0.76 0.83 0.82   GFRESTIMATED >90Non  GFR Calc >90Non  GFR Calc  --   --  72  --  75 69 69   GFRESTBLACK >90African American GFR Calc >90African American GFR Calc  --   --  87  --  >90African American GFR Calc 83 84   IZZY  --  7.9*  --   --   --   --  8.3* 9.4 9.1   MAG 2.2 2.3 2.3  --  2.4*  --   --   --   --    PHOS 2.4* 2.8 2.9  --  1.9*  --   --   --   --    PROTTOTAL  --   --   --   --   --   --   --   --  7.2   ALBUMIN  --   --   --   --   --   --   --   --  3.1*   BILITOTAL  --   --   --   --   --   --   --   --  0.4   ALKPHOS  --   --   --   --   --   --   --   --  104   AST  --   --   --   --   --   --   --   --  22   ALT  --   --   --   --   --   --   --   --  22     CBC  Recent [place]  Lab 03/21/17  2244 03/21/17  0322 03/20/17 2000   WBC 11.1*  --  18.6*   RBC 2.48*  --  3.56*   HGB 7.4*  --  10.4*   HCT 23.0*  --  32.4*   MCV 93  --  91   MCH 29.8  --  29.2   MCHC 32.2  --  32.1   RDW 19.7*  --  18.6*    324 341     INRNo lab results found in last 7 days.  Arterial Blood Gas  Recent Labs  Lab 03/20/17  2159   O2PER 40

## 2017-03-22 NOTE — PROGRESS NOTES
Social Work Services Progress Note  Hospital Day: 1  Collaborated with:  Team rounds; chart reviewed; bedside RN; MICU Team; Palliative Care Team; RNCC; Patient and daughter, César.     Data:    Patient is a 67 year old female with past medical history significant for severe COPD s/p tracheostomy on home ventilator and GJ tube placement, anxiety, hypertension, chronic indwelling bender catheter, and achalasia who presented with malaise and bender catheter pain, found to have severe metabolic derangements and a leukocytosis concerning for sepsis. Palliative Care consulted today.    Intervention:    Please see RNCC note for information re patient's home situation. Notified by team that patient's daughter was requesting to complete a new POLST form to make patient's wishes better known and to clarify patient DOES want to be hospitalized and receive full hospital care. Clarified with MICU team that patient was alert/oriented to complete a new POLST form today.     Briefly met with patient and daughter, César and provided a new POLST form. Daughter discussed with patient what the POLST form was while writer was present in the room. Palliative arrived to meet with patient and daughter, so writer ended our visit. Later notified by bedside RN that updated form was completed; copy sent to HIM to be scanned into EPIC.     Assessment:    Significant relationship at present time:  Daughter, César Troy (p: [phone]).  Family of origin is available for support:  Yes; involved and appears to be supportive person.   Other support available:  Group Home Staff; .   Gaps in support system:  None.   Patient is caregiver to:  None.     Plan:  Anticipated Disposition:  Pending medical course.   Barriers to d/c plan:  Medical stability.   Follow Up:  Will continue to follow for support and d/c planning as appropriate.     [facility], [facility]  ICU   Pager: [phone]  Phone:  [phone]

## 2017-03-22 NOTE — PROGRESS NOTES
[facility]      MICU Progress Note  Mary Wang MRN: [id number]  1950  Date of Admission: 3/20/2017  Primary care provider: Norman Brito      Assessment and Plan:     Mary Wang is a 67 year old female with a past medical history significant for severe COPD (Last FEV1 0.35) s/p tracheostomy on home ventilator and GJ tube placement, anxiety, hypertension, chronic indwelling bender catheter, and achalasia who presented with malaise and bender catheter pain, found to have severe metabolic derangements and a leukocytosis concerning for sepsis.     PLAN    Interval changes  - Goals of care discussion, POLST redone with patient's POA, full hospital care but DNR  - Transfer to medicine, on home vent    ===NEURO===  Generalized anxiety disorder  Air hunger   - Continue home clonazepam, morphine, quetiapine, sertraline, hydromorphone PRN, and lorazepam PRN     ===PULM===  Chronic hypoxic and hypercarbic respiratory failure   Severe COPD s/p tracheostomy, home vent-dependent   Home vent settings: CMV/14/400/5/40%, changed to 12/470/5/40% because of air trapping.  - Antimicrobials as below   - Continue home prednisone, nebs      ===CV===  H/o CAD, NSTEMI  - Not on any cardiac meds currently      Sinus tachycardia - resolved  Likely due to infection v. discomfort with bender catheter. Will continue to monitor.      ===ID===  Sepsis (leukocytosis, tachycardia) secondary to UTI and/or HCAP   Chronic indwelling bender in place with urinary symptoms. Sediment present in bender bag. UA with moderate LE, negative nitrites. CT A/P with bladder wall thickening c/w cystitis. CXR with RLL infiltrate concerning for pneumonia. Normal lactate. Leukocytosis (18.6) with neutrophil predominance on admission.    - Meropenem empirically  - D/c vancomycin  - Sputum cultures, blood cultures, urine cultures pending      ===RENAL/FEN===  Hypovolemic hyponatremia  On admission 120 (down from 142 one month prior).  Suspect hypovolemia given probable systemic infection. Will monitor after fluid resuscitation. Goal to correct 10-12 mEq per day given unclear chronicity. S/p 1L NS bolus in ED.   - Trend Na  - NS @ 125 cc/hr     Hypokalemia  - On protocol     Chronic respiratory acidosis w/ renal compensation  Metabolic alkalosis   Overall patient is alkalemic probably due to a metabolic alkalosis in the setting of volume contraction. She otherwise has a chronic respiratory acidosis with renal compensation. Will monitor acid-base status after vent changes and volume resuscitation.      ===GI/Nutrition===  H/o achalasia and GERD  S/p GJ tube  - Resume home TF's, nutrition consult   - Continue home H2 blocker, GI cocktail PRN     Constipation, likely opioid-induced  - Continue home bowel regimen      ===HEME/ONC===  Leukocytosis with neutrophilia   Likely due to infection(s) as above. Will monitor.      Chronic normocytic anemia   - Will monitor periodically, no signs of bleeding      ===ENDO===  Steroid-induced hypergylcemia   - LDSSI, hypoglycemia protocol     ===MSK/RHEUM===  Stage III Decubitus Ulcer  No purulent drainage or surrounding erythema suggestive of superimposed infection.   - WOC consult    Lines: PIV, tracheostomy, bender, GJ tube    Prophylaxis: DVT - Enoxaparin, Ulcer - H2 blocker   Code: DNR  Dispo: General mclain. Per POA, the patient can have invasive central monitoring and pressors if necessary, POLST redone on 3/22/2017.      Interval History:     Ms. Wang had large loose BMs overnight. C.diff sent and negative. Denies chest pain, shortness of breath, or abdominal pain. Back on home vent.    PHYSICAL EXAM  Temp  Av.9  F (37.2  C)  Min: 98.1  F (36.7  C)  Max: 99.5  F (37.5  C)      Pulse  Av.7  Min: 81  Max: 100 Resp  Avg: 15  Min: 12  Max: 21  SpO2  Av.2 %  Min: 93 %  Max: 100 %  FiO2 (%)  Av.7 %  Min: 40 %  Max: 60 %       Intake/Output Summary (Last 24 hours) at 17 5499  Last data  filed at 03/21/17 1800   Gross per 24 hour   Intake          5543.83 ml   Output             2520 ml   Net          3023.83 ml   GEN: Somnolent, arousable to sternal rub  EYES: PERRL, anicteric sclera  CV: RRR, no gallops, rubs, or mumurs  PULM: bibasilar crackles, symmetric chest rise  GI: normal bowel sounds, mildly distended, non-tender, no rebound tenderness or guarding, no masses  : bender catheter in place  EXTREMITIES: 1+ pedal edema, moving all extremities, peripheral pulses intact  NEURO: grimaces to sternal rub, cranial nerves II-XII grossly intact, no motor-sensory deficits noted  SKIN: no rashes, sores or ulcerations    DIAGNOSTIC STUDIES  ROUTINE ICU LABS (Last four results)  CMP  Recent Labs  Lab 03/21/17  1538 03/21/17  1255 03/21/17  0958 03/21/17  0322 03/20/17  2159 03/20/17 2000     --  130* 125* 120* 120*   POTASSIUM 3.2* 3.7  --  2.8* 3.4 3.3*   CHLORIDE  --   --   --  71* 64* 64*   CO2  --   --   --  >45Critical Value called to and read back by MATTIE PRINCE AT [facility], [id number] AT 0412 BY JS.* >45Critical Value called to and read back byDEAN MIGUEL RN IN ER AT 2236 ON 03/20/17 BY KS* >45Critical Value called to and read back byACE GOODEN RN IN ER AT 2111 ON 03/20/17 BY KS*   ANIONGAP  --   --   --  Not Calculated Not Calculated Not Calculated   GLC  --   --   --  96 109* 106*   BUN  --   --   --  41* 50* 47*   CR  --   --   --  0.76 0.83 0.82   GFRESTIMATED  --   --   --  75 69 69   GFRESTBLACK  --   --   --  >90African American GFR Calc 83 84   IZZY  --   --   --  8.3* 9.4 9.1   MAG  --  2.4*  --   --   --   --    PHOS  --  1.9*  --   --   --   --    PROTTOTAL  --   --   --   --   --  7.2   ALBUMIN  --   --   --   --   --  3.1*   BILITOTAL  --   --   --   --   --  0.4   ALKPHOS  --   --   --   --   --  104   AST  --   --   --   --   --  22   ALT  --   --   --   --   --  22     CBC  Recent Labs  Lab 03/21/17 0322 03/20/17 2000   WBC  --  18.6*   RBC  --  3.56*   HGB  --  10.4*   HCT   --  32.4*   MCV  --  91   MCH  --  29.2   MCHC  --  32.1   RDW  --  18.6*    341     INRNo lab results found in last 7 days.  Arterial Blood Gas  Recent Labs  Lab 03/20/17  2159   O2PER 40

## 2017-03-22 NOTE — PROGRESS NOTES
Internal Medicine [facility] ICU Transfer Acceptance Note   Date of Service: 3/22/2017    Patient: Mary Wang  MRN: [id number]  Admission Date: 3/20/2017  Hospital Day # 1    Assessment & Plan:   Mary Wang is a 67 year old female with a history of severe COPD s/p tracheostomy on home ventilator, GJ-tube dependence, anxiety, HTN, chronic indwelling bender, and achalasia who presented with malaise and bender catheter pain, admitted to the ICU on 3/21/17 for sepsis from likely urinary source with severe metabolic derangements, now stable for transfer to the floor.    # Sepsis secondary to Urinary Source versus HCAP/Aspiration PNA - Leukocytosis, subsequent hypotension, new CXR findings, and questionable UA concerning for Sepsis.  Initially admitted to the ICU given high potential for decompensation but has since stabilized.  Procal reassuring, continuing antibiotic coverage and monitoring cultures.  - Continue empiric meropenem  - Vancomycin discontinued 3/22  - Follow blood cultures, urine cultures, and sputum cultures  - Vent settings and nebs as below  - Monitor blood pressures and gases    # Chronic Hypoxic, Hypercarbic Respiratory Failure with possible HCAP  # Severe COPD s/p Tracheostomy with Ventilator Dependence  Admission CXR with increased RLL opacities could represent HCAP versus aspiration PNA, awaiting sputum culture results.  - Home vent settings: CMV 14/400/5/45%   - Currently: 12/470/5/40% due to air trapping  - Antibiotics as above  - Continue home prednisone and nebs  - Palliative team involved, appreciate assistance    # Hypovolemic Hyponatremia and Electrolyte Abnormalities (Resolved)  -  cc/hr  - Electrolyte protocol for hypokalemia    # Steroid-Induced Hyperglycemia - Monitoring closely, well controlled at present.  - Low dose SSI  - Hypoglycemia Protocol  - q4h BG checks on TF    ===== Chronic Medical Problems =====  # CAD - History of NSTEMI, no meds currently  #  "Generalized Anxiety Disorder and Air Hunger - No acute issues. Continue home clonazepam, morphine, quetiapine, sertraline, hydromorphone PRN and lorazepam PRN  # Achalasia and GERD with GJ-Tube dependence - No acute issues, home TF per nutrition, continue H2 blocker with GI cocktail PRN  # Chronic Normocytic Anemia - No acute issues, likely hemoconcentrated on admission, now close to baseline without signs of bleeding.  # Stage III Decubitus Ulcer - Present on admission, WOC consulted to assist with management, no signs of acute infection.  # Constipation - No acute issues.  Continue home bowel regimen    # FEN  - TF per home routine  # PPx: Enoxaparin and H2 blocker  # Code Status: DNR  # Dispo: Transferred from ICU, inpatient admission for IV antibiotics.    Patient was seen and discussed with Dr. Mead who agreed with the above.    Fátima Sainz MD  Med/Peds PGY-4  3/22/2017    ___________________________________________________________________    Subjective & Interval Hx:  Mary was accepted from the ICU in stable condition.     ROS: The remainder of a 10 point ROS is negative unless otherwise noted above.    Medications: Reviewed in EPIC. List below for reference    Physical Exam:    Blood pressure 145/68, pulse 100, temperature 99.1  F (37.3  C), temperature source Tympanic, resp. rate 12, height 1.626 m (5' 4\"), weight 63.1 kg (139 lb 1.8 oz), SpO2 99 %, not currently breastfeeding.    General: Asleep but easily awoken, in no acute distress  HEENT: NC/AT, PERRL, sclera anicteric, op clear, mmm  Neck: Tracheostomy in place with no drainage  Chest/Resp: Poor air entry bilaterally with bibasilar crackles and no wheezes  Heart/CV: RRR, no m/r/g  Abdomen/GI: Soft, non-tender non-distended, normoactive bowel sounds.  Extremities/MSK: Warm and well perfused, 1+ edema bilaterally  Skin: No rashes, lesions, or ulcerations  Neuro/Psych: Asleep but easily awoken, interactive    Lines/Tubes:   Peripheral IV 03/20/17 " Left Upper forearm (Active)   Site Assessment [facility] 3/22/2017  4:00 PM   Line Status Infusing 3/22/2017  4:00 PM   Phlebitis Scale 0-->no symptoms 3/22/2017  4:00 PM   Infiltration Scale 0 3/22/2017  4:00 PM   Extravasation? No 3/22/2017  4:00 PM   Number of days:2       Peripheral IV 03/21/17 Right;Medial Upper forearm (Active)   Site Assessment [facility] 3/22/2017  4:00 PM   Line Status Infusing 3/22/2017  4:00 PM   Phlebitis Scale 0-->no symptoms 3/22/2017  4:00 PM   Infiltration Scale 0 3/22/2017  4:00 PM   Extravasation? No 3/22/2017  4:00 PM   Number of days:1       Labs & Studies of Note:   Labs and imaging from the past 24 hours reviewed.  Medications list for Reference  Current Facility-Administered Medications   Medication     acetaminophen (TYLENOL) solution 650 mg     albuterol neb solution 2.5 mg     bisacodyl (DULCOLAX) Suppository 10 mg     budesonide (PULMICORT) neb solution 0.5 mg     calcium carbonate (TUMS) chewable tablet 500 mg     clonazePAM (klonoPIN) suspension 1.5 mg     fexofenadine (ALLEGRA) tablet 180 mg     fiber modular (NUTRISOURCE FIBER) packet 1 packet     gabapentin (NEURONTIN) solution 300 mg     HYDROmorphone (DILAUDID) liquid 1 mg     ipratropium (ATROVENT) 0.02 % neb solution 0.5 mg     lactobacillus rhamnosus (GG) (CULTURELL) capsule 1 capsule     levalbuterol (XOPENEX) neb solution 1.25 mg     lidocaine (LIDODERM) 5 % Patch 1-2 patch     lidocaine (XYLOCAINE) 2 % 15 mL, alum & mag hydroxide-simethicone (MYLANTA ES/MAALOX  ES) 15 mL GI Cocktail     LORazepam (ATIVAN) 2 MG/ML (HIGH CONC) solution 0.5 mg     melatonin liquid 3 mg     menthol-zinc oxide (CALMOSEPTINE) 0.44-20.625 % ointment     polyethylene glycol (MIRALAX/GLYCOLAX) Packet 17 g     polyethylene glycol 0.4%- propylene glycol 0.3% (SYSTANE ULTRA) ophthalmic solution 1-2 drop     predniSONE (DELTASONE) tablet 20 mg     QUEtiapine (SEROquel) tablet 25 mg     QUEtiapine (SEROquel) tablet 50 mg     senna-docusate  (SENOKOT-S;PERICOLACE) 8.6-50 MG per tablet 2 tablet     sertraline (ZOLOFT) 20 MG/ML (HIGH CONC) solution 150 mg     sodium chloride (OCEAN) 0.65 % nasal spray 1 spray     naloxone (NARCAN) injection 0.1-0.4 mg     enoxaparin (LOVENOX) injection 40 mg     ondansetron (ZOFRAN-ODT) ODT tab 4 mg    Or     ondansetron (ZOFRAN) injection 4 mg     potassium chloride SA (K-DUR/KLOR-CON M) CR tablet 20-40 mEq     potassium chloride (KLOR-CON) Packet 20-40 mEq     potassium chloride 10 mEq in 100 mL intermittent infusion     potassium chloride 10 mEq in 100 mL intermittent infusion with 10 mg lidocaine     potassium chloride 20 mEq in 50 mL intermittent infusion     magnesium sulfate 2 g in NS intermittent infusion (PharMEDium or FV Cmpd)     magnesium sulfate 4 g in 100 mL sterile water (premade)     sodium phosphate 10 mmol in D5W intermittent infusion     sodium phosphate 15 mmol in D5W intermittent infusion     sodium phosphate 20 mmol in D5W intermittent infusion     sodium phosphate 25 mmol in D5W intermittent infusion     glucose 40 % gel 15-30 g    Or     dextrose 50 % injection 25-50 mL    Or     glucagon injection 1 mg     insulin aspart (NovoLOG) inj (RAPID ACTING)     famotidine (PEPCID) suspension 20 mg     meropenem (MERREM) 1 g vial to attach to  mL bag     lidocaine (LIDODERM) patch REMOVAL     lidocaine (LIDODERM) Patch in Place     guaiFENesin (ROBITUSSIN) 20 mg/mL solution 10 mL     multivitamins with minerals (CERTAVITE/CEROVITE) liquid 15 mL     morphine solution 10 mg     0.9% sodium chloride infusion     sodium phosphate 20 mmol in D5W intermittent infusion

## 2017-03-22 NOTE — PLAN OF CARE
Problem: Goal Outcome Summary  Goal: Goal Outcome Summary  D:  I/A: Stable on Vent.clear/coarse lung sounds. HR stable 70's-90's NSR, no ectopy. BP soft- monitored MAP's closely, total of 1L bolus given. electrolytes replace per protocol. UOP 30-50. Copious amount of stool- c-diff sample sent- pt put into enteric precautions. New abdomen pain noted around 0530- MD came and assessed at bedside- pain meds given, tube feed stopped per MD.  Wound care done x2 on coccyx wound.  A/P: Continue to Monitor and Assess patient. Notify MD of any changes. Closely monitor  BP, stool amount and abdomen pain

## 2017-03-22 NOTE — PLAN OF CARE
Problem: Goal Outcome Summary  Goal: Goal Outcome Summary  Outcome: No Change  Afebrile. Vital signs stable. No changes made to vent. 40% PEEP 5. Neurologically follows commands, opens eyes spontaneously. Sleepy during day. Avendaño patent, adequate urine output. Rectal pouch intact, moderate amount of soft loose stool out. G tube to drainage gravity, J tube clamped. TF to be given overnight 8PM to 8AM. NS @ 125. Phos and K+ replaced. Daughter César here this afternoon. Continuing with plan of care and close monitoring. Handoff report to be given to oncoming RN.

## 2017-03-23 NOTE — PLAN OF CARE
Problem: Goal Outcome Summary  Goal: Goal Outcome Summary  Outcome: No Change  No change in vent settings 40%, PEEP 5, RR 12, . O2 sats mid to upper 90's. TF running overnight at 85qh. Pt complaining of stomach pain at 0600, stopped TF. 500 bolus NS for low BP, BP now stable. NS running at 125qh. G tube to drainage gravity. J tube clamped. Pt alert following commands. Awake most of the night.

## 2017-03-23 NOTE — PROGRESS NOTES
[facility]  Patient is currently open to home care services with [facility].  The patient is currently receiving RN and OT services.  [facility]  and team have been notified of patient admission.  [facility] liaison will continue to follow patient during stay.  If appropriate provide orders to resume home care at time of discharge.    Leela Ortega RN  [facility] Liaison

## 2017-03-23 NOTE — PROGRESS NOTES
Internal Medicine [facility] Progress Note   Date of Service: 3/23/2017    Patient: Mary Wang  MRN: [id number]  Admission Date: 3/20/2017  Hospital Day # 2    Assessment & Plan:   Mary Wang is a 67 year old female with a history of severe COPD s/p tracheostomy on home ventilator, GJ-tube dependence, anxiety, HTN, chronic indwelling bender, and achalasia who presented with malaise and bender catheter pain, admitted to the ICU on 3/21/17 for sepsis from likely urinary source with severe metabolic derangements, now stable and awaiting culture results.     # Sepsis secondary to Urinary Source versus HCAP/Aspiration PNA - Leukocytosis, subsequent hypotension, new CXR findings, and questionable UA concerning for Sepsis. Initially admitted to the ICU given high potential for decompensation but has since stabilized. Procal reassuring, continuing antibiotic coverage and monitoring cultures.  - Continue empiric meropenem  - Vancomycin discontinued 3/22  - Follow blood cultures, urine cultures, and sputum cultures  - Vent settings and nebs as below  - Monitor blood pressures and gases     # Chronic Hypoxic, Hypercarbic Respiratory Failure with possible HCAP  # Severe COPD s/p Tracheostomy with Ventilator Dependence  Admission CXR with increased RLL opacities could represent HCAP versus aspiration PNA, awaiting sputum culture results.  - Home vent settings: CMV 14/400/5/45%. On home vent since AM of 3/23   - Recheck VBG in AM  - Antibiotics as above  - Continue home prednisone and nebs   - Consider stress dose steroids for any further hypotension  - Palliative team involved, appreciate assistance     # Hypovolemic Hyponatremia and Electrolyte Abnormalities (Resolved)  - D/C IVF, monitor for s/sx of hypovolemia  - Electrolyte protocol for hypokalemia     # Steroid-Induced Hyperglycemia - Monitoring closely, well controlled at present.  - Low dose SSI  - Hypoglycemia Protocol  - q4h BG checks on TF    #  "Abdominal Pain - No acute etiology on CT scan on 3/23.  Will monitor.    ===== Chronic Medical Problems =====  # CAD - History of NSTEMI, no meds currently  # Generalized Anxiety Disorder and Air Hunger - No acute issues. Continue home clonazepam, morphine, quetiapine, sertraline, hydromorphone PRN and lorazepam PRN  # Achalasia and GERD with GJ-Tube dependence - No acute issues, home TF per nutrition, continue H2 blocker with GI cocktail PRN  # Chronic Normocytic Anemia - No acute issues, likely hemoconcentrated on admission, now close to baseline without signs of bleeding.  # Stage III Decubitus Ulcer - Present on admission, WOC consulted to assist with management, no signs of acute infection.  # Constipation - No acute issues. Continue home bowel regimen     # FEN  - TF per home routine   - Nutrition consulted  # PPx: Enoxaparin and H2 blocker  # Code Status: DNR  # Dispo: Inpatient admission for IV antibiotics pending culture results, anticipate discharge in 1-2 days.    Patient was seen and discussed with Dr. Acevedo who agreed with the above.    Celestino Mo MD, A  Internal Medicine PGY-1  [facility]  Pager: [phone]  ___________________________________________________________________    Subjective & Interval Hx:  Mary did well overnight with no acute issues.  This morning she was transitioned to her home ventilator without difficulty.  This morning Mary indicates some increased abdominal pain and bloating but had no other apparent complaints or concerns.     ROS: The remainder of a 10 point ROS is negative unless otherwise noted above.    Medications: Reviewed in EPIC. List below for reference    Physical Exam:    Blood pressure 134/69, pulse 100, temperature 98.5  F (36.9  C), temperature source Oral, resp. rate 14, height 1.626 m (5' 4\"), weight 63.1 kg (139 lb 1.8 oz), SpO2 100 %, not currently breastfeeding.    Constitutional: Mild distress, chronically ill, cooperative "   Cardiovascular: RRR. No murmurs  Respiratory: diminished air flow, bilaterally, bibasilar crackles, with small wheezes  Head: Normocephalic. No masses, lesions, tenderness or abnormalities  Neck: Tracheostomy, No adenopathy, Carotids without bruits.  Abdomen: Abdomen slightly tense, diffusely tender. BS normal. No masses, organomegaly  NEURO: Gait normal. Reflexes normal and symmetric. Sensation grossly WNL.  SKIN: Large sacrococcygeal wound, present on admission  JOINT/EXTREMITIES: extremities normal- no gross deformities noted, 1 + LE edema    Lines/Tubes:   Peripheral IV 03/21/17 Right;Medial Upper forearm (Active)   Site Assessment [facility] 3/23/2017  4:00 PM   Line Status Infusing 3/23/2017  4:00 PM   Phlebitis Scale 0-->no symptoms 3/23/2017  4:00 PM   Infiltration Scale 0 3/23/2017  4:00 PM   Extravasation? No 3/23/2017  4:00 PM   Number of days:2       Labs & Studies of Note: I  Labs and imaging from the past 24 hours reviewed and notable for no acute changes on abdominal CT, stable BMP, and improved VBG.    Unresulted Labs Ordered in the Past 30 Days of this Admission     Date and Time Order Name Status Description    3/21/2017 1500 Sputum Culture Aerobic Bacterial Preliminary     3/20/2017 2112 Blood Culture ONE site Preliminary     3/20/2017 2005 Urine Culture Aerobic Bacterial Preliminary         Medications list for Reference  Current Facility-Administered Medications   Medication     morphine solution 10 mg     acetaminophen (TYLENOL) solution 650 mg     albuterol neb solution 2.5 mg     bisacodyl (DULCOLAX) Suppository 10 mg     budesonide (PULMICORT) neb solution 0.5 mg     calcium carbonate (TUMS) chewable tablet 500 mg     clonazePAM (klonoPIN) suspension 1.5 mg     fexofenadine (ALLEGRA) tablet 180 mg     fiber modular (NUTRISOURCE FIBER) packet 1 packet     gabapentin (NEURONTIN) solution 300 mg     HYDROmorphone (DILAUDID) liquid 1 mg     ipratropium (ATROVENT) 0.02 % neb solution 0.5 mg      lactobacillus rhamnosus (GG) (CULTURELL) capsule 1 capsule     levalbuterol (XOPENEX) neb solution 1.25 mg     lidocaine (LIDODERM) 5 % Patch 1-2 patch     lidocaine (XYLOCAINE) 2 % 15 mL, alum & mag hydroxide-simethicone (MYLANTA ES/MAALOX  ES) 15 mL GI Cocktail     LORazepam (ATIVAN) 2 MG/ML (HIGH CONC) solution 0.5 mg     melatonin liquid 3 mg     menthol-zinc oxide (CALMOSEPTINE) 0.44-20.625 % ointment     polyethylene glycol (MIRALAX/GLYCOLAX) Packet 17 g     polyethylene glycol 0.4%- propylene glycol 0.3% (SYSTANE ULTRA) ophthalmic solution 1-2 drop     predniSONE (DELTASONE) tablet 20 mg     QUEtiapine (SEROquel) tablet 25 mg     QUEtiapine (SEROquel) tablet 50 mg     senna-docusate (SENOKOT-S;PERICOLACE) 8.6-50 MG per tablet 2 tablet     sertraline (ZOLOFT) 20 MG/ML (HIGH CONC) solution 150 mg     sodium chloride (OCEAN) 0.65 % nasal spray 1 spray     naloxone (NARCAN) injection 0.1-0.4 mg     enoxaparin (LOVENOX) injection 40 mg     ondansetron (ZOFRAN-ODT) ODT tab 4 mg    Or     ondansetron (ZOFRAN) injection 4 mg     potassium chloride SA (K-DUR/KLOR-CON M) CR tablet 20-40 mEq     potassium chloride (KLOR-CON) Packet 20-40 mEq     potassium chloride 10 mEq in 100 mL intermittent infusion     potassium chloride 10 mEq in 100 mL intermittent infusion with 10 mg lidocaine     potassium chloride 20 mEq in 50 mL intermittent infusion     magnesium sulfate 2 g in NS intermittent infusion (PharMEDium or FV Cmpd)     magnesium sulfate 4 g in 100 mL sterile water (premade)     sodium phosphate 10 mmol in D5W intermittent infusion     sodium phosphate 15 mmol in D5W intermittent infusion     sodium phosphate 20 mmol in D5W intermittent infusion     sodium phosphate 25 mmol in D5W intermittent infusion     glucose 40 % gel 15-30 g    Or     dextrose 50 % injection 25-50 mL    Or     glucagon injection 1 mg     insulin aspart (NovoLOG) inj (RAPID ACTING)     famotidine (PEPCID) suspension 20 mg     meropenem (MERREM)  1 g vial to attach to  mL bag     lidocaine (LIDODERM) patch REMOVAL     lidocaine (LIDODERM) Patch in Place     guaiFENesin (ROBITUSSIN) 20 mg/mL solution 10 mL     multivitamins with minerals (CERTAVITE/CEROVITE) liquid 15 mL     0.9% sodium chloride infusion     sodium phosphate 20 mmol in D5W intermittent infusion     Internal Medicine Staff Addendum      I have seen and examined the patient with the resident and agree with the jointly made assessment and plan outlined above.  My edits are in blue or .  I have also reviewed the vital signs, laboratory testing, and imaging obtained in the last 24 hours.         Care discussed with the bedside nurse.      Kevin Acevedo MD  Hospitalist    Medicine and Pediatrics    Pager:  [phone]  Email: [email or web address]    DOS:3/23/17

## 2017-03-23 NOTE — PROGRESS NOTES
Social Work Services Progress Note  Hospital Day: 2  Collaborated with:  Team rounds; chart reviewed; [facility]; daughter, César.      Data:   Patient is a 67 year old female with past medical history significant for severe COPD s/p tracheostomy on home ventilator and GJ tube placement, anxiety, hypertension, chronic indwelling bender catheter, and achalasia who presented with malaise and bender catheter pain, found to have severe metabolic derangements and a leukocytosis concerning for sepsis. Palliative Care consulted today.     Intervention:    Spoke with daughterCésar via phone this afternoon ([phone]) via phone. She had questions about DME and states she's been working with [facility] OT and [facility] to get patient a [new] pressure mattress given her wounds. César would ideally like the mattress delivered while patient is hospitalized or before she d/c's; however [facility] is needing a face-to-face documentation form completed and patient[place]CP [place] the next 10 days or so. César is requesting assistance with this and hopeful that patient's team here may be able to help with the needed documentation.     César has been working with Jenni at [facility] (p: [phone]; f: [phone]) and this would be who needs to be contacted to get the needed documentation. Will assist with this as able.      Assessment:    Significant relationship at present time:  DaughterCésar (p: [phone]).  Family of origin is available for support:  Yes; involved and appears to be supportive person.   Other support available: Group Home Staff; .   Gaps in support system:  None.   Patient is caregiver to: None.      Plan:  Anticipated Disposition:  Pending medical course.   Barriers to d/c plan:  Medical stability.   Follow Up: Will continue to follow for support and d/c planning as appropriate.     [facility] Ortiz, [facility]  ICU Social  Worker  Pager: [phone]  Phone: [phone]

## 2017-03-24 NOTE — PLAN OF CARE
Problem: Goal Outcome Summary  Goal: Goal Outcome Summary  Outcome: No Change  Patient without acute event. Respiratory and cardiovascular status stable and unchanged; patient awaiting transfer to 6B.  Continue to monitor patient status; notify physician of changes.

## 2017-03-24 NOTE — PROGRESS NOTES
Internal Medicine [facility] Progress Note       Patient: Mary Wang  MRN: [id number]  Admission Date: 3/20/2017  Hospital Day # 3    Assessment & Plan:   Mary Wang is a 67 year old female with a history of severe COPD s/p tracheostomy on home ventilator, GJ-tube dependence, anxiety, HTN, chronic indwelling bender, and achalasia who presented with malaise and bender catheter pain, admitted to the ICU on 3/21/17 for sepsis from likely urinary source with severe metabolic derangements, now stable and awaiting culture results.     # Sepsis secondary to Urinary Source versus HCAP/Aspiration PNA - Leukocytosis, subsequent hypotension, new CXR findings, and questionable UA concerning for Sepsis. Initially admitted to the ICU given high potential for decompensation but has since stabilized. Procal reassuring, continuing antibiotic coverage and monitoring cultures. Pending cultures  - Continue empiric meropenem  - Vancomycin discontinued 3/22  - Follow blood cultures, urine cultures, and sputum cultures  - Vent settings and nebs as below  - Monitor blood pressures and gases, improved today     # Chronic Hypoxic, Hypercarbic Respiratory Failure with possible HCAP  # Severe COPD s/p Tracheostomy with Ventilator Dependence  Admission CXR with increased RLL opacities could represent HCAP versus aspiration PNA, awaiting sputum culture results.  - Home vent settings: CMV 14/400/5/45%. On home vent since AM of 3/23  - Recheck VBG; improved Respiratory Acidosis,    - Antibiotics as above  - Continue home prednisone and nebs   - Consider stress dose steroids for any further hypotension  - Palliative team involved, appreciate assistance     # Hypovolemic Hyponatremia and Electrolyte Abnormalities (Resolved)  - D/C IVF, monitor for s/sx of hypovolemia  - Electrolyte protocol for hypokalemia     # Steroid-Induced Hyperglycemia - Monitoring closely, well controlled at present.  - Low dose SSI  - Hypoglycemia Protocol  -  "q4h BG checks on TF    # Abdominal Pain - No acute etiology on CT scan on 3/23.  Will monitor.    ===== Chronic Medical Problems =====  # CAD - History of NSTEMI, no meds currently  # Generalized Anxiety Disorder and Air Hunger - No acute issues. Continue home clonazepam, morphine, quetiapine, sertraline, hydromorphone PRN and lorazepam PRN  # Achalasia and GERD with GJ-Tube dependence - No acute issues, home TF per nutrition, continue H2 blocker with GI cocktail PRN  # Chronic Normocytic Anemia - No acute issues, likely hemoconcentrated on admission, now close to baseline without signs of bleeding.  # Stage III Decubitus Ulcer - Present on admission, WOC consulted to assist with management, no signs of acute infection.  # Constipation - No acute issues. Continue home bowel regimen    # FEN  - TF per home routine   - Nutrition consulted  # PPx: Enoxaparin and H2 blocker  # Code Status: DNR  # Dispo: Inpatient admission for IV antibiotics pending culture results, anticipate discharge in 1-2 days.    Patient was seen and discussed with Dr. Acevedo who agreed with the above.    Celestino Mo MD, A  Internal Medicine PGY-1  [facility]  Pager: [phone]  ___________________________________________________________________    Subjective & Interval Hx:  Mary did well overnight with no acute issues.  This continued to sat well on her home ventilator settings without difficulty.  This morning Mary indicates some increased anxiety but was fatigued when first seen in the morning.     ROS: The remainder of a 10 point ROS is negative unless otherwise noted above.    Medications: Reviewed in EPIC. List below for reference    Physical Exam:    Blood pressure 112/64, pulse 100, temperature 98.6  F (37  C), temperature source Axillary, resp. rate 14, height 1.626 m (5' 4\"), weight 63.1 kg (139 lb 1.8 oz), SpO2 97 %, not currently breastfeeding.    Constitutional: Mild distress, chronically ill, " fatigued   Cardiovascular: RRR. No murmurs  Respiratory: diminished air flow, bilaterally, bibasilar crackles, with small wheezes  Head: Normocephalic. No masses, lesions, tenderness or abnormalities  Neck: Tracheostomy, No adenopathy, Carotids without bruits.  Abdomen: Abdomen slightly tense, diffusely tender. BS normal. No masses, organomegaly  NEURO: Gait normal. Reflexes normal and symmetric. Sensation grossly WNL.  SKIN: Large sacrococcygeal wound  JOINT/EXTREMITIES: extremities normal- no gross deformities noted, 1 + LE edema    Lines/Tubes:   Peripheral IV 03/21/17 Right;Medial Upper forearm (Active)   Site Assessment WDL 3/23/2017  4:00 PM   Line Status Infusing 3/23/2017  4:00 PM   Phlebitis Scale 0-->no symptoms 3/23/2017  4:00 PM   Infiltration Scale 0 3/23/2017  4:00 PM   Extravasation? No 3/23/2017  4:00 PM   Number of days:2       Labs & Studies of Note:   Labs and imaging from the past 24 hours reviewed and notable for a stable BMP, CBC and improved ABG listed below.    Arterial Blood Gas result:  pO2 88; pCO2 52; pH 7.43;  HCO3 34, %O2 Sat 40% .      Unresulted Labs Ordered in the Past 30 Days of this Admission     Date and Time Order Name Status Description    3/21/2017 1500 Sputum Culture Aerobic Bacterial Preliminary     3/20/2017 2112 Blood Culture ONE site Preliminary         Medications list for Reference  Current Facility-Administered Medications   Medication     morphine solution 10 mg     acetaminophen (TYLENOL) solution 650 mg     albuterol neb solution 2.5 mg     bisacodyl (DULCOLAX) Suppository 10 mg     budesonide (PULMICORT) neb solution 0.5 mg     calcium carbonate (TUMS) chewable tablet 500 mg     clonazePAM (klonoPIN) suspension 1.5 mg     fexofenadine (ALLEGRA) tablet 180 mg     fiber modular (NUTRISOURCE FIBER) packet 1 packet     gabapentin (NEURONTIN) solution 300 mg     HYDROmorphone (DILAUDID) liquid 1 mg     ipratropium (ATROVENT) 0.02 % neb solution 0.5 mg     lactobacillus  rhamnosus (GG) (CULTURELL) capsule 1 capsule     levalbuterol (XOPENEX) neb solution 1.25 mg     lidocaine (LIDODERM) 5 % Patch 1-2 patch     lidocaine (XYLOCAINE) 2 % 15 mL, alum & mag hydroxide-simethicone (MYLANTA ES/MAALOX  ES) 15 mL GI Cocktail     LORazepam (ATIVAN) 2 MG/ML (HIGH CONC) solution 0.5 mg     melatonin liquid 3 mg     menthol-zinc oxide (CALMOSEPTINE) 0.44-20.625 % ointment     polyethylene glycol (MIRALAX/GLYCOLAX) Packet 17 g     polyethylene glycol 0.4%- propylene glycol 0.3% (SYSTANE ULTRA) ophthalmic solution 1-2 drop     predniSONE (DELTASONE) tablet 20 mg     QUEtiapine (SEROquel) tablet 25 mg     QUEtiapine (SEROquel) tablet 50 mg     senna-docusate (SENOKOT-S;PERICOLACE) 8.6-50 MG per tablet 2 tablet     sertraline (ZOLOFT) 20 MG/ML (HIGH CONC) solution 150 mg     sodium chloride (OCEAN) 0.65 % nasal spray 1 spray     naloxone (NARCAN) injection 0.1-0.4 mg     enoxaparin (LOVENOX) injection 40 mg     ondansetron (ZOFRAN-ODT) ODT tab 4 mg    Or     ondansetron (ZOFRAN) injection 4 mg     potassium chloride SA (K-DUR/KLOR-CON M) CR tablet 20-40 mEq     potassium chloride (KLOR-CON) Packet 20-40 mEq     potassium chloride 10 mEq in 100 mL intermittent infusion     potassium chloride 10 mEq in 100 mL intermittent infusion with 10 mg lidocaine     potassium chloride 20 mEq in 50 mL intermittent infusion     magnesium sulfate 2 g in NS intermittent infusion (PharMEDium or FV Cmpd)     magnesium sulfate 4 g in 100 mL sterile water (premade)     sodium phosphate 10 mmol in D5W intermittent infusion     sodium phosphate 15 mmol in D5W intermittent infusion     sodium phosphate 20 mmol in D5W intermittent infusion     sodium phosphate 25 mmol in D5W intermittent infusion     glucose 40 % gel 15-30 g    Or     dextrose 50 % injection 25-50 mL    Or     glucagon injection 1 mg     insulin aspart (NovoLOG) inj (RAPID ACTING)     famotidine (PEPCID) suspension 20 mg     meropenem (MERREM) 1 g vial to  attach to  mL bag     lidocaine (LIDODERM) patch REMOVAL     lidocaine (LIDODERM) Patch in Place     guaiFENesin (ROBITUSSIN) 20 mg/mL solution 10 mL     multivitamins with minerals (CERTAVITE/CEROVITE) liquid 15 mL     sodium phosphate 20 mmol in D5W intermittent infusion     Internal Medicine Staff Addendum      I have seen and examined the patient with the resident and agree with the jointly made assessment and plan outlined above.  My edits are in blue or .  I have also reviewed the vital signs, laboratory testing, and imaging obtained in the last 24 hours.       Care discussed with the bedside nurse.      Kevin Acevedo MD  Hospitalist    Medicine and Pediatrics    Pager:  [phone]  Email: [email or web address]    DOS:3/24/17

## 2017-03-24 NOTE — PROGRESS NOTES
"Palliative Care Inpatient Clinical Social Work Assessment    Patient Information:  Mary is a 67 year old woman with chronic respiratory failure on a ventilator. She also has sepsis and a UTI currently being treated. She will likely return to her group home on Monday.    Visit Summary:  I met with Mary briefly this afternoon in her room. She attempted to mouth words yet I was not able to understand what she was saying. Although she was frustrated, she remained very calm. At one point it did seem like she was saying \"back pain\" and attempting to point to her back. Her nurse, MATTIE Winter, was aware of this concern and going to bring her pain medication.    I then spoke with her daughter César by phone. Details of this conversation are listed below.    Relevant Symptoms/Concerns     Physical:  Edenilson Lindsey reports that Lynda has sores on her bottom which are very painful. They are working to get her a better pressure mattress.   Psychological/Emotional/Existential:      Family/Social/Caregiver:   Edenilson Lindsey has a daughter who is a senior in high school and is busy with various events. She continues to visit Mary at least once a week as she has done for many years. When Mary is dying, César hopes to be able to visit more often.   Developmental:      Mental Health:  Some episodes of anxiety or air hunger but overall this is improving. See strengths section below   End of Life:  Edenilson Lindsey has noticed that Lynda is more withdrawn than before. She often sleeps more and does not visit as much with family. She knows that this could be a characteristic of Mary approaching the end of her life. Edenilson Lindsey talks with her friend from work who has cared for several family members who have .   Cultural/Jehovah's witness/Spiritual:      Grief/Loss:      Concurrent Stressors:        Comments:       Strengths     Physical:     Psychological/Emotional/Existential:      Family/Social/Caregiver:      Developmental:   " "   Mental Health:  Edenilson Lindsey reports that Brandons air hunger and anxiety have significantly improved over the past month. She thinks this is due to Dr. Brito (Complex Care Team MD) prescribing \"slow release morphine\" which Lynda receives 3x per day. She also says that when Lynda asks for her prn Dilaudid it is often because of the pressure sores on her bottom rather than because she cannot breathe well.   End of Life:  Edenilson Lindsey reports that their family is at peace with Mary's terminal illness and know that \"if she goes, it means it's her time.\"   Cultural/Mormon/Spiritual:      Grief/Loss:         Comments:       Goals/Decision Making/Advance Care Planning   Preferences:  Edenilson Lindsey explains that Mary had been saying \"I want to go to the hospital\" for at least a week before they decided to transfer her to the hospital from her group home.    Concerns:  We had discussions during a previous hospital admission about pursuing comfort care at her group home rather than transporting Mary to the hospital in the case of a life threatening event. Edenilson Lindsey believes that during that admission Lynda was experiencing brain damage due to not having oxygen to her brain for so long. She and other family members think this because they have known several people within their family with brain injuries whose personalities have changed as a result. Because Mary was so calm and at peace during that discussion they do not know if she was fully oriented to the decisions she was making. Edenilson Lindsey says \"I do know that was not her normal personality.\" Because Mary requested several times to come to the hospital the past week, they felt that this truly was her wish. Edenilson Lindsey recognizes that for many years if Lynda did not feel well her solution was to go to the hospital. She can see how this would make the most sense to her mother if she was not feeling well.   Documents:  Edenilson Lindsey " "filled out a new POLST form. This was because of the confusion that happened when Mary was transported to the hospital. Edenilson Lindsey did not have her phone when the Emergency Medicine doctor called and did not get back to him for several hours. She said that during that time Lynda's blood pressure dropped low and due to the existing POLST the doctors were prepared to not treat this condition but rather keep her comfortable. She said that the doctor said that the POLST form read that Mary did not want \"full care.\" They did give Lynda fluids which helped. Edenilson Lindsey says that in the future she believes her mother would want interventions to keep her alive in the hospital if possible. She continues to agree that resuscitation should not be attempted. Edenilson Lindsey filled out a new POLST form this hospital stay which is now on file. She also says that there was confusion at the group home regarding the meaning of the POLST form as it was previously stated. She feels comfortable with the most recent POLST on file.   Decision Making Issues:  Edenilson Lindsey says that the goals for her mother have really not changed. As a family, they do intend to keep her out of the hospital and at her group home. If she consistently asks to return to the hospital due to not feeling well then they will consider this. She will continue with her current outpatient home care treatment including visits from Dr. Brito.     Comments:       Resource Needs     Discharge Planning:  Per Unit/Program  and/or Care Coordinator   Other:      Comments:       Sources of Information   Patient:  Lynda, minimal interaction   Family:  Edenilson Lindsey   Staff:      Chart Review:      Other:       Intervention (Check all that apply)    X   Assessment of palliative specific issues          Introduction of Palliative clinical social work interventions        Adjustment to illness counseling        Advanced care planning        " Attended/participated in care conference        Behavioral interventions for symptom management        Facilitation of processing of thoughts/feelings        Family communication facilitated        Grief counseling    X   Goals of care discussion/facilitation        Life legacy work        Life review facilitation        Psychoeducation        Re-framing        Resource referral        Other:       Comments:  Although I met with Lynda and she was engaged I was not able to understand what she was attempting to say. Her daughter César was verbal, engaged and receptive to talking about goals of care by phone.    Plan:  I will continue assessment/intervention as indicated.        [facility], [facility]  Palliative Care Clinical   Pager [phone]

## 2017-03-24 NOTE — PLAN OF CARE
Problem: Goal Outcome Summary  Goal: Goal Outcome Summary  Outcome: No Change  D/I:  Patient changed to home vent with  and PEEP of 5. No change in sats or work of breathing. Patient did not report shortness of breath. Complained of abdominal pain throughout day that was unaffected by pain medication administration. Gtube to gravity with clear/bilious output in am. Abdoment distended and tympanic. Rectal pouch with gas and minimal stool. MDs notified, abd CT performed, simethecone administered and gtube clamped for two hours, rectal pouch removed. Patient intermittently sleepy throughout day. Attempted to correct sleep/wake schedule with activity and cares performed throughout the day. Family updated.  A: Patient unable to tolerate gtube clamp with significant gas buildup. DAughter reported that was a chronic problem and gtube was always kept to gravity for that reason. Pain currently under control, though no changes were made to care plan. Abd CT negative for any significant changes. Will continue to monitor and notify team of any changes. Resting comfortably on vent.   P: Likely transfer to 6B in am if remains stable on home vent. Notify team of any changes

## 2017-03-24 NOTE — PROGRESS NOTES
"CLINICAL NUTRITION SERVICES - BRIEF NOTE    Nutrition Prescription    RECOMMENDATIONS FOR MDs/PROVIDERS TO ORDER:  None at this time    Recommendations already ordered by Registered Dietitian (RD):  -Tri-Vi-Sol 13 mL BID x 10 days for wound healing       3/21- WOC RN note: \"PI located on Sacrococcygeal area : Unstageable. Status: wound markedly worsening\"     Nutrition Progress Note - f/u for progress towards previous nutrition POC (see previous 3/21 reassessment for details)    Brigida Villasenor RDN, LD  Pgr: [phone]  "

## 2017-03-24 NOTE — PROGRESS NOTES
PALLIATIVE CONSULT SERVICE  SPIRITUAL ASSESSMENT Progress Note  [facility] 4C MICU    Self-referred based on chaplaincy care Mary has received in the past. She was not available at the time of visit attempt. I will follow-up 2x/week as she receives ongoing support from the inpatient palliative consult service.    Cesar Varela M.Div., [facility]  Palliative Consult Service   Pager [phone]

## 2017-03-25 NOTE — PROGRESS NOTES
Surgery Interval Note:    No acute events. CT scan shows no pneumoperitoneum. Recommend adjusting GJ tube balloon to avoid compression of the abdominal and stomach wall. No indication for surgical intervention. Surgery team will sign off, please call with questions.     Moy Rockwell MD  PGY-2, General Surgery  [phone]

## 2017-03-25 NOTE — CONSULTS
GENERAL SURGERY HISTORY AND PHYSICAL    ASSESSMENT/PLAN: Mary Wang is a 67 year old female with a history of COPD s/p trach on home vent, GJ-tube dependence currently admitted for sepsis likely urinary source now with concern for pneumoperitoneum on supine AXR. She is hemodynamically stable without signs of peritonitis but does report some abdominal pain.     -- Lateral decubitus film to further evaluate free air, if equivocal then STAT CT abdomen/pelvis with contrast to evaluate for free air or source of perforation  -- NPO, hold tube feeds  -- Repeat labs now  -- Serial abdominal exams  -- Please call with changes in vitals or increasing abdominal pain    Discussed with Dr. Giles and Dr. Guadalupe.    Shameka Momin, MS4    Moy Rockwell MD  PGY-2, General Surgery  Pager: [phone]    - - - - - - - - - -    CHIEF COMPLAINT: Concern for pneumoperitoneum on AXR    HISTORY OF PRESENT ILLNESS: Mary Wang is 67 year old female with a history of COPD s/p trach on home vent, GJ-tube feed dependence currently admitted for sepsis of a likely urinary source now with concern for pneumoperitoneum on supine AXR. Per nursing, the GJ-tube was clamped around 8pm. When the nurse returned there was bilious fluid on the patient's abdomen which appeared to originate from the GJ-tube site. AXR was performed for concern of GJ-tube placement with preliminary read concerning for subdiaphragmatic lucency in the LUQ with suspicion for pneumoperitoneum and pneumatosis. The patient is nonverbal, but does signal that she has abdominal pain. She is inconsistent in her responses.      REVIEW OF SYSTEMS: Unable to obtain due to patient condition.     PAST MEDICAL HISTORY:   Past Medical History:   Diagnosis Date     Achalasia      Anxiety      Anxiety and depression      Chronic pain      COPD (chronic obstructive pulmonary disease) (H)     trach/vent dependent      GERD (gastroesophageal reflux disease)      Hypertension       Lung nodule     spiculated; followed in pulm clinic      Stress-induced cardiomyopathy      TMJ pain dysfunction syndrome      Tracheostomy in place        SURGICAL HISTORY:   Past Surgical History:   Procedure Laterality Date     ANESTHESIA OUT OF OR MRI N/A 4/18/2016    Procedure: ANESTHESIA OUT OF OR MRI;  Surgeon: GENERIC ANESTHESIA PROVIDER;  Location: [facility] OR     BRONCHOSCOPY, INSERT BRONCHIAL VALVE Right 9/2/2015    Procedure: BRONCHOSCOPY, INSERT BRONCHIAL VALVE;  Surgeon: Everardo Beach MD;  Location: [facility]     ENDOSCOPIC INSERTION TUBE GASTROSTOMY N/A 7/9/2016    Procedure: ENDOSCOPIC INSERTION TUBE GASTROSTOMY;  Surgeon: Brenden Plasencia MD;  Location: [facility]     ESOPHAGOSCOPY, GASTROSCOPY, DUODENOSCOPY (EGD), COMBINED N/A 12/11/2015    Procedure: COMBINED ESOPHAGOSCOPY, GASTROSCOPY, DUODENOSCOPY (EGD);  Surgeon: Dhruv Lyles MD;  Location: [facility]     ESOPHAGOSCOPY, GASTROSCOPY, DUODENOSCOPY (EGD), COMBINED N/A 12/31/2015    Procedure: COMBINED ESOPHAGOSCOPY, GASTROSCOPY, DUODENOSCOPY (EGD);  Surgeon: Brenden Plasencia MD;  Location: [facility]     ESOPHAGOSCOPY, GASTROSCOPY, DUODENOSCOPY (EGD), COMBINED N/A 2/17/2017    Procedure: COMBINED ESOPHAGOSCOPY, GASTROSCOPY, DUODENOSCOPY (EGD);  Surgeon: Brenden Plasencia MD;  Location: [facility]     PICC INSERTION Right 1/9/2017    5fr DL BioFlo PICC, 38cm (1cm external) in the R basilic vein.     REMOVE AND REPLACE BREAST IMPLANT PROSTHESIS N/A 12/31/2015    Procedure: PERCUTANEOUS INSERTION TUBE JEJUNOSTOMY;  Surgeon: Brenden Plasencia MD;  Location: [facility]     REMOVE AND REPLACE BREAST IMPLANT PROSTHESIS N/A 1/25/2016    Procedure: PERCUTANEOUS INSERTION TUBE JEJUNOSTOMY;  Surgeon: Carrie Coleman MD;  Location: [facility]     TRACHEOSTOMY N/A 8/26/2015    Procedure: TRACHEOSTOMY;  Surgeon: Milena Thibodeaux MD;  Location: [facility] OR       SOCIAL HISTORY: Tob - former smoker. Etoh - none    FAMILY HISTORY: No bleeding/clotting  disorders nor problems with anesthesia.     ALLERGIES:      Allergies   Allergen Reactions     Levaquin Other (See Comments)     Delirium     Toro Podhaler [Tobramycin] Other (See Comments)     Severe congestion, SOB      Suprax [Cefixime]      See ceftibuten     Augmentin Nausea     Has tolerated for treatment of UTIs in 2016.     Avelox [Moxifloxacin] Anxiety     Cedax [Ceftibuten] Other (See Comments)     Pain in eyes     Penicillins Itching     Tolerated amoxicillin without issues.     Vantin [Cefpodoxime] Nausea       MEDICATIONS:    No current facility-administered medications on file prior to encounter.   Current Outpatient Prescriptions on File Prior to Encounter:  gabapentin (NEURONTIN) 250 MG/5ML solution TAKE 300MG (6ML) VIA J-TUBE 3 TIMES A DAY....   HYDROmorphone (DILAUDID) 1 MG/ML LIQD liquid Take 1 mL (1 mg) by mouth every 2 hours as needed for other (dyspnea)   levalbuterol (XOPENEX) 1.25 MG/3ML neb solution Take 3 mLs (1.25 mg) by nebulization 4 times daily and every four hours at night PRN.   morphine sulfate (ROSIE) 10 MG 24 hr capsule Give contents of one capsule through the gastrostomy tube every 8 hours.   predniSONE (DELTASONE) 20 MG tablet 1 tablet (20 mg) by Per G Tube route daily   polyethylene glycol (MIRALAX/GLYCOLAX) Packet 17 g by Per J Tube route 2 times daily Hold for loose stools   budesonide (PULMICORT) 0.5 MG/2ML neb solution Take 2 mLs (0.5 mg) by nebulization 2 times daily   clonazePAM (KLONOPIN) 0.5 MG tablet TAKE 1.5MG (3 TABLETS) BY MOUTH FOUR TIMES A DAY FOR ANXIETY.   menthol-zinc oxide (CALMOSEPTINE) 0.44-20.625 % OINT ointment Apply topically 2 times daily as needed for skin protection   guaiFENesin (ORGANIDIN) 200 MG TABS tablet Take 1 tablet (200 mg) by mouth 4 times daily   Cranberry 125 MG TABS 1 tablet by Jejunal Tube route daily   QUEtiapine (SEROQUEL) 25 MG tablet Administer one tab per J-tube as needed at night if scheduled HS dose ineffective for treatment of  anxiety or sleeplessness.   bisacodyl (DULCOLAX) 10 MG Suppository Place 1 suppository (10 mg) rectally daily as needed for constipation   order for DME Equipment being ordered: Methylex foam dressings, alternating pressure pad for mattress and pump.   Rectal Cleansers (FLE[employer] CLEANSING ENEMA) ENEM Place 1 enema rectally daily as needed   order for DME Equipment being ordered: 1) Avendaño catheter 16F, balloon 10 mL, silicone.  2) Urinary collection bag.  3) Elastic leg strap for Avendaño catheter.  Please fax to [employer].   LORazepam (ATIVAN) 2 MG/ML (HIGH CONC) solution Take 0.25 mLs (0.5 mg) by mouth every 3 hours as needed for anxiety   clonazePAM (KLONOPIN) 0.1 mg/mL Take 15 mLs (1.5 mg) by mouth 4 times daily   QUEtiapine (SEROQUEL) 50 MG tablet Take 1 tablet (50 mg) by mouth 2 times daily   ipratropium (ATROVENT) 0.02 % neb solution Take 2.5 mLs (0.5 mg) by nebulization 4 times daily and every four hours at night PRN.   lactobacillus rhamnosus, GG, (CULTURELL) capsule 1 capsule by Per G Tube route daily   lidocaine (LIDODERM) 5 % Patch Place 1-2 patches onto the skin every 24 hours Apply to the lower back   senna-docusate (SENOKOT-S;PERICOLACE) 8.6-50 MG per tablet 2 tablets by Per J Tube route 2 times daily   calcium carbonate (TUMS) 500 MG chewable tablet Take 1 tablet (500 mg) by mouth every 2 hours as needed for heartburn   ondansetron (ZOFRAN) 4 MG tablet Take 1 tablet (4 mg) by mouth every 4 hours as needed for nausea   famotidine (PEPCID) 40 MG/5ML suspension Take 2.5 mLs (20 mg) by mouth 2 times daily as needed for heartburn   loperamide (IMODIUM A-D) 2 MG tablet 2-2 mg tablets per J-tube qid prn frequent and/or loose stools. Do not use more than 8 tabs per day.   polyethylene glycol 0.4%- propylene glycol 0.3% (SYSTANE ULTRA) 0.4-0.3 % SOLN ophthalmic solution Place 1-2 drops into both eyes 4 times daily as needed for dry eyes 0900, 1300, 1700, 2100   sertraline (ZOLOFT) 20 MG/ML (HIGH CONC)  "solution 7.5 mLs (150 mg) by Per Feeding Tube route daily   fexofenadine (ALLEGRA) 180 MG tablet Take 1 tablet (180 mg) by mouth daily   fiber modular (NUTRISOURCE FIBER) packet 1 packet by Per J Tube route 3 times daily   melatonin (MELATONIN) 1 MG/ML LIQD liquid 3 mLs (3 mg) by Per J Tube route At Bedtime   acetaminophen (TYLENOL) 160 MG/5ML oral liquid 20.3 mLs (650 mg) by Per Feeding Tube route every 4 hours as needed for mild pain   sodium chloride (OCEAN) 0.65 % nasal spray Spray 1 spray into both nostrils daily as needed for congestion   lidocaine 15 mL, alum & mag hydroxide-simethicone 15 mL GI Cocktail Take 30 mLs by mouth 3 times daily as needed for moderate pain       PHYSICAL EXAM:   /63  Pulse 100  Temp 97.7  F (36.5  C) (Axillary)  Resp 16  Ht 1.626 m (5' 4\")  Wt 63.1 kg (139 lb 1.8 oz)  SpO2 100%  BMI 23.88 kg/m2  General: Awake, alert, in no acute distress.  HEENT: Normocephalic, atraumatic. Tracheostomy  Chest wall: Symmetric thorax.    Respiratory: On mechanical ventilation via tracheostomy. Non-labored breathing.    Cardiovascular: Tachycardic, regular rhythm.   Gastrointestinal: Abdomen soft, non-distended, mild tenderness to palpation in the LUQ and epigastric region. No rebound tenderness or tenderness to percussion. GJ tube site without exudate or erythema.   Extremities: Moving all four extremities. No limb deformities. No pedal edema. Peripheral pulses palpable in all distal extremities.   Skin: No rashes or lesions appreciated.    LAB DATA: Reviewed.   Lactate 0.9  WBC 14.3r --> 10.7  Hgb 7.4  Plt 249  INR 1.06    IMAGING:   XR abdomen 3/24/2017:  PRELIMINARY REPORT - The following report is a preliminary  interpretation.   1. Subdiaphragmatic lucency in the left upper quadrant worrisome for  pneumoperitoneum.   2. Additional linear lucency in the left mid abdomen, suspicious for  pneumatosis.    "

## 2017-03-25 NOTE — PLAN OF CARE
Problem: Goal Outcome Summary  Goal: Goal Outcome Summary  PT 4C: Will sign off. Pt is vent dependent at baseline and has all functional needs met at group home. No acute PT needs identified.

## 2017-03-25 NOTE — PLAN OF CARE
Problem: Goal Outcome Summary  Goal: Goal Outcome Summary  D: Patient with a history of COPD on LTV at home.  I/A: Patient is lethargic and confused. Patient on her home vent at 4L O2. HR 70s-110s in sinus rhythm. SBP 110s-130s. Urine output 125-150mL/hr. Around 2200, patient had a large amount of bile around her G/J with low BPs, MD notified. Gave 1L NS bolus and had an abdominal xray. Consulted surgery for possible perforated viscus. TF was shut off at 0200. Another abdominal xray was completed but inconclusive so a stat CT was ordered. Replaced mag, phos, and potassium this am.   P: Continue to monitor respiratory and cardiovascular status and follow plan of care.

## 2017-03-26 NOTE — PLAN OF CARE
Problem: Goal Outcome Summary  Goal: Goal Outcome Summary  D: Patient with a history of COPD and on home LTV.  I/A: Patient on home LTV on 4 LPM with O2 sats in the high 90s. Patient lethargic and follows commands. Patient continuous to be anxious. Patient BP labile, anywhere from from 70s-200s systolic. Gave 500mL bolus at 2100 for low BPs.  HR 90s-120s.  Decreased TF to 40mL/hr at 0500 due to large output from G port. G port output from 9605-8119 was 150mL so shut TF off at 0600. Abdominal xray was ordered this am to evaluate GI status.    P: Continue to monitor GI and respiratory status and follow plan of care.

## 2017-03-26 NOTE — PLAN OF CARE
Problem: Goal Outcome Summary  Goal: Goal Outcome Summary  Outcome: No Change  Patient with intermittently soft BP and reduced LOC this morning; responded well to 500cc NS bolus - since hemodynamically stable. Otherwise without acute event; no change to respiratory or hemodynamic status. Patient remains anxious; exhibiting impressive persistence with her call light.  Continue to monitor patient status;notify physician of changes.

## 2017-03-26 NOTE — PROGRESS NOTES
Internal Medicine [facility] Progress Note       Patient: Mary Wang  MRN: [id number]  Admission Date: 3/20/2017  Hospital Day # 5    Assessment & Plan:   Mary Wang is a 67 year old female with a history of severe COPD s/p tracheostomy on home ventilator, GJ-tube dependence, anxiety, HTN, chronic indwelling bender, and achalasia who presented with malaise and bender catheter pain, admitted to the ICU on 3/21/17 for sepsis from likely urinary source with severe metabolic derangements, now stable and awaiting culture results.     # Sepsis secondary to Urinary Source versus HCAP/Aspiration PNA - Leukocytosis, subsequent hypotension, new CXR findings, and questionable UA concerning for Sepsis. Initially admitted to the ICU given high potential for decompensation but has since stabilized. Procal reassuring, continuing antibiotic coverage and monitoring cultures. Cultures and sensitivity returned with sensitivity to ceftriaxone.  - Discontinue empiric meropenem 3/25 due to low concern for pseudomonas with quick interval change of infiltrates on CXR, likely colonization and not consistent with acute infection.  - Cont uansyn   - Vancomycin discontinued 3/22  - Blood cultures, urine cultures, and sputum cultures, completed  - Vent settings and nebs as below  - Monitor blood pressures and gases, stable     # Abdominal pain:  Abdominal pain and blotting concerning for gastric outlet obstruction with tube feedings. Discussed with Gastroenterology, who assessed GJ- tube placement on CT and loosened PEG-J slightly.  Continue to monitor.  - Tube feeds changed to continues @ 35 cc/hr  - XR Gastrostomy Tube check    # Chronic Hypoxic, Hypercarbic Respiratory Failure with possible HCAP  # Severe COPD s/p Tracheostomy with Ventilator Dependence  Admission CXR with increased RLL opacities could represent HCAP versus aspiration PNA, awaiting sputum culture results.  - Home vent settings: CMV 14/400/5/45%. On home vent  since AM of 3/23  - Recheck VBG; improved Respiratory Acidosis,    - Antibiotics as above  - Continue home prednisone and nebs   - Consider stress dose steroids for any further hypotension  - Palliative team involved, appreciate assistance     # Acute on Chronic Normocytic Anemia:   Likely hemoconcentrated on admission, was close to baseline without signs of bleeding after fluid replacement. Also compounded by frequent blood draws and poor reticulocyte response.  - 1 Unit of pRBC  -  Recheck tonight after unit completed.     # Hypovolemic Hyponatremia and Electrolyte Abnormalities (Resolved)  - D/C IVF, monitor for s/sx of hypovolemia  - Electrolyte protocol for hypokalemia     # Steroid-Induced Hyperglycemia - Monitoring closely, well controlled at present.  - Low dose SSI  - Hypoglycemia Protocol  - q4h BG checks on TF    # Abdominal Pain - No acute etiology on CT scan on 3/23.  Will monitor.    ===== Chronic Medical Problems =====  # CAD - History of NSTEMI, no meds currently  # Generalized Anxiety Disorder and Air Hunger - No acute issues. Continue home clonazepam, morphine, quetiapine, sertraline, hydromorphone PRN and lorazepam PRN  # Achalasia and GERD with GJ-Tube dependence - No acute issues, home TF per nutrition, continue H2 blocker with GI cocktail PRN  # Stage III Decubitus Ulcer - Present on admission, WOC consulted to assist with management, no signs of acute infection.  # Constipation - No acute issues. Continue home bowel regimen    # FEN: TF per home routine,  Nutrition consulted  # PPx: Enoxaparin and H2 blocker  # Code Status: DNR  # Dispo: Inpatient admission for IV antibiotics pending culture results, anticipate discharge in 1-2 days.    Patient was seen and discussed with Dr. Acevedo who agreed with the above.    Celestino Mo MD, MHA  Internal Medicine PGY-1  [facility]  Pager:  "[phone]  ___________________________________________________________________    Subjective & Interval Hx:  Mary had an acute episode overnight patient had episode of lethargy and confusion. Pressure were from from 70s-200s systolic. Gave 500mL bolus at 2100 for low BPs. HR 90s-120s. Decreased TF to 40mL/hr at 0500 due to large output from G port. G port output from 2084-1158 was 150mL so shut TF off at 0600.  She reports anxiety and some abdominal pain. No signs of bleeding reported.     ROS: The remainder of a 10 point ROS is negative unless otherwise noted above.    Medications: Reviewed in EPIC. List below for reference    Physical Exam:    Blood pressure (!) 116/92, pulse 100, temperature 99  F (37.2  C), temperature source Axillary, resp. rate 20, height 1.626 m (5' 4\"), weight 66 kg (145 lb 8.1 oz), SpO2 97 %, not currently breastfeeding.    Constitutional: Mild distress, chronically ill, more awake compared to yesterday  Cardiovascular: RRR. No murmurs  Respiratory: diminished air flow, bibasilar crackles, with small wheezes  Head: Normocephalic. No masses, lesions, tenderness or abnormalities  Neck: Tracheostomy, No adenopathy, Carotids without bruits.  Abdomen: Abdomen slightly tense, diffusely tender. BS normal. No masses, organomegaly  NEURO: Gait normal. Reflexes normal and symmetric. Sensation grossly WNL.  SKIN: Large sacrococcygeal wound  JOINT/EXTREMITIES: extremities normal- no gross deformities noted, 1 + LE edema    Lines/Tubes:   Peripheral IV 03/21/17 Right;Medial Upper forearm (Active)   Site Assessment WDL 3/23/2017  4:00 PM   Line Status Infusing 3/23/2017  4:00 PM   Phlebitis Scale 0-->no symptoms 3/23/2017  4:00 PM   Infiltration Scale 0 3/23/2017  4:00 PM   Extravasation? No 3/23/2017  4:00 PM   Number of days:2       Labs & Studies of Note:   Labs and imaging from the past 24 hours reviewed:    Sodium 140, Potassium 3.5, Chloride 99, Carbon Dioxide 37, Urea Nitrogen 9, Creatinine " 0.36, glucose 121    Hb.9, WBC: 8.6, Plt: 295    Unresulted Labs Ordered in the Past 30 Days of this Admission     No orders found from 2017 to 3/21/2017.        Imaging:  Exam: XR ABDOMEN PORT F1 VW, 3/26/2017 7:07 AM     Indication: 67 Y F with GJ on tube feeds with increased residuals and  distention. Evaluate for obstruction.     Comparison: Previous day     Findings: Single frontal view of the abdomen is obtained. Scattered  nondilated loops of small bowel throughout the abdomen. Gas present  throughout the colon. No pneumatosis. No portal venous gas. No  definite free air, though findings are limited on this supine exam.  Small bilateral pleural effusions. Degenerative changes of the lumbar  spine.     Impression: Nonobstructive bowel gas pattern.  Medications list for Reference  Current Facility-Administered Medications   Medication     ampicillin-sulbactam (UNASYN) 3 g vial to attach to  mL bag     QUEtiapine (SEROquel) tablet 25 mg     lidocaine (LMX4) kit     heparin lock flush 10 UNIT/ML injection 2-5 mL     vitamin A-D & C drops (TRI-VI-SOL) drops SOLN 13 mL     sodium chloride (PF) 0.9% PF flush 10-20 mL     heparin lock flush 10 UNIT/ML injection 5-10 mL     heparin lock flush 10 UNIT/ML injection 5-10 mL     morphine solution 10 mg     acetaminophen (TYLENOL) solution 650 mg     albuterol neb solution 2.5 mg     bisacodyl (DULCOLAX) Suppository 10 mg     budesonide (PULMICORT) neb solution 0.5 mg     calcium carbonate (TUMS) chewable tablet 500 mg     clonazePAM (klonoPIN) suspension 1.5 mg     fexofenadine (ALLEGRA) tablet 180 mg     fiber modular (NUTRISOURCE FIBER) packet 1 packet     gabapentin (NEURONTIN) solution 300 mg     HYDROmorphone (DILAUDID) liquid 1 mg     ipratropium (ATROVENT) 0.02 % neb solution 0.5 mg     lactobacillus rhamnosus (GG) (CULTURELL) capsule 1 capsule     levalbuterol (XOPENEX) neb solution 1.25 mg     lidocaine (LIDODERM) 5 % Patch 1-2 patch     lidocaine  (XYLOCAINE) 2 % 15 mL, alum & mag hydroxide-simethicone (MYLANTA ES/MAALOX  ES) 15 mL GI Cocktail     LORazepam (ATIVAN) 2 MG/ML (HIGH CONC) solution 0.5 mg     melatonin liquid 3 mg     menthol-zinc oxide (CALMOSEPTINE) 0.44-20.625 % ointment     polyethylene glycol (MIRALAX/GLYCOLAX) Packet 17 g     polyethylene glycol 0.4%- propylene glycol 0.3% (SYSTANE ULTRA) ophthalmic solution 1-2 drop     predniSONE (DELTASONE) tablet 20 mg     QUEtiapine (SEROquel) tablet 25 mg     senna-docusate (SENOKOT-S;PERICOLACE) 8.6-50 MG per tablet 2 tablet     sertraline (ZOLOFT) 20 MG/ML (HIGH CONC) solution 150 mg     sodium chloride (OCEAN) 0.65 % nasal spray 1 spray     naloxone (NARCAN) injection 0.1-0.4 mg     enoxaparin (LOVENOX) injection 40 mg     ondansetron (ZOFRAN-ODT) ODT tab 4 mg    Or     ondansetron (ZOFRAN) injection 4 mg     potassium chloride SA (K-DUR/KLOR-CON M) CR tablet 20-40 mEq     potassium chloride (KLOR-CON) Packet 20-40 mEq     potassium chloride 10 mEq in 100 mL intermittent infusion     potassium chloride 10 mEq in 100 mL intermittent infusion with 10 mg lidocaine     potassium chloride 20 mEq in 50 mL intermittent infusion     magnesium sulfate 2 g in NS intermittent infusion (PharMED[facility] or FV Cmpd)     magnesium sulfate 4 g in 100 mL sterile water (premade)     sodium phosphate 10 mmol in D5W intermittent infusion     sodium phosphate 15 mmol in D5W intermittent infusion     sodium phosphate 20 mmol in D5W intermittent infusion     sodium phosphate 25 mmol in D5W intermittent infusion     glucose 40 % gel 15-30 g    Or     dextrose 50 % injection 25-50 mL    Or     glucagon injection 1 mg     insulin aspart (NovoLOG) inj (RAPID ACTING)     famotidine (PEPCID) suspension 20 mg     lidocaine (LIDODERM) patch REMOVAL     lidocaine (LIDODERM) Patch in Place     guaiFENesin (ROBITUSSIN) 20 mg/mL solution 10 mL     multivitamins with minerals (CERTAVITE/CEROVITE) liquid 15 mL     sodium phosphate 20 mmol  in D5W intermittent infusion     Internal Medicine Staff Addendum      I have seen and examined the patient with the resident and agree with the jointly made assessment and plan outlined above.  My edits are in blue or .  I have also reviewed the vital signs, laboratory testing, and imaging obtained in the last 24 hours.         Care discussed with the bedside nurse.      Kevin Acevedo MD  Hospitalist    Medicine and Pediatrics    Pager:  [phone]  Email: [email or web address]    DOS:3/26/17

## 2017-03-26 NOTE — PROGRESS NOTES
Internal Medicine [facility] Progress Note       Patient: Mary Wang  MRN: [id number]  Admission Date: 3/20/2017  Hospital Day # 4    Assessment & Plan:   Mary Wang is a 67 year old female with a history of severe COPD s/p tracheostomy on home ventilator, GJ-tube dependence, anxiety, HTN, chronic indwelling bender, and achalasia who presented with malaise and bender catheter pain, admitted to the ICU on 3/21/17 for sepsis from likely urinary source with severe metabolic derangements, now stable and awaiting culture results.     # Sepsis secondary to Urinary Source versus HCAP/Aspiration PNA - Leukocytosis, subsequent hypotension, new CXR findings, and questionable UA concerning for Sepsis. Initially admitted to the ICU given high potential for decompensation but has since stabilized. Procal reassuring, continuing antibiotic coverage and monitoring cultures. Cultures and sensitivity returned with sensitivity to ceftriaxone.  - Discontinue empiric meropenem 3/25 due to low concern for pseudomonas with quick interval change of infiltrates on CXR, likely colonization and not consistent with acute infection.  - Starting Ceftriaxone  - Vancomycin discontinued 3/22  - Blood cultures, urine cultures, and sputum cultures, completed  - Vent settings and nebs as below  - Monitor blood pressures and gases, stable     # Chronic Hypoxic, Hypercarbic Respiratory Failure with possible HCAP  # Severe COPD s/p Tracheostomy with Ventilator Dependence  Admission CXR with increased RLL opacities could represent HCAP versus aspiration PNA, awaiting sputum culture results.  - Home vent settings: CMV 14/400/5/45%. On home vent since AM of 3/23  - Recheck VBG; improved Respiratory Acidosis,    - Antibiotics as above  - Continue home prednisone and nebs   - Consider stress dose steroids for any further hypotension  - Palliative team involved, appreciate assistance     # Hypovolemic Hyponatremia and Electrolyte Abnormalities  (Resolved)  - D/C IVF, monitor for s/sx of hypovolemia  - Electrolyte protocol for hypokalemia     # Steroid-Induced Hyperglycemia - Monitoring closely, well controlled at present.  - Low dose SSI  - Hypoglycemia Protocol  - q4h BG checks on TF    # Abdominal Pain - No acute etiology on CT scan on 3/23.  Will monitor.    ===== Chronic Medical Problems =====  # CAD - History of NSTEMI, no meds currently  # Generalized Anxiety Disorder and Air Hunger - No acute issues. Continue home clonazepam, morphine, quetiapine, sertraline, hydromorphone PRN and lorazepam PRN  # Achalasia and GERD with GJ-Tube dependence - No acute issues, home TF per nutrition, continue H2 blocker with GI cocktail PRN  # Chronic Normocytic Anemia - No acute issues, likely hemoconcentrated on admission, now close to baseline without signs of bleeding.  # Stage III Decubitus Ulcer - Present on admission, WOC consulted to assist with management, no signs of acute infection.  # Constipation - No acute issues. Continue home bowel regimen    # FEN  - TF per home routine   - Nutrition consulted  # PPx: Enoxaparin and H2 blocker  # Code Status: DNR  # Dispo: Inpatient admission for IV antibiotics pending culture results, anticipate discharge in 1-2 days.    Patient was seen and discussed with Dr. Acevedo who agreed with the above.    Celestino Mo MD, A  Internal Medicine PGY-1  [facility]  Pager: [phone]  ___________________________________________________________________    Subjective & Interval Hx:  Mary did well overnight patient had episode of lethargy and confusion.  Around 2200, patient had a large amount of bile around her G/J with low BPs, Gave 1L NS bolus and had an abdominal xray. Consulted surgery for possible perforated viscus. TF was shut off at 0200. Another abdominal xray was completed but inconclusive so a stat CT was ordered. Replaced mag, phos, and potassium this am.  Patient was also hypotensive this  "morning, but recovered with fluids. Continued to sat well on her home ventilator settings.     ROS: The remainder of a 10 point ROS is negative unless otherwise noted above.    Medications: Reviewed in EPIC. List below for reference    Physical Exam:    Blood pressure 139/65, pulse 100, temperature 98.6  F (37  C), temperature source Axillary, resp. rate 18, height 1.626 m (5' 4\"), weight 66.2 kg (145 lb 15.1 oz), SpO2 99 %, not currently breastfeeding.    Constitutional: Mild distress, chronically ill  Cardiovascular: RRR. No murmurs  Respiratory: diminished air flow, bibasilar crackles, with small wheezes  Head: Normocephalic. No masses, lesions, tenderness or abnormalities  Neck: Tracheostomy, No adenopathy, Carotids without bruits.  Abdomen: Abdomen slightly tense, diffusely tender. BS normal. No masses, organomegaly  NEURO: Gait normal. Reflexes normal and symmetric. Sensation grossly WNL.  SKIN: Large sacrococcygeal wound  JOINT/EXTREMITIES: extremities normal- no gross deformities noted, 1 + LE edema    Lines/Tubes:   Peripheral IV 03/21/17 Right;Medial Upper forearm (Active)   Site Assessment WDL 3/23/2017  4:00 PM   Line Status Infusing 3/23/2017  4:00 PM   Phlebitis Scale 0-->no symptoms 3/23/2017  4:00 PM   Infiltration Scale 0 3/23/2017  4:00 PM   Extravasation? No 3/23/2017  4:00 PM   Number of days:2       Labs & Studies of Note:   Labs and imaging from the past 24 hours reviewed and notable for a stable BMP, CBC, VBG below    Venous Blood Gas result:  pO2 37; pCO2 62; pH 7.36;  HCO3 35, FIO2 40      Unresulted Labs Ordered in the Past 30 Days of this Admission     Date and Time Order Name Status Description    3/20/2017 2112 Blood Culture ONE site Preliminary         Imaging:   EXAM: XR CHEST PORT 1 VW 3/25/2017 11:47 AM      FINDINGS: Stable right PICC, tracheostomy. Aortic arch calcification.  Cardiac silhouette remains stable. Diffuse reticular and nodular  opacities throughout the lungs . Improved " aeration at the lung bases.  Biapical scarring present.      IMPRESSION: Improved bibasilar opacities superimposed on chronic  reticular and nodular opacities.    Medications list for Reference  Current Facility-Administered Medications   Medication     lidocaine (LMX4) kit     heparin lock flush 10 UNIT/ML injection 2-5 mL     vitamin A-D & C drops (TRI-VI-SOL) drops SOLN 13 mL     sodium chloride (PF) 0.9% PF flush 10-20 mL     heparin lock flush 10 UNIT/ML injection 5-10 mL     heparin lock flush 10 UNIT/ML injection 5-10 mL     morphine solution 10 mg     acetaminophen (TYLENOL) solution 650 mg     albuterol neb solution 2.5 mg     bisacodyl (DULCOLAX) Suppository 10 mg     budesonide (PULMICORT) neb solution 0.5 mg     calcium carbonate (TUMS) chewable tablet 500 mg     clonazePAM (klonoPIN) suspension 1.5 mg     fexofenadine (ALLEGRA) tablet 180 mg     fiber modular (NUTRISOURCE FIBER) packet 1 packet     gabapentin (NEURONTIN) solution 300 mg     HYDROmorphone (DILAUDID) liquid 1 mg     ipratropium (ATROVENT) 0.02 % neb solution 0.5 mg     lactobacillus rhamnosus (GG) (CULTURELL) capsule 1 capsule     levalbuterol (XOPENEX) neb solution 1.25 mg     lidocaine (LIDODERM) 5 % Patch 1-2 patch     lidocaine (XYLOCAINE) 2 % 15 mL, alum & mag hydroxide-simethicone (MYLANTA ES/MAALOX  ES) 15 mL GI Cocktail     LORazepam (ATIVAN) 2 MG/ML (HIGH CONC) solution 0.5 mg     melatonin liquid 3 mg     menthol-zinc oxide (CALMOSEPTINE) 0.44-20.625 % ointment     polyethylene glycol (MIRALAX/GLYCOLAX) Packet 17 g     polyethylene glycol 0.4%- propylene glycol 0.3% (SYSTANE ULTRA) ophthalmic solution 1-2 drop     predniSONE (DELTASONE) tablet 20 mg     QUEtiapine (SEROquel) tablet 25 mg     QUEtiapine (SEROquel) tablet 50 mg     senna-docusate (SENOKOT-S;PERICOLACE) 8.6-50 MG per tablet 2 tablet     sertraline (ZOLOFT) 20 MG/ML (HIGH CONC) solution 150 mg     sodium chloride (OCEAN) 0.65 % nasal spray 1 spray     naloxone  (NARCAN) injection 0.1-0.4 mg     enoxaparin (LOVENOX) injection 40 mg     ondansetron (ZOFRAN-ODT) ODT tab 4 mg    Or     ondansetron (ZOFRAN) injection 4 mg     potassium chloride SA (K-DUR/KLOR-CON M) CR tablet 20-40 mEq     potassium chloride (KLOR-CON) Packet 20-40 mEq     potassium chloride 10 mEq in 100 mL intermittent infusion     potassium chloride 10 mEq in 100 mL intermittent infusion with 10 mg lidocaine     potassium chloride 20 mEq in 50 mL intermittent infusion     magnesium sulfate 2 g in NS intermittent infusion (PharMEDium or FV Cmpd)     magnesium sulfate 4 g in 100 mL sterile water (premade)     sodium phosphate 10 mmol in D5W intermittent infusion     sodium phosphate 15 mmol in D5W intermittent infusion     sodium phosphate 20 mmol in D5W intermittent infusion     sodium phosphate 25 mmol in D5W intermittent infusion     glucose 40 % gel 15-30 g    Or     dextrose 50 % injection 25-50 mL    Or     glucagon injection 1 mg     insulin aspart (NovoLOG) inj (RAPID ACTING)     famotidine (PEPCID) suspension 20 mg     meropenem (MERREM) 1 g vial to attach to  mL bag     lidocaine (LIDODERM) patch REMOVAL     lidocaine (LIDODERM) Patch in Place     guaiFENesin (ROBITUSSIN) 20 mg/mL solution 10 mL     multivitamins with minerals (CERTAVITE/CEROVITE) liquid 15 mL     sodium phosphate 20 mmol in D5W intermittent infusion     Internal Medicine Staff Addendum      I have seen and examined the patient with the resident and agree with the jointly made assessment and plan outlined above.  My edits are in blue or .  I have also reviewed the vital signs, laboratory testing, and imaging obtained in the last 24 hours.         Care discussed with the bedside nurse.      Kevin Acevedo MD  Hospitalist    Medicine and Pediatrics    Pager:  [phone]  Email: [email or web address]    DOS:3/25/17

## 2017-03-26 NOTE — PLAN OF CARE
Problem: Goal Outcome Summary  Goal: Goal Outcome Summary  Outcome: No Change  Patient without acute event. Respiratory and cardiovascular status stable and unchanged. Patient remains anxious.  Continue to monitor patient status; notify physician of changes.

## 2017-03-27 NOTE — PROGRESS NOTES
[facility] [place]  Palliative Care Daily Progress Note       Recommendations & Counseling     -No immediate recommended changes to her plan of care.  -Continue scheduled morphine, prn hydromorphone, prn lorazepam for her anxiety-dyspnea complex    Goals - I think they are as clear as they are likely to be for now. See Madelyn Cheema, palliative clinical LICSW, note from end of last week w/r/t extensive care goals conversation with patient's dtr and health care agent César. This patient and her family have chosen chronic, permanent mechanical ventilation, and are well informed that it's permanent and the patient's function and health related QOL won't improve. In fact her dtr thinks it is deteriorating. They have been offered in the past deescalation of care and have declined it, apart from a general plan/goal/hope to keep the patient out of the hospital. Ie, their value system has been very consistent about continuing her ventilation and active treatments, despite her severe functional and communication limitation.  Last week, the patient  spent many days insisting on coming back to the hospital which César, appropriately, felt like she had to honor. And in fact the patient was septic, is recovering from treatment, and as far as I can tell she and her dtr are happy about that. César remains clear (to our team) in her acknowledgment that the patient is in a terminal situation, could die any time, and however wants active, disease-modifying treatments to prolong her. At the same time they do want to keep her out of the hospital. There is a tension there, but it's an understandable one, and I don't immediately think any of us are going to, or need to, dissuade them from their currently defined treatment limitations. Most of our patients would not choose such a life, however Mary by all account has, and that's ok. She should be discharged to her group home with her DNR order in place, with ongoing  medical care by her complex care team.     I do not think the patient has the cognitive or communication abilities to independently make complex/important medical decisions, and while she should be infomred of things, and invited to participate, César is her clear decision maker in my opinion.     POLST - complete    D/w Dr Acevedo this am.  Discussed extensively with Madelyn FRANCISCO  We will follow, but not daily.    Thank you for involving us in the patient's care. [phone] on unit, over half counseling and coord care. In room 4368-3454. Ho Luevano MD / Palliative Medicine / Pager [phone] / [facility] Inpatient Team Consult Pager [phone] (answered 8am-430pm M-F) - ok to text page via "[employer] / After-Hours Answering Service [phone] / Palliative Clinic in the [facility] at the [facility] - [phone] (scheduling); [phone] (triage).          Assessment      1) Diagnoses & symptoms:        Chronic resp failure/chronic vent with tracheostomy due to COPD  Severe gastric emptying delay with 'functional' GOO picture, has GJ tube  Anxiety  Dyspnea  MMP    Shifting care goals     2)  Psychosocial/Spiritual Needs:   Ongoing: anxiety  New: n      Is new assessment/intervention by palliative team required?:  n                Interval History:   Patient seen with float nurse who supplements history    Briefly she is awake and answers y/n Qs with head movements, but then becomes glassy eyed and minimally responsive    Says she is SOB, and anxious. Says medication helps. Denies panic attacks.  Denies sedation from meds.     RN says no buttock wounds.  +diarrhea and anal redness but no decub now at least on buttock           Review of Systems:   Palliative Symptom Review (0=no symptom/no concern, 1=mild, 2=moderate, 3=severe):      Pain: 1  Dyspnea 3            Medications:   I have reviewed this patient's medication profile and medications during this hospitalization  Noted meds:  Morphine IR 10 tid  scheduled  Quetiapine 25 bid, down from 50 bid  Prednisone 20  Dilaudid prn 1mg GT --bid most days  lorazpem 0.5 GT prn; 1-3x daily             Physical Exam:   Vitals were reviewed  Temp: 98.2  F (36.8  C) Temp src: Oral BP: 122/77   Heart Rate: 85 Resp: 16 SpO2: 98 % O2 Device: Mechanical Ventilator Oxygen Delivery: 3 LPM  Alert (initially)  NAD  Cushingoid  RUE PICC  CV rrr s1s2  Trach, portable ventilator  Lungs markedly prolonged exp, and low pitched expiratory creaking sounds  Abd soft, mildly distended nt  +le edema               Data Reviewed:   Reviewed recent pertinent imaging, comments:CXR 3/25 bilat reticulonodular opacities  Reviewed recent labs, comments: Cr 0.4  HCO3 36

## 2017-03-27 NOTE — PLAN OF CARE
Problem: Goal Outcome Summary  Goal: Goal Outcome Summary  Outcome: No Change  Neuro: Unable to fully assess pt's orientation. Trached.    Cardiac: SR, 90s. VSS.       Respiratory: Bivona 6 patent. Mechanical vent.   GI/: Adequate urine output per bender. BM X1  Diet/appetite: Tolerating TF to J tube at goal of 35 ml/hr. G to gravity.   Activity:  Assist of 2, turned Q2H.   Pain: Pt c/o abdominal pain/discomfort. PRN dilaudid given x2.   Skin: Sacral dressing CDI.      R: Continue with POC. Notify primary team with changes.

## 2017-03-27 NOTE — PROGRESS NOTES
Internal Medicine [facility] Progress Note   Date of Service: 3/27/2017    Patient: Mary Wang  MRN: [id number]  Admission Date: 3/20/2017  Hospital Day # 6    Assessment & Plan:   Mary Wang is a 67 year old female with a history of severe COPD s/p tracheostomy on home ventilator, GJ-tube dependence, anxiety, HTN, chronic indwelling bender, and achalasia who presented with malaise and bender catheter pain, admitted to the ICU on 3/21/17 for sepsis from likely urinary source with severe metabolic derangements, now stable on IV ampicillin/sulbactam.      # Sepsis secondary to Proteus from Urinary Source - Mary presented with a leukocytosis, hypotension, and tachypnea concerning for sepsis.  Initial evaluation raised concern for urinary versus HCAP source due to dirty UA and new infiltrate on CXR.  Cultures were obtained however CXR infiltrate resolved rapidly which is less consistent with HCAP.  Initial care in ICU with IVF resuscitation, once stabilized was transferred to the floor.  Given changes with CXR, urine was felt to be most consistent source of infection and grew Proteus, susceptible to amp/sulbactam.  Mary was narrowed to amp/sulbactam on 3/26 from Meropenem.  Of note, Mary did grow Pseudomonas on her sputum sample but this was most consistent with colonization and not pathologic infection.  - Continue ampicillin/sulbactam   - Transition to enteral medications tomorrow if stable  - Vancomycin discontinued 3/22, Meropenem discontinued 3/26  - Blood cultures, urine cultures, and sputum cultures, completed  - Vent settings and nebs as below  - Monitor blood pressures and gases, stable      # Abdominal Pain Likely 2/2 Gastroparesis versus Gastric Outlet Obstruction - No acute etiology on CT scan on 3/23.  Suspect due to gastroparesis versus gastric outlet obstruction. GI assisting with PEG-J adjustment as well.  - TF changed to continuous by J-port  - G-tube vented with aspiration  daily     # Chronic Hypoxic, Hypercarbic Respiratory Failure  # Severe COPD s/p Tracheostomy with Ventilator Dependence  Admission CXR with increased RLL opacities but resolved on recheck and cultures most likely represent colonization..  - Home vent settings: CMV 14/400/5/45%. On home vent since AM of 3/23  - Continue home prednisone and nebs   - Consider stress dose steroids for any further hypotension  - Palliative team involved, appreciate assistance      # Acute on Chronic Normocytic Anemia:   Likely hemoconcentrated on admission, was close to baseline without signs of bleeding after fluid replacement. Also compounded by frequent blood draws and poor reticulocyte response. Responded well to 1 Unit of pRBC on 3/26  - CBC daily     # Hypovolemic Hyponatremia and Electrolyte Abnormalities (Resolved)  - D/C IVF, monitor for s/sx of hypovolemia  - Electrolyte protocol for hypokalemia      # Steroid-Induced Hyperglycemia - Monitoring closely, well controlled at present.  - Low dose SSI  - Hypoglycemia Protocol  - q4h BG checks on TF      ===== Chronic Medical Problems =====  # CAD - History of NSTEMI, no meds currently  # Generalized Anxiety Disorder and Air Hunger - No acute issues. Continue home clonazepam, morphine, quetiapine, sertraline, hydromorphone PRN and lorazepam PRN  # Achalasia and GERD with GJ-Tube dependence - No acute issues, home TF per nutrition, continue H2 blocker with GI cocktail PRN  # Sacrococcygeal Ulcer, Unstageable - Present on admission, WOC consulted to assist with management, no signs of acute infection.  # Constipation - No acute issues. Continue home bowel regimen     # FEN: TF per home routine,  Nutrition consulted  # PPx: Enoxaparin and H2 blocker  # Code Status: DNR  # Dispo: Inpatient admission for IV antibiotics pending culture results, anticipate discharge in 1-2 days.     Patient was seen and discussed with Dr. Acevedo who agreed with the above.    Fátima Sainz MD  Med/Peds  "PGY-4  3/27/2017    ___________________________________________________________________    Subjective & Interval Hx:  Mary did well overnight with no acute issues.  She appeared to tolerate her new tube feed regimen better but still reports some abdominal pain.  Otherwise she had no new questions.     ROS: The remainder of a 10 point ROS is negative unless otherwise noted above.    Medications: Reviewed in EPIC. List below for reference    Physical Exam:    Blood pressure 130/68, pulse 100, temperature 98.5  F (36.9  C), temperature source Oral, resp. rate 16, height 1.626 m (5' 4\"), weight 66 kg (145 lb 8.1 oz), SpO2 98 %, not currently breastfeeding.    Gen: Asleep but easily awoken female in no acute distress, endorsing some abdominal pain  Resp: Bibasilar crackles with expiratory wheezes, no rhonchi, tracheostomy and vent dependent.  CV: RRR, no m/r/g  Abd: GJ with TF in J port, soft abdomen with no apparent tenderness to palpation, no rebound or guarding.  Back: Unstageable pressure ulcer over the sacrococcygeal area  Extrem: Warm and well perfused, no LE edema  Neuro: Asleep but easily awoken, interactive with exam.    Lines/Tubes:   Peripheral IV 03/24/17 Left Upper arm (Active)   Site Assessment [facility] 3/27/2017  4:00 AM   Line Status Saline locked 3/27/2017  4:00 AM   Phlebitis Scale 0-->no symptoms 3/27/2017  4:00 AM   Infiltration Scale 0 3/27/2017  4:00 AM   Extravasation? No 3/27/2017  4:00 AM   Number of days:3       PICC Double Lumen 03/24/17 Right Basilic (Active)   Site Assessment [facility] 3/27/2017  4:00 AM   External Cath Length (cm) 3 cm 3/20/2017 12:00 AM   Extremity Circumference (cm) 29 cm 3/20/2017 12:00 AM   Dressing Intervention Chlorhexidine patch;Transparent;Securing device;New dressing 3/20/2017 12:00 AM   Dressing Change Due 04/01/17 3/26/2017 10:00 PM   Number of days:3       Labs & Studies of Note:   Labs and images from the past 24 hours reviewed and notable for:  - Improved hemoglobin " post transfusion and stable this AM  - GJ-tube study with no contrast advancing past the stomach from the g-port  - BMP with low phos  Medications list for Reference   Current Facility-Administered Medications   Medication     ampicillin-sulbactam (UNASYN) 3 g vial to attach to  mL bag     QUEtiapine (SEROquel) tablet 25 mg     lidocaine (LMX4) kit     heparin lock flush 10 UNIT/ML injection 2-5 mL     vitamin A-D & C drops (TRI-VI-SOL) drops SOLN 13 mL     sodium chloride (PF) 0.9% PF flush 10-20 mL     heparin lock flush 10 UNIT/ML injection 5-10 mL     heparin lock flush 10 UNIT/ML injection 5-10 mL     morphine solution 10 mg     acetaminophen (TYLENOL) solution 650 mg     albuterol neb solution 2.5 mg     bisacodyl (DULCOLAX) Suppository 10 mg     budesonide (PULMICORT) neb solution 0.5 mg     calcium carbonate (TUMS) chewable tablet 500 mg     clonazePAM (klonoPIN) suspension 1.5 mg     fexofenadine (ALLEGRA) tablet 180 mg     fiber modular (NUTRISOURCE FIBER) packet 1 packet     gabapentin (NEURONTIN) solution 300 mg     HYDROmorphone (DILAUDID) liquid 1 mg     ipratropium (ATROVENT) 0.02 % neb solution 0.5 mg     lactobacillus rhamnosus (GG) (CULTURELL) capsule 1 capsule     levalbuterol (XOPENEX) neb solution 1.25 mg     lidocaine (LIDODERM) 5 % Patch 1-2 patch     lidocaine (XYLOCAINE) 2 % 15 mL, alum & mag hydroxide-simethicone (MYLANTA ES/MAALOX  ES) 15 mL GI Cocktail     LORazepam (ATIVAN) 2 MG/ML (HIGH CONC) solution 0.5 mg     melatonin liquid 3 mg     menthol-zinc oxide (CALMOSEPTINE) 0.44-20.625 % ointment     polyethylene glycol (MIRALAX/GLYCOLAX) Packet 17 g     polyethylene glycol 0.4%- propylene glycol 0.3% (SYSTANE ULTRA) ophthalmic solution 1-2 drop     predniSONE (DELTASONE) tablet 20 mg     QUEtiapine (SEROquel) tablet 25 mg     senna-docusate (SENOKOT-S;PERICOLACE) 8.6-50 MG per tablet 2 tablet     sertraline (ZOLOFT) 20 MG/ML (HIGH CONC) solution 150 mg     sodium chloride (OCEAN)  0.65 % nasal spray 1 spray     naloxone (NARCAN) injection 0.1-0.4 mg     enoxaparin (LOVENOX) injection 40 mg     ondansetron (ZOFRAN-ODT) ODT tab 4 mg    Or     ondansetron (ZOFRAN) injection 4 mg     potassium chloride SA (K-DUR/KLOR-CON M) CR tablet 20-40 mEq     potassium chloride (KLOR-CON) Packet 20-40 mEq     potassium chloride 10 mEq in 100 mL intermittent infusion     potassium chloride 10 mEq in 100 mL intermittent infusion with 10 mg lidocaine     potassium chloride 20 mEq in 50 mL intermittent infusion     magnesium sulfate 2 g in NS intermittent infusion (PharMEDium or FV Cmpd)     magnesium sulfate 4 g in 100 mL sterile water (premade)     sodium phosphate 10 mmol in D5W intermittent infusion     sodium phosphate 15 mmol in D5W intermittent infusion     sodium phosphate 20 mmol in D5W intermittent infusion     sodium phosphate 25 mmol in D5W intermittent infusion     glucose 40 % gel 15-30 g    Or     dextrose 50 % injection 25-50 mL    Or     glucagon injection 1 mg     insulin aspart (NovoLOG) inj (RAPID ACTING)     famotidine (PEPCID) suspension 20 mg     lidocaine (LIDODERM) patch REMOVAL     lidocaine (LIDODERM) Patch in Place     guaiFENesin (ROBITUSSIN) 20 mg/mL solution 10 mL     multivitamins with minerals (CERTAVITE/CEROVITE) liquid 15 mL     sodium phosphate 20 mmol in D5W intermittent infusion   Internal Medicine Staff Addendum      I have seen and examined the patient with the resident and agree with the jointly made assessment and plan outlined above.  My edits are in blue or .  I have also reviewed the vital signs, laboratory testing, and imaging obtained in the last 24 hours.       Care discussed with the bedside nurse.      Kevin Acevedo MD  Hospitalist    Medicine and Pediatrics    Pager:  [phone]  Email: [email or web address]    DOS:3/27/17

## 2017-03-27 NOTE — PLAN OF CARE
Problem: Goal Outcome Summary  Goal: Goal Outcome Summary  Outcome: No Change  Pt has been lethargic and withdrawn today.  She intermittantly wakes up with wide eyes and states that she is not getting air.  Both times that this happened Ativan then scheduled morphine helped calm her down.  During these episodes O2 bleed in was turned up to 5L to keep sats in the mid 90%s, but was able to be turned back down to 3L bleed into the Vent.  Pt had a very large loose BM today.  G tube is putting out thick milky green bile.  Order to aspirate once per day.  I aspirated and got no return this afternoon.  Sacrum wound dressing changed.  Continue with antibiotics and provide comfort.

## 2017-03-27 NOTE — PROGRESS NOTES
Pt arrived on 6B at this time accompanied by float RN and RT. Oriented to unit, protocols, call don't fall. Call light in reach. Will continue to monitor.

## 2017-03-27 NOTE — PROGRESS NOTES
Panc-Bili Progress Note    Ms. Wang was seen at the bedside this morning, and chart reviewed. She is feeling better today, less distended. She has her G tube draining to gravity and has been loosened. It was quite loose on examination, was nontender. Tube feeds running, gastric secretions coming out of drainage side.     She likely has severely delayed gastric emptying owing to multiple etiologies. AXR continues to have retained contrast in the stomach.   Recs:  -- Scheduled at least BID G tube venting, can even suction on the G port to ensure complete clearance of contents.   -- Continue jejunal tube feeds  -- I discussed with bedside RN, will tape the tube to Ms. Wang' side while G tube is draining as it was pulling with significant tension on the internal bumper    Please call if questions or concerns    GI will sign off    Vladimir Lam MD  Gastroenterology Fellow

## 2017-03-28 NOTE — PROGRESS NOTES
Care Coordinator- Discharge Planning     Admission Date/Time:  3/20/2017  Attending MD:  Kevin Acevedo*     Data  Date of initial CC assessment:  Chart reviewed, discussed with interdisciplinary team.   Patient was admitted for:   1. NSTEMI (non-ST elevated myocardial infarction) (H)    2. Hyponatremia    3. Hypercarbia    4. Acute on chronic respiratory failure with hypoxia (H)    5. Centrilobular emphysema (H)           Assessment  Notified by bedside RN that patients ninfa Lindsey would like to speak to RNCC about specialty mattress requested for home. Writer working with Jenni at [facility] ([phone]) to have a group 2 mattress ordered. Specific documentation and orders required for insurance authorization. Writer currently awaiting fax from Jenni with documents to fill out. Call placed to daughter César with no answer; voicemail left giving update on mattress status. RNCC to update César again once details are available. Ideally, César would like the mattress delivered to patients group home prior to returning there.        Plan  Anticipated Discharge Date: TBD  Anticipated Discharge Plan: back to group home with resumption of services    Catrina Campbell

## 2017-03-28 NOTE — PROGRESS NOTES
[facility] Nurse Inpatient Adult Pressure INJURY (PI) Wound Assessment     Initial assessment of PU(s) on pt's:   Sacrococcygeal area     Data:   Patient History:      per MD note(s):  Mary Wang is a 67 year old female with a past medical history significant for severe COPD (Last FEV1 0.35) s/p tracheostomy on home ventilator and GJ tube placement, anxiety, hypertension, chronic indwelling bender catheter, and achalasia who presented with malaise and bender catheter pain, found to have severe metabolic derangements and a leukocytosis concerning for sepsis.      Current mattress:  [employer]mosAir  Current pressure relieving devices:  Foam dressing and Pillows    Moisture Management:  Incontinence Protocol    Catheter secured? Yes    Current Diet / Nutrition:     None      Jose F Assessment and sub scores:   Jose F Score  Av  Min: 13  Max: 13   Labs:   Recent Labs   Lab Test  17   0321   17   0638   16   0331   ALBUMIN  3.1*   --    --    --    --    < >   --    HGB  10.4*   < >  6.9*   < >  8.7*   < >  8.4*   INR   --    --   1.15*   --    --    < >   --    WBC  18.6*   --   10.6   < >  9.0   < >  11.8*   A1C   --    --    --    --    --    --   5.1   CRP   --    --    --    --   83.0*   < >   --     < > = values in this interval not displayed.                                                                                                                          Pressure Injury Assessment  (location #1):   Sacrococcygeal area    Wound History:   Present on admission     3/21/17                                                              3/28/17      Wound Base: 70% dark brown/yellow misture of moist nonviable tissue, 30% mixture of pink and scattered yellow slough tissue    Specific Dimensions (length x width x depth, in cm) :   5.5 x 1.6x 2.4cm    Tunneling:  N/A    Undermining: N/A    Palpation of the wound bed:  boggy and fluctuance    Slough appearance:   adherent and moist    Eschar appearance:  none    Periwound Skin: exfoliating, dry peeling epithelium    Color: red blanchable erythema    Temperature  normal     Drainage:  Amount: small,  Color: serous       Odor: none    Pain:  Unable to assess, intubated         Intervention:     Patient's chart evaluated.      Jose F Interventions:  Current Jose F Interventions and Care Plan reviewed and updated, appropriate at this time.    Wound was assessed.    Wound Care: was done:    Orders  Written    Supplies  Ordered: iodosorb gel    Discussed plan of care with Nurse           Assessment:     Pressure Injury (PI) located on Sacrococcygeal area : Unstageable    Status: wound  initial assessment, nonviable tissue softening, yellow slough remains stringy.      Present on admission         Plan:     Nursing to notify the Provider(s) and re-consult the WOC Nurse if wound(s) deteriorate(s).  Plan of care for wound located on Sacrococcygeal area every other day:   Cleanse the wound with microklenz moisten gauze pat dry  Apply a layer of iodosorb gel (#[id number]) at the base of the wound  Cover with Sacral Mepilex.    WOC Nurse will return: weekly  Face to face time: 20 mins

## 2017-03-28 NOTE — PLAN OF CARE
Problem: Goal Outcome Summary  Goal: Goal Outcome Summary  Outcome: No Change  Neuro: Alert, oriented to person, place. Lethargic after PRN meds  Cardiac: SR. VSS.       Respiratory: Sating 99% on 3L bleed into mechanical vent. Inline suction x2 white, thick sputum. Desats to upper 80's with anxiety, improves after PRN meds.   GI/: Adequate urine output per bender. Incontinent loose BM x1. G tube to gravity 75mL out/4 hrs, 0mL returned upon aspiration.  Diet/appetite: Tolerating continuous tube feeds at 35mL/hr  Activity:  Repositioned q2h. Positioned off pressure points.   Pain: Negligible pain, c/o SOB improved with ativan, dilaudid and morphine.   Skin: No new deficits noted.     R: Plan for DC back to Group home in 1-2 days (per MD note)

## 2017-03-28 NOTE — PROGRESS NOTES
SPIRITUAL HEALTH SERVICES  SPIRITUAL ASSESSMENT Progress Note (Palliative Focus)  [facility] ([place] 6B    Visited pat at request of Palliative  Cesar. Patient appeared to be attentive with eyes open as I greeted her and pointed out the springtime weather outside her window and green colors emerging. She seemed to indicate with her eyes that prayer would be appreciated so I prayed Lord's Prayer and more. She slipped off into sleep as her eyes were closed  as I finished and said good bye. I will inform the palliative  of care provided.    Mata Calvin  Chaplain Resident  Pager [phone]

## 2017-03-28 NOTE — PLAN OF CARE
Problem: Goal Outcome Summary  Goal: Goal Outcome Summary  Outcome: Therapy, unable to show any progress toward functional goals  Pt mostly lethargic during shift, but is easily aroused. Pt needing round-the-clock pain/anxiety medications to help with tachypnea and desaturations. Pt requires staff's presence throughout the night to help reduce fighting against ventilator, to relax and allow machine aid in coordinating her breathing. Staff attempted to explain to try to breathe slower, and relax respiratory muscles but it is unclear if pt understand. Had small bowel movement, loose. Q2H turns. Will continue to monitor.

## 2017-03-28 NOTE — PROGRESS NOTES
CLINICAL NUTRITION SERVICES - REASSESSMENT NOTE     Nutrition Prescription    RECOMMENDATIONS FOR MDs/PROVIDERS TO ORDER:  Change bowel meds to PRN.    Malnutrition Status:    Non-severe malnutrition in the context of acute on chronic illness    Recommendations already ordered by Registered Dietitian (RD):  Changed to Impact Peptide (fiber-free, high PRO, and fish oil given wound and CRP of 22.0) @ new goal 50 ml/hr (1200 ml/day) to provide 1800 kcals (31 kcal/kg), 113 g PRO (1.9 g/kg), 924 ml free H2O, 77 g Fat (50% from MCTs), 168 g CHO and no Fiber daily per 58 kg dosing weight.       Changed water flushes; 60 mL q 4 hrs to maintain FT patency. Additional adjustment per providers pending fluid status.    Discontinued Tri-Vi-Sol. Impact should supply sufficient Vitamin A and C.     Added 220 mg Zn Sulfate x 10 days (ending 4/7/17) given loose stools and may help support wound healing.     Recommendations Post Discharge:  Use Impact Peptide for the next 7 - 10 days (ending ~4/7/17) or until sacral ulcer is fully healed, then change back to maintenance formula of Isosource 1.5 @ goal 50 mL/hr + add 2 packets of Beneprotein/daily. Maintain continuous x 24 hr rate. If MD/patient desires cyclic TF regimen, would rec Isosource 1.5 @ goal 75 mL/hr x 16 hrs (4pm-8am daily).    RD will continue to follow while inpatient and make adjustments to enteral nutrition regimen pending tolerance and medical course.       EVALUATION OF THE PROGRESS TOWARD GOALS   Diet: NPO    Nutrition Support: Isosource 1.5 @ 35 mL/hr + 3 packets Nutrisource fiber per Jtube, which at goal only provides 1260 kcals, 57g PRO daily.    Intake: Received ave of 900 kcals (16 kcal/kg/day) and 41 g PRO (0.7 g/kg/day) daily the last 7 days.       NEW FINDINGS   -GI, Meds, & EN Access: Gtube with increased output and shown to have possible severely delayed gastric emptying (see AXR 3/26). Gtube placed to gravity drainage and TF stopped on 3/25, then readv to  current above goal of 35 mL/hr into Jtube. Up to 8 loose/brown stools documented in the past 24 hours.    -Skin, Labs, & Est Needs: Pt has worsening unstageable PI located on sacrococcygeal area. CRP also high @ 22.0 and borderline low BUN of 8-9 in the past several days. Would aim estimated PRO needs >1.5 g/kg/day given CRP and BUN trends, and aim energy needs >30 kcal/kg/day per the Adult Pressure Ulcer Care Protocol for adequate wound healing/tissue regrowth.     -Water flushes continue to be scheduled 60 mL BID before & after her typical 12 hour overnight cycle runs. Will adjust to q 4 hours given above GI issues and change to continuous feeding schedule.    MALNUTRITION  % Intake: < 75% for > 7 days (non-severe)  % Weight Loss: None noted   Subcutaneous Fat Loss: None observed  Muscle Loss: Patellar region and Posterior calf:  Mild  Fluid Accumulation/Edema: None noted  Malnutrition Diagnosis: Non-severe malnutrition in the context of acute on chronic illness    Previous Goals   Total avg nutritional intake to meet a minimum of 25 kcal/kg and 1.2 g PRO/kg daily (per dosing wt 58 kg).  Evaluation: Not met    Previous Nutrition Diagnosis  Predicted inadequate nutrient intake (protein/energy)   Evaluation: No longer applicable, nutrition diagnosis changed below    CURRENT NUTRITION DIAGNOSIS  Inadequate protein-energy intake related to inadequate EN infusion due to GI intolerance in the setting of increased needs to heal sacral ulcer as evidenced by received ave of 900 kcals (16 kcal/kg/day) and 41 g PRO (0.7 g/kg/day) daily the last 7 days, which does not meet estimated needs and TF rate of 35 mL/hr will not supply sufficient calories to heal wound.    INTERVENTIONS  Implementation  Collaboration with other providers  Enteral Nutrition   Feeding tube flush  Multi-trace element supplement therapy    Goals  Total avg nutritional intake to meet a minimum of 25 kcal/kg and 1.2 g PRO/kg daily (per dosing wt 58  kg).    Monitoring/Evaluation  Progress toward goals will be monitored and evaluated per protocol.    Norman Cornelius RD, ROHINI  6B Clinical Dietitian  Pager: [phone]  ASCOM [phone]

## 2017-03-28 NOTE — PROVIDER NOTIFICATION
Maroon 1 notified of leaking around GJ Tube sight.  Yellow/bile in color, does not look like TF.  Dressing changed and is now CDI.  TF infusing, no new orders at this time.  Will continue to monitor.

## 2017-03-28 NOTE — PROGRESS NOTES
Internal Medicine [facility] Progress Note   Date of Service: 3/27/2017    Patient: Mary Wang  MRN: [id number]  Admission Date: 3/20/2017  Hospital Day # 7    Assessment & Plan:   Mary Wang is a 67 year old female with a history of severe COPD s/p tracheostomy on home ventilator, GJ-tube dependence, anxiety, HTN, chronic indwelling bender, and achalasia who presented with malaise and bender catheter pain, admitted to the ICU on 3/21/17 for sepsis from likely urinary source with severe metabolic derangements, now stable on IV ampicillin/sulbactam.      # Sepsis secondary to Proteus from Urinary Source - Mary presented with a leukocytosis, hypotension, and tachypnea concerning for sepsis.  Initial evaluation raised concern for urinary versus HCAP source due to dirty UA and new infiltrate on CXR.  Cultures were obtained however CXR infiltrate resolved rapidly which is less consistent with HCAP.  Initial care in ICU with IVF resuscitation, once stabilized was transferred to the floor.  Given changes with CXR, urine was felt to be most consistent source of infection and grew Proteus, susceptible to amp/sulbactam.  Mary was narrowed to amp/sulbactam on 3/26 from Meropenem.  Of note, Mary did grow Pseudomonas on her sputum sample but this was most consistent with colonization and not pathologic infection.  - Continue ampicillin/sulbactam   - Transition to enteral medications pending stable.  - Vancomycin discontinued 3/22, Meropenem discontinued 3/26  - Blood cultures, urine cultures, and sputum cultures, completed  - Vent settings and nebs as below  - Monitor blood pressures and gases, stable      # Abdominal Pain Likely 2/2 Gastroparesis versus Gastric Outlet Obstruction - No acute etiology on CT scan on 3/23.  Suspect due to gastroparesis versus gastric outlet obstruction. GI assisting with PEG-J adjustment as well.  - TF changed to continuous by J-port  - Changed TF to Impact Peptide  (fiber-free, high PRO, and fish oil given wound and CRP of 22.0) @ new goal 50 ml/hr (1200 ml/day), per nutrition recs  - G-tube vented with aspiration daily     # Chronic Hypoxic, Hypercarbic Respiratory Failure  # Severe COPD s/p Tracheostomy with Ventilator Dependence  Admission CXR with increased RLL opacities but resolved on recheck and cultures most likely represent colonization..  - Home vent settings: CMV 14/400/5/45%. On home vent since AM of 3/23  - Continue home prednisone and nebs   - Consider stress dose steroids for any further hypotension  - Palliative team involved, appreciate assistance      # Acute on Chronic Normocytic Anemia:   Likely hemoconcentrated on admission, was close to baseline without signs of bleeding after fluid replacement. Also compounded by frequent blood draws and poor reticulocyte response. Responded well to 1 Unit of pRBC on 3/26  - CBC daily     # Hypovolemic Hyponatremia and Electrolyte Abnormalities (Resolved)  - D/C IVF, monitor for s/sx of hypovolemia  - Electrolyte protocol for hypokalemia      # Steroid-Induced Hyperglycemia - Monitoring closely, well controlled at present.  - Low dose SSI  - Hypoglycemia Protocol  - q4h BG checks on TF      ===== Chronic Medical Problems =====  # CAD - History of NSTEMI, no meds currently  # Generalized Anxiety Disorder and Air Hunger - No acute issues. Continue home clonazepam, morphine, quetiapine, sertraline, hydromorphone PRN and lorazepam PRN  # Achalasia and GERD with GJ-Tube dependence - No acute issues, home TF per nutrition, continue H2 blocker with GI cocktail PRN  # Sacrococcygeal Ulcer, Unstageable - Present on admission, WOC consulted to assist with management, no signs of acute infection.  # Constipation - No acute issues. Continue home bowel regimen     # FEN: TF per home routine,  Nutrition consulted  # PPx: Enoxaparin and H2 blocker  # Code Status: DNR  # Dispo: Inpatient admission for IV antibiotics pending culture  "results, anticipate discharge in 1-2 days.     Patient was seen and discussed with Dr. Hansen who agreed with the above.    Celestino Mo MD, A  Internal Medicine PGY-1  [facility]  Pager: [phone]  ___________________________________________________________________    Subjective & Interval Hx:  Mary was stable overnight but had increased G port leakage and intermittent issues with respiratory distress needing suction.   She appeared to tolerate her new tube feed regimen better still have some abdominal pain.  Otherwise she had no new questions.  Patient anxious intermittently overnight and today, continues to have some relief with PRN dilaudid and ativan.  Patient triggered the sepsis protocol and was given 500 cc bolus resulting in a down trending Lactate.    ROS: The remainder of a 10 point ROS is negative unless otherwise noted above.    Medications: Reviewed in EPIC. List below for reference    Physical Exam:    Blood pressure 114/71, pulse 100, temperature 96.9  F (36.1  C), temperature source Axillary, resp. rate 20, height 1.626 m (5' 4\"), weight 66 kg (145 lb 8.1 oz), SpO2 96 %, not currently breastfeeding.    Gen: Asleep but easily awoken female in acute distress  Resp: Bibasilar crackles with expiratory wheezes, no rhonchi, tracheostomy and vent dependent.  CV: RRR, no m/r/g  Abd: GJ with TF in J port, soft abdomen with left lower quadrate tenderness to palpation, no rebound or guarding.  Back: Unstageable pressure ulcer over the sacrococcygeal area  Extrem: Warm and well perfused, no LE edema  Neuro: Asleep but easily awoken, less interactive today    Lines/Tubes:   Peripheral IV 03/24/17 Left Upper arm (Active)   Site Assessment WDL 3/27/2017  4:00 AM   Line Status Saline locked 3/27/2017  4:00 AM   Phlebitis Scale 0-->no symptoms 3/27/2017  4:00 AM   Infiltration Scale 0 3/27/2017  4:00 AM   Extravasation? No 3/27/2017  4:00 AM   Number of days:3       PICC Double " Lumen 03/24/17 Right Basilic (Active)   Site Assessment WDL 3/27/2017  4:00 AM   External Cath Length (cm) 3 cm 3/20/2017 12:00 AM   Extremity Circumference (cm) 29 cm 3/20/2017 12:00 AM   Dressing Intervention Chlorhexidine patch;Transparent;Securing device;New dressing 3/20/2017 12:00 AM   Dressing Change Due 04/01/17 3/26/2017 10:00 PM   Number of days:3     Labs & Studies of Note:   Labs and images from the past 24 hours reviewed and notable for stable VBG and trending down lactate post 500 cc bolus.  Stable CBC and BMP.  Medications list for Reference   Current Facility-Administered Medications   Medication     [START ON 3/29/2017] zinc sulfate (20 mg [place]. Zn/mL) solution 220 mg     ampicillin-sulbactam (UNASYN) 3 g vial to attach to  mL bag     QUEtiapine (SEROquel) tablet 25 mg     lidocaine (LMX4) kit     heparin lock flush 10 UNIT/ML injection 2-5 mL     sodium chloride (PF) 0.9% PF flush 10-20 mL     heparin lock flush 10 UNIT/ML injection 5-10 mL     heparin lock flush 10 UNIT/ML injection 5-10 mL     morphine solution 10 mg     acetaminophen (TYLENOL) solution 650 mg     albuterol neb solution 2.5 mg     bisacodyl (DULCOLAX) Suppository 10 mg     budesonide (PULMICORT) neb solution 0.5 mg     calcium carbonate (TUMS) chewable tablet 500 mg     clonazePAM (klonoPIN) suspension 1.5 mg     fexofenadine (ALLEGRA) tablet 180 mg     fiber modular (NUTRISOURCE FIBER) packet 1 packet     gabapentin (NEURONTIN) solution 300 mg     HYDROmorphone (DILAUDID) liquid 1 mg     ipratropium (ATROVENT) 0.02 % neb solution 0.5 mg     lactobacillus rhamnosus (GG) (CULTURELL) capsule 1 capsule     levalbuterol (XOPENEX) neb solution 1.25 mg     lidocaine (LIDODERM) 5 % Patch 1-2 patch     lidocaine (XYLOCAINE) 2 % 15 mL, alum & mag hydroxide-simethicone (MYLANTA ES/MAALOX  ES) 15 mL GI Cocktail     LORazepam (ATIVAN) 2 MG/ML (HIGH CONC) solution 0.5 mg     melatonin liquid 3 mg     menthol-zinc oxide (CALMOSEPTINE)  0.44-20.625 % ointment     polyethylene glycol (MIRALAX/GLYCOLAX) Packet 17 g     polyethylene glycol 0.4%- propylene glycol 0.3% (SYSTANE ULTRA) ophthalmic solution 1-2 drop     predniSONE (DELTASONE) tablet 20 mg     QUEtiapine (SEROquel) tablet 25 mg     senna-docusate (SENOKOT-S;PERICOLACE) 8.6-50 MG per tablet 2 tablet     sertraline (ZOLOFT) 20 MG/ML (HIGH CONC) solution 150 mg     sodium chloride (OCEAN) 0.65 % nasal spray 1 spray     naloxone (NARCAN) injection 0.1-0.4 mg     enoxaparin (LOVENOX) injection 40 mg     ondansetron (ZOFRAN-ODT) ODT tab 4 mg    Or     ondansetron (ZOFRAN) injection 4 mg     potassium chloride SA (K-DUR/KLOR-CON M) CR tablet 20-40 mEq     potassium chloride (KLOR-CON) Packet 20-40 mEq     potassium chloride 10 mEq in 100 mL intermittent infusion     potassium chloride 10 mEq in 100 mL intermittent infusion with 10 mg lidocaine     potassium chloride 20 mEq in 50 mL intermittent infusion     magnesium sulfate 2 g in NS intermittent infusion (PharMEDium or FV Cmpd)     magnesium sulfate 4 g in 100 mL sterile water (premade)     sodium phosphate 10 mmol in D5W intermittent infusion     sodium phosphate 15 mmol in D5W intermittent infusion     sodium phosphate 20 mmol in D5W intermittent infusion     sodium phosphate 25 mmol in D5W intermittent infusion     glucose 40 % gel 15-30 g    Or     dextrose 50 % injection 25-50 mL    Or     glucagon injection 1 mg     insulin aspart (NovoLOG) inj (RAPID ACTING)     famotidine (PEPCID) suspension 20 mg     lidocaine (LIDODERM) patch REMOVAL     lidocaine (LIDODERM) Patch in Place     guaiFENesin (ROBITUSSIN) 20 mg/mL solution 10 mL     multivitamins with minerals (CERTAVITE/CEROVITE) liquid 15 mL     sodium phosphate 20 mmol in D5W intermittent infusion     Pt was seen and examined with Dr. Mo; confirmed cardiac and lung exam findings;  I agree with the detailed A/P as documented above.    Elda Hansen MD

## 2017-03-29 NOTE — PLAN OF CARE
"Problem: Goal Outcome Summary  Goal: Goal Outcome Summary  Outcome: No Change  /86 (BP Location: Left leg)  Pulse 100  Temp 99.1  F (37.3  C) (Axillary)  Resp 14  Ht 1.626 m (5' 4\")  Wt 64 kg (141 lb 1.5 oz)  SpO2 100%  BMI 24.22 kg/m2     VSS, Alert and  follows commands, mechenical vent. Bivona #6 and suctioned x2. Intermittent sinus tach HR at 120s.  Pt c/o abdominal pain, managed with PRN dilaudid, scheduled morphine and tylenol. G-tube intact with moderate output. Avendaño intact with good UOP. Small BM with Fleet enema x1. TF at 50 ml/hr. Repositioned q2h. Will continue to monitor.       "

## 2017-03-29 NOTE — PLAN OF CARE
"Problem: Goal Outcome Summary  Goal: Goal Outcome Summary  Outcome: No Change  /69 (BP Location: Left leg)  Pulse 100  Temp 98.6  F (37  C)  Resp 14  Ht 1.626 m (5' 4\")  Wt 66 kg (145 lb 8.1 oz)  SpO2 100%  BMI 24.98 kg/m2     VS: Pt became hypertensive with systolic in 170's multiple times during shift. Team aware - it appears to be pain related. When pain medications are given, BP lowers about 1 hr after. Afebrile.  Neuro: Pt alert, unable to assess orientation. Pt does not mouth words - she makes eye contact and will occasionally nod her head yes or no for responses.   Cardiac: SR in 80-90's  Respiratory: Sating 100% on mechanical vent, Bivona 6, suctioned twice during shift with minimal secretions  GI: no BM on shift  : Adequate urine output - bender  IV Access: R double lumen PICC, TKO w/abx. L PIV SL'd  Drains/tubes: G tube to gravity, no output during last 4 hours. No leaking on gisell shift.  J tube - continuous tube feeds @45/hr, increase by 5ml q4h, goal rate is 50/hr. (due to increase to 50 at midnight). Meds through J.  Diet/appetite: NPO, TF  Activity:  Bedrest. Turn q2h  Pain: Non-verbal, restlessness. PRN dilaudid q2h, PRN ativan q3h.  Skin: Pressure wound on coccyx, covered by mepilex, intact. Skin tear on arm and leg, both TENZIN, and pink. No changes.     R: Continue with POC. Notify primary team with changes.          "

## 2017-03-29 NOTE — PROGRESS NOTES
Internal Medicine [facility] Progress Note   Date of Service: 3/27/2017    Patient: Mary Wang  MRN: [id number]  Admission Date: 3/20/2017  Hospital Day # 8    Assessment & Plan:   Mary Wang is a 67 year old female with a history of severe COPD s/p tracheostomy on home ventilator, GJ-tube dependence, anxiety, HTN, chronic indwelling bender, and achalasia who presented with malaise and bender catheter pain, admitted to the ICU on 3/21/17 for sepsis from likely urinary source with severe metabolic derangements, now stable on IV ampicillin/sulbactam.      # Sepsis secondary to Proteus from Urinary Source - Mary presented with a leukocytosis, hypotension, and tachypnea concerning for sepsis.  Initial evaluation raised concern for urinary versus HCAP source due to dirty UA and new infiltrate on CXR.  Cultures were obtained however CXR infiltrate resolved rapidly which is less consistent with HCAP.  Initial care in ICU with IVF resuscitation, once stabilized was transferred to the floor.  Given changes with CXR, urine was felt to be most consistent source of infection and grew Proteus, susceptible to amp/sulbactam.  Mary was narrowed to amp/sulbactam on 3/26 from Meropenem.  Of note, Mary did grow Pseudomonas on her sputum sample but this was most consistent with colonization and not pathologic infection.  - Cont ampicillin/sulbactam   - Transition to enteral medications pending stable.  - Vancomycin discontinued 3/22, Meropenem discontinued 3/26  - Blood cultures, urine cultures, and sputum cultures, completed  - Vent settings and nebs as below  - Monitor blood pressures and gases, stable      # Abdominal Pain Likely 2/2 Gastroparesis versus Gastric Outlet Obstruction - No acute etiology on CT scan on 3/23.  Suspect due to gastroparesis versus gastric outlet obstruction. GI assisting with PEG-J adjustment as well.  Constipation likely contributory.   - TF changed to continuous by J-port  - TF to  Impact Peptide (fiber-free, high PRO, and fish oil given wound and CRP of 22.0) @ new goal 50 ml/hr (1200 ml/day), per nutrition recs  - G-tube vented with aspiration daily  - GI consulted  - Fleets enema today  - Continue bowel regimen  - Started scheduled acetaminophen Q4H       # Chronic Hypoxic, Hypercarbic Respiratory Failure  # Severe COPD s/p Tracheostomy with Ventilator Dependence  Admission CXR with increased RLL opacities but resolved on recheck and cultures most likely represent colonization..  - Home vent settings: CMV 14/400/5/45%. On home vent since AM of 3/23  - Continue home prednisone and nebs   - Consider stress dose steroids for any further hypotension  - Palliative team involved, appreciate assistance      # Acute on Chronic Normocytic Anemia: Stable  Likely hemoconcentrated on admission, was close to baseline without signs of bleeding after fluid replacement. Also compounded by frequent blood draws and poor reticulocyte response. Responded well to 1 Unit of pRBC on 3/26  - CBC daily     # Hypovolemic Hyponatremia and Electrolyte Abnormalities: Resolved  - D/C IVF, monitor for s/sx of hypovolemia  - Electrolyte protocol for hypokalemia      # Steroid-Induced Hyperglycemia - Monitoring closely, well controlled at present.  - Low dose SSI  - Hypoglycemia Protocol  - q4h BG checks on TF      # Sacrococcygeal Ulcer:  One stage III pressure ulcer on her sacrococcygeal area.  No improvement reported for several months, present on admission. No signs of acute infection.  - WOC consulted to assist with management  - Regular assessment needed by nursing and MD  - Appropriate turning and postioning program  - Appropriate wound care and management of moisture and incontinence  - Patient will need group 2 mattress as group 1 has failed previously     ===== Chronic Medical Problems =====  # CAD - History of NSTEMI, no meds currently  # Generalized Anxiety Disorder and Air Hunger - No acute issues. Continue  "home clonazepam, morphine, quetiapine, sertraline, hydromorphone PRN and lorazepam PRN  # Achalasia and GERD with GJ-Tube dependence - No acute issues, home TF per nutrition, continue H2 blocker with GI cocktail PRN  # Constipation - No acute issues. Continue home bowel regimen     # FEN: TF per home routine,  Nutrition consulted  # PPx: Enoxaparin and H2 blocker  # Code Status: DNR  # Dispo: Inpatient admission for IV antibiotics pending culture results, anticipate discharge in 1-2 days.     Patient was seen and discussed with Dr. Hansen who agreed with the above.    Celestino Mo MD, A  Internal Medicine PGY-1  [facility]  Pager: [phone]  ___________________________________________________________________    Subjective & Interval Hx:  Mary was stable overnight but had increased G port leakage and 300cc + G tube output.  Still having some abdominal pain.  Patient anxious intermittently overnight and today, continues to have some relief with PRN dilaudid and ativan, but has episodes of agitation.  No acute respiratory issues over last 24hours    ROS: The remainder of a 10 point ROS is negative unless otherwise noted above.    Medications: Reviewed in EPIC. List below for reference    Physical Exam:    Blood pressure 135/86, pulse 100, temperature 99.1  F (37.3  C), temperature source Axillary, resp. rate 14, height 1.626 m (5' 4\"), weight 64 kg (141 lb 1.5 oz), SpO2 100 %, not currently breastfeeding.    Gen: Asleep but easily awoken female in some acute distress  Resp: Bibasilar crackles with expiratory wheezes, no rhonchi, tracheostomy and vent dependent.  CV: RRR, no m/r/g  Abd: GJ with TF in J port, soft abdomen with left lower quadrate and epigastric tenderness to palpation, no rebound or guarding.  Back: Unstageable pressure ulcer over the sacrococcygeal area  Extrem: Warm and well perfused, no LE edema  Neuro: Asleep but easily awoken, less interactive " today    Lines/Tubes:   Peripheral IV 03/24/17 Left Upper arm (Active)   Site Assessment [facility] 3/27/2017  4:00 AM   Line Status Saline locked 3/27/2017  4:00 AM   Phlebitis Scale 0-->no symptoms 3/27/2017  4:00 AM   Infiltration Scale 0 3/27/2017  4:00 AM   Extravasation? No 3/27/2017  4:00 AM   Number of days:3       PICC Double Lumen 03/24/17 Right Basilic (Active)   Site Assessment [facility] 3/27/2017  4:00 AM   External Cath Length (cm) 3 cm 3/20/2017 12:00 AM   Extremity Circumference (cm) 29 cm 3/20/2017 12:00 AM   Dressing Intervention Chlorhexidine patch;Transparent;Securing device;New dressing 3/20/2017 12:00 AM   Dressing Change Due 04/01/17 3/26/2017 10:00 PM   Number of days:3     Labs & Studies of Note:   Labs and images from the past 24 hours reviewed and notable for stable CBC and BMP  Medications list for Reference   Current Facility-Administered Medications   Medication     acetaminophen (TYLENOL) solution 325 mg     zinc sulfate (20 mg [place]. Zn/mL) solution 220 mg     ampicillin-sulbactam (UNASYN) 3 g vial to attach to  mL bag     QUEtiapine (SEROquel) tablet 25 mg     lidocaine (LMX4) kit     heparin lock flush 10 UNIT/ML injection 2-5 mL     sodium chloride (PF) 0.9% PF flush 10-20 mL     heparin lock flush 10 UNIT/ML injection 5-10 mL     heparin lock flush 10 UNIT/ML injection 5-10 mL     morphine solution 10 mg     acetaminophen (TYLENOL) solution 650 mg     albuterol neb solution 2.5 mg     bisacodyl (DULCOLAX) Suppository 10 mg     budesonide (PULMICORT) neb solution 0.5 mg     calcium carbonate (TUMS) chewable tablet 500 mg     clonazePAM (klonoPIN) suspension 1.5 mg     fexofenadine (ALLEGRA) tablet 180 mg     fiber modular (NUTRISOURCE FIBER) packet 1 packet     gabapentin (NEURONTIN) solution 300 mg     HYDROmorphone (DILAUDID) liquid 1 mg     ipratropium (ATROVENT) 0.02 % neb solution 0.5 mg     lactobacillus rhamnosus (GG) (CULTURELL) capsule 1 capsule     levalbuterol (XOPENEX) neb  solution 1.25 mg     lidocaine (LIDODERM) 5 % Patch 1-2 patch     lidocaine (XYLOCAINE) 2 % 15 mL, alum & mag hydroxide-simethicone (MYLANTA ES/MAALOX  ES) 15 mL GI Cocktail     LORazepam (ATIVAN) 2 MG/ML (HIGH CONC) solution 0.5 mg     melatonin liquid 3 mg     menthol-zinc oxide (CALMOSEPTINE) 0.44-20.625 % ointment     polyethylene glycol (MIRALAX/GLYCOLAX) Packet 17 g     polyethylene glycol 0.4%- propylene glycol 0.3% (SYSTANE ULTRA) ophthalmic solution 1-2 drop     predniSONE (DELTASONE) tablet 20 mg     QUEtiapine (SEROquel) tablet 25 mg     senna-docusate (SENOKOT-S;PERICOLACE) 8.6-50 MG per tablet 2 tablet     sertraline (ZOLOFT) 20 MG/ML (HIGH CONC) solution 150 mg     sodium chloride (OCEAN) 0.65 % nasal spray 1 spray     naloxone (NARCAN) injection 0.1-0.4 mg     enoxaparin (LOVENOX) injection 40 mg     ondansetron (ZOFRAN-ODT) ODT tab 4 mg    Or     ondansetron (ZOFRAN) injection 4 mg     potassium chloride SA (K-DUR/KLOR-CON M) CR tablet 20-40 mEq     potassium chloride (KLOR-CON) Packet 20-40 mEq     potassium chloride 10 mEq in 100 mL intermittent infusion     potassium chloride 10 mEq in 100 mL intermittent infusion with 10 mg lidocaine     potassium chloride 20 mEq in 50 mL intermittent infusion     magnesium sulfate 2 g in NS intermittent infusion (PharMEDium or FV Cmpd)     magnesium sulfate 4 g in 100 mL sterile water (premade)     sodium phosphate 10 mmol in D5W intermittent infusion     sodium phosphate 15 mmol in D5W intermittent infusion     sodium phosphate 20 mmol in D5W intermittent infusion     sodium phosphate 25 mmol in D5W intermittent infusion     glucose 40 % gel 15-30 g    Or     dextrose 50 % injection 25-50 mL    Or     glucagon injection 1 mg     insulin aspart (NovoLOG) inj (RAPID ACTING)     famotidine (PEPCID) suspension 20 mg     lidocaine (LIDODERM) patch REMOVAL     lidocaine (LIDODERM) Patch in Place     guaiFENesin (ROBITUSSIN) 20 mg/mL solution 10 mL     multivitamins  with minerals (CERTAVITE/CEROVITE) liquid 15 mL     Pt was seen and examined with Dr. Mo; confirmed abdominal, cardiac, and pulmonary exam findings;  I agree with the detailed A/P as documented above    Elda Hansen MD

## 2017-03-29 NOTE — PLAN OF CARE
Problem: Goal Outcome Summary  Goal: Goal Outcome Summary  Outcome: No Change  Neuro: Alert, SUN orientaion; pt does not mouth words. Pt nods head yes or no and follows commands.  Cardiac: SR, HR 70's-90's, SBP  119-130's, afebrile.   Respiratory: Sating in 90's on mechanical vent, Bivona 6, suctioned x 3 this shift w/ minimal secretions noted.  GI/: Avendaño; adequate urine output, BM X0  Diet/appetite: NPO; tube feed running at 50 mL/hr (goal) w/60 mL flushes q4h.  Activity: Assist of 2   Pain: Abdominal, managed with prn dilaudid.   Skin: Medial groin skin tear, rt upper arm skin tear, coccyx ulcer covered with foam; dressing clean/dry/intact.  IV: Rt double lumen PICC, lft peripheral, SL      Pt's G/J tube was leaking at insertion site. G-tube (to gravity) had an output of 10 mL at beginning of shift, at end of shift there was 300 mL output. Amanda team text-paged. No further concerns. Continue with POC. Notify primary team with changes.

## 2017-03-29 NOTE — PROVIDER NOTIFICATION
Pt's G/J tube is leaking at insertion site. G-tube (to gravity) had an output of 10 mL at beginning of shift, at end of shift there was 300 mL output. Amanda team text-paged.

## 2017-03-30 NOTE — PROGRESS NOTES
Ms. Wang chart was reviewed and case discussed with primary team.     She has persistent lower abdominal pain without a clear exacerbating or alleviating factor. She remains relatively constipated. Her workup thus far has not revealed e/o pancreatitis, cholecystitis, peptic ulcer disease, bowel obstruction, colitis, diverticulitis. She has had multiple cross sectional imaging studies without a clear explanation for her symptoms. Her liver chemistries are normal.     It is unlikely that the jejunal extension of her tube is causing pain. She may have gastric ulceration from the tube previously being too tight into the wall. May also have symptomatic constipation, opioid bowel syndrome, functional abdominal pain.     At this point would treat symptomatically/empirically for a few things, as further intervention (such as colonoscopy, repeat EGD/CT etc) would have low likelihood of finding a cause of her pain and puts her at excess risk. Additionally rare diseases such as porphyria are quite unlikely.     -- agree with checking H pylori, treat for 14 days with triple therapy if positive  -- change H2 blocker to PPI for 6-8 weeks for empiric treatment of gastritis/gastric ulceration from G tube balloon pressure  -- continue tube feeds  -- BID miralax via J tube, supplemented with enemas PRN. Could also consider short trial of methylnaltrexone  -- continue venting G tube BID    Please call if questions or concerns.     Vladimir Lam MD  Gastroenterology Fellow

## 2017-03-30 NOTE — PLAN OF CARE
"Problem: Goal Outcome Summary  Goal: Goal Outcome Summary  Outcome: No Change  Care provided 1900-0730. Pt int alert/ int somnolent/lethargic. SUN orientation. /56 (BP Location: Left leg)  Pulse 100  Temp 98.3  F (36.8  C) (Axillary)  Resp 15  Ht 1.626 m (5' 4\")  Wt 63.5 kg (139 lb 15.9 oz)  SpO2 99%  BMI 24.03 kg/m2 on mechanical vent @3-4L; suctioned x3 on shift. Pt tolerating TF & meds per [place]; site is reddened & continues to leak around site. G-tube to gravity drain with moderate bilious output. Adequate UOP via bender; no BM on shift. Pt appeared to rest comfortably between cares; no PRN Ativan or dilaudid given on shift. Will continue to monitor closely & update with any changes.           "

## 2017-03-30 NOTE — PLAN OF CARE
Problem: Goal Outcome Summary  Goal: Goal Outcome Summary       Pt has been sleepy most of the day with intermittently awake and expressed having anxiety. Prn and schedule pain/Anxitey given and has been effective. VS stable and on VENT sating 96%. Needed suctioning twice .TF infusing at gal rate tolerating it Well. Avendaño patent draining yellow urine , no BM at this shift . Pt able to use call light to let her needs known. Bed alarm on and functioning.

## 2017-03-30 NOTE — PROGRESS NOTES
Internal Medicine [facility] Progress Note   Date of Service: 3/27/2017    Patient: Mary Wang  MRN: [id number]  Admission Date: 3/20/2017  Hospital Day # 9    Assessment & Plan:   Mary Wang is a 67 year old female with a history of severe COPD s/p tracheostomy on home ventilator, GJ-tube dependence, anxiety, HTN, chronic indwelling bender, and achalasia who presented with malaise and bender catheter pain, admitted to the ICU on 3/21/17 for sepsis from likely urinary source with severe metabolic derangements, now stable on IV ampicillin/sulbactam.      # Sepsis secondary to Proteus from Urinary Source - Mary presented with a leukocytosis, hypotension, and tachypnea concerning for sepsis.  Initial evaluation raised concern for urinary versus HCAP source due to dirty UA and new infiltrate on CXR.  Cultures were obtained however CXR infiltrate resolved rapidly which is less consistent with HCAP.  Initial care in ICU with IVF resuscitation, once stabilized was transferred to the floor.  Given changes with CXR, urine was felt to be most consistent source of infection and grew Proteus, susceptible to amp/sulbactam.  Mary was narrowed to amp/sulbactam on 3/26 from Meropenem.  Of note, aMry did grow Pseudomonas on her sputum sample but this was most consistent with colonization and not pathologic infection.  - Cont ampicillin/sulbactam   - Transition to enteral medications pending stable.  - Vancomycin discontinued 3/22, Meropenem discontinued 3/26  - Blood cultures, urine cultures, and sputum cultures, completed  - Vent settings and nebs as below  - Monitor blood pressures and gases, stable      # Abdominal Pain Likely 2/2 Gastroparesis versus Gastric Outlet Obstruction - No acute etiology on CT scan on 3/23.  Suspect due to gastroparesis versus gastric outlet obstruction. GI assisting with PEG-J adjustment as well.  Constipation likely contributory.   - TF changed to continuous by J-port  - TF to  Impact Peptide (fiber-free, high PRO, and fish oil given wound and CRP of 22.0) @ new goal 50 ml/hr (1200 ml/day), per nutrition recs  - G-tube vented with aspiration daily  - GI consulted  - Fleets enema PRN  - Cont bowel regimen, BID miralax via J tube  - Scheduled acetaminophen Q4H and  Oxycodone Q4H while awake  - H pylori antigen pending; treat for 14 days with triple therapy if positive  - change H2 blocker to PPI for 6-8 weeks for empiric treatment of gastritis/gastric ulceration from G tube balloon pressure  - Short trial of methylnaltrexone  - cont venting G tube BID    # Chronic Hypoxic, Hypercarbic Respiratory Failure  # Severe COPD s/p Tracheostomy with Ventilator Dependence  Admission CXR with increased RLL opacities but resolved on recheck and cultures most likely represent colonization..  - Home vent settings: CMV 14/400/5/45%. On home vent since AM of 3/23  - Continue home prednisone and nebs   - Consider stress dose steroids for any further hypotension  - Palliative team involved, appreciate assistance      # Acute on Chronic Normocytic Anemia: Stable  Likely hemoconcentrated on admission, was close to baseline without signs of bleeding after fluid replacement. Also compounded by frequent blood draws and poor reticulocyte response. Responded well to 1 Unit of pRBC on 3/26  - CBC daily     # Hypovolemic Hyponatremia and Electrolyte Abnormalities: Resolved  - D/C IVF, monitor for s/sx of hypovolemia  - Electrolyte protocol for hypokalemia      # Steroid-Induced Hyperglycemia - Monitoring closely, well controlled at present.  - Low dose SSI  - Hypoglycemia Protocol  - q4h BG checks on TF      # Sacrococcygeal Ulcer:  One stage III pressure ulcer on her sacrococcygeal area.  No improvement reported for several months, present on admission. No signs of acute infection.  - WOC consulted to assist with management  - Regular assessment needed by nursing and MD  - Appropriate turning and postioning  "program  - Appropriate wound care and management of moisture and incontinence  - Patient will need group 2 mattress as group 1 has failed previously     ===== Chronic Medical Problems =====  # CAD - History of NSTEMI, no meds currently  # Generalized Anxiety Disorder and Air Hunger -  Continue home clonazepam, morphine, quetiapine, sertraline, hydromorphone PRN and lorazepam PRN  # Achalasia and GERD with GJ-Tube dependence - Home TF per nutrition, discontinued H2 blocker and start PPI with GI cocktail PRN   # FEN: TF per regimen above,  Nutrition consulted  # PPx: Enoxaparin and H2 blocker switched to PPI on 03/30  # Code Status: DNR  # Dispo: Inpatient admission for IV antibiotics pending culture results, anticipate discharge in 1-2 days.     Patient was seen and discussed with Dr. Hansen who agreed with the above.    Celestino Mo MD, A  Internal Medicine PGY-1  [facility]  Pager: [phone]  ___________________________________________________________________    Subjective & Interval Hx:  Mary was stable overnight.  No G port leakage reported or increased G-tube output.  Still having abdominal pain.  Patient anxious intermittently overnight and today, continues to have some relief with PRN dilaudid and ativan, but has episodes of agitation.  No acute respiratory issues over last 24hours    ROS: The remainder of a 10 point ROS is negative unless otherwise noted above.    Medications: Reviewed in EPIC. List below for reference    Physical Exam:    Blood pressure 134/70, pulse 100, temperature 97.9  F (36.6  C), temperature source Axillary, resp. rate 14, height 1.626 m (5' 4\"), weight 63.5 kg (139 lb 15.9 oz), SpO2 96 %, not currently breastfeeding.    Gen: Asleep but easily awoken female in some acute distress  Resp: Bibasilar crackles with expiratory wheezes, no rhonchi, tracheostomy and vent dependent.  CV: RRR, no m/r/g  Abd: GJ with TF in J port, soft abdomen with left lower " quadrate and epigastric tenderness to palpation, no rebound or guarding.  Back: Unstageable pressure ulcer over the sacrococcygeal area  Extrem: Warm and well perfused, no LE edema  Neuro: Asleep but easily awoken, less interactive today    Lines/Tubes:   Peripheral IV 03/24/17 Left Upper arm (Active)   Site Assessment WDL 3/27/2017  4:00 AM   Line Status Saline locked 3/27/2017  4:00 AM   Phlebitis Scale 0-->no symptoms 3/27/2017  4:00 AM   Infiltration Scale 0 3/27/2017  4:00 AM   Extravasation? No 3/27/2017  4:00 AM   Number of days:3       PICC Double Lumen 03/24/17 Right Basilic (Active)   Site Assessment [facility] 3/27/2017  4:00 AM   External Cath Length (cm) 3 cm 3/20/2017 12:00 AM   Extremity Circumference (cm) 29 cm 3/20/2017 12:00 AM   Dressing Intervention Chlorhexidine patch;Transparent;Securing device;New dressing 3/20/2017 12:00 AM   Dressing Change Due 04/01/17 3/26/2017 10:00 PM   Number of days:3     Labs & Studies of Note:   Labs and images from the past 24 hours reviewed and notable for stable CBC and BMP  Medications list for Reference   Current Facility-Administered Medications   Medication     acetaminophen (TYLENOL) solution 325 mg     zinc sulfate (20 mg [place]. Zn/mL) solution 220 mg     ampicillin-sulbactam (UNASYN) 3 g vial to attach to  mL bag     QUEtiapine (SEROquel) tablet 25 mg     lidocaine (LMX4) kit     heparin lock flush 10 UNIT/ML injection 2-5 mL     sodium chloride (PF) 0.9% PF flush 10-20 mL     heparin lock flush 10 UNIT/ML injection 5-10 mL     heparin lock flush 10 UNIT/ML injection 5-10 mL     morphine solution 10 mg     acetaminophen (TYLENOL) solution 650 mg     albuterol neb solution 2.5 mg     bisacodyl (DULCOLAX) Suppository 10 mg     budesonide (PULMICORT) neb solution 0.5 mg     calcium carbonate (TUMS) chewable tablet 500 mg     clonazePAM (klonoPIN) suspension 1.5 mg     fexofenadine (ALLEGRA) tablet 180 mg     fiber modular (NUTRISOURCE FIBER) packet 1 packet      gabapentin (NEURONTIN) solution 300 mg     HYDROmorphone (DILAUDID) liquid 1 mg     ipratropium (ATROVENT) 0.02 % neb solution 0.5 mg     lactobacillus rhamnosus (GG) (CULTURELL) capsule 1 capsule     levalbuterol (XOPENEX) neb solution 1.25 mg     lidocaine (LIDODERM) 5 % Patch 1-2 patch     lidocaine (XYLOCAINE) 2 % 15 mL, alum & mag hydroxide-simethicone (MYLANTA ES/MAALOX  ES) 15 mL GI Cocktail     LORazepam (ATIVAN) 2 MG/ML (HIGH CONC) solution 0.5 mg     melatonin liquid 3 mg     menthol-zinc oxide (CALMOSEPTINE) 0.44-20.625 % ointment     polyethylene glycol (MIRALAX/GLYCOLAX) Packet 17 g     polyethylene glycol 0.4%- propylene glycol 0.3% (SYSTANE ULTRA) ophthalmic solution 1-2 drop     predniSONE (DELTASONE) tablet 20 mg     QUEtiapine (SEROquel) tablet 25 mg     senna-docusate (SENOKOT-S;PERICOLACE) 8.6-50 MG per tablet 2 tablet     sertraline (ZOLOFT) 20 MG/ML (HIGH CONC) solution 150 mg     sodium chloride (OCEAN) 0.65 % nasal spray 1 spray     naloxone (NARCAN) injection 0.1-0.4 mg     enoxaparin (LOVENOX) injection 40 mg     ondansetron (ZOFRAN-ODT) ODT tab 4 mg    Or     ondansetron (ZOFRAN) injection 4 mg     potassium chloride SA (K-DUR/KLOR-CON M) CR tablet 20-40 mEq     potassium chloride (KLOR-CON) Packet 20-40 mEq     potassium chloride 10 mEq in 100 mL intermittent infusion     potassium chloride 10 mEq in 100 mL intermittent infusion with 10 mg lidocaine     potassium chloride 20 mEq in 50 mL intermittent infusion     magnesium sulfate 2 g in NS intermittent infusion (PharMEDium or FV Cmpd)     magnesium sulfate 4 g in 100 mL sterile water (premade)     sodium phosphate 10 mmol in D5W intermittent infusion     sodium phosphate 15 mmol in D5W intermittent infusion     sodium phosphate 20 mmol in D5W intermittent infusion     sodium phosphate 25 mmol in D5W intermittent infusion     glucose 40 % gel 15-30 g    Or     dextrose 50 % injection 25-50 mL    Or     glucagon injection 1 mg     insulin  aspart (NovoLOG) inj (RAPID ACTING)     famotidine (PEPCID) suspension 20 mg     lidocaine (LIDODERM) patch REMOVAL     lidocaine (LIDODERM) Patch in Place     guaiFENesin (ROBITUSSIN) 20 mg/mL solution 10 mL     multivitamins with minerals (CERTAVITE/CEROVITE) liquid 15 mL     Pt was seen and examined with Dr. Mo; confirmed abdominal, cardiac, and pulmonary exam findings;  I agree with the detailed A/P as documented above    Elda Hansen MD

## 2017-03-31 NOTE — PLAN OF CARE
"Problem: Goal Outcome Summary  Goal: Goal Outcome Summary  Outcome: No Change  /72 (BP Location: Left leg)  Pulse 100  Temp 98.3  F (36.8  C) (Oral)  Resp 14  Ht 1.626 m (5' 4\")  Wt 62.5 kg (137 lb 12.6 oz)  SpO2 99%  BMI 23.65 kg/m2  Neuro: Pt lethargic, when alert answers orientation questions.   Cardiac: SR. VSS.       Respiratory: Sating 100% on Mechanical Vent 5L O2. Pt still reported to shortness of breath di spite being suctioned with little to no sputum, and no loss of saturation. Pt frequently reported SOB.   GI/: Adequate urine output. BM x3, loose.   Diet/appetite: TF at goal.   Activity:  Turned Q 2Hr.   Pain: At acceptable level on current regimen.   Skin: Intact, no new deficits noted. Coccyx pressure ulcer dressing changed x1.      R: Continue with POC. Notify primary team with changes.          "

## 2017-03-31 NOTE — PROGRESS NOTES
Care Coordinator- Discharge Planning     Admission Date/Time:  3/20/2017  Attending MD:  Kevin Acevedo*     Data  Date of initial CC assessment:  Chart reviewed, discussed with interdisciplinary team.   Patient was admitted for:   1. NSTEMI (non-ST elevated myocardial infarction) (H)    2. Hyponatremia    3. Hypercarbia    4. Acute on chronic respiratory failure with hypoxia (H)    5. Centrilobular emphysema (H)           Assessment  Spoke with Jenni at [facility] ([phone], ext [phone]). All documentation correct and group 2 mattress ordered. Unfortunately it is on back order and can take 7-10 days to be delivered to patient. When mattress in stock, [facility] will call patients group home and daughter César for delivery set up. [facility] called and updated César.     Plan  Anticipated Discharge Date: TBD  Anticipated Discharge Plan: back to group home    Catrina Campbell

## 2017-03-31 NOTE — PLAN OF CARE
Problem: Goal Outcome Summary  Goal: Goal Outcome Summary  Outcome: No Change  Neuro: Alert/lethargic fluctuates. Answers yes/no. Uses call light.   Cardiac: . VSS.            Respiratory: Sating 99% on Vent 5L.   GI/: Adequate urine output via bender. BM X2-Fleets EA given.   Diet/appetite: Tube feeds via J tube 50ml/hr. G-tube to gravity.  Activity:  Bedrest. Repositioned Q2H.  Pain: Complained of constant L abdominal pain. Scheduled pain meds given, pt able to rest in between.   Skin: Drsg changed to coccyx ulcer. Lotion to dry skin. Repositioned Q2H.     R: No acute issues this shift. C/o shortness of breath, relieved with medication. Suctioned Q3-4 hours. Hoping relieving stool burdon with improve abdominal pain. Continue with POC. Notify primary team with changes.

## 2017-03-31 NOTE — PROGRESS NOTES
Internal Medicine [facility] Progress Note   Date of Service: 3/27/2017    Patient: Mary Wang  MRN: [id number]  Admission Date: 3/20/2017  Hospital Day # 10    Assessment & Plan:   Mary Wang is a 67 year old female with a history of severe COPD s/p tracheostomy on home ventilator, GJ-tube dependence, anxiety, HTN, chronic indwelling bender, and achalasia who presented with malaise and bender catheter pain, admitted to the ICU on 3/21/17 for sepsis from likely urinary source with severe metabolic derangements, now stable on IV ampicillin/sulbactam.      # Sepsis secondary to Proteus from Urinary Source - Mary presented with a leukocytosis, hypotension, and tachypnea concerning for sepsis.  Initial evaluation raised concern for urinary versus HCAP source due to dirty UA and new infiltrate on CXR.  Cultures were obtained however CXR infiltrate resolved rapidly which is less consistent with HCAP.  Initial care in ICU with IVF resuscitation, once stabilized was transferred to the floor.  Given changes with CXR, urine was felt to be most consistent source of infection and grew Proteus, susceptible to amp/sulbactam.  Mary was narrowed to amp/sulbactam on 3/26 from Meropenem.  Of note, Mary did grow Pseudomonas on her sputum sample but this was most consistent with colonization and not pathologic infection.  - Discont ampicillin/sulbactam 3/31   - Transition to enteral medications pending stable.  - Vancomycin discontinued 3/22, Meropenem discontinued 3/26  - Blood cultures, urine cultures, and sputum cultures, completed  - Vent settings and nebs as below  - Monitor blood pressures and gases, stable      # Abdominal Pain Likely 2/2 Gastroparesis versus Gastric Outlet Obstruction - No acute etiology on CT scan on 3/23.  Suspect due to gastroparesis versus gastric outlet obstruction. GI assisting with PEG-J adjustment as well.  Constipation likely contributory.   - TF changed to continuous by  J-port  - TF to Impact Peptide (fiber-free, high PRO, and fish oil given wound and CRP of 22.0) @ new goal 50 ml/hr (1200 ml/day), per nutrition recs  - G-tube vented with aspiration daily  - GI consulted  - Fleets enema PRN  - Cont bowel regimen, BID miralax via J tube  - Scheduled acetaminophen Q4H and  Oxycodone Q4H while awake  - H pylori antigen pending; treat for 14 days with triple therapy if positive  - change H2 blocker to PPI for 6-8 weeks for empiric treatment of gastritis/gastric ulceration from G tube balloon pressure  - Short trial of methylnaltrexone  - cont venting G tube BID    # Chronic Hypoxic, Hypercarbic Respiratory Failure  # Severe COPD s/p Tracheostomy with Ventilator Dependence  Admission CXR with increased RLL opacities but resolved on recheck and cultures most likely represent colonization..  - Home vent settings: CMV 14/400/5/45%. On home vent since AM of 3/23  - Continue home prednisone and nebs   - Consider stress dose steroids for any further hypotension  - Palliative team involved, appreciate assistance      # Acute on Chronic Normocytic Anemia: Stable  Likely hemoconcentrated on admission, was close to baseline without signs of bleeding after fluid replacement. Also compounded by frequent blood draws and poor reticulocyte response. Responded well to 1 Unit of pRBC on 3/26  - CBC daily     # Hypovolemic Hyponatremia and Electrolyte Abnormalities: Resolved  - D/C IVF, monitor for s/sx of hypovolemia  - Electrolyte protocol for hypokalemia      # Steroid-Induced Hyperglycemia - Monitoring closely, well controlled at present.  - Low dose SSI  - Hypoglycemia Protocol  - q4h BG checks on TF      # Sacrococcygeal Ulcer:  One stage III pressure ulcer on her sacrococcygeal area.  No improvement reported for several months, present on admission. No signs of acute infection.  - WOC consulted to assist with management  - Regular assessment needed by nursing and MD  - Appropriate turning and  "postioning program  - Appropriate wound care and management of moisture and incontinence  - Patient will need group 2 mattress as group 1 has failed previously     ===== Chronic Medical Problems =====  # CAD - History of NSTEMI, no meds currently  # Generalized Anxiety Disorder and Air Hunger -  Continue home clonazepam, morphine, quetiapine, sertraline, hydromorphone PRN and lorazepam PRN  # Achalasia and GERD with GJ-Tube dependence - Home TF per nutrition, discontinued H2 blocker and start PPI with GI cocktail PRN   # FEN: TF per regimen above,  Nutrition consulted  # PPx: Enoxaparin and H2 blocker switched to PPI on 03/30  # Code Status: DNR  # Dispo: Inpatient admission for IV antibiotics pending culture results, anticipate discharge in 1-2 days.     Patient was seen and discussed with Dr. Hansen who agreed with the above.    Celestino Mo MD, A  Internal Medicine PGY-1  [facility]  Pager: [phone]  ___________________________________________________________________    Subjective & Interval Hx:  Mary was stable overnight with no acute issues.  No G port leakage reported or increased G-tube output. Complained of constant L abdominal pain. Scheduled pain meds given, able to rest in between.  BM X2-Fleets EA given  No PRN dilaudid or ativan given.  No acute respiratory issues over last 24hours.     ROS: The remainder of a 10 point ROS is negative unless otherwise noted above.    Medications: Reviewed in EPIC. List below for reference    Physical Exam:    Blood pressure 160/72, pulse 100, temperature 98.3  F (36.8  C), temperature source Oral, resp. rate 14, height 1.626 m (5' 4\"), weight 62.5 kg (137 lb 12.6 oz), SpO2 100 %, not currently breastfeeding.    Gen: Asleep but easily awoken female in some acute distress  Resp: Bibasilar crackles with expiratory wheezes, no rhonchi, tracheostomy and vent dependent.  CV: RRR, no m/r/g  Abd: GJ with TF in J port, soft abdomen with left " lower quadrate and epigastric tenderness to palpation, no rebound or guarding.  Back: Unstageable pressure ulcer over the sacrococcygeal area  Extrem: Warm and well perfused, no LE edema  Neuro: Asleep but easily awoken, interactive today, but difficult communication     Lines/Tubes:   Peripheral IV 03/24/17 Left Upper arm (Active)   Site Assessment [facility] 3/27/2017  4:00 AM   Line Status Saline locked 3/27/2017  4:00 AM   Phlebitis Scale 0-->no symptoms 3/27/2017  4:00 AM   Infiltration Scale 0 3/27/2017  4:00 AM   Extravasation? No 3/27/2017  4:00 AM   Number of days:3       PICC Double Lumen 03/24/17 Right Basilic (Active)   Site Assessment [facility] 3/27/2017  4:00 AM   External Cath Length (cm) 3 cm 3/20/2017 12:00 AM   Extremity Circumference (cm) 29 cm 3/20/2017 12:00 AM   Dressing Intervention Chlorhexidine patch;Transparent;Securing device;New dressing 3/20/2017 12:00 AM   Dressing Change Due 04/01/17 3/26/2017 10:00 PM   Number of days:3     Labs & Studies of Note:   Labs and images from the past 24 hours reviewed and notable for stable CBC and BMP  Medications list for Reference   Current Facility-Administered Medications   Medication     methylnaltrexone (RELISTOR) injection 8 mg     omeprazole (priLOSEC) suspension 20 mg     sodium phosphate (FLEET ENEMA) 1 enema     oxyCODONE (ROXICODONE) IR tablet 5 mg     acetaminophen (TYLENOL) solution 325 mg     zinc sulfate (20 mg [place]. Zn/mL) solution 220 mg     ampicillin-sulbactam (UNASYN) 3 g vial to attach to  mL bag     QUEtiapine (SEROquel) tablet 25 mg     lidocaine (LMX4) kit     heparin lock flush 10 UNIT/ML injection 2-5 mL     sodium chloride (PF) 0.9% PF flush 10-20 mL     heparin lock flush 10 UNIT/ML injection 5-10 mL     heparin lock flush 10 UNIT/ML injection 5-10 mL     morphine solution 10 mg     acetaminophen (TYLENOL) solution 650 mg     albuterol neb solution 2.5 mg     bisacodyl (DULCOLAX) Suppository 10 mg     budesonide (PULMICORT) neb  solution 0.5 mg     calcium carbonate (TUMS) chewable tablet 500 mg     clonazePAM (klonoPIN) suspension 1.5 mg     fexofenadine (ALLEGRA) tablet 180 mg     fiber modular (NUTRISOURCE FIBER) packet 1 packet     gabapentin (NEURONTIN) solution 300 mg     HYDROmorphone (DILAUDID) liquid 1 mg     ipratropium (ATROVENT) 0.02 % neb solution 0.5 mg     lactobacillus rhamnosus (GG) (CULTURELL) capsule 1 capsule     levalbuterol (XOPENEX) neb solution 1.25 mg     lidocaine (LIDODERM) 5 % Patch 1-2 patch     lidocaine (XYLOCAINE) 2 % 15 mL, alum & mag hydroxide-simethicone (MYLANTA ES/MAALOX  ES) 15 mL GI Cocktail     LORazepam (ATIVAN) 2 MG/ML (HIGH CONC) solution 0.5 mg     melatonin liquid 3 mg     menthol-zinc oxide (CALMOSEPTINE) 0.44-20.625 % ointment     polyethylene glycol (MIRALAX/GLYCOLAX) Packet 17 g     polyethylene glycol 0.4%- propylene glycol 0.3% (SYSTANE ULTRA) ophthalmic solution 1-2 drop     predniSONE (DELTASONE) tablet 20 mg     QUEtiapine (SEROquel) tablet 25 mg     senna-docusate (SENOKOT-S;PERICOLACE) 8.6-50 MG per tablet 2 tablet     sertraline (ZOLOFT) 20 MG/ML (HIGH CONC) solution 150 mg     sodium chloride (OCEAN) 0.65 % nasal spray 1 spray     naloxone (NARCAN) injection 0.1-0.4 mg     enoxaparin (LOVENOX) injection 40 mg     ondansetron (ZOFRAN-ODT) ODT tab 4 mg    Or     ondansetron (ZOFRAN) injection 4 mg     potassium chloride SA (K-DUR/KLOR-CON M) CR tablet 20-40 mEq     potassium chloride (KLOR-CON) Packet 20-40 mEq     potassium chloride 10 mEq in 100 mL intermittent infusion     potassium chloride 10 mEq in 100 mL intermittent infusion with 10 mg lidocaine     potassium chloride 20 mEq in 50 mL intermittent infusion     magnesium sulfate 2 g in NS intermittent infusion (PharMEDium or FV Cmpd)     magnesium sulfate 4 g in 100 mL sterile water (premade)     sodium phosphate 10 mmol in D5W intermittent infusion     sodium phosphate 15 mmol in D5W intermittent infusion     sodium phosphate 20  mmol in D5W intermittent infusion     sodium phosphate 25 mmol in D5W intermittent infusion     glucose 40 % gel 15-30 g    Or     dextrose 50 % injection 25-50 mL    Or     glucagon injection 1 mg     insulin aspart (NovoLOG) inj (RAPID ACTING)     lidocaine (LIDODERM) patch REMOVAL     lidocaine (LIDODERM) Patch in Place     guaiFENesin (ROBITUSSIN) 20 mg/mL solution 10 mL     multivitamins with minerals (CERTAVITE/CEROVITE) liquid 15 mL     Pt was seen and examined with Dr. Mo; confirmed cardiac, pulmonary, and abdominal exam findings;  I agree with the detailed A/P as documented above    Elda Hansen MD

## 2017-03-31 NOTE — PROGRESS NOTES
Care Coordinator- Discharge Planning     Admission Date/Time:  3/20/2017  Attending MD:  Kevin Acevedo*     Data  Date of initial CC assessment:  Chart reviewed, discussed with interdisciplinary team.   Patient was admitted for:   1. NSTEMI (non-ST elevated myocardial infarction) (H)    2. Hyponatremia    3. Hypercarbia    4. Acute on chronic respiratory failure with hypoxia (H)    5. Centrilobular emphysema (H)           Assessment  Notified by MD that plan if for patient to remain hospitalized over the weekend and will be ready to discharge on Monday. Daughter César coming in tomorrow to talk with med team. Call placed to [facility] ([phone]) to update. Olu will plan to staff patient for Monday and writer to call on Monday morning to discuss discharge further.    Coordination of Care and Referrals: Provided patient/family with options for discharge     Plan  Anticipated Discharge Date: TBD. Monday?  Anticipated Discharge Plan: back to group home with 24 hour cares    Catrina Campbell

## 2017-04-01 NOTE — PLAN OF CARE
"Problem: Goal Outcome Summary  Goal: Goal Outcome Summary  Outcome: No Change  /78 (BP Location: Left arm)  Pulse 100  Temp 98.6  F (37  C) (Axillary)  Resp 20  Ht 1.626 m (5' 4\")  Wt 63.5 kg (139 lb 15.9 oz)  SpO2 94%  BMI 24.03 kg/m2  Tolerating home vent with 3L O2 bleed in per home routine. Family concerned for CO2 retention; VBG drawn. A/O x 2-3. Continues to have the fixed gaze, more interactive and anxious today. Follows commands. Complains of pain in abdomen; oxycodone changed to PRN, POdilaudid given x 1. Bender patent with adequate UOP; had episode of incontinence around bender. Incontinent of stool x 2. Complained of nausea this morning; zofran given. Abdomen slightly firm. Coccyx wound with dressing changed x 2. Skin tears on arms. G/J tube- TF at goal, G tube to gravity with green drainage. Trach with moderate white/yellow secretions; deep suction this afternoon. Q2H turns. Eye drops for dry eyes today. Family at bedside this afternoon. Continue with POC, notify MD of any changes.       "

## 2017-04-01 NOTE — PLAN OF CARE
"Problem: Goal Outcome Summary  Goal: Goal Outcome Summary  Outcome: No Change  /70 (BP Location: Left leg)  Pulse 100  Temp 98.3  F (36.8  C) (Oral)  Resp 14  Ht 1.626 m (5' 4\")  Wt 63.5 kg (139 lb 15.9 oz)  SpO2 100%  BMI 24.03 kg/m2     Care provided from [id number]. VSS, afebrile, sinus rhythm. Chronic mechanical vent. Lethargic intermittently alert and awake, fixed guaze, withdrawn. SUN orientation status. No PRN dilaudid or ativan given. PRN zofran administered X1, no emesis.  Suctioned X4, moderate amount of thin clear-yellow secretions. TF running at 50ml/hr via J tube, G- tube to gravity. Adequate urine output via bender. X1 loose stool. Continue to monitor and notify care team of any cahgnes.       "

## 2017-04-01 NOTE — PROGRESS NOTES
Internal Medicine [facility] Progress Note   Date of Service: 3/27/2017    Patient: Mary Wang  MRN: [id number]  Admission Date: 3/20/2017  Hospital Day # 11    Assessment & Plan:   Mary Wang is a 67 year old female with a history of severe COPD s/p tracheostomy on home ventilator, GJ-tube dependence, anxiety, HTN, chronic indwelling bender, and achalasia who presented with malaise and bender catheter pain, admitted to the ICU on 3/21/17 for sepsis from likely urinary source with severe metabolic derangements, now stable on IV ampicillin/sulbactam.      # Sepsis secondary to Proteus from Urinary Source - Mary presented with a leukocytosis, hypotension, and tachypnea concerning for sepsis.  Initial evaluation raised concern for urinary versus HCAP source due to dirty UA and new infiltrate on CXR.  Cultures were obtained however CXR infiltrate resolved rapidly which is less consistent with HCAP.  Initial care in ICU with IVF resuscitation, once stabilized was transferred to the floor.  Given changes with CXR, urine was felt to be most consistent source of infection and grew Proteus, susceptible to amp/sulbactam.  Mary was narrowed to amp/sulbactam on 3/26 from Meropenem.  Of note, Mary did grow Pseudomonas on her sputum sample but this was most consistent with colonization and not pathologic infection.  - Discont ampicillin/sulbactam 3/31   - Transition to enteral medications pending stable.  - Vancomycin discontinued 3/22, Meropenem discontinued 3/26  - Blood cultures, urine cultures, and sputum cultures, completed  - Vent settings and nebs as below  - Monitor blood pressures and gases, stable      # Abdominal Pain Likely 2/2 Gastroparesis versus Gastric Outlet Obstruction - No acute etiology on CT scan on 3/23.  Suspect due to gastroparesis versus gastric outlet obstruction. GI assisting with PEG-J adjustment as well.  Constipation likely contributory.   - TF changed to continuous by  J-port  - TF to Impact Peptide (fiber-free, high PRO, and fish oil given wound and CRP of 22.0) @ new goal 50 ml/hr (1200 ml/day), per nutrition recs  - G-tube vented with aspiration daily  - GI consulted  - Fleets enema PRN  - Cont bowel regimen, BID miralax via J tube  - Scheduled acetaminophen Q4H and Oxycodone PRN  - H pylori antigen pending; treat for 14 days with triple therapy if positive  - change H2 blocker to PPI for 6-8 weeks for empiric treatment of gastritis/gastric ulceration from G tube balloon pressure  - Short trial of methylnaltrexone  - cont venting G tube BID    # Chronic Hypoxic, Hypercarbic Respiratory Failure  # Severe COPD s/p Tracheostomy with Ventilator Dependence  Admission CXR with increased RLL opacities but resolved on recheck and cultures most likely represent colonization..  - Home vent settings: CMV 14/400/5/45%. On home vent since AM of 3/23  - Continue home prednisone and nebs   - Consider stress dose steroids for any further hypotension  - Palliative team involved, appreciate assistance      # Acute on Chronic Normocytic Anemia: Stable  Likely hemoconcentrated on admission, was close to baseline without signs of bleeding after fluid replacement. Also compounded by frequent blood draws and poor reticulocyte response. Responded well to 1 Unit of pRBC on 3/26  - CBC daily     # Hypovolemic Hyponatremia and Electrolyte Abnormalities: Resolved  - D/C IVF, monitor for s/sx of hypovolemia  - Electrolyte protocol for hypokalemia      # Steroid-Induced Hyperglycemia - Monitoring closely, well controlled at present.  - Low dose SSI  - Hypoglycemia Protocol  - q4h BG checks on TF      # Sacrococcygeal Ulcer:  One stage III pressure ulcer on her sacrococcygeal area.  No improvement reported for several months, present on admission. No signs of acute infection.  - WOC consulted to assist with management  - Regular assessment needed by nursing and MD  - Appropriate turning and postioning  "program  - Appropriate wound care and management of moisture and incontinence  - Patient will need group 2 mattress as group 1 has failed previously     ===== Chronic Medical Problems =====  # CAD - History of NSTEMI, no meds currently  # Generalized Anxiety Disorder and Air Hunger -  Continue home clonazepam, morphine, quetiapine, sertraline, hydromorphone PRN and lorazepam PRN  # Achalasia and GERD with GJ-Tube dependence - Home TF per nutrition, discontinued H2 blocker and start PPI with GI cocktail PRN   # FEN: TF per regimen above,  Nutrition consulted  # PPx: Enoxaparin and H2 blocker switched to PPI on 03/30  # Code Status: DNR  # Dispo: Inpatient admission for IV antibiotics pending culture results, anticipate discharge in 1-2 days.     Patient was seen and discussed with Dr. Hansen who agreed with the above.    Celestino Mo MD, A  Internal Medicine PGY-1  [facility]  Pager: [phone]  ___________________________________________________________________    Subjective & Interval Hx:  Mary was stable overnight with no acute issues.  Continues to have some abdominal pain. Scheduled pain meds given.  No acute respiratory issues over last 24hours. Discussed patient's care with patient daughter, César.  Plan on continue Insulin, switch oxy from scheduled to PRN, and following up with RT about deep suction PRN for better secretion clearance.    ROS: The remainder of a 10 point ROS is negative unless otherwise noted above.    Medications: Reviewed in EPIC. List below for reference    Physical Exam:    Blood pressure 148/78, pulse 100, temperature 98.6  F (37  C), temperature source Axillary, resp. rate 20, height 1.626 m (5' 4\"), weight 63.5 kg (139 lb 15.9 oz), SpO2 94 %, not currently breastfeeding.    Gen: Asleep but easily awoken female in some acute distress  Resp: Bibasilar crackles with expiratory wheezes, no rhonchi, tracheostomy and vent dependent.  CV: RRR, no m/r/g  Abd: " GJ with TF in J port, soft abdomen with left lower quadrate and epigastric tenderness to palpation, no rebound or guarding.  Back: Unstageable pressure ulcer over the sacrococcygeal area  Extrem: Warm and well perfused, no LE edema  Neuro: Asleep but easily awoken, interactive today, but difficult communication     Lines/Tubes:   Peripheral IV 03/24/17 Left Upper arm (Active)   Site Assessment [facility] 3/27/2017  4:00 AM   Line Status Saline locked 3/27/2017  4:00 AM   Phlebitis Scale 0-->no symptoms 3/27/2017  4:00 AM   Infiltration Scale 0 3/27/2017  4:00 AM   Extravasation? No 3/27/2017  4:00 AM   Number of days:3       PICC Double Lumen 03/24/17 Right Basilic (Active)   Site Assessment [facility] 3/27/2017  4:00 AM   External Cath Length (cm) 3 cm 3/20/2017 12:00 AM   Extremity Circumference (cm) 29 cm 3/20/2017 12:00 AM   Dressing Intervention Chlorhexidine patch;Transparent;Securing device;New dressing 3/20/2017 12:00 AM   Dressing Change Due 04/01/17 3/26/2017 10:00 PM   Number of days:3     Labs & Studies of Note:   Labs and images from the past 24 hours reviewed and notable for stable CBC and BMP    UA RESULTS:  Recent Labs   Lab Test  03/31/17   1400   COLOR  Straw   APPEARANCE  Clear   URINEGLC  Negative   URINEBILI  Negative   URINEKETONE  Negative   SG  1.009   UBLD  Moderate*   URINEPH  6.5   PROTEIN  10*   NITRITE  Negative   LEUKEST  Negative   RBCU  20*   WBCU  1     Medications list for Reference   Current Facility-Administered Medications   Medication     oxyCODONE (ROXICODONE) IR tablet 5 mg     methylnaltrexone (RELISTOR) injection 8 mg     omeprazole (priLOSEC) suspension 20 mg     sodium phosphate (FLEET ENEMA) 1 enema     acetaminophen (TYLENOL) solution 325 mg     zinc sulfate (20 mg [place]. Zn/mL) solution 220 mg     QUEtiapine (SEROquel) tablet 25 mg     lidocaine (LMX4) kit     heparin lock flush 10 UNIT/ML injection 2-5 mL     sodium chloride (PF) 0.9% PF flush 10-20 mL     heparin lock flush 10  UNIT/ML injection 5-10 mL     heparin lock flush 10 UNIT/ML injection 5-10 mL     morphine solution 10 mg     acetaminophen (TYLENOL) solution 650 mg     albuterol neb solution 2.5 mg     bisacodyl (DULCOLAX) Suppository 10 mg     budesonide (PULMICORT) neb solution 0.5 mg     calcium carbonate (TUMS) chewable tablet 500 mg     clonazePAM (klonoPIN) suspension 1.5 mg     fexofenadine (ALLEGRA) tablet 180 mg     fiber modular (NUTRISOURCE FIBER) packet 1 packet     gabapentin (NEURONTIN) solution 300 mg     HYDROmorphone (DILAUDID) liquid 1 mg     ipratropium (ATROVENT) 0.02 % neb solution 0.5 mg     lactobacillus rhamnosus (GG) (CULTURELL) capsule 1 capsule     levalbuterol (XOPENEX) neb solution 1.25 mg     lidocaine (LIDODERM) 5 % Patch 1-2 patch     lidocaine (XYLOCAINE) 2 % 15 mL, alum & mag hydroxide-simethicone (MYLANTA ES/MAALOX  ES) 15 mL GI Cocktail     LORazepam (ATIVAN) 2 MG/ML (HIGH CONC) solution 0.5 mg     melatonin liquid 3 mg     menthol-zinc oxide (CALMOSEPTINE) 0.44-20.625 % ointment     polyethylene glycol (MIRALAX/GLYCOLAX) Packet 17 g     polyethylene glycol 0.4%- propylene glycol 0.3% (SYSTANE ULTRA) ophthalmic solution 1-2 drop     predniSONE (DELTASONE) tablet 20 mg     QUEtiapine (SEROquel) tablet 25 mg     senna-docusate (SENOKOT-S;PERICOLACE) 8.6-50 MG per tablet 2 tablet     sertraline (ZOLOFT) 20 MG/ML (HIGH CONC) solution 150 mg     sodium chloride (OCEAN) 0.65 % nasal spray 1 spray     naloxone (NARCAN) injection 0.1-0.4 mg     enoxaparin (LOVENOX) injection 40 mg     ondansetron (ZOFRAN-ODT) ODT tab 4 mg    Or     ondansetron (ZOFRAN) injection 4 mg     potassium chloride SA (K-DUR/KLOR-CON M) CR tablet 20-40 mEq     potassium chloride (KLOR-CON) Packet 20-40 mEq     potassium chloride 10 mEq in 100 mL intermittent infusion     potassium chloride 10 mEq in 100 mL intermittent infusion with 10 mg lidocaine     potassium chloride 20 mEq in 50 mL intermittent infusion     magnesium  sulfate 2 g in NS intermittent infusion (PharMEDium or FV Cmpd)     magnesium sulfate 4 g in 100 mL sterile water (premade)     sodium phosphate 10 mmol in D5W intermittent infusion     sodium phosphate 15 mmol in D5W intermittent infusion     sodium phosphate 20 mmol in D5W intermittent infusion     sodium phosphate 25 mmol in D5W intermittent infusion     glucose 40 % gel 15-30 g    Or     dextrose 50 % injection 25-50 mL    Or     glucagon injection 1 mg     insulin aspart (NovoLOG) inj (RAPID ACTING)     lidocaine (LIDODERM) patch REMOVAL     lidocaine (LIDODERM) Patch in Place     guaiFENesin (ROBITUSSIN) 20 mg/mL solution 10 mL     multivitamins with minerals (CERTAVITE/CEROVITE) liquid 15 mL       Pt was seen and examined with Dr. Mo; confirmed cardiac, pulmonary, and abdominal exam findings;  I agree with the detailed A/P as documented above.    Elda Hansen MD

## 2017-04-01 NOTE — PLAN OF CARE
"Problem: Goal Outcome Summary  Goal: Goal Outcome Summary  Outcome: No Change  /70 (BP Location: Left arm)  Pulse 100  Temp 99.2  F (37.3  C) (Axillary)  Resp 14  Ht 1.626 m (5' 4\")  Wt 62.5 kg (137 lb 12.6 oz)  SpO2 100%  BMI 23.65 kg/m2  Tolerating home vent with 5L O2 bleed in. Tmax 99.2. A/O x 3. Continues to have the fixed gaze, minimally interactive. Follows commands. Intermittently anxious. Complains of pain in abdomen; scheduled pain meds given, IV dilaudid given x 1. Avendaño patent with adequate UOP; UA sent. Incontinent of stool x 2. K replaced. Complained of nausea this evening; zofran given. Abdomen slightly firm. Coccyx wound with dressing CDI. Skin tears on arms.  G/J tube- TF at goal, G tube to gravity with green drainage. Trach with minimal white/yellow secretions. Q2H turns. Family at bedside this evening. Continue with POC, notify MD of any changes.       "

## 2017-04-02 NOTE — PROGRESS NOTES
Internal Medicine [facility] Progress Note   Date of Service: 3/27/2017    Patient: Mary Wang  MRN: [id number]  Admission Date: 3/20/2017  Hospital Day # 12    Assessment & Plan:   Mary Wang is a 67 year old female with a history of severe COPD s/p tracheostomy on home ventilator, GJ-tube dependence, anxiety, HTN, chronic indwelling bender, and achalasia who presented with malaise and bender catheter pain, admitted to the ICU on 3/21/17 for sepsis from likely urinary source with severe metabolic derangements, now stable on IV ampicillin/sulbactam.      # Sepsis secondary to Proteus from Urinary Source - Mary presented with a leukocytosis, hypotension, and tachypnea concerning for sepsis.  Initial evaluation raised concern for urinary versus HCAP source due to dirty UA and new infiltrate on CXR.  Cultures were obtained however CXR infiltrate resolved rapidly which is less consistent with HCAP.  Initial care in ICU with IVF resuscitation, once stabilized was transferred to the floor.  Given changes with CXR, urine was felt to be most consistent source of infection and grew Proteus, susceptible to amp/sulbactam.  Mary was narrowed to amp/sulbactam on 3/26 from Meropenem.  Of note, Mary did grow Pseudomonas on her sputum sample but this was most consistent with colonization and not pathologic infection.  - Discont ampicillin/sulbactam 3/31   - Transition to enteral medications pending stable.  - Vancomycin discontinued 3/22, Meropenem discontinued 3/26  - Blood cultures, urine cultures, and sputum cultures, completed  - Vent settings and nebs as below  - Monitor blood pressures and gases, stable      # Abdominal Pain Likely 2/2 Gastroparesis versus Gastric Outlet Obstruction - No acute etiology on CT scan on 3/23.  Suspect due to gastroparesis versus gastric outlet obstruction. GI assisting with PEG-J adjustment as well.  Constipation likely contributory.   - TF changed to continuous by  J-port  - TF to Impact Peptide (fiber-free, high PRO, and fish oil given wound and CRP of 22.0) @ new goal 50 ml/hr (1200 ml/day), per nutrition recs  - G-tube vented with aspiration daily  - GI consulted  - Fleets enema PRN  - Cont bowel regimen, BID miralax via J tube  - Scheduled acetaminophen Q4H and Oxycodone PRN  - H pylori antigen pending; treat for 14 days with triple therapy if positive  - change H2 blocker to PPI for 6-8 weeks for empiric treatment of gastritis/gastric ulceration from G tube balloon pressure  - Short trial of methylnaltrexone  - cont venting G tube BID    # Chronic Hypoxic, Hypercarbic Respiratory Failure  # Severe COPD s/p Tracheostomy with Ventilator Dependence  Admission CXR with increased RLL opacities but resolved on recheck and cultures most likely represent colonization..  - Home vent settings: CMV 14/400/5/45%. On home vent since AM of 3/23  - Continue home prednisone and nebs   - Consider stress dose steroids for any further hypotension  - Palliative team involved, appreciate assistance  - Procal, CRP  - Chest Xray  - Sputum culture  - VBG      # Acute on Chronic Normocytic Anemia: Stable  Likely hemoconcentrated on admission, was close to baseline without signs of bleeding after fluid replacement. Also compounded by frequent blood draws and poor reticulocyte response. Responded well to 1 Unit of pRBC on 3/26  - CBC daily     # Hypovolemic Hyponatremia and Electrolyte Abnormalities: Resolved  - D/C IVF, monitor for s/sx of hypovolemia  - Electrolyte protocol for hypokalemia      # Steroid-Induced Hyperglycemia - Monitoring closely, well controlled at present.  - Low dose SSI  - Hypoglycemia Protocol  - q4h BG checks on TF      # Sacrococcygeal Ulcer:  One stage III pressure ulcer on her sacrococcygeal area.  No improvement reported for several months, present on admission. No signs of acute infection.  - WOC consulted to assist with management  - Regular assessment needed by  "nursing and MD  - Appropriate turning and postioning program  - Appropriate wound care and management of moisture and incontinence  - Patient will need group 2 mattress as group 1 has failed previously     ===== Chronic Medical Problems =====  # CAD - History of NSTEMI, no meds currently  # Generalized Anxiety Disorder and Air Hunger -  Continue home clonazepam, morphine, quetiapine, sertraline, hydromorphone PRN and lorazepam PRN  # Achalasia and GERD with GJ-Tube dependence - Home TF per nutrition, discontinued H2 blocker and start PPI with GI cocktail PRN   # FEN: TF per regimen above,  Nutrition consulted  # PPx: Enoxaparin and H2 blocker switched to PPI on 03/30  # Code Status: DNR  # Dispo: Inpatient admission for IV antibiotics pending culture results, anticipate discharge in 1-2 days.     Patient was seen and discussed with Dr. Hansen who agreed with the above.    Celestino Mo MD, A  Internal Medicine PGY-1  [facility]  Pager: [phone]  ___________________________________________________________________    Subjective & Interval Hx:  Had an episode this morning where she became acutely short of breath, requiring 10L, needing suctioning then back on vent; now down to 2L. Seems more sleepy per nursing as well.    ROS: The remainder of a 10 point ROS is negative unless otherwise noted above.    Medications: Reviewed in EPIC. List below for reference    Physical Exam:    Blood pressure (!) 182/96, pulse 92, temperature 99  F (37.2  C), temperature source Oral, resp. rate 14, height 1.626 m (5' 4\"), weight 62.5 kg (137 lb 12.6 oz), SpO2 96 %, not currently breastfeeding.    Gen: Asleep but easily awoken female in increased resp distress  Resp: Bibasilar crackles with expiratory wheezes, no rhonchi, tracheostomy and vent dependent.  CV: RRR, no m/r/g  Abd: GJ with TF in J port, soft abdomen with left lower quadrate and epigastric tenderness to palpation, no rebound or " guarding.  Back: Unstageable pressure ulcer over the sacrococcygeal area  Extrem: Warm and well perfused, no LE edema  Neuro: Asleep but easily awoken, interactive today, but difficult communication     Lines/Tubes:   Peripheral IV 03/24/17 Left Upper arm (Active)   Site Assessment WDL 3/27/2017  4:00 AM   Line Status Saline locked 3/27/2017  4:00 AM   Phlebitis Scale 0-->no symptoms 3/27/2017  4:00 AM   Infiltration Scale 0 3/27/2017  4:00 AM   Extravasation? No 3/27/2017  4:00 AM   Number of days:3       PICC Double Lumen 03/24/17 Right Basilic (Active)   Site Assessment [facility] 3/27/2017  4:00 AM   External Cath Length (cm) 3 cm 3/20/2017 12:00 AM   Extremity Circumference (cm) 29 cm 3/20/2017 12:00 AM   Dressing Intervention Chlorhexidine patch;Transparent;Securing device;New dressing 3/20/2017 12:00 AM   Dressing Change Due 04/01/17 3/26/2017 10:00 PM   Number of days:3     Labs & Studies of Note:   Labs and images from the past 24 hours reviewed and notable for stable CBC and BMP  Medications list for Reference   Current Facility-Administered Medications   Medication     doxycycline (VIBRAMYCIN) syrup 100 mg     oxyCODONE (ROXICODONE) IR tablet 5 mg     methylnaltrexone (RELISTOR) injection 8 mg     omeprazole (priLOSEC) suspension 20 mg     sodium phosphate (FLEET ENEMA) 1 enema     acetaminophen (TYLENOL) solution 325 mg     zinc sulfate (20 mg [place]. Zn/mL) solution 220 mg     QUEtiapine (SEROquel) tablet 25 mg     lidocaine (LMX4) kit     heparin lock flush 10 UNIT/ML injection 2-5 mL     sodium chloride (PF) 0.9% PF flush 10-20 mL     heparin lock flush 10 UNIT/ML injection 5-10 mL     heparin lock flush 10 UNIT/ML injection 5-10 mL     morphine solution 10 mg     acetaminophen (TYLENOL) solution 650 mg     albuterol neb solution 2.5 mg     bisacodyl (DULCOLAX) Suppository 10 mg     budesonide (PULMICORT) neb solution 0.5 mg     calcium carbonate (TUMS) chewable tablet 500 mg     clonazePAM (klonoPIN)  suspension 1.5 mg     fexofenadine (ALLEGRA) tablet 180 mg     fiber modular (NUTRISOURCE FIBER) packet 1 packet     gabapentin (NEURONTIN) solution 300 mg     HYDROmorphone (DILAUDID) liquid 1 mg     ipratropium (ATROVENT) 0.02 % neb solution 0.5 mg     lactobacillus rhamnosus (GG) (CULTURELL) capsule 1 capsule     levalbuterol (XOPENEX) neb solution 1.25 mg     lidocaine (LIDODERM) 5 % Patch 1-2 patch     lidocaine (XYLOCAINE) 2 % 15 mL, alum & mag hydroxide-simethicone (MYLANTA ES/MAALOX  ES) 15 mL GI Cocktail     LORazepam (ATIVAN) 2 MG/ML (HIGH CONC) solution 0.5 mg     melatonin liquid 3 mg     menthol-zinc oxide (CALMOSEPTINE) 0.44-20.625 % ointment     polyethylene glycol (MIRALAX/GLYCOLAX) Packet 17 g     polyethylene glycol 0.4%- propylene glycol 0.3% (SYSTANE ULTRA) ophthalmic solution 1-2 drop     predniSONE (DELTASONE) tablet 20 mg     QUEtiapine (SEROquel) tablet 25 mg     senna-docusate (SENOKOT-S;PERICOLACE) 8.6-50 MG per tablet 2 tablet     sertraline (ZOLOFT) 20 MG/ML (HIGH CONC) solution 150 mg     sodium chloride (OCEAN) 0.65 % nasal spray 1 spray     naloxone (NARCAN) injection 0.1-0.4 mg     enoxaparin (LOVENOX) injection 40 mg     ondansetron (ZOFRAN-ODT) ODT tab 4 mg    Or     ondansetron (ZOFRAN) injection 4 mg     potassium chloride SA (K-DUR/KLOR-CON M) CR tablet 20-40 mEq     potassium chloride (KLOR-CON) Packet 20-40 mEq     potassium chloride 10 mEq in 100 mL intermittent infusion     potassium chloride 10 mEq in 100 mL intermittent infusion with 10 mg lidocaine     potassium chloride 20 mEq in 50 mL intermittent infusion     magnesium sulfate 2 g in NS intermittent infusion (PharMEDium or FV Cmpd)     magnesium sulfate 4 g in 100 mL sterile water (premade)     sodium phosphate 10 mmol in D5W intermittent infusion     sodium phosphate 15 mmol in D5W intermittent infusion     sodium phosphate 20 mmol in D5W intermittent infusion     sodium phosphate 25 mmol in D5W intermittent infusion      glucose 40 % gel 15-30 g    Or     dextrose 50 % injection 25-50 mL    Or     glucagon injection 1 mg     insulin aspart (NovoLOG) inj (RAPID ACTING)     lidocaine (LIDODERM) patch REMOVAL     lidocaine (LIDODERM) Patch in Place     guaiFENesin (ROBITUSSIN) 20 mg/mL solution 10 mL     multivitamins with minerals (CERTAVITE/CEROVITE) liquid 15 mL     Pt was seen and examined with Dr. Mo; confirmed cardiac, pulmonary and abdominal exam findings;  I agree with the detailed A/P as documented above    Elda Hansen MD

## 2017-04-02 NOTE — PLAN OF CARE
"Problem: Goal Outcome Summary  Goal: Goal Outcome Summary  Outcome: No Change  /83 (BP Location: Left arm)  Pulse 92  Temp 98.1  F (36.7  C) (Oral)  Resp 14  Ht 1.626 m (5' 4\")  Wt 62.5 kg (137 lb 12.6 oz)  SpO2 96%  BMI 23.65 kg/m2     Care provided from [id number]. VSS, afebrile, chronic vent 3L. A & O 2-3; lethargic at times, anxious, fixed guaze/ hypoactive. Pain expressed in abdominal region. Catheter suction with RT X2 with moderate amount of secretions. PRN ativan given X1, no PRN pain meds given.Adequate urine output via bender. X1 loose incontinenti BM. G tube to gravity. J tube feeding running at 50ml/hr. Continue to monitor and notify care team of any changes.       "

## 2017-04-02 NOTE — PROGRESS NOTES
0840: Pt desat to 80% on home vent. RN bagged pt and deep suctioned with assistance of another RN; large amount of creamy secretions. Pt's sats came back to 97% and pt placed back on vent.     0845: Pt O2 sats 89-90% on baselie 3 L O2 on vent. RT called and came to bedside. Deep suctioned again with scant secretions.    0920: Pt still with O2 sats 89-90% on 3 L. Turned up to 10 L and MD called. MD at bedside; VBG and CXR ordered. Will slowly wean O2 down as pt tolerates and continue to monitor.

## 2017-04-02 NOTE — PROGRESS NOTES
[facility] Daily Progress Note    S:  Had an episode this morning where she became acutely short of breath, requiring 10L, needing suctioning then back on vent; now down to 2L.  Seems more sleepy per nursing as well.      O:  B/P: 167/89, T: 98.6, P: 92, R: 14  Gen:   Moaning; difficult to assess  Cor:  RRR  Lungs:  Coarse breath sounds bilaterally  Abd +BS, NT  Ext no edema    A/P:  67 year old female with a past medical history of Vent dependence, Achalasia; Anxiety; Anxiety and depression; Chronic pain; COPD (chronic obstructive pulmonary disease) (H); GERD (gastroesophageal reflux disease); Hypertension; Lung nodule; Stress-induced cardiomyopathy; TMJ pain dysfunction syndrome; and Tracheostomy in place.       Given recent episode will obtain CXR and VBG    Pt was seen and examined with  ***.  I agree with their documentation above    Elda Hansen MD

## 2017-04-03 NOTE — DOWNTIME EVENT NOTE
The EMR was down for 3 hours on 4/2/2017.    Glil Duong was responsible for completing the paper charting during this time period.     The following information was re-entered into the system by Emilia Perla: MAR    The following information will remain in the paper chart: End of note progress note written by Gill Perla  4/2/2017

## 2017-04-03 NOTE — PROVIDER NOTIFICATION
Provider notified of VBG results: pH 7.31, pCO2 74, PO2 44, Bicarb 37. MD aware. Will continue to monitor.

## 2017-04-03 NOTE — PLAN OF CARE
Problem: Goal Outcome Summary  Goal: Goal Outcome Summary  Outcome: No Change  Neuro: Pt is lethargic and withdrawn at times. Pt more alert this afternoon.   Cardiac: SR to ST. VSS. Blood pressures elevate when anxious.    Respiratory: Sating>95% on 3L on the vent.  GI/: Adequate urine output. Avendaño in place. BM X2  Diet/appetite: NPO + TF @ goal of 50mls/hr.   Activity:  Assist of 2. Reposition q2hrs.  Pain: PRN dilaudid given (See MAR) for abdominal pain. At acceptable level on current regimen.   Skin: Mepilex changed to coccyx and site cleaned per wound care.     Pt triggered sepsis protocol, lactic 0.9. US of arm, negative for thrombus. Family at bedside.      R: Continue with POC. Notify primary team with changes.

## 2017-04-03 NOTE — PLAN OF CARE
"Problem: Goal Outcome Summary  Goal: Goal Outcome Summary  Outcome: No Change  /61 (BP Location: Left arm)  Pulse 92  Temp 98.2  F (36.8  C) (Axillary)  Resp 18  Ht 1.626 m (5' 4\")  Wt 60 kg (132 lb 4.4 oz)  SpO2 98%  BMI 22.71 kg/m2     Care provided from [id number]. VSS, afebrile, chronic vent 3L. A & O 2; lethargic throughout night. When alert very anxious. Had two episodes of desaturation in the mid 80's where bagging and suctioning was required. Pt recovered quickly. Suctioned X4 with moderate thin secretions. Adequate urine output via bender. No BM. G tube to gravity. J tube with continuous feeding running at 50 ml/hr. PRN dilaudid given X3 throughout night for dyspnea and pain. Continue to monitor and notify care team of any changes.       "

## 2017-04-03 NOTE — PROVIDER NOTIFICATION
Pt right upper extremitiy more swollen and cool to touch than left. Provider notified. Ultrasound order for arm. Will continue to monitor.

## 2017-04-04 NOTE — PROGRESS NOTES
Internal Medicine [facility] Progress Note   Date of Service: 4/4/2017      Patient: Mary Wang  MRN: [id number]  Admission Date: 3/20/2017  Hospital Day # 14    Assessment & Plan:   Mary Wang is a 67 year old female with a history of severe COPD s/p tracheostomy on home ventilator, GJ-tube dependence, anxiety, HTN, chronic indwelling bender, and achalasia who presented with malaise and bender catheter pain, admitted to the ICU on 3/21/17 for sepsis from likely urinary source with severe metabolic derangements resolving with antibiotics.  Now with worsening respiratory failure concerned for HCAP vs COPD exacaertion vs increased rentention.        # Chronic Hypoxic, Hypercarbic Respiratory Failure  # Severe COPD s/p Tracheostomy with Ventilator Dependence  Admission CXR with increased RLL opacities but resolved on recheck and cultures most likely represent colonization..  - Home vent settings: CMV 14/400/5/45%. On home vent since AM of 3/23, respiratory distress with increased procal on 4/2 concerning for HCAP vs worsening COPD. Responding to steroid burst and started on broad spectrum abx. Gram stain growing gram negative rods.   - Continue home nebulizers  - Palliative team involved, appreciate assistance --. Ongoing discussion with family about future goals.   - complete 7d course meropenem end date 4/9   - Continue steroid burst 40 mg day 2   - Scheduled nebs        # Abdominal Pain Likely 2/2 Gastroparesis versus Gastric Outlet Obstruction - No acute etiology on CT scan on 3/23.  Suspect due to gastroparesis versus gastric outlet obstruction. GI assisting with PEG-J adjustment as well.  Constipation likely contributory.   - TF changed to continuous by J-port  - TF to Impact Peptide (fiber-free, high PRO, and fish oil given wound and CRP of 22.0) @ new goal 50 ml/hr (1200 ml/day), per nutrition recs  - G-tube vented with aspiration daily  - GI consulted  - Fleets enema PRN  - Cont bowel regimen,  BID miralax via J tube  - Scheduled acetaminophen Q4H and Oxycodone PRN  - H pylori antigen pending; treat for 14 days with triple therapy if positive  - change H2 blocker to PPI for 6-8 weeks for empiric treatment of gastritis/gastric ulceration from G tube balloon pressure  - Short trial of methylnaltrexone  - cont venting G tube BID    # Sepsis secondary to Proteus from Urinary Source - Mary presented with a leukocytosis, hypotension, and tachypnea concerning for sepsis.  Initial evaluation raised concern for urinary versus HCAP source due to dirty UA and new infiltrate on CXR.  Cultures were obtained however CXR infiltrate resolved rapidly which is less consistent with HCAP.  Initial care in ICU with IVF resuscitation, once stabilized was transferred to the floor.  Given changes with CXR, urine was felt to be most consistent source of infection and grew Proteus, susceptible to amp/sulbactam.  Mary was narrowed to amp/sulbactam on 3/26 from Meropenem.        # Acute on Chronic Normocytic Anemia: Stable  Likely hemoconcentrated on admission, was close to baseline without signs of bleeding after fluid replacement. Also compounded by frequent blood draws and poor reticulocyte response. Responded well to 1 Unit of pRBC on 3/26  - CBC daily     # Hypovolemic Hyponatremia and Electrolyte Abnormalities: Resolved  - D/C IVF, monitor for s/sx of hypovolemia  - Electrolyte protocol for hypokalemia      # Steroid-Induced Hyperglycemia - Monitoring closely, well controlled at present.  - Low dose SSI  - Hypoglycemia Protocol  - q4h BG checks on TF      # Sacrococcygeal Ulcer:  One stage III pressure ulcer on her sacrococcygeal area.  No improvement reported for several months, present on admission. No signs of acute infection.  - WOC consulted to assist with management  - Regular assessment needed by nursing and MD  - Appropriate turning and postioning program  - Appropriate wound care and management of moisture and  "incontinence  - Patient will need group 2 mattress as group 1 has failed previously     ===== Chronic Medical Problems =====  # CAD - History of NSTEMI, no meds currently  # Generalized Anxiety Disorder and Air Hunger -  Continue home clonazepam, morphine, quetiapine, sertraline, hydromorphone PRN and lorazepam PRN  # Achalasia and GERD with GJ-Tube dependence - Home TF per nutrition, discontinued H2 blocker and start PPI with GI cocktail PRN   # FEN: TF per regimen above,  Nutrition consulted  # PPx: Enoxaparin and H2 blocker switched to PPI on 03/30  # Code Status: DNR  # Dispo: Inpatient admission for IV antibiotics pending culture results, anticipate discharge in 1-2 days.     Patient was seen and discussed with Dr. Crespo who agreed with the above.    Clementine Sams MD  MedPeds PGY1  [phone]  ___________________________________________________________________    Subjective & Interval Hx:  No acute events overnight. Discussed with family, César, daughter this morning in regards to returning to the group home to complete antibiotic course.     ROS: The remainder of a 10 point ROS is negative unless otherwise noted above.    Medications: Reviewed in EPIC. List below for reference    Physical Exam:    Blood pressure 119/74, pulse 122, temperature 98  F (36.7  C), temperature source Axillary, resp. rate 19, height 1.626 m (5' 4\"), weight 60.5 kg (133 lb 6.1 oz), SpO2 95 %, not currently breastfeeding.    Gen: NAD, pleasant, non toxic   Resp: Bibasilar crackles with expiratory wheezes, no rhonchi, tracheostomy and vent dependent.  CV: RRR, no m/r/g  Abd: GJ with TF in J port, soft abdomen slight tenderness to palpation, no rebound or guarding.  Back: Unstageable pressure ulcer over the sacrococcygeal area  Extrem: Warm and well perfused, no LE edema  Neuro: Asleep but easily awoken, but difficult communication     Lines/Tubes:   Peripheral IV 03/24/17 Left Upper arm (Active)   Site Assessment WDL 3/27/2017  4:00 " AM   Line Status Saline locked 3/27/2017  4:00 AM   Phlebitis Scale 0-->no symptoms 3/27/2017  4:00 AM   Infiltration Scale 0 3/27/2017  4:00 AM   Extravasation? No 3/27/2017  4:00 AM   Number of days:3       PICC Double Lumen 03/24/17 Right Basilic (Active)   Site Assessment WDL 3/27/2017  4:00 AM   External Cath Length (cm) 3 cm 3/20/2017 12:00 AM   Extremity Circumference (cm) 29 cm 3/20/2017 12:00 AM   Dressing Intervention Chlorhexidine patch;Transparent;Securing device;New dressing 3/20/2017 12:00 AM   Dressing Change Due 04/01/17 3/26/2017 10:00 PM   Number of days:3     Labs & Studies of Note:   Labs and images from the past 24 hours reviewed and notable for sputum growing gram negative non fermenting rods.   Medications list for Reference   Current Facility-Administered Medications   Medication     protein modular (BENEPROTEIN) packet 1 packet     vitamin A-D & C drops (TRI-VI-SOL) drops SOLN 13 mL     [START ON 4/9/2017] zinc sulfate (20 mg [place]. Zn/mL) solution 220 mg     predniSONE (DELTASONE) tablet 40 mg     ipratropium (ATROVENT) 0.02 % neb solution 0.5 mg     levalbuterol (XOPENEX) neb solution 1.25 mg     meropenem (MERREM) 1 g vial to attach to  mL bag     oxyCODONE (ROXICODONE) IR tablet 5 mg     methylnaltrexone (RELISTOR) injection 8 mg     omeprazole (priLOSEC) suspension 20 mg     sodium phosphate (FLEET ENEMA) 1 enema     acetaminophen (TYLENOL) solution 325 mg     zinc sulfate (20 mg [place]. Zn/mL) solution 220 mg     QUEtiapine (SEROquel) tablet 25 mg     lidocaine (LMX4) kit     sodium chloride (PF) 0.9% PF flush 10-20 mL     heparin lock flush 10 UNIT/ML injection 5-10 mL     heparin lock flush 10 UNIT/ML injection 5-10 mL     morphine solution 10 mg     acetaminophen (TYLENOL) solution 650 mg     albuterol neb solution 2.5 mg     bisacodyl (DULCOLAX) Suppository 10 mg     budesonide (PULMICORT) neb solution 0.5 mg     calcium carbonate (TUMS) chewable tablet 500 mg     clonazePAM  (klonoPIN) suspension 1.5 mg     fexofenadine (ALLEGRA) tablet 180 mg     fiber modular (NUTRISOURCE FIBER) packet 1 packet     gabapentin (NEURONTIN) solution 300 mg     HYDROmorphone (DILAUDID) liquid 1 mg     lactobacillus rhamnosus (GG) (CULTURELL) capsule 1 capsule     lidocaine (LIDODERM) 5 % Patch 1-2 patch     lidocaine (XYLOCAINE) 2 % 15 mL, alum & mag hydroxide-simethicone (MYLANTA ES/MAALOX  ES) 15 mL GI Cocktail     LORazepam (ATIVAN) 2 MG/ML (HIGH CONC) solution 0.5 mg     melatonin liquid 3 mg     menthol-zinc oxide (CALMOSEPTINE) 0.44-20.625 % ointment     polyethylene glycol (MIRALAX/GLYCOLAX) Packet 17 g     polyethylene glycol 0.4%- propylene glycol 0.3% (SYSTANE ULTRA) ophthalmic solution 1-2 drop     QUEtiapine (SEROquel) tablet 25 mg     senna-docusate (SENOKOT-S;PERICOLACE) 8.6-50 MG per tablet 2 tablet     sertraline (ZOLOFT) 20 MG/ML (HIGH CONC) solution 150 mg     sodium chloride (OCEAN) 0.65 % nasal spray 1 spray     naloxone (NARCAN) injection 0.1-0.4 mg     enoxaparin (LOVENOX) injection 40 mg     ondansetron (ZOFRAN-ODT) ODT tab 4 mg    Or     ondansetron (ZOFRAN) injection 4 mg     potassium chloride SA (K-DUR/KLOR-CON M) CR tablet 20-40 mEq     potassium chloride (KLOR-CON) Packet 20-40 mEq     potassium chloride 10 mEq in 100 mL intermittent infusion     potassium chloride 10 mEq in 100 mL intermittent infusion with 10 mg lidocaine     potassium chloride 20 mEq in 50 mL intermittent infusion     magnesium sulfate 2 g in NS intermittent infusion ([facility] or FV Cmpd)     magnesium sulfate 4 g in 100 mL sterile water (premade)     sodium phosphate 10 mmol in D5W intermittent infusion     sodium phosphate 15 mmol in D5W intermittent infusion     sodium phosphate 20 mmol in D5W intermittent infusion     sodium phosphate 25 mmol in D5W intermittent infusion     glucose 40 % gel 15-30 g    Or     dextrose 50 % injection 25-50 mL    Or     glucagon injection 1 mg     insulin aspart  (NovoLOG) inj (RAPID ACTING)     lidocaine (LIDODERM) patch REMOVAL     lidocaine (LIDODERM) Patch in Place     guaiFENesin (ROBITUSSIN) 20 mg/mL solution 10 mL     multivitamins with minerals (CERTAVITE/CEROVITE) liquid 15 mL     Internal Medicine Staff Addendum  Date of Service: 4/4/2017  I have seen and examined Ms Wang, reviewed the data and discussed the plan of care with the care team on rounds.  I agree with the above documentation with the additions/changes to the ROS, HPI, Exam or data (including my edits in italics):    I discussed pt's care with bedside RN, case management/social work today.  I personally reviewed, labs, medications and past 24 hr notes.  Assessment/Plan/Diagnoses: plan/dx as above, which contains my edits and reflects our joint medical decision-making.     Arley Crespo MD PhD  Internal Medicine Hospitalist & Staff Physician   of Internal Medicine   [facility]  Pager: [phone]

## 2017-04-04 NOTE — PLAN OF CARE
Problem: Goal Outcome Summary  Goal: Goal Outcome Summary  Outcome: No Change  Pt more sleepy and withdrawn today on and off.  Pulse trending upward, other vitals stable.  No BM today, urine output on the lower side, team aware.  Tube feeds returned to maintenance feed, same rate.  Only complaint today was stomach discomfort.  Zofran given with little relief.  Will continue to monitor.

## 2017-04-04 NOTE — PROGRESS NOTES
CLINICAL NUTRITION SERVICES - REASSESSMENT NOTE     Nutrition Prescription    RECOMMENDATIONS FOR MDs/PROVIDERS TO ORDER:  None at this time.    Malnutrition Status:    Criteria no longer met, but at risk.    Recommendations already ordered by Registered Dietitian (RD):  Given reduced inflammation (CRP), changed back to maintenance formula: Isosource 1.5 @ goal 50 ml/hr + 2 packets Beneprotein to provide 1850 kcals (32 kcal/kg), 94 g PRO (1.6 g/kg), 912 ml free H2O, 211 g CHO and 18 g Fiber daily per 58 kg.    Trivisol 13 mL/day x 10 days (4/5-15) for ongoing wound healing support.    Reordered Zn Sulfate x 10 days (4/9-19).    Future/Additional Recommendations:  Ongoing EN tolerance and adjustments pending tolerance and wound healing.       EVALUATION OF THE PROGRESS TOWARD GOALS   Diet: NPO  Nutrition Support: Impact Peptide @ 50 mL/hr, + 3 packets Nutrisource fiber daily per day.  Intake: Received ave of 1661 kcals (29 kcal/kg/day) and 102 g PRO (1.5 g/kg/day) daily the last 7 days.       NEW FINDINGS   -Labs & Wound: CRP 22.0 -> 10.0 downtrending (almost normal). Got a good ~7 days of immuno-nutrition. Per WOCN 4/4: PI located on Sacrococcygeal area : Unstageable with nonviable tissue softening, yellow slough remains firmly adherent to wound bed. Continues to evolve. Getting a MVI and Zn Sulfate daily (ending 4/8). Will reorder vitamins (see above).    MALNUTRITION  % Intake: Decreased intake does not meet criteria  % Weight Loss: None noted  Subcutaneous Fat Loss: None observed  Muscle Loss: Patellar region and Posterior calf: Mild  Fluid Accumulation/Edema: None noted  Malnutrition Diagnosis: Patient does not meet two of the above criteria necessary for diagnosing malnutrition    Previous Goals   Total avg nutritional intake to meet a minimum of 25 kcal/kg and 1.2 g PRO/kg daily (per dosing wt 58 kg).  Evaluation: Met    Previous Nutrition Diagnosis  Inadequate protein-energy intake  Evaluation:  Resolved    CURRENT NUTRITION DIAGNOSIS  Predicted inadequate nutrient intake (protein-energy) related to increased needs to heal unstageable wound and possible to have interruptions to EN throughout this admission.     INTERVENTIONS  Implementation  Enteral Nutrition  Multi-trace element supplement therapy  Multivitamin/mineral supplement therapy    Goals  Total avg nutritional intake to meet a minimum of 25 kcal/kg and 1.2 g PRO/kg daily (per dosing wt 68 kg).    Monitoring/Evaluation  Progress toward goals will be monitored and evaluated per protocol.    Norman Cornelius RD, LD  6B Clinical Dietitian  Pager: [phone]  [facility] [phone]

## 2017-04-04 NOTE — PROGRESS NOTES
[facility] Nurse Inpatient Adult Pressure INJURY (PI) Wound Assessment     Follow up assessment of PU(s) on pt's:   Sacrococcygeal area     Data:   Patient History:      per MD note(s):  Mary Wang is a 67 year old female with a past medical history significant for severe COPD (Last FEV1 0.35) s/p tracheostomy on home ventilator and GJ tube placement, anxiety, hypertension, chronic indwelling bendre catheter, and achalasia who presented with malaise and bender catheter pain, found to have severe metabolic derangements and a leukocytosis concerning for sepsis.      Current mattress:  TIMOTHY  Current pressure relieving devices:  Foam dressing and Pillows    Moisture Management:  Incontinence Protocol    Catheter secured? Yes    Current Diet / Nutrition:     None      Jose F Assessment and sub scores:   Jose F Score  Av  Min: 13  Max: 13   Labs:   Recent Labs   Lab Test  17   0321   17   0638   16   0331   ALBUMIN  3.1*   --    --    --    --    < >   --    HGB  10.4*   < >  6.9*   < >  8.7*   < >  8.4*   INR   --    --   1.15*   --    --    < >   --    WBC  18.6*   --   10.6   < >  9.0   < >  11.8*   A1C   --    --    --    --    --    --   5.1   CRP   --    --    --    --   83.0*   < >   --     < > = values in this interval not displayed.                                                                                                                          Pressure Injury Assessment  (location #1):   Sacrococcygeal area    Wound History:   Present on admission     3/21/17                                                              3/28/17      Wound Base: 50% dark black/yellow misture of moist nonviable tissue, 50% pink granular tissue    Specific Dimensions (length x width x depth, in cm) :   5 x 1.6x 2cm    Tunneling:  N/A    Undermining: N/A    Palpation of the wound bed:  boggy and fluctuance    Slough appearance:  adherent and moist on proximal aspect  of the wound    Eschar appearance:  none    Periwound Skin:  Intact, minimal erythema    Color: red blanchable erythema    Temperature  normal     Drainage:  Amount: moderate,  Color: serous       Odor: none    Pain:  Unable to assess, intubated         Intervention:     Patient's chart evaluated.      Jose F Interventions:  Current Jose F Interventions and Care Plan reviewed and updated, appropriate at this time.    Wound was assessed.    Wound Care: was done:    Orders  Written    Supplies  Ordered: iodosorb gel    Discussed plan of care with Nurse           Assessment:     Pressure Injury (PI) located on Sacrococcygeal area : Unstageable    Status: wound  Follow up assessment, nonviable tissue softening, yellow slough remains firmly adherent to wound bed. Continues to evolve.     Present on admission         Plan:     Nursing to notify the Provider(s) and re-consult the WOC Nurse if wound(s) deteriorate(s).  Plan of care for wound located on Sacrococcygeal area every other day:   Cleanse the wound with microklenz moisten gauze pat dry  Apply a layer of iodosorb gel (#[id number]) at the base of the wound  Cover with Sacral Mepilex.    WOC Nurse will return: weekly  Face to face time: 20 mins

## 2017-04-04 NOTE — PLAN OF CARE
Problem: Goal Outcome Summary  Goal: Goal Outcome Summary  Outcome: No Change  Pt alert, intermittent lethargy. Unable to assess orientation. VSS. Trached with shiley #6. O2 sats stable on mechanical vent with 3L. Avendaño intact with adequate UOP. J tube intact with TF at goal of 50mL/hr. G tube intact to gravity. C/o of abd pain - PRN dilaudid administered with relief. Turn and repo Q2hrs. Will continue to monitor per POC.

## 2017-04-04 NOTE — PROGRESS NOTES
Care Coordinator- Discharge Planning     Admission Date/Time:  3/20/2017  Attending MD:  Arley Crespo, *     Data  Date of initial CC assessment:  Chart reviewed, discussed with interdisciplinary team.   Patient was admitted for:   1. NSTEMI (non-ST elevated myocardial infarction) (H)    2. Hyponatremia    3. Hypercarbia    4. Acute on chronic respiratory failure with hypoxia (H)    5. Centrilobular emphysema (H)           Assessment  Notified by MD that patient will be medically ready to discharge back to her group home tomorrow and on IV Merrem until Sunday 4-9. Call placed to [facility]  Arley ([phone]) to see if patient will have staff available to return. Arley states that most likely staff will be available. Writer to call him in the morning to arrange ride time.   Patient has been discharged on IV antibiotics in the past (most recently in February) and choice agency is [facility]. Referral placed and patient will be covered at 100%. Patients 24 hour nursing will administer the medication.   Bedside RN updated on discharge planning and MD to update patients daughter César.    Coordination of Care and Referrals: Provided patient/family with options for discharge     Plan  Anticipated Discharge Date: tomorrow  Anticipated Discharge Plan: back to group home with resumption of services    Catrina Campbell

## 2017-04-04 NOTE — PLAN OF CARE
"Problem: Goal Outcome Summary  Goal: Goal Outcome Summary  Outcome: No Change  /66  Pulse 122  Temp 98.5  F (36.9  C) (Axillary)  Resp 18  Ht 1.626 m (5' 4\")  Wt 60.5 kg (133 lb 6.1 oz)  SpO2 98%  BMI 22.89 kg/m2      Neuro: Pt lethargic/drowsy. Slept well overnight. PRN ativan given x1 for anxiety and per patient request  Cardiac: Sinus tach. SBP range between 90 and 140.   Respiratory: Bivona 6 in place. Trach cares performed at 0000. Sating>95% on 3L on patient's home vent. Suctioned x2 with scant output. Still need sputum sample.  GI/: Urine output adequate per bender catheter. 1 small BM. G tube draining to gravity- 100-200cc green bile q4h.   Diet/appetite: NPO + TF into J tube @ goal of 50mls/hr with 30cc flush q2h.  Activity: Assist of 2. Reposition q2hrs. Tolerating well  Pain: Denied pain overnight.    Skin: Mepilex on coccyx, CDI. Generalized bruising throughout.   Will continue to monitor and follow plan of care.       "

## 2017-04-04 NOTE — PROGRESS NOTES
Internal Medicine Amanda Progress Note   Date of Service: 4/3/2017    Patient: Mary Wang  MRN: [id number]  Admission Date: 3/20/2017  Hospital Day # 14    Assessment & Plan:   Mary Wang is a 67 year old female with a history of severe COPD s/p tracheostomy on home ventilator, GJ-tube dependence, anxiety, HTN, chronic indwelling bender, and achalasia who presented with malaise and bender catheter pain, admitted to the ICU on 3/21/17 for sepsis from likely urinary source with severe metabolic derangements resolving with antibiotics.  Now with worsening respiratory failure concerned for HCAP vs COPD exacerbation.        # Chronic Hypoxic, Hypercarbic Respiratory Failure  # Severe COPD s/p Tracheostomy with Ventilator Dependence  Admission CXR with increased RLL opacities but resolved on recheck and cultures most likely represent colonization..  - Home vent settings: CMV 14/400/5/45%. On home vent since AM of 3/23, respiratory distress with increased procal on 4/2 concerning for HCAP vs worsening COPD.  - Continue home prednisone and nebs  - Palliative team involved, appreciate assistance  - Procal, CRP elevated  - Cont Meropenum  - Start Steroid    - Scheduled nebs  - Chest Xray PRN  - Sputum culture  - VBG trend      # Abdominal Pain Likely 2/2 Gastroparesis versus Gastric Outlet Obstruction - No acute etiology on CT scan on 3/23.  Suspect due to gastroparesis versus gastric outlet obstruction. GI assisting with PEG-J adjustment as well.  Constipation likely contributory.   - TF changed to continuous by J-port  - TF to Impact Peptide (fiber-free, high PRO, and fish oil given wound and CRP of 22.0) @ new goal 50 ml/hr (1200 ml/day), per nutrition recs  - G-tube vented with aspiration daily  - GI consulted  - Fleets enema PRN  - Cont bowel regimen, BID miralax via J tube  - Scheduled acetaminophen Q4H and Oxycodone PRN  - H pylori antigen pending; treat for 14 days with triple therapy if  positive  - change H2 blocker to PPI for 6-8 weeks for empiric treatment of gastritis/gastric ulceration from G tube balloon pressure  - Short trial of methylnaltrexone  - cont venting G tube BID    # Sepsis secondary to Proteus from Urinary Source - Mary presented with a leukocytosis, hypotension, and tachypnea concerning for sepsis.  Initial evaluation raised concern for urinary versus HCAP source due to dirty UA and new infiltrate on CXR.  Cultures were obtained however CXR infiltrate resolved rapidly which is less consistent with HCAP.  Initial care in ICU with IVF resuscitation, once stabilized was transferred to the floor.  Given changes with CXR, urine was felt to be most consistent source of infection and grew Proteus, susceptible to amp/sulbactam.  Mary was narrowed to amp/sulbactam on 3/26 from Meropenem.  Of note, Mary did grow Pseudomonas on her sputum sample but this was most consistent with colonization and not pathologic infection.  - Discont ampicillin/sulbactam 3/31  - Vancomycin discontinued 3/22, Meropenem discontinued 3/26  - Blood cultures, urine cultures, and sputum cultures, completed    # Acute on Chronic Normocytic Anemia: Stable  Likely hemoconcentrated on admission, was close to baseline without signs of bleeding after fluid replacement. Also compounded by frequent blood draws and poor reticulocyte response. Responded well to 1 Unit of pRBC on 3/26  - CBC daily     # Hypovolemic Hyponatremia and Electrolyte Abnormalities: Resolved  - D/C IVF, monitor for s/sx of hypovolemia  - Electrolyte protocol for hypokalemia      # Steroid-Induced Hyperglycemia - Monitoring closely, well controlled at present.  - Low dose SSI  - Hypoglycemia Protocol  - q4h BG checks on TF      # Sacrococcygeal Ulcer:  One stage III pressure ulcer on her sacrococcygeal area.  No improvement reported for several months, present on admission. No signs of acute infection.  - WOC consulted to assist with  "management  - Regular assessment needed by nursing and MD  - Appropriate turning and postioning program  - Appropriate wound care and management of moisture and incontinence  - Patient will need group 2 mattress as group 1 has failed previously     ===== Chronic Medical Problems =====  # CAD - History of NSTEMI, no meds currently  # Generalized Anxiety Disorder and Air Hunger -  Continue home clonazepam, morphine, quetiapine, sertraline, hydromorphone PRN and lorazepam PRN  # Achalasia and GERD with GJ-Tube dependence - Home TF per nutrition, discontinued H2 blocker and start PPI with GI cocktail PRN   # FEN: TF per regimen above,  Nutrition consulted  # PPx: Enoxaparin and H2 blocker switched to PPI on 03/30  # Code Status: DNR  # Dispo: Inpatient admission for IV antibiotics pending culture results, anticipate discharge in 1-2 days.     Patient was seen and discussed with Dr. Hansen who agreed with the above.    Celestino Mo MD, [facility]  Internal Medicine PGY-1  [facility]  Pager: [phone]  ___________________________________________________________________    Subjective & Interval Hx:  Patient continues to have respiratory distress overnight, but was able wean down from 10L FIO2 yesterday. Continued need for deep suctioning. Otherwise unchanged, continues to reports abdominal pain.     ROS: The remainder of a 10 point ROS is negative unless otherwise noted above.    Medications: Reviewed in EPIC. List below for reference    Physical Exam:    Blood pressure 110/66, pulse 122, temperature 97.9  F (36.6  C), temperature source Oral, resp. rate 16, height 1.626 m (5' 4\"), weight 60.5 kg (133 lb 6.1 oz), SpO2 98 %, not currently breastfeeding.    Gen: NAD, improved Resp effort since yesterday  Resp: Bibasilar crackles with expiratory wheezes, no rhonchi, tracheostomy and vent dependent.  CV: RRR, no m/r/g  Abd: GJ with TF in J port, soft abdomen slight tenderness to palpation, no rebound or " guarding.  Back: Unstageable pressure ulcer over the sacrococcygeal area  Extrem: Warm and well perfused, no LE edema  Neuro: Asleep but easily awoken, but difficult communication     Lines/Tubes:   Peripheral IV 03/24/17 Left Upper arm (Active)   Site Assessment [facility] 3/27/2017  4:00 AM   Line Status Saline locked 3/27/2017  4:00 AM   Phlebitis Scale 0-->no symptoms 3/27/2017  4:00 AM   Infiltration Scale 0 3/27/2017  4:00 AM   Extravasation? No 3/27/2017  4:00 AM   Number of days:3       PICC Double Lumen 03/24/17 Right Basilic (Active)   Site Assessment [facility] 3/27/2017  4:00 AM   External Cath Length (cm) 3 cm 3/20/2017 12:00 AM   Extremity Circumference (cm) 29 cm 3/20/2017 12:00 AM   Dressing Intervention Chlorhexidine patch;Transparent;Securing device;New dressing 3/20/2017 12:00 AM   Dressing Change Due 04/01/17 3/26/2017 10:00 PM   Number of days:3     Labs & Studies of Note:   Labs and images from the past 24 hours reviewed and notable for increased WBC and increased CO2 on VBG.  Medications list for Reference   Current Facility-Administered Medications   Medication     predniSONE (DELTASONE) tablet 40 mg     ipratropium (ATROVENT) 0.02 % neb solution 0.5 mg     levalbuterol (XOPENEX) neb solution 1.25 mg     meropenem (MERREM) 1 g vial to attach to  mL bag     oxyCODONE (ROXICODONE) IR tablet 5 mg     methylnaltrexone (RELISTOR) injection 8 mg     omeprazole (priLOSEC) suspension 20 mg     sodium phosphate (FLEET ENEMA) 1 enema     acetaminophen (TYLENOL) solution 325 mg     zinc sulfate (20 mg [place]. Zn/mL) solution 220 mg     QUEtiapine (SEROquel) tablet 25 mg     lidocaine (LMX4) kit     sodium chloride (PF) 0.9% PF flush 10-20 mL     heparin lock flush 10 UNIT/ML injection 5-10 mL     heparin lock flush 10 UNIT/ML injection 5-10 mL     morphine solution 10 mg     acetaminophen (TYLENOL) solution 650 mg     albuterol neb solution 2.5 mg     bisacodyl (DULCOLAX) Suppository 10 mg     budesonide  (PULMICORT) neb solution 0.5 mg     calcium carbonate (TUMS) chewable tablet 500 mg     clonazePAM (klonoPIN) suspension 1.5 mg     fexofenadine (ALLEGRA) tablet 180 mg     fiber modular (NUTRISOURCE FIBER) packet 1 packet     gabapentin (NEURONTIN) solution 300 mg     HYDROmorphone (DILAUDID) liquid 1 mg     lactobacillus rhamnosus (GG) (CULTURELL) capsule 1 capsule     lidocaine (LIDODERM) 5 % Patch 1-2 patch     lidocaine (XYLOCAINE) 2 % 15 mL, alum & mag hydroxide-simethicone (MYLANTA ES/MAALOX  ES) 15 mL GI Cocktail     LORazepam (ATIVAN) 2 MG/ML (HIGH CONC) solution 0.5 mg     melatonin liquid 3 mg     menthol-zinc oxide (CALMOSEPTINE) 0.44-20.625 % ointment     polyethylene glycol (MIRALAX/GLYCOLAX) Packet 17 g     polyethylene glycol 0.4%- propylene glycol 0.3% (SYSTANE ULTRA) ophthalmic solution 1-2 drop     QUEtiapine (SEROquel) tablet 25 mg     senna-docusate (SENOKOT-S;PERICOLACE) 8.6-50 MG per tablet 2 tablet     sertraline (ZOLOFT) 20 MG/ML (HIGH CONC) solution 150 mg     sodium chloride (OCEAN) 0.65 % nasal spray 1 spray     naloxone (NARCAN) injection 0.1-0.4 mg     enoxaparin (LOVENOX) injection 40 mg     ondansetron (ZOFRAN-ODT) ODT tab 4 mg    Or     ondansetron (ZOFRAN) injection 4 mg     potassium chloride SA (K-DUR/KLOR-CON M) CR tablet 20-40 mEq     potassium chloride (KLOR-CON) Packet 20-40 mEq     potassium chloride 10 mEq in 100 mL intermittent infusion     potassium chloride 10 mEq in 100 mL intermittent infusion with 10 mg lidocaine     potassium chloride 20 mEq in 50 mL intermittent infusion     magnesium sulfate 2 g in NS intermittent infusion (PharMEDium or FV Cmpd)     magnesium sulfate 4 g in 100 mL sterile water (premade)     sodium phosphate 10 mmol in D5W intermittent infusion     sodium phosphate 15 mmol in D5W intermittent infusion     sodium phosphate 20 mmol in D5W intermittent infusion     sodium phosphate 25 mmol in D5W intermittent infusion     glucose 40 % gel 15-30 g     Or     dextrose 50 % injection 25-50 mL    Or     glucagon injection 1 mg     insulin aspart (NovoLOG) inj (RAPID ACTING)     lidocaine (LIDODERM) patch REMOVAL     lidocaine (LIDODERM) Patch in Place     guaiFENesin (ROBITUSSIN) 20 mg/mL solution 10 mL     multivitamins with minerals (CERTAVITE/CEROVITE) liquid 15 mL     Pt was seen and examined with Dr. Mo on 4/3/17.  I confirmed abdominal, cardiac, and pulmonary exam findings; reviewed labs, vitals, imaging.  I agree with the detailed A/P as documented above, including treatment of COPD exacerbation with steroids, nebs, antibiotics.      Elda Spangler MD

## 2017-04-05 NOTE — PLAN OF CARE
"Problem: Goal Outcome Summary  Goal: Goal Outcome Summary  Outcome: Adequate for Discharge Date Met:  04/05/17  /61 (BP Location: Left arm)  Pulse 122  Temp 97.7  F (36.5  C) (Axillary)  Resp 20  Ht 1.626 m (5' 4\")  Wt 60.5 kg (133 lb 6.1 oz)  SpO2 96%  BMI 22.89 kg/m2     VSS. Vent at home settings 3L. Scheduled anxiety and pain meds given, PRN ativan and dilaudid given x1 before transplant. Orientation difficult to assess-able to make some needs known. Bivona 6 with home vent at 3L. Suctioned x2. GJ tube, g to gravity, J with TF @ 50/hr. Avendaño with adequate urine output. 2 BMs this shift. R PICC line in place at discharge. Transported to group home at 1330, with [facility] transport and home ventilator. Daughter Efren notified of discharge.       "

## 2017-04-05 NOTE — PROGRESS NOTES
"[facility]  \"Hello, my name is Karen Martines , and I am calling from the [facility].  I want to check in and see how you are doing, after leaving the hospital.  You may also receive a call from your Care Coordinator (care team), but I want to make sure you don t have any urgent needs.  I have a couple questions to review with you:     Post-Discharge Outreach                                                    Mary Wang is a 67 year old female        Follow-up Appointments           Adult [facility] Follow-up and recommended labs and tests       Follow up with primary care provider, Norman Brito, within 7 days for hospital follow- up. No follow up labs or test are needed.                       Your next 10 appointments already scheduled            Apr 12, 2017 2:00 PM CDT   Return Visit with Nomran Brito MD   [facility]     [address]  [address]  [facility] [address] [phone]                  Apr 28, 2017 12:15 PM CDT   Return Visit with Norman Brito MD   [facility] ([facility])     [address]  [address]  [facility] [address] [phone]                  May 19, 2017 2:00 PM CDT   Return Visit with Norman Brito MD   [facility] ([facility])     [address]  [address]  [facility] [address] [phone]                  Jun 16, 2017 2:00 PM CDT   Return Visit with Norman Brito MD   [facility] ([facility])               Care Team:    Patient Care Team       Relationship Specialty Notifications Start End    Norman Brito MD PCP - General Pulmonary  1/19/17     Phone: [phone] Fax: [phone]          [facility] [address]    [facility] [address]    Kahlil Nuñez MD MD Internal Medicine  7/1/15     " Phone: [phone] Fax: [phone]         [facility] [address] [facility] [address] [facility] [address]    Criselda Long APRN CNP Nurse Practitioner Nurse Practitioner  7/13/15     Phone: [phone] Pager: [phone] Fax: [phone]        [facility] [address] [facility] [address]    Dhruv Lyles MD MD Gastroenterology  11/10/15     Phone: [phone] Fax: [phone]         [facility] [address] [facility] [address] [address]    Soledad Santos MD Resident Student in organized health care education/training program  9/14/16     Phone: [phone] Fax: [phone]         [facility] [address] [facility] [address] [facility] [address] [facility] [address] [address]    Radha Mcmillan MD MD Internal Medicine  9/15/16     Phone: [phone] Fax: [phone]         [facility] [address] [facility] [address] [address]    Kahlil Nuñez MD MD Pulmonary  10/24/16     Phone: [phone] Fax: [phone]         [facility] [address] [facility] [address] [address]    Cecilia Grewal PA Physician Assistant Physician Assistant  12/7/16     Phone: [phone] Fax: [phone]         [facility] [address] [facility] [address] [address] [address]    Michaelle Castillo Other (see comments)  Admissions 2/10/17     Comment:  [employer]    Natalie Stroud Other (see comments)  Admissions 2/10/17     Comment:  [employer]    Emilia Pandya   Admissions 2/10/17             Transition of Care Review                                                      Patient was contacted by an APRN for post DC follow up so no duplicate post DC follow up call will be made                Joceline Alvarado APRN CNP   Nurse Practitioner        FYI. Call from answering service during night. Group home nurse had called stating Cefepime and morphine were incompatible. Morphine is through G tube. Do not see a problem. Asked group home  "staff to call during day today if more questions. Joceline Alvarado [facility]          Cherrie Pinto, RN   Nurse        Call Type: Triage Call     Presenting Problem: Nurse from [facility] is calling and states  patient was started on an \"antibiotic which is not compatible with  morphine that patient is taking.\" Triager called out to answering  service, left message for on call provider to call triager at [phone]. Triager received return call from Joceline Conner NP and  she stated that she has not heard of them being uncompatible  together, but nurse should call back in the am and speak with  provider if she has any further concerns. Triaged for medication  questions/Disposition to call provider within 24 hours.  Triage Note:  Guideline Title: Medication Questions - Adult  Recommended Disposition: Speak with Provider or Pharmacist  within 24 hours  Original Inclination: Wanted to speak with a nurse  Override Disposition:  Intended Action: Call PCP/HCP  Physician Contacted: No  Has questions about prescribed and/or nonprescribed medications not covered by  available resources ? YES  Pregnant and has medication questions regarding prescribed and/or nonprescribed  medication(s) not covered by available resources ? NO  Breastfeeding and has medication questions regarding prescribed and/or  nonprescribed medication(s) not covered by available resources ? NO  Sign(s) or symptom(s) associated with a diagnosed condition or with a new illness  ? NO  Prescription ordered today and not available at pharmacy putting patient at  clinical risk ? NO  Recurrence of a symptom(s) or illness post prescribed medication treatment AND  provider instructed patient to call if symptom(s) returned. ? NO  Unable to obtain prescribed medication related to available resources AND  situation poses immediate clinical risk ? NO  Pharmacy calling to clarify prescription order. ? NO  Requests refill of " prescribed medication that does NOT have a valid refill; lack  of medication may cause clinical risk to patient if not available. ? NO  Requests refill of prescribed medication without valid refills OR requests refill  of prescribed medication with valid refills but does not have prescription number  (no RX container); lack of medication does not put patient at clinical risk ? NO  Physician Instructions:  Care Advice: Safe Use of Medications: - Keep a list of your medications,  listing both brand and generic names, the prescribed dosage, common side  effects, recommended action for side effects, when to call their provider,  and any other specific instructions. This medication list should be updated  if any prescriptions change or if new medications are added. Your list  should include nonprescription medications, vitamins and supplements. An  online resource for a medication list is:  http://www.ahrq.gov/patients-consumers/diagnosis-treatment/treatments/safeme  ds/walletform.html or  http://www.ahrq.gov/patients-consumers/diagnosis-treatment/treatments/safeme  ds/yourmeds.pdf Ask about your medications interaction with other  medications, supplements or foods. - Take the medication list to each  provider visit, especially if seeing more than one doctor. Make note of any  known allergies on this list. - Use your medication exactly as directed.  Any concerns about side effects should be discussed with your pharmacist or  prescribing provider before changing doses or stopping the medication. -  Don't chew or crush tablets or open capsules unless told to do so. Some  long-acting medications can be absorbed too quickly when chewed or crushed.  Other medications either won't be effective or could cause side effects. -  If you have difficulty swallowing pills, ask your provider or the  pharmacist if the medication is available in liquid form. - For liquid  medications, only use measuring device that came with it or was  provided by  the pharmacy. Household teaspoons and tablespoons can be inaccurate. -  Never take anyone else's medication.                      Next 5 appointments (look out 90 days)     Apr 12, 2017  2:00 PM CDT   Return Visit with Norman Brito MD   [facility] ([facility])    [address]  [address]  [facility] [address]   [phone]            Apr 28, 2017 12:15 PM CDT   Return Visit with Norman Brito MD   [facility] ([facility])    [address]  [address]  [facility] [address]   [phone]            May 19, 2017  2:00 PM CDT   Return Visit with Norman Brito MD   [facility] ([facility])    [address]  [address]  [facility] [address]   [phone]            Jun 16, 2017  2:00 PM CDT   Return Visit with Norman Brito MD   [facility] ([facility])    [address]  [address]  [facility] [address]   [phone]                      Plan                                                      Thanks for your time.  Your Care Coordinator may follow-up within the next couple days.  In the meantime if you have questions, concerns or problems call your care team.        Karen Martines

## 2017-04-05 NOTE — PLAN OF CARE
"Problem: Goal Outcome Summary  Goal: Goal Outcome Summary  Outcome: No Change  /64 (BP Location: Left arm)  Pulse 122  Temp 97.5  F (36.4  C) (Oral)  Resp 14  Ht 1.626 m (5' 4\")  Wt 60.5 kg (133 lb 6.1 oz)  SpO2 99%  BMI 22.89 kg/m2  Neuro: Pt lethargic, difficult to assess orientation. Pt becomes fixated on ambient room temp, wanted to be ambu bagged etc. Pt repeatedly reaching out grasping writers hand, not wanting to let go, even after having her needs me.   Cardiac: SR. VSS.       Respiratory: Sating 98% on Mechanical vent with 3L O2.  RR 14-20. Pt reporting SOB, and feeling like she \"can't breath\", did not drop O2 sats, suctioned x3 with minimal secretions. During bedside report patient was speaking over the vent requesting to be \"bagged\" - no indications of respiratory distress were present at the time.   GI/: Adequate urine output. No BM this shift.   Diet/appetite: Tolerating TF with changes. G tube to gravity drainage.   Activity:  Turned Q 2hr.   Pain: Pt denied pain. Received Dilaudid x3.for difficulty breathing. Received ativan x2 for anxiety.   Skin: Coccyx wound covered, dressing C/D/I. No new skin changes. Edema present, drains and line leaving impressions in skin.      R: Continue with POC. Notify primary team with changes.          "

## 2017-04-05 NOTE — PROGRESS NOTES
Care Coordinator- Discharge Planning     Admission Date/Time:  3/20/2017  Attending MD:  Arley Crespo, *     Data  Date of initial CC assessment:  On admission  Chart reviewed, discussed with interdisciplinary team.   Patient was admitted for:   1. NSTEMI (non-ST elevated myocardial infarction) (H)    2. Hyponatremia    3. Hypercarbia    4. Acute on chronic respiratory failure with hypoxia (H)    5. Centrilobular emphysema (H)    6. Chronic bronchitis, unspecified chronic bronchitis type (H)    7. HCAP (healthcare-associated pneumonia)    8. Constipation due to opioid therapy         Assessment  Full assessment completed in previous note     Patient medically ready to discharge back to group [facility] today. [facility] stretcher ride set up 1pm. Patient has own home vent and will use for transportation. Call placed to [facility] care  and patient will have [facility] for her when she returns. Patient no longer will require IV abx; [facility] referral cancelled. Bedside RN updated on discharge plan. PCS form tubed to unit.     Addendum 1234: Notified from bedside RN that because of allergy to oral medication, patient will be discharging on IV cefipime. Referral placed again with [facility] and Arley PHELPS notified. PICC line still in place. Patient to discharge at 1300 to group [facility]. [facility]      Plan  Anticipated Discharge Date:  today  Anticipated Discharge Plan:  Group home with resumption of services    CTS Handoff completed:  YES    Catrina Campbell, MATTIE

## 2017-04-05 NOTE — DISCHARGE SUMMARY
"                                                                  Internal Medicine Discharge Summary  Mary Wang   : 1950  MRN: [id number]  Primary care provider: Norman Brito          Date of Admission:  3/20/2017  Date of Discharge:  2017  Attending:   Dr. Arley Crespo  Resident:   Roxann Monterroso  Intern:     Clementine Sams  Service:    [place] [id number]    REASON FOR ADMISSION  Per admission H&P,   \"This patient is a 67 year old  female with a significant past medical history of severe COPD (Last FEV1 0.35) s/p tracheostomy on home ventilator and GJ tube placement, anxiety, hypertension, chronic indwelling bender catheter, and achalasia who presented for pain at her bender catheter site and malaise.     Per her home care nurse, she has been \"feeling off\" all day. She did not experience any fevers, chills, sweats, increased secretions, diarrhea, nausea, vomiting, chest pain, palpitations, or worsening dyspnea. She also has been having some discomfort with her bender catheter that was recently exchanged at home. No blood was noticed in the bender bag.      Upon evaluation in the ED, the patient was resting comfortably in bed. She was difficult to obtain a history from because of the trach but also because she would fall asleep when being asked questions.      Of note, the patient has had seven admissions since 2016 and has been living at a group home. She has filled out a POLST which was reviewed in the ED and does state that she wishes not to be re-hospitalized, however per discussion with the ED provider and the home care nurse, she was adamant today that she be seen in the hospital and is ok with all aggressive measures.\"    DISCHARGE DIAGNOSIS  Acute on chronic hypoxic and hypercarbic respiratory failure  Urosepsis  Ventilator associated pneumonia  Gastroparesis verses gastric outlet obstruction    PROCEDURES & SIGNIFICANT FINDINGS  CT Abd/Pelvis 3/25  IMPRESSION:  1. No " pneumoperitoneum.  2. The GJ tube balloon has been pulled back since 3/23/2017. The  distal margin is still within the gastric lumen, but the balloon  projects into the abdominal wall musculature. Consider adjustment.  3. Colonic diverticulosis with moderate amount of stool burden in the  colon.  4. Mild anasarca. Decreased pelvic ascites.  5. Stable small right and trace left pleural effusions with overlying  atelectasis/scarring. Additionally, mucous plugging in the lower lobes  could indicate chronic aspiration.  6. Stable nonobstructing right renal calculus.    CONSULTATIONS  Palliative Care  General Surgery  Gastroenterology    HOSPITAL COURSE BY PROBLEM  Mary Wang is a 67 year old female with a history of severe COPD s/p tracheostomy on home ventilator, GJ-tube dependence, anxiety, HTN, chronic indwelling bender, and achalasia who presented with malaise and bender catheter pain, admitted to the ICU on 3/21/17 for sepsis from likely urinary source with severe metabolic derangements resolved with antibiotics. Hospitalization complicated by worsening respiratory failure concerned for Ventilator Associated Pneumonia, improving with treatment on IV antibiotics.         # Acute on chronic Hypoxic, Hypercarbic Respiratory Failure  # Severe COPD s/p Tracheostomy with Ventilator Dependence  Developed worsening respiratory status during hospitalization on 4/2, with increasing SOB and hypercapnea. Started on IV meropenem empirically. Sputum cultures subsequently grew serratia, pseudomonas, and achromobacter. Improved clinically with abx. She also started prednisone burst 40 mg for 5 days given worsening hypercarbia.  Based on susceptibilities, she was transitioned to IV cefepime on discharge. (Susceptibility for achromobacter pending.)  - Home vent settings: CMV 14/400/5/45%.  - Continue home nebulizers  - Discharged on cefepime 2G Q8H for 7 day course  - Will follow sensitivities for achromobacter and adjust abx  outpatient if needed  - Continue steroid burst 40 mg day to complete total 5 days, then resume PTA prednisone  - PCP follow up in one week     # Abdominal Pain Likely 2/2 Gastroparesis versus Gastric Outlet Obstruction - No acute etiology on CT scan on 3/23. Suspect due to gastroparesis versus gastric outlet obstruction. GI assisted with PEG-J adjustment during hospitalization. Constipation likely contributory as well.   - TF changed to continuous by J-port  - TF to Impact Peptide (fiber-free, high PRO, and fish oil given wound and CRP of 22.0) @ new goal 50 ml/hr (1200 ml/day), per nutrition recs  - G-tube venting BID  - Cont bowel regimen     # Sepsis secondary to Proteus from Urinary Source, resolved - Presented with a leukocytosis, hypotension, and tachypnea concerning for sepsis. Initial evaluation raised concern for urinary versus HCAP source due to dirty UA and new infiltrate on CXR. Cultures were obtained however CXR infiltrate resolved rapidly which was felt to be less consistent with HCAP. Initial care in ICU with IVF resuscitation, once stabilized was transferred to the floor. Given improvement with CXR, urine was felt to be most consistent source of infection and grew Proteus, susceptible to amp/sulbactam. Mary was treated with amp/sulbactam.      # Acute on Chronic Normocytic Anemia: Stable  Likely hemoconcentrated on admission. No signs of active bleeding. Also compounded by frequent blood draws and poor reticulocyte response. Responded well to 1 Unit of pRBC on 3/26. Remained stable.       # Sacrococcygeal Ulcer:  One stage III pressure ulcer on her sacrococcygeal area. No improvement reported for several months, present on admission. No signs of acute infection.  - Continue wound cares     ===== Chronic Medical Problems =====  # CAD - History of NSTEMI, no meds currently  # Generalized Anxiety Disorder and Air Hunger - Continue home clonazepam, morphine, quetiapine, sertraline, hydromorphone PRN  "and lorazepam PRN  # Achalasia and GERD with GJ-Tube dependence -  TF per nutrition.     PHYSICAL EXAM  Blood pressure 103/61, pulse 122, temperature 97.7  F (36.5  C), temperature source Axillary, resp. rate 20, height 1.626 m (5' 4\"), weight 60.5 kg (133 lb 6.1 oz), SpO2 96 %, not currently breastfeeding.    Gen: No acute distress  Resp: Scattered rales, no wheezes, tracheostomy and vent dependent.  CV: RRR, no m/r/g  Abd: GJ with TF in J port, soft abdomen, nontender to palpation, no rebound or guarding.  Back: Unstageable pressure ulcer over the sacrococcygeal area  Extrem: Warm and well perfused, no LE edema  Neuro: Awake, responds appropriately    IV ACCESS   PICC      PENDING RESULTS  Unresulted Labs Ordered in the Past 30 Days of this Admission     Date and Time Order Name Status Description    4/2/2017 1755 Sputum Culture Aerobic Bacterial Preliminary           DISCHARGE MEDICATIONS  Discharge Medication List as of 4/5/2017  1:16 PM      START taking these medications    Details   ceFEPIme (MAXIPIME) 2 G vial Inject 2 g into the vein every 8 hours for 7 days, Disp-420 mL, R-0, E-Prescribe         CONTINUE these medications which have CHANGED    Details   !! predniSONE (DELTASONE) 20 MG tablet 1 tablet (20 mg) by Per G Tube route daily To be resumed after 3 day burst, Disp-90 tablet, R-3, E-Prescribe      !! predniSONE (DELTASONE) 20 MG tablet 2 tablets (40 mg) by Per G Tube route daily, Disp-6 tablet, R-0, E-Prescribe      zinc sulfate, 20 mg [place]. Zn/mL, 88 mg/mL SOLN solution 2.5 mLs (220 mg) by Per J Tube route daily, Disp-25 mL, R-0, E-Prescribe      multivitamins with minerals (CERTAVITE/CEROVITE) LIQD liquid 15 mLs by Per Feeding Tube route daily, Disp-450 mL, R-0, E-Prescribe      vitamin A-D & C drops (TRI-VITAMIN) 1500-400-35 drops 7 mLs by Per J Tube route daily, Disp-50 mL, R-0, E-Prescribe       !! - Potential duplicate medications found. Please discuss with provider.      CONTINUE these " medications which have NOT CHANGED    Details   gabapentin (NEURONTIN) 250 MG/5ML solution TAKE 300MG (6ML) VIA J-TUBE 3 TIMES A DAY...., Disp-450 mL, R-3, E-Prescribe      HYDROmorphone (DILAUDID) 1 MG/ML LIQD liquid Take 1 mL (1 mg) by mouth every 2 hours as needed for other (dyspnea), Disp-180 mL, R-0, Local Print      levalbuterol (XOPENEX) 1.25 MG/3ML neb solution Take 3 mLs (1.25 mg) by nebulization 4 times daily and every four hours at night PRN., Disp-540 mL, R-0, E-Prescribe      morphine sulfate (ROSIE) 10 MG 24 hr capsule Give contents of one capsule through the gastrostomy tube every 8 hours., Disp-90 capsule, R-0, Local Print      polyethylene glycol (MIRALAX/GLYCOLAX) Packet 17 g by Per J Tube route 2 times daily Hold for loose stools, Disp-100 packet, R-0, E-Prescribe      budesonide (PULMICORT) 0.5 MG/2ML neb solution Take 2 mLs (0.5 mg) by nebulization 2 times daily, Disp-60 ampule, R-0, E-Prescribe      menthol-zinc oxide (CALMOSEPTINE) 0.44-20.625 % OINT ointment Apply topically 2 times daily as needed for skin protectionDisp-113 g, [id number]      guaiFENesin (ORGANIDIN) 200 MG TABS tablet Take 1 tablet (200 mg) by mouth 4 times daily, Disp-120 tablet, R-3, E-Prescribe      Cranberry 125 MG TABS 1 tablet by Jejunal Tube route daily, Disp-90 tablet, R-3, E-Prescribe      !! QUEtiapine (SEROQUEL) 25 MG tablet Administer one tab per J-tube as needed at night if scheduled HS dose ineffective for treatment of anxiety or sleeplessness., Disp-90 tablet, R-3, E-Prescribe      bisacodyl (DULCOLAX) 10 MG Suppository Place 1 suppository (10 mg) rectally daily as needed for constipation, Disp-25 suppository, R-1, Historical      !! order for DME Equipment being ordered: Methylex foam dressings, alternating pressure pad for mattress and pump.Disp-1 Device, R-0, RAPHAEL, Local Print      Rectal Cleansers ([employer] CLEANSING ENEMA) ENEM Place 1 enema rectally daily as needed, Disp-3 Bottle, R-11,  E-Prescribe      !! order for DME Equipment being ordered: 1) Avendaño catheter 16F, balloon 10 mL, silicone.  2) Urinary collection bag.  3) Elastic leg strap for Avendaño catheter.  Please fax to [employer].Disp-3 each, R-11, Local Print      LORazepam (ATIVAN) 2 MG/ML (HIGH CONC) solution Take 0.25 mLs (0.5 mg) by mouth every 3 hours as needed for anxiety, Disp-30 mL, R-1, Local Print      clonazePAM (KLONOPIN) 0.1 mg/mL Take 15 mLs (1.5 mg) by mouth 4 times daily, Disp-1800 mL, R-1, Local Print      !! QUEtiapine (SEROQUEL) 50 MG tablet Take 1 tablet (50 mg) by mouth 2 times daily, Disp-180 tablet, R-3, E-Prescribe      ipratropium (ATROVENT) 0.02 % neb solution Take 2.5 mLs (0.5 mg) by nebulization 4 times daily and every four hours at night PRN., Disp-450 mL, R-0, E-Prescribe      lactobacillus rhamnosus, GG, (CULTURELL) capsule 1 capsule by Per G Tube route daily, Disp-60 capsule, R-0, Fax      lidocaine (LIDODERM) 5 % Patch Place 1-2 patches onto the skin every 24 hours Apply to the lower backDisp-30 patch, [id number]      senna-docusate (SENOKOT-S;PERICOLACE) 8.6-50 MG per tablet 2 tablets by Per J Tube route 2 times daily, Disp-100 tablet, R-0, Historical      calcium carbonate (TUMS) 500 MG chewable tablet Take 1 tablet (500 mg) by mouth every 2 hours as needed for heartburn, Disp-150 tablet, Historical      ondansetron (ZOFRAN) 4 MG tablet Take 1 tablet (4 mg) by mouth every 4 hours as needed for nausea, Disp-18 tablet, Historical      famotidine (PEPCID) 40 MG/5ML suspension Take 2.5 mLs (20 mg) by mouth 2 times daily as needed for heartburn, Disp-75 mL, R-3, E-Prescribe      loperamide (IMODIUM A-D) 2 MG tablet 2-2 mg tablets per J-tube qid prn frequent and/or loose stools. Do not use more than 8 tabs per day., Disp-30 tablet, R-0, E-Prescribe      polyethylene glycol 0.4%- propylene glycol 0.3% (SYSTANE ULTRA) 0.4-0.3 % SOLN ophthalmic solution Place 1-2 drops into both eyes 4 times daily as needed  for dry eyes 0900, 1300, 1700, 2100, Disp-1 Bottle, R-3, E-Prescribe      sertraline (ZOLOFT) 20 MG/ML (HIGH CONC) solution 7.5 mLs (150 mg) by Per Feeding Tube route daily, Disp-105 mL, R-0, E-Prescribe      fexofenadine (ALLEGRA) 180 MG tablet Take 1 tablet (180 mg) by mouth daily, Disp-30 tablet, R-0, E-Prescribe      fiber modular (NUTRISOURCE FIBER) packet 1 packet by Per J Tube route 3 times daily, Disp-42 packet, R-0, E-Prescribe      melatonin (MELATONIN) 1 MG/ML LIQD liquid 3 mLs (3 mg) by Per J Tube route At Bedtime, Disp-300 mL, R-0, Fax      acetaminophen (TYLENOL) 160 MG/5ML oral liquid 20.3 mLs (650 mg) by Per Feeding Tube route every 4 hours as needed for mild pain, Disp-300 mL, R-0, E-Prescribe      sodium chloride (OCEAN) 0.65 % nasal spray Spray 1 spray into both nostrils daily as needed for congestion, Disp-1 Bottle, R-0, E-Prescribe      lidocaine 15 mL, alum & mag hydroxide-simethicone 15 mL GI Cocktail Take 30 mLs by mouth 3 times daily as needed for moderate pain, 30 mL, Oral, 3 TIMES DAILY PRN Starting 11/28/2016, Until Discontinued, Disp-500 mL, R-0, Fax       !! - Potential duplicate medications found. Please discuss with provider.      STOP taking these medications       levofloxacin (LEVAQUIN) 750 MG tablet Comments:   Reason for Stopping:         meropenem (MERREM) 1 G vial Comments:   Reason for Stopping:         methylnaltrexone (RELISTOR) 12 MG/0.6ML SOLN injection Comments:   Reason for Stopping:         albuterol (2.5 MG/3ML) 0.083% neb solution Comments:   Reason for Stopping:         clonazePAM (KLONOPIN) 0.5 MG tablet Comments:   Reason for Stopping:               DISCHARGE INSTRUCTIONS    Discharge Procedure Orders  Home care nursing referral   Referral Type: Home Health Therapies & Aides     Home infusion referral     MD face to face encounter   Order Comments: Documentation of Face to Face and Certification for Home Health Services     I certify that patient: Mary Wang  is under my care and that I, or a nurse practitioner or physician's assistant working with me, had a face-to-face encounter that meets the physician face-to-face encounter requirements with this patient on: 3/27/2017.    This encounter with the patient was in whole, or in part, for the following medical condition, which is the primary reason for home health care: Pseudomonas (B965), COPD (J449), Respiratory vent dependent (), Tracheostomy (L930), and stage 3 pressure ulcer.    I certify that, based on my findings, the following services are medically necessary home health services: Group 2 mattress     My clinical findings support the need for the above services because: Group one overlay mattress failed. Medically needed for stage 3 pressure ulcer to Sacrococcygeal region.     Further, I certify that my clinical findings support that this patient is homebound (i.e. absences from home require considerable and taxing effort and are for medical reasons or [facility] services or infrequently or of short duration when for other reasons) because: Requires assistance of another person or specialized equipment to access medical services because patient: Requires supervision of another for safe transfer...    Based on the above findings. I certify that this patient is confined to the home and needs intermittent skilled nursing care, physical therapy and/or speech therapy.  The patient is under my care, and I have initiated the establishment of the plan of care.  This patient will be followed by a physician who will periodically review the plan of care.  Physician/Provider to provide follow up care: Norman Brito    Attending hospital physician (the Medicare certified [facility] provider):Dr. Kevin Acevedo  Physician Signature: See electronic signature associated with these discharge orders.  Date: 3/27/2017     Reason for your hospital stay   Order Comments: Pneumonia     Adult [facility] Follow-up and recommended labs  and tests   Order Comments: Follow up with primary care provider, Norman Brito, within 7 days for hospital follow- up.  No follow up labs or test are needed.     Activity   Order Comments: Your activity upon discharge: activity as tolerated and ambulate in house   Order Specific Question Answer Comments   Is discharge order? Yes      Discharge Instructions   Order Comments: You have a prescription for an antibiotic, levofloxacin, to take for 7 days. You should also take prednisone 40 mg for 3 more days, then resume your home dose. Follow up with PCP in a week.     DNR (Do Not Resuscitate)     Oxygen Adult   Order Comments: Renew Home Oxygen Order  Renew previous prescription.  Expected treatment length is indefinite (99 months).    [facility]     Attending Provider: Dr. Kevin Acevedo  Physician signature: See electronic signature associated with these discharge orders  Date of Order: March 23, 2017     Ventilator   Order Comments: Resume home Vent settings     Ventilator   Standing Status: Future  Standing Exp. Date: 04/05/18   Order Comments: Resume home ventilatory settings     Diet   Order Comments: Follow this diet upon discharge: Orders Placed This Encounter     Adult Formula Drip Feeding: Continuous Isosource 1.5; Jejunostomy; Goal Rate: 50; mL/hr; Medication - Tube Feeding Flush Frequency: At least 15-30 mL water before and after medication administration and with tube clogging; Amout to Send (Nutrition...   Order Specific Question Answer Comments   Is discharge order? Yes         DISCHARGE DISPOSITION  Group Home    CONDITION ON DISCHARGE  Discharge condition: Stable  Code Status: DNR    Date of service: 4/5/2017    The patient was discussed with Dr. Crespo.     Roxann Monterroso MD, MPH  Internal Medicine, PGY-3  Pager [phone]    ADDENDUM:  Final sputum culture results with serratia, pseudomonas, and achromobacter. Achromobacter sensitivities returned after discharge. Demonstrated  susceptible to bactrim, fluoroquinolones, zosyn, meropenem. Resistant to cefepime. Patient was discharged on cefepime given sensitivities of other two organisms. A fluoroquinolone would be ideal; however, per chart and daughter, patient had severe reaction to levofloxacin in the past with delirium. At this point, will continue cefepime outpatient for 7 day course. If decline in clinical status, she will need a change in antibiotics to ensure coverage of Achromobacter. Discussed with Dr. Crespo.     Internal Medicine Staff Addendum  Date of Service: 4/5/2017  I have seen and examined Ms Wang, reviewed the data and discussed the plan of care with the care team on rounds.  I agree with the above documentation with the additions/changes to the ROS, HPI, Exam or data (including my edits in italics):    I discussed pt's care with bedside RN, case management/social work today.  I personally reviewed, labs, medications and past 24 hr notes.    Assessment/Plan/Diagnoses: plan/dx as above, which contains my edits and reflects our joint medical decision-making. Chronic, ventilator dependent respiratory failure secondary to end stage COPD who presents with worsening dyspnea and hypercapnea secondary to a polymicrobial ventilator associated pneumonia including pseudomonas and achromobacter.  Clinically improved with steroids and broad spectrum antibiotics, felt back to her baseline at the time of discharge.  We discussed the likely time course of her disease with the patient including repeated admissions, antibiotic courses, and the lack of independence.  We recommended palliative care.  The patient and her family understood her prognosis and stated that they had resources and were actively discussing goals of care.      >50% of 60 minutes spent in direct patient care, counseling family, and coordinating discharge     Arley Crespo MD PhD  Internal Medicine Hospitalist & Staff Physician   of Internal  [facility]  Pager: [phone]

## 2017-04-05 NOTE — PROGRESS NOTES
CLINICAL NUTRITION SERVICES - BRIEF NOTE    NEW FINDINGS   -Meds: Discussed with Pharmacy today; currently, we only supply Tri-Vitamin, which has a different dosing of vitamin A than Trivisol.  -GI: No BM x 2 days; on scheduled bowel meds and getting 27g fiber total per day from modulars and TFs.    Interventions  Changed Tri-Vitamin dose to 7 mL/day (25445 IU Vitamin A/day).     Reduced fiber to 1 packet Nutrisource fiber/day = 21g total/day to possibly help with constipation.    Will continue to follow.     Norman Cornelius RD, LD  6B Clinical Dietitian   Pager: [phone]  [facility] [phone]

## 2017-04-05 NOTE — PROGRESS NOTES
CM notified that client was admitted to [facility] 3/20/17-4/5/17 for NSTEMI.  CM not notified upon admission as care team options were incorrectly entered which has since been corrected. CM reviewed Epic notes of hospitalization. Client returned to her group home today where she receives 24hr nursing care as she is on a vent.  Client also getting home IV Abx through [facility].  Client has appt arranged with PCP for next Wed 4/12/17.  CM called group home and spoke with Michaelle who states client just arrived home and is doing fine.  CM also left message for dtr, César, asking her to call me if any needs arise. Trans log in chart.     DAYANARA BatemanW   [facility]   [phone]

## 2017-04-06 NOTE — TELEPHONE ENCOUNTER
Spoke with home care nurse and approved continuation of visits, continued wound care.  Mariel reports that she has contacted the wound care nurse, who will be assessing the patient next week.  Yashira Devi RN April 6, 2017 3:11 PM

## 2017-04-06 NOTE — TELEPHONE ENCOUNTER
Agree: there is no problem giving enteral MSO4 and cefepime IV concurrently.  No need to change Rx.

## 2017-04-06 NOTE — TELEPHONE ENCOUNTER
"Call Type: Triage Call    Presenting Problem: Patient's nurse is calling and states patient was  \"started on a new antibiotic and it is not compatible with the  morphine the patient is on.\" Triager gave nurse the number to the  answeing service, so that she can have the provider on call paged  and speak with him. Triaged for medication  question-Adult/Disposition to speak with provider or pharmacist  within 24 hours.  Triage Note:  Guideline Title: Medication Questions - Adult  Recommended Disposition: Speak with Provider or Pharmacist  within 24 hours  Original Inclination: Wanted to speak with a nurse  Override Disposition:  Intended Action: Call PCP/HCP  Physician Contacted: No  Has questions about prescribed and/or nonprescribed medications not covered by  available resources ?  YES  Pregnant and has medication questions regarding prescribed and/or nonprescribed  medication(s) not covered by available resources ? NO  Breastfeeding and has medication questions regarding prescribed and/or  nonprescribed medication(s) not covered by available resources ? NO  Sign(s) or symptom(s) associated with a diagnosed condition or with a new illness  ? NO  Prescription ordered today and not available at pharmacy putting patient at  clinical risk ? NO  Recurrence of a symptom(s) or illness post prescribed medication treatment AND  provider instructed patient to call if symptom(s) returned. ? NO  Unable to obtain prescribed medication related to available resources AND  situation poses immediate clinical risk ? NO  Pharmacy calling to clarify prescription order. ? NO  Requests refill of prescribed medication that does NOT have a valid refill; lack  of medication may cause clinical risk to patient if not available. ? NO  Requests refill of prescribed medication without valid refills OR requests refill  of prescribed medication with valid refills but does not have prescription number  (no RX container); lack of medication does " not put patient at clinical risk ? NO  Physician Instructions:  Care Advice: Safe Use of Medications: - Keep a list of your medications,  listing both brand and generic names, the prescribed dosage, common side  effects, recommended action for side effects, when to call their provider,  and any other specific instructions. This medication list should be updated  if any prescriptions change or if new medications are added. Your list  should include nonprescription medications, vitamins and supplements. An  online resource for a medication list is:  http://www.ahrq.gov/patients-consumers/diagnosis-treatment/treatments/safeme  ds/walletform.html or  http://www.ahrq.gov/patients-consumers/diagnosis-treatment/treatments/safeme  ds/yourmeds.pdf     Ask about your medications interaction with other  medications, supplements or foods. - Take the medication list to each  provider visit, especially if seeing more than one doctor. Make note of any  known allergies on this list. - Use your medication exactly as directed.  Any concerns about side effects should be discussed with your pharmacist or  prescribing provider before changing doses or stopping the medication. -  Don't chew or crush tablets or open capsules unless told to do so.  Some  long-acting medications can be absorbed too quickly when chewed or crushed.  Other medications either won't be effective or could cause side effects. -  If you have difficulty swallowing pills, ask your provider or the  pharmacist if the medication is available in liquid form. - For liquid  medications, only use measuring device that came with it or was provided by  the pharmacy. Household teaspoons and tablespoons can be inaccurate. -  Never take anyone else's medication.

## 2017-04-06 NOTE — TELEPHONE ENCOUNTER
Mariel with [facility]    Requesting:  SN 2w4 with 3 PRN    Wound care orders: cleanse with microklenz, apply iodosorb, cover with adhesive foam, change q 2-3 days and PRN    [phone]

## 2017-04-06 NOTE — TELEPHONE ENCOUNTER
FYI.  Call from answering service during night.  Group home nurse had called stating Cefepime and morphine were incompatible.  Morphine is through G tube.  Do not see a problem.  Asked group home staff to call during day today if more questions.  Joceline Alvarado [facility]

## 2017-04-06 NOTE — TELEPHONE ENCOUNTER
"Call Type: Triage Call    Presenting Problem: Nurse from [facility] is calling and states  patient was started on an \"antibiotic which is not compatible with  morphine that patient is taking.\" Triager called out to answering  service, left message for on call provider to call triager at [phone]. Triager received return call from Joceline Conner NP and  she stated that she has not heard of them being uncompatible  together, but nurse should call back in the am and speak with  provider if she has any further concerns. Triaged for medication  questions/Disposition to call provider within 24 hours.  Triage Note:  Guideline Title: Medication Questions - Adult  Recommended Disposition: Speak with Provider or Pharmacist  within 24 hours  Original Inclination: Wanted to speak with a nurse  Override Disposition:  Intended Action: Call PCP/HCP  Physician Contacted: No  Has questions about prescribed and/or nonprescribed medications not covered by  available resources ?  YES  Pregnant and has medication questions regarding prescribed and/or nonprescribed  medication(s) not covered by available resources ? NO  Breastfeeding and has medication questions regarding prescribed and/or  nonprescribed medication(s) not covered by available resources ? NO  Sign(s) or symptom(s) associated with a diagnosed condition or with a new illness  ? NO  Prescription ordered today and not available at pharmacy putting patient at  clinical risk ? NO  Recurrence of a symptom(s) or illness post prescribed medication treatment AND  provider instructed patient to call if symptom(s) returned. ? NO  Unable to obtain prescribed medication related to available resources AND  situation poses immediate clinical risk ? NO  Pharmacy calling to clarify prescription order. ? NO  Requests refill of prescribed medication that does NOT have a valid refill; lack  of medication may cause clinical risk to patient if not available. ? NO  Requests refill of " prescribed medication without valid refills OR requests refill  of prescribed medication with valid refills but does not have prescription number  (no RX container); lack of medication does not put patient at clinical risk ? NO  Physician Instructions:  Care Advice: Safe Use of Medications: - Keep a list of your medications,  listing both brand and generic names, the prescribed dosage, common side  effects, recommended action for side effects, when to call their provider,  and any other specific instructions. This medication list should be updated  if any prescriptions change or if new medications are added. Your list  should include nonprescription medications, vitamins and supplements. An  online resource for a medication list is:  http://www.ahrq.gov/patients-consumers/diagnosis-treatment/treatments/safeme  ds/walletform.html or  http://www.ahrq.gov/patients-consumers/diagnosis-treatment/treatments/safeme  ds/yourmeds.pdf     Ask about your medications interaction with other  medications, supplements or foods. - Take the medication list to each  provider visit, especially if seeing more than one doctor. Make note of any  known allergies on this list. - Use your medication exactly as directed.  Any concerns about side effects should be discussed with your pharmacist or  prescribing provider before changing doses or stopping the medication. -  Don't chew or crush tablets or open capsules unless told to do so.  Some  long-acting medications can be absorbed too quickly when chewed or crushed.  Other medications either won't be effective or could cause side effects. -  If you have difficulty swallowing pills, ask your provider or the  pharmacist if the medication is available in liquid form. - For liquid  medications, only use measuring device that came with it or was provided by  the pharmacy. Household teaspoons and tablespoons can be inaccurate. -  Never take anyone else's medication.

## 2017-04-10 NOTE — TELEPHONE ENCOUNTER
Sent message to MATTIE Mcclain [facility] requesting her to assess patient and update clinic.  Gave her information as noted below.    Jessika Bhagat RN

## 2017-04-10 NOTE — TELEPHONE ENCOUNTER
Received call from MATTIE Tubbs with [facility]. He states that the pt has been declining and is slowly desating when on 3L/min O2 at rest. RN states that pt went down to 88%. RN bumped up O2 to 4L/min and pt went back up to 93%.    Arley is wondering if oxygen order can be changed to 4L/min at rest. He can be reached at [phone]

## 2017-04-12 PROBLEM — B96.4 URINARY TRACT INFECTION DUE TO PROTEUS: Status: ACTIVE | Noted: 2017-01-01

## 2017-04-12 PROBLEM — J43.2 CENTRILOBULAR EMPHYSEMA (H): Status: ACTIVE | Noted: 2017-01-01

## 2017-04-12 PROBLEM — N39.0 URINARY TRACT INFECTION: Status: RESOLVED | Noted: 2017-01-01 | Resolved: 2017-01-01

## 2017-04-12 PROBLEM — N39.0 URINARY TRACT INFECTION DUE TO PROTEUS: Status: ACTIVE | Noted: 2017-01-01

## 2017-04-12 NOTE — MR AVS SNAPSHOT
After Visit Summary   4/12/2017    Mary Wang    MRN: [id number]           Patient Information     Date Of Birth          1950        Visit Information        Provider Department      4/12/2017 2:00 PM Norman Brito MD [facility]        Today's Diagnoses     Urinary tract infection due to Proteus    -  1    Centrilobular emphysema (H)        Acute on chronic respiratory failure with hypoxia (H)        Achalasia        Air hunger        Generalized anxiety disorder          Care Instructions    1.  I wrote out orders today on a form at your residence.  If you have questions about changes made, please ask your staff member to discuss these with you.    2.  I'll be back to see you next on Friday, April 28th at 12:15 PM.  However, I asked your nurse today to give me a call sooner than that if we're not doing a good job controlling your anxiety or shortness of breath.  We can make changes prior to my next visit if need be.        Follow-ups after your visit        Your next 10 appointments already scheduled     Apr 28, 2017 12:15 PM CDT   [facility] with Norman Brito MD   [facility]    [address]  [address]  [place] [facility] [address]   [phone]            May 19, 2017  2:00 PM CDT   Return Visit with Norman Brito MD   [facility]    [address]  [address]  [place] [address] [phone]            Jun 16, 2017  2:00 PM CDT   Return Visit with Norman Brito MD   [facility] ([facility]    [address]  [address]  [place] [address]   [phone]              Who to contact     If you have questions or need follow up information about today's clinic visit or your schedule please contact [facility] directly at [phone].  Normal or non-critical lab and imaging results  will be communicated to you by SealedMediahart, letter or phone within 4 business days after the clinic has received the results. If you do not hear from us within 7 days, please contact the clinic through [email or web address] or phone. If you have a critical or abnormal lab result, we will notify you by phone as soon as possible.  Submit refill requests through [email or web address] or call your pharmacy and they will forward the refill request to us. Please allow 3 business days for your refill to be completed.          Additional Information About Your Visit        [email or web address] Information     [email or web address] gives you secure access to your electronic health record. If you see a primary care provider, you can also send messages to your care team and make appointments. If you have questions, please call your primary care clinic.  If you do not have a primary care provider, please call [phone] and they will assist you.        Care EveryWhere ID     This is your Care EveryWhere ID. This could be used by other organizations to access your [place] medical records  [id number]        Your Vitals Were     Pulse Respirations Pulse Oximetry             96 14 97%          Blood Pressure from Last 3 Encounters:   04/05/17 103/61   03/17/17 98/58   02/24/17 132/76    Weight from Last 3 Encounters:   04/05/17 133 lb 6.1 oz (60.5 kg)   02/17/17 123 lb 7.3 oz (56 kg)   02/03/17 126 lb 11.2 oz (57.5 kg)              Today, you had the following     No orders found for display         Today's Medication Changes          These changes are accurate as of: 4/12/17 11:59 PM.  If you have any questions, ask your nurse or doctor.               Start taking these medicines.        Dose/Directions    clonazePAM 2 MG tablet   Commonly known as:  klonoPIN   Used for:  Air hunger, Generalized anxiety disorder   Replaces:  clonazePAM 0.1 mg/mL   Started by:  Norman Brito MD        Dose:  2 mg   1 tablet (2 mg) by Per J Tube route 4 times daily   Quantity:  120 tablet    Refills:  1       IMPACT PEPTIDE 1.5 Liqd   Used for:  Achalasia   Started by:  Norman Brito MD        Dose:  50 mL/hr   Take 50 mL/hr by mouth continuous   Quantity:  30 Bottle   Refills:  11         Stop taking these medicines if you haven't already. Please contact your care team if you have questions.     clonazePAM 0.1 mg/mL   Commonly known as:  klonoPIN   Replaced by:  clonazePAM 2 MG tablet   Stopped by:  Norman Brito MD                Where to get your medicines      These medications were sent to [facility] - [place], [place] - [address]  [address], [facility] [address]     Phone:  [phone]     IMPACT PEPTIDE 1.5 Liqd         Some of these will need a paper prescription and others can be bought over the counter.  Ask your nurse if you have questions.     Bring a paper prescription for each of these medications     clonazePAM 2 MG tablet                Primary Care Provider Office Phone # Fax #    Norman Brito [phone]        [facility] [address] [place] [address] [address]     [facility] [address]        Thank you!     Thank you for choosing [facility]  for your care. Our goal is always to provide you with excellent care. Hearing back from our patients is one way we can continue to improve our services. Please take a few minutes to complete the written survey that you may receive in the mail after your visit with us. Thank you!             Your Updated Medication List - Protect others around you: Learn how to safely use, store and throw away your medicines at www.disposemymeds.org.          This list is accurate as of: 4/12/17 11:59 PM.  Always use your most recent med list.                   Brand Name Dispense Instructions for use    acetaminophen 32 mg/mL solution    TYLENOL    300 mL    20.3 mLs (650 mg) by Per Feeding Tube route every 4 hours as needed for mild pain       bisacodyl 10 MG Suppository    DULCOLAX     25 suppository    Place 1 suppository (10 mg) rectally daily as needed for constipation       budesonide 0.5 MG/2ML neb solution    PULMICORT    60 ampule    Take 2 mLs (0.5 mg) by nebulization 2 times daily       calcium carbonate 500 MG chewable tablet    TUMS    150 tablet    Take 1 tablet (500 mg) by mouth every 2 hours as needed for heartburn       clonazePAM 2 MG tablet    klonoPIN    120 tablet    1 tablet (2 mg) by Per J Tube route 4 times daily       Cranberry 125 MG Tabs     90 tablet    1 tablet by Jejunal Tube route daily       famotidine 40 MG/5ML suspension    PEPCID    75 mL    Take 2.5 mLs (20 mg) by mouth 2 times daily as needed for heartburn       fexofenadine 180 MG tablet    ALLEGRA    30 tablet    Take 1 tablet (180 mg) by mouth daily       FLEET NATURALS CLEANSING ENEMA Enem     3 Bottle    Place 1 enema rectally daily as needed       gabapentin 250 MG/5ML solution    NEURONTIN    450 mL    TAKE 300MG (6ML) VIA J-TUBE 3 TIMES A DAY....       guaiFENesin 200 MG Tabs tablet    ORGANIDIN    120 tablet    Take 1 tablet (200 mg) by mouth 4 times daily       HYDROmorphone 1 MG/ML Liqd liquid    DILAUDID    180 mL    Take 1 mL (1 mg) by mouth every 2 hours as needed for other (dyspnea)       IMPACT PEPTIDE 1.5 Liqd     30 Bottle    Take 50 mL/hr by mouth continuous       ipratropium 0.02 % neb solution    ATROVENT    450 mL    Take 2.5 mLs (0.5 mg) by nebulization 4 times daily and every four hours at night PRN.       lactobacillus rhamnosus (GG) capsule     60 capsule    1 capsule by Per G Tube route daily       levalbuterol 1.25 MG/3ML neb solution    XOPENEX    540 mL    Take 3 mLs (1.25 mg) by nebulization 4 times daily and every four hours at night PRN.       lidocaine 15 mL, alum & mag hydroxide-simethicone 15 mL GI Cocktail     500 mL    Take 30 mLs by mouth 3 times daily as needed for moderate pain       lidocaine 5 % Patch    LIDODERM    30 patch    Place 1-2 patches onto the skin every 24  hours Apply to the lower back       loperamide 2 MG tablet    IMODIUM A-D    30 tablet    2-2 mg tablets per J-tube qid prn frequent and/or loose stools. Do not use more than 8 tabs per day.       LORazepam 2 MG/ML (HIGH CONC) solution    ATIVAN    30 mL    Take 0.25 mLs (0.5 mg) by mouth every 3 hours as needed for anxiety       melatonin 1 MG/ML Liqd liquid     300 mL    3 mLs (3 mg) by Per J Tube route At Bedtime       menthol-zinc oxide 0.44-20.625 % Oint ointment    CALMOSEPTINE    113 g    Apply topically 2 times daily as needed for skin protection       morphine sulfate 10 MG 24 hr capsule    ROSIE    90 capsule    Give contents of one capsule through the gastrostomy tube every 8 hours.       multivitamins with minerals Liqd liquid     450 mL    15 mLs by Per Feeding Tube route daily       ondansetron 4 MG tablet    ZOFRAN    18 tablet    Take 1 tablet (4 mg) by mouth every 4 hours as needed for nausea       * order for DME     3 each    Equipment being ordered: 1) Avendaño catheter 16F, balloon 10 mL, silicone.  2) Urinary collection bag.  3) Elastic leg strap for Avendaño catheter.  Please fax to [email or web address].       * order for DME     1 Device    Equipment being ordered: Methylex foam dressings, alternating pressure pad for mattress and pump.       polyethylene glycol 0.4%- propylene glycol 0.3% 0.4-0.3 % Soln ophthalmic solution    SYSTANE ULTRA    1 Bottle    Place 1-2 drops into both eyes 4 times daily as needed for dry eyes 0900, 1300, 1700, 2100       polyethylene glycol Packet    MIRALAX/GLYCOLAX    100 packet    17 g by Per J Tube route 2 times daily Hold for loose stools       predniSONE 5 MG/5ML solution     500 mL    20 mLs (20 mg) by Per G Tube route daily       * QUEtiapine 50 MG tablet    SEROQUEL    180 tablet    Take 1 tablet (50 mg) by mouth 2 times daily       * QUEtiapine 25 MG tablet    SEROquel    90 tablet    Administer one tab per J-tube as needed at night if scheduled HS dose ineffective  for treatment of anxiety or sleeplessness.       SENEXON-S 8.6-50 MG per tablet   Generic drug:  senna-docusate     360 tablet    TAKE TWO TABLETS VIA J-TUBE 2 TIMES A DAY       sertraline 20 MG/ML (HIGH CONC) solution    ZOLOFT    105 mL    7.5 mLs (150 mg) by Per Feeding Tube route daily       sodium chloride 0.65 % nasal spray    OCEAN    1 Bottle    Spray 1 spray into both nostrils daily as needed for congestion       vitamin A-D & C drops 1500-400-35 drops     630 mL    GIVE 7ML PER J-TUBE EVERY DAY       zinc sulfate (20 mg [place]. Zn/mL) 88 mg/mL Soln solution     25 mL    2.5 mLs (220 mg) by Per J Tube route daily       * Notice:  This list has 4 medication(s) that are the same as other medications prescribed for you. Read the directions carefully, and ask your doctor or other care provider to review them with you.

## 2017-04-12 NOTE — PATIENT INSTRUCTIONS
1.  I wrote out orders today on a form at your residence.  If you have questions about changes made, please ask your staff member to discuss these with you.    2.  I'll be back to see you next on Friday, April 28th at 12:15 PM.  However, I asked your nurse today to give me a call sooner than that if we're not doing a good job controlling your anxiety or shortness of breath.  We can make changes prior to my next visit if need be.

## 2017-04-12 NOTE — PROGRESS NOTES
SUBJECTIVE:                                                    Mary Wang is a 67 year old female with medical history which includes the followin.  Chronic obstructive pulmonary disease, end-stage - most recent PFT  showed FEV1 0.35 L (16% predicted) and DLCO/VA 18% predicted, indicative of emphysema.      2.  Chronic ventilatory failure secondary to #1 - on continuous ventilation via trach.      3.  Intractable dyspnea despite mechanical ventilation - seen in Palliative Care consultation by Dr. Radha Reyes 2017; see below.      4.  Achalasia, etiology not described in medical record - esophageal stent placed at some point in the past, removed endoscopically 2015.  Has GJ tube for enteral feeding.  There has been past difficulty placing GJ endoscopically.      She was seen in her group home today for the above, in follow-up of a recent hospitalization, and for the following health issues:               End-stage COPD with chronic ventilatory failure  Has had trach and required continuous mechanical ventilation since early .  Current vent:  CMV mode, Vt 400, rate 14, FiO2 3 L/min O2, and PEEP 5 cm.  Has been steroid-dependent for years, currently 20 mg QD.  Trach is Bivona 6.0 (70 mm).  Nebulized Rx: budesonide BID, levalbuterol Q4H, ipratropium Q4H. Both bronchodilators available PRN at night also.    Symptoms are currently: Continues to have episodes of severe dyspnea while on mechanical ventilation, including this morning prior to my visit.  With many of these episodes, patient requests that she be taken to the hospital.    Current fatigue or dyspnea with ambulation: N/A; is at bedrest and sedentary due to severe dyspnea.    Shortness of breath: Always present when awake, and previously episodically severe even when at rest.    Increased or change in Cough/Sputum: No.  Has occasional cough, requests suction frequently.  Sputum is presently at baseline scant, thick,  "white.    Fever(s): No.    Any ER/UC or hospital admissions since your last visit? Yes; see below.             History    Smoking status       Former Smoker -- 2.00 packs/day for 40 years       Types:  Cigarettes       Start date:  01/01/1964       Quit date:  04/18/1998    Smokeless tobacco       Never Used        No results found for this basename: FEV1, [id number]              Amount of exercise or physical activity: Almost entirely bedbound.  Can still be lifted to chair, but does so infrequently.    Problems taking medications regularly: No; all meds given j port of GJ tube.    Medication side effects: Daughter reports sleepiness to hydromorphone and benzos.    Diet: Changed during 3/2017 hospital stay to Impact Peptide (fiber-free) at 50 mL/hour (1200 mL/day), given by J-port, based upon RD recommendations.  Only J-port is being fed, due to gastric intolerance to TF's during recent hospital stay.      Intractable dyspnea  Current palliative Rx: MS Contin 10 mg Q8H, hydromorphone 1 mg Q2H PRN which she receives fairly routinely, scheduled clonazepam 1.5 mg QID, and lorazepam 0.5 mg Q3H PRN.    Duration: Severe since patient was officially taken off of transplant list in 2016.    Description (location/character/radiation): Severe dyspnea, even at rest, and even on mechanical ventilation.  Severe enough at times that patient says she's \"scared\", and requests ED transport.  Anxiety and dyspnea control remain poor per staff member today.    Intensity:  Often very severe as above.    History (similar episodes/previous evaluation): End-stage COPD on continuous mechanical ventilation, superimposed on chronic anxiety.    Therapies tried and outcome: See summary above of palliative Rx, which has improved baseline comfort to some degree, though continues to have episodically severe dyspnea, and continues to request hospitalization from group home staff, including during my visit today.           Anxiety    Current Rx " includes sertraline 150 mg QD, clonazepam 1.5 mg QID, lorazepam 0.5 mg Q3H PRN, and quetiapine 50 mg Q12H.    Status since last visit: Anxiety poorly controlled, details largely described above under dyspnea.    Other associated symptoms: Severe dyspnea, see above.    Complicating factors:  Significant life event: No   Current substance abuse: None  Depression symptoms: No  No flowsheet data found.        GAD7             Hospital Follow-up Visit:    Hospital: [facility]  Date of Admission: 3/20/17  Date of Discharge: 4/5/17  Reason(s) for Admission: 1. Acute on chronic ventilatory failure.  2. Urosepsis due to Proteus spp.  3. Abdominal pain, undetermined etiology.            Problems taking medications regularly:  None.       Medication changes since discharge: None, though cefepime ends today, 4/12/17.       Problems adhering to non-medication therapy:  None.    Summary of hospitalization:  [facility] discharge summary reviewed, portions follow:    HOSPITAL COURSE BY PROBLEM  Mary Wang is a 67 year old female with a history of severe COPD s/p tracheostomy on home ventilator, GJ-tube dependence, anxiety, HTN, chronic indwelling bender, and achalasia who presented with malaise and bender catheter pain, admitted to the ICU on 3/21/17 for sepsis from likely urinary source with severe metabolic derangements resolved with antibiotics. Hospitalization complicated by worsening respiratory failure concerned for Ventilator Associated Pneumonia, improving with treatment on IV antibiotics.        # Acute on chronic Hypoxic, Hypercarbic Respiratory Failure  # Severe COPD s/p Tracheostomy with Ventilator Dependence  Developed worsening respiratory status during hospitalization on 4/2, with increasing SOB and hypercapnea. Started on IV meropenem empirically. Sputum cultures subsequently grew serratia, pseudomonas, and achromobacter. Improved clinically with abx. She also started prednisone  burst 40 mg for 5 days given worsening hypercarbia. Based on susceptibilities, she was transitioned to IV cefepime on discharge. (Susceptibility for achromobacter pending.)  - Home vent settings: CMV 14/400/5/45%.  - Continue home nebulizers  - Discharged on cefepime 2G Q8H for 7 day course  - Will follow sensitivities for achromobacter and adjust abx outpatient if needed  - Continue steroid burst 40 mg day to complete total 5 days, then resume PTA prednisone  - PCP follow up in one week      # Abdominal Pain Likely 2/2 Gastroparesis versus Gastric Outlet Obstruction - No acute etiology on CT scan on 3/23. Suspect due to gastroparesis versus gastric outlet obstruction. GI assisted with PEG-J adjustment during hospitalization. Constipation likely contributory as well.   - TF changed to continuous by J-port  - TF to Impact Peptide (fiber-free, high PRO, and fish oil given wound and CRP of 22.0) @ new goal 50 ml/hr (1200 ml/day), per nutrition recs  - G-tube venting BID  - Cont bowel regimen      # Sepsis secondary to Proteus from Urinary Source, resolved - Presented with a leukocytosis, hypotension, and tachypnea concerning for sepsis. Initial evaluation raised concern for urinary versus HCAP source due to dirty UA and new infiltrate on CXR. Cultures were obtained however CXR infiltrate resolved rapidly which was felt to be less consistent with HCAP. Initial care in ICU with IVF resuscitation, once stabilized was transferred to the floor. Given improvement with CXR, urine was felt to be most consistent source of infection and grew Proteus, susceptible to amp/sulbactam. Mary was treated with amp/sulbactam.       # Acute on Chronic Normocytic Anemia: Stable  Likely hemoconcentrated on admission. No signs of active bleeding. Also compounded by frequent blood draws and poor reticulocyte response. Responded well to 1 Unit of pRBC on 3/26. Remained stable.       # Sacrococcygeal Ulcer:  One stage III pressure ulcer on  her sacrococcygeal area. No improvement reported for several months, present on admission. No signs of acute infection.  - Continue wound cares    Diagnostic Tests/Treatments reviewed.  Follow up needed: None.  Other Healthcare Providers Involved in Patient s Care:         Patient is back in group home, receiving total care.  Update since discharge: Dyspnea and anxiety remain suboptimally controlled on current Rx.  Is constipated; no BM in nearly 4 days.  Is due to receive an enema this afternoon.     Post Discharge Medication Reconciliation: discharge medications reconciled and changed, per note/orders (see AVS).  Plan of care communicated with patient and staff nurse.  Daughter was not present for today's visit.     Coding guidelines for this visit:  Type of Medical   Decision Making Face-to-Face Visit       within 7 Days of discharge Face-to-Face Visit        within 14 days of discharge   Moderate Complexity [id number]   High Complexity [id number]            Problem list and histories reviewed & adjusted, as indicated.  Additional history: as documented    BP Readings from Last 3 Encounters:   04/05/17 103/61   03/17/17 98/58   02/24/17 132/76    Wt Readings from Last 3 Encounters:   04/05/17 133 lb 6.1 oz (60.5 kg)   02/17/17 123 lb 7.3 oz (56 kg)   02/03/17 126 lb 11.2 oz (57.5 kg)                  Labs reviewed in EPIC    Reviewed and updated as needed this visit by clinical staff       Reviewed and updated as needed this visit by Provider       ROS:  Obtained from patient and group home nurse.  CONSTITUTIONAL: NEGATIVE  for chills, fever.   EYES: NEGATIVE for eye irritation or discharge.  ENT/MOUTH: NEGATIVE for nasal congestion, sinus pressure, or epistaxis.  RESP: See above.    CV: NEGATIVE for chest pain.  Has mild to minimal lower extremity edema, at baseline.  GI: NEGATIVE for abdominal pain, diarrhea, nausea or vomiting.  Constipation reported despite Senna 2 BID use and Dulcolax supp today.  Is due  "to receive an enema this afternoon if no BM.  : NEGATIVE for reported dysuria.  Has indwelling Avendaño chronically.    MUSCULOSKELETAL: NEGATIVE for focal arthralgia or back pain, no joint swelling.  INTEG: [facility] is treating area of skin breakdown, current ulcer large and unstageable per staff nurse, though exact dimensions unavailable.  NEURO: NEGATIVE for syncope, stroke or seizure. Periods of somnolence after opioid administration persist.  PSYCHIATRIC: See above.    OBJECTIVE:                                                    Pulse 96  Resp 14  SpO2 97%  There is no height or weight on file to calculate BMI.    GENERAL: Short in stature woman, lying in bed, awake, mouthing words, including \"I want to go to the hospital\".    EYES: Eyes grossly normal to inspection, extraocular movements intact  HENT: Nares patent bilaterally.  Nasal mucosa normal, no discharge.    NECK: Tracheostomy tube is Bivona 6.0, stoma is clean and mature, no discharge.  RESP: No accessory muscle use on full vent support.  Symmetrically very distant breath sounds bilaterally.  Lungs quiet but clear throughout on inspiration.  Expiration nearly inaudible and very prolonged, no wheeze heard.  CV: Regular rate and rhythm, distantly heard, non-tachycardic.  Normal S1 S2, no murmur heard, S2 is persistently split.  No lower extremity edema.  ABDOMEN: GJ tube stoma is clean, and tube is secure, one suture remains in place.  Mildly protuberant, but soft, non-tender, no guarding.  Liver and spleen not palpable, no masses palpable.  Bowel sounds positive.  MS: No bony deformities noted.  No red or inflamed joints.  SKIN: Warm and dry, no rashes. Multiple ecchymoses consistent with steroid dermopathy.  Not repositioned to examine decubitus wound due to severe dyspnea at complete rest.  NEURO: Awake and alert throughout this visit.  Orientation cannot be formally tested as patient is non-conversant.  Cranial nerves II - XII grossly intact.  " No gross motor or sensory deficits noted.    : Avendaño catheter noted.  PSYCH: Exam mostly deferred as patient is non-verbal, but she appears overtly anxious.        ASSESSMENT/PLAN:                                                      (N39.0,  B96.4) Urinary tract infection due to Proteus  (primary encounter diagnosis)  Comment: Noted during most recent hospital stay.  Cefepime IV course completed 4/11 via PICC line, which remains in place in the right arm.  She is having no fevers, and Avendaño is functioning well.  Plan: Therapy complete.  Will remove PICC line.    (R09.89) Intractable dyspnea  Comment: Episodic dyspnea remains severe on occasion, on current Rx of MS Contin 10 mg Q8H, hydromorphone 1 mg Q2H PRN, which is now being given Q2H nearly routinely, as well as clonazepam 1.5 mg QID, and lorazepam 0.5 mg Q3H PRN (up to 6 doses per day).  Patient requests to be taken to the hospital when dyspnea is severe; she did so during my visit today.  I think we need to continue to expand palliative therapy.   Plan: We're going to increase benzodiazepine dosing to start.  See Anxiety below.  May need to increase opioids as well.      (F41.1) Generalized anxiety disorder  Comment: Longstanding, predates respiratory failure and end-stage COPD.  Dyspnea is the primary trigger of anxiety, and has been a common cause of hospital transport.  Plan: Will increase clonazepam dose by 25% to 2 mg QID, and continue lorazepam 0.5 mg Q3H PRN.  Nurse reports short duration of benefit following clonazepam dosing.  If this fails to improve with this next dose increase, will probably switch to diazepam for a longer t1/2.  Meanwhile, will continue sertraline 150 mg QD and quetiapine 50 mg BID (patient also has a history of periods of delirium, which I guess is why this was started).  Will also need to control dyspnea, as it is the primary trigger for severe anxiety; see above for details.  Monitor response.      (J43.2) Centrilobular  emphysema (H)    Comment: End-stage, both numerically and clinically.  Is on continuous mechanical ventilation, and tenuous on that.  Is steroid-dependent with Prednisone 20 mg QD, and remains on nebulized budesonide and bronchodilators.  Plan: Continue present Rx, which is maximal. I can't identify any additions likely to be helpful.      (J96.22) Acute and chronic respiratory failure with hypercapnia (H)  Comment: Vent settings and trach info described above.  Patient has severe, and often intractable dyspnea despite full vent support; see below.  Plan: Continue current vent strategy.      (K59.03,  T40.2X5A) Constipation due to opioid therapy  Comment: An episodic problem, occurs despite Senna, Dulcolax, and polyethylene glycol.    Plan: Continue present Rx, which now includes Rectal Cleansers ([employer] CLEANS[employer] ENEMA) ENEMA daiily as needed, bisacodyl (DULCOLAX) 10 MG Suppository daily as needed added last visit.      (Z71.89) Advanced directives, counseling/discussion  Comment: Patient has an established DNR order, and on 2/3/17 changed her POLST to reflect her desire not to be transported to the hospital. She cannot be enrolled into Hospice while on mechanical ventilation.  Plan: Will avoid future transport to ED or hospital per patient's request on the 2/3/17 POLST, and prioritize management of dyspnea and anxiety as outlined above.    (K22.0) Achalasia  Comment: Patient is strict NPO and fed through a GJ-tube.  Her TF regimen was changed during her 4/2017 hospital stay on the basis of RD consultation, due to gastric intolerance.  Plan: Discussed changes in TF regimen with staff nurse today.  Will begin IMPACT PEPTIDE 1.5 LIQD 50 mL/hour round the clock (1200 mL/day), with H2O flushes unchanged.  RD eliminated fiber from regimen, so will stop using fiber supplement.  We are advised to feed only through the J-port, which we will do.    FUTURE APPOINTMENTS:       - Follow-up visit scheduled for 4/28/17  at 1215.    Face to face time was 44 minutes, at least 50% of which was spent in counseling regarding management of dyspnea, anxiety, and enteral feeding.      Norman Brito MD  [facility]

## 2017-04-14 NOTE — PROGRESS NOTES
Chart review for ongoing care coordination needs following Complex Care assessment. Patient has [facility]  ([phone]).   Closing to this care coordinator.      LUZ Cuevas  Social Work Care Coordinator  [facility], Mobile Team

## 2017-04-18 PROBLEM — R09.02 HYPOXIA: Status: ACTIVE | Noted: 2017-01-01

## 2017-04-18 NOTE — ED PROVIDER NOTES
Emergency Department Patient Sign-out       Brief HPI:  This is a 67 year old female signed out to me by Dr. Gómez.  See initial ED Provider note for details of the presentation.     Significant Events prior to my assuming care: 67-year-old female with COPD who is trach/vent dependent and recently experiencing increasing O2 requirement without clear etiology.  Now on home vent settings.  Noted to be hyponatremic to 128 and [place] [facility] [place]      Exam:   Patient Vitals for the past 24 hrs:   BP Temp Temp src Heart Rate Resp SpO2 Height Weight   04/18/17 1745 116/76 - - - - - - -   04/18/17 1730 123/71 - - 85 15 94 % - -   04/18/17 1715 121/72 - - 86 16 96 % - -   04/18/17 1700 106/75 - - - - - - -   04/18/17 1645 129/72 - - 84 - - - -   04/18/17 1630 97/61 - - 80 14 96 % - -   04/18/17 1615 93/65 - - 81 14 - - -   04/18/17 1600 96/65 - - 82 14 94 % - -   04/18/17 1545 95/66 - - 83 14 94 % - -   04/18/17 1530 103/70 - - 86 13 94 % - -   04/18/17 1515 114/66 - - 89 11 94 % - -   04/18/17 1500 99/67 - - 87 21 92 % - -   04/18/17 1445 101/68 - - 87 15 97 % - -   04/18/17 1430 97/72 - - 87 16 93 % - -   04/18/17 1415 111/71 - - 89 17 99 % - -   04/18/17 1400 107/72 - - - - - - -   04/18/17 1345 95/68 - - 87 15 99 % - -   04/18/17 1330 106/72 - - 89 16 99 % - -   04/18/17 1315 97/69 - - - - - - -   04/18/17 1300 93/67 - - 93 15 99 % - -   04/18/17 1245 92/65 98.6  F (37  C) Axillary 91 18 100 % 1.524 m (5') 57.4 kg (126 lb 8 oz)   04/18/17 1230 109/72 - - 96 20 100 % - -   04/18/17 1215 110/72 - - 96 16 - - -   04/18/17 1200 104/65 - - 94 16 99 % - -   04/18/17 1156 116/76 - - - - 99 % - -           ED RESULTS:   Results for orders placed or performed during the hospital encounter of 04/18/17 (from the past 24 hour(s))   CBC with platelets differential     Status: Abnormal    Collection Time: 04/18/17 12:05 PM   Result Value Ref Range    WBC 19.1 (H) 4.0 - 11.0 10e9/L    RBC Count 3.52 (L) 3.8 - 5.2  10e12/L    Hemoglobin 10.8 (L) 11.7 - 15.7 g/dL    Hematocrit 32.6 (L) 35.0 - 47.0 %    MCV 93 78 - 100 fl    MCH 30.7 26.5 - 33.0 pg    MCHC 33.1 31.5 - 36.5 g/dL    RDW 16.2 (H) 10.0 - 15.0 %    Platelet Count 290 150 - 450 10e9/L    Diff Method Automated Method     % Neutrophils 84.9 %    % Lymphocytes 3.1 %    % Monocytes 6.6 %    % Eosinophils 4.6 %    % Basophils 0.3 %    % Immature Granulocytes 0.5 %    Nucleated RBCs 0 0 /100    Absolute Neutrophil 16.2 (H) 1.6 - 8.3 10e9/L    Absolute Lymphocytes 0.6 (L) 0.8 - 5.3 10e9/L    Absolute Monocytes 1.3 0.0 - 1.3 10e9/L    Absolute Eosinophils 0.9 (H) 0.0 - 0.7 10e9/L    Absolute Basophils 0.1 0.0 - 0.2 10e9/L    Abs Immature Granulocytes 0.1 0 - 0.4 10e9/L    Absolute Nucleated RBC 0.0    Basic metabolic panel     Status: Abnormal    Collection Time: 04/18/17 12:05 PM   Result Value Ref Range    Sodium 128 (L) 133 - 144 mmol/L    Potassium 3.2 (L) 3.4 - 5.3 mmol/L    Chloride 72 (L) 94 - 109 mmol/L    Carbon Dioxide 45 (H) 20 - 32 mmol/L    Anion Gap 11 3 - 14 mmol/L    Glucose 115 (H) 70 - 99 mg/dL    Urea Nitrogen 84 (H) 7 - 30 mg/dL    Creatinine 0.97 0.52 - 1.04 mg/dL    GFR Estimate 57 (L) >60 mL/min/1.7m2    GFR Estimate If Black 69 >60 mL/min/1.7m2    Calcium 9.1 8.5 - 10.1 mg/dL   Troponin I     Status: None    Collection Time: 04/18/17 12:05 PM   Result Value Ref Range    Troponin I ES  0.000 - 0.045 ug/L     <0.015  The 99th percentile for upper reference range is 0.045 ug/L.  Troponin values in   the range of 0.045 - 0.120 ug/L may be associated with risks of adverse   clinical events.     BNP     Status: None    Collection Time: 04/18/17 12:05 PM   Result Value Ref Range    N-Terminal Pro BNP Inpatient 113 0 - 900 pg/mL   ISTAT gases lactate miles POCT     Status: Abnormal    Collection Time: 04/18/17 12:05 PM   Result Value Ref Range    Ph Venous 7.47 (H) 7.32 - 7.43 pH    PCO2 Venous 75 (H) 40 - 50 mm Hg    PO2 Venous 60 (H) 25 - 47 mm Hg     Bicarbonate Venous 55 (HH) 21 - 28 mmol/L    O2 Sat Venous 90 %    Lactic Acid 1.6 0.7 - 2.1 mmol/L   Osmolality     Status: Abnormal    Collection Time: 04/18/17 12:05 PM   Result Value Ref Range    Osmolality 318 (H) 280 - 301 mmol/kg   EKG 12 lead     Status: None (Preliminary result)    Collection Time: 04/18/17 12:23 PM   Result Value Ref Range    Interpretation ECG Click View Image link to view waveform and result    Influenza A/B antigen     Status: None    Collection Time: 04/18/17 12:27 PM   Result Value Ref Range    Influenza A/B Agn Specimen Swab     Influenza A Negative NEG    Influenza B  NEG     Negative   Test results must be correlated with clinical data. If necessary, results   should be confirmed by a molecular assay or viral culture.     Chest  XR, 1 view portable     Status: None    Collection Time: 04/18/17 12:30 PM    Narrative    Chest one view portable upright    HISTORY: Hypoxemia    Comparison study: 4/2/2017    FINDINGS: Tracheostomy tube in place. Cardiac silhouette is not  enlarged. Scattered punctate calcified granulomata within both lungs.  Pulmonary vascularity is mildly increased. Minimal left basilar  atelectasis. Chronic blunting of the left costophrenic angle.      Impression    IMPRESSION: No acute airspace disease.    NUZHAT RODRIGUEZ MD   UA with Microscopic reflex to Culture     Status: Abnormal    Collection Time: 04/18/17  3:04 PM   Result Value Ref Range    Color Urine Yellow     Appearance Urine Clear     Glucose Urine Negative NEG mg/dL    Bilirubin Urine Negative NEG    Ketones Urine Negative NEG mg/dL    Specific Gravity Urine 1.014 1.003 - 1.035    Blood Urine Small (A) NEG    pH Urine 6.5 5.0 - 7.0 pH    Protein Albumin Urine 30 (A) NEG mg/dL    Urobilinogen mg/dL Normal 0.0 - 2.0 mg/dL    Nitrite Urine Negative NEG    Leukocyte Esterase Urine Trace (A) NEG    Source Catheterized Urine     WBC Urine 3 (H) 0 - 2 /HPF    RBC Urine 17 (H) 0 - 2 /HPF    Squamous Epithelial  /HPF Urine <1 0 - 1 /HPF    Mucous Urine Present (A) NEG /LPF   Urine Culture Aerobic Bacterial     Status: None (Preliminary result)    Collection Time: 04/18/17  3:04 PM   Result Value Ref Range    Specimen Description Catheterized Urine     Special Requests Specimen received in preservative     Culture Micro Pending     Micro Report Status Pending    Blood culture     Status: None (Preliminary result)    Collection Time: 04/18/17  3:07 PM   Result Value Ref Range    Specimen Description Blood Right Arm     Culture Micro Pending     Micro Report Status Pending    ISTAT gases lactate miles POCT     Status: Abnormal    Collection Time: 04/18/17  3:09 PM   Result Value Ref Range    Ph Venous 7.49 (H) 7.32 - 7.43 pH    PCO2 Venous 69 (H) 40 - 50 mm Hg    PO2 Venous 57 (H) 25 - 47 mm Hg    Bicarbonate Venous 52 (HH) 21 - 28 mmol/L    O2 Sat Venous 90 %    Lactic Acid 1.9 0.7 - 2.1 mmol/L   Blood culture     Status: None (Preliminary result)    Collection Time: 04/18/17  3:13 PM   Result Value Ref Range    Specimen Description Blood Left Hand     Culture Micro Pending     Micro Report Status Pending        ED MEDICATIONS:   Medications   lidocaine 1 % 1 mL (not administered)   lidocaine (LMX4) kit (not administered)   sodium chloride (PF) 0.9% PF flush 3 mL (not administered)   sodium chloride (PF) 0.9% PF flush 3 mL (not administered)   0.9% sodium chloride BOLUS (not administered)   HYDROmorphone (DILAUDID) liquid 1 mg (not administered)         Impression:    ICD-10-CM    1. Urinary retention R33.9 Sputum Culture Aerobic Bacterial     Gram stain     Urine Culture Aerobic Bacterial     CRP inflammation     CRP inflammation   2. Hyponatremia E87.1 UA with Microscopic reflex to Culture     Osmolality     Osmolality     Procalcitonin     Procalcitonin     Blood culture     Blood culture     Lactic acid whole blood     ISTAT gases lactate miles POCT     ISTAT gases lactate miles POCT     CANCELED: Osmolality     CANCELED:  Procalcitonin     CANCELED: Lactic acid whole blood       Plan:    Patient remained stable from a respiratory stand point and [place] [facility] [place]        Angela Nguyen, Angela Crowley MD  04/19/17 0023

## 2017-04-18 NOTE — ED NOTES
Bed: ED01  Expected date: 4/18/17  Expected time:   Means of arrival: Ambulance  Comments:  [facility]  68 y/o female, vent dependent.  C/o sats in the 40s, sats now 100% with bagging.   ETA 10 min.

## 2017-04-18 NOTE — H&P
INTERNAL MEDICINE HISTORY & PHYSICAL   Mary Wang ([id number]) admitted on 4/18/2017  Primary care provider: Norman Brito         Chief Complaint:     Acute hypoxia, AMS, Abd pain         History of Present Illness   History provided by her home care nurse and her daughter     Mary Wang is a 67 year old female with history of stress-induced cardiomyopathy, severe COPD (Last FEV1 0.35) s/p tracheostomy on home ventilator and GJ tube placement, anxiety, hypertension, chronic indwelling bender catheter, and achalasia, multiple admissions (most recent 03/21- 04/05), who presents with acute hypoxia and AMS.    Patient was said to have been noticed to have acute onset shortness of breath and hypoxia to the low 40s requiring bagging. No mucus was extracted upon deep suctioning. She was not eating at the time, there was no hx of fever, chills, sweats, increased secretions, nausea, vomiting, chest pain, or palpitations. There has also been no recent changes in her vent setting. She indicated having lower abdominal pain but patient is on bender and no urinary symptoms.    Her daughter notes a mild worsening of her baseline AMS when she had the episode of hypoxia. Says patient is usually able to talk and is mostly alert, except on some days when she goes off on a tangent during discussions. No hx of head injury, LOC, headache, fever, or seizure activity.     Of note, the patient has had eight admissions since 12/2016 and has been living at a group home.         Past Medical History       Patient Active Problem List   Diagnosis     Urinary retention     Air hunger     Achalasia     Delirium     HTN (hypertension)     GERD (gastroesophageal reflux disease)     ACP (advance care planning)     S/P percutaneous endoscopic gastrostomy (PEG) tube placement (H)     NSTEMI (non-ST elevated myocardial infarction) (H)     Atypical chest pain     Encounter for gastrojejunal (GJ) tube placement     Constipation due  to opioid therapy     Generalized anxiety disorder     Advanced directives, counseling/discussion     Acute on chronic respiratory failure with hypoxia (H)     Health Care Home     Malfunction of gastrostomy tube (H)     Anemia     Feeding tube dysfunction     Decubitus ulcer of sacral region, unstageable (H)     Urinary tract infection due to Proteus     Centrilobular emphysema (H)           Past Surgical History       Past Surgical History:   Procedure Laterality Date     ANESTHESIA OUT OF OR MRI N/A 4/18/2016    Procedure: ANESTHESIA OUT OF OR MRI;  Surgeon: GENERIC ANESTHESIA PROVIDER;  Location: [facility] OR     BRONCHOSCOPY, INSERT BRONCHIAL VALVE Right 9/2/2015    Procedure: BRONCHOSCOPY, INSERT BRONCHIAL VALVE;  Surgeon: Everardo Beach MD;  Location: [facility]     ENDOSCOPIC INSERTION TUBE GASTROSTOMY N/A 7/9/2016    Procedure: ENDOSCOPIC INSERTION TUBE GASTROSTOMY;  Surgeon: Brenden Plasencia MD;  Location: [facility]     ESOPHAGOSCOPY, GASTROSCOPY, DUODENOSCOPY (EGD), COMBINED N/A 12/11/2015    Procedure: COMBINED ESOPHAGOSCOPY, GASTROSCOPY, DUODENOSCOPY (EGD);  Surgeon: Dhruv Lyles MD;  Location: [facility] OR     ESOPHAGOSCOPY, GASTROSCOPY, DUODENOSCOPY (EGD), COMBINED N/A 12/31/2015    Procedure: COMBINED ESOPHAGOSCOPY, GASTROSCOPY, DUODENOSCOPY (EGD);  Surgeon: Brenden Plasencia MD;  Location: [facility] OR     ESOPHAGOSCOPY, GASTROSCOPY, DUODENOSCOPY (EGD), COMBINED N/A 2/17/2017    Procedure: COMBINED ESOPHAGOSCOPY, GASTROSCOPY, DUODENOSCOPY (EGD);  Surgeon: Brenden Plasencia MD;  Location: [facility]     PICC INSERTION Right 1/9/2017    5fr DL BioFlo PICC, 38cm (1cm external) in the R basilic vein.     REMOVE AND REPLACE BREAST IMPLANT PROSTHESIS N/A 12/31/2015    Procedure: PERCUTANEOUS INSERTION TUBE JEJUNOSTOMY;  Surgeon: Brenden Plasencia MD;  Location: [facility]     REMOVE AND REPLACE BREAST IMPLANT PROSTHESIS N/A 1/25/2016    Procedure: PERCUTANEOUS INSERTION TUBE JEJUNOSTOMY;  Surgeon: Jared  Carrie Galarza MD;  Location: [facility]     TRACHEOSTOMY N/A 8/26/2015    Procedure: TRACHEOSTOMY;  Surgeon: Milena Thibodeaux MD;  Location: [facility] OR            Medications     No current facility-administered medications on file prior to encounter.   Current Outpatient Prescriptions on File Prior to Encounter:  morphine sulfate (ROSIE) 10 MG 24 hr capsule EMPTY 10MG (1 CAPSULE) INTO WATER AND GIVE VIA J-TUBE EVERY 12 HOURS   Nutritional Supplements (IMPACT PEPTIDE 1.5) LIQD Take 50 mL/hr by mouth continuous   clonazePAM (KLONOPIN) 2 MG tablet 1 tablet (2 mg) by Per J Tube route 4 times daily   SENEXON-S 8.6-50 MG per tablet TAKE TWO TABLETS VIA J-TUBE 2 TIMES A DAY   vitamin A-D & C drops (TRI-VITAMIN) 1500-400-35 drops GIVE 7ML PER J-TUBE EVERY DAY   predniSONE 5 MG/5ML solution 20 mLs (20 mg) by Per G Tube route daily   zinc sulfate, 20 mg Iqugmiut. Zn/mL, 88 mg/mL SOLN solution 2.5 mLs (220 mg) by Per J Tube route daily   multivitamins with minerals (CERTAVITE/CEROVITE) LIQD liquid 15 mLs by Per Feeding Tube route daily   polyethylene glycol (MIRALAX/GLYCOLAX) Packet 17 g by Per J Tube route 2 times daily Hold for loose stools   budesonide (PULMICORT) 0.5 MG/2ML neb solution Take 2 mLs (0.5 mg) by nebulization 2 times daily   gabapentin (NEURONTIN) 250 MG/5ML solution TAKE 300MG (6ML) VIA J-TUBE 3 TIMES A DAY....   menthol-zinc oxide (CALMOSEPTINE) 0.44-20.625 % OINT ointment Apply topically 2 times daily as needed for skin protection   guaiFENesin (ORGANIDIN) 200 MG TABS tablet Take 1 tablet (200 mg) by mouth 4 times daily   Cranberry 125 MG TABS 1 tablet by Jejunal Tube route daily   QUEtiapine (SEROQUEL) 25 MG tablet Administer one tab per J-tube as needed at night if scheduled HS dose ineffective for treatment of anxiety or sleeplessness.   HYDROmorphone (DILAUDID) 1 MG/ML LIQD liquid Take 1 mL (1 mg) by mouth every 2 hours as needed for other (dyspnea)   bisacodyl (DULCOLAX) 10 MG Suppository Place 1 suppository  (10 mg) rectally daily as needed for constipation   order for DME Equipment being ordered: Methylex foam dressings, alternating pressure pad for mattress and pump.   Rectal Cleansers (FLE[employer] CLEANSING ENEMA) ENEM Place 1 enema rectally daily as needed   order for DME Equipment being ordered: 1) Avendaño catheter 16F, balloon 10 mL, silicone.  2) Urinary collection bag.  3) Elastic leg strap for Avendaño catheter.  Please fax to [facility].   LORazepam (ATIVAN) 2 MG/ML (HIGH CONC) solution Take 0.25 mLs (0.5 mg) by mouth every 3 hours as needed for anxiety   QUEtiapine (SEROQUEL) 50 MG tablet Take 1 tablet (50 mg) by mouth 2 times daily   ipratropium (ATROVENT) 0.02 % neb solution Take 2.5 mLs (0.5 mg) by nebulization 4 times daily and every four hours at night PRN.   levalbuterol (XOPENEX) 1.25 MG/3ML neb solution Take 3 mLs (1.25 mg) by nebulization 4 times daily and every four hours at night PRN.   lactobacillus rhamnosus, GG, (CULTURELL) capsule 1 capsule by Per G Tube route daily   lidocaine (LIDODERM) 5 % Patch Place 1-2 patches onto the skin every 24 hours Apply to the lower back   calcium carbonate (TUMS) 500 MG chewable tablet Take 1 tablet (500 mg) by mouth every 2 hours as needed for heartburn   ondansetron (ZOFRAN) 4 MG tablet Take 1 tablet (4 mg) by mouth every 4 hours as needed for nausea   famotidine (PEPCID) 40 MG/5ML suspension Take 2.5 mLs (20 mg) by mouth 2 times daily as needed for heartburn   loperamide (IMODIUM A-D) 2 MG tablet 2-2 mg tablets per J-tube qid prn frequent and/or loose stools. Do not use more than 8 tabs per day.   polyethylene glycol 0.4%- propylene glycol 0.3% (SYSTANE ULTRA) 0.4-0.3 % SOLN ophthalmic solution Place 1-2 drops into both eyes 4 times daily as needed for dry eyes 0900, 1300, 1700, 2100   sertraline (ZOLOFT) 20 MG/ML (HIGH CONC) solution 7.5 mLs (150 mg) by Per Feeding Tube route daily   fexofenadine (ALLEGRA) 180 MG tablet Take 1 tablet (180 mg) by mouth daily    melatonin (MELATONIN) 1 MG/ML LIQD liquid 3 mLs (3 mg) by Per J Tube route At Bedtime   acetaminophen (TYLENOL) 160 MG/5ML oral liquid 20.3 mLs (650 mg) by Per Feeding Tube route every 4 hours as needed for mild pain   sodium chloride (OCEAN) 0.65 % nasal spray Spray 1 spray into both nostrils daily as needed for congestion   lidocaine 15 mL, alum & mag hydroxide-simethicone 15 mL GI Cocktail Take 30 mLs by mouth 3 times daily as needed for moderate pain            Allergies     Allergies   Allergen Reactions     Levaquin Other (See Comments)     Delirium     Toro Podhaler [Tobramycin] Other (See Comments)     Severe congestion, SOB      Suprax [Cefixime]      See ceftibuten     Augmentin Nausea     Has tolerated for treatment of UTIs in 2016.     Avelox [Moxifloxacin] Anxiety     Cedax [Ceftibuten] Other (See Comments)     Pain in eyes     Penicillins Itching     Tolerated amoxicillin without issues.     Vantin [Cefpodoxime] Nausea            Social History     Social History     Social History     Marital status:      Spouse name: N/A     Number of children: N/A     Years of education: N/A     Occupational History     Not on file.     Social History Main Topics     Smoking status: Former Smoker     Packs/day: 2.00     Years: 40.00     Types: Cigarettes     Start date: 1/1/1964     Quit date: 4/18/1998     Smokeless tobacco: Never Used     Alcohol use No     Drug use: No     Sexual activity: Not on file     Other Topics Concern     Not on file     Social History Narrative            Family History     Family History   Problem Relation Age of Onset     CANCER Sister             Review of Systems     10 point ROS negative except as in HPI         Vitals and Exam     Physical exam:  /65  Temp 98.6  F (37  C) (Axillary)  Resp 15  Ht 1.524 m (5')  Wt 57.4 kg (126 lb 8 oz)  SpO2 94%  Breastfeeding? No  BMI 24.71 kg/m2  Wt Readings from Last 2 Encounters:   04/18/17 57.4 kg (126 lb 8 oz)   04/05/17  60.5 kg (133 lb 6.1 oz)     No intake or output data in the 24 hours ending 04/18/17 2455    Physical Exam:   General: Elderly female  HEENT: No Scleral icterus. NCAT, MMM. PERRL, EOMI.  Cardiac: Tachycardic, regular rhythm. No m/r/g. Normal S1, S2.  Pulm: Tachypnea. Diminished breath sounds bilaterally, no wheezes, or crackles. Increased respiratory effort.  Abd: Soft, non distended, non tender abdomen. No hepatosplenomegaly.  Skin: No jaundice, No rash. Stage 3 sacral ulcer not examined yet.  Extremities: No LE edema  Joints: No inflammation noted on joints  Neuro: A&Ox3, no focal deficits         Labs   CBC  Recent Labs  Lab 04/18/17  1205   WBC 19.1*   RBC 3.52*   HGB 10.8*   HCT 32.6*   MCV 93   MCH 30.7   MCHC 33.1   RDW 16.2*          BMP  Recent Labs  Lab 04/18/17  1205   *   POTASSIUM 3.2*   CHLORIDE 72*   CO2 45*   ANIONGAP 11   *   BUN 84*   CR 0.97   GFRESTIMATED 57*   GFRESTBLACK 69   IZZY 9.1        INRNo lab results found in last 7 days.    Liver panelNo lab results found in last 7 days.    Urinalysis  Recent Labs   Lab Test  04/18/17   1504   COLOR  Yellow   APPEARANCE  Clear   URINEGLC  Negative   URINEBILI  Negative   URINEKETONE  Negative   SG  1.014   UBLD  Small*   URINEPH  6.5   PROTEIN  30*   NITRITE  Negative   LEUKEST  Trace*   RBCU  17*   WBCU  3*       Cultures Pending    Imaging/procedure results:  # CXR: IMPRESSION: No acute airspace disease.    EKG: NSR    ASSESSMENT & PLAN :    Mary Wang is a 67 year old female with history of stress-induced cardiomyopathy, severe COPD (Last FEV1 0.35) s/p tracheostomy on home ventilator and GJ tube placement, anxiety, hypertension, chronic indwelling bender catheter, and achalasia, multiple admissions (most recent 03/21- 04/05), who presents with acute hypoxic hypercarbic respiratory failure, abdominal pain and AMS.    # Acute on chronic hypoxic hypercarbic respiratory failure  # Respiratory acidosis with compensation  Home  vent settings: CMV RR 14  PEEP 5 FiO2 40%.   Presented with acute onset of hypoxia requiring bagging and deep suctioning (though suctioning did not yield mucus). On recent admission for SOB and hypercapnea, patient grew serratia, pseudomonas and achromobacter on sputum Cx. Concern for possible mucus plugs, aspiration or central respiratory failure given acuity of event. HCAP also in differential due to previous hx and leukocytosis to 19k, but no fever, cough, or increased sputum production. Influenza negative. CXR negative for acute findings. Could also be a COPD exacerbation though no wheezes on chest auscultation and suddenness of symptom.  Patient placed on hospital vent at ED. VBG with respiratory acidosis with compensation. Slight improvement on ED vent.  -  RT: Ct on mechanical ventilation at home setting, can adjust if need be.  - CRP, sputum Cx, procal blood Cx pending; low threshold to start on Cefepime, Levofloxacin and Unasyn  - Continue home prednisone, nebs  - Consider pulm consult     # AMS  Likely the result of hypoxia, CO2 narcosis with background dementia. Daughter reports that in past episodes she has had O2 sats in the 20s for >20 mins before recovery. She thinks there is some hypoxic injury in addition to her dementia. No recent changes in sedating medications. No fall, head trauma, LOC or seizure activity. Could also be infectious though UA neg for UTI, HCAP recently, pending procal, neg CXR, and leukocytosis may be due to prednisone.  -- Fall precautions  -- Neuro checks    # NATA  # Hyponatremia  Cr 0.97 up from baseline of 0.40. UA with small blood likely from chronic bender. Neg for UTI. Hyponatremia likely hypovolemic. Not on known nephrotoxic medications.  -- IVF  mls once  -- Monitor Na and K   -- Daily BMP    # Leukocytosis  Possibly due to prednisone, HCAP and UTI unlikely as above. Does not appear septic and does not meet SIRS criteria.  -- Pending UCx, BCx, CRP, Procal  --  Low threshold to start ABx as above    # Abdominal Pain Likely 2/2 Gastroparesis versus Gastric Outlet Obstruction - No acute etiology on CT scan on 3/23. Suspect due to gastroparesis versus gastric outlet obstruction. GI assisted with PEG-J adjustment during hospitalization.   - Ct TF by J-port  - Nutrition consult  - G-tube venting BID  - Cont bowel regimen    # Sacrococcygeal Ulcer:  One stage III pressure ulcer on her sacrococcygeal area. No improvement reported for several months, present on admission. No signs of acute infection.  - Continue wound cares  - Wound consult    # Acute on Chronic Normocytic Anemia: Stable  No signs of active bleeding.       ===== Chronic Medical Problems =====  # CAD - History of NSTEMI, no meds currently  # Generalized Anxiety Disorder and Air Hunger - Continue home clonazepam, morphine, quetiapine, sertraline, hydromorphone PRN and lorazepam PRN  # Achalasia and GERD with GJ-Tube dependence - TF per nutrition.     FEN: TF  Prophylaxis:  DVT: Heparin GI: Pepcid  Disposition: Pending OT/PT eval   Code Status: DNAR      Erick Tarango MD  Internal Medicine- PGY1  [phone]      Patient was seen and discussed with attending physician Steffanie Cifuentes MD who agrees with above assessment and plan.

## 2017-04-18 NOTE — ED PROVIDER NOTES
History     Chief Complaint   Patient presents with     Shortness of Breath     on vent     HPI  Mary Wang is a 67 year old female with a medical history significant for stress induced cardiomyopathy, hypertension, COPD requiring 10 L O2 supplementation and ventilator dependence who was brought to the emergency department by ambulance from her care facility for evaluation of shortness of breath. EMS report that the patient spontaneously became dyspneic and hypoxic at her care home earlier today. They report that the patient's O2 sats would not increase above 50 despite care home staff giving her an in-line nebulizer treatment. They also state that the patient was bagged after that failed nebulizer treatment and, while this would typically provide her with relief, this was unable to increase her O2 sats above 50. However, EMS note that the patient was not receiving her home O2 at that time as this was removed to bag the patient. EMS placed the patient back on her regular 10 L/min and she promptly rebounded to a oxygen saturation of 99 percent. EMS report that an en route end tidal CO2 of 69.     I have reviewed the Medications, Allergies, Past Medical and Surgical History, and Social History in the [employer] system.    PAST MEDICAL HISTORY:   Past Medical History:   Diagnosis Date     Achalasia      Anxiety      Anxiety and depression      Chronic pain      COPD (chronic obstructive pulmonary disease) (H)     trach/vent dependent      GERD (gastroesophageal reflux disease)      Hypertension      Lung nodule     spiculated; followed in pulm clinic      Stress-induced cardiomyopathy      TMJ pain dysfunction syndrome      Tracheostomy in place        PAST SURGICAL HISTORY:   Past Surgical History:   Procedure Laterality Date     ANESTHESIA OUT OF OR MRI N/A 4/18/2016    Procedure: ANESTHESIA OUT OF OR MRI;  Surgeon: GENERIC ANESTHESIA PROVIDER;  Location: [facility] OR     BRONCHOSCOPY, INSERT BRONCHIAL VALVE Right  9/2/2015    Procedure: BRONCHOSCOPY, INSERT BRONCHIAL VALVE;  Surgeon: Everardo Beach MD;  Location: [facility]     ENDOSCOPIC INSERTION TUBE GASTROSTOMY N/A 7/9/2016    Procedure: ENDOSCOPIC INSERTION TUBE GASTROSTOMY;  Surgeon: Brenden Plasencia MD;  Location: [facility]     ESOPHAGOSCOPY, GASTROSCOPY, DUODENOSCOPY (EGD), COMBINED N/A 12/11/2015    Procedure: COMBINED ESOPHAGOSCOPY, GASTROSCOPY, DUODENOSCOPY (EGD);  Surgeon: Dhruv Lyles MD;  Location: [facility]     ESOPHAGOSCOPY, GASTROSCOPY, DUODENOSCOPY (EGD), COMBINED N/A 12/31/2015    Procedure: COMBINED ESOPHAGOSCOPY, GASTROSCOPY, DUODENOSCOPY (EGD);  Surgeon: Brenden Plasencia MD;  [facility]     ESOPHAGOSCOPY, GASTROSCOPY, DUODENOSCOPY (EGD), COMBINED N/A 2/17/2017    Procedure: COMBINED ESOPHAGOSCOPY, GASTROSCOPY, DUODENOSCOPY (EGD);  Surgeon: Brenden Plasencia MD;  [facility]     PICC INSERTION Right 1/9/2017    5fr DL BioFlo PICC, 38cm (1cm external) in the R basilic vein.     REMOVE AND REPLACE BREAST IMPLANT PROSTHESIS N/A 12/31/2015    Procedure: PERCUTANEOUS INSERTION TUBE JEJUNOSTOMY;  Surgeon: Brenden Plasencia MD;  Location: [facility]     REMOVE AND REPLACE BREAST IMPLANT PROSTHESIS N/A 1/25/2016    Procedure: PERCUTANEOUS INSERTION TUBE JEJUNOSTOMY;  Surgeon: Carrie Coleman MD;  [facility]     TRACHEOSTOMY N/A 8/26/2015    Procedure: TRACHEOSTOMY;  Surgeon: Milena Thibodeaux MD;  Location: [facility]       FAMILY HISTORY:   Family History   Problem Relation Age of Onset     CANCER Sister        SOCIAL HISTORY:   Social History   Substance Use Topics     Smoking status: Former Smoker     Packs/day: 2.00     Years: 40.00     Types: Cigarettes     Start date: 1/1/1964     Quit date: 4/18/1998     Smokeless tobacco: Never Used     Alcohol use No       Discharge Medication List as of 4/23/2017  6:23 PM      CONTINUE these medications which have NOT CHANGED    Details   magnesium hydroxide (MILK OF MAGNESIA) 400  MG/5ML suspension Take 5 mLs by mouth daily as needed for constipation or heartburn, Historical      sodium phosphate (FLEET ENEMA) 7-19 GM/118ML rectal enema Place 1 enema rectally once as needed for constipation, Historical      sulfamethoxazole-trimethoprim (BACTRIM/SEPTRA) suspension Take 160 mg by mouth daily Dose based on TMP component., Historical      morphine sulfate (ROSIE) 10 MG 24 hr capsule EMPTY 10MG (1 CAPSULE) INTO WATER AND GIVE VIA J-TUBE EVERY 12 HOURS, Disp-60 capsule, R-0, Local Print      clonazePAM (KLONOPIN) 2 MG tablet 1 tablet (2 mg) by Per J Tube route 4 times daily, Disp-120 tablet, R-1, Local Print      SENEXON-S 8.6-50 MG per tablet TAKE TWO TABLETS VIA J-TUBE 2 TIMES A DAY, Disp-360 tablet, R-3, E-Prescribe      predniSONE 5 MG/5ML solution 20 mLs (20 mg) by Per G Tube route daily, Disp-500 mL, R-3, E-Prescribe      multivitamins with minerals (CERTAVITE/CEROVITE) LIQD liquid 15 mLs by Per Feeding Tube route daily, Disp-450 mL, R-0, E-Prescribe      polyethylene glycol (MIRALAX/GLYCOLAX) Packet 17 g by Per J Tube route 2 times daily Hold for loose stools, Disp-100 packet, R-0, E-Prescribe      budesonide (PULMICORT) 0.5 MG/2ML neb solution Take 2 mLs (0.5 mg) by nebulization 2 times daily, Disp-60 ampule, R-0, E-Prescribe      gabapentin (NEURONTIN) 250 MG/5ML solution TAKE 300MG (6ML) VIA J-TUBE 3 TIMES A DAY...., Disp-450 mL, R-3, E-Prescribe      guaiFENesin (ORGANIDIN) 200 MG TABS tablet Take 1 tablet (200 mg) by mouth 4 times daily, Disp-120 tablet, R-3, E-Prescribe      Cranberry 125 MG TABS 1 tablet by Jejunal Tube route daily, Disp-90 tablet, R-3, E-Prescribe      !! QUEtiapine (SEROQUEL) 25 MG tablet Administer one tab per J-tube as needed at night if scheduled HS dose ineffective for treatment of anxiety or sleeplessness., Disp-90 tablet, R-3, E-Prescribe      HYDROmorphone (DILAUDID) 1 MG/ML LIQD liquid Take 1 mL (1 mg) by mouth every 2 hours as needed for other (dyspnea),  Disp-180 mL, R-0, Local Print      bisacodyl (DULCOLAX) 10 MG Suppository Place 1 suppository (10 mg) rectally daily as needed for constipation, Disp-25 suppository, R-1, Historical      Rectal Cleansers (FLEET [employer] CLEANSING ENEMA) ENEM Place 1 enema rectally daily as needed, Disp-3 Bottle, R-11, E-Prescribe      !! order for DME Equipment being ordered: 1) Avendaño catheter 16F, balloon 10 mL, silicone.  2) Urinary collection bag.  3) Elastic leg strap for Avendaño catheter.  Please fax to [facility].Disp-3 each, R-11, Local Print      LORazepam (ATIVAN) 2 MG/ML (HIGH CONC) solution Take 0.25 mLs (0.5 mg) by mouth every 3 hours as needed for anxiety, Disp-30 mL, R-1, Local Print      !! QUEtiapine (SEROQUEL) 50 MG tablet Take 1 tablet (50 mg) by mouth 2 times daily, Disp-180 tablet, R-3, E-Prescribe      ipratropium (ATROVENT) 0.02 % neb solution Take 2.5 mLs (0.5 mg) by nebulization 4 times daily and every four hours at night PRN., Disp-450 mL, R-0, E-Prescribe      levalbuterol (XOPENEX) 1.25 MG/3ML neb solution Take 3 mLs (1.25 mg) by nebulization 4 times daily and every four hours at night PRN., Disp-540 mL, R-0, E-Prescribe      lactobacillus rhamnosus, GG, (CULTURELL) capsule 1 capsule by Per G Tube route daily, Disp-60 capsule, R-0, Fax      calcium carbonate (TUMS) 500 MG chewable tablet Take 1 tablet (500 mg) by mouth every 2 hours as needed for heartburn, Disp-150 tablet, Historical      ondansetron (ZOFRAN) 4 MG tablet Take 1 tablet (4 mg) by mouth every 4 hours as needed for nausea, Disp-18 tablet, Historical      loperamide (IMODIUM A-D) 2 MG tablet 2-2 mg tablets per J-tube qid prn frequent and/or loose stools. Do not use more than 8 tabs per day., Disp-30 tablet, R-0, E-Prescribe      polyethylene glycol 0.4%- propylene glycol 0.3% (SYSTANE ULTRA) 0.4-0.3 % SOLN ophthalmic solution Place 1-2 drops into both eyes 4 times daily as needed for dry eyes 0900, 1300, 1700, 2100, Disp-1 Bottle, R-3,  E-Prescribe      sertraline (ZOLOFT) 20 MG/ML (HIGH CONC) solution 7.5 mLs (150 mg) by Per Feeding Tube route daily, Disp-105 mL, R-0, E-Prescribe      fexofenadine (ALLEGRA) 180 MG tablet Take 1 tablet (180 mg) by mouth daily, Disp-30 tablet, R-0, E-Prescribe      melatonin (MELATONIN) 1 MG/ML LIQD liquid 3 mLs (3 mg) by Per J Tube route At Bedtime, Disp-300 mL, R-0, Fax      acetaminophen (TYLENOL) 160 MG/5ML oral liquid 20.3 mLs (650 mg) by Per Feeding Tube route every 4 hours as needed for mild pain, Disp-300 mL, R-0, E-Prescribe      sodium chloride (OCEAN) 0.65 % nasal spray Spray 1 spray into both nostrils daily as needed for congestion, Disp-1 Bottle, R-0, E-Prescribe      lidocaine 15 mL, alum & mag hydroxide-simethicone 15 mL GI Cocktail Take 30 mLs by mouth 3 times daily as needed for moderate pain, 30 mL, Oral, 3 TIMES DAILY PRN Starting 11/28/2016, Until Discontinued, Disp-500 mL, R-0, Fax      Nutritional Supplements (IMPACT PEPTIDE 1.5) LIQD Take 50 mL/hr by mouth continuous, Disp-30 Bottle, R-11, E-Prescribe      !! order for DME Equipment being ordered: Methylex foam dressings, alternating pressure pad for mattress and pump.Disp-1 Device, R-0, RAPHAEL, Local Print       !! - Potential duplicate medications found. Please discuss with provider.        Allergies   Allergen Reactions     Levaquin Other (See Comments)     Delirium     Toro Podhaler [Tobramycin] Other (See Comments)     Severe congestion, SOB      Suprax [Cefixime]      See ceftibuten     Augmentin Nausea     Has tolerated for treatment of UTIs in 2016.     Avelox [Moxifloxacin] Anxiety     Cedax [Ceftibuten] Other (See Comments)     Pain in eyes     Penicillins Itching     Tolerated amoxicillin without issues.     Vantin [Cefpodoxime] Nausea     Review of Systems   Respiratory: Positive for shortness of breath.        Physical Exam      /90 (BP Location: Right arm)  Pulse 87  Temp 98.5  F (36.9  C) (Oral)  Resp (!) 0  Ht 1.524 m  (5')  Wt 54.7 kg (120 lb 9.5 oz)  SpO2 (!) 13%  Breastfeeding? No  BMI 23.55 kg/m2    Physical Exam   Gen: alert, eyes open, follows commands and attempts to communicate with her daughter  HEENT:PERRL, no facial tenderness or wounds, head atraumatic  Neck: trach in place. No significant secretions on suctioning.   Back: no CVA tenderness, no midline bony tenderness  CV:RRR without murmurs  PULM: course bilaterally   Abd:soft, nontender, nondistended. Bowel sounds present and normal. feeding tube in place  UE:No traumatic injuries, skin normal  LE:no traumatic injuries, skin normal, no edema.   Neuro: follows commands in all extremities         ED Course     ED Course     Procedures   11:52 AM  The patient was seen and examined by Camila Gómez MD in Room 1.          EKG Interpretation:      Interpreted by Camila Gómez  Time reviewed: 12:28PM  Symptoms at time of EKG: hypoxia and respiratory distress  Rhythm: normal sinus   Rate: 95  Axis: normal  Ectopy: none  Conduction: normal  ST Segments/ T Waves: No ST-T wave changes. Biatrial enlargement  Q Waves: none  Comparison to prior: Unchanged from March 29, 2017    Clinical Impression: abnormal EKG        Critical Care time:  was 60 minutes for this patient excluding procedures.    Labs Ordered and Resulted from Time of ED Arrival Up to the Time of Departure from the ED   CBC WITH PLATELETS DIFFERENTIAL - Abnormal; Notable for the following:        Result Value    WBC 19.1 (*)     RBC Count 3.52 (*)     Hemoglobin 10.8 (*)     Hematocrit 32.6 (*)     RDW 16.2 (*)     Absolute Neutrophil 16.2 (*)     Absolute Lymphocytes 0.6 (*)     Absolute Eosinophils 0.9 (*)     All other components within normal limits   BASIC METABOLIC PANEL - Abnormal; Notable for the following:     Sodium 128 (*)     Potassium 3.2 (*)     Chloride 72 (*)     Carbon Dioxide 45 (*)     Glucose 115 (*)     Urea Nitrogen 84 (*)     GFR Estimate 57 (*)     All other components within normal  limits   ROUTINE UA WITH MICROSCOPIC REFLEX TO CULTURE - Abnormal; Notable for the following:     Blood Urine Small (*)     Protein Albumin Urine 30 (*)     Leukocyte Esterase Urine Trace (*)     WBC Urine 3 (*)     RBC Urine 17 (*)     Mucous Urine Present (*)     All other components within normal limits   OSMOLALITY - Abnormal; Notable for the following:     Osmolality 318 (*)     All other components within normal limits   CRP INFLAMMATION - Abnormal; Notable for the following:     CRP Inflammation 42.0 (*)     All other components within normal limits   ISTAT  GASES LACTATE JOSE ROBERTO POCT - Abnormal; Notable for the following:     Ph Venous 7.47 (*)     PCO2 Venous 75 (*)     PO2 Venous 60 (*)     Bicarbonate Venous 55 (*)     All other components within normal limits   ISTAT  GASES LACTATE JOSE ROBERTO POCT - Abnormal; Notable for the following:     Ph Venous 7.49 (*)     PCO2 Venous 69 (*)     PO2 Venous 57 (*)     Bicarbonate Venous 52 (*)     All other components within normal limits   TROPONIN I   NT PROBNP INPATIENT   PROCALCITONIN   PERIPHERAL IV CATHETER   ISTAT CG4 GASES LACTATE JOSE ROBERTO NURSING POCT   NURSING DRAW AND HOLD   CARDIAC CONTINUOUS MONITORING   ISTAT CG4 GASES LACTATE JOSE ROBERTO NURSING POCT   TELEMETRY MONITORING CARDIOLOGY ADULT   INFLUENZA A/B ANTIGEN       Assessments & Plan (with Medical Decision Making)   67-year-old female with a history of COPD, chronic trach and vent dependent, who presents from her care facility with an episode of hypoxia.  The patient s family notes an episode on Sunday where she seemed to have altered mental state and required being taken off the vent and being bagged, and a similar episode happened today.  On arrival here to the ED, she had responded well to treatment by EMS and so was placed on vent at her usual settings of assist control 14/400, PEEP of 5.  We started with an FiO2 of 60%, and we were eventually able to titrate down to an FiO2 of 35%, which is consistent with her  baseline oxygen needs at her facility.  Chest xray does not show any acute infiltrates and there was no report of significant mucus plugging or fevers at her facility to suggest an acute infection or tracheitis.  Laboratory testing was performed and shows WBC of 19, hemoglobin of 10.8, and platelets of 290, basic metabolic panel with new electrolyte changes with a sodium of 128, potassium of 3.2, chloride of 72, but normal creatinine at 0.97.  Troponin and BNP were negative, and a venous blood gas showing alkalosis.  Given the fact that she has new moderate hyponatremia, we plan to admit her to the Internal Medicine service on intermediate care.      This part of the document was transcribed by Efe Scott for Camila Gómez MD.    At this time the pt's respiratory status appears to be back to baseline for her.     I have reviewed the nursing notes.    I have reviewed the findings, diagnosis, plan and need for follow up with the patient.    Discharge Medication List as of 4/23/2017  6:23 PM          Final diagnoses:   Hyponatremia     ICelestino, am serving as a trained medical scribe to document services personally performed by Camila Gómez MD, based on the provider's statements to me.   ICamila MD, was physically present and have reviewed and verified the accuracy of this note documented by Celestino Corona.  4/18/2017   [facility], [place], EMERGENCY DEPARTMENT    MD FRANCESCO Vines Katrina Anne, MD  04/27/17 1123

## 2017-04-18 NOTE — ED NOTES
Bowel movement twice, cleaning done with 2 nurses taking 20 minutes. Daughter and caregiver at bedside.

## 2017-04-19 NOTE — PROGRESS NOTES
Care Coordinator Progress Note     Admission Date/Time:  4/18/2017  Attending MD:  Steffanie Mead DO     Data  Chart reviewed, discussed with interdisciplinary team.   Patient was admitted for:    Hyponatremia  Urinary retention  Hypoxia.    Concerns with insurance coverage for discharge needs: None.  Current Living Situation: Patient lives in a group home.  Support System: Supportive and Involved  Services Involved: DME and Home Care  Transportation: Ambulance  Barriers to Discharge: Pt is requiring acute inpatient medical treatment.      Assessment  Pt is a 67 year old female with PMH significant for stress-induced cardiomyopathy, severe COPD s/p tracheostomy on home ventilator and GJ tube placement, anxiety, hypertension, chronic indwelling bender catheter, and achalasia who presented with acute hypoxia and AMS. Per chart review, pt is open to [facility] for skilled nursing needs as well as receives 24 hr nursing care through [facility] (phone: [phone] and Fax: [phone]). Spoke with Arley (phone: [phone]), pt's  through [facility], regarding pt's admission. Notified Arley that pt may be able to discharge tomorrow and Arley stated that [facility] should have staffing available if pt would discharge tomorrow. Pt will need [facility] stretcher transport arranged at time of discharge. Will continue to follow plan of care.      Plan  Anticipated Discharge Date:  4/20-4/21  Anticipated Discharge Plan:  Pt will likely discharge to home with resumption of home services.     Sapphire De Leon RN

## 2017-04-19 NOTE — PHARMACY-CONSULT NOTE
"Pharmacy Tube Feeding Consult    Medication reviewed for administration by feeding tube and for potential food/drug interactions.    Recommendation: No changes are needed at this time.     Pharmacy will continue to follow as new medications are ordered.    The following home medications were NOT continued on inpatient admission per \"Discontinuation of nonessential home medications during hospitalization\" policy: cranberry caps    If a therapeutic holiday is deemed inappropriate per the prescriber, please notify the pharmacist regarding the medication order.    The pharmacist is available to answer any questions and/or concerns the patient may have regarding discontinuation of non-essential medications.    Please ensure that these medications are restarted as needed upon discharge via the medication reconciliation discharge process and included on the discharge medication reconciliation report.    Soledad Harp, PharmD  April 19, 2017          "

## 2017-04-19 NOTE — PROGRESS NOTES
Nursing Focus: Admission  D: Arrived at 2130 from ED via bed. Patient accompanied by ER tech/RT. Admitted for hypoxia. Complains of abdominal pain, breathing changes.      I: Admission process began.  Patient oriented to room, enviroment, call light.  Md. notified of patients arrival on unit.     A: Vital signs stable, afebrile.  Patient stable at this time.  Complaining of abdominal pain.     P: Implement plan of care when available. Continue to monitor patient. Nursing interventions as appropriate. Notify md with changes in pt status.

## 2017-04-19 NOTE — PROGRESS NOTES
[facility] Nurse Inpatient Adult Pressure INJURY (PI) Wound Assessment     Follow up assessment of PU(s) on pt's:   Sacrococcygeal area     Data:   Patient History:      per MD note(s):  67 year old female with history of stress-induced cardiomyopathy, severe COPD (Last FEV1 0.35) s/p tracheostomy on home ventilator and GJ tube placement, anxiety, hypertension, chronic indwelling bender catheter, and achalasia, multiple admissions (most recent - ), who presents with acute hypoxia and AMS.        Current mattress: Isolibrium   Current pressure relieving devices:  Foam dressing and Pillows    Moisture Management:  Incontinence Protocol    Catheter secured? Yes    Current Diet / Nutrition:     None      Jose F Assessment and sub scores: Jose F Score  Av.4  Min: 11  Max: 15       Labs:   Recent Labs   Lab Test  17   0857  17   1205   17   0605   17   0246   16   0331   ALBUMIN   --    --    --   2.5*   < >  2.4*   < >   --    HGB  10.1*  10.8*   < >  8.8*   < >  7.4*   < >  8.4*   INR   --    --    --    --    --   1.06   < >   --    WBC  12.9*  19.1*   < >  10.0   < >  10.7   < >  11.8*   A1C   --    --    --    --    --    --    --   5.1   CRP   --   42.0*   < >   --    < >   --    < >   --     < > = values in this interval not displayed.                                                                                 Pressure Injury Assessment  (location #1):   Sacrococcygeal area    Wound History:   Present on admission     3/28/17 (previous admit)  17      Wound Base: 30% dark brown and yellow mixture of moist nonviable tissue, 70% pink granular tissue    Specific Dimensions (length x width x depth, in cm) :   5.1 x 3.1 x 1.7cm    Tunneling:  N/A    Undermining: from 10-1 o'clock, maximum of 1.8 cm at 12 o'clock    Palpation of the wound bed:  Firm, bone easily palpable in inferior wound     Slough appearance:  adherent and moist on proximal aspect of  the wound    Eschar appearance:  none    Periwound Skin:  Intact, minimal erythema    Color: red blanchable erythema    Temperature  normal     Drainage:  Amount: moderate,  Color: pink tinged serous       Odor: minimal    Pain:  Unable to assess, intubated         Intervention:     Patient's chart evaluated.      Jose F Interventions:  Current Jose F Interventions and Care Plan reviewed and updated, appropriate at this time.    Wound was assessed.    Wound Care: was done:    Orders  Written    Supplies  Reviewed    Discussed plan of care with Nurse           Assessment:     Pressure Injury (PI) located on Sacrococcygeal area : Stage 4, community acquired     Status: significantly  since last admission.  nonviable tissue softening, Continues to evolve.   No obvious s/s of infection        Plan:     Nursing to notify the Provider(s) and re-consult the WOC Nurse if wound(s) deteriorate(s).  Plan of care for wound located on Sacrococcygeal area twice a day  Cleanse the wound with microklenz spray.  Pack with kerlix moistened with sterile NS.    Cover with ABD pad and tape      WOC Nurse will return: weekly  Face to face time: 20 mins

## 2017-04-19 NOTE — PROGRESS NOTES
CM notified client admitted to [facility] on 4/18/17 with hypoxia.  Client lives at [facility] where she is vent dependent and gets 24hr care.  CM reviewed [facility] notes and will follow via [employer] for d/c planning. Trans log started.    LUZ Bateman   [facility]   [phone]

## 2017-04-19 NOTE — PLAN OF CARE
Problem: Respiratory Insufficiency (Adult)  Goal: Identify Related Risk Factors and Signs and Symptoms  Related risk factors and signs and symptoms are identified upon initiation of Human [employer] Clinical Practice Guideline (CPG)   Outcome: Improving  Admit for hypoxia, currently on home vent settings. Sating mid 90s. Obtained venous blood gas, results verbally reported to [facility] team cross-cover, Dr. Hernandez. MD aware and will address as needed. K replaced for 2.7. 500 cc bolus given over 4 hours for low sodium. Recheck labs this am. Voiding well via catheter. Pressure sore on sacrum, WOCN consult placed. Will need diet orders to begin tube feeds. Slept well between cares. Continue with current plan of care.

## 2017-04-19 NOTE — PROGRESS NOTES
INTERNAL MEDICINE PROGRESS NOTE  Mary Wang MRN: [id number]  Age: 67 year old, : 1950  Primary care provider: Norman Brito    Mary Wang ([id number]) admitted on 2017    Changes today  -- Put pt back on home vent machine  -- Reduced dosages of sedatives/anxiolytics    Assessment & Plan:    Mary Wang is a 67 year old female with history of stress-induced cardiomyopathy, severe COPD (Last FEV1 0.35) s/p tracheostomy on home ventilator and GJ tube placement, anxiety, hypertension, chronic indwelling bender catheter, and achalasia, multiple admissions (most recent - ), who presents with acute hypoxic hypercarbic respiratory failure, abdominal pain and AMS.     # Acute on chronic hypoxic hypercarbic respiratory failure  # Metabolic alkalosis   Home vent settings: CMV RR 14  PEEP 5 FiO2 40%.   Presented with acute onset of hypoxia requiring bagging and deep suctioning (though suctioning did not yield mucus). On recent admission for SOB and hypercapnea, patient grew serratia, pseudomonas and achromobacter on sputum Cx. Concern for possible mucus plugs, aspiration or central respiratory failure given acuity of event. HCAP also in differential due to previous hx and leukocytosis to 19k, but no fever, cough, or increased sputum production. Influenza negative. CXR negative for acute findings. Could also be a COPD exacerbation though no wheezes on chest auscultation and suddenness of symptom. Patient placed on hospital vent at ED. VBG with metabolic alkalosis with respiratory compensation. Slight improvement on hospital vent overnight.  - RT: Switch to home mechanical ventilator and at home setting  - CRP elevated, but procal is insignificant, sputum Cx with normal roem and likely contaminants, blood Cx pending; low threshold to start on Cefepime, Levofloxacin and Unasyn  - Continue home prednisone, nebs  - Consider pulm consult      # AMS  Likely the  result of oversedation, hypoxia, CO2 narcosis with background dementia. Patient on many sedative/anxiolytics including Zoloft, quetiapine, and clonazepam. Daughter reports that in past episodes she has had O2 sats in the 20s for >20 mins before recovery. She thinks there is some hypoxic injury in addition to her dementia. No recent changes in sedating medications. No fall, head trauma, LOC or seizure activity. Could also be infectious though UA neg for UTI, HCAP recently, pending procal, neg CXR, and leukocytosis may be due to prednisone.  -- Fall precautions  -- Neuro checks  -- Reduction in dose of sedative/anxiolytics      # NATA  # Hyponatremia  # Hypokalemia  Cr 0.97 up from baseline of 0.40. UA with small blood likely from chronic bender. Neg for UTI. Hyponatremia likely hypovolemic, improved with saline boluses. Not on known nephrotoxic medications.   -- IVF NS 1L at 125 mls/hr once  -- Monitor Na and K   -- Daily BMP     # Leukocytosis  Possibly due to prednisone, HCAP and UTI unlikely as above. Does not appear septic and does not meet SIRS criteria.  -- Low threshold to start ABx as above     # Abdominal Pain Likely 2/2 Gastroparesis versus Gastric Outlet Obstruction - No acute etiology on CT scan on 3/23. Suspect due to gastroparesis versus gastric outlet obstruction. GI assisted with PEG-J adjustment during hospitalization.   - Ct TF by J-port  - Nutrition consult  - G-tube venting BID  - Cont bowel regimen     # Sacrococcygeal Ulcer:  One stage III pressure ulcer on her sacrococcygeal area. No improvement reported for several months, present on admission. No signs of acute infection.  - Continue wound cares  - Wound consult     # Acute on Chronic Normocytic Anemia: Stable  No signs of active bleeding.       ===== Chronic Medical Problems =====  # CAD - History of NSTEMI, no meds currently  # Generalized Anxiety Disorder and Air Hunger - Continue home clonazepam, morphine, quetiapine, sertraline,  hydromorphone PRN and lorazepam PRN  # Achalasia and GERD with GJ-Tube dependence - TF per nutrition.      FEN: TF  Prophylaxis: DVT: Heparin GI: Pepcid  Disposition: Pending OT/PT eval   Code Status: DNAR     Erick Tarango MD  Internal Medicine- PGY1  [phone]       Patient was seen and discussed with Steffanie Cifuentes MD who agrees with above assessment and plan.    ==================================================================    Subjective:  Nursing notes reviewed. No acute events overnight. Patient more somnolent this morning.     Objective:  Most recent vital signs:  BP (!) 107/96  Pulse 87  Temp 98.8  F (37.1  C) (Axillary)  Resp 14  Ht 1.524 m (5')  Wt 54.4 kg (119 lb 14.9 oz)  SpO2 96%  Breastfeeding? No  BMI 23.42 kg/m2  Temp:  [98.5  F (36.9  C)-99.2  F (37.3  C)] 98.8  F (37.1  C)  Pulse:  [87] 87  Heart Rate:  [] 78  Resp:  [11-21] 14  BP: ()/() 107/96  FiO2 (%):  [35 %-60 %] 35 %  SpO2:  [92 %-100 %] 96 %  Wt Readings from Last 2 Encounters:   04/19/17 54.4 kg (119 lb 14.9 oz)   04/05/17 60.5 kg (133 lb 6.1 oz)       Intake/Output Summary (Last 24 hours) at 04/19/17 0716  Last data filed at 04/19/17 0600   Gross per 24 hour   Intake              500 ml   Output             1275 ml   Net             -775 ml       Physical exam:  General: Elderly female lying in bed. Difficult to arouse, but opening eyes with deep sternal rub.  HEENT: No Scleral icterus. NCAT, MMM. PERRL, EOMI.  Cardiac: Tachycardic, regular rhythm. No m/r/g. Normal S1, S2.  Pulm: Tachypnea. Diminished breath sounds bilaterally, no wheezes, or crackles. Increased respiratory effort.  Abd: Soft, non distended, non tender abdomen. No hepatosplenomegaly.  Skin: No jaundice, No rash. Stage 3 sacral ulcer not examined yet.  Extremities: No LE edema  Joints: No inflammation noted on joints  Neuro: A&Ox3, no focal deficits    Labs (Past three days):  Reviewed in [facility]    Imaging/procedure results:  Reviewed in  Epic

## 2017-04-19 NOTE — PHARMACY-ADMISSION MEDICATION HISTORY
Admission medication history interview status for the 4/18/2017 admission is complete. See Epic admission navigator for allergy information, pharmacy, prior to admission medications and immunization status.     Medication history interview sources:  [facility] (#[phone]), Fax [phone]    Changes made to PTA medication list (reason)  Added:   -Fleet enema  -Mg Hydroxide  -Bactrim  Deleted:   - Zinc 220mg per j tube daily  - vit A-D & C drops, 1500-400-35 drops, 7ml per J tube QD  -Famotidine 20mg BID  -Lidocaine 5% patch  -Calmoseptine 0.44-20.625% ointment BID  Changed:   - Acetaminophen changed from 650mg to 500mg  - Loperamide changed  from 2mg to 4mg  - Morphine changed from q12h to q8h  - Cranberry Changed from 125mg to 450mg    Additional medication history information (including reliability of information, actions taken by pharmacist):  1) Meds that were deleted were not listed on the MAR from outside facility.  2) bactrim indication PCP prophylaxis as patient on long term higher dose steroids    Prior to Admission medications    Medication Sig Last Dose Taking? Auth Provider   magnesium hydroxide (MILK OF MAGNESIA) 400 MG/5ML suspension Take 5 mLs by mouth daily as needed for constipation or heartburn  Yes Unknown, Entered By History   sodium phosphate (FLEET ENEMA) 7-19 GM/118ML rectal enema Place 1 enema rectally once as needed for constipation 4/13/2017 Yes Unknown, Entered By History   sulfamethoxazole-trimethoprim (BACTRIM/SEPTRA) suspension Take 160 mg by mouth daily Dose based on TMP component. 4/18/2017 at Unknown time Yes Unknown, Entered By History   morphine sulfate (ROSIE) 10 MG 24 hr capsule EMPTY 10MG (1 CAPSULE) INTO WATER AND GIVE VIA J-TUBE EVERY 12 HOURS  Patient taking differently: EMPTY 10MG (1 CAPSULE) INTO WATER AND GIVE VIA J-TUBE EVERY 8 HOURS 4/18/2017 at Unknown time Yes Norman Brito MD   clonazePAM (KLONOPIN) 2 MG tablet 1 tablet (2 mg) by Per J Tube  route 4 times daily 4/18/2017 at Unknown time Yes Norman Brito MD   SENEXON-S 8.6-50 MG per tablet TAKE TWO TABLETS VIA J-TUBE 2 TIMES A DAY 4/18/2017 at Unknown time Yes Norman Brito MD   predniSONE 5 MG/5ML solution 20 mLs (20 mg) by Per G Tube route daily 4/18/2017 at Unknown time Yes Norman Brito MD   multivitamins with minerals (CERTAVITE/CEROVITE) LIQD liquid 15 mLs by Per Feeding Tube route daily  Yes Arley Crespo MD   polyethylene glycol (MIRALAX/GLYCOLAX) Packet 17 g by Per J Tube route 2 times daily Hold for loose stools 4/18/2017 at Unknown time Yes Veronica Huerta APRN CNP   budesonide (PULMICORT) 0.5 MG/2ML neb solution Take 2 mLs (0.5 mg) by nebulization 2 times daily 4/18/2017 at Unknown time Yes Veronica Huerta APRN CNP   gabapentin (NEURONTIN) 250 MG/5ML solution TAKE 300MG (6ML) VIA J-TUBE 3 TIMES A DAY.... 4/18/2017 at Unknown time Yes Norman Brito MD   guaiFENesin (ORGANIDIN) 200 MG TABS tablet Take 1 tablet (200 mg) by mouth 4 times daily 4/18/2017 at Unknown time Yes Norman Brito MD   Cranberry 125 MG TABS 1 tablet by Jejunal Tube route daily  Patient taking differently: 450 mg by Jejunal Tube route daily  4/18/2017 at Unknown time Yes Norman Brito MD   QUEtiapine (SEROQUEL) 25 MG tablet Administer one tab per J-tube as needed at night if scheduled HS dose ineffective for treatment of anxiety or sleeplessness.  Yes Norman Brito MD   HYDROmorphone (DILAUDID) 1 MG/ML LIQD liquid Take 1 mL (1 mg) by mouth every 2 hours as needed for other (dyspnea) 4/18/2017 at Unknown time Yes Norman Brito MD   bisacodyl (DULCOLAX) 10 MG Suppository Place 1 suppository (10 mg) rectally daily as needed for constipation  Yes Celia Chavis MD   Rectal Cleansers (FLEET NATURALS CLEANSING ENEMA) ENEM Place 1 enema rectally daily as needed  Yes Norman Brito MD   order for DME Equipment being ordered: 1) Avendaño catheter 16F,  balloon 10 mL, silicone.  2) Urinary collection bag.  3) Elastic leg strap for Avendaño catheter.  Please fax to [employer]. 4/12/2017 Yes Norman Brito MD   LORazepam (ATIVAN) 2 MG/ML (HIGH CONC) solution Take 0.25 mLs (0.5 mg) by mouth every 3 hours as needed for anxiety  Yes Norman Brito MD   QUEtiapine (SEROQUEL) 50 MG tablet Take 1 tablet (50 mg) by mouth 2 times daily 4/18/2017 at AM Yes Norman Brito MD   ipratropium (ATROVENT) 0.02 % neb solution Take 2.5 mLs (0.5 mg) by nebulization 4 times daily and every four hours at night PRN. 4/18/2017 at Unknown time Yes Norman Brito MD   levalbuterol (XOPENEX) 1.25 MG/3ML neb solution Take 3 mLs (1.25 mg) by nebulization 4 times daily and every four hours at night PRN. 4/18/2017 at Unknown time Yes Norman Brito MD   lactobacillus rhamnosus, GG, (CULTURELL) capsule 1 capsule by Per G Tube route daily 4/18/2017 at Unknown time Yes Norman Brito MD   calcium carbonate (TUMS) 500 MG chewable tablet Take 1 tablet (500 mg) by mouth every 2 hours as needed for heartburn  Yes    ondansetron (ZOFRAN) 4 MG tablet Take 1 tablet (4 mg) by mouth every 4 hours as needed for nausea 4/12/2017 Yes    loperamide (IMODIUM A-D) 2 MG tablet 2-2 mg tablets per J-tube qid prn frequent and/or loose stools. Do not use more than 8 tabs per day.  Patient taking differently: 4 mg per J-tube qid prn frequent and/or loose stools. Do not use more than 8 tabs per day. 4/18/2017 at Unknown time Yes Theresa Robertson APRN CNP   polyethylene glycol 0.4%- propylene glycol 0.3% (SYSTANE ULTRA) 0.4-0.3 % SOLN ophthalmic solution Place 1-2 drops into both eyes 4 times daily as needed for dry eyes 0900, 1300, 1700, 2100 4/18/2017 at Unknown time Yes Theresa Robertson APRN CNP   sertraline (ZOLOFT) 20 MG/ML (HIGH CONC) solution 7.5 mLs (150 mg) by Per Feeding Tube route daily 4/18/2017 at Unknown time Yes Geo Bui MD   fexofenadine  (ALLEGRA) 180 MG tablet Take 1 tablet (180 mg) by mouth daily 4/18/2017 at Unknown time Yes Geo Bui MD   melatonin (MELATONIN) 1 MG/ML LIQD liquid 3 mLs (3 mg) by Per J Tube route At Bedtime 4/17/2017 Yes Francisco Burt MD   acetaminophen (TYLENOL) 160 MG/5ML oral liquid 20.3 mLs (650 mg) by Per Feeding Tube route every 4 hours as needed for mild pain  Patient taking differently: 500 mg by Per Feeding Tube route every 4 hours as needed for mild pain  4/14/2017 Yes Kiesha Martinez MD   sodium chloride (OCEAN) 0.65 % nasal spray Spray 1 spray into both nostrils daily as needed for congestion  Yes Kiesha Martinez MD   lidocaine 15 mL, alum & mag hydroxide-simethicone 15 mL GI Cocktail Take 30 mLs by mouth 3 times daily as needed for moderate pain 4/15/2017 Yes Kiesha Martinez MD   Nutritional Supplements (IMPACT PEPTIDE 1.5) LIQD Take 50 mL/hr by mouth continuous Unknown at Unknown time  Norman Brito MD   order for DME Equipment being ordered: Methylex foam dressings, alternating pressure pad for mattress and pump.   Norman Brito MD     Medication history completed by: Esra Nusseibeh, KojoD Candidate    I attest that the information provided in this note by the pharmacy student is complete and accurate    Fco Perkins, Ray  PGY-1 Resident Pharmacist

## 2017-04-19 NOTE — PROGRESS NOTES
CLINICAL NUTRITION SERVICES - ASSESSMENT NOTE     Nutrition Prescription    RECOMMENDATIONS FOR MDs/PROVIDERS TO ORDER:  - none additional     Malnutrition Status:    - Severe malnutrition in the context of acute on chronic illness     Recommendations already ordered by Registered Dietitian (RD):  - Continue TFs via J-port per current regimen: Impact Peptide @ goal 50 ml/hr (1200 ml/day) to provide 1800 kcals (33 kcals/kg), 113 g PRO (2 gm/kg), 924 ml free H2O, 77 g Fat (50% from MCTs), 168 g CHO and no Fiber daily.  - Initiate @ 20 ml/hr and advance by 10 ml q4hr to goal   - Do not start or advance until lytes (Mg++,K+) WNL and phos>1.9   - Recommend 30-60 ml q4hr fluid flushes for tube patency. Additional fluids and/or adjustments per MD.    - Continue multivitamin/mineral (15 ml/day via FT) to help ensure micronutrient needs being met with suspected hypermetabolic demands and potential interruptions to TF infusions.  - Continue additional zinc for wound healing (stage III pressure injury)  - No additional vitamin A with Impact Peptide.   - Order vitamin C 500 mg x 10 days for wound healing   - Monitor CRPs weekly with immune-modulating regimen     Future/Additional Recommendations:  - EN initiation/tolerance/labs  - Wound status   - Transition to standard regimen prior to d/c? Rec: Isosource 1.5 @ goal 50 ml/hr (1200 ml/day) to provide 1800 kcals (33 kcals/kg), 82 g PRO (1.5 gm/kg), 912 ml free H2O, 211 g CHO and 18 g Fiber daily.Consider additional vitamin A per wound protocol once pt transitions off Impact.        REASON FOR ASSESSMENT  Mary Wang is a/an 67 year old female assessed by the dietitian for Admission Nutrition Risk Screen for tube feeding or parenteral nutrition    Medical history:  Pt with h/o stress-induced cardiomyopathy, severe COPD s/p tracheostomy on home ventilator and GJ tube placement, anxiety, hypertension, chronic indwelling bender catheter, and achalasia, multiple admissions (most  "recent 03/21- 04/05), who presents with acute hypoxia and AMS. Pt with possible gastroparesis vs gastric outlet obstruction. Pt has h/o stage III sacrococcygeal ulcer.     NUTRITION HISTORY  Pt has GJ tube in which she receives full nutrition needs. TFs via J-port and G-tube for venting. Pt would not arouse during NFPA to answer questions and no family in room.     CURRENT NUTRITION ORDERS  Diet: none/NPO    LABS  Labs reviewed  Na+ 129 (L)  K+ 2.7 (L)  BUN: 84 (H)  CRP: 42.0 (H)    MEDICATIONS  Medications reviewed  Cranberry caps  Culturell   Certavite  Zinc sulfate 220 mg     ANTHROPOMETRICS  Height: 152.4 cm (5' 0\") 60\"  Most Recent Weight: 54.4 kg (119 lb 14.9 oz)    IBW: 45 kg  BMI: Normal BMI  Weight History:   Wt Readings from Last 9 Encounters:   04/19/17 54.4 kg (119 lb 14.9 oz)   04/05/17 60.5 kg (133 lb 6.1 oz)   02/17/17 56 kg (123 lb 7.3 oz)   02/03/17 57.5 kg (126 lb 11.2 oz)   01/24/17 55.3 kg (122 lb)   01/10/17 60.5 kg (133 lb 6.4 oz)   01/05/17 59 kg (130 lb 1.1 oz)   12/28/16 53.5 kg (117 lb 15.1 oz)   12/22/16 54 kg (119 lb)   11% weight loss over 2 weeks. Weight fluctuations over time; cannot r/o role of fluid in short-term weight changes.     Dosing Weight: 54 kg (lowest/driest since admit)    ASSESSED NUTRITION NEEDS  Estimated Energy Needs: 7977-7876 kcals/day (30-35 kcals/kg)  Justification: wounds/stage III pressure ulcer  Estimated Protein Needs:  grams protein/day (1.5 - 2 grams of pro/kg)  Justification: Wound healing/stage III pressure ulcer   Estimated Fluid Needs: 7941-8379 mL/day (1 mL/kcal)   Justification: Increased needs and Per provider pending fluid status    PHYSICAL FINDINGS  See malnutrition section below.  Pressure injury per chart: stage III coccyx area  Muscle wasting throughout extremities   Skin: Dry, Multiple areas of ecchymosis and skin tears, fragile   Hair: Dry, thinning areas  Nails: Dry, lackluster    MALNUTRITION  % Intake: Unable to assess  % Weight Loss: " severe; 11% over 2 weeks but cannot r/o role of fluid in weight loss.   Subcutaneous Fat Loss: Upper arm: and Lower arm: moderate   Muscle Loss: Temporal: mild, Scapular bone:, Thoracic region (clavicle, acromium bone, deltoid, trapezius, pectoral):, Upper arm (bicep, tricep):, Lower arm  (forearm):, Dorsal hand: , Upper leg (quadricep, hamstring): and Posterior calf: severe   Fluid Accumulation/Edema: None noted  Malnutrition Diagnosis: Severe malnutrition in the context of acute on chronic illness     NUTRITION DIAGNOSIS  Inadequate oral intake related to inability to take oral PO as evidenced by pt requires long-term nutrition support to meet nutrition needs.       INTERVENTIONS  Implementation  Nutrition Education: Unable to complete due to pt not alert during assessment    Enteral Nutrition - Initiate as highlighted above    Goals  Total avg nutritional intake to meet a minimum of 30 kcal/kg and 1.5 g PRO/kg daily (per dosing wt 54 kg).     Monitoring/Evaluation  Progress toward goals will be monitored and evaluated per protocol.     Terrell Richardson RD, LD  Unit pager: [phone]

## 2017-04-19 NOTE — PROGRESS NOTES
[facility]  Patient is currently open to home care services with [facility].  The patient is currently receiving  RN services.  [facility]  and team have been notified of patient admission.  [facility] liaison will continue to follow patient during stay.  If appropriate provide orders to resume home care at time of discharge.    Leela Ortega RN  [facility] Liaison

## 2017-04-20 NOTE — PLAN OF CARE
Problem: Goal Outcome Summary  Goal: Goal Outcome Summary  Outcome: Therapy, progress toward functional goals as expected  D:  Pt remains on home vent and ready to transfer to step down unit or possibly back to TCU.     I/A: Pt is afebrile.  Had one episode, of de-sating to the high 70's, pt looked panicked, suctioned and turned up O2 to 15L afterwhich pt rapidly recovered SATs to %.  Weaned O2 back down.  No further episodes, did not suciton a lot of secretions during but wonder if thick secretions or some type of plug started the drop.  During the episode pt had PVCs every other complex for a few minutes and then HR returned to baseline.  Pt had one large soft semi-formed BM.  Coccyx wound was changed and re-dressed per WOC  POC.     P:  Transfer pt out to a step down unit or back to her TCU.  Work to get care team to discuss POC with pt's daughter and try to determine if patient remains a capable decision maker for herself.

## 2017-04-20 NOTE — PLAN OF CARE
Problem: Goal Outcome Summary  Goal: Goal Outcome Summary  Outcome: Improving     Pt alert. Interactive. Stable on LTV. Increasingly hypertensive as shift progressed, pt denying anxiety - MD's notified and order for PRN labetalol obtained, pt responded well. New PIV placed, as pre-existing was leaking. TF at goal. UOP adequate via bender. Stool via rectal tube. Plan for possible discharge tomorrow (see care coordinators note). Continue to closely monitor and implement POC.

## 2017-04-20 NOTE — PROGRESS NOTES
INTERNAL MEDICINE PROGRESS NOTE  Mary Wang MRN: [id number]  Age: 67 year old, : 1950  Primary care provider: Norman Brito    Mary Wang ([id number]) admitted on 2017    Changes today  -- To have GOC discussion with family    Assessment & Plan:    Mary Wang is a 67 year old female with history of stress-induced cardiomyopathy, severe COPD (Last FEV1 0.35) s/p tracheostomy on home ventilator and GJ tube placement, anxiety, hypertension, chronic indwelling bender catheter, and achalasia, multiple admissions (most recent - ), who presented with acute hypoxic hypercarbic respiratory failure, abdominal pain and AMS.     # GOC  Discussed with patient about her poor prognosis and for her to consider comfort cares, do not re-hospitalize but patient  Indicated intention to want continued cares. She is currently DNR. Unsure if patient has capacity given her waxing and waning mental status and sometimes conflicting responses about end-of-life cares. Will keep talking to family.     # Acute on chronic hypoxic hypercarbic respiratory failure  # Metabolic alkalosis   Home vent settings: CMV RR 14  PEEP 5 FiO2 40%.   Presented with acute onset of hypoxia requiring bagging and deep suctioning (though suctioning did not yield mucus). On recent admission for SOB and hypercapnea, patient grew serratia, pseudomonas and achromobacter on sputum Cx. Concern for possible mucus plugs, aspiration or central respiratory failure given acuity of event. HCAP also in differential due to previous hx and leukocytosis to 19k, but no fever, cough, or increased sputum production. Influenza negative. CXR negative for acute findings. Could also be a COPD exacerbation though no wheezes on chest auscultation and suddenness of symptom. Patient placed on hospital vent at ED. VBG with metabolic alkalosis with respiratory compensation. Slight improvement on hospital vent overnight.  - RT:  Switch to home mechanical ventilator and at home setting  - CRP elevated, but procal is insignificant, sputum Cx with normal rome and likely contaminants, blood Cx pending; low threshold to start on Cefepime, Levofloxacin and Unasyn  - Continue home prednisone, nebs  - Consider pulm consult      # AMS  Likely the result of oversedation, hypoxia, CO2 narcosis with background dementia. Patient on many sedative/anxiolytics including Zoloft, quetiapine, and clonazepam. Daughter reports that in past episodes she has had O2 sats in the 20s for >20 mins before recovery. She thinks there is some hypoxic injury in addition to her dementia. No recent changes in sedating medications. No fall, head trauma, LOC or seizure activity. Could also be infectious though UA neg for UTI, HCAP recently, pending procal, neg CXR, and leukocytosis may be due to prednisone.  -- Fall precautions  -- Neuro checks  -- More alert today and obeying commands with reduction in dose of sedative/anxiolytics      # NATA (resolved)  # Hyponatremia (resolved)  # Hypokalemia (resolved)  Cr 0.97 up from baseline of 0.40. UA with small blood likely from chronic bender. Neg for UTI. Hyponatremia likely hypovolemic, improved with saline boluses. Not on known nephrotoxic medications.   -- IVF NS 1L at 125 mls/hr once  -- Monitor Na and K   -- Daily BMP     # Leukocytosis (resolved)  Possibly due to prednisone, HCAP and UTI unlikely as above. Does not appear septic and does not meet SIRS criteria.  -- Low threshold to start ABx as above     # Abdominal Pain Likely 2/2 Gastroparesis versus Gastric Outlet Obstruction - No acute etiology on CT scan on 3/23. Suspect due to gastroparesis versus gastric outlet obstruction. GI assisted with PEG-J adjustment during that  hospitalization. UA negative for a UTI.  - Ct TF by [facility]  - Nutrition consult  - G-tube venting BID  - Cont bowel regimen     # Sacrococcygeal Ulcer:  One stage III pressure ulcer on her  sacrococcygeal area. No improvement reported for several months, present on admission. No signs of acute infection.  - Continue wound cares  - Wound consult     # Acute on Chronic Normocytic Anemia: Stable  No signs of active bleeding.       ===== Chronic Medical Problems =====  # CAD - History of NSTEMI, no meds currently  # Generalized Anxiety Disorder and Air Hunger - Continue home clonazepam, morphine, quetiapine, sertraline, hydromorphone PRN and lorazepam PRN  # Achalasia and GERD with GJ-Tube dependence - TF per nutrition.      FEN: TF  Prophylaxis: DVT: Heparin GI: Pepcid  Disposition: Pending OT/PT eval   Code Status: DNAR     Erick Tarango MD  Internal Medicine- PGY1  [phone]       Patient was seen and discussed with Steffanie Cifuentes MD who agrees with above assessment and plan.    ==================================================================    Subjective:  Nursing notes reviewed. Patient desat to low 70s overnight on home vent, looked panicky, required suction and 15L of O2. She is more alert today.     Objective:  Most recent vital signs:  BP (!) 167/93  Pulse 87  Temp 98.6  F (37  C) (Oral)  Resp 19  Ht 1.524 m (5')  Wt 54.4 kg (119 lb 14.9 oz)  SpO2 99%  Breastfeeding? No  BMI 23.42 kg/m2  Temp:  [98.3  F (36.8  C)-98.8  F (37.1  C)] 98.7  F (37.1  C)  Heart Rate:  [] 105  Resp:  [14-20] 19  BP: ()/() 167/93  SpO2:  [91 %-100 %] 99 %  Wt Readings from Last 2 Encounters:   04/19/17 54.4 kg (119 lb 14.9 oz)   04/05/17 60.5 kg (133 lb 6.1 oz)       Intake/Output Summary (Last 24 hours) at 04/19/17 0716  Last data filed at 04/19/17 0600   Gross per 24 hour   Intake              500 ml   Output             1275 ml   Net             -775 ml       Physical exam:  General: Elderly female lying in bed. Opening eyes spontaneously, obeying commands.  HEENT: No Scleral icterus. NCAT, MMM. PERRL, EOMI.  Cardiac: Tachycardic, regular rhythm. No m/r/g. Normal S1, S2.  Pulm:  Tachypnea. Diminished breath sounds bilaterally, no wheezes, or crackles. Increased respiratory effort.  Abd: Soft, non distended, non tender abdomen. No hepatosplenomegaly.  Skin: No jaundice, No rash. Stage 3 sacral ulcer not examined yet.  Extremities: No LE edema  Joints: No inflammation noted on joints  Neuro: A&Ox3, no focal deficits    Labs (Past three days):  Reviewed in [facility]    Imaging/procedure results:  Reviewed in Epic

## 2017-04-21 NOTE — PLAN OF CARE
Problem: Goal Outcome Summary  Goal: Goal Outcome Summary  Outcome: No Change  D: LTV set at  RR 14, PEEP 5, . Sating high 90s at 3 LPM, increased to max 6 LPM with anxiety. TF at 50 ml/hr.   A/I: BP elevated, mostly with anxiety. 1x labetalol given with little effect. Md notified. Pt very anxious today, breathing above vent at 20-21 bpm. Given dilaudid & seroquel. Increased pain medication doses to correlate with home medication regimens per pharmacy. Complained of abdominal pain, Miralax that was held this morning given in the evening since 0 stool output. Avendaño good output. Phosphorus replaced. Wound care completed. Family updated.   P: Continue to assess and monitor. MD communicating with family about returning to group home/hospice care. Recheck Phos in AM.

## 2017-04-21 NOTE — PROGRESS NOTES
INTERNAL MEDICINE PROGRESS NOTE  Mary Wang MRN: [id number]  Age: 67 year old, : 1950  Primary care provider: Norman Brito    Mary Wang ([id number]) admitted on 2017    Changes today  -- To have GOC discussion with family    Assessment & Plan:    Mary Wang is a 67 year old female with history of stress-induced cardiomyopathy, severe COPD (Last FEV1 0.35) s/p tracheostomy on home ventilator and GJ tube placement, anxiety, hypertension, chronic indwelling bender catheter, and achalasia, multiple admissions (most recent - ), who presented with acute hypoxic hypercarbic respiratory failure, abdominal pain and AMS.     # GOC  Discussed with patient about her poor prognosis and for her to consider comfort cares, do not re-hospitalize but patient indicated intention to want continued cares. She is currently DNR. Unsure if patient has capacity given her waxing and waning mental status and sometimes conflicting responses about end-of-life cares. Will keep talking to family. Plan on having a care conference 10 am .    # Acute on chronic hypoxic hypercarbic respiratory failure  # Metabolic alkalosis   Home vent settings: CMV RR 14  PEEP 5 FiO2 40%.   Presented with acute onset of hypoxia requiring bagging and deep suctioning (though suctioning did not yield mucus). On recent admission for SOB and hypercapnea, patient grew serratia, pseudomonas and achromobacter on sputum Cx. Concern for possible mucus plugs, aspiration or central respiratory failure given acuity of event. HCAP also in differential due to previous hx and leukocytosis to 19k, but no fever, cough, or increased sputum production. Influenza negative. CXR negative for acute findings. Could also be a COPD exacerbation though no wheezes on chest auscultation and suddenness of symptom. Patient placed on hospital vent at ED. VBG with metabolic alkalosis with respiratory compensation. Slight  improvement on hospital vent overnight.  - RT: Switch to home mechanical ventilator and at home setting  - CRP elevated, but procal is insignificant, sputum Cx with normal rome and likely contaminants, blood Cx pending; low threshold to start on Cefepime, Levofloxacin and Unasyn  - Continue home prednisone, nebs  - Consider pulm consult      # AMS  Likely the result of oversedation, hypoxia, CO2 narcosis with background dementia. Patient on many sedative/anxiolytics including Zoloft, quetiapine, and clonazepam. Daughter reports that in past episodes she has had O2 sats in the 20s for >20 mins before recovery. She thinks there is some hypoxic injury in addition to her dementia. No recent changes in sedating medications. No fall, head trauma, LOC or seizure activity. Could also be infectious though UA neg for UTI, HCAP recently, pending procal, neg CXR, and leukocytosis may be due to prednisone.  -- Fall precautions  -- Neuro checks  -- More alert today and obeying commands with reduction in dose of sedative/anxiolytics      # NATA (resolved)  # Hyponatremia (resolved)  # Hypokalemia (resolved)  Cr 0.97 up from baseline of 0.40. UA with small blood likely from chronic bender. Neg for UTI. Hyponatremia likely hypovolemic, improved with saline boluses. Not on known nephrotoxic medications.   -- IVF NS 1L at 125 mls/hr once  -- Monitor Na and K   -- Daily BMP     # Leukocytosis (resolved)  Possibly due to prednisone, HCAP and UTI unlikely as above. Does not appear septic and does not meet SIRS criteria.  -- Low threshold to start ABx as above     # Abdominal Pain Likely 2/2 Gastroparesis versus Gastric Outlet Obstruction - No acute etiology on CT scan on 3/23. Suspect due to gastroparesis versus gastric outlet obstruction. GI assisted with PEG-J adjustment during that  hospitalization. UA negative for a UTI.  - Ct TF by [facility]  - Nutrition consult  - G-tube venting BID  - Cont bowel regimen     # Sacrococcygeal  Ulcer:  One stage III pressure ulcer on her sacrococcygeal area. No improvement reported for several months, present on admission. No signs of acute infection.  - Continue wound cares  - Wound consult     # Acute on Chronic Normocytic Anemia: Stable  No signs of active bleeding.       ===== Chronic Medical Problems =====  # CAD - History of NSTEMI, no meds currently  # Generalized Anxiety Disorder and Air Hunger - Continue home clonazepam, morphine, quetiapine, sertraline, hydromorphone PRN and lorazepam PRN  # Achalasia and GERD with GJ-Tube dependence - TF per nutrition.      FEN: TF  Prophylaxis: DVT: Heparin GI: Pepcid  Disposition: Pending OT/PT eval   Code Status: DNAR     Erick Tarango MD  Internal Medicine- PGY1  [phone]       Patient was seen and discussed with Steffanie Cifuentes MD who agrees with above assessment and plan.    ==================================================================    Subjective:  Nursing notes reviewed. Patient continues to have anxiety and air hunger even on ventilator. Blood pressure elevated, labetalol prn ordered.    Objective:  Most recent vital signs:  /81 (BP Location: Right arm)  Pulse 87  Temp 98.2  F (36.8  C) (Oral)  Resp 19  Ht 1.524 m (5')  Wt 54.7 kg (120 lb 9.5 oz)  SpO2 93%  Breastfeeding? No  BMI 23.55 kg/m2  Temp:  [97.9  F (36.6  C)-98.7  F (37.1  C)] 98.2  F (36.8  C)  Heart Rate:  [] 91  Resp:  [18-20] 19  BP: (122-187)/() 153/81  SpO2:  [90 %-100 %] 93 %  Wt Readings from Last 2 Encounters:   04/21/17 54.7 kg (120 lb 9.5 oz)   04/05/17 60.5 kg (133 lb 6.1 oz)       Intake/Output Summary (Last 24 hours) at 04/19/17 0716  Last data filed at 04/19/17 0600   Gross per 24 hour   Intake              500 ml   Output             1275 ml   Net             -775 ml       Physical exam:  General: Elderly female lying in bed. Opening eyes spontaneously, obeying commands.  HEENT: No Scleral icterus. NCAT, MMM. PERRL, EOMI.  Cardiac:  Tachycardic, regular rhythm. No m/r/g. Normal S1, S2.  Pulm: Tachypnea. Diminished breath sounds bilaterally, no wheezes, or crackles. Increased respiratory effort.  Abd: Soft, non distended, non tender abdomen. No hepatosplenomegaly.  Skin: No jaundice, No rash. Stage 3 sacral ulcer not examined yet.  Extremities: No LE edema  Joints: No inflammation noted on joints  Neuro: A&Ox3, no focal deficits    Labs (Past three days):  Reviewed in [facility]    Imaging/procedure results:  Reviewed in Epic

## 2017-04-21 NOTE — PLAN OF CARE
Problem: Respiratory Insufficiency (Adult)  Goal: Identify Related Risk Factors and Signs and Symptoms  Related risk factors and signs and symptoms are identified upon initiation of Human Response Clinical Practice Guideline (CPG)   Outcome: Improving  Afebrile, VSS. BP elevated at times, not meeting parameters for labetalol prn. Pt on home vent settings with 2-3 LPM. Sating mid to high 90s. C/o shortness of breath at times, relief with suctioning, reassurance. Intermittent abdominal pain, zofran/gi cocktail given with good relief. C/o coccyx pain, tylenol/dilaudid prn. Additional Seroquel dose given overnight for insomnia/anxiety. Pt other wise alert between cares, oriented to self, forgetful regarding time/situation. Behavior appropriate. Adequate urine output. Rectal tube patent, 2 of 2 wound care complete per plan of care. Will need phos replaced when med available. Pt hopeful to d/c home to group home today.

## 2017-04-22 NOTE — PLAN OF CARE
Problem: Goal Outcome Summary  Goal: Goal Outcome Summary  D: Acute SOB, Hypoxia  I/A: LTV 3L, Peep 5, . Sats remained >95% throughout night. Complaint of SOB, very anxious. Pulling at lines, purposely removing self from vent. PRN Ativan, seroquel, and Lorzepam used with little effect. Intermittent periods of rest lasting no longer than about an hour. Confused, stating she wants to go home. Repositioned, decreased environmental stimuli, PRN meds used as needed.   P: Continue to follow plan of care, notify MD with changes.

## 2017-04-22 NOTE — PLAN OF CARE
Problem: Respiratory Insufficiency (Adult)  Goal: Identify Related Risk Factors and Signs and Symptoms  Related risk factors and signs and symptoms are identified upon initiation of Human Response Clinical Practice Guideline (CPG)   Outcome: No Change  D: Pt anxious for most of the shift. Vented on LTV at 3L wall O2. Sating mid 90s. HR 80s-100s. BP slightly HTNs. TF at goal. Avendaño with minimal urine output. K+ 6.7, 5.9, 5.5. D 10 at 75 cc/hr.   I/A: PRN labetalol given with some response. PRN ativan and dilaudid given for anxiety with some response, helps for an hour or so. Extra PRNs given for anxiety with good results. Lasix given for high K+ with good output. D 50, insulin and kayexlate given for high K+ with some response. K+ slowly coming down. Will continue to monitor through the night. Waiting for PICC/ MIDLINE to be placed because one PIV extravasated and the second one infiltrated with irritation. MD notified. See notes for further info. PIV placed in the meantime until PICC can be placed. Family at bedside and notified.   P: waiting for PICC placement, continue to monitor IV sites and check K+

## 2017-04-22 NOTE — PROGRESS NOTES
Discussed with MD about need for central access if going to be correcting high K+ due to Pt not having good peripheral veins and have one extravasate and the other infiltrate with irritation. MD talked with family who agree to do a midline/PICC for today as Pt may be transitioning to comfort cares in the near future. Trying to place PIV until PICC/Midline can be placed later today. Pt and family updated.

## 2017-04-22 NOTE — SIGNIFICANT EVENT
D: Patient had a situation where her iv extravasated while receiving a vesicant.  I: Before giving the D50, the site was examined.  Patient was asked if it was painful.  While giving D50 in a piv (only option), for hyperkalemia, patient's piv extravasated. The site was monitored closely while giving it, and as soon as fluid was seen leaking, the push was stopped. 20mL (slow push) was given in total in that piv.  All IVs were stopped. Fluid was drawn back  (about 2cc was returned).  Pain was assessed.  Pt. stated it was painful. Spoke to a pharmacist re: anecdote: hylenex.  That was ordered by MD.  Ice pack was applied. PIV was removed.  Site was marked. Hylenex was given.  Patient was notified of the incident and kept abreast of the treatment.  She said it felt a great deal better after the anecdote was given.  Currently awaiting camera from another floor to take a picture of the site.    A: The site appeared lighter and less inflammed after given the anecdote.  Pt. Appeared more comfortable after the anecdote and appears to be currently resting comfortably.  P: Continue to monitor site; photograph the area when the camera is available.

## 2017-04-22 NOTE — PROGRESS NOTES
INTERNAL MEDICINE PROGRESS NOTE  Mary Wang MRN: [id number]  Age: 67 year old, : 1950  Primary care provider: Norman Brito    Mary Wang ([id number]) admitted on 2017      Assessment & Plan:    Mary Wang is a 67 year old female with history of stress-induced cardiomyopathy, severe COPD (Last FEV1 0.35) s/p tracheostomy on home ventilator and GJ tube placement, anxiety, hypertension, chronic indwelling bender catheter, and achalasia, multiple admissions (most recent - ), who presented with acute hypoxic hypercarbic respiratory failure, abdominal pain and AMS.     # GOC  Discussed with patient about her poor prognosis and for her to consider comfort cares, do not re-hospitalize but patient indicated intention to want continued cares. She is currently DNR. Unsure if patient has capacity given her waxing and waning mental status and sometimes conflicting responses about end-of-life cares. Will keep talking to family. Plan on having a care conference 10 am .    # Acute on chronic hypoxic hypercarbic respiratory failure  # Metabolic alkalosis   Home vent settings: CMV RR 14  PEEP 5 FiO2 40%.   Presented with acute onset of hypoxia requiring bagging and deep suctioning (though suctioning did not yield mucus). On recent admission for SOB and hypercapnea, patient grew serratia, pseudomonas and achromobacter on sputum Cx. Concern for possible mucus plugs, aspiration or central respiratory failure given acuity of event. HCAP also in differential due to previous hx and leukocytosis to 19k, but no fever, cough, or increased sputum production. Influenza negative. CXR negative for acute findings. Could also be a COPD exacerbation though no wheezes on chest auscultation and suddenness of symptom. Patient placed on hospital vent at ED. VBG with metabolic alkalosis with respiratory compensation. Slight improvement on hospital vent overnight.  - RT: Switch to  home mechanical ventilator and at home setting  - CRP elevated, but procal is insignificant, sputum Cx with normal rome and likely contaminants, blood Cx NGTD  - Continue home prednisone, nebs  - Consider pulm consult      # AMS  Likely the result of oversedation, hypoxia, CO2 narcosis with background dementia. Patient on many sedative/anxiolytics including Zoloft, quetiapine, and clonazepam. Daughter reports that in past episodes she has had O2 sats in the 20s for >20 mins before recovery. She thinks there is some hypoxic injury in addition to her dementia. No recent changes in sedating medications. No fall, head trauma, LOC or seizure activity. Could also be infectious though UA neg for UTI, HCAP recently, pending procal, neg CXR, and leukocytosis may be due to prednisone.  -- Fall precautions  -- Neuro checks  -- Patient agitated overnight. Increasing dose of scheduled sedative/anxiolytics with PRNs    # Hypertension  Most likely related to anxiety with essential hypertension as well. Patient was not on antihypertensives at presentation.   -- Labetalol 20 mg Inj Q6H PRN with goal SBP<180     # NATA (resolved)  # Hyponatremia (resolved)  # Hypokalemia/hyperkalemia  Cr 0.97 up from baseline of 0.40. UA with small blood likely from chronic bender. Neg for UTI. Hyponatremia likely hypovolemic, improved with saline boluses. Not on known nephrotoxic medications.   -- IVF NS 1L at 125 mls/hr once  -- Monitor Na and K, replace as necessary  -- Daily BMP     # Leukocytosis   Possibly due to prednisone, HCAP and UTI unlikely as above. Does not appear septic and does not meet SIRS criteria.  -- Low threshold to start ABx  -- Daily CBC     # Abdominal Pain Likely 2/2 Gastroparesis versus Gastric Outlet Obstruction - No acute etiology on CT scan on 3/23. Suspect due to gastroparesis versus gastric outlet obstruction. GI assisted with PEG-J adjustment during that  hospitalization. UA negative for a UTI.  - Ct TF by [place]  -  Nutrition consult  - G-tube venting BID  - Cont bowel regimen     # Sacrococcygeal Ulcer:  One stage III pressure ulcer on her sacrococcygeal area. No improvement reported for several months, present on admission. No signs of acute infection.  - Continue wound cares  - Wound consult     # Acute on Chronic Normocytic Anemia: Stable  No signs of active bleeding.       ===== Chronic Medical Problems =====  # CAD - History of NSTEMI, no meds currently  # Generalized Anxiety Disorder and Air Hunger - Continue home clonazepam, morphine, quetiapine, sertraline, hydromorphone PRN and lorazepam PRN  # Achalasia and GERD with GJ-Tube dependence - TF per nutrition.      FEN: TF  Prophylaxis: DVT: Heparin GI: Pepcid  Disposition: Pending OT/PT eval   Code Status: DNAR     Erick Tarango MD  Internal Medicine- PGY1  [phone]       Patient was seen and discussed with Steffanie Cifuentes MD who agrees with above assessment and plan.    ==================================================================    Subjective:  Nursing notes reviewed. Patient continues to have anxiety and air hunger even on ventilator. Blood pressure elevated, labetalol prn ordered. Doses of her sedative/anxyolitics increased.     Objective:  Most recent vital signs:  /90  Pulse 87  Temp 97.8  F (36.6  C) (Axillary)  Resp 22  Ht 1.524 m (5')  Wt 54.7 kg (120 lb 9.5 oz)  SpO2 98%  Breastfeeding? No  BMI 23.55 kg/m2  Temp:  [97.7  F (36.5  C)-98.8  F (37.1  C)] 97.8  F (36.6  C)  Heart Rate:  [] 98  Resp:  [18-22] 22  BP: (115-194)/() 156/90  SpO2:  [93 %-100 %] 98 %  Wt Readings from Last 2 Encounters:   04/21/17 54.7 kg (120 lb 9.5 oz)   04/05/17 60.5 kg (133 lb 6.1 oz)       Intake/Output Summary (Last 24 hours) at 04/22/17 1114  Last data filed at 04/22/17 1100   Gross per 24 hour   Intake           2277.5 ml   Output             1825 ml   Net            452.5 ml       Physical exam:  General: Elderly female lying in bed, in  obvious respiratory distress.   HEENT: No Scleral icterus. NCAT, MMM. PERRL, EOMI.  Cardiac: Tachycardic, regular rhythm. No m/r/g. Normal S1, S2.  Pulm: Tachypnea. Diminished breath sounds bilaterally, no wheezes, or crackles. Increased respiratory effort.  Abd: Soft, non distended, non tender abdomen. No hepatosplenomegaly.  Skin: No jaundice, No rash. Stage 3 sacral ulcer not examined yet.  Extremities: No LE edema  Joints: No inflammation noted on joints  Neuro: A&Ox3, no focal deficits    Labs (Past three days):  Reviewed in [facility]    Imaging/procedure results:  Reviewed in Epic

## 2017-04-22 NOTE — PROVIDER NOTIFICATION
Pt had a peripheral IV the extravasated. MD paged about possible PICC placement if Pt needs more vesicant IV meds. No new orders received yet.

## 2017-04-22 NOTE — PHARMACY-CONSULT NOTE
Hyperkalemia Consult:   Patient is being treated for hyperkalemia.  4/22 @ 0854 = 5.9, @ 1024 = 6.7  A pharmacy consult was initiated to review the patient's medication list for possible causes of hyperkalemia.  The following medications for this patient may cause or exacerbate hyperkalemia: Scheduled Potassium (has been discontinued); Potassium phosphate replacement protocol - was given dose last night (this has been discontinued)   - patient has subQ heparin as well, low risk of hyperkalemia.    - No additional recommendations, hyperkalemia causing medications have already been discontinued.    Katelynn Garcia, PharmD

## 2017-04-23 NOTE — PROGRESS NOTES
Pt transitioned to comfort cares. Family at bedside. Medication given to keep Pt comfortable when taken of the ventilator. Continue to monitor.

## 2017-04-23 NOTE — PROGRESS NOTES
Pt  at 1442. MD came down for death exam. Family took all of Pt's belongings and LTV. Pt prepared to go down to security. Family given number for security.

## 2017-04-23 NOTE — PROGRESS NOTES
Internal Medicine Daily Progress Note:    In brief, this is a 66 y/o female with PMH of severe COPD FEV1 0.35 ventilator dependent for aobut two years whom presents with recurrent hypoxemic and hypercapnic respiratory failure.      Family conference held today with the patient, her , her two daughters, and her son.  Long discussion about her health the last couple of months and her level of suffering.  Discussed the options to be either continuing on the ventilator with full cares and attempting to the return to the group home.  This likely would mean recurrent respiratory failure and less than optimal comfort as they are limited in how much sedation they can provide outside of the hospital and the patient wanting full cares.  Alternative plan of comfort cares which would mean medications titrated to her level of comfort/air hunger, turning the ventilator off and allowing a natural passing.  Mary's children and  expressed that they no longer want to see her suffer as she has been.  When asked if she would like to pursue comfort cares, she stated yes.  She was alert, oriented to person, place and time during this discussion.  She expressed readiness to transition to comfort care.  Family is very supportive and at bedside.    Plan will be to allow family time with the patient today.  When ready, will provide opiate and benzodiazepine dose for comfort and turn off the ventilator.    Steffanie Mead,   Staff Hospitalist

## 2017-04-23 NOTE — PLAN OF CARE
Problem: Goal Outcome Summary  Goal: Goal Outcome Summary  D: Admitted from group home for acute SOB, hypoxia requiring bagging and deep suctioning  I/A: Hemodynamically stable, sinus rhythm/sinus tach throughout night. Afebrile .Visibly using accesory muscles, increased work of breathing noted. O2 sats remains >97% Remains on LTV 3L  Rate 14 Peep 5. Neurologically waxing and waning. Intermittently following commands, anxious, restless.  Lethargic and somnolent at times. PRN medications utilized when anxious/restless/appearing uncomfortable with increased work of breathing. Still able to intermittently follow commands, open eyes spontaneously after PRN dilaudid and ativan given. While turning and repositioning, noted that patient was very tightly holding onto GJ tube, noted that tube remained in place, however stitches were pulled out. MD notified, gauze and tape used to secure site, abd xray obtained. No further interventions at the moment per kerline COLEMAN. K Checked Q4H, trending down, last value 5.0.   P: Continue to closely monitor, notify MD with changes.

## 2017-04-23 NOTE — PROGRESS NOTES
MD DEATH PRONOUNCEMENT     Physical Exam: Unresponsive to noxious stimuli, no spontaneous respirations. Breath and heart sounds absent, no pupillary light reflex or corneal reflex. No pulses present.      Patient was pronounced dead at: 14:42 2017      Active Problems:   Past Medical History:   Diagnosis Date     Achalasia      Anxiety      Anxiety and depression      Chronic pain      COPD (chronic obstructive pulmonary disease) (H)     trach/vent dependent      GERD (gastroesophageal reflux disease)      Hypertension      Lung nodule     spiculated; followed in pulm clinic      Stress-induced cardiomyopathy      TMJ pain dysfunction syndrome      Tracheostomy in place        Please consider an autopsy if any of the following exist:   NO  Unexpected or unexplained death during or following any dental, medical, or surgical diagnostic treatment procedures.    NO  Death of mother at or up to seven days after delivery.    NO  All  and pediatric deaths.    NO  Death where the cause is sufficiently obscure to delay completion of the death certificate.    NO  Deaths in which autopsy would confirm a suspected illness/condition that would affect surviving family members or recipients of transplanted organs.      The following deaths must be reported to the 's Office:   NO  A death that may be due entirely or in part to any factors other than natural disease (recent surgery, recent trauma, suspected abuse/neglect).    NO  A death that may be an accident, suicide, or homicide.    NO  Any sudden, unexpected death in which there is no prior history of significant heart disease or any other condition associated with sudden death.    NO  Any death which is apparently due to natural causes but in which the  does not have a personal physician familiar with the patient s medical history, social, or environmental situation or the circumstances of the terminal event.    NO  Any death apparently due to  Sudden Infant Death Syndrome.    NO  Deaths that occur during, in association with, or as consequences of a diagnostic, therapeutic, or anesthetic procedure.    NO  Any death in which a fracture of a major bone has occurred within the past (6) six months.    NO  Any death in which the  was an inmate of a public institution or was in the custody of Law Enforcement personnel.      Disposition: Autopsy was discussed with family member/POA and declined.     Chelsie Hernandez MD  Internal Medicine, PGY-1  Pager [phone]

## 2017-04-23 NOTE — PLAN OF CARE
Problem: Goal Outcome Summary  Goal: Goal Outcome Summary  New open/bleeding area noted above site of extravasation on right forearm. Surrounding area very dark/discolored. Appears painful to patient. Dilaudid PRN given for pain. Covered with mepilex. MD notified and aware, no further interventions at this time. Await for primary team. Continue to monitor.

## 2017-04-24 LAB
BACTERIA SPEC CULT: NO GROWTH
BACTERIA SPEC CULT: NO GROWTH
MICRO REPORT STATUS: NORMAL
MICRO REPORT STATUS: NORMAL
SPECIMEN SOURCE: NORMAL
SPECIMEN SOURCE: NORMAL

## 2017-04-24 NOTE — PROGRESS NOTES
Notified by Epic that client passed away on 4/23/17 at Hospital. Notified Taylor at [facility] of client's death and completed disenrollment checklist.  Death Notification form completed and faxed to [facility]. Chart reviewed, notes printed and this CM's encounter closed. Chart handed off to CMS to process disenrollment tasks.    LUZ Bateman   [facility]   [phone]

## 2017-04-25 LAB
INTERPRETATION ECG - MUSE: NORMAL
INTERPRETATION ECG - MUSE: NORMAL

## 2017-04-25 NOTE — DISCHARGE SUMMARY
Medicine Discharge Summary  Mary Wang MRN: [id number]  1950  Home clinic:  Primary care provider: Norman Brito  ___________________________________          Date of Admission:  2017  Date of Discharge:  2017   Admitting Physician:  Steffanie Mead DO  Discharge Physician:  No att. providers found   Discharging Service:  Internal Medicine, [place] [address]     Primary Provider: Norman Brito         Reason for Admission:   Acute hypoxic hypercarbic respiratory failure  Metabolic alkalosis  Abdominal pain  AMS  Acute kidney injury  Hypertension  Hyperkalemia         Hospital Course by Problem:      Mary Wang is a 67 year old female with history of stress-induced cardiomyopathy, severe COPD (Last FEV1 0.35) s/p tracheostomy on home ventilator and GJ tube placement, anxiety, hypertension, chronic indwelling bender catheter, and achalasia, multiple admissions (most recent - ), who presented with acute hypoxic hypercarbic respiratory failure, metabolic alkalosis, abdominal pain and AMS.      # GOC  On 17: Family conference was held with patient, her , her two daughters, and her son. At around 11:00, patient was transitioned to comfort care and  at 14:42.      # Acute on chronic hypoxic hypercarbic respiratory failure  # Metabolic alkalosis   Home vent settings: CMV RR 14  PEEP 5 FiO2 40%.   Presented with acute onset of hypoxia requiring bagging and deep suctioning (though suctioning did not yield mucus). On recent admission for SOB and hypercapnea, patient grew serratia, pseudomonas and achromobacter on sputum Cx. Concern for possible mucus plugs, aspiration or central respiratory failure given acuity of event. HCAP also in differential due to previous hx and leukocytosis to 19k, but no fever, cough, or increased sputum production. Influenza negative. CXR negative for acute findings. VBG  with metabolic alkalosis with respiratory compensation.       # AMS  Likely the result of oversedation, hypoxia, CO2 narcosis with background dementia. Patient on many sedative/anxiolytics including Zoloft, quetiapine, and clonazepam. Daughter reports that in past episodes she has had O2 sats in the 20s for >20 mins before recovery. Patient agitated most of the time and required high doses of sedative/anxiolytics with PRNs.     # Hypertension  Most likely related to anxiety with essential hypertension as well. Patient was not on antihypertensives at presentation. Was controlled with Labetalol PRN.     # NATA   # Hyponatremia   # Hypokalemia/hyperkalemia  Cr 0.97 up from baseline of 0.40. UA with small blood likely from chronic bender. Neg for UTI. Hyponatremia likely hypovolemic, improved with saline boluses.      # Leukocytosis   Possibly due to prednisone, HCAP and UTI unlikely as above. Did not appear septic and did not meet SIRS criteria.      # Abdominal Pain Likely 2/2 Gastroparesis versus Gastric Outlet Obstruction - No acute etiology on CT scan on 3/23. Suspect due to gastroparesis versus gastric outlet obstruction. GI assisted with PEG-J adjustment during that hospitalization. UA was negative for a UTI.      # Sacrococcygeal Ulcer:  One stage III pressure ulcer on her sacrococcygeal area. No improvement reported for several months, present on admission. No signs of acute infection. Received wound cares.      # Acute on Chronic Normocytic Anemia: Stable  No signs of active bleeding.      Physical Exam on day of Discharge:  See death note by Chelsie Hernandez MD       Date of service: 4/23/2017    The patient was discussed with Steffanie Cifuentes DO.    Erick Tarango MD  Internal Medicine-PGY1  [phone]

## 2018-03-19 NOTE — PROGRESS NOTES
Care Coordinator- Discharge Planning     Admission Date/Time:  3/20/2017  Attending MD:  Lucía Guadalupe*     Data  Date of initial CC assessment:  Chart reviewed, discussed with interdisciplinary team.   Patient was admitted for:   1. NSTEMI (non-ST elevated myocardial infarction) (H)    2. Hyponatremia    3. Hypercarbia    4. Acute on chronic respiratory failure with hypoxia (H)    5. Centrilobular emphysema (H)           Assessment  Per MD in morning rounds, plan is for discharge back to group home on Monday. Call placed to [facility] for patients 24 hour home care (ph: [phone]) to update. Arley will arrange staff for Monday and writer to call Monday morning to arrange time of transport. Arley will be faxing over a supply order sheet for our MD to sign and will fax over to [facility]. Discharge orders updated with [facility] and [facility] information as patient continues to have both services.   RNCC to continue to follow for discharge planning and needs.       Plan  Anticipated Discharge Date: Monday 3-27  Anticipated Discharge Plan: back to group home    Catrina Campbell     present

## 2018-05-03 NOTE — PLAN OF CARE
"Problem: Goal Outcome Summary  Goal: Goal Outcome Summary  Outcome: No Change  BP (!) 138/96 (BP Location: Left arm)  Pulse 100  Temp 98.3  F (36.8  C) (Oral)  Resp 18  Ht 1.626 m (5' 4\")  Wt 66 kg (145 lb 8.1 oz)  SpO2 96%  BMI 24.98 kg/m2 on Mechanical vent. Lethargic but easily arousable.  Patient anxious intermittently throughout shift, some relief with PRN dilaudid and ativan and emotional support by staff.  TF changed at from isosource to impact peptide at 1200, currently running at 35ml/hr- to be advanced by 5ml Q4hrs until reach goal rate of 50ml/hr.  Turning Q2 hrs.  Resting between cares.  Will continue with POC.           " Intact

## 2020-06-30 NOTE — PROVIDER NOTIFICATION
DATE:  1/21/2017   TIME OF RECEIPT FROM LAB:  1020  LAB TEST:  Blood cx (R arm)  LAB VALUE: gram pos cocci in clusters  RESULTS GIVEN WITH READ-BACK TO (PROVIDER): Dr. Aguilera [facility]  TIME LAB VALUE REPORTED TO PROVIDER:   1025  Will add vancomycin to antibiotic regimen.      30-Jun-2020 13:39 30-Jun-2020 21:22

## 2021-04-29 NOTE — PHARMACY-VANCOMYCIN DOSING SERVICE
Pharmacy Vancomycin Initial Note  Date of Service 2017  Patient's  1950  66 year old, female    Indication: Ventilator-Associated Pneumonia    Current estimated CrCl = Estimated Creatinine Clearance: 99 mL/min (based on Cr of 0.47).    Creatinine for last 3 days  2017:  1:35 PM Creatinine 0.47 mg/dL*    Recent Vancomycin Level(s) for last 3 days  No results found for requested labs within last 3 days.      Vancomycin IV Administrations (past 72 hours)      No vancomycin orders with administrations in past 72 hours.                Nephrotoxins and other renal medications (Future)    Start     Dose/Rate Route Frequency Ordered Stop    17 1850  vancomycin (VANCOCIN) 1000 mg in dextrose 5% 200 mL PREMIX      1,000 mg  over 90 Minutes Intravenous EVERY 12 HOURS 17 1849      17 1844  piperacillin-tazobactam (ZOSYN) 3.375 g vial to attach to  mL bag     Comments:  DO NOT USE THIS FIELD FOR ADMIN INSTRUCTIONS; INFORMATION DOES NOT SHOW ON MAR. USE THE FIELD ABOVE MARKED ADMIN INSTRUCTIONS    3.375 g  100 mL/hr over 1 Hours Intravenous ONCE 17 1843            Contrast Orders - past 72 hours     None                Plan:  1.  Start vancomycin  1000 mg (18.7 mg/kg using ABW = 53.5 kg) IV q12h.   2.  Goal Trough Level: 15-20 mg/L   3.  Pharmacy will check trough levels as appropriate in 1-3 Days.    4. Serum creatinine levels will be ordered daily for the first week of therapy and at least twice weekly for subsequent weeks.    5. [place] method utilized to dose vancomycin therapy: Method 2    Nicolette Walls, KojoD         normal...

## 2022-01-28 NOTE — TELEPHONE ENCOUNTER
Dr. Brito is already scheduled to see pt on 2/8.    Routing to Dr. Brito to see below note re: call from [facility] palliative care provider.  Her number is [phone].             76 Y.O. female with no significant PMH who presents from outpatient clinic for wheezing, sob and right hilar mass.    # Right hilar mass  - Out patient report of R hilar mass, no imaging on record but was on a CXR  - Former smoker with heavy smoking history.  Weight loss, sob and cough  - Will check CT chest A/P with contrast to assess for lung mass and possible extrathoracic staging.    # Wheezing/SOB  - Maybe mass effect vs COPD given smoking history. No outpatient diagnosis in the past.   - Will start Duoneb ATC. Symbicort BID. Hold off on parental steroids.   - Continuous Pulse OX    #Dysphagia  - History of recent dysphagia, sensation of food stuck in esophagus, no coughing after eating.   - Has been tolerating PO recently.   - WIll follow up with the CT scan. May need MBS if now clear etiology seen on CT.     # DVT PPX  - SQH TID    Will admit to RCU pending COVID PCR.     Dae Hyeon Kim MD-PGY7  Pulmonary Critical Care Fellow  Pager [phone]

## 2022-05-18 NOTE — PLAN OF CARE
"Problem: Goal Outcome Summary  Goal: Goal Outcome Summary  Outcome: No Change  /57 mmHg  Temp(Src) 98.8  F (37.1  C) (Oral)  Resp 14  Ht 1.626 m (5' 4\")  Wt 59 kg (130 lb 1.1 oz)  BMI 22.32 kg/m2  SpO2 100%    Neuro: Trached, unable to speak. Able to nod and shake head and mouth words. Frequent repeated requests, attention-seeking behavior this shift. Gave ativan x2 for c/o feeling anxious + restless behavior noted.   Cardiac: SR, HR 70s-80s. VSS. Afebrile.       Respiratory: Sating >98% on home vent settings, FiO2 at 50%. Bivona 6 cuffed. Suctioned q1-2 h with small to minimal white secretions out.   GI/: Adequate yellow urine output from bender. Soft BM X1.  Diet/appetite: NPO with ice chips, TF off from 8pm-8am.   Activity:  Assist of 2, for t/re.  Pain: At acceptable level on current regimen. Gave dilaudid x1 for c/o back pain.   Skin: Intact, no new deficits noted. Mepilex to buttocks/sacral area for blanchable erythema.    R: Continue with POC. Notify primary team with changes.            " Gen: Alert, NAD  Head: NC, AT   Eyes: PERRL, EOMI, normal lids/conjunctiva  ENT: normal hearing, patent oropharynx without erythema/exudate, uvula midline  Neck: supple, no tenderness, Trachea midline  Pulm: Bilateral BS, normal resp effort, no wheeze/stridor/retractions  CV: RRR, no M/R/G, 2+ radial and dp pulses bl, no edema  Abd: soft, NT/ND, +BS, no hepatosplenomegaly  Mskel: extremities x4 with normal ROM and no joint effusions. no ctl spine ttp.   Skin: no rash, no bruising   Neuro: AAOx3, no sensory/motor deficits, CN 2-12 intact  Psych: has a worried countenance. no si or hi.

## 2024-06-17 PROBLEM — Z76.89 HEALTH CARE HOME: Status: RESOLVED | Noted: 2017-01-01 | Resolved: 2024-06-17

## 2024-06-17 PROBLEM — Z71.89 ADVANCED DIRECTIVES, COUNSELING/DISCUSSION: Status: RESOLVED | Noted: 2017-01-01 | Resolved: 2024-06-17

## 2024-06-20 NOTE — TELEPHONE ENCOUNTER
Incoming call from MATTIE Mcclain [facility] who states SN was coming out to see patient for management of PICC line.  Patient lives in a  with nurses who care for patient.  Sarahi will plan to d/c SN services with homecare at this time as patient no longer has PICC line, and  nurses are capable to manage her care.  PT and OT will remain.    Jessika Bhagat RN    
97.9

## 2025-03-13 NOTE — TELEPHONE ENCOUNTER
I spoke with Ligia and she is aware.     Spoke with Jeanette at patient's home and gave information as noted by provider.  Verbalized understanding.    Spoke with daughter prior to call with Jeanette.  Daughter stated patient has had previous tubes that did leak and this was more her normal.  She was not too concerned.  Informed her of provider's response and she felt this would be very good information for the home, coming from the provider.    Jessika Bhagat RN

## (undated) DEVICE — SUCTION MANIFOLD DORNOCH ULTRA CART UL-CL500

## (undated) DEVICE — KIT ENDO FIRST STEP DISINFECTANT 200ML W/POUCH EP-4

## (undated) DEVICE — ENDO BITE BLOCK ADULT OMNI-BLOC

## (undated) DEVICE — GUIDEWIRE TERUMO .035X260 3CM STR TIP GS3504

## (undated) DEVICE — ENDO DEVICE LOCKING AND BIOPSY CAP M00545261

## (undated) DEVICE — PAD CHUX UNDERPAD 30X30"

## (undated) DEVICE — TAPE DURAPORE 3" SILK 1538-3

## (undated) DEVICE — PACK ENDOSCOPY GI CUSTOM UMMC

## (undated) DEVICE — SOL WATER IRRIG 1000ML BOTTLE 2F7114

## (undated) DEVICE — TUBE TRANS GASTROJEJUNOSTOMY 22FRX45CM  0250-22

## (undated) DEVICE — ENDO TUBING CO2 SMARTCAP STERILE DISP 100145CO2EXT

## (undated) RX ORDER — HYDROMORPHONE HYDROCHLORIDE 1 MG/ML
INJECTION, SOLUTION INTRAMUSCULAR; INTRAVENOUS; SUBCUTANEOUS
Status: DISPENSED
Start: 2017-01-01